# Patient Record
Sex: MALE | Race: WHITE | NOT HISPANIC OR LATINO | Employment: OTHER | URBAN - METROPOLITAN AREA
[De-identification: names, ages, dates, MRNs, and addresses within clinical notes are randomized per-mention and may not be internally consistent; named-entity substitution may affect disease eponyms.]

---

## 2017-01-11 ENCOUNTER — HOSPITAL ENCOUNTER (OUTPATIENT)
Dept: CARDIOLOGY | Facility: CLINIC | Age: 82
Discharge: HOME | End: 2017-01-11
Payer: MEDICARE

## 2017-01-11 ENCOUNTER — OFFICE VISIT (OUTPATIENT)
Dept: ELECTROPHYSIOLOGY | Facility: CLINIC | Age: 82
End: 2017-01-11
Payer: MEDICARE

## 2017-01-11 VITALS
WEIGHT: 157.88 LBS | SYSTOLIC BLOOD PRESSURE: 186 MMHG | BODY MASS INDEX: 23.38 KG/M2 | HEART RATE: 80 BPM | DIASTOLIC BLOOD PRESSURE: 98 MMHG | HEIGHT: 69 IN

## 2017-01-11 DIAGNOSIS — Z95.0 S/P PLACEMENT OF CARDIAC PACEMAKER: Chronic | ICD-10-CM

## 2017-01-11 DIAGNOSIS — I48.19 PERSISTENT ATRIAL FIBRILLATION: Primary | ICD-10-CM

## 2017-01-11 DIAGNOSIS — I48.91 ATRIAL FIBRILLATION, UNSPECIFIED TYPE: ICD-10-CM

## 2017-01-11 DIAGNOSIS — I45.9 HEART BLOCK: ICD-10-CM

## 2017-01-11 DIAGNOSIS — N18.30 CKD (CHRONIC KIDNEY DISEASE) STAGE 3, GFR 30-59 ML/MIN: ICD-10-CM

## 2017-01-11 DIAGNOSIS — Z95.2 S/P AVR (AORTIC VALVE REPLACEMENT): ICD-10-CM

## 2017-01-11 PROCEDURE — 99499 UNLISTED E&M SERVICE: CPT | Mod: S$GLB,,, | Performed by: INTERNAL MEDICINE

## 2017-01-11 PROCEDURE — 99999 PR PBB SHADOW E&M-EST. PATIENT-LVL III: CPT | Mod: PBBFAC,,, | Performed by: INTERNAL MEDICINE

## 2017-01-11 PROCEDURE — 1159F MED LIST DOCD IN RCRD: CPT | Mod: S$GLB,,, | Performed by: INTERNAL MEDICINE

## 2017-01-11 PROCEDURE — 99213 OFFICE O/P EST LOW 20 MIN: CPT | Mod: S$GLB,,, | Performed by: INTERNAL MEDICINE

## 2017-01-11 PROCEDURE — 93000 ELECTROCARDIOGRAM COMPLETE: CPT | Mod: S$GLB,,, | Performed by: INTERNAL MEDICINE

## 2017-01-11 PROCEDURE — 1160F RVW MEDS BY RX/DR IN RCRD: CPT | Mod: S$GLB,,, | Performed by: INTERNAL MEDICINE

## 2017-01-11 PROCEDURE — 1157F ADVNC CARE PLAN IN RCRD: CPT | Mod: S$GLB,,, | Performed by: INTERNAL MEDICINE

## 2017-01-11 RX ORDER — BENZONATATE 100 MG/1
CAPSULE ORAL
Refills: 0 | COMMUNITY
Start: 2016-12-27 | End: 2017-01-11

## 2017-01-11 RX ORDER — TIZANIDINE 4 MG/1
TABLET ORAL
Refills: 0 | COMMUNITY
Start: 2016-12-27 | End: 2017-01-11

## 2017-01-11 RX ORDER — AZITHROMYCIN 250 MG/1
TABLET, FILM COATED ORAL
Refills: 0 | COMMUNITY
Start: 2016-12-27 | End: 2017-01-11

## 2017-01-11 NOTE — PROGRESS NOTES
Subjective:    Patient ID:  Deena Moody is a 86 y.o. male who presents for follow-up of Atrial Fibrillation      HPI  Prior Hx:  I had the pleasure of seeing Mr. Moody today in our electrophysiology clinic in consultation for his new onset atrial fibrillation. 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 On remote monitoring new onset persistent atrial fibrillation was diagnosed. We asked him to come to the clinic for discussion. He has had no perceived change in how he is feeling since being in atrial fibrillation. He has never been diagnosed with atrial fibrillation before. Device interrogation shows 15% AF burden since last device check . He has been persistent since 10/31/2016. He is doing his full activities including mowing his lawn.     Interim Hx:  Mr. Moody was seen 2 months ago with discussion about atrial fibrillation and stroke risk reduction with anticoagulation.  We initiated apixaban.  He returns for follow-up.  He denies any changes in his symptoms or bleeding in his stool or black, tar-like stool.    Device interrogation from 12/1/2016 reported 99% RV pacing with 40% AF burden since implant which was persistent since 10/31/2016.    My interpretation of today's in clinic electrocardiogram is atrial fibrillation with ventricular pacing.    Review of Systems   Constitution: Negative.   HENT: Negative.    Eyes: Negative.    Cardiovascular: Negative.    Respiratory: Negative.    Endocrine: Negative.    Hematologic/Lymphatic: Negative.    Skin: Negative.    Musculoskeletal: Negative.    Gastrointestinal: Negative.    Genitourinary: Negative.    Neurological: Negative.    Psychiatric/Behavioral: Negative.         Objective:    Physical Exam   Constitutional: He is oriented to person, place, and time. He appears  well-developed and well-nourished. No distress.   HENT:   Head: Normocephalic and atraumatic.   Eyes: Conjunctivae are normal. Right eye exhibits no discharge. Left eye exhibits no discharge.   Neck: Neck supple. No JVD present.   Cardiovascular: Normal rate and regular rhythm.  Exam reveals no gallop and no friction rub.    Murmur heard.   Harsh crescendo-decrescendo midsystolic murmur is present with a grade of 3/6  at the upper right sternal border radiating to the neck  Pulmonary/Chest: Effort normal and breath sounds normal. No respiratory distress. He has no wheezes. He has no rales.   Abdominal: Soft. Bowel sounds are normal. He exhibits no distension. There is no tenderness. There is no rebound.   Musculoskeletal: He exhibits no edema or tenderness.   Neurological: He is alert and oriented to person, place, and time.   Skin: Skin is warm and dry. No rash noted. He is not diaphoretic. No erythema.   Psychiatric: He has a normal mood and affect. His behavior is normal. Thought content normal.   Vitals reviewed.        Assessment:       1. Persistent atrial fibrillation    2. Heart block    3. CKD (chronic kidney disease) stage 3, GFR 30-59 ml/min    4. S/P AVR (aortic valve replacement) with possible stenosis    5. S/P placement of cardiac pacemaker         Plan:        In summary, Mr. 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 We again addressed the need for CVA (stroke) prophylaxis with aspirin versus oral anticoagulation (warfarin vs NOACs, discussed bleeding risks, irreversibility of NOACs vs Vitamin K/plasma reversal of warfarin, and need to come to the ER for any head trauma for CT scanning even if asymptomatic). Since  he is asymptomatic and has AV block with 99% RV pacing we are leaving him in atrial fibrillation.  Since his creatinine is borderline 1.5, may at some point need the lower eliquis dosing.  Will check CBC, BMP today.    Will call with results of blood tests.    RTC in 6 months    Thank you for allowing me to participate in the care of this patient. Please do not hesitate to call me with any questions or concerns.    Donn Hastings MD, PhD  Cardiac Electrophysiology

## 2017-01-11 NOTE — MR AVS SNAPSHOT
[facility]  [address]  [facility] [address]  Phone: [phone]  Fax: [phone]                  Deena Moody   2017 9:20 AM   Office Visit    Description:  Male : 1930   Provider:  Donn Hastings MD   Department:  [facility]           Reason for Visit     Atrial Fibrillation           Diagnoses this Visit        Comments    Persistent atrial fibrillation    -  Primary     Heart block         CKD (chronic kidney disease) stage 3, GFR 30-59 ml/min         S/P AVR (aortic valve replacement)         S/P placement of cardiac pacemaker                To Do List           Future Appointments        Provider Department Dept Phone    2017 9:20 AM MD [facility] [phone]    2017 11:00 AM HRA, [facility] [phone]    3/6/2017 8:00 AM HOME MONITOR DEVICE CHECK, [facility] [phone]    2017 7:00 AM LAB, [facility] [phone]    2017 8:20 AM Jam Rowell MD [facility] [phone]      Goals (5 Years of Data)     None      Follow-Up and Disposition     Return in about 6 months (around 2017).    Follow-up and Disposition History      [facility] On Call     [facility] Nurse Care Line - 24/ Assistance  Registered nurses in the [facility] Center provide clinical advisement, health education, appointment booking, and other advisory services.  Call for this free service at [phone].             Medications           Message regarding Medications     Verify the changes and/or additions to your medication regime listed below are the same as discussed with your clinician today.  If any of these changes or additions are incorrect, please notify your healthcare provider.        STOP taking these medications     azithromycin (Z-CHRISTOPH) 250 MG tablet     benzonatate (TESSALON) 100 MG capsule TK 1 C PO BID FOR 10 DAYS     "VIRTUSSIN AC  mg/5 mL syrup TK 5 ML PO Q 6 TO 8 H PRN FOR COUGH           Verify that the below list of medications is an accurate representation of the medications you are currently taking.  If none reported, the list may be blank. If incorrect, please contact your healthcare provider. Carry this list with you in case of emergency.           Current Medications     amlodipine (NORVASC) 5 MG tablet Take 1 tablet (5 mg total) by mouth once daily.    apixaban (ELIQUIS) 5 mg Tab Take 1 tablet (5 mg total) by mouth 2 (two) times daily.    aspirin (ECOTRIN) 81 MG EC tablet Take 1 tablet (81 mg total) by mouth once daily.    atorvastatin (LIPITOR) 80 MG tablet Take 1 tablet (80 mg total) by mouth nightly.    benazepril (LOTENSIN) 5 MG tablet Take 1 tablet (5 mg total) by mouth once daily.    carvedilol (COREG) 12.5 MG tablet Take 1 tablet (12.5 mg total) by mouth 2 (two) times daily.    multivitamin (MULTIVITAMIN) per tablet Take 1 tablet by mouth once daily.      omeprazole (PRILOSEC) 20 MG capsule Take 20 mg by mouth once daily.           Clinical Reference Information           Vital Signs - Last Recorded  Most recent update: 1/11/2017  9:10 AM by Lorrie Hauser MA    BP Pulse Ht Wt BMI    (!) 186/98 80 5' 8.5" (1.74 m) 71.6 kg (157 lb 13.6 oz) 23.65 kg/m2      Blood Pressure          Most Recent Value    BP  (!)  186/98      Allergies as of 1/11/2017     Iodine And Iodide Containing Products      Immunizations Administered on Date of Encounter - 1/11/2017     None      Orders Placed During Today's Visit     Future Labs/Procedures Expected by Expires    Basic metabolic panel  1/11/2017 3/12/2018    CBC auto differential  1/11/2017 3/12/2018      "

## 2017-01-12 ENCOUNTER — TELEPHONE (OUTPATIENT)
Dept: ELECTROPHYSIOLOGY | Facility: CLINIC | Age: 82
End: 2017-01-12

## 2017-01-12 NOTE — TELEPHONE ENCOUNTER
----- Message from Donn Hastings MD sent at 1/11/2017  1:31 PM CST -----  Please notify the patient that his lab results are unchanged and we will keep his medications the same

## 2017-01-12 NOTE — TELEPHONE ENCOUNTER
Pt does not hear well via telephone. He asked that I speak with his wife. I spoke with Mrs. Moody, and informed her that his BMP results are unchanged, and we are going to keep him on his current medication regimen. She verbalized understanding and denied further questions, needs, and concerns.

## 2017-01-18 ENCOUNTER — OFFICE VISIT (OUTPATIENT)
Dept: FAMILY MEDICINE | Facility: CLINIC | Age: 82
End: 2017-01-18
Payer: MEDICARE

## 2017-01-18 VITALS
DIASTOLIC BLOOD PRESSURE: 84 MMHG | HEART RATE: 80 BPM | BODY MASS INDEX: 23.89 KG/M2 | HEIGHT: 68 IN | SYSTOLIC BLOOD PRESSURE: 132 MMHG | WEIGHT: 157.63 LBS

## 2017-01-18 DIAGNOSIS — E78.5 DYSLIPIDEMIA: ICD-10-CM

## 2017-01-18 DIAGNOSIS — Z00.00 ENCOUNTER FOR PREVENTIVE HEALTH EXAMINATION: Primary | ICD-10-CM

## 2017-01-18 DIAGNOSIS — I70.0 ATHEROSCLEROSIS OF AORTA: ICD-10-CM

## 2017-01-18 DIAGNOSIS — I10 ESSENTIAL HYPERTENSION: ICD-10-CM

## 2017-01-18 DIAGNOSIS — I27.89 OTHER CHRONIC PULMONARY HEART DISEASES: ICD-10-CM

## 2017-01-18 DIAGNOSIS — N18.30 CKD (CHRONIC KIDNEY DISEASE) STAGE 3, GFR 30-59 ML/MIN: ICD-10-CM

## 2017-01-18 DIAGNOSIS — I48.19 PERSISTENT ATRIAL FIBRILLATION: ICD-10-CM

## 2017-01-18 PROCEDURE — 99999 PR PBB SHADOW E&M-EST. PATIENT-LVL III: CPT | Mod: PBBFAC,,, | Performed by: NURSE PRACTITIONER

## 2017-01-18 PROCEDURE — G0439 PPPS, SUBSEQ VISIT: HCPCS | Mod: S$GLB,,, | Performed by: NURSE PRACTITIONER

## 2017-01-18 PROCEDURE — 99499 UNLISTED E&M SERVICE: CPT | Mod: S$GLB,,, | Performed by: NURSE PRACTITIONER

## 2017-01-18 RX ORDER — AMLODIPINE BESYLATE 5 MG/1
5 TABLET ORAL DAILY
Qty: 90 TABLET | Refills: 0 | Status: SHIPPED | OUTPATIENT
Start: 2017-01-18 | End: 2017-04-27 | Stop reason: SDUPTHER

## 2017-01-18 RX ORDER — CARVEDILOL 12.5 MG/1
12.5 TABLET ORAL 2 TIMES DAILY
Qty: 90 TABLET | Refills: 0 | Status: SHIPPED | OUTPATIENT
Start: 2017-01-18 | End: 2017-03-28 | Stop reason: SDUPTHER

## 2017-01-18 RX ORDER — OMEPRAZOLE 20 MG/1
20 CAPSULE, DELAYED RELEASE ORAL DAILY
Qty: 90 CAPSULE | Refills: 0 | Status: SHIPPED | OUTPATIENT
Start: 2017-01-18 | End: 2017-04-27 | Stop reason: SDUPTHER

## 2017-01-18 RX ORDER — BENAZEPRIL HYDROCHLORIDE 5 MG/1
5 TABLET ORAL DAILY
Qty: 90 TABLET | Refills: 0 | Status: SHIPPED | OUTPATIENT
Start: 2017-01-18 | End: 2017-04-27 | Stop reason: SDUPTHER

## 2017-01-18 RX ORDER — ATORVASTATIN CALCIUM 80 MG/1
80 TABLET, FILM COATED ORAL NIGHTLY
Qty: 90 TABLET | Refills: 0 | Status: SHIPPED | OUTPATIENT
Start: 2017-01-18 | End: 2017-04-27 | Stop reason: SDUPTHER

## 2017-01-18 NOTE — MR AVS SNAPSHOT
[facility]   [address]  [place] [address]  Phone: [phone]  Fax: [phone]                  Deena Moody   2017 11:00 AM   Office Visit    Description:  Male : 1930   Provider:  Korina Lopez NP   Department:  [facility]           Reason for Visit     Health Risk Assessment           Diagnoses this Visit        Comments    Persistent atrial fibrillation                To Do List           Future Appointments        Provider Department Dept Phone    2017 11:00 AM Korina Lopez NP [facility] [phone]    2/10/2017 2:00 PM Jam Rowell MD [facility] [phone]    3/6/2017 8:00 AM HOME MONITOR DEVICE CHECK, [facility] [phone]    2017 7:00 AM LAB, [facility] [phone]    2017 8:20 AM Jam Rowell MD [facility] [phone]      Goals (5 Years of Data)     None      Follow-Up and Disposition     Return in 3 weeks (on 2/10/2017) for follow-up with PCP, HRA visit in 1 year.       These Medications        Disp Refills Start End    amlodipine (NORVASC) 5 MG tablet 90 tablet 0 2017     Take 1 tablet (5 mg total) by mouth once daily. - Oral    Pharmacy: [facility] - [facility] [address] #: [phone]       apixaban (ELIQUIS) 5 mg Tab 60 tablet 0 2017     Take 1 tablet (5 mg total) by mouth 2 (two) times daily. - Oral    Pharmacy: [facility] - [facility] [address] Ph #: [phone]       atorvastatin (LIPITOR) 80 MG tablet 90 tablet 0 2017     Take 1 tablet (80 mg total) by mouth nightly. - Oral    Pharmacy: [facility] - [facility] [address] Ph #: [phone]       benazepril (LOTENSIN) 5 MG tablet 90 tablet 0 2017    Take 1 tablet (5 mg total) by mouth once daily. - Oral     Pharmacy: [facility] - [place], [place] - [address] #: [phone]       carvedilol (COREG) 12.5 MG tablet 90 tablet 0 1/18/2017     Take 1 tablet (12.5 mg total) by mouth 2 (two) times daily. - Oral    Pharmacy: [facility] - [facility] [address] #: [phone]       Notes to Pharmacy: **Patient requests 90 days supply**    omeprazole (PRILOSEC) 20 MG capsule 90 capsule 0 1/18/2017     Take 1 capsule (20 mg total) by mouth once daily. - Oral    Pharmacy: [facility] - [facility] [address] Ph #: [phone]         [facility]     [facility] Care [facility] - 24/7 Assistance  Registered nurses in the [facility] Center provide clinical advisement, health education, appointment booking, and other advisory services.  Call for this free service at [phone].             Medications           Message regarding Medications     Verify the changes and/or additions to your medication regime listed below are the same as discussed with your clinician today.  If any of these changes or additions are incorrect, please notify your healthcare provider.        CHANGE how you are taking these medications     Start Taking Instead of    omeprazole (PRILOSEC) 20 MG capsule omeprazole (PRILOSEC) 20 MG capsule    Dosage:  Take 1 capsule (20 mg total) by mouth once daily. Dosage:  Take 20 mg by mouth once daily.    Reason for Change:  Reorder            Verify that the below list of medications is an accurate representation of the medications you are currently taking.  If none reported, the list may be blank. If incorrect, please contact your healthcare provider. Carry this list with you in case of emergency.           Current Medications     amlodipine (NORVASC) 5 MG tablet Take 1 tablet (5 mg total) by mouth once daily.    apixaban (ELIQUIS) 5 mg Tab Take 1 tablet (5 mg total) by mouth 2 (two) times daily.    aspirin  "(ECOTRIN) 81 MG EC tablet Take 1 tablet (81 mg total) by mouth once daily.    atorvastatin (LIPITOR) 80 MG tablet Take 1 tablet (80 mg total) by mouth nightly.    benazepril (LOTENSIN) 5 MG tablet Take 1 tablet (5 mg total) by mouth once daily.    carvedilol (COREG) 12.5 MG tablet Take 1 tablet (12.5 mg total) by mouth 2 (two) times daily.    multivitamin (MULTIVITAMIN) per tablet Take 1 tablet by mouth once daily.      omeprazole (PRILOSEC) 20 MG capsule Take 1 capsule (20 mg total) by mouth once daily.           Clinical Reference Information           Vital Signs - Last Recorded  Most recent update: 1/18/2017  9:44 AM by Naomi Estes MA    BP Pulse Ht Wt BMI    132/84 80 5' 8" (1.727 m) 71.5 kg (157 lb 10.1 oz) 23.97 kg/m2      Blood Pressure          Most Recent Value    BP  132/84      Allergies as of 1/18/2017     Iodine And Iodide Containing Products      Immunizations Administered on Date of Encounter - 1/18/2017     None      "

## 2017-01-19 PROBLEM — I27.89 OTHER CHRONIC PULMONARY HEART DISEASES: Status: ACTIVE | Noted: 2017-01-19

## 2017-01-19 NOTE — PROGRESS NOTES
"Deena Moody presented for a  Medicare AWV and comprehensive Health Risk Assessment today. The following components were reviewed and updated:    · Medical history  · Family History  · Social history  · Allergies and Current Medications  · Health Risk Assessment  · Health Maintenance  · Care Team     ** See Completed Assessments for Annual Wellness Visit within the encounter summary.**       The following assessments were completed:  · Living Situation  · CAGE  · Depression Screening  · Timed Get Up and Go  · Whisper Test  · Cognitive Function Screening  · Nutrition Screening  · ADL Screening  · PAQ Screening    Vitals:    01/18/17 0942   BP: 132/84   Pulse: 80   Weight: 71.5 kg (157 lb 10.1 oz)   Height: 5' 8" (1.727 m)     Body mass index is 23.97 kg/(m^2).     Physical Exam   Constitutional: He is oriented to person, place, and time. He appears well-developed and well-nourished. No distress.   Hard of hearing   HENT:   Head: Normocephalic and atraumatic.   Eyes: EOM are normal. Pupils are equal, round, and reactive to light.   Neck: Neck supple. No JVD present. No tracheal deviation present.   Cardiovascular: Normal rate, regular rhythm and intact distal pulses.    Murmur heard.  Pulmonary/Chest: Effort normal and breath sounds normal. No respiratory distress. He has no wheezes. He has no rales.   Abdominal: Soft. Bowel sounds are normal. He exhibits no distension and no mass. There is no tenderness.   Musculoskeletal: Normal range of motion. He exhibits no edema or tenderness.   Neurological: He is alert and oriented to person, place, and time. Gait abnormal. Coordination normal.   Skin: Skin is warm and dry. No erythema. No pallor.   Psychiatric: He has a normal mood and affect. His behavior is normal. Judgment and thought content normal. Cognition and memory are normal. He expresses no homicidal and no suicidal ideation.   Nursing note and vitals reviewed.        Diagnoses and health risks identified today " and associated recommendations/orders:    1. Encounter for preventive health examination    2. Essential hypertension  Chronic; stable on medication.  Followed by PCP.    3. Dyslipidemia  Chronic; stable on medication.  Followed by PCP.    4. Atherosclerosis of aorta  Chronic; stable.  Followed by Cardiology.    5. Persistent atrial fibrillation  Chronic; stable on medication.  Followed by Cardiology.  - apixaban (ELIQUIS) 5 mg Tab; Take 1 tablet (5 mg total) by mouth 2 (two) times daily.  Dispense: 60 tablet; Refill: 0    6. Other chronic pulmonary heart diseases  Chronic; stable.  Followed by Cardiology.    7. CKD (chronic kidney disease) stage 3, GFR 30-59 ml/min  Chronic; stable.  Followed by PCP.    8. Body mass index (BMI) 23.0-23.9, adult      Provided Deena with a 5-10 year written screening schedule and personal prevention plan. Recommendations were developed using the USPSTF age appropriate recommendations. Education, counseling, and referrals were provided as needed. After Visit Summary printed and given to patient which includes a list of additional screenings\tests needed.    Return in 3 weeks (on 2/10/2017) for follow-up with PCP, HRA visit in 1 year.    Korina Lopez NP

## 2017-01-19 NOTE — PATIENT INSTRUCTIONS
Counseling and Referral of Other Preventative  (Italic type indicates deductible and co-insurance are waived)    Patient Name: Deena Moody  Today's Date: 1/19/2017      SERVICE LIMITATIONS RECOMMENDATION    Vaccines    · Pneumococcal (once after 65)    · Influenza (annually)    · Hepatitis B (if medium/high risk)    · Prevnar 13      Hepatitis B medium/high risk factors:       - End-stage renal disease       - Hemophiliacs who received Factor VII or         IX concentrates       - Clients of institutions for the mentally             retarded       - Persons who live in the same house as          a HepB carrier       - Homosexual men       - Illicit injectable drug abusers     Pneumococcal: Done, no repeat necessary     Influenza: Done, repeat in one year     Hepatitis B: N/A Not recommended     Prevnar 13: Done, repeat at next scheduled date    Prostate cancer screening (annually to age 75)     Prostate specific antigen (PSA) Shared decision making with Provider. Sometimes a co-pay may be required if the patient decides to have this test. The USPSTF no longer recommends prostate cancer screening routinely in medicine: not to follow    Colorectal cancer screening (to age 75)    · Fecal occult blood test (annual)  · Flexible sigmoidoscopy (5y)  · Screening colonoscopy (10y)  · Barium enema   N/A Not recommended    Diabetes self-management training (no USPSTF recommendations)  Requires referral by treating physician for patient with diabetes or renal disease. 10 hours of initial DSMT sessions of no less than 30 minutes each in a continuous 12-month period. 2 hours of follow-up DSMT in subsequent years.  N/A Not recommended    Glaucoma screening (no USPSTF recommendation)  Diabetes mellitus, family history   , age 50 or over    American, age 65 or over  Done this year, repeat every year    Medical nutrition therapy for diabetes or renal disease (no recommended schedule)  Requires referral by  treating physician for patient with diabetes or renal disease or kidney transplant within the past 3 years.  Can be provided in same year as diabetes self-management training (DSMT), and CMS recommends medical nutrition therapy take place after DSMT. Up to 3 hours for initial year and 2 hours in subsequent years.  N/A Not recommended    Cardiovascular screening blood tests (every 5 years)  · Fasting lipid panel  Order as a panel if possible  Done this year, repeat every year    Diabetes screening tests (at least every 3 years, Medicare covers annually or at 6-month intervals for prediabetic patients)  · Fasting blood sugar (FBS) or glucose tolerance test (GTT)  Patient must be diagnosed with one of the following:       - Hypertension       - Dyslipidemia       - Obesity (BMI 30kg/m2)       - Previous elevated impaired FBS or GTT       ... or any two of the following:       - Overweight (BMI 25 but <30)       - Family history of diabetes       - Age 65 or older       - History of gestational diabetes or birth of baby weighing more than 9 pounds  Done this year, repeat every year    Abdominal aortic aneurysm screening (once)  · Sonogram   Limited to patients who meet one of the following criteria:       - Men who are 65-75 years old and have smoked more than 100 cigarette in their lifetime       - Anyone with a family history of abdominal aortic aneurysm       - Anyone recommended for screening by the USPSTF  N/A Not recommended    HIV screening (annually for increased risk patients)  · HIV-1 and HIV-2 by EIA, or KEMAL, rapid antibody test or oral mucosa transudate  Patients must be at increased risk for HIV infection per USPSTF guidelines or pregnant. Tests covered annually for patient at increased risk or as requested by the patient. Pregnant patients may receive up to 3 tests during pregnancy.  Risks discussed, screening is not recommended    Smoking cessation counseling (up to 8 sessions per year)  Patients must  be asymptomatic of tobacco-related conditions to receive as a preventative service.  Never Smoker    Subsequent annual wellness visit  At least 12 months since last AWV  Return in one year     The following information is provided to all patients.  This information is to help you find resources for any of the problems found today that may be affecting your health:                Living healthy guide: www.[facility].[place].[facility]      Understanding Diabetes: www.diabetes.org      Eating healthy: www.cdc.gov/healthyweight      CDC home safety checklist: www.cdc.gov/steadi/patient.html      Agency on Aging: www.goea.[place].[facility]      Alcoholics anonymous (AA): www.aa.org      Physical Activity: www.sharri.nih.gov/qq4ruqc      Tobacco use: www.quitwithusla.org

## 2017-02-10 ENCOUNTER — OFFICE VISIT (OUTPATIENT)
Dept: FAMILY MEDICINE | Facility: CLINIC | Age: 82
End: 2017-02-10
Payer: MEDICARE

## 2017-02-10 VITALS
HEIGHT: 68 IN | DIASTOLIC BLOOD PRESSURE: 80 MMHG | SYSTOLIC BLOOD PRESSURE: 124 MMHG | WEIGHT: 158.94 LBS | HEART RATE: 72 BPM | BODY MASS INDEX: 24.09 KG/M2

## 2017-02-10 DIAGNOSIS — I10 ESSENTIAL HYPERTENSION: Primary | ICD-10-CM

## 2017-02-10 DIAGNOSIS — K21.9 GASTROESOPHAGEAL REFLUX DISEASE, ESOPHAGITIS PRESENCE NOT SPECIFIED: ICD-10-CM

## 2017-02-10 DIAGNOSIS — D63.8 CHRONIC DISEASE ANEMIA: ICD-10-CM

## 2017-02-10 DIAGNOSIS — E78.5 DYSLIPIDEMIA: ICD-10-CM

## 2017-02-10 PROCEDURE — 1159F MED LIST DOCD IN RCRD: CPT | Mod: S$GLB,,, | Performed by: FAMILY MEDICINE

## 2017-02-10 PROCEDURE — 1157F ADVNC CARE PLAN IN RCRD: CPT | Mod: S$GLB,,, | Performed by: FAMILY MEDICINE

## 2017-02-10 PROCEDURE — 1160F RVW MEDS BY RX/DR IN RCRD: CPT | Mod: S$GLB,,, | Performed by: FAMILY MEDICINE

## 2017-02-10 PROCEDURE — 1126F AMNT PAIN NOTED NONE PRSNT: CPT | Mod: S$GLB,,, | Performed by: FAMILY MEDICINE

## 2017-02-10 PROCEDURE — 99999 PR PBB SHADOW E&M-EST. PATIENT-LVL III: CPT | Mod: PBBFAC,,, | Performed by: FAMILY MEDICINE

## 2017-02-10 PROCEDURE — 99214 OFFICE O/P EST MOD 30 MIN: CPT | Mod: S$GLB,,, | Performed by: FAMILY MEDICINE

## 2017-02-10 PROCEDURE — 99499 UNLISTED E&M SERVICE: CPT | Mod: S$GLB,,, | Performed by: FAMILY MEDICINE

## 2017-02-10 NOTE — PROGRESS NOTES
Subjective:       Patient ID: Deena Moody is a 86 y.o. male.    Chief Complaint: Follow-up and Hypertension    HPI Comments: 86 years old male who came to the clinic for blood pressure check.  Blood pressure today is stable.  No chest pain palpitations orthopnea or PND.  Patient with chronic anemia but stable in comparison with previous reports.  Patient with good compliance on medical regimen.  No flareups with the esophageal reflux.    Hypertension   Pertinent negatives include no chest pain or palpitations.     Review of Systems   Constitutional: Negative.    HENT: Negative.    Eyes: Negative.    Respiratory: Negative.    Cardiovascular: Negative.  Negative for chest pain, palpitations and leg swelling.   Gastrointestinal: Negative.    Genitourinary: Negative.    Musculoskeletal: Negative.    Skin: Negative.    Neurological: Negative.    Psychiatric/Behavioral: Negative.        Objective:      Physical Exam   Constitutional: He is oriented to person, place, and time. He appears well-developed and well-nourished. No distress.   HENT:   Head: Normocephalic and atraumatic.   Right Ear: External ear normal.   Left Ear: External ear normal.   Nose: Nose normal.   Mouth/Throat: Oropharynx is clear and moist. No oropharyngeal exudate.   Eyes: Conjunctivae and EOM are normal. Pupils are equal, round, and reactive to light. Right eye exhibits no discharge. Left eye exhibits no discharge. No scleral icterus.   Neck: Normal range of motion. Neck supple. No JVD present. No tracheal deviation present. No thyromegaly present.   Cardiovascular: Normal rate, regular rhythm, normal heart sounds and intact distal pulses.  Exam reveals no gallop and no friction rub.    No murmur heard.  Pulmonary/Chest: Effort normal and breath sounds normal. No stridor. No respiratory distress. He has no wheezes. He has no rales. He exhibits no tenderness.   Abdominal: Soft. Bowel sounds are normal. He exhibits no distension and no mass. There  is no tenderness. There is no rebound and no guarding.   Musculoskeletal: Normal range of motion. He exhibits no edema or tenderness.   Lymphadenopathy:     He has no cervical adenopathy.   Neurological: He is alert and oriented to person, place, and time. He has normal reflexes. He displays normal reflexes. No cranial nerve deficit. He exhibits normal muscle tone. Coordination normal.   Skin: Skin is warm and dry. No rash noted. He is not diaphoretic. No erythema. No pallor.   Psychiatric: He has a normal mood and affect. His behavior is normal. Judgment and thought content normal.   Nursing note and vitals reviewed.      Assessment:       1. Essential hypertension    2. Dyslipidemia    3. Gastroesophageal reflux disease, esophagitis presence not specified    4. Chronic disease anemia        Plan:         Deena was seen today for follow-up and hypertension.    Diagnoses and all orders for this visit:    Essential hypertension  -     TSH; Future  -     Comprehensive metabolic panel; Future  -     Lipid panel; Future  -     CBC auto differential; Future    Dyslipidemia  -     TSH; Future  -     Comprehensive metabolic panel; Future  -     Lipid panel; Future    Gastroesophageal reflux disease, esophagitis presence not specified    Chronic disease anemia  -     CBC auto differential; Future

## 2017-02-10 NOTE — MR AVS SNAPSHOT
[facility]   [facility] [place] [address]  Phone: [phone]  Fax: [phone]                  Deena oMody   2/10/2017 2:00 PM   Office Visit    Description:  Male : 1930   Provider:  Jam Rowell MD   Department:  [facility]           Reason for Visit     Follow-up     Hypertension           Diagnoses this Visit        Comments    Essential hypertension    -  Primary     Dyslipidemia         Gastroesophageal reflux disease, esophagitis presence not specified         Chronic disease anemia                To Do List           Future Appointments        Provider Department Dept Phone    3/6/2017 8:00 AM HOME MONITOR DEVICE CHECK, [facility] - Arrhythmia [phone]    2017 7:00 AM LAB, [facility] [phone]    2017 8:20 AM Jam Rowell MD [facility] [phone]      Goals (5 Years of Data)     None      Follow-Up and Disposition     Return in about 6 months (around 8/10/2017), or if symptoms worsen or fail to improve.      [facility]     [facility] Care [facility] -  Assistance  Registered nurses in the [facility] provide clinical advisement, health education, appointment booking, and other advisory services.  Call for this free service at [phone].             Medications           Message regarding Medications     Verify the changes and/or additions to your medication regime listed below are the same as discussed with your clinician today.  If any of these changes or additions are incorrect, please notify your healthcare provider.             Verify that the below list of medications is an accurate representation of the medications you are currently taking.  If none reported, the list may be blank. If incorrect, please contact your healthcare provider. Carry this list with you in case of emergency.           Current Medications     amlodipine  "(NORVASC) 5 MG tablet Take 1 tablet (5 mg total) by mouth once daily.    apixaban (ELIQUIS) 5 mg Tab Take 1 tablet (5 mg total) by mouth 2 (two) times daily.    aspirin (ECOTRIN) 81 MG EC tablet Take 1 tablet (81 mg total) by mouth once daily.    atorvastatin (LIPITOR) 80 MG tablet Take 1 tablet (80 mg total) by mouth nightly.    benazepril (LOTENSIN) 5 MG tablet Take 1 tablet (5 mg total) by mouth once daily.    carvedilol (COREG) 12.5 MG tablet Take 1 tablet (12.5 mg total) by mouth 2 (two) times daily.    multivitamin (MULTIVITAMIN) per tablet Take 1 tablet by mouth once daily.      omeprazole (PRILOSEC) 20 MG capsule Take 1 capsule (20 mg total) by mouth once daily.           Clinical Reference Information           Your Vitals Were     BP Pulse Height Weight BMI    124/80 72 5' 8" (1.727 m) 72.1 kg (158 lb 15.2 oz) 24.17 kg/m2      Blood Pressure          Most Recent Value    BP  124/80      Allergies as of 2/10/2017     Iodine And Iodide Containing Products      Immunizations Administered on Date of Encounter - 2/10/2017     None      Orders Placed During Today's Visit     Future Labs/Procedures Expected by Expires    CBC auto differential  2/10/2017 2/10/2018    Comprehensive metabolic panel  2/10/2017 5/11/2017    Lipid panel  2/10/2017 5/11/2017    TSH  2/10/2017 5/11/2017      [facility] Sign-Up     Activating your [facility] account is as easy as 1-2-3!     1) Visit [email or web address], select Sign Up Now, enter this activation code and your date of birth, then select Next.  Activation code not generated  Current Patient Portal Status: Account disabled      2) Create a username and password to use when you visit [facility] in the future and select a security question in case you lose your password and select Next.    3) Enter your e-mail address and click Sign Up!    Additional Information  If you have questions, please e-mail [email or web address] or call [phone] to talk to our [facility] staff. Remember, " [facility] is NOT to be used for urgent needs. For medical emergencies, dial 911.         Language Assistance Services     ATTENTION: Language assistance services are available, free of charge. Please call [phone].      ATENCIÓN: Si habla franki, tiene a david disposición servicios gratuitos de asistencia lingüística. Llame al [phone].     CHÚ Ý: N?u b?n nói Ti?ng Vi?t, có các d?ch v? h? tr? ngôn ng? mi?n phí dành cho b?n. G?i s? [phone].         [facility] complies with applicable Federal civil rights laws and does not discriminate on the basis of race, color, national origin, age, disability, or sex.

## 2017-03-06 ENCOUNTER — CLINICAL SUPPORT (OUTPATIENT)
Dept: ELECTROPHYSIOLOGY | Facility: CLINIC | Age: 82
End: 2017-03-06
Payer: MEDICARE

## 2017-03-06 DIAGNOSIS — I48.91 ATRIAL FIBRILLATION, UNSPECIFIED TYPE: ICD-10-CM

## 2017-03-06 DIAGNOSIS — Z95.0 CARDIAC PACEMAKER IN SITU: ICD-10-CM

## 2017-03-06 PROCEDURE — 93294 REM INTERROG EVL PM/LDLS PM: CPT | Mod: S$GLB,,, | Performed by: INTERNAL MEDICINE

## 2017-03-06 PROCEDURE — 93296 REM INTERROG EVL PM/IDS: CPT | Mod: S$GLB,,, | Performed by: INTERNAL MEDICINE

## 2017-03-28 RX ORDER — CARVEDILOL 12.5 MG/1
12.5 TABLET ORAL 2 TIMES DAILY
Qty: 90 TABLET | Refills: 3 | Status: SHIPPED | OUTPATIENT
Start: 2017-03-28 | End: 2017-04-27 | Stop reason: SDUPTHER

## 2017-04-27 DIAGNOSIS — I48.19 PERSISTENT ATRIAL FIBRILLATION: ICD-10-CM

## 2017-04-27 RX ORDER — AMLODIPINE BESYLATE 5 MG/1
5 TABLET ORAL DAILY
Qty: 90 TABLET | Refills: 1 | Status: SHIPPED | OUTPATIENT
Start: 2017-04-27 | End: 2017-07-29 | Stop reason: SDUPTHER

## 2017-04-27 RX ORDER — CARVEDILOL 12.5 MG/1
12.5 TABLET ORAL 2 TIMES DAILY
Qty: 90 TABLET | Refills: 1 | Status: SHIPPED | OUTPATIENT
Start: 2017-04-27 | End: 2017-07-29 | Stop reason: SDUPTHER

## 2017-04-27 RX ORDER — BENAZEPRIL HYDROCHLORIDE 5 MG/1
5 TABLET ORAL DAILY
Qty: 90 TABLET | Refills: 1 | Status: SHIPPED | OUTPATIENT
Start: 2017-04-27 | End: 2017-07-29 | Stop reason: SDUPTHER

## 2017-04-27 RX ORDER — ATORVASTATIN CALCIUM 80 MG/1
80 TABLET, FILM COATED ORAL NIGHTLY
Qty: 90 TABLET | Refills: 1 | Status: SHIPPED | OUTPATIENT
Start: 2017-04-27 | End: 2017-07-29 | Stop reason: SDUPTHER

## 2017-04-27 RX ORDER — OMEPRAZOLE 20 MG/1
20 CAPSULE, DELAYED RELEASE ORAL DAILY
Qty: 90 CAPSULE | Refills: 1 | Status: SHIPPED | OUTPATIENT
Start: 2017-04-27 | End: 2017-07-29 | Stop reason: SDUPTHER

## 2017-05-08 ENCOUNTER — LAB VISIT (OUTPATIENT)
Dept: LAB | Facility: HOSPITAL | Age: 82
End: 2017-05-08
Attending: FAMILY MEDICINE
Payer: MEDICARE

## 2017-05-08 DIAGNOSIS — E78.5 DYSLIPIDEMIA: ICD-10-CM

## 2017-05-08 DIAGNOSIS — N18.30 CKD (CHRONIC KIDNEY DISEASE) STAGE 3, GFR 30-59 ML/MIN: ICD-10-CM

## 2017-05-08 DIAGNOSIS — I10 ESSENTIAL HYPERTENSION: ICD-10-CM

## 2017-05-08 DIAGNOSIS — D63.8 CHRONIC DISEASE ANEMIA: ICD-10-CM

## 2017-05-08 LAB
ALBUMIN SERPL BCP-MCNC: 3.5 G/DL
ALP SERPL-CCNC: 132 U/L
ALT SERPL W/O P-5'-P-CCNC: 18 U/L
ANION GAP SERPL CALC-SCNC: 8 MMOL/L
AST SERPL-CCNC: 23 U/L
BASOPHILS # BLD AUTO: 0.06 K/UL
BASOPHILS NFR BLD: 0.7 %
BILIRUB SERPL-MCNC: 1.4 MG/DL
BUN SERPL-MCNC: 17 MG/DL
CALCIUM SERPL-MCNC: 9.1 MG/DL
CHLORIDE SERPL-SCNC: 108 MMOL/L
CHOLEST/HDLC SERPL: 3.4 {RATIO}
CO2 SERPL-SCNC: 27 MMOL/L
CREAT SERPL-MCNC: 1.4 MG/DL
DIFFERENTIAL METHOD: ABNORMAL
EOSINOPHIL # BLD AUTO: 0.7 K/UL
EOSINOPHIL NFR BLD: 7.4 %
ERYTHROCYTE [DISTWIDTH] IN BLOOD BY AUTOMATED COUNT: 15.4 %
EST. GFR  (AFRICAN AMERICAN): 52.2 ML/MIN/1.73 M^2
EST. GFR  (NON AFRICAN AMERICAN): 45.2 ML/MIN/1.73 M^2
GLUCOSE SERPL-MCNC: 92 MG/DL
HCT VFR BLD AUTO: 37.7 %
HDL/CHOLESTEROL RATIO: 29.1 %
HDLC SERPL-MCNC: 117 MG/DL
HDLC SERPL-MCNC: 34 MG/DL
HGB BLD-MCNC: 11.6 G/DL
LDLC SERPL CALC-MCNC: 71.2 MG/DL
LYMPHOCYTES # BLD AUTO: 2.3 K/UL
LYMPHOCYTES NFR BLD: 26.4 %
MCH RBC QN AUTO: 30.3 PG
MCHC RBC AUTO-ENTMCNC: 30.8 %
MCV RBC AUTO: 98 FL
MONOCYTES # BLD AUTO: 0.9 K/UL
MONOCYTES NFR BLD: 9.7 %
NEUTROPHILS # BLD AUTO: 5 K/UL
NEUTROPHILS NFR BLD: 55.8 %
NONHDLC SERPL-MCNC: 83 MG/DL
PLATELET # BLD AUTO: 168 K/UL
PMV BLD AUTO: 9.6 FL
POTASSIUM SERPL-SCNC: 4 MMOL/L
PROT SERPL-MCNC: 7.3 G/DL
RBC # BLD AUTO: 3.83 M/UL
SODIUM SERPL-SCNC: 143 MMOL/L
TRIGL SERPL-MCNC: 59 MG/DL
TSH SERPL DL<=0.005 MIU/L-ACNC: 2.19 UIU/ML
WBC # BLD AUTO: 8.87 K/UL

## 2017-05-08 PROCEDURE — 84443 ASSAY THYROID STIM HORMONE: CPT

## 2017-05-08 PROCEDURE — 36415 COLL VENOUS BLD VENIPUNCTURE: CPT | Mod: PO

## 2017-05-08 PROCEDURE — 85025 COMPLETE CBC W/AUTO DIFF WBC: CPT

## 2017-05-08 PROCEDURE — 80053 COMPREHEN METABOLIC PANEL: CPT

## 2017-05-08 PROCEDURE — 80061 LIPID PANEL: CPT

## 2017-05-11 ENCOUNTER — OFFICE VISIT (OUTPATIENT)
Dept: FAMILY MEDICINE | Facility: CLINIC | Age: 82
End: 2017-05-11
Payer: MEDICARE

## 2017-05-11 VITALS
WEIGHT: 156.5 LBS | HEART RATE: 64 BPM | HEIGHT: 68 IN | BODY MASS INDEX: 23.72 KG/M2 | DIASTOLIC BLOOD PRESSURE: 72 MMHG | SYSTOLIC BLOOD PRESSURE: 140 MMHG

## 2017-05-11 DIAGNOSIS — D63.8 CHRONIC DISEASE ANEMIA: ICD-10-CM

## 2017-05-11 DIAGNOSIS — I48.91 ATRIAL FIBRILLATION, UNSPECIFIED TYPE: ICD-10-CM

## 2017-05-11 DIAGNOSIS — N18.30 CKD (CHRONIC KIDNEY DISEASE) STAGE 3, GFR 30-59 ML/MIN: ICD-10-CM

## 2017-05-11 DIAGNOSIS — I48.19 PERSISTENT ATRIAL FIBRILLATION: ICD-10-CM

## 2017-05-11 DIAGNOSIS — E78.5 DYSLIPIDEMIA: ICD-10-CM

## 2017-05-11 DIAGNOSIS — Z00.00 ANNUAL PHYSICAL EXAM: Primary | ICD-10-CM

## 2017-05-11 DIAGNOSIS — I10 ESSENTIAL HYPERTENSION: ICD-10-CM

## 2017-05-11 PROCEDURE — 99397 PER PM REEVAL EST PAT 65+ YR: CPT | Mod: S$GLB,,, | Performed by: FAMILY MEDICINE

## 2017-05-11 PROCEDURE — 99499 UNLISTED E&M SERVICE: CPT | Mod: S$GLB,,, | Performed by: FAMILY MEDICINE

## 2017-05-11 PROCEDURE — 99999 PR PBB SHADOW E&M-EST. PATIENT-LVL III: CPT | Mod: PBBFAC,,, | Performed by: FAMILY MEDICINE

## 2017-05-11 NOTE — PATIENT INSTRUCTIONS
Anemia  Anemia is a condition that occurs when your body does not have enough healthy red blood cells (RBCs). Your RBCs are the parts of your blood that carry oxygen throughout your body. A protein called hemoglobin allows your RBCs to absorb and release oxygen. Without enough RBCs or hemoglobin, your body doesn't get enough oxygen. Symptoms of anemia may then occur.    Symptoms of anemia  Some people with anemia have no symptoms. But most people have symptoms that range from mild to severe. These can include:  · Tiredness (fatigue)  · Weakness  · Pale skin  · Shortness of breath  · Dizziness or fainting  · Rapid heartbeat  · Trouble doing normal amounts of activity  · Jaundice (yellowing of your eyes, skin, or mouth; dark urine)  Causes of anemia  Anemia can occur when your body:  · Loses too much blood  · Does not make enough RBCs  · Destroys your RBCs at a faster rate than it can replace them  · Does not make a normal amount of hemoglobin in your RBCs  These problems can occur for many reasons, including:  · A condition that you are born with (congenital or inherited). This includes sickle cell disease or thalassemia.  · Heavy bleeding for any reason, including injury, surgery, childbirth, or even heavy menstrual periods.  · Being low in certain nutrients, such as iron, folate, or vitamin B12. This may be due to poor diet. Also, a condition like celiac disease or Crohn's disease can cause poor absorption of these nutrients  · Certain chronic conditions like diabetes, arthritis, or kidney disease.  · Certain chronic infections like tuberculosis or HIV.  · Exposure to certain medications, such as those used for chemotherapy.  There are different types of anemia. Your doctor can tell you more about the type of anemia you have and what may have caused it.  Diagnosing anemia  To diagnose anemia, your doctor gives you blood tests. These can include:  · Complete blood cell count (CBC). This test measures the amounts  of the different types of blood cells.  · Blood smear. This test checks the size and shape of your blood cells. To perform the test, your doctor views a drop of your blood under a microscope. Your doctor uses a stain to make the blood cells easier to see.  · Iron studies. These tests measure the amount of iron in your blood. Your body needs iron to make hemoglobin in your RBCs.  · Vitamin B12 and folate studies. These tests check for some of the components that help give RBCs a normal size and shape.  · Reticulocyte count. This test measures the amount of new RBCs that your bone marrow makes.  · Hemoglobin electrophoresis. This test checks for problems with your hemoglobin in RBCs.  Treating anemia  Treatment for anemia is based on the type of anemia, its cause, and the severity of your symptoms. Treatments may include:  · Diet changes. This involves increasing the amount of certain nutrients in your diet, such as iron, vitamin B12, or folate. Your doctor may also prescribe nutrient supplements.  · Medications. Certain medications treat the cause of your anemia. Others help build new RBCs or relieve symptoms. If a medication is the cause of your anemia, you may need to stop or change it.  · Blood transfusions. Replacing some of your blood can increase the number of healthy RBCs in your body.  · Surgery. In some cases, your doctor can do surgery to treat the underlying cause of anemia. If you need surgery, your doctor will explain the procedure and outline the risks and benefits for you.  Long-term concerns  If you have a certain type of anemia, you can expect a full recovery after treatment. If you have other types of anemia (especially a type you're born with), you will need to manage it for life. Your doctor can tell you more.  Date Last Reviewed: 4/27/2015  © 1520-3457 [employer]. [address] [place] [address], [place] [address]. All rights reserved. This information is not intended as a substitute for  professional medical care. Always follow your healthcare professional's instructions.

## 2017-05-11 NOTE — MR AVS SNAPSHOT
[facility]   [facility] [place] [address]  Phone: [phone]  Fax: [phone]                  Deena Moody   2017 8:20 AM   Office Visit    Description:  Male : 1930   Provider:  Jam Rowell MD   Department:  [facility]           Reason for Visit     Annual Exam           Diagnoses this Visit        Comments    Annual physical exam    -  Primary     Essential hypertension         Persistent atrial fibrillation         Dyslipidemia         CKD (chronic kidney disease) stage 3, GFR 30-59 ml/min         Chronic disease anemia                To Do List           Future Appointments        Provider Department Dept Phone    2017 8:00 AM HOME MONITOR DEVICE CHECK, [facility] - Arrhythmia [phone]    2017 9:00 AM EKG, [facility] - [phone]    2017 9:20 AM [facility] - Arrhythmia [phone]    2017 7:00 AM LAB, [facility] [phone]    11/10/2017 8:20 AM Jam Rowell MD [facility] [phone]      Goals (5 Years of Data)     None      Follow-Up and Disposition     Return in about 6 months (around 2017), or if symptoms worsen or fail to improve.      [facility]     [facility] Care [facility] - 24/ Assistance  Unless otherwise directed by your provider, please contact [facility], our nurse care line that is available for  assistance.     Registered nurses in the [facility] provide: appointment scheduling, clinical advisement, health education, and other advisory services.  Call: [phone] (toll free)               Medications           Message regarding Medications     Verify the changes and/or additions to your medication regime listed below are the same as discussed with your clinician today.  If any of these changes or additions are incorrect, please notify your healthcare provider.            "  Verify that the below list of medications is an accurate representation of the medications you are currently taking.  If none reported, the list may be blank. If incorrect, please contact your healthcare provider. Carry this list with you in case of emergency.           Current Medications     amlodipine (NORVASC) 5 MG tablet Take 1 tablet (5 mg total) by mouth once daily.    apixaban (ELIQUIS) 5 mg Tab Take 1 tablet (5 mg total) by mouth 2 (two) times daily.    aspirin (ECOTRIN) 81 MG EC tablet Take 1 tablet (81 mg total) by mouth once daily.    atorvastatin (LIPITOR) 80 MG tablet Take 1 tablet (80 mg total) by mouth nightly.    benazepril (LOTENSIN) 5 MG tablet Take 1 tablet (5 mg total) by mouth once daily.    carvedilol (COREG) 12.5 MG tablet Take 1 tablet (12.5 mg total) by mouth 2 (two) times daily.    multivitamin (MULTIVITAMIN) per tablet Take 1 tablet by mouth once daily.      omeprazole (PRILOSEC) 20 MG capsule Take 1 capsule (20 mg total) by mouth once daily.           Clinical Reference Information           Your Vitals Were     BP Pulse Height Weight BMI    140/72 (BP Location: Left arm, Patient Position: Sitting, BP Method: Manual) 64 5' 8" (1.727 m) 71 kg (156 lb 8.4 oz) 23.8 kg/m2      Blood Pressure          Most Recent Value    BP  (!)  140/72      Allergies as of 5/11/2017     Iodine And Iodide Containing Products      Immunizations Administered on Date of Encounter - 5/11/2017     None      Orders Placed During Today's Visit     Future Labs/Procedures Expected by Expires    CBC auto differential  5/11/2017 12/27/2018    Comprehensive metabolic panel  5/11/2017 12/27/2018    Lipid panel  5/11/2017 12/27/2018    TSH  5/11/2017 12/27/2018      [facility] Sign-Up     Activating your [facility] account is as easy as 1-2-3!     1) Visit [email or web address], select Sign Up Now, enter this activation code and your date of birth, then select Next.  Activation code not generated  Current Patient Portal Status: " Account disabled      2) Create a username and password to use when you visit [facility] in the future and select a security question in case you lose your password and select Next.    3) Enter your e-mail address and click Sign Up!    Additional Information  If you have questions, please e-mail [email or web address] or call [phone] to talk to our [facility] staff. Remember, [facility] is NOT to be used for urgent needs. For medical emergencies, dial 911.         Instructions      Anemia  Anemia is a condition that occurs when your body does not have enough healthy red blood cells (RBCs). Your RBCs are the parts of your blood that carry oxygen throughout your body. A protein called hemoglobin allows your RBCs to absorb and release oxygen. Without enough RBCs or hemoglobin, your body doesn't get enough oxygen. Symptoms of anemia may then occur.    Symptoms of anemia  Some people with anemia have no symptoms. But most people have symptoms that range from mild to severe. These can include:  · Tiredness (fatigue)  · Weakness  · Pale skin  · Shortness of breath  · Dizziness or fainting  · Rapid heartbeat  · Trouble doing normal amounts of activity  · Jaundice (yellowing of your eyes, skin, or mouth; dark urine)  Causes of anemia  Anemia can occur when your body:  · Loses too much blood  · Does not make enough RBCs  · Destroys your RBCs at a faster rate than it can replace them  · Does not make a normal amount of hemoglobin in your RBCs  These problems can occur for many reasons, including:  · A condition that you are born with (congenital or inherited). This includes sickle cell disease or thalassemia.  · Heavy bleeding for any reason, including injury, surgery, childbirth, or even heavy menstrual periods.  · Being low in certain nutrients, such as iron, folate, or vitamin B12. This may be due to poor diet. Also, a condition like celiac disease or Crohn's disease can cause poor absorption of these nutrients  · Certain  chronic conditions like diabetes, arthritis, or kidney disease.  · Certain chronic infections like tuberculosis or HIV.  · Exposure to certain medications, such as those used for chemotherapy.  There are different types of anemia. Your doctor can tell you more about the type of anemia you have and what may have caused it.  Diagnosing anemia  To diagnose anemia, your doctor gives you blood tests. These can include:  · Complete blood cell count (CBC). This test measures the amounts of the different types of blood cells.  · Blood smear. This test checks the size and shape of your blood cells. To perform the test, your doctor views a drop of your blood under a microscope. Your doctor uses a stain to make the blood cells easier to see.  · Iron studies. These tests measure the amount of iron in your blood. Your body needs iron to make hemoglobin in your RBCs.  · Vitamin B12 and folate studies. These tests check for some of the components that help give RBCs a normal size and shape.  · Reticulocyte count. This test measures the amount of new RBCs that your bone marrow makes.  · Hemoglobin electrophoresis. This test checks for problems with your hemoglobin in RBCs.  Treating anemia  Treatment for anemia is based on the type of anemia, its cause, and the severity of your symptoms. Treatments may include:  · Diet changes. This involves increasing the amount of certain nutrients in your diet, such as iron, vitamin B12, or folate. Your doctor may also prescribe nutrient supplements.  · Medications. Certain medications treat the cause of your anemia. Others help build new RBCs or relieve symptoms. If a medication is the cause of your anemia, you may need to stop or change it.  · Blood transfusions. Replacing some of your blood can increase the number of healthy RBCs in your body.  · Surgery. In some cases, your doctor can do surgery to treat the underlying cause of anemia. If you need surgery, your doctor will explain the  procedure and outline the risks and benefits for you.  Long-term concerns  If you have a certain type of anemia, you can expect a full recovery after treatment. If you have other types of anemia (especially a type you're born with), you will need to manage it for life. Your doctor can tell you more.  Date Last Reviewed: 4/27/2015  © 8885-3698 [employer]. [address] [place] [address], [place] [address]. All rights reserved. This information is not intended as a substitute for professional medical care. Always follow your healthcare professional's instructions.             Language Assistance Services     ATTENTION: Language assistance services are available, free of charge. Please call [phone].      ATENCIÓN: Si aston doan, tiene a david disposición servicios gratuitos de asistencia lingüística. Llame al [phone].     LUCINA Ý: N?u b?n nói Ti?ng Vi?t, có các d?ch v? h? tr? ngôn ng? mi?n phí dành cho b?n. G?i s? [phone].         [facility] complies with applicable Federal civil rights laws and does not discriminate on the basis of race, color, national origin, age, disability, or sex.

## 2017-05-11 NOTE — PROGRESS NOTES
Subjective:       Patient ID: Deena Moody is a 86 y.o. male.    Chief Complaint: Annual Exam    HPI Comments: 86 years old male came to the clinic for his physical examination.  Blood pressure is stable.  No chest pain palpitations orthopnea or PND.  Patient with decreased kidney function but stable in comparison with previous reports.  Atrial fibrillation currently stable.  No symptoms flare ups.  Patient with mild anemia but stable in comparison with previous reports.    Review of Systems   Constitutional: Negative.    HENT: Negative.    Eyes: Negative.    Respiratory: Negative.    Cardiovascular: Negative.  Negative for chest pain, palpitations and leg swelling.   Gastrointestinal: Negative.    Genitourinary: Negative.    Musculoskeletal: Negative.    Skin: Negative.    Neurological: Negative.    Psychiatric/Behavioral: Negative.        Objective:      Physical Exam   Constitutional: He is oriented to person, place, and time. He appears well-developed and well-nourished. No distress.   HENT:   Head: Normocephalic and atraumatic.   Right Ear: External ear normal.   Left Ear: External ear normal.   Nose: Nose normal.   Mouth/Throat: Oropharynx is clear and moist. No oropharyngeal exudate.   Eyes: Conjunctivae and EOM are normal. Pupils are equal, round, and reactive to light. Right eye exhibits no discharge. Left eye exhibits no discharge. No scleral icterus.   Neck: Normal range of motion. Neck supple. No JVD present. No tracheal deviation present. No thyromegaly present.   Cardiovascular: Normal rate, normal heart sounds and intact distal pulses.  An irregularly irregular rhythm present. Exam reveals no gallop and no friction rub.    No murmur heard.  Pulmonary/Chest: Effort normal and breath sounds normal. No stridor. No respiratory distress. He has no wheezes. He has no rales. He exhibits no tenderness.   Abdominal: Soft. Bowel sounds are normal. He exhibits no distension and no mass. There is no  tenderness. There is no rebound and no guarding.   Musculoskeletal: Normal range of motion. He exhibits no edema or tenderness.   Lymphadenopathy:     He has no cervical adenopathy.   Neurological: He is alert and oriented to person, place, and time. He has normal reflexes. He displays normal reflexes. No cranial nerve deficit. He exhibits normal muscle tone. Coordination and gait abnormal.   Skin: Skin is warm and dry. No rash noted. He is not diaphoretic. No erythema. No pallor.   Psychiatric: He has a normal mood and affect. His behavior is normal. Judgment and thought content normal.   Nursing note and vitals reviewed.      Assessment:       1. Annual physical exam    2. Essential hypertension    3. Persistent atrial fibrillation    4. Dyslipidemia    5. CKD (chronic kidney disease) stage 3, GFR 30-59 ml/min    6. Chronic disease anemia        Plan:         Deena was seen today for annual exam.    Diagnoses and all orders for this visit:    Annual physical exam    Essential hypertension  -     Comprehensive metabolic panel; Future  -     Lipid panel; Future  -     TSH; Future  -     CBC auto differential; Future    Persistent atrial fibrillation  -     TSH; Future    Dyslipidemia  -     Comprehensive metabolic panel; Future  -     Lipid panel; Future    CKD (chronic kidney disease) stage 3, GFR 30-59 ml/min  -     Comprehensive metabolic panel; Future  -     TSH; Future    Chronic disease anemia  -     CBC auto differential; Future    Continue monitoring blood pressure at home, low sodium diet.

## 2017-06-06 ENCOUNTER — HOSPITAL ENCOUNTER (EMERGENCY)
Facility: HOSPITAL | Age: 82
Discharge: HOME | End: 2017-06-06
Attending: FAMILY MEDICINE
Payer: MEDICARE

## 2017-06-06 VITALS
TEMPERATURE: 98 F | BODY MASS INDEX: 23.19 KG/M2 | OXYGEN SATURATION: 96 % | WEIGHT: 162 LBS | RESPIRATION RATE: 16 BRPM | SYSTOLIC BLOOD PRESSURE: 167 MMHG | DIASTOLIC BLOOD PRESSURE: 82 MMHG | HEART RATE: 79 BPM | HEIGHT: 70 IN
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
## 2017-06-06 DIAGNOSIS — S09.90XA HEAD TRAUMA, INITIAL ENCOUNTER: Primary | ICD-10-CM
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
## 2017-06-06 PROCEDURE — 99284 EMERGENCY DEPT VISIT MOD MDM: CPT

## 2017-06-06 PROCEDURE — 99284 EMERGENCY DEPT VISIT MOD MDM: CPT | Mod: ,,, | Performed by: PHYSICIAN ASSISTANT

## 2017-06-06 NOTE — ED TRIAGE NOTES
2 weeks ago patient reports that he was outside blowing off the lawn when it began to rain.  He states that he snatched the power cord out of the electrical socket and the plug end hit him in the back of the head.  Has no contusion noted.

## 2017-06-06 NOTE — ED PROVIDER NOTES
"Encounter Date: 6/6/2017       History     Chief Complaint   Patient presents with    Head Injury     hit in back of head with plug on sat, denies loc, on eliquis bid     Review of patient's allergies indicates:   Allergen Reactions    Iodine and iodide containing products Other (See Comments)     Caused changes in skin color     Patient is a 86-year-old male with a past medical history of HTN, HLD, CAD, diastolic heart failure, pacemaker, A. fib who is on Eliquis who presents the ED with head trauma.  Patient states on Saturday a plug hit him in the back of the head.  He states that since, he's been feeling "fine".  Patient denies any headaches, but does state that he feels different.  He is having difficulty describing his symptoms and states that he thinks he could be dizzy.  Wife states that they came to the ED today because she called her PCP and describes his symptoms to her PCP's nurse who instructed them to report to the ED due to his risk factors.  He denies any dizziness, lightheadedness, nausea, vomiting, blurred vision, changes in hearing.          Past Medical History:   Diagnosis Date    Acute on chronic diastolic heart failure 9/1/2016    Coronary artery disease     Hyperlipidemia     Hypertension     Macular degeneration (senile) of retina, unspecified 12/12/2014    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    Persistent atrial fibrillation 11/10/2016    S/P placement of cardiac pacemaker 9/14/2016     Past Surgical History:   Procedure Laterality Date    AORTIC VALVE REPLACEMENT N/A     CARDIAC PACEMAKER PLACEMENT      CATARACT EXTRACTION W/  INTRAOCULAR LENS IMPLANT Right 2/16/2016    Dr. Whitley    CATARACT EXTRACTION W/  INTRAOCULAR LENS IMPLANT Left 3/1/2016    Dr. Whitley    CORONARY ARTERY BYPASS GRAFT      EAR EXAMINATION UNDER ANESTHESIA      EYE SURGERY       Family History   Problem Relation Age of Onset    No Known Problems Mother     No Known Problems Father     No Known " Problems Sister     Stroke Brother     No Known Problems Maternal Aunt     No Known Problems Maternal Uncle     No Known Problems Paternal Aunt     No Known Problems Paternal Uncle     No Known Problems Maternal Grandmother     No Known Problems Maternal Grandfather     No Known Problems Paternal Grandmother     No Known Problems Paternal Grandfather     No Known Problems Daughter     No Known Problems Son     Amblyopia Neg Hx     Blindness Neg Hx     Cancer Neg Hx     Cataracts Neg Hx     Diabetes Neg Hx     Glaucoma Neg Hx     Hypertension Neg Hx     Macular degeneration Neg Hx     Retinal detachment Neg Hx     Strabismus Neg Hx     Thyroid disease Neg Hx      Social History   Substance Use Topics    Smoking status: Never Smoker    Smokeless tobacco: Not on file    Alcohol use No     Review of Systems   Constitutional: Negative for chills and fever.   HENT: Negative for ear pain and sore throat.    Eyes: Negative for visual disturbance.   Respiratory: Negative for cough and shortness of breath.    Cardiovascular: Negative for chest pain.   Gastrointestinal: Negative for abdominal pain and nausea.   Endocrine: Negative for polyuria.   Genitourinary: Negative for dysuria and hematuria.   Musculoskeletal: Negative for back pain.   Skin: Negative for rash.   Neurological: Positive for dizziness and headaches. Negative for seizures, syncope and weakness.   Hematological: Does not bruise/bleed easily.       Physical Exam     Initial Vitals [06/06/17 1414]   BP Pulse Resp Temp SpO2   (!) 173/85 80 18 97.6 °F (36.4 °C) 99 %     Physical Exam    Vitals reviewed.  Constitutional: He appears well-developed and well-nourished. He is not diaphoretic. No distress.   HENT:   Head: Normocephalic and atraumatic. Head is without raccoon's eyes, without Jimenez's sign, without abrasion, without contusion and without laceration.   Nose: Nose normal.   Mouth/Throat: Oropharynx is clear and moist.   Eyes:  Conjunctivae and EOM are normal. Pupils are equal, round, and reactive to light.   Neck: Normal range of motion. Neck supple.   Cardiovascular: Normal rate, regular rhythm and normal heart sounds. Exam reveals no friction rub.    No murmur heard.  Pulmonary/Chest: Breath sounds normal. No respiratory distress. He has no wheezes. He has no rales.   Abdominal: Soft. Bowel sounds are normal. He exhibits no distension. There is no tenderness.   Musculoskeletal: Normal range of motion.   Neurological: He is alert and oriented to person, place, and time. He has normal strength. No cranial nerve deficit or sensory deficit. He displays a negative Romberg sign. Coordination and gait normal.   Normal finger to nose and rapid alternating hand movements.   Skin: Skin is warm and dry. No erythema.   Psychiatric: He has a normal mood and affect. Thought content normal.         ED Course   Procedures  Labs Reviewed - No data to display          Medical Decision Making:   History:   Old Medical Records: I decided to obtain old medical records.  Clinical Tests:   Radiological Study: Ordered and Reviewed    Imaging Results          CT Head Without Contrast (Final result)  Result time 06/06/17 16:58:36    Final result by Clark Crowley MD (06/06/17 16:58:36)                 Impression:         No evidence of acute intracranial abnormality. Particularly, no evidence of acute infarction or hemorrhage.  ______________________________________     Electronically signed by resident: SANIYA SOARES MD  Date:     06/06/17  Time:    16:32            As the supervising and teaching physician, I personally reviewed the images and resident's interpretation and I agree with the findings.          Electronically signed by: CLARK CROWLEY MD  Date:     06/06/17  Time:    16:58              Narrative:    CT head without contrast    06/06/17 16:19:16    Accession# [id number]    CLINICAL INDICATION: 86 year old M with head injury, on eliquis  "    TECHNIQUE: Axial CT images obtained throughout the region of the head without the use of intravenous contrast. Axial, sagittal and coronal reconstructions were performed.    COMPARISON: CT head 5/36/2004.    FINDINGS:  There is generalized cerebral volume loss and compensatory enlargement of the ventricular system and sulci.    No parenchymal mass, hemorrhage, edema or acute major vascular distribution infarct.    No extra-axial blood or fluid collections.The ventricles are normal in size without evidence of hydrocephalus.    The cranium appears intact. Mastoid air cells and paranasal sinuses are essentially clear.                                 APC / Resident Notes:   On exam, NAD and nontoxic appearing.  Normal neurological exam.  He is hard of hearing which is at baseline today.  Ambulates without difficulty or gait. He has difficulty describing his symptoms clearly and does repeated say he feels "fine". They reported to ED to to patient wife's concerns. Will order CT head to rule out intracranial abnormality and reassess.    CT with no intracranial abnormality.    Patient updated with results.  He reports feeling fine and is asymptomatic at this time.  I will have him follow-up with PCP.  Return precaution given.  All questions answered.  Patient is comfortable with plan and stable for discharge.  I reviewed patient's chart and imaging and discussed this case with my supervising M.D.              ED Course     Clinical Impression:   The encounter diagnosis was Head trauma, initial encounter.    Disposition:   Disposition: Discharged  Condition: Stable       Mariely Marr PA-C  06/06/17 2058    "

## 2017-06-06 NOTE — ED NOTES
GENERAL: The patient is a frail elderly male in no apparent distress. He is alert and oriented x3.    HEENT: Head is normocephalic and atraumatic. Extraocular muscles are intact. Pupils are equal, round, and reactive to light and accommodation. Nares appeared normal. Mouth is well hydrated and without lesions. Mucous membranes are moist. Posterior pharynx clear of any exudate or lesions.    NECK: Supple. No carotid bruits. No lymphadenopathy or thyromegaly.    LUNGS: Clear to auscultation.    HEART: Regular rate and rhythm without murmur.     ABDOMEN: Soft, nontender, and nondistended. Positive bowel sounds. No hepatosplenomegaly was noted.     EXTREMITIES: Without any cyanosis, clubbing, rash, lesions or edema.     NEUROLOGIC: Cranial nerves II through XII are grossly intact.     PSYCHIATRIC: Flat affect, but denies suicidal or homicidal ideations.    SKIN: No ulceration or induration present.

## 2017-06-06 NOTE — DISCHARGE INSTRUCTIONS
Continue all home medication as prescribed. Rest. Stay hydrated by drinking plenty of fluids.  Follow up with primary care physician in 1 day.   Return to the emergency department for new or worsening symptoms.    Future Appointments  Date Time Provider Department Center   6/13/2017 8:00 AM HOME MONITOR DEVICE CHECK, [facility]   7/11/2017 9:00 AM EKG, APPT [facility] EKG [facility]   7/11/2017 9:20 AM Donn Hastings MD [facility]   11/7/2017 7:00 AM LAB, [facility]   11/10/2017 8:20 AM Jam Rowell MD [facility]

## 2017-06-13 ENCOUNTER — CLINICAL SUPPORT (OUTPATIENT)
Dept: ELECTROPHYSIOLOGY | Facility: CLINIC | Age: 82
End: 2017-06-13
Payer: MEDICARE

## 2017-06-13 DIAGNOSIS — Z95.0 CARDIAC PACEMAKER IN SITU: ICD-10-CM

## 2017-06-13 DIAGNOSIS — I48.91 ATRIAL FIBRILLATION, UNSPECIFIED TYPE: ICD-10-CM

## 2017-06-13 PROCEDURE — 93296 REM INTERROG EVL PM/IDS: CPT | Mod: S$GLB,,, | Performed by: INTERNAL MEDICINE

## 2017-06-13 PROCEDURE — 93294 REM INTERROG EVL PM/LDLS PM: CPT | Mod: S$GLB,,, | Performed by: INTERNAL MEDICINE

## 2017-07-11 ENCOUNTER — HOSPITAL ENCOUNTER (OUTPATIENT)
Dept: CARDIOLOGY | Facility: CLINIC | Age: 82
Discharge: HOME | End: 2017-07-11
Payer: MEDICARE

## 2017-07-11 ENCOUNTER — CLINICAL SUPPORT (OUTPATIENT)
Dept: ELECTROPHYSIOLOGY | Facility: CLINIC | Age: 82
End: 2017-07-11
Payer: MEDICARE

## 2017-07-11 ENCOUNTER — OFFICE VISIT (OUTPATIENT)
Dept: ELECTROPHYSIOLOGY | Facility: CLINIC | Age: 82
End: 2017-07-11
Payer: MEDICARE

## 2017-07-11 VITALS
WEIGHT: 159.38 LBS | SYSTOLIC BLOOD PRESSURE: 122 MMHG | HEART RATE: 80 BPM | HEIGHT: 69 IN | DIASTOLIC BLOOD PRESSURE: 84 MMHG | BODY MASS INDEX: 23.6 KG/M2

## 2017-07-11 DIAGNOSIS — Z95.0 PACEMAKER: Chronic | ICD-10-CM

## 2017-07-11 DIAGNOSIS — Z95.0 PACEMAKER: ICD-10-CM

## 2017-07-11 DIAGNOSIS — N18.30 CKD (CHRONIC KIDNEY DISEASE) STAGE 3, GFR 30-59 ML/MIN: ICD-10-CM

## 2017-07-11 DIAGNOSIS — I49.9 CARDIAC ARRHYTHMIA, UNSPECIFIED CARDIAC ARRHYTHMIA TYPE: ICD-10-CM

## 2017-07-11 DIAGNOSIS — I10 ESSENTIAL HYPERTENSION: ICD-10-CM

## 2017-07-11 DIAGNOSIS — I45.9 HEART BLOCK: ICD-10-CM

## 2017-07-11 DIAGNOSIS — I48.19 PERSISTENT ATRIAL FIBRILLATION: Primary | ICD-10-CM

## 2017-07-11 DIAGNOSIS — Z79.01 LONG TERM (CURRENT) USE OF ANTICOAGULANTS: ICD-10-CM

## 2017-07-11 PROCEDURE — 93280 PM DEVICE PROGR EVAL DUAL: CPT | Mod: S$GLB,,, | Performed by: INTERNAL MEDICINE

## 2017-07-11 PROCEDURE — 99213 OFFICE O/P EST LOW 20 MIN: CPT | Mod: S$GLB,,, | Performed by: INTERNAL MEDICINE

## 2017-07-11 PROCEDURE — 99999 PR PBB SHADOW E&M-EST. PATIENT-LVL III: CPT | Mod: PBBFAC,,, | Performed by: INTERNAL MEDICINE

## 2017-07-11 PROCEDURE — 1159F MED LIST DOCD IN RCRD: CPT | Mod: S$GLB,,, | Performed by: INTERNAL MEDICINE

## 2017-07-11 PROCEDURE — 1126F AMNT PAIN NOTED NONE PRSNT: CPT | Mod: S$GLB,,, | Performed by: INTERNAL MEDICINE

## 2017-07-11 PROCEDURE — 93000 ELECTROCARDIOGRAM COMPLETE: CPT | Mod: S$GLB,,, | Performed by: INTERNAL MEDICINE

## 2017-07-11 PROCEDURE — 99499 UNLISTED E&M SERVICE: CPT | Mod: S$GLB,,, | Performed by: INTERNAL MEDICINE

## 2017-07-11 NOTE — PROGRESS NOTES
Subjective:    Patient ID:  Deena Moody is a 86 y.o. male who presents for follow-up of Atrial Fibrillation      HPI  Prior Hx:  I had the pleasure of seeing Mr. Moody today in our electrophysiology clinic in consultation for his new onset atrial fibrillation. 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 On remote monitoring new onset persistent atrial fibrillation was diagnosed that began 10/2016.  He was asymptomatic and started on eliquis for stroke prophylaxis.      Interim Hx:  Mr. Moody presents for 6 month follow-up.  He is feeling well and denies any bloody stools, melena, shortness of breath, or functional limitations.  He continues to mow his lawn without difficulties.  On device check he is in complete AV block with persistent AF and V-pacing. Recent H/H and creatinine are stable.     My interpretation of today's in clinic electrocardiogram is atrial fibrillation with ventricular pacing.    Review of Systems   Constitution: Negative. Negative for weakness and malaise/fatigue.   HENT: Negative.  Negative for congestion and headaches.    Eyes: Negative.  Negative for blurred vision and visual disturbance.   Cardiovascular: Negative.  Negative for chest pain, dyspnea on exertion, irregular heartbeat, near-syncope, orthopnea, palpitations, paroxysmal nocturnal dyspnea and syncope.   Respiratory: Negative.  Negative for cough and wheezing.    Endocrine: Negative.    Hematologic/Lymphatic: Negative for bleeding problem. Bruises/bleeds easily.   Skin: Negative.    Musculoskeletal: Negative.    Gastrointestinal: Negative.  Negative for hematochezia and melena.   Genitourinary: Negative.    Neurological: Negative.  Negative for dizziness, focal weakness and light-headedness.   Psychiatric/Behavioral: Negative.          Objective:    Physical Exam   Constitutional: He is oriented to person, place, and time. He appears well-developed and well-nourished. No distress.   HENT:   Head: Normocephalic and atraumatic.   Eyes: Conjunctivae are normal. Right eye exhibits no discharge. Left eye exhibits no discharge. No scleral icterus.   Neck: Neck supple. No JVD present.   Cardiovascular: Normal rate, regular rhythm and normal heart sounds.  Exam reveals no gallop and no friction rub.    No murmur heard.  Pulmonary/Chest: Effort normal and breath sounds normal. No respiratory distress. He has no wheezes. He has no rales.   Pacemaker pocket well healed   Abdominal: Soft. Bowel sounds are normal. He exhibits no distension. There is no tenderness. There is no rebound.   Musculoskeletal: He exhibits no edema or tenderness.   Neurological: He is alert and oriented to person, place, and time.   Skin: Skin is warm and dry. He is not diaphoretic.   Psychiatric: He has a normal mood and affect. His behavior is normal. Judgment and thought content normal.         Assessment:       1. Persistent atrial fibrillation    2. Essential hypertension    3. Heart block    4. CKD (chronic kidney disease) stage 3, GFR 30-59 ml/min    5. Pacemaker         Plan:       In summary, Mr. Moody is 85 year-old man with hypertension, chronic kidney disease stage III, CABG/AVR 2004 with preserved ejection fraction, and recent symptomatic high-grade AV block s/p dual-chamber pacemaker implantation presenting for follow-up for  asymptomatic permanent atrial fibrillation ([id number]) on eliquis. He is tolerating eliquis without difficulty.  He understands to monitor his stools for signs of bleeding/melena and to come to ER if he has a significant head injury even if he feels fine. Auto-capture on his RV lead was turned on and his lower rate in DDIR mode was changed to 70 from 80.  Continue remote checks every 3 months.  RTC with me in 6 months with pre-clinic  BMP/CBC.    Thank you for allowing me to participate in the care of this patient. Please do not hesitate to call me with any questions or concerns.    Donn Hastings MD, PhD  Cardiac Electrophysiology

## 2017-07-29 RX ORDER — BENAZEPRIL HYDROCHLORIDE 5 MG/1
5 TABLET ORAL DAILY
Qty: 90 TABLET | Refills: 1 | Status: SHIPPED | OUTPATIENT
Start: 2017-07-29 | End: 2017-11-15 | Stop reason: SDUPTHER

## 2017-07-29 RX ORDER — OMEPRAZOLE 20 MG/1
20 CAPSULE, DELAYED RELEASE ORAL DAILY
Qty: 90 CAPSULE | Refills: 1 | Status: SHIPPED | OUTPATIENT
Start: 2017-07-29 | End: 2017-11-06 | Stop reason: SDUPTHER

## 2017-07-29 RX ORDER — AMLODIPINE BESYLATE 5 MG/1
5 TABLET ORAL DAILY
Qty: 90 TABLET | Refills: 1 | Status: SHIPPED | OUTPATIENT
Start: 2017-07-29 | End: 2017-11-06 | Stop reason: SDUPTHER

## 2017-07-29 RX ORDER — CARVEDILOL 12.5 MG/1
12.5 TABLET ORAL 2 TIMES DAILY
Qty: 90 TABLET | Refills: 1 | Status: SHIPPED | OUTPATIENT
Start: 2017-07-29 | End: 2017-11-06 | Stop reason: SDUPTHER

## 2017-07-29 RX ORDER — ATORVASTATIN CALCIUM 80 MG/1
80 TABLET, FILM COATED ORAL NIGHTLY
Qty: 90 TABLET | Refills: 1 | Status: SHIPPED | OUTPATIENT
Start: 2017-07-29 | End: 2017-11-06 | Stop reason: SDUPTHER

## 2017-09-11 ENCOUNTER — OFFICE VISIT (OUTPATIENT)
Dept: URGENT CARE | Facility: CLINIC | Age: 82
End: 2017-09-11
Payer: MEDICARE

## 2017-09-11 VITALS
HEIGHT: 68 IN | RESPIRATION RATE: 18 BRPM | WEIGHT: 158 LBS | DIASTOLIC BLOOD PRESSURE: 78 MMHG | HEART RATE: 64 BPM | OXYGEN SATURATION: 95 % | TEMPERATURE: 97 F | SYSTOLIC BLOOD PRESSURE: 142 MMHG | BODY MASS INDEX: 23.95 KG/M2

## 2017-09-11 DIAGNOSIS — J18.9 PNEUMONIA OF RIGHT LOWER LOBE DUE TO INFECTIOUS ORGANISM: Primary | ICD-10-CM

## 2017-09-11 DIAGNOSIS — R05.9 COUGH: ICD-10-CM

## 2017-09-11 PROCEDURE — 1159F MED LIST DOCD IN RCRD: CPT | Mod: S$GLB,,, | Performed by: PHYSICIAN ASSISTANT

## 2017-09-11 PROCEDURE — 99214 OFFICE O/P EST MOD 30 MIN: CPT | Mod: 25,S$GLB,, | Performed by: PHYSICIAN ASSISTANT

## 2017-09-11 PROCEDURE — 3008F BODY MASS INDEX DOCD: CPT | Mod: S$GLB,,, | Performed by: PHYSICIAN ASSISTANT

## 2017-09-11 PROCEDURE — 96372 THER/PROPH/DIAG INJ SC/IM: CPT | Mod: S$GLB,,, | Performed by: PHYSICIAN ASSISTANT

## 2017-09-11 RX ORDER — AZITHROMYCIN 250 MG/1
250 TABLET, FILM COATED ORAL DAILY
Qty: 6 TABLET | Refills: 0 | Status: SHIPPED | OUTPATIENT
Start: 2017-09-11 | End: 2017-09-16

## 2017-09-11 RX ORDER — CEFTRIAXONE 1 G/1
1 INJECTION, POWDER, FOR SOLUTION INTRAMUSCULAR; INTRAVENOUS
Status: COMPLETED | OUTPATIENT
Start: 2017-09-11 | End: 2017-09-11

## 2017-09-11 RX ADMIN — CEFTRIAXONE 1 G: 1 INJECTION, POWDER, FOR SOLUTION INTRAMUSCULAR; INTRAVENOUS at 12:09

## 2017-09-11 NOTE — PROGRESS NOTES
"Subjective:       Patient ID: Deena Moody is a 86 y.o. male.    Vitals:  height is 5' 8" (1.727 m) and weight is 71.7 kg (158 lb). His temperature is 97 °F (36.1 °C). His blood pressure is 142/78 (abnormal) and his pulse is 64. His respiration is 18 and oxygen saturation is 95%.     Chief Complaint: Cough    Cough   This is a new problem. The current episode started 1 to 4 weeks ago. The problem has been unchanged. Associated symptoms include shortness of breath. Pertinent negatives include no chest pain, chills, ear pain, eye redness, fever, headaches, myalgias, sore throat or wheezing. Nothing aggravates the symptoms. He has tried nothing for the symptoms. His past medical history is significant for pneumonia.     Review of Systems   Constitution: Positive for malaise/fatigue. Negative for chills and fever.   HENT: Positive for congestion. Negative for ear pain, hoarse voice and sore throat.    Eyes: Negative for discharge and redness.   Cardiovascular: Negative for chest pain, dyspnea on exertion and leg swelling.   Respiratory: Positive for cough and shortness of breath. Negative for sputum production and wheezing.    Musculoskeletal: Negative for myalgias.   Gastrointestinal: Negative for abdominal pain and nausea.   Neurological: Negative for headaches.       Objective:      Physical Exam   Constitutional: He is oriented to person, place, and time. He appears well-developed and well-nourished. No distress.   HENT:   Head: Normocephalic and atraumatic.   Right Ear: External ear normal.   Left Ear: External ear normal.   Nose: Nose normal.   Mouth/Throat: Oropharynx is clear and moist.   Eyes: Conjunctivae are normal.   Neck: Normal range of motion. Neck supple.   Cardiovascular: Normal rate and regular rhythm.  Exam reveals no gallop and no friction rub.    No murmur heard.  Pulmonary/Chest: Effort normal and breath sounds normal. He has no wheezes. He has no rales. He exhibits no tenderness. "   Musculoskeletal: Normal range of motion.   Neurological: He is alert and oriented to person, place, and time.   Skin: Skin is warm and dry. No rash noted. No erythema.   Psychiatric: He has a normal mood and affect. His behavior is normal. Judgment and thought content normal.   Nursing note and vitals reviewed.      12:39 PM - Right lower lobe infiltrate.    Assessment:       1. Pneumonia of right lower lobe due to infectious organism    2. Cough        Plan:         Pneumonia of right lower lobe due to infectious organism  -     cefTRIAXone injection 1 g; Inject 1 g into the muscle one time.  -     azithromycin (Z-CHRISTOPH) 250 MG tablet; Take 1 tablet (250 mg total) by mouth once daily. Take 2 tablets on day 1.  Dispense: 6 tablet; Refill: 0    Cough  -     X-Ray Chest PA And Lateral; Future; Expected date: 09/11/2017      Deena was seen today for cough.    Diagnoses and all orders for this visit:    Pneumonia of right lower lobe due to infectious organism  -     cefTRIAXone injection 1 g; Inject 1 g into the muscle one time.  -     azithromycin (Z-CHRISTOPH) 250 MG tablet; Take 1 tablet (250 mg total) by mouth once daily. Take 2 tablets on day 1.    Cough  -     X-Ray Chest PA And Lateral; Future      Patient Instructions   - Rest.    - Drink plenty of fluids.    - Tylenol or Ibuprofen as directed as needed for fever/pain.    - Follow up with your PCP or specialty clinic as directed in the next 1-2 weeks if not improved or as needed.  You can call [phone] to schedule an appointment with the appropriate provider.    - Go to the ED if your symptoms worsen.    Pneumonia (Adult)  Pneumonia is an infection deep within the lungs. It is in the small air sacs (alveoli). Pneumonia may be caused by a virus or bacteria. Pneumonia caused by bacteria is usually treated with an antibiotic. Severe cases may need to be treated in the hospital. Milder cases can be treated at home. Symptoms usually start to get better during the  first 2 days of treatment.    Home care  Follow these guidelines when caring for yourself at home:  · Rest at home for the first 2 to 3 days, or until you feel stronger. Dont let yourself get overly tired when you go back to your activities.  · Stay away from cigarette smoke - yours or other peoples.  · You may use acetaminophen or ibuprofen to control fever or pain, unless another medicine was prescribed. If you have chronic liver or kidney disease, talk with your healthcare provider before using these medicines. Also talk with your provider if youve had a stomach ulcer or gastrointestinal bleeding. Dont give aspirin to anyone younger than 18 years of age who is ill with a fever. It may cause severe liver damage.  · Your appetite may be poor, so a light diet is fine.  · Drink 6 to 8 glasses of fluids every day to make sure you are getting enough fluids. Beverages can include water, sport drinks, sodas without caffeine, juices, tea, or soup. Fluids will help loosen secretions in the lung. This will make it easier for you to cough up the phlegm (sputum). If you also have heart or kidney disease, check with your healthcare provider before you drink extra fluids.  · Take antibiotic medicine prescribed until it is all gone, even if you are feeling better after a few days.  Follow-up care  Follow up with your healthcare provider in the next 2 to 3 days, or as advised. This is to be sure the medicine is helping you get better.  If you are 65 or older, you should get a pneumococcal vaccine and a yearly flu (influenza) shot. You should also get these vaccines if you have chronic lung disease like asthma, emphysema, or COPD. Recently, a second type of pneumonia vaccine has become available for everyone over 65 years old. This is in addition to the previous vaccine. Ask your provider about this.  When to seek medical advice  Call your healthcare provider right away if any of these occur:  · You dont get better within the  first 48 hours of treatment  · Shortness of breath gets worse  · Rapid breathing (more than 25 breaths per minute)  · Coughing up blood  · Chest pain gets worse with breathing  · Fever of 100.4°F (38°C) or higher that doesnt get better with fever medicine  · Weakness, dizziness, or fainting that gets worse  · Thirst or dry mouth that gets worse  · Sinus pain, headache, or a stiff neck  · Chest pain not caused by coughing  Date Last Reviewed: 1/1/2017  © 2886-4314 [employer]. [address] [place] [address]. All rights reserved. This information is not intended as a substitute for professional medical care. Always follow your healthcare professional's instructions.

## 2017-09-11 NOTE — PATIENT INSTRUCTIONS
- Rest.    - Drink plenty of fluids.    - Tylenol or Ibuprofen as directed as needed for fever/pain.    - Follow up with your PCP or specialty clinic as directed in the next 1-2 weeks if not improved or as needed.  You can call [phone] to schedule an appointment with the appropriate provider.    - Go to the ED if your symptoms worsen.    Pneumonia (Adult)  Pneumonia is an infection deep within the lungs. It is in the small air sacs (alveoli). Pneumonia may be caused by a virus or bacteria. Pneumonia caused by bacteria is usually treated with an antibiotic. Severe cases may need to be treated in the hospital. Milder cases can be treated at home. Symptoms usually start to get better during the first 2 days of treatment.    Home care  Follow these guidelines when caring for yourself at home:  · Rest at home for the first 2 to 3 days, or until you feel stronger. Dont let yourself get overly tired when you go back to your activities.  · Stay away from cigarette smoke - yours or other peoples.  · You may use acetaminophen or ibuprofen to control fever or pain, unless another medicine was prescribed. If you have chronic liver or kidney disease, talk with your healthcare provider before using these medicines. Also talk with your provider if youve had a stomach ulcer or gastrointestinal bleeding. Dont give aspirin to anyone younger than 18 years of age who is ill with a fever. It may cause severe liver damage.  · Your appetite may be poor, so a light diet is fine.  · Drink 6 to 8 glasses of fluids every day to make sure you are getting enough fluids. Beverages can include water, sport drinks, sodas without caffeine, juices, tea, or soup. Fluids will help loosen secretions in the lung. This will make it easier for you to cough up the phlegm (sputum). If you also have heart or kidney disease, check with your healthcare provider before you drink extra fluids.  · Take antibiotic medicine prescribed until it is all  gone, even if you are feeling better after a few days.  Follow-up care  Follow up with your healthcare provider in the next 2 to 3 days, or as advised. This is to be sure the medicine is helping you get better.  If you are 65 or older, you should get a pneumococcal vaccine and a yearly flu (influenza) shot. You should also get these vaccines if you have chronic lung disease like asthma, emphysema, or COPD. Recently, a second type of pneumonia vaccine has become available for everyone over 65 years old. This is in addition to the previous vaccine. Ask your provider about this.  When to seek medical advice  Call your healthcare provider right away if any of these occur:  · You dont get better within the first 48 hours of treatment  · Shortness of breath gets worse  · Rapid breathing (more than 25 breaths per minute)  · Coughing up blood  · Chest pain gets worse with breathing  · Fever of 100.4°F (38°C) or higher that doesnt get better with fever medicine  · Weakness, dizziness, or fainting that gets worse  · Thirst or dry mouth that gets worse  · Sinus pain, headache, or a stiff neck  · Chest pain not caused by coughing  Date Last Reviewed: 1/1/2017  © 9172-5090 [employer]. [address] [place] [address], [place] [address]. All rights reserved. This information is not intended as a substitute for professional medical care. Always follow your healthcare professional's instructions.

## 2017-09-13 ENCOUNTER — CLINICAL SUPPORT (OUTPATIENT)
Dept: ELECTROPHYSIOLOGY | Facility: CLINIC | Age: 82
End: 2017-09-13
Payer: MEDICARE

## 2017-09-13 DIAGNOSIS — I48.91 ATRIAL FIBRILLATION, UNSPECIFIED TYPE: ICD-10-CM

## 2017-09-13 DIAGNOSIS — Z95.0 CARDIAC PACEMAKER IN SITU: ICD-10-CM

## 2017-09-13 PROCEDURE — 93294 REM INTERROG EVL PM/LDLS PM: CPT | Mod: S$GLB,,, | Performed by: INTERNAL MEDICINE

## 2017-09-13 PROCEDURE — 93296 REM INTERROG EVL PM/IDS: CPT | Mod: S$GLB,,, | Performed by: INTERNAL MEDICINE

## 2017-10-24 ENCOUNTER — TELEPHONE (OUTPATIENT)
Dept: ELECTROPHYSIOLOGY | Facility: CLINIC | Age: 82
End: 2017-10-24

## 2017-10-24 NOTE — TELEPHONE ENCOUNTER
Called patient to follow up with [employer]  update, patient verbalized understanding. Patient is not interested in the device update.   Encouraged to call back with any further questions or concerns.

## 2017-11-06 DIAGNOSIS — I48.19 PERSISTENT ATRIAL FIBRILLATION: ICD-10-CM

## 2017-11-06 RX ORDER — AMLODIPINE BESYLATE 5 MG/1
5 TABLET ORAL DAILY
Qty: 90 TABLET | Refills: 1 | Status: SHIPPED | OUTPATIENT
Start: 2017-11-06 | End: 2017-11-15 | Stop reason: SDUPTHER

## 2017-11-06 RX ORDER — CARVEDILOL 12.5 MG/1
12.5 TABLET ORAL 2 TIMES DAILY
Qty: 90 TABLET | Refills: 1 | Status: SHIPPED | OUTPATIENT
Start: 2017-11-06 | End: 2018-04-17 | Stop reason: SDUPTHER

## 2017-11-06 RX ORDER — ATORVASTATIN CALCIUM 80 MG/1
80 TABLET, FILM COATED ORAL NIGHTLY
Qty: 90 TABLET | Refills: 1 | Status: SHIPPED | OUTPATIENT
Start: 2017-11-06 | End: 2017-11-15 | Stop reason: SDUPTHER

## 2017-11-06 RX ORDER — OMEPRAZOLE 20 MG/1
20 CAPSULE, DELAYED RELEASE ORAL DAILY
Qty: 90 CAPSULE | Refills: 1 | Status: SHIPPED | OUTPATIENT
Start: 2017-11-06 | End: 2018-06-04 | Stop reason: SDUPTHER

## 2017-11-06 NOTE — TELEPHONE ENCOUNTER
----- Message from Conchis Sanchez sent at 11/6/2017  3:15 PM CST -----  Contact: Self [phone]  Patient is calling to talk to nurse concerning refills on all of his medication and would like to get them send to the Mail Order pharmacy. Please advice

## 2017-11-12 DIAGNOSIS — I48.19 PERSISTENT ATRIAL FIBRILLATION: ICD-10-CM

## 2017-11-13 RX ORDER — APIXABAN 5 MG/1
TABLET, FILM COATED ORAL
Qty: 60 TABLET | Refills: 11 | Status: SHIPPED | OUTPATIENT
Start: 2017-11-13 | End: 2017-11-27 | Stop reason: SDUPTHER

## 2017-11-15 DIAGNOSIS — I48.19 PERSISTENT ATRIAL FIBRILLATION: ICD-10-CM

## 2017-11-15 RX ORDER — AMLODIPINE BESYLATE 5 MG/1
5 TABLET ORAL DAILY
Qty: 90 TABLET | Refills: 1 | Status: SHIPPED | OUTPATIENT
Start: 2017-11-15 | End: 2018-05-24 | Stop reason: ALTCHOICE

## 2017-11-15 RX ORDER — ATORVASTATIN CALCIUM 80 MG/1
80 TABLET, FILM COATED ORAL NIGHTLY
Qty: 90 TABLET | Refills: 1 | Status: SHIPPED | OUTPATIENT
Start: 2017-11-15 | End: 2018-06-12 | Stop reason: SDUPTHER

## 2017-11-15 RX ORDER — BENAZEPRIL HYDROCHLORIDE 5 MG/1
5 TABLET ORAL DAILY
Qty: 90 TABLET | Refills: 1 | Status: SHIPPED | OUTPATIENT
Start: 2017-11-15 | End: 2018-04-17 | Stop reason: SDUPTHER

## 2017-11-17 ENCOUNTER — TELEPHONE (OUTPATIENT)
Dept: ELECTROPHYSIOLOGY | Facility: CLINIC | Age: 82
End: 2017-11-17

## 2017-11-17 NOTE — TELEPHONE ENCOUNTER
----- Message from Donn Hastings MD sent at 11/17/2017  8:08 AM CST -----  Regarding: RE: Turn OFF AF and RV % pacing Alerts  That would be fine.  ----- Message -----  From: Tatiana Lama  Sent: 11/17/2017   7:51 AM  To: Eddie Shabazz, Donn Hastings MD  Subject: Turn OFF AF and RV % pacing Alerts               Dr. 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      Okay to turn OFF the AT/AF episode duration > threshold, AT/AF burden > threshold, and RV percent pacing greater than limit alerts?    Thanks!  Tatiana

## 2017-11-27 DIAGNOSIS — I48.19 PERSISTENT ATRIAL FIBRILLATION: ICD-10-CM

## 2017-11-27 NOTE — TELEPHONE ENCOUNTER
----- Message from Roberta Cisneros sent at 11/27/2017  4:31 PM CST -----  Contact: pt  RX request - refill or new RX.  Is this a refill or new RX:  refill  RX name and strength: apixaban (ELIQUIS) 5 mg Tab      Pharmacy name: [facility]  Comments:

## 2017-12-14 ENCOUNTER — CLINICAL SUPPORT (OUTPATIENT)
Dept: ELECTROPHYSIOLOGY | Facility: CLINIC | Age: 82
End: 2017-12-14
Attending: INTERNAL MEDICINE
Payer: MEDICARE

## 2017-12-14 DIAGNOSIS — Z95.0 CARDIAC PACEMAKER IN SITU: ICD-10-CM

## 2017-12-14 DIAGNOSIS — I48.91 ATRIAL FIBRILLATION, UNSPECIFIED TYPE: ICD-10-CM

## 2017-12-14 PROCEDURE — 93294 REM INTERROG EVL PM/LDLS PM: CPT | Mod: S$GLB,,, | Performed by: INTERNAL MEDICINE

## 2017-12-14 PROCEDURE — 93296 REM INTERROG EVL PM/IDS: CPT | Mod: S$GLB,,, | Performed by: INTERNAL MEDICINE

## 2017-12-21 DIAGNOSIS — Z95.0 CARDIAC PACEMAKER IN SITU: Primary | ICD-10-CM

## 2017-12-21 DIAGNOSIS — I48.19 PERSISTENT ATRIAL FIBRILLATION: ICD-10-CM

## 2017-12-30 ENCOUNTER — LAB VISIT (OUTPATIENT)
Dept: LAB | Facility: HOSPITAL | Age: 82
End: 2017-12-30
Attending: FAMILY MEDICINE
Payer: MEDICARE

## 2017-12-30 DIAGNOSIS — Z79.01 LONG TERM CURRENT USE OF ANTICOAGULANT THERAPY: ICD-10-CM

## 2017-12-30 DIAGNOSIS — I48.19 PERSISTENT ATRIAL FIBRILLATION: ICD-10-CM

## 2017-12-30 DIAGNOSIS — I10 ESSENTIAL HYPERTENSION: ICD-10-CM

## 2017-12-30 DIAGNOSIS — D63.8 CHRONIC DISEASE ANEMIA: ICD-10-CM

## 2017-12-30 DIAGNOSIS — E78.5 DYSLIPIDEMIA: ICD-10-CM

## 2017-12-30 DIAGNOSIS — N18.30 CKD (CHRONIC KIDNEY DISEASE) STAGE 3, GFR 30-59 ML/MIN: ICD-10-CM

## 2017-12-30 LAB
ALBUMIN SERPL BCP-MCNC: 3.4 G/DL
ALP SERPL-CCNC: 150 U/L
ALT SERPL W/O P-5'-P-CCNC: 17 U/L
ANION GAP SERPL CALC-SCNC: 8 MMOL/L
ANION GAP SERPL CALC-SCNC: 8 MMOL/L
AST SERPL-CCNC: 22 U/L
BASOPHILS # BLD AUTO: 0.07 K/UL
BASOPHILS # BLD AUTO: 0.07 K/UL
BASOPHILS NFR BLD: 0.8 %
BASOPHILS NFR BLD: 0.8 %
BILIRUB SERPL-MCNC: 1.3 MG/DL
BUN SERPL-MCNC: 19 MG/DL
BUN SERPL-MCNC: 19 MG/DL
CALCIUM SERPL-MCNC: 9 MG/DL
CALCIUM SERPL-MCNC: 9 MG/DL
CHLORIDE SERPL-SCNC: 110 MMOL/L
CHLORIDE SERPL-SCNC: 110 MMOL/L
CHOLEST SERPL-MCNC: 124 MG/DL
CHOLEST/HDLC SERPL: 3.9 {RATIO}
CO2 SERPL-SCNC: 27 MMOL/L
CO2 SERPL-SCNC: 27 MMOL/L
CREAT SERPL-MCNC: 1.3 MG/DL
CREAT SERPL-MCNC: 1.3 MG/DL
DIFFERENTIAL METHOD: ABNORMAL
DIFFERENTIAL METHOD: ABNORMAL
EOSINOPHIL # BLD AUTO: 0.7 K/UL
EOSINOPHIL # BLD AUTO: 0.7 K/UL
EOSINOPHIL NFR BLD: 7.2 %
EOSINOPHIL NFR BLD: 7.2 %
ERYTHROCYTE [DISTWIDTH] IN BLOOD BY AUTOMATED COUNT: 13.8 %
ERYTHROCYTE [DISTWIDTH] IN BLOOD BY AUTOMATED COUNT: 13.8 %
EST. GFR  (AFRICAN AMERICAN): 56.7 ML/MIN/1.73 M^2
EST. GFR  (AFRICAN AMERICAN): 56.7 ML/MIN/1.73 M^2
EST. GFR  (NON AFRICAN AMERICAN): 49.1 ML/MIN/1.73 M^2
EST. GFR  (NON AFRICAN AMERICAN): 49.1 ML/MIN/1.73 M^2
GLUCOSE SERPL-MCNC: 87 MG/DL
GLUCOSE SERPL-MCNC: 87 MG/DL
HCT VFR BLD AUTO: 37.6 %
HCT VFR BLD AUTO: 37.6 %
HDLC SERPL-MCNC: 32 MG/DL
HDLC SERPL: 25.8 %
HGB BLD-MCNC: 12 G/DL
HGB BLD-MCNC: 12 G/DL
IMM GRANULOCYTES # BLD AUTO: 0.01 K/UL
IMM GRANULOCYTES # BLD AUTO: 0.01 K/UL
IMM GRANULOCYTES NFR BLD AUTO: 0.1 %
IMM GRANULOCYTES NFR BLD AUTO: 0.1 %
LDLC SERPL CALC-MCNC: 74.4 MG/DL
LYMPHOCYTES # BLD AUTO: 2.4 K/UL
LYMPHOCYTES # BLD AUTO: 2.4 K/UL
LYMPHOCYTES NFR BLD: 25.9 %
LYMPHOCYTES NFR BLD: 25.9 %
MCH RBC QN AUTO: 31.3 PG
MCH RBC QN AUTO: 31.3 PG
MCHC RBC AUTO-ENTMCNC: 31.9 G/DL
MCHC RBC AUTO-ENTMCNC: 31.9 G/DL
MCV RBC AUTO: 98 FL
MCV RBC AUTO: 98 FL
MONOCYTES # BLD AUTO: 0.9 K/UL
MONOCYTES # BLD AUTO: 0.9 K/UL
MONOCYTES NFR BLD: 9.2 %
MONOCYTES NFR BLD: 9.2 %
NEUTROPHILS # BLD AUTO: 5.2 K/UL
NEUTROPHILS # BLD AUTO: 5.2 K/UL
NEUTROPHILS NFR BLD: 56.8 %
NEUTROPHILS NFR BLD: 56.8 %
NONHDLC SERPL-MCNC: 92 MG/DL
NRBC BLD-RTO: 0 /100 WBC
NRBC BLD-RTO: 0 /100 WBC
PLATELET # BLD AUTO: 173 K/UL
PLATELET # BLD AUTO: 173 K/UL
PMV BLD AUTO: 10 FL
PMV BLD AUTO: 10 FL
POTASSIUM SERPL-SCNC: 4.5 MMOL/L
POTASSIUM SERPL-SCNC: 4.5 MMOL/L
PROT SERPL-MCNC: 7.6 G/DL
RBC # BLD AUTO: 3.84 M/UL
RBC # BLD AUTO: 3.84 M/UL
SODIUM SERPL-SCNC: 145 MMOL/L
SODIUM SERPL-SCNC: 145 MMOL/L
TRIGL SERPL-MCNC: 88 MG/DL
TSH SERPL DL<=0.005 MIU/L-ACNC: 2.71 UIU/ML
WBC # BLD AUTO: 9.2 K/UL
WBC # BLD AUTO: 9.2 K/UL

## 2017-12-30 PROCEDURE — 84443 ASSAY THYROID STIM HORMONE: CPT

## 2017-12-30 PROCEDURE — 85025 COMPLETE CBC W/AUTO DIFF WBC: CPT

## 2017-12-30 PROCEDURE — 80061 LIPID PANEL: CPT

## 2017-12-30 PROCEDURE — 36415 COLL VENOUS BLD VENIPUNCTURE: CPT | Mod: PO

## 2017-12-30 PROCEDURE — 80053 COMPREHEN METABOLIC PANEL: CPT

## 2018-01-02 ENCOUNTER — OFFICE VISIT (OUTPATIENT)
Dept: FAMILY MEDICINE | Facility: CLINIC | Age: 83
End: 2018-01-02
Payer: MEDICARE

## 2018-01-02 VITALS
DIASTOLIC BLOOD PRESSURE: 80 MMHG | HEART RATE: 71 BPM | WEIGHT: 158 LBS | SYSTOLIC BLOOD PRESSURE: 130 MMHG | BODY MASS INDEX: 23.95 KG/M2 | OXYGEN SATURATION: 98 % | HEIGHT: 68 IN

## 2018-01-02 DIAGNOSIS — Z23 NEED FOR INFLUENZA VACCINATION: ICD-10-CM

## 2018-01-02 DIAGNOSIS — E78.5 DYSLIPIDEMIA: ICD-10-CM

## 2018-01-02 DIAGNOSIS — I48.19 PERSISTENT ATRIAL FIBRILLATION: ICD-10-CM

## 2018-01-02 DIAGNOSIS — N18.30 CKD (CHRONIC KIDNEY DISEASE) STAGE 3, GFR 30-59 ML/MIN: ICD-10-CM

## 2018-01-02 DIAGNOSIS — I10 ESSENTIAL HYPERTENSION: Primary | ICD-10-CM

## 2018-01-02 PROCEDURE — 99214 OFFICE O/P EST MOD 30 MIN: CPT | Mod: S$GLB,,, | Performed by: FAMILY MEDICINE

## 2018-01-02 PROCEDURE — 99499 UNLISTED E&M SERVICE: CPT | Mod: S$GLB,,, | Performed by: FAMILY MEDICINE

## 2018-01-02 PROCEDURE — 99999 PR PBB SHADOW E&M-EST. PATIENT-LVL III: CPT | Mod: PBBFAC,,, | Performed by: FAMILY MEDICINE

## 2018-01-02 PROCEDURE — G0008 ADMIN INFLUENZA VIRUS VAC: HCPCS | Mod: S$GLB,,, | Performed by: FAMILY MEDICINE

## 2018-01-02 PROCEDURE — 90662 IIV NO PRSV INCREASED AG IM: CPT | Mod: S$GLB,,, | Performed by: FAMILY MEDICINE

## 2018-01-02 NOTE — PATIENT INSTRUCTIONS
Understanding Atrial Fibrillation    An arrhythmia is any problem with the speed or pattern of the heartbeat. Atrial fibrillation (AFib) is a common type of arrhythmia. It causes fast, chaotic electrical signals in the atria. This leads to poor functioning of the heart. It also affects how much blood your heart can pump out to the body.  Afib may occur once in a while and go away on its own. Or it may continue for longer periods and need treatment.  AFib can lead to serious problems, such as stroke. Your healthcare provider will need to monitor and manage it.  What happens during atrial fibrillation?   The heart has an electrical system that sends signals to control the heartbeat. As signals move through the heart, they tell the hearts upper chambers (atria) and lower chambers (ventricles) when to squeeze (contract) and relax. This lets blood move through the heart and out to the body and lungs.  With AFib, the atria receive abnormal signals. This causes them to contract in a fast and irregular way, and out of sync with the ventricles. When this happens, the atria also have a harder time moving blood into the ventricles. Blood may then pool in the atria, which increases the risk for blood clots and stroke. The ventricles also may contract too quickly and irregularly. As a result, they may not pump blood to the body and lungs as well as they should. This can weaken the heart muscle over time and cause heart failure.  What causes atrial fibrillation?  AFib is more common in older adults. It has many possible causes including:  · Coronary artery disease  · Heart valve disease  · Heart attack  · Heart surgery  · High blood pressure  · Thyroid disease  · Diabetes  · Lung disease  · Sleep apnea  · Heavy alcohol use  In some cases of AFib, doctors do not know the cause.  What are the symptoms of atrial fibrillation?  AFib may or may not cause symptoms. If symptoms do occur, they may include:  · A fast, pounding,  irregular heartbeat  · Shortness of breath  · Tiredness  · Dizziness or fainting  · Chest pain  How is atrial fibrillation treated?  Treatments for AFib can include any of the options below.  · Medicines. You may be prescribed:  ¨ Heart rate medicines to help slow down the heartbeat  ¨ Heart rhythm medicines to help the heart beat more regularly  ¨ Anti-clotting medicines to help reduce the risk for blood clots and stroke  · Electrical cardioversion. Your healthcare provider uses special pads or paddles to send one or more brief electrical shocks to the heart. This can help reset the heartbeat to normal.  · Ablation. Long, thin tubes called catheters are threaded through a blood vessel to the heart. There, the catheters send out hot or cold energy to the areas causing the abnormal signals. This energy destroys the problem tissue or cells. This improves the chances that your heart will stay in normal rhythm without using medicines. If your heart rate and rhythm cant be controlled, you may need ablation and a pacemaker. These will help control the heart rate and regularity of the heartbeat.  · Surgery. During surgery, your healthcare provider may use different methods to create scar tissue in the areas of the heart causing the abnormal signals. The scar tissue disrupts the abnormal signals and may stop AFib from occurring.  What are the complications of atrial fibrillation?  These can include:  · Blood clots  · Stroke  · Heart failure. This problem occurs when the heart muscle weakens so much that it can no longer pump blood well.  When should I call my healthcare provider?  Call your healthcare provider right away if you have any of these:  · Symptoms that dont get better with treatment, or get worse  · New symptoms   Date Last Reviewed: 5/1/2016  © 0784-1260 [employer]. [address] [place] [address], [place] [address]. All rights reserved. This information is not intended as a substitute for professional  medical care. Always follow your healthcare professional's instructions.

## 2018-01-02 NOTE — PROGRESS NOTES
Subjective:       Patient ID: Deena Moody is a 87 y.o. male.    Chief Complaint: No chief complaint on file.    87 years old male came to the clinic for blood pressure check.  Blood pressure today stable.  No chest pain palpitations orthopnea or PND.  Patient with decreased kidney function but stable in comparison with previous reports.  Last lipid profile was normal.  Patient with mild anemia but stable in comparison with previous reports.      Review of Systems   Constitutional: Negative.    HENT: Positive for hearing loss.    Eyes: Negative.    Respiratory: Negative.    Cardiovascular: Negative.  Negative for chest pain, palpitations and leg swelling.   Gastrointestinal: Negative.    Genitourinary: Negative.    Musculoskeletal: Negative.    Skin: Negative.    Neurological: Negative.    Psychiatric/Behavioral: Negative.        Objective:      Physical Exam   Constitutional: He is oriented to person, place, and time. He appears well-developed and well-nourished. No distress.   HENT:   Head: Normocephalic and atraumatic.   Right Ear: External ear normal.   Left Ear: External ear normal.   Nose: Nose normal.   Mouth/Throat: Oropharynx is clear and moist. No oropharyngeal exudate.   Eyes: Conjunctivae and EOM are normal. Pupils are equal, round, and reactive to light. Right eye exhibits no discharge. Left eye exhibits no discharge. No scleral icterus.   Neck: Normal range of motion. Neck supple. No JVD present. No tracheal deviation present. No thyromegaly present.   Cardiovascular: Normal rate, normal heart sounds and intact distal pulses.  An irregularly irregular rhythm present. Exam reveals no gallop and no friction rub.    No murmur heard.  Pulmonary/Chest: Effort normal and breath sounds normal. No stridor. No respiratory distress. He has no wheezes. He has no rales. He exhibits no tenderness.   Abdominal: Soft. Bowel sounds are normal. He exhibits no distension and no mass. There is no tenderness. There is  no rebound and no guarding.   Musculoskeletal: Normal range of motion. He exhibits no edema or tenderness.   Lymphadenopathy:     He has no cervical adenopathy.   Neurological: He is alert and oriented to person, place, and time. He has normal reflexes. He displays normal reflexes. No cranial nerve deficit. He exhibits normal muscle tone. Coordination normal.   Skin: Skin is warm and dry. No rash noted. He is not diaphoretic. No erythema. No pallor.   Psychiatric: He has a normal mood and affect. His behavior is normal. Judgment and thought content normal.   Nursing note and vitals reviewed.      Assessment:       1. Essential hypertension    2. Persistent atrial fibrillation    3. Dyslipidemia    4. CKD (chronic kidney disease) stage 3, GFR 30-59 ml/min    5. Need for influenza vaccination        Plan:         Diagnoses and all orders for this visit:    Essential hypertension    Persistent atrial fibrillation    Dyslipidemia    CKD (chronic kidney disease) stage 3, GFR 30-59 ml/min    Need for influenza vaccination  -     Influenza - High Dose (65+) (PF) (IM)    Continue monitoring blood pressure at home, low sodium diet.

## 2018-01-04 ENCOUNTER — TELEPHONE (OUTPATIENT)
Dept: ELECTROPHYSIOLOGY | Facility: CLINIC | Age: 83
End: 2018-01-04

## 2018-01-04 NOTE — TELEPHONE ENCOUNTER
----- Message from Donn Hastings MD sent at 1/4/2018 11:19 AM CST -----  Please notify the patient that his lab results are stable without any significant abnormalities.

## 2018-01-04 NOTE — TELEPHONE ENCOUNTER
Spoke with patient's wife and advised of lab results, per Dr Hastings' note. She verbalizes understanding. Verified appt with Dr Hastings on 1/30/2018.

## 2018-01-17 ENCOUNTER — PES CALL (OUTPATIENT)
Dept: ADMINISTRATIVE | Facility: CLINIC | Age: 83
End: 2018-01-17

## 2018-01-19 ENCOUNTER — TELEPHONE (OUTPATIENT)
Dept: INTERNAL MEDICINE | Facility: CLINIC | Age: 83
End: 2018-01-19

## 2018-01-26 DIAGNOSIS — I48.19 PERSISTENT ATRIAL FIBRILLATION: Primary | ICD-10-CM

## 2018-01-26 DIAGNOSIS — Z95.0 PACEMAKER: ICD-10-CM

## 2018-01-30 ENCOUNTER — HOSPITAL ENCOUNTER (OUTPATIENT)
Dept: CARDIOLOGY | Facility: CLINIC | Age: 83
Discharge: HOME | End: 2018-01-30
Payer: MEDICARE

## 2018-01-30 ENCOUNTER — OFFICE VISIT (OUTPATIENT)
Dept: ELECTROPHYSIOLOGY | Facility: CLINIC | Age: 83
End: 2018-01-30
Payer: MEDICARE

## 2018-01-30 VITALS
SYSTOLIC BLOOD PRESSURE: 138 MMHG | WEIGHT: 162.5 LBS | BODY MASS INDEX: 24.07 KG/M2 | DIASTOLIC BLOOD PRESSURE: 82 MMHG | HEART RATE: 70 BPM | HEIGHT: 69 IN

## 2018-01-30 DIAGNOSIS — I48.19 PERSISTENT ATRIAL FIBRILLATION: ICD-10-CM

## 2018-01-30 DIAGNOSIS — I48.19 PERSISTENT ATRIAL FIBRILLATION: Primary | ICD-10-CM

## 2018-01-30 DIAGNOSIS — Z79.01 LONG TERM (CURRENT) USE OF ANTICOAGULANTS: ICD-10-CM

## 2018-01-30 DIAGNOSIS — R53.83 FATIGUE, UNSPECIFIED TYPE: ICD-10-CM

## 2018-01-30 DIAGNOSIS — Z95.0 S/P PLACEMENT OF CARDIAC PACEMAKER: Chronic | ICD-10-CM

## 2018-01-30 DIAGNOSIS — I44.2 COMPLETE AV BLOCK: Chronic | ICD-10-CM

## 2018-01-30 DIAGNOSIS — Z95.0 PACEMAKER: ICD-10-CM

## 2018-01-30 PROCEDURE — 99214 OFFICE O/P EST MOD 30 MIN: CPT | Mod: S$GLB,,, | Performed by: INTERNAL MEDICINE

## 2018-01-30 PROCEDURE — 99999 PR PBB SHADOW E&M-EST. PATIENT-LVL II: CPT | Mod: PBBFAC,,, | Performed by: INTERNAL MEDICINE

## 2018-01-30 PROCEDURE — 93000 ELECTROCARDIOGRAM COMPLETE: CPT | Mod: S$GLB,,, | Performed by: INTERNAL MEDICINE

## 2018-01-30 PROCEDURE — 99499 UNLISTED E&M SERVICE: CPT | Mod: S$GLB,,, | Performed by: INTERNAL MEDICINE

## 2018-01-30 NOTE — PROGRESS NOTES
Subjective:    Patient ID:  Deena Moody is a 87 y.o. male who presents for follow-up of Fatigue; Atrial Fibrillation; and Pacemaker Check      HPI  Prior Hx:  I had the pleasure of seeing Mr. Moody today in our electrophysiology clinic in consultation for his new onset atrial fibrillation. 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 On remote monitoring new onset persistent atrial fibrillation was diagnosed that began 10/2016.  He was asymptomatic and started on eliquis for stroke prophylaxis. At his 6 month evaluation after starting eliquis he felt well with stable H/H and creatinine.     Interim Hx:  Mr. Moody presents for 6 month follow-up.  He reports gradual increase in fatigue over the past few months. He denies any bloody stools, melena, or shortness of breath  He continues to do yard work and notices at times he has to stop and rest, which is new for him.  On device check he is in complete AV block with persistent AF and 100% V-pacing with stable lead parameters. Recent H/H and creatinine are stable (12/30/2017).     My interpretation of today's in clinic electrocardiogram is atrial fibrillation with ventricular pacing.    Review of Systems   Constitution: Negative. Negative for fever, weakness and malaise/fatigue.   HENT: Negative.  Negative for congestion and sore throat.    Eyes: Negative.  Negative for blurred vision and visual disturbance.   Cardiovascular: Negative.  Negative for chest pain, dyspnea on exertion, irregular heartbeat, orthopnea, palpitations and paroxysmal nocturnal dyspnea.   Respiratory: Negative.  Negative for cough and shortness of breath.    Hematologic/Lymphatic: Negative for bleeding problem. Does not bruise/bleed easily.   Skin: Negative.  Negative for rash.   Gastrointestinal:  Negative.  Negative for hematochezia and melena.   Neurological: Negative for dizziness and focal weakness.        Objective:    Physical Exam   Constitutional: He is oriented to person, place, and time. He appears well-developed and well-nourished. No distress.   HENT:   Head: Normocephalic and atraumatic.   Eyes: Conjunctivae are normal. Right eye exhibits no discharge. Left eye exhibits no discharge.   Neck: Neck supple. No JVD present.   Cardiovascular: Normal rate, regular rhythm and normal heart sounds.  Exam reveals no gallop and no friction rub.    No murmur heard.  Pulmonary/Chest: Effort normal and breath sounds normal. No respiratory distress. He has no wheezes. He has no rales.   Pacemaker site well healed   Abdominal: Bowel sounds are normal. He exhibits no distension. There is no tenderness.   Musculoskeletal: He exhibits no edema.   Neurological: He is alert and oriented to person, place, and time.   Skin: Skin is warm and dry. He is not diaphoretic.   Psychiatric: He has a normal mood and affect. His behavior is normal. Thought content normal.   Vitals reviewed.        Assessment:       1. Persistent atrial fibrillation    2. Complete AV block    3. S/P placement of cardiac pacemaker    4. Long term (current) use of anticoagulants    5. Fatigue, unspecified type         Plan:       In summary, Mr. 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 He is tolerating eliquis without difficulty.  He understands to monitor his stools for signs of bleeding/melena and to come to ER if he has a significant head injury even if he feels fine.  2d echo to evaluate for new reduced EF in setting of chronic RV pacing. Will call with results and discuss next  steps.    Continue remote checks every 3 months.  RTC with me in 6 months with pre-clinic BMP/CBC if ECHO ok.     Thank you for allowing me to participate in the care of this patient. Please do not hesitate to call me with any questions or concerns.     Donn Hastings MD, PhD  Cardiac Electrophysiology

## 2018-01-31 ENCOUNTER — HOSPITAL ENCOUNTER (OUTPATIENT)
Dept: CARDIOLOGY | Facility: CLINIC | Age: 83
Discharge: HOME | End: 2018-01-31
Attending: INTERNAL MEDICINE
Payer: MEDICARE

## 2018-01-31 DIAGNOSIS — Z95.0 S/P PLACEMENT OF CARDIAC PACEMAKER: Chronic | ICD-10-CM

## 2018-01-31 DIAGNOSIS — I48.19 PERSISTENT ATRIAL FIBRILLATION: ICD-10-CM

## 2018-01-31 DIAGNOSIS — R53.83 FATIGUE, UNSPECIFIED TYPE: ICD-10-CM

## 2018-01-31 DIAGNOSIS — I44.2 COMPLETE AV BLOCK: Chronic | ICD-10-CM

## 2018-01-31 LAB
ESTIMATED PA SYSTOLIC PRESSURE: 69.56
MITRAL VALVE REGURGITATION: ABNORMAL
RETIRED EF AND QEF - SEE NOTES: 65 (ref 55–65)
TRICUSPID VALVE REGURGITATION: ABNORMAL

## 2018-01-31 PROCEDURE — 93306 TTE W/DOPPLER COMPLETE: CPT | Mod: S$GLB,,, | Performed by: INTERNAL MEDICINE

## 2018-02-01 ENCOUNTER — TELEPHONE (OUTPATIENT)
Dept: ELECTROPHYSIOLOGY | Facility: CLINIC | Age: 83
End: 2018-02-01

## 2018-02-01 DIAGNOSIS — I27.20 PULMONARY HYPERTENSION: Chronic | ICD-10-CM

## 2018-02-01 DIAGNOSIS — I50.32 CHRONIC DIASTOLIC HEART FAILURE: Primary | ICD-10-CM

## 2018-02-01 NOTE — TELEPHONE ENCOUNTER
Notified patient of his ECHO results. LVEF is normal however he has diastolic dysfunction with pulmonary hypertension (PA pressure of 70). PA pressure has been 40-50 in the past. Will refer to general cardiology for evaluation and treatment. Will have my office staff help arrange.

## 2018-02-08 ENCOUNTER — OFFICE VISIT (OUTPATIENT)
Dept: CARDIOLOGY | Facility: CLINIC | Age: 83
End: 2018-02-08
Payer: MEDICARE

## 2018-02-08 ENCOUNTER — LAB VISIT (OUTPATIENT)
Dept: LAB | Facility: HOSPITAL | Age: 83
End: 2018-02-08
Attending: INTERNAL MEDICINE
Payer: MEDICARE

## 2018-02-08 VITALS
DIASTOLIC BLOOD PRESSURE: 59 MMHG | BODY MASS INDEX: 23.74 KG/M2 | HEART RATE: 70 BPM | WEIGHT: 160.25 LBS | HEIGHT: 69 IN | OXYGEN SATURATION: 98 % | SYSTOLIC BLOOD PRESSURE: 150 MMHG

## 2018-02-08 DIAGNOSIS — R06.09 EXERTIONAL DYSPNEA: ICD-10-CM

## 2018-02-08 LAB — BNP SERPL-MCNC: 405 PG/ML

## 2018-02-08 PROCEDURE — 99999 PR PBB SHADOW E&M-EST. PATIENT-LVL IV: CPT | Mod: PBBFAC,,, | Performed by: INTERNAL MEDICINE

## 2018-02-08 PROCEDURE — 3008F BODY MASS INDEX DOCD: CPT | Mod: S$GLB,,, | Performed by: INTERNAL MEDICINE

## 2018-02-08 PROCEDURE — 99499 UNLISTED E&M SERVICE: CPT | Mod: S$GLB,,, | Performed by: INTERNAL MEDICINE

## 2018-02-08 PROCEDURE — 36415 COLL VENOUS BLD VENIPUNCTURE: CPT

## 2018-02-08 PROCEDURE — 99213 OFFICE O/P EST LOW 20 MIN: CPT | Mod: S$GLB,,, | Performed by: INTERNAL MEDICINE

## 2018-02-08 PROCEDURE — 99214 OFFICE O/P EST MOD 30 MIN: CPT | Performed by: INTERNAL MEDICINE

## 2018-02-08 PROCEDURE — 1126F AMNT PAIN NOTED NONE PRSNT: CPT | Mod: S$GLB,,, | Performed by: INTERNAL MEDICINE

## 2018-02-08 PROCEDURE — 83880 ASSAY OF NATRIURETIC PEPTIDE: CPT

## 2018-02-08 PROCEDURE — 1159F MED LIST DOCD IN RCRD: CPT | Mod: S$GLB,,, | Performed by: INTERNAL MEDICINE

## 2018-02-08 RX ORDER — TORSEMIDE 10 MG/1
20 TABLET ORAL DAILY
Qty: 60 TABLET | Refills: 11 | Status: SHIPPED | OUTPATIENT
Start: 2018-02-08 | End: 2018-07-26 | Stop reason: DRUGHIGH

## 2018-02-08 NOTE — LETTER
February 8, 2018      Donn Hastings MD  [address]  [facility] [address]           [facility]  [address]  [facility] [address]  Phone: [phone]          Patient: Deena Moody   MR Number: [id number]   YOB: 1930   Date of Visit: 2/8/2018       Dear Dr. Donn Hsatings:    Thank you for referring Deena Moody to me for evaluation. Attached you will find relevant portions of my assessment and plan of care.    If you have questions, please do not hesitate to call me. I look forward to following Deena Moody along with you.    Sincerely,    Ciaran Michaud MD    Enclosure  CC:  No Recipients    If you would like to receive this communication electronically, please contact [email or web address] or [phone] to request more information on [employer] access.    For providers and/or their staff who would like to refer a patient to [facility], please contact us through our one-stop-shop provider referral line, [facility] , at [phone].    If you feel you have received this communication in error or would no longer like to receive these types of communications, please e-mail [email or web address]

## 2018-02-08 NOTE — PROGRESS NOTES
Patient ID:  Deena Moody is a 87 y.o. male who presents to establish care in Cardiology Clinic.      Pt refers that since 2-3 weeks ago he has noted worsening dyspnea on exertion. No PND. Further he occasionally experiences epigastric discomfort that occurs when he walks and resolves if he continues walking. The discomfort is different from what he experienced when he had a myocardial infarction. Asymptomatic at rest.    BP systolic at home averages 140 mmHg. He has not noted an increase in body weight.     PMH includes: s/p pacemaker for complete AV block (followed by Dr Hastings); persistent atrial fibrillation on chronic anticoagulation; hypertension; hyperlipidemia; CAD, Mi, s/p CABG and AVR in 2004; HFpEF.    Social hx: he lives with his wife. Today he is accompanied by his daughter.    Present medicines include: ASA 81 mg qd  Amlodipine 5 mg qd  Benazepril 5 mg qd  Carvedilol 12.5 mg bid  Atorvastatin 80 mg qd  apixaban 5 mg bid  Omeprazole 20 mg qd          Lab Results   Component Value Date     12/30/2017     12/30/2017    K 4.5 12/30/2017    K 4.5 12/30/2017     12/30/2017     12/30/2017    CO2 27 12/30/2017    CO2 27 12/30/2017    BUN 19 12/30/2017    BUN 19 12/30/2017    CREATININE 1.3 12/30/2017    CREATININE 1.3 12/30/2017    GLU 87 12/30/2017    GLU 87 12/30/2017    HGBA1C 5.5 06/15/2010    MG 2.0 09/13/2016    AST 22 12/30/2017    ALT 17 12/30/2017    ALBUMIN 3.4 (L) 12/30/2017    PROT 7.6 12/30/2017    BILITOT 1.3 (H) 12/30/2017    WBC 9.20 12/30/2017    WBC 9.20 12/30/2017    HGB 12.0 (L) 12/30/2017    HGB 12.0 (L) 12/30/2017    HCT 37.6 (L) 12/30/2017    HCT 37.6 (L) 12/30/2017    HCT 39 08/31/2016    MCV 98 12/30/2017    MCV 98 12/30/2017     12/30/2017     12/30/2017    INR 1.1 09/13/2016    PSA 0.89 05/19/2011    TSH 2.709 12/30/2017       Past Medical History:   Diagnosis Date    Acute on chronic diastolic heart failure 9/1/2016    Coronary artery  disease     Hyperlipidemia     Hypertension     Macular degeneration (senile) of retina, unspecified 12/12/2014    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    Persistent atrial fibrillation 11/10/2016    S/P placement of cardiac pacemaker 9/14/2016     Family History   Problem Relation Age of Onset    No Known Problems Mother     No Known Problems Father     No Known Problems Sister     Stroke Brother     Hypertension Brother     No Known Problems Maternal Aunt     No Known Problems Maternal Uncle     No Known Problems Paternal Aunt     No Known Problems Paternal Uncle     No Known Problems Maternal Grandmother     No Known Problems Maternal Grandfather     No Known Problems Paternal Grandmother     No Known Problems Paternal Grandfather     No Known Problems Daughter     No Known Problems Son     Amblyopia Neg Hx     Blindness Neg Hx     Cancer Neg Hx     Cataracts Neg Hx     Diabetes Neg Hx     Glaucoma Neg Hx     Macular degeneration Neg Hx     Retinal detachment Neg Hx     Strabismus Neg Hx     Thyroid disease Neg Hx      Social History     Social History    Marital status:      Spouse name: N/A    Number of children: N/A    Years of education: N/A     Occupational History    Not on file.     Social History Main Topics    Smoking status: Never Smoker    Smokeless tobacco: Never Used    Alcohol use No    Drug use: No    Sexual activity: Yes     Partners: Female     Other Topics Concern    Not on file     Social History Narrative    No narrative on file       Lab Results   Component Value Date    CHOL 124 12/30/2017    HDL 32 (L) 12/30/2017    TRIG 88 12/30/2017       Lab Results   Component Value Date    LDLCALC 74.4 12/30/2017       Past Medical History:   Diagnosis Date    Acute on chronic diastolic heart failure 9/1/2016    Coronary artery disease     Hyperlipidemia     Hypertension     Macular degeneration (senile) of retina, unspecified 12/12/2014    Nuclear  sclerosis 12/12/2014    Persistent atrial fibrillation 11/10/2016    S/P placement of cardiac pacemaker 9/14/2016     Hypertension Medications             amLODIPine (NORVASC) 5 MG tablet Take 1 tablet (5 mg total) by mouth once daily.    benazepril (LOTENSIN) 5 MG tablet Take 1 tablet (5 mg total) by mouth once daily.    carvedilol (COREG) 12.5 MG tablet Take 1 tablet (12.5 mg total) by mouth 2 (two) times daily.            Review of Systems   Constitution: Negative for decreased appetite, diaphoresis, fever, weakness, malaise/fatigue, weight gain and weight loss.   HENT: Negative for congestion, ear discharge, ear pain and nosebleeds.    Eyes: Negative for blurred vision, double vision and visual disturbance.   Cardiovascular: Positive for dyspnea on exertion. Negative for chest pain, claudication, cyanosis, irregular heartbeat, leg swelling, near-syncope, orthopnea, palpitations, paroxysmal nocturnal dyspnea and syncope.   Respiratory: Negative for cough, hemoptysis, shortness of breath, sleep disturbances due to breathing, snoring, sputum production and wheezing.    Endocrine: Negative for polydipsia, polyphagia and polyuria.   Hematologic/Lymphatic: Negative for adenopathy and bleeding problem. Does not bruise/bleed easily.   Skin: Negative for color change, nail changes, poor wound healing and rash.   Musculoskeletal: Negative for muscle cramps and muscle weakness.   Gastrointestinal: Negative for abdominal pain, anorexia, change in bowel habit, hematochezia, nausea and vomiting.   Genitourinary: Negative for dysuria, frequency and hematuria.   Neurological: Negative for brief paralysis, difficulty with concentration, excessive daytime sleepiness, dizziness, focal weakness, headaches, light-headedness, seizures and vertigo.   Psychiatric/Behavioral: Negative for altered mental status and depression.   Allergic/Immunologic: Negative for persistent infections.                Objective:         BP (!) 150/59  "(BP Location: Left arm, Patient Position: Sitting, BP Method: Medium (Automatic))   Pulse 70   Ht 5' 9" (1.753 m)   Wt 72.7 kg (160 lb 4.4 oz)   SpO2 98%   BMI 23.67 kg/m²    Physical Exam   Constitutional: He is oriented to person, place, and time. He appears well-developed and well-nourished.   HENT:   Head: Normocephalic.   Right Ear: External ear normal.   Left Ear: External ear normal.   Nose: Nose normal.   Inspection of lips, teeth and gums normal   Eyes: EOM are normal. Pupils are equal, round, and reactive to light. No scleral icterus.   Neck: Normal range of motion. Neck supple. Hepatojugular reflux and JVD (venous pressure is 10 cm H2O) present. No tracheal deviation present. No thyromegaly present.   Cardiovascular: Normal rate, regular rhythm, S1 normal, S2 normal and intact distal pulses.  Exam reveals no gallop and no friction rub.    Murmur heard.   Harsh midsystolic murmur is present with a grade of 3/6  at the upper right sternal border radiating to the neck  Pulses:       Carotid pulses are 2+ on the right side with bruit, and 2+ on the left side.       Radial pulses are 2+ on the right side, and 2+ on the left side.        Dorsalis pedis pulses are 2+ on the right side, and 2+ on the left side.        Posterior tibial pulses are 2+ on the right side, and 2+ on the left side.   Pulmonary/Chest: Effort normal and breath sounds normal.   Abdominal: Bowel sounds are normal. He exhibits no distension. There is no hepatosplenomegaly. There is no tenderness. There is no guarding.   Musculoskeletal: Normal range of motion. He exhibits no edema or tenderness.   Lymphadenopathy:   Palpation of neck and groin lymph nodes normal   Neurological: He is alert and oriented to person, place, and time. No cranial nerve deficit. He exhibits normal muscle tone. Coordination normal.   Skin: Skin is dry.   He has 1+ pretibial edema bilaterally.   Psychiatric: His behavior is normal. Judgment and thought content " normal.             ECG (30-JAN-2018)  Electronic ventricular pacemaker  The other rhythm shows Atrial fibrillation  Abnormal ECG  When compared with ECG of 11-JUL-2017 08:39,  Vent. rate has decreased BY 10 BPM        Echocardiogram (01/31/2018)    TEST DESCRIPTION     General: A catheter is present in the right-sided cardiac chambers.     Aorta: The aortic root is normal in size. Aortic root measures 3.2 cm at Sinuses of Valsalva.     Left Atrium: The left atrial volume index is severely enlarged, measuring 48.50 cc/m2.     Left Ventricle: The left ventricle is normal in size, with an end-diastolic diameter of 4.4 cm, and an end-systolic diameter of 3.3 cm. Wall thickness is increased, with the septum and the posterior wall each measuring 1.1 cm across. Relative wall   thickness was increased at 0.50, and the LV mass index was 104.1 g/m2 consistent with concentric remodeling. There are no regional wall motion abnormalities. Left ventricular systolic function appears normal. Visually estimated ejection fraction is   60-65%. The LV Doppler derived stroke volume equals 68.0 ccs.     Right Atrium: The right atrium is enlarged, measuring 5.2 cm in length and 4.7 cm in width in the apical view.     Right Ventricle: The right ventricle is normal in size measuring 3.9 cm at the base in the apical right ventricle-focused view. Global right ventricular systolic function appears mildly depressed. Tricuspid annular plane systolic excursion (TAPSE) is 1.8   cm. Tissue Doppler-derived tricuspid annular peak systolic velocity (S prime) is 7.4 cm/s. The estimated PA systolic pressure is greater than 70 mmHg.     Aortic Valve:  There is a surgically replaced bioprosthesis in the aortic position. The peak velocity obtained across the aortic valve is 2.85 m/s, which translates to a peak gradient of 32 mmHg. The mean gradient is 18 mmHg. Using a left ventricular   outflow tract diameter of 2.1 cm, a left ventricular outflow tract  velocity time integral of 20 cm, and a peak instantaneous transvalvular velocity time integral of 61 cm, the effective prosthetic valve area is 1.13 cm2(p AVAi is 0.6 cm2/m2). 21 mm   Post aortic valve with patch augmentation in 2004.    Mitral Valve:  The mitral valve is normal in structure. The mean gradient obtained across the mitral valve is 2 mmHg. There is mild mitral regurgitation. There is marked mitral annular calcification.     Tricuspid Valve:  The tricuspid valve is normal in structure. There is mild tricuspid regurgitation.     Pulmonary Valve:  The pulmonic valve is not well seen.     IVC: The IVC is not visualized.     Intracavitary: There is no evidence of pericardial effusion, intracavity mass, thrombi, or vegetation.     In atrial fibrillation.     CONCLUSIONS     1 - Biatrial enlargement.     2 - Normal left ventricular systolic function (EF 60-65%).     3 - S/P surgical AVR, NAVNEET = 1.13 cm2, AVAi = 0.6 cm2/m2, peak velocity = 2.85 m/s, mean gradient = 18 mmHg.     4 - Mild mitral regurgitation.     5 - Mild tricuspid regurgitation.     6 - Pulmonary hypertension. The estimated PA systolic pressure is greater than 70 mmHg.     7 - In atrial fibrillation.     This document has been electronically    SIGNED BY: Marycarmen Wong MD On: 01/31/2018      I have reviewed the following:     Details / Date    []   Labs     []   Imaging     []   Cardiology Procedures     []   Other      Assessment and Plan:       1. Exertional dyspnea         Exertional dyspnea  He has clinical evidence of fluid retention. He has a hx of CAD and Mi; exertional dyspnea may also represent angina equivalent.    Home body weight and blood pressure monitoring. Keep a log and bring it at the time of next appointment  BNP  Torsemide 20 mg qd  Cardiac PET stress   RTC in 10 days

## 2018-02-08 NOTE — ASSESSMENT & PLAN NOTE
He has clinical evidence of fluid retention. He has a hx of CAD and Mi; exertional dyspnea may also represent angina equivalent.    Home body weight and blood pressure monitoring. Keep a log and bring it at the time of next appointment  BNP  Torsemide 20 mg qd  Cardiac PET stress   RTC in 10 days

## 2018-02-09 NOTE — PROGRESS NOTES
Patient, Deena Moody (MRN #[id number]), presented with a recent Estimated PA Systolic Pressure greater than 40 mmHG consistent with the definition of pulmonary hypertension (ICD10 - I27.0).    Est. PA Systolic Pressure   Date Value Ref Range Status   01/31/2018 69.56 (A)       The patient's pulmonary hypertension was monitored, evaluated, addressed and/or treated. This addendum to the medical record is made on 02/09/2018.

## 2018-02-14 ENCOUNTER — TELEPHONE (OUTPATIENT)
Dept: CARDIOLOGY | Facility: CLINIC | Age: 83
End: 2018-02-14

## 2018-02-15 NOTE — TELEPHONE ENCOUNTER
Message given to patient ( spoke with wife ).    MD Sarah Huynh MA             Please inform pt that BNP is elevated and that I confirm that he is to take torsemide 20 mg qd as planned.     long term

## 2018-02-15 NOTE — TELEPHONE ENCOUNTER
----- Message from Ciaran Michaud MD sent at 2/8/2018  6:43 PM CST -----  Please inform pt that BNP is elevated and that I confirm that he is to take torsemide 20 mg qd as planned.    intermediate

## 2018-02-20 ENCOUNTER — CLINICAL SUPPORT (OUTPATIENT)
Dept: CARDIOLOGY | Facility: CLINIC | Age: 83
End: 2018-02-20
Attending: INTERNAL MEDICINE
Payer: MEDICARE

## 2018-02-20 DIAGNOSIS — R06.09 EXERTIONAL DYSPNEA: ICD-10-CM

## 2018-02-20 PROCEDURE — 93015 CV STRESS TEST SUPVJ I&R: CPT | Mod: S$GLB,,, | Performed by: INTERNAL MEDICINE

## 2018-02-20 PROCEDURE — A9555 RB82 RUBIDIUM: HCPCS | Mod: S$GLB,,, | Performed by: INTERNAL MEDICINE

## 2018-02-20 PROCEDURE — 78492 MYOCRD IMG PET MLT RST&STRS: CPT | Mod: S$GLB,,, | Performed by: INTERNAL MEDICINE

## 2018-02-21 ENCOUNTER — TELEPHONE (OUTPATIENT)
Dept: CARDIOLOGY | Facility: CLINIC | Age: 83
End: 2018-02-21

## 2018-02-21 RX ORDER — NITROGLYCERIN 0.4 MG/1
TABLET SUBLINGUAL
Qty: 25 TABLET | Refills: 4 | Status: SHIPPED | OUTPATIENT
Start: 2018-02-21 | End: 2021-03-18 | Stop reason: SDUPTHER

## 2018-02-21 RX ORDER — NITROGLYCERIN 0.4 MG/1
TABLET SUBLINGUAL
Qty: 30 TABLET | Refills: 6 | Status: CANCELLED | OUTPATIENT
Start: 2018-02-21

## 2018-02-22 NOTE — TELEPHONE ENCOUNTER
Cardiac PET stress positive for myocardial ischemia. Pt and his family have been informed and order for sublingual NTG has been written. I will see pt in 2 days in Clinic.    Ciaran Michaud

## 2018-02-23 ENCOUNTER — OFFICE VISIT (OUTPATIENT)
Dept: CARDIOLOGY | Facility: CLINIC | Age: 83
End: 2018-02-23
Payer: MEDICARE

## 2018-02-23 VITALS
OXYGEN SATURATION: 99 % | HEART RATE: 70 BPM | HEIGHT: 68 IN | BODY MASS INDEX: 22.35 KG/M2 | DIASTOLIC BLOOD PRESSURE: 57 MMHG | WEIGHT: 147.5 LBS | SYSTOLIC BLOOD PRESSURE: 148 MMHG

## 2018-02-23 DIAGNOSIS — I25.10 CORONARY ARTERY DISEASE, ANGINA PRESENCE UNSPECIFIED, UNSPECIFIED VESSEL OR LESION TYPE, UNSPECIFIED WHETHER NATIVE OR TRANSPLANTED HEART: Primary | ICD-10-CM

## 2018-02-23 DIAGNOSIS — I48.19 PERSISTENT ATRIAL FIBRILLATION: ICD-10-CM

## 2018-02-23 DIAGNOSIS — I10 ESSENTIAL HYPERTENSION: ICD-10-CM

## 2018-02-23 DIAGNOSIS — I50.32 CHRONIC DIASTOLIC HEART FAILURE: ICD-10-CM

## 2018-02-23 PROCEDURE — 3008F BODY MASS INDEX DOCD: CPT | Mod: S$GLB,,, | Performed by: INTERNAL MEDICINE

## 2018-02-23 PROCEDURE — 1126F AMNT PAIN NOTED NONE PRSNT: CPT | Mod: S$GLB,,, | Performed by: INTERNAL MEDICINE

## 2018-02-23 PROCEDURE — 99213 OFFICE O/P EST LOW 20 MIN: CPT | Mod: S$GLB,,, | Performed by: INTERNAL MEDICINE

## 2018-02-23 PROCEDURE — 1159F MED LIST DOCD IN RCRD: CPT | Mod: S$GLB,,, | Performed by: INTERNAL MEDICINE

## 2018-02-23 PROCEDURE — 99499 UNLISTED E&M SERVICE: CPT | Mod: S$GLB,,, | Performed by: INTERNAL MEDICINE

## 2018-02-23 PROCEDURE — 99999 PR PBB SHADOW E&M-EST. PATIENT-LVL III: CPT | Mod: PBBFAC,,, | Performed by: INTERNAL MEDICINE

## 2018-02-23 NOTE — ASSESSMENT & PLAN NOTE
Clinically improved in response to diuresis with torsemide and 12 lbs wt loss    BMP prior to next appointment  Continue home Bw, BP and HR monitoring  Low sodium diet (2 gm or less per day)  Limit fluid intake to 1.5 liters per day   Benazepril 5 mg qd  Increase Carvedilol to 25 mg bid  Torsemide 10 mg every other day. Go back to 20 mg per day for 3 days if body weight increases more than 3 lbs in a day or 5 lbs in a week

## 2018-02-23 NOTE — PATIENT INSTRUCTIONS
Low sodium diet (2 gm or less per day)  Limit fluid intake to 1.5 liters per day   ASA 81 mg qd  Amlodipine 5 mg qd  Benazepril 5 mg qd  Carvedilol 25 mg bid  Sublingual nitroglycerin prn  Atorvastatin 80 mg qd  apixaban 5 mg bid  Torsemide 10 mg every other day. Go back to 20 mg per day for 3 days if body weight increases more than 3 lbs in a day or 5 lbs in a week

## 2018-02-23 NOTE — ASSESSMENT & PLAN NOTE
Asymptomatic. Small area of ischemia on Cardiac PET Stress    ASA 81 mg qd  Benazepril 5 mg qd  Increase Carvedilol to 25 mg bid  Sublingual nitroglycerin prn  Atorvastatin 80 mg qd  If BP tolerates the increase in carvedilol at next appointment I will add Imdur

## 2018-02-23 NOTE — PROGRESS NOTES
Cardiology Progress Note:    Patient ID:  Deena Moody is a 87 y.o. male who presents for:     History of Present Illness:  Pt has been in his usual state of health since last visit with me on 2/8/2018. Since then BW has decreased from 156 to 144 lbs; BP has averaged 140/75 mmHg; HR has averaged 70/min    Cardiac PET Stress shows a small area of moderate ischemia (see below)     Past Medical History Includes:  s/p pacemaker for complete AV block (followed by Dr Hastings); persistent atrial fibrillation on chronic anticoagulation; hypertension; hyperlipidemia; CAD, Mi, s/p CABG and AVR in 2004; HFpEF.    Family History Includes:  Non-contributory    Social History Includes:  he lives with his wife. Today he is accompanied by his daughter.    Present Medical Therapy Includes:    ASA 81 mg qd  Amlodipine 5 mg qd  Benazepril 5 mg qd  Carvedilol 12.5 mg bid  Atorvastatin 80 mg qd  apixaban 5 mg bid  Torsemide 20 mg qd  Omeprazole 20 mg qd    Full Medication List Includes:  Outpatient Encounter Prescriptions as of 2/23/2018   Medication Sig Dispense Refill    amLODIPine (NORVASC) 5 MG tablet Take 1 tablet (5 mg total) by mouth once daily. 90 tablet 1    apixaban (ELIQUIS) 5 mg Tab Take 1 tablet (5 mg total) by mouth 2 (two) times daily. 60 tablet 11    aspirin (ECOTRIN) 81 MG EC tablet Take 1 tablet (81 mg total) by mouth once daily. 90 tablet 3    atorvastatin (LIPITOR) 80 MG tablet Take 1 tablet (80 mg total) by mouth nightly. 90 tablet 1    benazepril (LOTENSIN) 5 MG tablet Take 1 tablet (5 mg total) by mouth once daily. 90 tablet 1    carvedilol (COREG) 12.5 MG tablet Take 1 tablet (12.5 mg total) by mouth 2 (two) times daily. 90 tablet 1    multivitamin (MULTIVITAMIN) per tablet Take 1 tablet by mouth once daily.        nitroGLYCERIN (NITROSTAT) 0.4 MG SL tablet Take one tablet every 5 minutes for chest pain. After the third tablet go to the ED. 25 tablet 4    omeprazole (PRILOSEC) 20 MG capsule Take 1  "capsule (20 mg total) by mouth once daily. 90 capsule 1    torsemide (DEMADEX) 10 MG Tab Take 2 tablets (20 mg total) by mouth once daily. 60 tablet 11     No facility-administered encounter medications on file as of 2/23/2018.        Review of Systems:  Review of Systems   Constitution: Negative for decreased appetite, diaphoresis, fever, weakness, malaise/fatigue, weight gain and weight loss.   HENT: Negative for congestion, ear discharge, ear pain and nosebleeds.    Eyes: Negative for blurred vision, double vision and visual disturbance.   Cardiovascular: Negative for chest pain, claudication, cyanosis, dyspnea on exertion, irregular heartbeat, leg swelling, near-syncope, orthopnea, palpitations, paroxysmal nocturnal dyspnea and syncope.   Respiratory: Negative for cough, hemoptysis, shortness of breath, sleep disturbances due to breathing, snoring, sputum production and wheezing.    Endocrine: Negative for polydipsia, polyphagia and polyuria.   Hematologic/Lymphatic: Negative for adenopathy and bleeding problem. Does not bruise/bleed easily.   Skin: Negative for color change, nail changes, poor wound healing and rash.   Musculoskeletal: Negative for muscle cramps and muscle weakness.   Gastrointestinal: Negative for abdominal pain, anorexia, change in bowel habit, hematochezia, nausea and vomiting.   Genitourinary: Negative for dysuria, frequency and hematuria.   Neurological: Negative for brief paralysis, difficulty with concentration, excessive daytime sleepiness, dizziness, focal weakness, headaches, light-headedness, seizures and vertigo.   Psychiatric/Behavioral: Negative for altered mental status and depression.   Allergic/Immunologic: Negative for persistent infections.       Physical Exam:  BP (!) 148/57 (BP Location: Left arm, Patient Position: Sitting, BP Method: Small (Automatic))   Pulse 70   Ht 5' 8" (1.727 m)   Wt 66.9 kg (147 lb 7.8 oz)   SpO2 99%   BMI 22.43 kg/m²   Physical Exam "   Constitutional: He is oriented to person, place, and time. He appears well-developed and well-nourished.   HENT:   Head: Normocephalic.   Right Ear: External ear normal.   Left Ear: External ear normal.   Nose: Nose normal.   Inspection of lips, teeth and gums normal   Eyes: EOM are normal. Pupils are equal, round, and reactive to light. No scleral icterus.   Neck: Normal range of motion. Neck supple. No hepatojugular reflux and no JVD present. No tracheal deviation present. No thyromegaly present.   Cardiovascular: Normal rate, regular rhythm, S1 normal, S2 normal and intact distal pulses.  Exam reveals no gallop and no friction rub.    Murmur heard.   Harsh midsystolic murmur is present with a grade of 3/6  at the upper right sternal border radiating to the neck  Pulses:       Carotid pulses are 2+ on the right side with bruit, and 2+ on the left side.       Radial pulses are 2+ on the right side, and 2+ on the left side.        Dorsalis pedis pulses are 2+ on the right side, and 2+ on the left side.        Posterior tibial pulses are 2+ on the right side, and 2+ on the left side.   Pulmonary/Chest: Effort normal and breath sounds normal.   Abdominal: Bowel sounds are normal. He exhibits no distension. There is no hepatosplenomegaly. There is no tenderness. There is no guarding.   Musculoskeletal: Normal range of motion. He exhibits no edema or tenderness.   Lymphadenopathy:   Palpation of neck and groin lymph nodes normal   Neurological: He is alert and oriented to person, place, and time. No cranial nerve deficit. He exhibits normal muscle tone. Coordination normal.   Skin: Skin is dry.   No ankle nor pretibial edema (he had 1+ pretibial edema bilaterally at the time of last visit).   Psychiatric: His behavior is normal. Judgment and thought content normal.        Blood Tests:  Lab Results   Component Value Date     (H) 02/08/2018     12/30/2017     12/30/2017    K 4.5 12/30/2017    K 4.5  12/30/2017     12/30/2017     12/30/2017    CO2 27 12/30/2017    CO2 27 12/30/2017    BUN 19 12/30/2017    BUN 19 12/30/2017    CREATININE 1.3 12/30/2017    CREATININE 1.3 12/30/2017    GLU 87 12/30/2017    GLU 87 12/30/2017    HGBA1C 5.5 06/15/2010    MG 2.0 09/13/2016    AST 22 12/30/2017    ALT 17 12/30/2017    ALBUMIN 3.4 (L) 12/30/2017    PROT 7.6 12/30/2017    BILITOT 1.3 (H) 12/30/2017    WBC 9.20 12/30/2017    WBC 9.20 12/30/2017    HGB 12.0 (L) 12/30/2017    HGB 12.0 (L) 12/30/2017    HCT 37.6 (L) 12/30/2017    HCT 37.6 (L) 12/30/2017    HCT 39 08/31/2016    MCV 98 12/30/2017    MCV 98 12/30/2017     12/30/2017     12/30/2017    INR 1.1 09/13/2016    PSA 0.89 05/19/2011    TSH 2.709 12/30/2017       Lab Results   Component Value Date    CHOL 124 12/30/2017    HDL 32 (L) 12/30/2017    TRIG 88 12/30/2017       Lab Results   Component Value Date    LDLCALC 74.4 12/30/2017       Urine Tests:  Lab Results   Component Value Date    COLORU Yellow 09/14/2016    APPEARANCEUA Clear 09/14/2016    PHUR 5.0 09/14/2016    SPECGRAV 1.010 09/14/2016    PROTEINUA 1+ (A) 09/14/2016    GLUCUA Negative 09/14/2016    KETONESU Negative 09/14/2016    BILIRUBINUA Negative 09/14/2016    OCCULTUA 1+ (A) 09/14/2016    NITRITE Negative 09/14/2016    UROBILINOGEN Negative 09/14/2016    LEUKOCYTESUR Negative 09/14/2016    CREATRANDUR 95 12/27/2010        ECG (30-JAN-2018)  Electronic ventricular pacemaker  The other rhythm shows Atrial fibrillation  Abnormal ECG  When compared with ECG of 11-JUL-2017 08:39,  Vent. rate has decreased BY 10 BPM           Echocardiogram (01/31/2018)    TEST DESCRIPTION      General: A catheter is present in the right-sided cardiac chambers.     Aorta: The aortic root is normal in size. Aortic root measures 3.2 cm at Sinuses of Valsalva.     Left Atrium: The left atrial volume index is severely enlarged, measuring 48.50 cc/m2.     Left Ventricle: The left ventricle is normal in  size, with an end-diastolic diameter of 4.4 cm, and an end-systolic diameter of 3.3 cm. Wall thickness is increased, with the septum and the posterior wall each measuring 1.1 cm across. Relative wall   thickness was increased at 0.50, and the LV mass index was 104.1 g/m2 consistent with concentric remodeling. There are no regional wall motion abnormalities. Left ventricular systolic function appears normal. Visually estimated ejection fraction is   60-65%. The LV Doppler derived stroke volume equals 68.0 ccs.     Right Atrium: The right atrium is enlarged, measuring 5.2 cm in length and 4.7 cm in width in the apical view.     Right Ventricle: The right ventricle is normal in size measuring 3.9 cm at the base in the apical right ventricle-focused view. Global right ventricular systolic function appears mildly depressed. Tricuspid annular plane systolic excursion (TAPSE) is 1.8   cm. Tissue Doppler-derived tricuspid annular peak systolic velocity (S prime) is 7.4 cm/s. The estimated PA systolic pressure is greater than 70 mmHg.     Aortic Valve:  There is a surgically replaced bioprosthesis in the aortic position. The peak velocity obtained across the aortic valve is 2.85 m/s, which translates to a peak gradient of 32 mmHg. The mean gradient is 18 mmHg. Using a left ventricular   outflow tract diameter of 2.1 cm, a left ventricular outflow tract velocity time integral of 20 cm, and a peak instantaneous transvalvular velocity time integral of 61 cm, the effective prosthetic valve area is 1.13 cm2(p AVAi is 0.6 cm2/m2). 21 mm   Post aortic valve with patch augmentation in 2004.    Mitral Valve:  The mitral valve is normal in structure. The mean gradient obtained across the mitral valve is 2 mmHg. There is mild mitral regurgitation. There is marked mitral annular calcification.     Tricuspid Valve:  The tricuspid valve is normal in structure. There is mild tricuspid regurgitation.     Pulmonary Valve:  The pulmonic  valve is not well seen.     IVC: The IVC is not visualized.     Intracavitary: There is no evidence of pericardial effusion, intracavity mass, thrombi, or vegetation.     In atrial fibrillation.     CONCLUSIONS     1 - Biatrial enlargement.     2 - Normal left ventricular systolic function (EF 60-65%).     3 - S/P surgical AVR, NAVNEET = 1.13 cm2, AVAi = 0.6 cm2/m2, peak velocity = 2.85 m/s, mean gradient = 18 mmHg.     4 - Mild mitral regurgitation.     5 - Mild tricuspid regurgitation.     6 - Pulmonary hypertension. The estimated PA systolic pressure is greater than 70 mmHg.     7 - In atrial fibrillation.     This document has been electronically    SIGNED BY: Marycarmen Wong MD On: 01/31/2018           Cardiac PET Stress Test (02/20/2018)    PRE-TEST DATA   EKG: EKG demonstrates adequate. Resting electrocardiogram reveals normal sinus rhythm at a rate of 70 bpm. Ventricular paced rhythm    Indication for Stress Test:  MCKEON    Clinical Notes:  Hypertension,  Hyperlipidemia,  CABG,  CAD and Atrial Fibrillation    TEST DESCRIPTION   The patient received 40.73 mg of Dipyridamole over 4 minutes. Peak heart rate was 70 bpm, which is 53% of the age predicted maximum heart rate. .     EKG Conclusions:    1. The EKG portion of this study is uninterpretable for ischemia at a peak heart rate of 70 bpm (53% of predicted).   2. Blood pressure remained stable throughout the protocol  (Presenting BP: 161/85 Peak BP: 156/65).   3. No significant arrhythmias were present.   4. There were no symptoms of chest discomfort or significant dyspnea throughout the protocol.     Protocol:  After explaining the risk, benefits, and alternatives of IV Dipyridamole PET perfusion scintigraphy and after obtaining informed consent, the patient was positioned in the Positron Attrius. After aligning the sternal angle of Fred with the laser defining the upper edge of the field of view, positioning was confirmed with a 7 minute attenuation transmission  scan. This was followed by a 50.1 mCi. Rubidium 82 injection at rest.  A 7 minute emission acquisition scan was performed 70 seconds after termination of the infusion.     Subsequently, Dipyridamole pharmacologic stress testing was performed as described above. 3 minutes following the cessation of the Dipyridamole infusion, 50 mCi of Rubidium 82 was infused and a 7 minute emission acquisition scan performed 70 seconds after termination of the infusion. This was followed by a 7 minute attenuation transmission scan. Subsequently, images were processed VLA, HLA and short axis views in addition to polar tomographic displays and pseudo topographic displays. The site of the IV injection was the right hand.     Inspection of the transaxial images demonstrated significant left lateral patient motion in the camera between rest and stress acquisitions. This was compensated for with the computer assisted shift correction algorithm.     COMMENTS  This is a technically excellent study.   Relative Myocardial Perfusion Images: The relative PET images show the following:  On resting images, there is a very small sized moderate intensity defect in the mid inferior wall. On stress images, this defect becomes larger in size extending from the mid inferior and mid to apical inferolateral walls.     Other Findings:  The extracardiac distribution of radioactivity is normal. The left ventricular cavity is normal in size and does not increase with stress. On gated SPECT, left ventricular motion is normal at rest. On gated SPECT, left ventricular motion is normal at stress.     Nuclear Quantitative Functional Analysis:   At rest:  LVEF: >= 70 % (normal is >= 51%)  LVED Volume: 81 ml (normal is <=171)  LVES Volume: 23 ml (normal is <=70)  At stress:  LVEF: 65 % (normal is >= 51%)  LVED Volume: 87 ml (normal is <=171)  LVES Volume: 30 ml (normal is <=70)    CONCLUSIONS: ABNORMAL MYOCARDIAL PERFUSION PET STRESS TEST  1. There is a very small  sized moderate ischemia with a small amount of underlying infarct in the mid inferior and mid to apical inferolateral walls in the usual distribution of the Posterior Lateral Branch. This defect comprises 10 % of the left ventricular myocardium. This is likely consistent with the known occlusion of the Right Coronary Artery.  2. Resting wall motion is physiologic. Stress wall motion is physiologic.   3. LV function is normal at rest and stress.  (normal is >= 51%)  4. The ventricular volumes are normal at rest and stress.   5. The extracardiac distribution of radioactivity is normal.   6. There was no previous study available to compare.    This document has been electronically    SIGNED BY: Paula Quinonez MD       I have reviewed the following:     Details / Date    []   Labs     []   Imaging     []   Cardiology Procedures     []   Other      Assessment and Plan:     1. Coronary artery disease, angina presence unspecified, unspecified vessel or lesion type, unspecified whether native or transplanted heart    2. Chronic diastolic heart failure    3. Essential hypertension    4. Persistent atrial fibrillation         Chronic diastolic heart failure  Clinically improved in response to diuresis with torsemide and 12 lbs wt loss    BMP prior to next appointment  Continue home Bw, BP and HR monitoring  Low sodium diet (2 gm or less per day)  Limit fluid intake to 1.5 liters per day   Benazepril 5 mg qd  Increase Carvedilol to 25 mg bid  Torsemide 10 mg every other day. Go back to 20 mg per day for 3 days if body weight increases more than 3 lbs in a day or 5 lbs in a week      CAD (coronary artery disease)  Asymptomatic. Small area of ischemia on Cardiac PET Stress    ASA 81 mg qd  Benazepril 5 mg qd  Increase Carvedilol to 25 mg bid  Sublingual nitroglycerin prn  Atorvastatin 80 mg qd  If BP tolerates the increase in carvedilol at next appointment I will add Imdur      Hypertension  Still mildly elevated: average  140/75    Low sodium diet (2 gm or less per day)  Amlodipine 5 mg qd  Benazepril 5 mg qd  Increase Carvedilol to 25 mg bid      Persistent atrial fibrillation  Heart rate is regular    Carvedilol 25 mg bid  apixaban 5 mg bid      Follow-up in about 6 weeks (around 4/6/2018).        Ciaran Michaud MD

## 2018-02-23 NOTE — ASSESSMENT & PLAN NOTE
Still mildly elevated: average 140/75    Low sodium diet (2 gm or less per day)  Amlodipine 5 mg qd  Benazepril 5 mg qd  Increase Carvedilol to 25 mg bid

## 2018-03-16 ENCOUNTER — OFFICE VISIT (OUTPATIENT)
Dept: URGENT CARE | Facility: CLINIC | Age: 83
End: 2018-03-16
Payer: MEDICARE

## 2018-03-16 VITALS
SYSTOLIC BLOOD PRESSURE: 125 MMHG | OXYGEN SATURATION: 100 % | DIASTOLIC BLOOD PRESSURE: 76 MMHG | HEART RATE: 69 BPM | WEIGHT: 147 LBS | RESPIRATION RATE: 18 BRPM | BODY MASS INDEX: 22.28 KG/M2 | TEMPERATURE: 97 F | HEIGHT: 68 IN

## 2018-03-16 DIAGNOSIS — S61.411A LACERATION OF RIGHT HAND, FOREIGN BODY PRESENCE UNSPECIFIED, INITIAL ENCOUNTER: Primary | ICD-10-CM

## 2018-03-16 PROCEDURE — 99213 OFFICE O/P EST LOW 20 MIN: CPT | Mod: S$GLB,,, | Performed by: NURSE PRACTITIONER

## 2018-03-16 NOTE — PATIENT INSTRUCTIONS
Old Laceration: Not Sutured  A laceration is a cut through the skin. This will usually require stitches if it is deep. However, if a laceration remains open for too long, the risk of infection increases. In your case, too much time has passed before coming for treatment. The danger of infection from suturing at this time is too high. That is why your wound was not sutured.  If the wound is spread open, it will heal by filling in from the bottom and sides. A wound that is not sutured may take 1 to 4 weeks to heal, depending on the size of the opening. A visible scar will probably occur. You can discuss revision of the scar with your healthcare provider at a later time.  Home care  The following guidelines will help you care for your laceration at home:  · Keep the wound clean and dry. If a bandage was applied and it becomes wet or dirty, replace it. Otherwise, leave it in place for the first 24 hours, then change it once a day or as directed.  · Clean the wound daily:  ¨ After removing any bandage, wash the area with soap and water. Use a wet cotton swab to loosen and remove any blood or crust that forms.  ¨ Talk with your doctor before applying any antibiotic ointment to the wound. Reapply a fresh bandage.  ¨ You may remove the bandage to shower as usual after the first 24 hours, but do not soak the area in water (no tub baths or swimming) for the next five days. Avoid activities that may reinjure your wound.  · The healthcare provider may prescribe an antibiotic cream or ointment to prevent infection. Do not stop using this medication until you have finished the prescribed course or the provider tells you to stop.  · The healthcare provider may also prescribe medications for pain. Follow instructions for taking these medications.  · Check the wound daily for signs of infection listed below.  Follow-up care  Follow up with your healthcare provider as advised.  When to seek medical advice  Call your healthcare  provider right away if any of these occur:  · Wound bleeding not controlled by direct pressure  · Signs of infection, including increasing pain in the wound, increasing wound redness or swelling, or pus or bad odor coming from the wound  · Fever of 100.4°F (38.ºC) or higher or as directed by your healthcare provider  · Wound edges re-open  · Wound changes colors  · Numbness around the wound   · Decreased movement around the injured area  Date Last Reviewed: 6/10/2015  © 5437-9745 [employer]. [address] [place] [id number]. All rights reserved. This information is not intended as a substitute for professional medical care. Always follow your healthcare professional's instructions.      Keep pressure on laceration

## 2018-03-16 NOTE — PROGRESS NOTES
"Subjective:       Patient ID: Deena Moody is a 87 y.o. male.    Vitals:  height is 5' 8" (1.727 m) and weight is 66.7 kg (147 lb). His temperature is 97.1 °F (36.2 °C). His blood pressure is 125/76 and his pulse is 69. His respiration is 18 and oxygen saturation is 100%.     Chief Complaint: Puncture Wound    This is a 87 y.o. male with Past Medical History:  9/1/2016: Acute on chronic diastolic heart failure  No date: Coronary artery disease  No date: Hyperlipidemia  No date: Hypertension  12/12/2014: Macular degeneration (senile) of retina, unspe*  12/12/2014: Nuclear sclerosis  11/10/2016: Persistent atrial fibrillation  9/14/2016: S/P placement of cardiac pacemaker   who presents today with a chief complaint of laceration on rt hand. He cut it while out in yard on Monday on a stick. He has a bandaid on it and states it keeps bleeding. Pt states he is UTD on Tetnus. Pt states the bleeding is a lot better today than it has been any other day. He is concerned that it got stuck in his vein "like an IV they put in".        Hand Injury    His dominant hand is their right hand. The incident occurred 3 to 5 days ago. The incident occurred at home. Injury mechanism: plant  The pain is present in the right hand. The pain does not radiate. The pain is at a severity of 0/10. The patient is experiencing no pain. Pertinent negatives include no chest pain or numbness. Nothing aggravates the symptoms. Treatments tried: neosporin. The treatment provided mild relief.     Review of Systems   Constitution: Negative for weakness and malaise/fatigue.   HENT: Negative for nosebleeds.    Cardiovascular: Negative for chest pain and syncope.   Respiratory: Negative for shortness of breath.    Musculoskeletal: Negative for back pain, joint pain and neck pain.   Gastrointestinal: Negative for abdominal pain.   Genitourinary: Negative for hematuria.   Neurological: Negative for dizziness and numbness.       Objective:      Physical Exam "   Constitutional: He is oriented to person, place, and time. He appears well-developed and well-nourished.   HENT:   Head: Normocephalic and atraumatic. Head is without abrasion, without contusion and without laceration.   Right Ear: External ear normal.   Left Ear: External ear normal.   Nose: Nose normal.   Mouth/Throat: Oropharynx is clear and moist.   Eyes: Conjunctivae, EOM and lids are normal. Pupils are equal, round, and reactive to light.   Neck: Trachea normal, normal range of motion, full passive range of motion without pain and phonation normal. Neck supple.   Cardiovascular: Normal rate, regular rhythm and normal heart sounds.    Pulmonary/Chest: Effort normal and breath sounds normal. No stridor. No respiratory distress.   Musculoskeletal: Normal range of motion.   Neurological: He is alert and oriented to person, place, and time.   Skin: Skin is warm and dry. Capillary refill takes less than 2 seconds. Bruising (3 cm) noted. No abrasion, no burn, no ecchymosis, no lesion and no rash noted. No cyanosis or erythema. Nails show no clubbing.   1mm laceration to hand inbetween thumb and index finger. No infection present. Scan bleeding noted. Pressure dressing applied.   ROM/sensation normal in right hand   Psychiatric: He has a normal mood and affect. His speech is normal and behavior is normal. Judgment and thought content normal. Cognition and memory are normal.   Nursing note and vitals reviewed.      Assessment:       1. Laceration of right hand, foreign body presence unspecified, initial encounter        Plan:       Pt was told he did not need to keep a bandaid on the area but needed to keep it clean and dry to prevent infection. Scant amount of bleeding noticed  Laceration of right hand, foreign body presence unspecified, initial encounter

## 2018-03-22 ENCOUNTER — CLINICAL SUPPORT (OUTPATIENT)
Dept: ELECTROPHYSIOLOGY | Facility: CLINIC | Age: 83
End: 2018-03-22
Payer: MEDICARE

## 2018-03-22 DIAGNOSIS — I48.91 ATRIAL FIBRILLATION, UNSPECIFIED TYPE: ICD-10-CM

## 2018-03-22 DIAGNOSIS — Z95.0 CARDIAC PACEMAKER IN SITU: ICD-10-CM

## 2018-03-22 PROCEDURE — 93294 REM INTERROG EVL PM/LDLS PM: CPT | Mod: S$GLB,,, | Performed by: INTERNAL MEDICINE

## 2018-03-22 PROCEDURE — 93296 REM INTERROG EVL PM/IDS: CPT | Mod: S$GLB,,, | Performed by: INTERNAL MEDICINE

## 2018-03-24 DIAGNOSIS — Z95.0 CARDIAC PACEMAKER IN SITU: Primary | ICD-10-CM

## 2018-03-24 DIAGNOSIS — I44.2 CHB (COMPLETE HEART BLOCK): ICD-10-CM

## 2018-03-29 ENCOUNTER — OFFICE VISIT (OUTPATIENT)
Dept: URGENT CARE | Facility: CLINIC | Age: 83
End: 2018-03-29
Payer: MEDICARE

## 2018-03-29 ENCOUNTER — TELEPHONE (OUTPATIENT)
Dept: FAMILY MEDICINE | Facility: CLINIC | Age: 83
End: 2018-03-29

## 2018-03-29 VITALS
BODY MASS INDEX: 21.77 KG/M2 | RESPIRATION RATE: 18 BRPM | WEIGHT: 147 LBS | DIASTOLIC BLOOD PRESSURE: 83 MMHG | SYSTOLIC BLOOD PRESSURE: 160 MMHG | TEMPERATURE: 98 F | HEART RATE: 70 BPM | HEIGHT: 69 IN | OXYGEN SATURATION: 100 %

## 2018-03-29 DIAGNOSIS — L03.115 CELLULITIS OF RIGHT KNEE: Primary | ICD-10-CM

## 2018-03-29 PROCEDURE — 99214 OFFICE O/P EST MOD 30 MIN: CPT | Mod: S$GLB,,, | Performed by: FAMILY MEDICINE

## 2018-03-29 RX ORDER — AMOXICILLIN AND CLAVULANATE POTASSIUM 875; 125 MG/1; MG/1
1 TABLET, FILM COATED ORAL 2 TIMES DAILY
Qty: 20 TABLET | Refills: 0 | Status: SHIPPED | OUTPATIENT
Start: 2018-03-29 | End: 2018-03-29 | Stop reason: SDUPTHER

## 2018-03-29 RX ORDER — AMOXICILLIN AND CLAVULANATE POTASSIUM 875; 125 MG/1; MG/1
1 TABLET, FILM COATED ORAL 2 TIMES DAILY
Qty: 20 TABLET | Refills: 0 | Status: SHIPPED | OUTPATIENT
Start: 2018-03-29 | End: 2018-03-31

## 2018-03-29 NOTE — PATIENT INSTRUCTIONS
Cellulitis  Cellulitis is an infection of the deep layers of skin. A break in the skin, such as a cut or scratch, can let bacteria under the skin. If the bacteria get to deep layers of the skin, it can be serious. If not treated, cellulitis can get into the bloodstream and lymph nodes. The infection can then spread throughout the body. This causes serious illness.  Cellulitis causes the affected skin to become red, swollen, warm, and sore. The reddened areas have a visible border. An open sore may leak fluid (pus). You may have a fever, chills, and pain.  Cellulitis is treated with antibiotics taken for 7 to 10 days. An open sore may be cleaned and covered with cool wet gauze. Symptoms should get better 1 to 2 days after treatment is started. Make sure to take all the antibiotics for the full number of days until they are gone. Keep taking the medicine even if your symptoms go away.  Home care  Follow these tips:  · Limit the use of the part of your body with cellulitis.   · If the infection is on your leg, keep your leg raised while sitting. This will help to reduce swelling.  · Take all of the antibiotic medicine exactly as directed until it is gone. Do not miss any doses, especially during the first 7 days. Dont stop taking the medicine when your symptoms get better.  · Keep the affected area clean and dry.  · Wash your hands with soap and warm water before and after touching your skin. Anyone else who touches your skin should also wash his or her hands. Don't share towels.  Follow-up care  Follow up with your healthcare provider, or as advised. If your infection does not go away on the first antibiotic, your healthcare provider will prescribe a different one.  When to seek medical advice  Call your healthcare provider right away if any of these occur:  · Red areas that spread  · Swelling or pain that gets worse  · Fluid leaking from the skin (pus)  · Fever higher of 100.4º F (38.0º C) or higher after 2 days  on antibiotics  Date Last Reviewed: 9/1/2016  © 5683-7454 [employer]. [address] [place] [address], [place] [address]. All rights reserved. This information is not intended as a substitute for professional medical care. Always follow your healthcare professional's instructions.

## 2018-03-29 NOTE — PROGRESS NOTES
"Subjective:       Patient ID: Deena Moody is a 87 y.o. male.    Vitals:  height is 5' 8.5" (1.74 m) and weight is 66.7 kg (147 lb). His oral temperature is 97.5 °F (36.4 °C). His blood pressure is 160/83 (abnormal) and his pulse is 70. His respiration is 18 and oxygen saturation is 100%.     Chief Complaint: Joint Swelling    This is a 87 y.o. male with Past Medical History:  9/1/2016: Acute on chronic diastolic heart failure  No date: Coronary artery disease  No date: Hyperlipidemia  No date: Hypertension  12/12/2014: Macular degeneration (senile) of retina, unspe*  12/12/2014: Nuclear sclerosis  11/10/2016: Persistent atrial fibrillation  9/14/2016: S/P placement of cardiac pacemaker   who presents today with a chief complaint of right knee swelling since Tuesday. Applied ice which helped. Symptoms worsened Thursday.Greater pain with change of position to knee. Denies injury.      Knee Pain    The incident occurred 5 to 7 days ago. The incident occurred at home. There was no injury mechanism. The pain is present in the right knee. The pain is at a severity of 5/10. The pain is moderate. The pain has been fluctuating since onset. Associated symptoms include a loss of motion. Pertinent negatives include no inability to bear weight. He reports no foreign bodies present. The symptoms are aggravated by palpation and movement. He has tried ice for the symptoms. The treatment provided mild relief.     Review of Systems   Constitution: Negative for chills and fever.   HENT: Negative for sore throat.    Eyes: Negative for blurred vision.   Cardiovascular: Negative for chest pain.   Respiratory: Negative for shortness of breath.    Skin: Negative for rash.   Musculoskeletal: Positive for joint pain, joint swelling and myalgias. Negative for back pain, falls, muscle weakness and stiffness.   Gastrointestinal: Negative for abdominal pain, diarrhea, nausea and vomiting.   Neurological: Negative for headaches. "   Psychiatric/Behavioral: The patient is not nervous/anxious.        Objective:      Physical Exam   Constitutional: He appears well-developed and well-nourished.   HENT:   Head: Normocephalic and atraumatic.   Cardiovascular: Normal rate, regular rhythm and normal heart sounds.    Pulmonary/Chest: Effort normal and breath sounds normal.   Abdominal: Soft.   Skin: Skin is warm. There is erythema (peripatelllar region right knee, tender, warm).   Nursing note and vitals reviewed.      Assessment:       1. Cellulitis of right knee        Plan:         Cellulitis of right knee  -     X-Ray Knee 3 View Right; Future; Expected date: 03/29/2018  -     Discontinue: amoxicillin-clavulanate 875-125mg (AUGMENTIN) 875-125 mg per tablet; Take 1 tablet by mouth 2 (two) times daily.  Dispense: 20 tablet; Refill: 0  -     Discontinue: amoxicillin-clavulanate 875-125mg (AUGMENTIN) 875-125 mg per tablet; Take 1 tablet by mouth 2 (two) times daily.  Dispense: 20 tablet; Refill: 0  -     amoxicillin-clavulanate 875-125mg (AUGMENTIN) 875-125 mg per tablet; Take 1 tablet by mouth 2 (two) times daily.  Dispense: 20 tablet; Refill: 0      Warm compresses. Followup with PCP in 1-2 days. Discussed case with Dr Vicente

## 2018-03-31 ENCOUNTER — OFFICE VISIT (OUTPATIENT)
Dept: INTERNAL MEDICINE | Facility: CLINIC | Age: 83
End: 2018-03-31
Payer: MEDICARE

## 2018-03-31 ENCOUNTER — LAB VISIT (OUTPATIENT)
Dept: LAB | Facility: HOSPITAL | Age: 83
End: 2018-03-31
Attending: INTERNAL MEDICINE
Payer: MEDICARE

## 2018-03-31 VITALS
HEIGHT: 68 IN | BODY MASS INDEX: 22.45 KG/M2 | HEART RATE: 69 BPM | OXYGEN SATURATION: 99 % | DIASTOLIC BLOOD PRESSURE: 72 MMHG | TEMPERATURE: 97 F | WEIGHT: 148.13 LBS | SYSTOLIC BLOOD PRESSURE: 120 MMHG

## 2018-03-31 DIAGNOSIS — M25.561 ACUTE PAIN OF RIGHT KNEE: ICD-10-CM

## 2018-03-31 DIAGNOSIS — M25.561 ACUTE PAIN OF RIGHT KNEE: Primary | ICD-10-CM

## 2018-03-31 DIAGNOSIS — L60.2 THICKENED NAILS: ICD-10-CM

## 2018-03-31 DIAGNOSIS — Z79.01 CHRONIC ANTICOAGULATION: ICD-10-CM

## 2018-03-31 DIAGNOSIS — I50.32 CHRONIC DIASTOLIC HEART FAILURE: ICD-10-CM

## 2018-03-31 LAB
ALBUMIN SERPL BCP-MCNC: 3.6 G/DL
ALP SERPL-CCNC: 144 U/L
ALT SERPL W/O P-5'-P-CCNC: 21 U/L
ANION GAP SERPL CALC-SCNC: 12 MMOL/L
ANION GAP SERPL CALC-SCNC: 12 MMOL/L
AST SERPL-CCNC: 23 U/L
BASOPHILS # BLD AUTO: 0.06 K/UL
BASOPHILS NFR BLD: 0.6 %
BILIRUB SERPL-MCNC: 1.8 MG/DL
BUN SERPL-MCNC: 29 MG/DL
BUN SERPL-MCNC: 29 MG/DL
CALCIUM SERPL-MCNC: 9.2 MG/DL
CALCIUM SERPL-MCNC: 9.2 MG/DL
CHLORIDE SERPL-SCNC: 104 MMOL/L
CHLORIDE SERPL-SCNC: 104 MMOL/L
CO2 SERPL-SCNC: 27 MMOL/L
CO2 SERPL-SCNC: 27 MMOL/L
CREAT SERPL-MCNC: 1.6 MG/DL
CREAT SERPL-MCNC: 1.6 MG/DL
CRP SERPL-MCNC: 36.9 MG/L
DIFFERENTIAL METHOD: ABNORMAL
EOSINOPHIL # BLD AUTO: 0.5 K/UL
EOSINOPHIL NFR BLD: 4.6 %
ERYTHROCYTE [DISTWIDTH] IN BLOOD BY AUTOMATED COUNT: 15.2 %
ERYTHROCYTE [SEDIMENTATION RATE] IN BLOOD BY WESTERGREN METHOD: 68 MM/HR
EST. GFR  (AFRICAN AMERICAN): 44.1 ML/MIN/1.73 M^2
EST. GFR  (AFRICAN AMERICAN): 44.1 ML/MIN/1.73 M^2
EST. GFR  (NON AFRICAN AMERICAN): 38.2 ML/MIN/1.73 M^2
EST. GFR  (NON AFRICAN AMERICAN): 38.2 ML/MIN/1.73 M^2
GLUCOSE SERPL-MCNC: 96 MG/DL
GLUCOSE SERPL-MCNC: 96 MG/DL
HCT VFR BLD AUTO: 35.6 %
HGB BLD-MCNC: 11.2 G/DL
IMM GRANULOCYTES # BLD AUTO: 0.04 K/UL
IMM GRANULOCYTES NFR BLD AUTO: 0.4 %
LYMPHOCYTES # BLD AUTO: 2 K/UL
LYMPHOCYTES NFR BLD: 19.6 %
MCH RBC QN AUTO: 31 PG
MCHC RBC AUTO-ENTMCNC: 31.5 G/DL
MCV RBC AUTO: 99 FL
MONOCYTES # BLD AUTO: 1.1 K/UL
MONOCYTES NFR BLD: 10.3 %
NEUTROPHILS # BLD AUTO: 6.7 K/UL
NEUTROPHILS NFR BLD: 64.5 %
NRBC BLD-RTO: 0 /100 WBC
PLATELET # BLD AUTO: 183 K/UL
PMV BLD AUTO: 9.6 FL
POTASSIUM SERPL-SCNC: 4.1 MMOL/L
POTASSIUM SERPL-SCNC: 4.1 MMOL/L
PROT SERPL-MCNC: 7.9 G/DL
RBC # BLD AUTO: 3.61 M/UL
SODIUM SERPL-SCNC: 143 MMOL/L
SODIUM SERPL-SCNC: 143 MMOL/L
URATE SERPL-MCNC: 7.8 MG/DL
WBC # BLD AUTO: 10.33 K/UL

## 2018-03-31 PROCEDURE — 99499 UNLISTED E&M SERVICE: CPT | Mod: S$GLB,,, | Performed by: INTERNAL MEDICINE

## 2018-03-31 PROCEDURE — 84550 ASSAY OF BLOOD/URIC ACID: CPT

## 2018-03-31 PROCEDURE — 85730 THROMBOPLASTIN TIME PARTIAL: CPT

## 2018-03-31 PROCEDURE — 36415 COLL VENOUS BLD VENIPUNCTURE: CPT | Mod: PO

## 2018-03-31 PROCEDURE — 85610 PROTHROMBIN TIME: CPT

## 2018-03-31 PROCEDURE — 85025 COMPLETE CBC W/AUTO DIFF WBC: CPT

## 2018-03-31 PROCEDURE — 99999 PR PBB SHADOW E&M-EST. PATIENT-LVL IV: CPT | Mod: PBBFAC,,, | Performed by: INTERNAL MEDICINE

## 2018-03-31 PROCEDURE — 86038 ANTINUCLEAR ANTIBODIES: CPT

## 2018-03-31 PROCEDURE — 85651 RBC SED RATE NONAUTOMATED: CPT

## 2018-03-31 PROCEDURE — 86140 C-REACTIVE PROTEIN: CPT

## 2018-03-31 PROCEDURE — 80053 COMPREHEN METABOLIC PANEL: CPT

## 2018-03-31 PROCEDURE — 99214 OFFICE O/P EST MOD 30 MIN: CPT | Mod: S$GLB,,, | Performed by: INTERNAL MEDICINE

## 2018-03-31 NOTE — PATIENT INSTRUCTIONS
Recommendations for today    Symptoms today are likely due to acute arthritis but this is not likely to be simple arthritis of aging.  This arthritis needs further evaluation and possibly with a procedure to extract some fluid from the knee.  Your primary care doctor will contact you early next week to discuss further diagnostic testing and to see if you qualify for removal of fluid from the knee.    Wrapping the knee can be helpful to reduce pain associated with swelling.  Go to the local pharmacy and get a Ace bandage to wrap up the knee.  Follow other home care measures described below in the meantime.      We recommend stopping antibiotic for now.  However, after stopping antibiotic if fevers develop resume the antibiotic and contact primary care office as soon as possible to describe these new symptoms.              ACE Wrap  Minor muscle or joint injuries are often treated with an elastic bandage. The bandage provides support and compression to the injured area. An elastic bandage is a stretchy, rolled bandage. Elastic bandages range in width from 2 to 6 inches. They can be used for a variety of injuries. The bandages are often called ACE bandages, after the most common brand name.  If used correctly, elastic bandages help control swelling and ease pain. An elastic bandage is also a good reminder not to overuse the injured area. However, elastic bandages do not provide a lot of support and will not prevent reinjury.  Home care    To apply an elastic bandage:  · Check the skin before wrapping the injury. It should be clean, dry, and free of drainage.  · Start wrapping below the injury and work your way toward the body. For an ankle sprain, start wrapping around the foot and work up toward the calf. This will help control swelling.  · Overlap the edges of the bandage so it stays snuggly in place.  · Wrap the bandage firmly, but not too tightly. A tight bandage can increase swelling on either end of the bandage.  Make sure the bandage is wrinkle free.  · Leave fingers and toes exposed.  · Secure ends of the bandage (even self-sticking ones) with clips or tape.  · Check frequently to ensure adequate circulation, especially in the fingers and toes. Loosen the bandage if there is local swelling, numbness, tingling, discomfort, coldness, or discoloration (skin pale or bluish in color).  · Rewrap the bandage as needed during the day. Reroll the bandage as you unwind it.  Continue using the elastic bandage until the pain and swelling are gone or as your healthcare provider advises.  If you have been told to ice the area, the ice can be secured in place with the elastic bandage. Wrap the ice pack with a thin towel to protect the skin. Do not put ice or an ice pack directly on the skin.  Ice the area for no more than 20 minutes at a time.    Follow-up care  Follow up with your healthcare provider, as advised.  When to seek medical advice  Call your healthcare provider for any of the following:  · Pain and swelling that doesn't get better or gets worse  · Trouble moving injured area  · Skin discoloration, numbness, or tingling that doesnt go away after bandage is removed  Date Last Reviewed: 9/13/2015 © 2000-2017 [employer]. [address] [place] [address], [place] [address]. All rights reserved. This information is not intended as a substitute for professional medical care. Always follow your healthcare professional's instructions.        Reducing Knee Pain and Swelling    Many treatments can help reduce pain and swelling in your knee. Your healthcare provider or physical therapist may suggest one or more of the following treatments:  · Icing your knee helps reduce swelling. You may be asked to ice your knee once a day or more. Apply ice for about 15 to 20 minutes at a time, with at least 40 minutes between sessions. Always keep a towel between the ice and your skin.   · Keeping your leg raised above your heart helps excess  fluid flow out of your knee joint. This reduces swelling.  · Compression means wrapping an elastic bandage or neoprene sleeve snugly around your knees. This keeps fluid from collecting in your knee joint.  · Electrical stimulation, done by a physical therapist or , can help reduce excess fluid in your knee joint.  · Anti-inflammatory medicines may be prescribed by your healthcare provider. You may take pills or receive injections in your knee.  · Isometric (vu) exercises strengthen the muscles that support your knee joint. They also help reduce excess fluid in your knee.  · Massage helps fluid drain away from your knee.  Date Last Reviewed: 10/13/2015  © 1958-4443 [employer]. [address] [place] [address], [place] [address]. All rights reserved. This information is not intended as a substitute for professional medical care. Always follow your healthcare professional's instructions.

## 2018-03-31 NOTE — PROGRESS NOTES
Portions of this note are generated with voice recognition software. Typographical errors may exist.     SUBJECTIVE:    This is a/an 87 y.o. male here for primary care visit for  Chief Complaint   Patient presents with    Knee Pain     Patient states that he was in his usual state of health until about March 23.  States that he started to have the gradual development of aching in the right knee.  By Monday after the onset of symptoms there was significant swelling.  No obvious precipitating factor.  Despite anterior knee pain and swelling patient reports no problems with strength when flexing or extending the knee.  No constitutional symptoms.  Patient does not have a past medical history of gout after detailed discussion with the patient about gout symptoms.  Does not have family members with a history of gout.  Does not have a history of injuries to the affected knee.  Does not have a history of rheumatologic conditions but has had persistent thickening of the nails in the right hand that has not been fully evaluated in the past.  Patient is on Eliquis as anticoagulation for atrial fibrillation.  No history of hemarthrosis or other bleeding complications noted.  Kidney function has been stable in this patient on Eliquis.        Medications Reviewed and Updated    Past medical, family, and social histories were reviewed and updated.    Review of Systems negative unless otherwise noted in history of present illness-  ROS    General ROS: negative  Psychological ROS: negative  ENT ROS: negative  Hematologic ROS: Negative  Allergy and Immunology ROS: negative  Gastrointestinal ROS: negative  Musculoskeletal ROS: negative  Dermatological ROS: negative        Allergic:    Review of patient's allergies indicates:   Allergen Reactions    Iodine and iodide containing products Other (See Comments)     Caused changes in skin color       OBJECTIVE:  BP: 120/72 Pulse: 69 Temp: 97.4 °F (36.3 °C)  Wt Readings from Last 3  Encounters:   03/31/18 67.2 kg (148 lb 2.4 oz)   03/29/18 66.7 kg (147 lb)   03/16/18 66.7 kg (147 lb)    Body mass index is 22.53 kg/m².  Previous Blood Pressure Readings :   BP Readings from Last 3 Encounters:   03/31/18 120/72   03/29/18 (!) 160/83   03/16/18 125/76       Physical Exam    GEN: No apparent distress  HEENT: sclera non-icteric, conjunctiva clear  CV: no peripheral edema  PULM: breathing non-labored  ABD: Obese, protuberant abdomen.  PSYCH: appropriate affect  MSK: able to rise from chair without assistance.  Significant point tenderness over the anterior knee.  Warmth and erythema.  SKIN: normal skin turgor    Pertinent Labs Reviewed       ASSESSMENT/PLAN:    Acute pain of right knee.This is a New problem. The etiology is Unknown.  Rheumatologic arthritis versus hemarthrosis.. The problem is not adequately controlled. The risk of medical complications is moderate. Treatment/diagnostic recommendations are to modify the diagnostic/treatment plan as follows. The patient advised if symptoms change or intensify to seek medical care.   -     CBC auto differential; Future; Expected date: 03/31/2018  -     Comprehensive metabolic panel; Future; Expected date: 03/31/2018  -     Sedimentation rate, manual; Future; Expected date: 03/31/2018  -     C-reactive protein; Future; Expected date: 03/31/2018  -     URIC ACID; Future; Expected date: 03/31/2018  -     JOSE; Future; Expected date: 03/31/2018    Chronic anticoagulation.Further evaluation warranted.  Recommendations as below.  -     Protime-INR; Future; Expected date: 03/31/2018  -     APTT; Future; Expected date: 03/31/2018    Thickened nails.Further evaluation warranted.  Recommendations as below.  -     JOSE; Future; Expected date: 03/31/2018          Future Appointments  Date Time Provider Department Center   4/10/2018 4:00 PM Ciaran Michaud MD [facility] CARDIO [facility]   6/21/2018 1:00 PM COORDINATED DEVICE CHECK [facility] ARRHYTH [facility]    7/2/2018 9:00 AM Jam Rowell MD [facility]   10/3/2018 8:00 AM HOME MONITOR DEVICE CHECK, [facility] BRANNON Barry  3/31/2018  10:30 AM

## 2018-04-02 ENCOUNTER — TELEPHONE (OUTPATIENT)
Dept: FAMILY MEDICINE | Facility: CLINIC | Age: 83
End: 2018-04-02

## 2018-04-02 DIAGNOSIS — R94.5 ABNORMAL RESULTS OF LIVER FUNCTION STUDIES: ICD-10-CM

## 2018-04-02 DIAGNOSIS — M25.469 KNEE SWELLING: ICD-10-CM

## 2018-04-02 DIAGNOSIS — M19.90 CHRONIC INFLAMMATORY ARTHRITIS: Primary | ICD-10-CM

## 2018-04-02 DIAGNOSIS — R74.8 ELEVATED ALKALINE PHOSPHATASE LEVEL: ICD-10-CM

## 2018-04-02 LAB
ANA SER QL IF: NORMAL
APTT BLDCRRT: 30 SEC
INR PPP: 1.2
PROTHROMBIN TIME: 12.6 SEC

## 2018-04-02 RX ORDER — PREDNISONE 20 MG/1
20 TABLET ORAL DAILY
Qty: 7 TABLET | Refills: 0 | Status: SHIPPED | OUTPATIENT
Start: 2018-04-02 | End: 2018-04-09

## 2018-04-02 NOTE — TELEPHONE ENCOUNTER
Patient with knee swelling with elevated inflammation markers and uric acid.     Orthopedics referral for possible knee fluid testing as soon as possible.     Rheumatology evaluation for inflammatory arthritis.    Patient with slightly elevated bilirubin and alkaline phosphatase.  Repeat liver function tests and ultrasound were ordered.    I did order steroids only for 1 week and rheumatoid factor testing.    Please notify the patient with appointments.

## 2018-04-02 NOTE — TELEPHONE ENCOUNTER
Spoke with patient's wife Mrs Baum about patient's lab result.  Mrs Baum was transferred to Mrs Kandi Munoz a referral coordinator to set all his appointment with specialist and lab/ ultrasound.  Mrs Baum verbalized understanding instruction given.

## 2018-04-10 ENCOUNTER — HOSPITAL ENCOUNTER (OUTPATIENT)
Dept: RADIOLOGY | Facility: HOSPITAL | Age: 83
Discharge: HOME | End: 2018-04-10
Attending: FAMILY MEDICINE
Payer: MEDICARE

## 2018-04-10 ENCOUNTER — OFFICE VISIT (OUTPATIENT)
Dept: CARDIOLOGY | Facility: CLINIC | Age: 83
End: 2018-04-10
Payer: MEDICARE

## 2018-04-10 VITALS
WEIGHT: 149.94 LBS | HEART RATE: 67 BPM | HEIGHT: 70 IN | DIASTOLIC BLOOD PRESSURE: 72 MMHG | SYSTOLIC BLOOD PRESSURE: 158 MMHG | BODY MASS INDEX: 21.47 KG/M2

## 2018-04-10 DIAGNOSIS — I10 ESSENTIAL HYPERTENSION: ICD-10-CM

## 2018-04-10 DIAGNOSIS — I25.10 CORONARY ARTERY DISEASE, ANGINA PRESENCE UNSPECIFIED, UNSPECIFIED VESSEL OR LESION TYPE, UNSPECIFIED WHETHER NATIVE OR TRANSPLANTED HEART: ICD-10-CM

## 2018-04-10 DIAGNOSIS — R74.8 ELEVATED ALKALINE PHOSPHATASE LEVEL: ICD-10-CM

## 2018-04-10 DIAGNOSIS — I48.19 PERSISTENT ATRIAL FIBRILLATION: ICD-10-CM

## 2018-04-10 DIAGNOSIS — I50.32 CHRONIC DIASTOLIC HEART FAILURE: Primary | ICD-10-CM

## 2018-04-10 PROCEDURE — 99214 OFFICE O/P EST MOD 30 MIN: CPT | Mod: S$GLB,,, | Performed by: INTERNAL MEDICINE

## 2018-04-10 PROCEDURE — 76700 US EXAM ABDOM COMPLETE: CPT | Mod: TC

## 2018-04-10 PROCEDURE — 99499 UNLISTED E&M SERVICE: CPT | Mod: S$PBB,,, | Performed by: INTERNAL MEDICINE

## 2018-04-10 PROCEDURE — 99999 PR PBB SHADOW E&M-EST. PATIENT-LVL IV: CPT | Mod: PBBFAC,,, | Performed by: INTERNAL MEDICINE

## 2018-04-10 PROCEDURE — 76700 US EXAM ABDOM COMPLETE: CPT | Mod: 26,,, | Performed by: RADIOLOGY

## 2018-04-10 NOTE — ASSESSMENT & PLAN NOTE
Clinically stable without evidence of fluid retention. He has taken torsemide alternating 10 mg and 20 mg qd; that is more than instructed. Creatinine was 1.6 ten days ago; up from 1.4 in December 2017.    BMP in 2 weeks  Continue home Bw, BP and HR monitoring  Low sodium diet (2 gm or less per day)  Limit fluid intake to 1.5 liters per day   Benazepril 5 mg qd  Carvedilol 25 mg bid  Torsemide 10 mg every other day. Go back to 20 mg per day for 3 days if body weight increases more than 3 lbs in a day or 5 lbs in a week

## 2018-04-10 NOTE — PROGRESS NOTES
Cardiology Progress Note:    Patient ID:  Deena Moody is a 87 y.o. male who presents for follow up of chronic diastolic heart failure, hypertension, CAD, s/p AV replacement    History of Present Illness:  Pt has been in his usual state of health since last visit with me on 2/23/2018. He has taken torsemide every day alternating 10 and 20 mg per day. I had instructed him to take 10 mg torsemide qod and increase to 20 mg qd for 3 days only with evidence of fluid retention.    He refers that BW has been stable at 144-146 lbs; BP has averaged 140/75 mmHg; HR has averaged 70/min     Cardiac PET Stress shows a small area of moderate ischemia (see below)      Past Medical History Includes:  s/p pacemaker for complete AV block (followed by Dr Hastings); persistent atrial fibrillation on chronic anticoagulation; hypertension; hyperlipidemia; CAD, Mi, s/p CABG and AVR in 2004; HFpEF.     Family History Includes:  Non-contributory     Social History Includes:  he lives with his wife. Today he is accompanied by his daughter.     Present Medical Therapy Includes:    ASA 81 mg qd  Amlodipine 5 mg qd  Benazepril 5 mg qd  Carvedilol 12.5 mg bid (he did not increase it to 25 bid as instructed)  Atorvastatin 80 mg qd  apixaban 5 mg bid  Torsemide 10 mg alternating with 20 mg qd (instructed to take 10 mg qod and increase to 20 mg qd for 3 days in the presence of fluid retention)  Omeprazole 20 mg qd    Full Medication List Includes:  Outpatient Encounter Prescriptions as of 4/10/2018   Medication Sig Dispense Refill    amLODIPine (NORVASC) 5 MG tablet Take 1 tablet (5 mg total) by mouth once daily. 90 tablet 1    apixaban (ELIQUIS) 5 mg Tab Take 1 tablet (5 mg total) by mouth 2 (two) times daily. 60 tablet 11    aspirin (ECOTRIN) 81 MG EC tablet Take 1 tablet (81 mg total) by mouth once daily. 90 tablet 3    atorvastatin (LIPITOR) 80 MG tablet Take 1 tablet (80 mg total) by mouth nightly. 90 tablet 1    benazepril (LOTENSIN) 5  MG tablet Take 1 tablet (5 mg total) by mouth once daily. 90 tablet 1    carvedilol (COREG) 12.5 MG tablet Take 1 tablet (12.5 mg total) by mouth 2 (two) times daily. 90 tablet 1    multivitamin (MULTIVITAMIN) per tablet Take 1 tablet by mouth once daily.        nitroGLYCERIN (NITROSTAT) 0.4 MG SL tablet Take one tablet every 5 minutes for chest pain. After the third tablet go to the ED. 25 tablet 4    omeprazole (PRILOSEC) 20 MG capsule Take 1 capsule (20 mg total) by mouth once daily. 90 capsule 1    torsemide (DEMADEX) 10 MG Tab Take 2 tablets (20 mg total) by mouth once daily. 60 tablet 11    [] predniSONE (DELTASONE) 20 MG tablet Take 1 tablet (20 mg total) by mouth once daily. 7 tablet 0     No facility-administered encounter medications on file as of 4/10/2018.        Review of Systems:  Review of Systems   Constitution: Negative for decreased appetite, diaphoresis, fever, weakness, malaise/fatigue, weight gain and weight loss.   HENT: Negative for congestion, ear discharge, ear pain and nosebleeds.    Eyes: Negative for blurred vision, double vision and visual disturbance.   Cardiovascular: Negative for chest pain, claudication, cyanosis, dyspnea on exertion, irregular heartbeat, leg swelling, near-syncope, orthopnea, palpitations, paroxysmal nocturnal dyspnea and syncope.   Respiratory: Negative for cough, hemoptysis, shortness of breath, sleep disturbances due to breathing, snoring, sputum production and wheezing.    Endocrine: Negative for polydipsia, polyphagia and polyuria.   Hematologic/Lymphatic: Negative for adenopathy and bleeding problem. Does not bruise/bleed easily.   Skin: Negative for color change, nail changes, poor wound healing and rash.   Musculoskeletal: Negative for muscle cramps and muscle weakness.   Gastrointestinal: Negative for abdominal pain, anorexia, change in bowel habit, hematochezia, nausea and vomiting.   Genitourinary: Negative for dysuria, frequency and  "hematuria.   Neurological: Negative for brief paralysis, difficulty with concentration, excessive daytime sleepiness, dizziness, focal weakness, headaches, light-headedness, seizures and vertigo.   Psychiatric/Behavioral: Negative for altered mental status and depression.   Allergic/Immunologic: Negative for persistent infections.       Physical Exam:  BP (!) 158/72 (BP Location: Left arm, Patient Position: Sitting, BP Method: Medium (Automatic))   Pulse 67   Ht 5' 10" (1.778 m)   Wt 68 kg (149 lb 14.6 oz)   BMI 21.51 kg/m²   Physical Exam   Constitutional: He is oriented to person, place, and time. He appears well-developed and well-nourished.   HENT:   Head: Normocephalic.   Right Ear: External ear normal.   Left Ear: External ear normal.   Nose: Nose normal.   Inspection of lips, teeth and gums normal   Eyes: EOM are normal. Pupils are equal, round, and reactive to light. No scleral icterus.   Neck: Normal range of motion. Neck supple. No hepatojugular reflux and no JVD present. No tracheal deviation present. No thyromegaly present.   Cardiovascular: Normal rate, regular rhythm, S1 normal, S2 normal and intact distal pulses.  Exam reveals no gallop and no friction rub.    Murmur heard.   Harsh midsystolic murmur is present with a grade of 3/6  at the upper right sternal border radiating to the neck  Pulses:       Carotid pulses are 2+ on the right side with bruit, and 2+ on the left side.       Radial pulses are 2+ on the right side, and 2+ on the left side.        Dorsalis pedis pulses are 2+ on the right side, and 2+ on the left side.        Posterior tibial pulses are 2+ on the right side, and 2+ on the left side.   Pulmonary/Chest: Effort normal and breath sounds normal.   Abdominal: Bowel sounds are normal. He exhibits no distension. There is no hepatosplenomegaly. There is no tenderness. There is no guarding.   Musculoskeletal: Normal range of motion. He exhibits no edema or tenderness. "   Lymphadenopathy:   Palpation of neck and groin lymph nodes normal   Neurological: He is alert and oriented to person, place, and time. No cranial nerve deficit. He exhibits normal muscle tone. Coordination normal.   Skin: Skin is dry.   No ankle nor pretibial edema (he had 1+ pretibial edema bilaterally at the time of last visit).   Psychiatric: His behavior is normal. Judgment and thought content normal.        Blood Tests:  Lab Results   Component Value Date     (H) 02/08/2018     03/31/2018     03/31/2018    K 4.1 03/31/2018    K 4.1 03/31/2018     03/31/2018     03/31/2018    CO2 27 03/31/2018    CO2 27 03/31/2018    BUN 29 (H) 03/31/2018    BUN 29 (H) 03/31/2018    CREATININE 1.6 (H) 03/31/2018    CREATININE 1.6 (H) 03/31/2018    GLU 96 03/31/2018    GLU 96 03/31/2018    HGBA1C 5.5 06/15/2010    MG 2.0 09/13/2016    AST 32 04/10/2018    ALT 38 04/10/2018    ALBUMIN 3.6 04/10/2018    PROT 7.5 04/10/2018    BILITOT 1.2 (H) 04/10/2018    WBC 10.33 03/31/2018    HGB 11.2 (L) 03/31/2018    HCT 35.6 (L) 03/31/2018    HCT 39 08/31/2016    MCV 99 (H) 03/31/2018     03/31/2018    INR 1.2 03/31/2018    PSA 0.89 05/19/2011    TSH 2.709 12/30/2017       Lab Results   Component Value Date    CHOL 124 12/30/2017    HDL 32 (L) 12/30/2017    TRIG 88 12/30/2017       Lab Results   Component Value Date    LDLCALC 74.4 12/30/2017       Urine Tests:  Lab Results   Component Value Date    COLORU Yellow 09/14/2016    APPEARANCEUA Clear 09/14/2016    PHUR 5.0 09/14/2016    SPECGRAV 1.010 09/14/2016    PROTEINUA 1+ (A) 09/14/2016    GLUCUA Negative 09/14/2016    KETONESU Negative 09/14/2016    BILIRUBINUA Negative 09/14/2016    OCCULTUA 1+ (A) 09/14/2016    NITRITE Negative 09/14/2016    UROBILINOGEN Negative 09/14/2016    LEUKOCYTESUR Negative 09/14/2016    CREATRANDUR 95 12/27/2010             ECG (30-JAN-2018)  Electronic ventricular pacemaker  The other rhythm shows Atrial  fibrillation  Abnormal ECG  When compared with ECG of 11-JUL-2017 08:39,  Vent. rate has decreased BY 10 BPM           Echocardiogram (01/31/2018)    TEST DESCRIPTION      General: A catheter is present in the right-sided cardiac chambers.     Aorta: The aortic root is normal in size. Aortic root measures 3.2 cm at Sinuses of Valsalva.     Left Atrium: The left atrial volume index is severely enlarged, measuring 48.50 cc/m2.     Left Ventricle: The left ventricle is normal in size, with an end-diastolic diameter of 4.4 cm, and an end-systolic diameter of 3.3 cm. Wall thickness is increased, with the septum and the posterior wall each measuring 1.1 cm across. Relative wall   thickness was increased at 0.50, and the LV mass index was 104.1 g/m2 consistent with concentric remodeling. There are no regional wall motion abnormalities. Left ventricular systolic function appears normal. Visually estimated ejection fraction is   60-65%. The LV Doppler derived stroke volume equals 68.0 ccs.     Right Atrium: The right atrium is enlarged, measuring 5.2 cm in length and 4.7 cm in width in the apical view.     Right Ventricle: The right ventricle is normal in size measuring 3.9 cm at the base in the apical right ventricle-focused view. Global right ventricular systolic function appears mildly depressed. Tricuspid annular plane systolic excursion (TAPSE) is 1.8   cm. Tissue Doppler-derived tricuspid annular peak systolic velocity (S prime) is 7.4 cm/s. The estimated PA systolic pressure is greater than 70 mmHg.     Aortic Valve:  There is a surgically replaced bioprosthesis in the aortic position. The peak velocity obtained across the aortic valve is 2.85 m/s, which translates to a peak gradient of 32 mmHg. The mean gradient is 18 mmHg. Using a left ventricular   outflow tract diameter of 2.1 cm, a left ventricular outflow tract velocity time integral of 20 cm, and a peak instantaneous transvalvular velocity time integral of 61  cm, the effective prosthetic valve area is 1.13 cm2(p AVAi is 0.6 cm2/m2). 21 mm   Post aortic valve with patch augmentation in 2004.    Mitral Valve:  The mitral valve is normal in structure. The mean gradient obtained across the mitral valve is 2 mmHg. There is mild mitral regurgitation. There is marked mitral annular calcification.     Tricuspid Valve:  The tricuspid valve is normal in structure. There is mild tricuspid regurgitation.     Pulmonary Valve:  The pulmonic valve is not well seen.     IVC: The IVC is not visualized.     Intracavitary: There is no evidence of pericardial effusion, intracavity mass, thrombi, or vegetation.     In atrial fibrillation.     CONCLUSIONS     1 - Biatrial enlargement.     2 - Normal left ventricular systolic function (EF 60-65%).     3 - S/P surgical AVR, NAVNEET = 1.13 cm2, AVAi = 0.6 cm2/m2, peak velocity = 2.85 m/s, mean gradient = 18 mmHg.     4 - Mild mitral regurgitation.     5 - Mild tricuspid regurgitation.     6 - Pulmonary hypertension. The estimated PA systolic pressure is greater than 70 mmHg.     7 - In atrial fibrillation.     This document has been electronically    SIGNED BY: Marycarmen Wong MD On: 01/31/2018              Cardiac PET Stress Test (02/20/2018)    PRE-TEST DATA   EKG: EKG demonstrates adequate. Resting electrocardiogram reveals normal sinus rhythm at a rate of 70 bpm. Ventricular paced rhythm    Indication for Stress Test:  MCKEON    Clinical Notes:  Hypertension,  Hyperlipidemia,  CABG,  CAD and Atrial Fibrillation    TEST DESCRIPTION   The patient received 40.73 mg of Dipyridamole over 4 minutes. Peak heart rate was 70 bpm, which is 53% of the age predicted maximum heart rate. .     EKG Conclusions:    1. The EKG portion of this study is uninterpretable for ischemia at a peak heart rate of 70 bpm (53% of predicted).   2. Blood pressure remained stable throughout the protocol  (Presenting BP: 161/85 Peak BP: 156/65).   3. No significant arrhythmias  were present.   4. There were no symptoms of chest discomfort or significant dyspnea throughout the protocol.     Protocol:  After explaining the risk, benefits, and alternatives of IV Dipyridamole PET perfusion scintigraphy and after obtaining informed consent, the patient was positioned in the Positron Attrius. After aligning the sternal angle of Fred with the laser defining the upper edge of the field of view, positioning was confirmed with a 7 minute attenuation transmission scan. This was followed by a 50.1 mCi. Rubidium 82 injection at rest.  A 7 minute emission acquisition scan was performed 70 seconds after termination of the infusion.     Subsequently, Dipyridamole pharmacologic stress testing was performed as described above. 3 minutes following the cessation of the Dipyridamole infusion, 50 mCi of Rubidium 82 was infused and a 7 minute emission acquisition scan performed 70 seconds after termination of the infusion. This was followed by a 7 minute attenuation transmission scan. Subsequently, images were processed VLA, HLA and short axis views in addition to polar tomographic displays and pseudo topographic displays. The site of the IV injection was the right hand.     Inspection of the transaxial images demonstrated significant left lateral patient motion in the camera between rest and stress acquisitions. This was compensated for with the computer assisted shift correction algorithm.     COMMENTS  This is a technically excellent study.   Relative Myocardial Perfusion Images: The relative PET images show the following:  On resting images, there is a very small sized moderate intensity defect in the mid inferior wall. On stress images, this defect becomes larger in size extending from the mid inferior and mid to apical inferolateral walls.     Other Findings:  The extracardiac distribution of radioactivity is normal. The left ventricular cavity is normal in size and does not increase with stress. On gated  SPECT, left ventricular motion is normal at rest. On gated SPECT, left ventricular motion is normal at stress.     Nuclear Quantitative Functional Analysis:   At rest:  LVEF: >= 70 % (normal is >= 51%)  LVED Volume: 81 ml (normal is <=171)  LVES Volume: 23 ml (normal is <=70)  At stress:  LVEF: 65 % (normal is >= 51%)  LVED Volume: 87 ml (normal is <=171)  LVES Volume: 30 ml (normal is <=70)    CONCLUSIONS: ABNORMAL MYOCARDIAL PERFUSION PET STRESS TEST  1. There is a very small sized moderate ischemia with a small amount of underlying infarct in the mid inferior and mid to apical inferolateral walls in the usual distribution of the Posterior Lateral Branch. This defect comprises 10 % of the left ventricular myocardium. This is likely consistent with the known occlusion of the Right Coronary Artery.  2. Resting wall motion is physiologic. Stress wall motion is physiologic.   3. LV function is normal at rest and stress.  (normal is >= 51%)  4. The ventricular volumes are normal at rest and stress.   5. The extracardiac distribution of radioactivity is normal.   6. There was no previous study available to compare.     This document has been electronically    SIGNED BY: Paula Quinonez MD         I have reviewed the following:     Details / Date    []   Labs     []   Imaging     []   Cardiology Procedures     []   Other      Assessment and Plan:     1. Chronic diastolic heart failure    2. Coronary artery disease, angina presence unspecified, unspecified vessel or lesion type, unspecified whether native or transplanted heart    3. Essential hypertension    4. Persistent atrial fibrillation         Chronic diastolic heart failure  Clinically stable without evidence of fluid retention. He has taken torsemide alternating 10 mg and 20 mg qd; that is more than instructed. Creatinine was 1.6 ten days ago; up from 1.4 in December 2017.    BMP in 2 weeks  Continue home Bw, BP and HR monitoring  Low sodium diet (2 gm or less per  day)  Limit fluid intake to 1.5 liters per day   Benazepril 5 mg qd  Carvedilol 25 mg bid  Torsemide 10 mg every other day. Go back to 20 mg per day for 3 days if body weight increases more than 3 lbs in a day or 5 lbs in a week         CAD (coronary artery disease)  Asymptomatic. Small area of ischemia on Cardiac PET Stress     ASA 81 mg qd  Benazepril 5 mg qd  Carvedilol 25 mg bid  Sublingual nitroglycerin prn  Atorvastatin 80 mg qd           Hypertension  No home BP record. Today in clinic BP was well controlled at 138/70     Low sodium diet (2 gm or less per day)  Amlodipine 5 mg qd  Benazepril 5 mg qd  Carvedilol to 25 mg bid      Persistent atrial fibrillation  Heart rate is regular     Carvedilol 25 mg bid  apixaban 5 mg bid              Follow-up in about 6 weeks (around 5/22/2018).      Ciaran Michaud MD

## 2018-04-10 NOTE — ASSESSMENT & PLAN NOTE
Asymptomatic. Small area of ischemia on Cardiac PET Stress     ASA 81 mg qd  Benazepril 5 mg qd  Carvedilol 25 mg bid  Sublingual nitroglycerin prn  Atorvastatin 80 mg qd

## 2018-04-10 NOTE — PATIENT INSTRUCTIONS
Continue home Bw, BP and HR monitoring  Low sodium diet (2 gm or less per day)  Limit fluid intake to 1.5 liters per day   Benazepril 5 mg qd  Carvedilol 25 mg bid  Torsemide 10 mg every other day. Go back to 20 mg per day for 3 days if body weight increases more than 3 lbs in a day or 5 lbs in a week     ASA 81 mg qd  Amlodipine 5 mg qd  Apixaban 5 mg bid  Atorvastatin 80 mg qd  Sublingual NTG prn

## 2018-04-10 NOTE — ASSESSMENT & PLAN NOTE
No home BP record. Today in clinic BP was well controlled at 138/70     Low sodium diet (2 gm or less per day)  Amlodipine 5 mg qd  Benazepril 5 mg qd  Carvedilol to 25 mg bid

## 2018-04-17 RX ORDER — BENAZEPRIL HYDROCHLORIDE 5 MG/1
5 TABLET ORAL DAILY
Qty: 90 TABLET | Refills: 1 | Status: SHIPPED | OUTPATIENT
Start: 2018-04-17 | End: 2018-07-01 | Stop reason: SINTOL

## 2018-04-17 RX ORDER — CARVEDILOL 12.5 MG/1
12.5 TABLET ORAL 2 TIMES DAILY
Qty: 90 TABLET | Refills: 1 | Status: SHIPPED | OUTPATIENT
Start: 2018-04-17 | End: 2018-06-26 | Stop reason: SDUPTHER

## 2018-04-24 ENCOUNTER — LAB VISIT (OUTPATIENT)
Dept: LAB | Facility: HOSPITAL | Age: 83
End: 2018-04-24
Attending: INTERNAL MEDICINE
Payer: MEDICARE

## 2018-04-24 DIAGNOSIS — I50.32 CHRONIC DIASTOLIC HEART FAILURE: ICD-10-CM

## 2018-04-24 LAB
ANION GAP SERPL CALC-SCNC: 10 MMOL/L
BUN SERPL-MCNC: 25 MG/DL
CALCIUM SERPL-MCNC: 9.1 MG/DL
CHLORIDE SERPL-SCNC: 108 MMOL/L
CO2 SERPL-SCNC: 29 MMOL/L
CREAT SERPL-MCNC: 1.4 MG/DL
EST. GFR  (AFRICAN AMERICAN): 51.9 ML/MIN/1.73 M^2
EST. GFR  (NON AFRICAN AMERICAN): 44.9 ML/MIN/1.73 M^2
GLUCOSE SERPL-MCNC: 92 MG/DL
POTASSIUM SERPL-SCNC: 4.3 MMOL/L
SODIUM SERPL-SCNC: 147 MMOL/L

## 2018-04-24 PROCEDURE — 80048 BASIC METABOLIC PNL TOTAL CA: CPT

## 2018-04-24 PROCEDURE — 36415 COLL VENOUS BLD VENIPUNCTURE: CPT | Mod: PO

## 2018-05-18 ENCOUNTER — PATIENT MESSAGE (OUTPATIENT)
Dept: CARDIOLOGY | Facility: CLINIC | Age: 83
End: 2018-05-18

## 2018-05-24 ENCOUNTER — OFFICE VISIT (OUTPATIENT)
Dept: CARDIOLOGY | Facility: CLINIC | Age: 83
End: 2018-05-24
Payer: MEDICARE

## 2018-05-24 VITALS
SYSTOLIC BLOOD PRESSURE: 172 MMHG | HEART RATE: 69 BPM | BODY MASS INDEX: 22.45 KG/M2 | DIASTOLIC BLOOD PRESSURE: 79 MMHG | WEIGHT: 148.13 LBS | HEIGHT: 68 IN

## 2018-05-24 DIAGNOSIS — I50.32 CHRONIC DIASTOLIC HEART FAILURE: ICD-10-CM

## 2018-05-24 DIAGNOSIS — I48.19 PERSISTENT ATRIAL FIBRILLATION: ICD-10-CM

## 2018-05-24 DIAGNOSIS — I25.10 CORONARY ARTERY DISEASE WITHOUT ANGINA PECTORIS, UNSPECIFIED VESSEL OR LESION TYPE, UNSPECIFIED WHETHER NATIVE OR TRANSPLANTED HEART: ICD-10-CM

## 2018-05-24 DIAGNOSIS — I10 ESSENTIAL HYPERTENSION: ICD-10-CM

## 2018-05-24 PROBLEM — R06.09 EXERTIONAL DYSPNEA: Status: RESOLVED | Noted: 2018-02-08 | Resolved: 2018-05-24

## 2018-05-24 PROCEDURE — 99214 OFFICE O/P EST MOD 30 MIN: CPT | Mod: S$GLB,,, | Performed by: INTERNAL MEDICINE

## 2018-05-24 PROCEDURE — 99499 UNLISTED E&M SERVICE: CPT | Mod: S$PBB,,, | Performed by: INTERNAL MEDICINE

## 2018-05-24 PROCEDURE — 99999 PR PBB SHADOW E&M-EST. PATIENT-LVL IV: CPT | Mod: PBBFAC,,, | Performed by: INTERNAL MEDICINE

## 2018-05-24 NOTE — PROGRESS NOTES
Cardiology Progress Note:    Patient ID:  Deena Moody is a 87 y.o. male who presents for follow up of chronic diastolic heart failure, hypertension, CAD, s/p AV replacement     History of Present Illness: Pt is accompanied by his daughter.  Pt has felt well since his last visit with me on 4/10/2018. He is active and spends time working in the yard.     He refers that BW has been stable at 145 -146 lbs; BP systolic has averaged   100-110 and, occasionally, has been as low as 95.     Cardiac PET Stress shows a small area of moderate ischemia (see below)      Past Medical History Includes:  s/p pacemaker for complete AV block (followed by Dr Hastings); persistent atrial fibrillation on chronic anticoagulation; hypertension; hyperlipidemia; CAD, Mi, s/p CABG and AVR in 2004; HFpEF.     Family History Includes:  Non-contributory     Social History Includes:  he lives with his wife.    Present Medical Therapy Includes:    ASA 81 mg qd  Amlodipine 5 mg qd  Benazepril 5 mg qd  Carvedilol 12.5 mg bid   Atorvastatin 80 mg qd  apixaban 5 mg bid  Torsemide 10 mg qd   Omeprazole 20 mg qd  Sublingual NTG prn (not used)    Full Medication List Includes:  Outpatient Encounter Prescriptions as of 5/24/2018   Medication Sig Dispense Refill    apixaban (ELIQUIS) 5 mg Tab Take 1 tablet (5 mg total) by mouth 2 (two) times daily. 60 tablet 11    aspirin (ECOTRIN) 81 MG EC tablet Take 1 tablet (81 mg total) by mouth once daily. 90 tablet 3    atorvastatin (LIPITOR) 80 MG tablet Take 1 tablet (80 mg total) by mouth nightly. 90 tablet 1    benazepril (LOTENSIN) 5 MG tablet Take 1 tablet (5 mg total) by mouth once daily. 90 tablet 1    carvedilol (COREG) 12.5 MG tablet Take 1 tablet (12.5 mg total) by mouth 2 (two) times daily. 90 tablet 1    multivitamin (MULTIVITAMIN) per tablet Take 1 tablet by mouth once daily.        nitroGLYCERIN (NITROSTAT) 0.4 MG SL tablet Take one tablet every 5 minutes for chest pain. After the third  tablet go to the ED. 25 tablet 4    omeprazole (PRILOSEC) 20 MG capsule Take 1 capsule (20 mg total) by mouth once daily. 90 capsule 1    torsemide (DEMADEX) 10 MG Tab Take 2 tablets (20 mg total) by mouth once daily. 60 tablet 11    [DISCONTINUED] amLODIPine (NORVASC) 5 MG tablet Take 1 tablet (5 mg total) by mouth once daily. 90 tablet 1     No facility-administered encounter medications on file as of 5/24/2018.        Review of Systems:  Review of Systems   Constitution: Negative for decreased appetite, diaphoresis, fever, weakness, malaise/fatigue, weight gain and weight loss.   HENT: Negative for congestion, ear discharge, ear pain and nosebleeds.    Eyes: Negative for blurred vision, double vision and visual disturbance.   Cardiovascular: Negative for chest pain, claudication, cyanosis, dyspnea on exertion, irregular heartbeat, leg swelling, near-syncope, orthopnea, palpitations, paroxysmal nocturnal dyspnea and syncope.   Respiratory: Negative for cough, hemoptysis, shortness of breath, sleep disturbances due to breathing, snoring, sputum production and wheezing.    Endocrine: Negative for polydipsia, polyphagia and polyuria.   Hematologic/Lymphatic: Negative for adenopathy and bleeding problem. Does not bruise/bleed easily.   Skin: Negative for color change, nail changes, poor wound healing and rash.   Musculoskeletal: Negative for muscle cramps and muscle weakness.   Gastrointestinal: Negative for abdominal pain, anorexia, change in bowel habit, hematochezia, nausea and vomiting.   Genitourinary: Negative for dysuria, frequency and hematuria.   Neurological: Negative for brief paralysis, difficulty with concentration, excessive daytime sleepiness, dizziness, focal weakness, headaches, light-headedness, seizures and vertigo.   Psychiatric/Behavioral: Negative for altered mental status and depression.   Allergic/Immunologic: Negative for persistent infections.       Physical Exam:  BP (!) 172/79 (BP  "Location: Left arm, Patient Position: Sitting, BP Method: Medium (Automatic))   Pulse 69   Ht 5' 8" (1.727 m)   Wt 67.2 kg (148 lb 2.4 oz)   BMI 22.53 kg/m²   Physical Exam   Constitutional: He is oriented to person, place, and time. He appears well-developed and well-nourished.   HENT:   Head: Normocephalic.   Right Ear: External ear normal.   Left Ear: External ear normal.   Nose: Nose normal.   Inspection of lips, teeth and gums normal   Eyes: EOM are normal. Pupils are equal, round, and reactive to light. No scleral icterus.   Neck: Normal range of motion. Neck supple. No hepatojugular reflux and no JVD present. No tracheal deviation present. No thyromegaly present.   Cardiovascular: Normal rate, regular rhythm, S1 normal, S2 normal and intact distal pulses.  Exam reveals no gallop and no friction rub.    Murmur heard.   Harsh midsystolic murmur is present with a grade of 3/6  at the upper right sternal border radiating to the neck  Pulses:       Carotid pulses are 2+ on the right side with bruit, and 2+ on the left side.       Radial pulses are 2+ on the right side, and 2+ on the left side.        Dorsalis pedis pulses are 2+ on the right side, and 2+ on the left side.        Posterior tibial pulses are 2+ on the right side, and 2+ on the left side.   Pulmonary/Chest: Effort normal and breath sounds normal.   Abdominal: Bowel sounds are normal. He exhibits no distension. There is no hepatosplenomegaly. There is no tenderness. There is no guarding.   Musculoskeletal: Normal range of motion. He exhibits no edema or tenderness.   Lymphadenopathy:   Palpation of neck and groin lymph nodes normal   Neurological: He is alert and oriented to person, place, and time. No cranial nerve deficit. He exhibits normal muscle tone. Coordination normal.   Skin: Skin is dry.   No ankle nor pretibial edema    Psychiatric: His behavior is normal. Judgment and thought content normal.        Blood Tests:  Lab Results   Component " Value Date     (H) 02/08/2018     (H) 04/24/2018    K 4.3 04/24/2018     04/24/2018    CO2 29 04/24/2018    BUN 25 (H) 04/24/2018    CREATININE 1.4 04/24/2018    GLU 92 04/24/2018    HGBA1C 5.5 06/15/2010    MG 2.0 09/13/2016    AST 32 04/10/2018    ALT 38 04/10/2018    ALBUMIN 3.6 04/10/2018    PROT 7.5 04/10/2018    BILITOT 1.2 (H) 04/10/2018    WBC 10.33 03/31/2018    HGB 11.2 (L) 03/31/2018    HCT 35.6 (L) 03/31/2018    HCT 39 08/31/2016    MCV 99 (H) 03/31/2018     03/31/2018    INR 1.2 03/31/2018    PSA 0.89 05/19/2011    TSH 2.709 12/30/2017       Lab Results   Component Value Date    CHOL 124 12/30/2017    HDL 32 (L) 12/30/2017    TRIG 88 12/30/2017       Lab Results   Component Value Date    LDLCALC 74.4 12/30/2017       Urine Tests:  Lab Results   Component Value Date    COLORU Yellow 09/14/2016    APPEARANCEUA Clear 09/14/2016    PHUR 5.0 09/14/2016    SPECGRAV 1.010 09/14/2016    PROTEINUA 1+ (A) 09/14/2016    GLUCUA Negative 09/14/2016    KETONESU Negative 09/14/2016    BILIRUBINUA Negative 09/14/2016    OCCULTUA 1+ (A) 09/14/2016    NITRITE Negative 09/14/2016    UROBILINOGEN Negative 09/14/2016    LEUKOCYTESUR Negative 09/14/2016    CREATRANDUR 95 12/27/2010          ECG (30-JAN-2018)  Electronic ventricular pacemaker  The other rhythm shows Atrial fibrillation  Abnormal ECG  When compared with ECG of 11-JUL-2017 08:39,  Vent. rate has decreased BY 10 BPM           Echocardiogram (01/31/2018)    TEST DESCRIPTION      General: A catheter is present in the right-sided cardiac chambers.     Aorta: The aortic root is normal in size. Aortic root measures 3.2 cm at Sinuses of Valsalva.     Left Atrium: The left atrial volume index is severely enlarged, measuring 48.50 cc/m2.     Left Ventricle: The left ventricle is normal in size, with an end-diastolic diameter of 4.4 cm, and an end-systolic diameter of 3.3 cm. Wall thickness is increased, with the septum and the posterior wall  each measuring 1.1 cm across. Relative wall   thickness was increased at 0.50, and the LV mass index was 104.1 g/m2 consistent with concentric remodeling. There are no regional wall motion abnormalities. Left ventricular systolic function appears normal. Visually estimated ejection fraction is   60-65%. The LV Doppler derived stroke volume equals 68.0 ccs.     Right Atrium: The right atrium is enlarged, measuring 5.2 cm in length and 4.7 cm in width in the apical view.     Right Ventricle: The right ventricle is normal in size measuring 3.9 cm at the base in the apical right ventricle-focused view. Global right ventricular systolic function appears mildly depressed. Tricuspid annular plane systolic excursion (TAPSE) is 1.8   cm. Tissue Doppler-derived tricuspid annular peak systolic velocity (S prime) is 7.4 cm/s. The estimated PA systolic pressure is greater than 70 mmHg.     Aortic Valve:  There is a surgically replaced bioprosthesis in the aortic position. The peak velocity obtained across the aortic valve is 2.85 m/s, which translates to a peak gradient of 32 mmHg. The mean gradient is 18 mmHg. Using a left ventricular   outflow tract diameter of 2.1 cm, a left ventricular outflow tract velocity time integral of 20 cm, and a peak instantaneous transvalvular velocity time integral of 61 cm, the effective prosthetic valve area is 1.13 cm2(p AVAi is 0.6 cm2/m2). 21 mm Post aortic valve with patch augmentation in 2004.    Mitral Valve:  The mitral valve is normal in structure. The mean gradient obtained across the mitral valve is 2 mmHg. There is mild mitral regurgitation. There is marked mitral annular calcification.     Tricuspid Valve:  The tricuspid valve is normal in structure. There is mild tricuspid regurgitation.     Pulmonary Valve:  The pulmonic valve is not well seen.     IVC: The IVC is not visualized.     Intracavitary: There is no evidence of pericardial effusion, intracavity mass, thrombi, or  vegetation.     In atrial fibrillation.     CONCLUSIONS     1 - Biatrial enlargement.     2 - Normal left ventricular systolic function (EF 60-65%).     3 - S/P surgical AVR, NAVNEET = 1.13 cm2, AVAi = 0.6 cm2/m2, peak velocity = 2.85 m/s, mean gradient = 18 mmHg.     4 - Mild mitral regurgitation.     5 - Mild tricuspid regurgitation.     6 - Pulmonary hypertension. The estimated PA systolic pressure is greater than 70 mmHg.     7 - In atrial fibrillation.     This document has been electronically    SIGNED BY: Marycarmen Wong MD On: 01/31/2018              Cardiac PET Stress Test (02/20/2018)    PRE-TEST DATA   EKG: EKG demonstrates adequate. Resting electrocardiogram reveals normal sinus rhythm at a rate of 70 bpm. Ventricular paced rhythm    Indication for Stress Test:  MCKEON    Clinical Notes:  Hypertension,  Hyperlipidemia,  CABG,  CAD and Atrial Fibrillation    TEST DESCRIPTION   The patient received 40.73 mg of Dipyridamole over 4 minutes. Peak heart rate was 70 bpm, which is 53% of the age predicted maximum heart rate. .     EKG Conclusions:    1. The EKG portion of this study is uninterpretable for ischemia at a peak heart rate of 70 bpm (53% of predicted).   2. Blood pressure remained stable throughout the protocol  (Presenting BP: 161/85 Peak BP: 156/65).   3. No significant arrhythmias were present.   4. There were no symptoms of chest discomfort or significant dyspnea throughout the protocol.     Protocol:  After explaining the risk, benefits, and alternatives of IV Dipyridamole PET perfusion scintigraphy and after obtaining informed consent, the patient was positioned in the Positron Attrius. After aligning the sternal angle of Fred with the laser defining the upper edge of the field of view, positioning was confirmed with a 7 minute attenuation transmission scan. This was followed by a 50.1 mCi. Rubidium 82 injection at rest.  A 7 minute emission acquisition scan was performed 70 seconds after termination  of the infusion.     Subsequently, Dipyridamole pharmacologic stress testing was performed as described above. 3 minutes following the cessation of the Dipyridamole infusion, 50 mCi of Rubidium 82 was infused and a 7 minute emission acquisition scan performed 70 seconds after termination of the infusion. This was followed by a 7 minute attenuation transmission scan. Subsequently, images were processed VLA, HLA and short axis views in addition to polar tomographic displays and pseudo topographic displays. The site of the IV injection was the right hand.     Inspection of the transaxial images demonstrated significant left lateral patient motion in the camera between rest and stress acquisitions. This was compensated for with the computer assisted shift correction algorithm.     COMMENTS  This is a technically excellent study.   Relative Myocardial Perfusion Images: The relative PET images show the following:  On resting images, there is a very small sized moderate intensity defect in the mid inferior wall. On stress images, this defect becomes larger in size extending from the mid inferior and mid to apical inferolateral walls.     Other Findings:  The extracardiac distribution of radioactivity is normal. The left ventricular cavity is normal in size and does not increase with stress. On gated SPECT, left ventricular motion is normal at rest. On gated SPECT, left ventricular motion is normal at stress.     Nuclear Quantitative Functional Analysis:   At rest:  LVEF: >= 70 % (normal is >= 51%)  LVED Volume: 81 ml (normal is <=171)  LVES Volume: 23 ml (normal is <=70)  At stress:  LVEF: 65 % (normal is >= 51%)  LVED Volume: 87 ml (normal is <=171)  LVES Volume: 30 ml (normal is <=70)    CONCLUSIONS: ABNORMAL MYOCARDIAL PERFUSION PET STRESS TEST  1. There is a very small sized moderate ischemia with a small amount of underlying infarct in the mid inferior and mid to apical inferolateral walls in the usual distribution of  the Posterior Lateral Branch. This defect comprises 10 % of the left ventricular myocardium. This is likely consistent with the known occlusion of the Right Coronary Artery.  2. Resting wall motion is physiologic. Stress wall motion is physiologic.   3. LV function is normal at rest and stress.  (normal is >= 51%)  4. The ventricular volumes are normal at rest and stress.   5. The extracardiac distribution of radioactivity is normal.   6. There was no previous study available to compare.     This document has been electronically    SIGNED BY: Paula Quinonez MD         I have reviewed the following:     Details / Date    []   Labs     []   Imaging     []   Cardiology Procedures     []   Other      Assessment and Plan:     1. Chronic diastolic heart failure    2. Coronary artery disease without angina pectoris, unspecified vessel or lesion type, unspecified whether native or transplanted heart    3. Essential hypertension    4. Persistent atrial fibrillation         Chronic diastolic heart failure  Clinically stable without evidence of fluid retention. BW has been stable at 145-146 lbs     BMP prior to next appointment   Continue home Bw, BP and HR monitoring  Low sodium diet (2 gm or less per day)  Limit fluid intake to 1.5 liters per day. Compensate with 1-2 additional glasses of water if he spends time outside, in the heat  Benazepril 5 mg qd  Carvedilol 12.5 mg bid  Torsemide 10 mg qd     CAD (coronary artery disease)  Asymptomatic. Small area of ischemia on Cardiac PET Stress     ASA 81 mg qd  Benazepril 5 mg qd  Carvedilol 25 mg bid  Sublingual nitroglycerin prn  Atorvastatin 80 mg qd    Hypertension  Home BP record shows that BP systolic usually is 100-110 and, occasionally as low as 95. Today in clinic BP was well controlled at 138/75     Low sodium diet (2 gm or less per day)  Discontinue Amlodipine 5 mg qd  Continue: Benazepril 5 mg qd and Carvedilol 12.5 mg bid    Persistent atrial fibrillation  Heart rate  is regular    Continue present therapy:   Carvedilol 12.5 mg bid  apixaban 5 mg bid      Follow-up in about 2 months (around 7/24/2018).      Ciaran Michaud MD

## 2018-06-04 ENCOUNTER — PES CALL (OUTPATIENT)
Dept: ADMINISTRATIVE | Facility: CLINIC | Age: 83
End: 2018-06-04

## 2018-06-04 RX ORDER — OMEPRAZOLE 20 MG/1
20 CAPSULE, DELAYED RELEASE ORAL DAILY
Qty: 90 CAPSULE | Refills: 1 | Status: SHIPPED | OUTPATIENT
Start: 2018-06-04 | End: 2018-10-10 | Stop reason: SDUPTHER

## 2018-06-12 RX ORDER — ATORVASTATIN CALCIUM 80 MG/1
80 TABLET, FILM COATED ORAL NIGHTLY
Qty: 90 TABLET | Refills: 1 | Status: SHIPPED | OUTPATIENT
Start: 2018-06-12 | End: 2018-10-25

## 2018-06-21 ENCOUNTER — CLINICAL SUPPORT (OUTPATIENT)
Dept: ELECTROPHYSIOLOGY | Facility: CLINIC | Age: 83
End: 2018-06-21
Attending: INTERNAL MEDICINE
Payer: MEDICARE

## 2018-06-21 DIAGNOSIS — Z95.0 CARDIAC PACEMAKER IN SITU: ICD-10-CM

## 2018-06-21 DIAGNOSIS — I48.19 PERSISTENT ATRIAL FIBRILLATION: ICD-10-CM

## 2018-06-21 PROCEDURE — 93280 PM DEVICE PROGR EVAL DUAL: CPT | Mod: S$GLB,,, | Performed by: INTERNAL MEDICINE

## 2018-06-26 RX ORDER — CARVEDILOL 12.5 MG/1
TABLET ORAL
Qty: 180 TABLET | Refills: 1 | Status: SHIPPED | OUTPATIENT
Start: 2018-06-26 | End: 2020-01-08 | Stop reason: SDUPTHER

## 2018-06-28 ENCOUNTER — LAB VISIT (OUTPATIENT)
Dept: LAB | Facility: HOSPITAL | Age: 83
End: 2018-06-28
Attending: INTERNAL MEDICINE
Payer: MEDICARE

## 2018-06-28 DIAGNOSIS — I50.32 CHRONIC DIASTOLIC HEART FAILURE: ICD-10-CM

## 2018-06-28 LAB
ANION GAP SERPL CALC-SCNC: 8 MMOL/L
BUN SERPL-MCNC: 23 MG/DL
CALCIUM SERPL-MCNC: 9.2 MG/DL
CHLORIDE SERPL-SCNC: 105 MMOL/L
CO2 SERPL-SCNC: 31 MMOL/L
CREAT SERPL-MCNC: 1.7 MG/DL
EST. GFR  (AFRICAN AMERICAN): 41 ML/MIN/1.73 M^2
EST. GFR  (NON AFRICAN AMERICAN): 35.5 ML/MIN/1.73 M^2
GLUCOSE SERPL-MCNC: 91 MG/DL
POTASSIUM SERPL-SCNC: 3.9 MMOL/L
SODIUM SERPL-SCNC: 144 MMOL/L

## 2018-06-28 PROCEDURE — 80048 BASIC METABOLIC PNL TOTAL CA: CPT

## 2018-06-28 PROCEDURE — 36415 COLL VENOUS BLD VENIPUNCTURE: CPT | Mod: PO

## 2018-07-01 ENCOUNTER — TELEPHONE (OUTPATIENT)
Dept: CARDIOLOGY | Facility: HOSPITAL | Age: 83
End: 2018-07-01

## 2018-07-01 NOTE — TELEPHONE ENCOUNTER
Kidney function has slightly worsened (Cr 1.7). He is to stop benazepril and if BP increases above 140/90 he is to resume amlodipine 5 mg qd.    Ciaran Michaud

## 2018-07-02 ENCOUNTER — LAB VISIT (OUTPATIENT)
Dept: LAB | Facility: HOSPITAL | Age: 83
End: 2018-07-02
Attending: FAMILY MEDICINE
Payer: MEDICARE

## 2018-07-02 ENCOUNTER — OFFICE VISIT (OUTPATIENT)
Dept: FAMILY MEDICINE | Facility: CLINIC | Age: 83
End: 2018-07-02
Payer: MEDICARE

## 2018-07-02 ENCOUNTER — TELEPHONE (OUTPATIENT)
Dept: CARDIOLOGY | Facility: CLINIC | Age: 83
End: 2018-07-02

## 2018-07-02 VITALS
HEIGHT: 68 IN | SYSTOLIC BLOOD PRESSURE: 110 MMHG | DIASTOLIC BLOOD PRESSURE: 70 MMHG | BODY MASS INDEX: 22.38 KG/M2 | WEIGHT: 147.69 LBS | HEART RATE: 60 BPM

## 2018-07-02 DIAGNOSIS — N18.30 CKD (CHRONIC KIDNEY DISEASE) STAGE 3, GFR 30-59 ML/MIN: Primary | ICD-10-CM

## 2018-07-02 DIAGNOSIS — E78.5 DYSLIPIDEMIA: ICD-10-CM

## 2018-07-02 DIAGNOSIS — D63.8 CHRONIC DISEASE ANEMIA: ICD-10-CM

## 2018-07-02 DIAGNOSIS — N18.30 CKD (CHRONIC KIDNEY DISEASE) STAGE 3, GFR 30-59 ML/MIN: ICD-10-CM

## 2018-07-02 LAB
ALBUMIN SERPL BCP-MCNC: 3.6 G/DL
ALP SERPL-CCNC: 141 U/L
ALT SERPL W/O P-5'-P-CCNC: 29 U/L
AST SERPL-CCNC: 42 U/L
BASOPHILS # BLD AUTO: 0.05 K/UL
BASOPHILS NFR BLD: 0.7 %
BILIRUB DIRECT SERPL-MCNC: 0.7 MG/DL
BILIRUB SERPL-MCNC: 1.5 MG/DL
CHOLEST SERPL-MCNC: 116 MG/DL
CHOLEST/HDLC SERPL: 3.4 {RATIO}
DIFFERENTIAL METHOD: ABNORMAL
EOSINOPHIL # BLD AUTO: 0.4 K/UL
EOSINOPHIL NFR BLD: 5.7 %
ERYTHROCYTE [DISTWIDTH] IN BLOOD BY AUTOMATED COUNT: 13.5 %
HCT VFR BLD AUTO: 36 %
HDLC SERPL-MCNC: 34 MG/DL
HDLC SERPL: 29.3 %
HGB BLD-MCNC: 11.4 G/DL
IMM GRANULOCYTES # BLD AUTO: 0.01 K/UL
IMM GRANULOCYTES NFR BLD AUTO: 0.1 %
LDLC SERPL CALC-MCNC: 68.4 MG/DL
LYMPHOCYTES # BLD AUTO: 1.7 K/UL
LYMPHOCYTES NFR BLD: 22.1 %
MCH RBC QN AUTO: 32.9 PG
MCHC RBC AUTO-ENTMCNC: 31.7 G/DL
MCV RBC AUTO: 104 FL
MONOCYTES # BLD AUTO: 0.6 K/UL
MONOCYTES NFR BLD: 7.9 %
NEUTROPHILS # BLD AUTO: 4.8 K/UL
NEUTROPHILS NFR BLD: 63.5 %
NONHDLC SERPL-MCNC: 82 MG/DL
NRBC BLD-RTO: 0 /100 WBC
PLATELET # BLD AUTO: 154 K/UL
PMV BLD AUTO: 10 FL
PROT SERPL-MCNC: 7.2 G/DL
RBC # BLD AUTO: 3.46 M/UL
TRIGL SERPL-MCNC: 68 MG/DL
WBC # BLD AUTO: 7.5 K/UL

## 2018-07-02 PROCEDURE — 85025 COMPLETE CBC W/AUTO DIFF WBC: CPT

## 2018-07-02 PROCEDURE — 80061 LIPID PANEL: CPT

## 2018-07-02 PROCEDURE — 36415 COLL VENOUS BLD VENIPUNCTURE: CPT | Mod: PO

## 2018-07-02 PROCEDURE — 99214 OFFICE O/P EST MOD 30 MIN: CPT | Mod: S$GLB,,, | Performed by: FAMILY MEDICINE

## 2018-07-02 PROCEDURE — 99999 PR PBB SHADOW E&M-EST. PATIENT-LVL III: CPT | Mod: PBBFAC,,, | Performed by: FAMILY MEDICINE

## 2018-07-02 PROCEDURE — 80076 HEPATIC FUNCTION PANEL: CPT

## 2018-07-02 NOTE — PROGRESS NOTES
Subjective:       Patient ID: Deena Moody is a 87 y.o. male.    Chief Complaint: Follow-up and Hypertension    87 years old male came to the clinic for for chronic kidney disease follow-up.  Patient with decreased kidney function but stable in comparison with previous reports for the last year.  Patient with chronic anemia.  Patient with good compliance with cholesterol medicine.      Hypertension   Pertinent negatives include no chest pain or palpitations.     Review of Systems   Constitutional: Positive for fatigue.   HENT: Positive for hearing loss.    Eyes: Negative.    Respiratory: Negative.    Cardiovascular: Negative.  Negative for chest pain, palpitations and leg swelling.   Gastrointestinal: Negative.    Genitourinary: Negative.    Musculoskeletal: Negative.    Skin: Negative.    Neurological: Negative.    Psychiatric/Behavioral: Negative.        Objective:      Physical Exam   Constitutional: He is oriented to person, place, and time. He appears well-developed and well-nourished. No distress.   HENT:   Head: Normocephalic and atraumatic.   Right Ear: External ear normal.   Left Ear: External ear normal.   Nose: Nose normal.   Mouth/Throat: Oropharynx is clear and moist. No oropharyngeal exudate.   Eyes: Conjunctivae and EOM are normal. Pupils are equal, round, and reactive to light. Right eye exhibits no discharge. Left eye exhibits no discharge. No scleral icterus.   Neck: Normal range of motion. Neck supple. No JVD present. No tracheal deviation present. No thyromegaly present.   Cardiovascular: Normal rate, regular rhythm, normal heart sounds and intact distal pulses.  Exam reveals no gallop and no friction rub.    No murmur heard.  Pulmonary/Chest: Effort normal and breath sounds normal. No stridor. No respiratory distress. He has no wheezes. He has no rales. He exhibits no tenderness.   Abdominal: Soft. Bowel sounds are normal. He exhibits no distension and no mass. There is no tenderness. There  is no rebound and no guarding.   Musculoskeletal: Normal range of motion. He exhibits no edema or tenderness.   Lymphadenopathy:     He has no cervical adenopathy.   Neurological: He is alert and oriented to person, place, and time. He has normal reflexes. He displays normal reflexes. No cranial nerve deficit. He exhibits normal muscle tone. Coordination and gait abnormal.   Skin: Skin is warm and dry. No rash noted. He is not diaphoretic. No erythema. No pallor.   Psychiatric: He has a normal mood and affect. His behavior is normal. Judgment and thought content normal.   Nursing note and vitals reviewed.      Assessment:       1. CKD (chronic kidney disease) stage 3, GFR 30-59 ml/min    2. Chronic disease anemia    3. Dyslipidemia        Plan:         Deena was seen today for follow-up and hypertension.    Diagnoses and all orders for this visit:    CKD (chronic kidney disease) stage 3, GFR 30-59 ml/min  -     CBC auto differential; Future    Chronic disease anemia  -     CBC auto differential; Future    Dyslipidemia  -     CBC auto differential; Future  -     Hepatic function panel; Future  -     Lipid panel; Future

## 2018-07-02 NOTE — PATIENT INSTRUCTIONS

## 2018-07-24 ENCOUNTER — LAB VISIT (OUTPATIENT)
Dept: LAB | Facility: HOSPITAL | Age: 83
End: 2018-07-24
Attending: INTERNAL MEDICINE
Payer: MEDICARE

## 2018-07-24 ENCOUNTER — OFFICE VISIT (OUTPATIENT)
Dept: CARDIOLOGY | Facility: CLINIC | Age: 83
End: 2018-07-24
Payer: MEDICARE

## 2018-07-24 VITALS
WEIGHT: 143.94 LBS | SYSTOLIC BLOOD PRESSURE: 162 MMHG | HEIGHT: 69 IN | HEART RATE: 69 BPM | BODY MASS INDEX: 21.32 KG/M2 | DIASTOLIC BLOOD PRESSURE: 72 MMHG

## 2018-07-24 DIAGNOSIS — I50.32 CHRONIC DIASTOLIC HEART FAILURE: Primary | ICD-10-CM

## 2018-07-24 DIAGNOSIS — I48.19 PERSISTENT ATRIAL FIBRILLATION: ICD-10-CM

## 2018-07-24 DIAGNOSIS — Z95.2 S/P AVR (AORTIC VALVE REPLACEMENT): ICD-10-CM

## 2018-07-24 DIAGNOSIS — I25.10 CORONARY ARTERY DISEASE INVOLVING NATIVE CORONARY ARTERY OF NATIVE HEART WITHOUT ANGINA PECTORIS: ICD-10-CM

## 2018-07-24 DIAGNOSIS — I50.32 CHRONIC DIASTOLIC HEART FAILURE: ICD-10-CM

## 2018-07-24 DIAGNOSIS — I10 ESSENTIAL HYPERTENSION: ICD-10-CM

## 2018-07-24 LAB
ANION GAP SERPL CALC-SCNC: 9 MMOL/L
BUN SERPL-MCNC: 32 MG/DL
CALCIUM SERPL-MCNC: 9.7 MG/DL
CHLORIDE SERPL-SCNC: 105 MMOL/L
CO2 SERPL-SCNC: 30 MMOL/L
CREAT SERPL-MCNC: 1.9 MG/DL
EST. GFR  (AFRICAN AMERICAN): 35.8 ML/MIN/1.73 M^2
EST. GFR  (NON AFRICAN AMERICAN): 31 ML/MIN/1.73 M^2
GLUCOSE SERPL-MCNC: 99 MG/DL
POTASSIUM SERPL-SCNC: 5.2 MMOL/L
SODIUM SERPL-SCNC: 144 MMOL/L

## 2018-07-24 PROCEDURE — 99999 PR PBB SHADOW E&M-EST. PATIENT-LVL IV: CPT | Mod: PBBFAC,,, | Performed by: INTERNAL MEDICINE

## 2018-07-24 PROCEDURE — 99214 OFFICE O/P EST MOD 30 MIN: CPT | Mod: S$GLB,,, | Performed by: INTERNAL MEDICINE

## 2018-07-24 PROCEDURE — 80048 BASIC METABOLIC PNL TOTAL CA: CPT

## 2018-07-24 PROCEDURE — 36415 COLL VENOUS BLD VENIPUNCTURE: CPT

## 2018-07-24 RX ORDER — BENAZEPRIL HYDROCHLORIDE 5 MG/1
5 TABLET ORAL DAILY
COMMUNITY
End: 2018-07-25 | Stop reason: SINTOL

## 2018-07-24 NOTE — PROGRESS NOTES
Cardiology Progress Note:    Patient ID:  Deena Moody is a 87 y.o. male who presents for follow up of chronic diastolic heart failure, hypertension, CAD, s/p AV replacement      History of Present Illness: Pt is accompanied by his daughter. Pt has felt well since I last saw him on 5/24/2018. No symptoms to suggest myocardial ischemia, heart failure and arrhythmia.     He appears uncertain about some of the medicines he is taking.    He refers that BW has been stable at 142 -145 lbs; this AM it was 142 lbs.    BP systolic has averaged 110-120/65-75 and, occasionally, has been as low as 95. No orthostatic symptoms.     Cardiac PET Stress shows a small area of moderate ischemia (see below)      Past Medical History Includes:  s/p pacemaker for complete AV block (followed by Dr Hastings); persistent atrial fibrillation on chronic anticoagulation; hypertension; hyperlipidemia; CAD, Mi, s/p CABG and AVR in 2004; HFpEF.     Family History Includes:  Non-contributory     Social History Includes:  he lives with his wife.      Present Medical Therapy Includes:    ASA 81 mg qd  Benazepril 5 mg qd  Carvedilol 12.5 mg bid   Atorvastatin 80 mg qd  apixaban 5 mg bid  Torsemide 10 mg qod   Omeprazole 20 mg qd  Sublingual NTG prn (not used)    Full Medication List Includes:  Outpatient Encounter Prescriptions as of 7/24/2018   Medication Sig Dispense Refill    apixaban (ELIQUIS) 5 mg Tab Take 1 tablet (5 mg total) by mouth 2 (two) times daily. 60 tablet 11    aspirin (ECOTRIN) 81 MG EC tablet Take 1 tablet (81 mg total) by mouth once daily. 90 tablet 3    atorvastatin (LIPITOR) 80 MG tablet TAKE 1 TABLET (80 MG TOTAL) BY MOUTH NIGHTLY. 90 tablet 1    benazepril (LOTENSIN) 5 MG tablet Take 5 mg by mouth once daily.      carvedilol (COREG) 12.5 MG tablet TAKE 1 TABLET (12.5 MG TOTAL) BY MOUTH TWO TIMES DAILY. 180 tablet 1    multivitamin (MULTIVITAMIN) per tablet Take 1 tablet by mouth once daily.        nitroGLYCERIN  (NITROSTAT) 0.4 MG SL tablet Take one tablet every 5 minutes for chest pain. After the third tablet go to the ED. 25 tablet 4    omeprazole (PRILOSEC) 20 MG capsule Take 1 capsule (20 mg total) by mouth once daily. 90 capsule 1    torsemide (DEMADEX) 10 MG Tab Take 2 tablets (20 mg total) by mouth once daily. 60 tablet 11    [DISCONTINUED] benazepril (LOTENSIN) 5 MG tablet Take 1 tablet (5 mg total) by mouth once daily. 90 tablet 1    [DISCONTINUED] carvedilol (COREG) 12.5 MG tablet Take 1 tablet (12.5 mg total) by mouth 2 (two) times daily. 90 tablet 1     No facility-administered encounter medications on file as of 7/24/2018.        Review of Systems:  Review of Systems   Constitution: Negative for decreased appetite, diaphoresis, fever, weakness, malaise/fatigue, weight gain and weight loss.   HENT: Negative for congestion, ear discharge, ear pain and nosebleeds.    Eyes: Negative for blurred vision, double vision and visual disturbance.   Cardiovascular: Negative for chest pain, claudication, cyanosis, dyspnea on exertion, irregular heartbeat, leg swelling, near-syncope, orthopnea, palpitations, paroxysmal nocturnal dyspnea and syncope.   Respiratory: Negative for cough, hemoptysis, shortness of breath, sleep disturbances due to breathing, snoring, sputum production and wheezing.    Endocrine: Negative for polydipsia, polyphagia and polyuria.   Hematologic/Lymphatic: Negative for adenopathy and bleeding problem. Does not bruise/bleed easily.   Skin: Negative for color change, nail changes, poor wound healing and rash.   Musculoskeletal: Negative for muscle cramps and muscle weakness.   Gastrointestinal: Negative for abdominal pain, anorexia, change in bowel habit, hematochezia, nausea and vomiting.   Genitourinary: Negative for dysuria, frequency and hematuria.   Neurological: Negative for brief paralysis, difficulty with concentration, excessive daytime sleepiness, dizziness, focal weakness, headaches,  "light-headedness, seizures and vertigo.   Psychiatric/Behavioral: Negative for altered mental status and depression.   Allergic/Immunologic: Negative for persistent infections.       Physical Exam:  BP (!) 162/72 (BP Location: Left arm, Patient Position: Sitting, BP Method: Medium (Automatic))   Pulse 69   Ht 5' 8.5" (1.74 m)   Wt 65.3 kg (143 lb 15.4 oz)   BMI 21.57 kg/m²   Physical Exam   Constitutional: He is oriented to person, place, and time. He appears well-developed and well-nourished.   HENT:   Head: Normocephalic.   Right Ear: External ear normal.   Left Ear: External ear normal.   Nose: Nose normal.   Inspection of lips, teeth and gums normal   Eyes: EOM are normal. Pupils are equal, round, and reactive to light. No scleral icterus.   Neck: Normal range of motion. Neck supple. No hepatojugular reflux and no JVD present. No tracheal deviation present. No thyromegaly present.   Cardiovascular: Normal rate, regular rhythm, S1 normal, S2 normal and intact distal pulses.  Exam reveals no gallop and no friction rub.    Murmur heard.   Harsh midsystolic murmur is present with a grade of 3/6  at the upper right sternal border radiating to the neck  Pulses:       Carotid pulses are 2+ on the right side with bruit, and 2+ on the left side.       Radial pulses are 2+ on the right side, and 2+ on the left side.        Dorsalis pedis pulses are 2+ on the right side, and 2+ on the left side.        Posterior tibial pulses are 2+ on the right side, and 2+ on the left side.   Pulmonary/Chest: Effort normal and breath sounds normal.   Abdominal: Bowel sounds are normal. He exhibits no distension. There is no hepatosplenomegaly. There is no tenderness. There is no guarding.   Musculoskeletal: Normal range of motion. He exhibits no edema or tenderness.   Lymphadenopathy:   Palpation of neck and groin lymph nodes normal   Neurological: He is alert and oriented to person, place, and time. No cranial nerve deficit. He " exhibits normal muscle tone. Coordination normal.   Skin: Skin is dry.   No ankle nor pretibial edema    Psychiatric: His behavior is normal. Judgment and thought content normal.        Blood Tests:  Lab Results   Component Value Date     (H) 02/08/2018     06/28/2018    K 3.9 06/28/2018     06/28/2018    CO2 31 (H) 06/28/2018    BUN 23 06/28/2018    CREATININE 1.7 (H) 06/28/2018    GLU 91 06/28/2018    HGBA1C 5.5 06/15/2010    MG 2.0 09/13/2016    AST 42 (H) 07/02/2018    ALT 29 07/02/2018    ALBUMIN 3.6 07/02/2018    PROT 7.2 07/02/2018    BILITOT 1.5 (H) 07/02/2018    WBC 7.50 07/02/2018    HGB 11.4 (L) 07/02/2018    HCT 36.0 (L) 07/02/2018    HCT 39 08/31/2016     (H) 07/02/2018     07/02/2018    INR 1.2 03/31/2018    PSA 0.89 05/19/2011    TSH 2.709 12/30/2017       Lab Results   Component Value Date    CHOL 116 (L) 07/02/2018    HDL 34 (L) 07/02/2018    TRIG 68 07/02/2018       Lab Results   Component Value Date    LDLCALC 68.4 07/02/2018       Urine Tests:  Lab Results   Component Value Date    COLORU Yellow 09/14/2016    APPEARANCEUA Clear 09/14/2016    PHUR 5.0 09/14/2016    SPECGRAV 1.010 09/14/2016    PROTEINUA 1+ (A) 09/14/2016    GLUCUA Negative 09/14/2016    KETONESU Negative 09/14/2016    BILIRUBINUA Negative 09/14/2016    OCCULTUA 1+ (A) 09/14/2016    NITRITE Negative 09/14/2016    UROBILINOGEN Negative 09/14/2016    LEUKOCYTESUR Negative 09/14/2016    CREATRANDUR 95 12/27/2010            ECG (30-JAN-2018)  Electronic ventricular pacemaker  The other rhythm shows Atrial fibrillation  Abnormal ECG  When compared with ECG of 11-JUL-2017 08:39,  Vent. rate has decreased BY 10 BPM           Echocardiogram (01/31/2018)    TEST DESCRIPTION      General: A catheter is present in the right-sided cardiac chambers.     Aorta: The aortic root is normal in size. Aortic root measures 3.2 cm at Sinuses of Valsalva.     Left Atrium: The left atrial volume index is severely enlarged,  measuring 48.50 cc/m2.     Left Ventricle: The left ventricle is normal in size, with an end-diastolic diameter of 4.4 cm, and an end-systolic diameter of 3.3 cm. Wall thickness is increased, with the septum and the posterior wall each measuring 1.1 cm across. Relative wall   thickness was increased at 0.50, and the LV mass index was 104.1 g/m2 consistent with concentric remodeling. There are no regional wall motion abnormalities. Left ventricular systolic function appears normal. Visually estimated ejection fraction is   60-65%. The LV Doppler derived stroke volume equals 68.0 ccs.     Right Atrium: The right atrium is enlarged, measuring 5.2 cm in length and 4.7 cm in width in the apical view.     Right Ventricle: The right ventricle is normal in size measuring 3.9 cm at the base in the apical right ventricle-focused view. Global right ventricular systolic function appears mildly depressed. Tricuspid annular plane systolic excursion (TAPSE) is 1.8   cm. Tissue Doppler-derived tricuspid annular peak systolic velocity (S prime) is 7.4 cm/s. The estimated PA systolic pressure is greater than 70 mmHg.     Aortic Valve:  There is a surgically replaced bioprosthesis in the aortic position. The peak velocity obtained across the aortic valve is 2.85 m/s, which translates to a peak gradient of 32 mmHg. The mean gradient is 18 mmHg. Using a left ventricular   outflow tract diameter of 2.1 cm, a left ventricular outflow tract velocity time integral of 20 cm, and a peak instantaneous transvalvular velocity time integral of 61 cm, the effective prosthetic valve area is 1.13 cm2(p AVAi is 0.6 cm2/m2). 21 mm Post aortic valve with patch augmentation in 2004.    Mitral Valve:  The mitral valve is normal in structure. The mean gradient obtained across the mitral valve is 2 mmHg. There is mild mitral regurgitation. There is marked mitral annular calcification.     Tricuspid Valve:  The tricuspid valve is normal in structure.  There is mild tricuspid regurgitation.     Pulmonary Valve:  The pulmonic valve is not well seen.     IVC: The IVC is not visualized.     Intracavitary: There is no evidence of pericardial effusion, intracavity mass, thrombi, or vegetation.     In atrial fibrillation.     CONCLUSIONS     1 - Biatrial enlargement.     2 - Normal left ventricular systolic function (EF 60-65%).     3 - S/P surgical AVR, NAVNEET = 1.13 cm2, AVAi = 0.6 cm2/m2, peak velocity = 2.85 m/s, mean gradient = 18 mmHg.     4 - Mild mitral regurgitation.     5 - Mild tricuspid regurgitation.     6 - Pulmonary hypertension. The estimated PA systolic pressure is greater than 70 mmHg.     7 - In atrial fibrillation.     This document has been electronically    SIGNED BY: Marycarmen Wong MD On: 01/31/2018              Cardiac PET Stress Test (02/20/2018)    PRE-TEST DATA   EKG: EKG demonstrates adequate. Resting electrocardiogram reveals normal sinus rhythm at a rate of 70 bpm. Ventricular paced rhythm    Indication for Stress Test:  MCKEON    Clinical Notes:  Hypertension,  Hyperlipidemia,  CABG,  CAD and Atrial Fibrillation    TEST DESCRIPTION   The patient received 40.73 mg of Dipyridamole over 4 minutes. Peak heart rate was 70 bpm, which is 53% of the age predicted maximum heart rate. .     EKG Conclusions:    1. The EKG portion of this study is uninterpretable for ischemia at a peak heart rate of 70 bpm (53% of predicted).   2. Blood pressure remained stable throughout the protocol  (Presenting BP: 161/85 Peak BP: 156/65).   3. No significant arrhythmias were present.   4. There were no symptoms of chest discomfort or significant dyspnea throughout the protocol.     Protocol:  After explaining the risk, benefits, and alternatives of IV Dipyridamole PET perfusion scintigraphy and after obtaining informed consent, the patient was positioned in the Positron Attrius. After aligning the sternal angle of Fred with the laser defining the upper edge of the field  of view, positioning was confirmed with a 7 minute attenuation transmission scan. This was followed by a 50.1 mCi. Rubidium 82 injection at rest.  A 7 minute emission acquisition scan was performed 70 seconds after termination of the infusion.     Subsequently, Dipyridamole pharmacologic stress testing was performed as described above. 3 minutes following the cessation of the Dipyridamole infusion, 50 mCi of Rubidium 82 was infused and a 7 minute emission acquisition scan performed 70 seconds after termination of the infusion. This was followed by a 7 minute attenuation transmission scan. Subsequently, images were processed VLA, HLA and short axis views in addition to polar tomographic displays and pseudo topographic displays. The site of the IV injection was the right hand.     Inspection of the transaxial images demonstrated significant left lateral patient motion in the camera between rest and stress acquisitions. This was compensated for with the computer assisted shift correction algorithm.     COMMENTS  This is a technically excellent study.   Relative Myocardial Perfusion Images: The relative PET images show the following:  On resting images, there is a very small sized moderate intensity defect in the mid inferior wall. On stress images, this defect becomes larger in size extending from the mid inferior and mid to apical inferolateral walls.     Other Findings:  The extracardiac distribution of radioactivity is normal. The left ventricular cavity is normal in size and does not increase with stress. On gated SPECT, left ventricular motion is normal at rest. On gated SPECT, left ventricular motion is normal at stress.     Nuclear Quantitative Functional Analysis:   At rest:  LVEF: >= 70 % (normal is >= 51%)  LVED Volume: 81 ml (normal is <=171)  LVES Volume: 23 ml (normal is <=70)  At stress:  LVEF: 65 % (normal is >= 51%)  LVED Volume: 87 ml (normal is <=171)  LVES Volume: 30 ml (normal is  <=70)    CONCLUSIONS: ABNORMAL MYOCARDIAL PERFUSION PET STRESS TEST  1. There is a very small sized moderate ischemia with a small amount of underlying infarct in the mid inferior and mid to apical inferolateral walls in the usual distribution of the Posterior Lateral Branch. This defect comprises 10 % of the left ventricular myocardium. This is likely consistent with the known occlusion of the Right Coronary Artery.  2. Resting wall motion is physiologic. Stress wall motion is physiologic.   3. LV function is normal at rest and stress.  (normal is >= 51%)  4. The ventricular volumes are normal at rest and stress.   5. The extracardiac distribution of radioactivity is normal.   6. There was no previous study available to compare.     This document has been electronically    SIGNED BY: Paula Quinonez MD          I have reviewed the following:     Details / Date    []   Labs     []   Imaging     []   Cardiology Procedures     []   Other      Assessment and Plan:     1. Chronic diastolic heart failure    2. Coronary artery disease involving native coronary artery of native heart without angina pectoris    3. Essential hypertension    4. Persistent atrial fibrillation    5. S/P AVR (aortic valve replacement) with possible stenosis         Chronic diastolic heart failure  Clinically stable without evidence of fluid retention. BW has been stable at 142-145 lbs. This AM it was 142    Medicines were not modified as I recommended on 7/1/2018 and I am not certain of what he really takes at home. I asked Mr Moody's daughter to be our  and to ensure that her father takes the medicines as instructed     BMP today  Continue home Bw, BP and HR monitoring  Low sodium diet (2 gm or less per day)  Limit fluid intake to 1.5 liters per day. Compensate with 1-2 additional glasses of water if he spends time outside, in the heat  Benazepril 5 mg qd  Carvedilol 12.5 mg bid  Torsemide 10 mg qod     CAD (coronary artery  disease)   Asymptomatic. Small area of ischemia on Cardiac PET Stress     ASA 81 mg qd  Benazepril 5 mg qd  Carvedilol 25 mg bid  Sublingual nitroglycerin prn  Atorvastatin 80 mg qd    Hypertension  Home BP record shows that BP systolic usually is 110-120 and, occasionally as low as 95. He has no orthostatic symptoms.     Low sodium diet (2 gm or less per day)  Continue present therapy:   Benazepril 5 mg qd   Carvedilol 12.5 mg bid    Persistent atrial fibrillation  Heart rate is regular     Continue present therapy:   Carvedilol 12.5 mg bid  Apixaban 5 mg bid    S/P AVR (aortic valve replacement) with possible stenosis  Asymptomatic. On 1/31/2018 echocardiogram showed NAVNEET = 1.13 cm2, AVAi = 0.6 cm2/m2, peak velocity = 2.85 m/s, mean gradient = 18 mmHg.     Repeat echo in January 2019      Follow-up in about 3 months (around 10/24/2018) for Dr Tirado.      Ciaran Michaud MD

## 2018-07-24 NOTE — ASSESSMENT & PLAN NOTE
Asymptomatic. On 1/31/2018 echocardiogram showed NAVNEET = 1.13 cm2, AVAi = 0.6 cm2/m2, peak velocity = 2.85 m/s, mean gradient = 18 mmHg.     Repeat echo in January 2019

## 2018-07-24 NOTE — ASSESSMENT & PLAN NOTE
Home BP record shows that BP systolic usually is 110-120 and, occasionally as low as 95. He has no orthostatic symptoms.     Low sodium diet (2 gm or less per day)  Continue present therapy:   Benazepril 5 mg qd   Carvedilol 12.5 mg bid

## 2018-07-24 NOTE — ASSESSMENT & PLAN NOTE
Clinically stable without evidence of fluid retention. BW has been stable at 142-145 lbs. This AM it was 142    Medicines were not modified as I recommended on 7/1/2018 and I am not certain of what he really takes at home. I asked Mr Moody's daughter to be our  and to ensure that her father takes the medicines as instructed     BMP today  Continue home Bw, BP and HR monitoring  Low sodium diet (2 gm or less per day)  Limit fluid intake to 1.5 liters per day. Compensate with 1-2 additional glasses of water if he spends time outside, in the heat  Benazepril 5 mg qd  Carvedilol 12.5 mg bid  Torsemide 10 mg qod

## 2018-07-25 ENCOUNTER — TELEPHONE (OUTPATIENT)
Dept: CARDIOLOGY | Facility: HOSPITAL | Age: 83
End: 2018-07-25

## 2018-07-25 DIAGNOSIS — I50.32 CHRONIC DIASTOLIC HEART FAILURE: Primary | ICD-10-CM

## 2018-07-25 NOTE — TELEPHONE ENCOUNTER
Kidney function has worsened (Cr 1.9 and K 5.2)     I will ask the patient to stop benazepril and make sure that torsemide is taken only every other day.     He should also avoid eating potassium-rich foods.    The BMP will be repeated in 2 weeks.        Ciaran Michaud

## 2018-07-26 ENCOUNTER — PATIENT MESSAGE (OUTPATIENT)
Dept: CARDIOLOGY | Facility: CLINIC | Age: 83
End: 2018-07-26

## 2018-07-26 RX ORDER — TORSEMIDE 10 MG/1
20 TABLET ORAL DAILY
COMMUNITY
End: 2020-01-08 | Stop reason: SDUPTHER

## 2018-08-08 ENCOUNTER — PATIENT MESSAGE (OUTPATIENT)
Dept: CARDIOLOGY | Facility: CLINIC | Age: 83
End: 2018-08-08

## 2018-08-09 ENCOUNTER — LAB VISIT (OUTPATIENT)
Dept: LAB | Facility: HOSPITAL | Age: 83
End: 2018-08-09
Attending: INTERNAL MEDICINE
Payer: MEDICARE

## 2018-08-09 DIAGNOSIS — I50.32 CHRONIC DIASTOLIC HEART FAILURE: ICD-10-CM

## 2018-08-09 LAB
ANION GAP SERPL CALC-SCNC: 12 MMOL/L
BUN SERPL-MCNC: 23 MG/DL
CALCIUM SERPL-MCNC: 9.5 MG/DL
CHLORIDE SERPL-SCNC: 105 MMOL/L
CO2 SERPL-SCNC: 30 MMOL/L
CREAT SERPL-MCNC: 1.8 MG/DL
EST. GFR  (AFRICAN AMERICAN): 38.3 ML/MIN/1.73 M^2
EST. GFR  (NON AFRICAN AMERICAN): 33.1 ML/MIN/1.73 M^2
GLUCOSE SERPL-MCNC: 92 MG/DL
POTASSIUM SERPL-SCNC: 3.9 MMOL/L
SODIUM SERPL-SCNC: 147 MMOL/L

## 2018-08-09 PROCEDURE — 80048 BASIC METABOLIC PNL TOTAL CA: CPT

## 2018-08-09 PROCEDURE — 36415 COLL VENOUS BLD VENIPUNCTURE: CPT | Mod: PO

## 2018-08-10 ENCOUNTER — TELEPHONE (OUTPATIENT)
Dept: CARDIOLOGY | Facility: CLINIC | Age: 83
End: 2018-08-10

## 2018-08-10 NOTE — TELEPHONE ENCOUNTER
Called daughter Willa to schedule follow up w/ Dr. Tirado , as Dr. Michaud recommended, but got no answer. I left a message asking her to call at her convenience to schedule f/u for pt.     MD Sarah Huynh MA             Please make an appointment for Mr Moody to see Dr Tirado in Clinic.     correction

## 2018-08-19 ENCOUNTER — PATIENT MESSAGE (OUTPATIENT)
Dept: ELECTROPHYSIOLOGY | Facility: CLINIC | Age: 83
End: 2018-08-19

## 2018-08-20 ENCOUNTER — TELEPHONE (OUTPATIENT)
Dept: ELECTROPHYSIOLOGY | Facility: CLINIC | Age: 83
End: 2018-08-20

## 2018-08-20 ENCOUNTER — HOSPITAL ENCOUNTER (EMERGENCY)
Facility: HOSPITAL | Age: 83
Discharge: HOME | End: 2018-08-20
Attending: EMERGENCY MEDICINE
Payer: MEDICARE

## 2018-08-20 VITALS
WEIGHT: 138 LBS | RESPIRATION RATE: 16 BRPM | TEMPERATURE: 98 F | OXYGEN SATURATION: 98 % | HEIGHT: 69 IN | SYSTOLIC BLOOD PRESSURE: 185 MMHG | HEART RATE: 79 BPM | BODY MASS INDEX: 20.44 KG/M2 | DIASTOLIC BLOOD PRESSURE: 83 MMHG

## 2018-08-20 DIAGNOSIS — S00.10XA CONTUSION OF PERIOCULAR REGION, UNSPECIFIED LATERALITY, INITIAL ENCOUNTER: Primary | ICD-10-CM

## 2018-08-20 PROCEDURE — 99284 EMERGENCY DEPT VISIT MOD MDM: CPT

## 2018-08-20 PROCEDURE — 99282 EMERGENCY DEPT VISIT SF MDM: CPT | Mod: ,,, | Performed by: EMERGENCY MEDICINE

## 2018-08-20 NOTE — ED PROVIDER NOTES
Encounter Date: 8/20/2018    SCRIBE #1 NOTE: I, Josue oMsqueda, am scribing for, and in the presence of,  Dr. Hauser. I have scribed the entire note.       History     Chief Complaint   Patient presents with    Head Injury     hit head on wed, denies loc, on eliquist, daughter saw pt yest, and his dr told to come told to come to er for CT scan     Time patient was seen by the provider: 5:19 PM    The patient is a 87 y.o. male with co-morbidities including: HTN, HLD, CAD, CHF who presents to the ED with a complaint of minor head injury today. Pt reports that he hit his head on the corner of a tv while attempting to catch it 5 days ago. Pt was ambulatory following the accident and states that he was able to mow the lawn later that day. Pt currently has visible ecchymosis to forehead, R eye and R elbow. He has no further complaints at this time. Pt denies numbness or weakness of the extremities, unsteady gait, N/V or LOC. Of note, pt is currently on Eloquis and took it this morning.           Review of patient's allergies indicates:   Allergen Reactions    Iodine and iodide containing products Other (See Comments)     Caused changes in skin color     Past Medical History:   Diagnosis Date    Acute on chronic diastolic heart failure 9/1/2016    Coronary artery disease     Hyperlipidemia     Hypertension     Macular degeneration (senile) of retina, unspecified 12/12/2014    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    Persistent atrial fibrillation 11/10/2016    S/P placement of cardiac pacemaker 9/14/2016     Past Surgical History:   Procedure Laterality Date    AORTIC VALVE REPLACEMENT N/A     CARDIAC PACEMAKER PLACEMENT      CATARACT EXTRACTION W/  INTRAOCULAR LENS IMPLANT Right 2/16/2016    Dr. Whitley    CATARACT EXTRACTION W/  INTRAOCULAR LENS IMPLANT Left 3/1/2016    Dr. Whitley    CORONARY ARTERY BYPASS GRAFT      EAR EXAMINATION UNDER ANESTHESIA      EYE SURGERY       Family History   Problem Relation  Age of Onset    No Known Problems Mother     No Known Problems Father     No Known Problems Sister     Stroke Brother     Hypertension Brother     No Known Problems Maternal Aunt     No Known Problems Maternal Uncle     No Known Problems Paternal Aunt     No Known Problems Paternal Uncle     No Known Problems Maternal Grandmother     No Known Problems Maternal Grandfather     No Known Problems Paternal Grandmother     No Known Problems Paternal Grandfather     No Known Problems Daughter     No Known Problems Son     Amblyopia Neg Hx     Blindness Neg Hx     Cancer Neg Hx     Cataracts Neg Hx     Diabetes Neg Hx     Glaucoma Neg Hx     Macular degeneration Neg Hx     Retinal detachment Neg Hx     Strabismus Neg Hx     Thyroid disease Neg Hx      Social History     Tobacco Use    Smoking status: Never Smoker    Smokeless tobacco: Never Used   Substance Use Topics    Alcohol use: No    Drug use: No     Review of Systems   Musculoskeletal: Negative for gait problem, neck pain and neck stiffness.   Skin:        Ecchymosis of forehead, R eye, and L elbow   Neurological: Negative for weakness, numbness and headaches.                 Physical Exam     Initial Vitals [08/20/18 1611]   BP Pulse Resp Temp SpO2   (!) 203/91 70 18 97.5 °F (36.4 °C) 99 %      MAP       --         Physical Exam    Nursing note and vitals reviewed.  Constitutional: He appears well-developed and well-nourished. No distress.   HENT:   Right Ear: External ear normal.   Left Ear: External ear normal.   Mouth/Throat: Oropharynx is clear and moist.   There is a small ovoid ecchymosis on his lower forehead between his eyes. There is also periorbital ecchymosis. No facial bone tenderness or step off. No nasal bone tenderness.    Eyes: EOM are normal. Pupils are equal, round, and reactive to light.   Neck: Normal range of motion. Neck supple.   No midline c spine tenderness   Cardiovascular: Normal rate, regular rhythm and  normal heart sounds. Exam reveals no gallop and no friction rub.    No murmur heard.  Pulmonary/Chest: Breath sounds normal. No respiratory distress. He has no wheezes. He has no rhonchi. He has no rales.   Abdominal: Soft. He exhibits no distension. There is no tenderness.   Musculoskeletal: Normal range of motion.   Neurological: He is oriented to person, place, and time. He has normal strength. No cranial nerve deficit or sensory deficit.   Normal gait     Skin: Skin is warm and dry. Ecchymosis (scattered over bilateral upper extremities) noted.         ED Course   Procedures  Labs Reviewed - No data to display       Imaging Results          CT Head Without Contrast (Final result)  Result time 08/20/18 18:14:40    Final result by Aldo Alvarado III, MD (08/20/18 18:14:40)                 Impression:      See above    No acute process seen.      Electronically signed by: Aldo Alvarado MD  Date:    08/20/2018  Time:    18:14             Narrative:    EXAMINATION:  CT HEAD WITHOUT CONTRAST    CLINICAL HISTORY:  Head trauma, minor, GCS>=13, NOC/NEXUS/CCR positive, first study;    FINDINGS:  There is involutional change small vessel ischemic change cataract implants and skullbase vascular calcifications.  There is a prior right craniotomy.  No bleed, mass, mass effect seen.  No significant hydrocephalus seen.  No edema seen.                                 Medical Decision Making:   History:   Old Medical Records: I decided to obtain old medical records.  Initial Assessment:   86 yo male presents with on Eliquis s/p head injury 5 days ago. Pt denies HA, visual changes or neurological deficits. Has a normal neuro exam, however, cardiology wants clearance for continuing Eliquis. Will obtain Head CT to rule out intracranial bleed.   Clinical Tests:   Radiological Study: Ordered and Reviewed            Scribe Attestation:   Scribe #1: I performed the above scribed service and the documentation accurately describes the  services I performed. I attest to the accuracy of the note.    Attending Attestation:           Physician Attestation for Scribe:      Comments: I, Dr. Zoey Jeong, personally performed the services described in this documentation. All medical record entries made by the scribe were at my direction and in my presence.  I have reviewed the chart and agree that the record reflects my personal performance and is accurate and complete. Zoey Jeong MD.  8:43 PM 08/20/2018      Attending ED Notes:   Head CT shows no evidence of intracranial bleed. Pt may continue taking Eliquis and tylenol as needed for HA.                Clinical Impression:   The encounter diagnosis was Contusion of periocular region, unspecified laterality, initial encounter.      Disposition:   Disposition: Discharged  Condition: Stable                        Zoey Jeong MD  08/20/18 2048

## 2018-08-20 NOTE — TELEPHONE ENCOUNTER
Attempting to reach Amelie to discuss pt's fall and to see if he is still taking Eliquis.    No answer, message left for pt to return call to office.

## 2018-08-20 NOTE — TELEPHONE ENCOUNTER
Reviewed with Dr. Hastings.  Order for pt to be evaluated by ED and to hold Eliquis.    Spoke to Amelie, recommendations given.  Reiterated to her to ensure they get clear instructions from ED on when to resume Eliquis.  Asked for her to call office back tomorrow with update.    Amelie verbalizes understanding of recommendations and appreciates call.

## 2018-08-20 NOTE — ED NOTES
Pt presented to the ED via pov. Pt states he was trying to catch his tv from falling on the ground and it hit him in the head. Pt alert. Pt denies loss of consciousness.Pt has bursing noted to the face.

## 2018-08-20 NOTE — DISCHARGE INSTRUCTIONS
Our goal in the emergency department is to always give you outstanding care and exceptional service. You may receive a survey by mail or e-mail in the next week regarding your experience in our ED. We would greatly appreciate your completing and returning the survey. Your feedback provides us with a way to recognize our staff who give very good care and it helps us learn how to improve when your experience was below our aspiration of excellence.     You may continue taking your eliquis.  Tylenol as needed for headache.

## 2018-08-20 NOTE — TELEPHONE ENCOUNTER
----- Message from Court Campbell MA sent at 8/20/2018 11:44 AM CDT -----  Contact: Amelie [phone]      ----- Message -----  From: Ketty Kruger  Sent: 8/20/2018  11:35 AM  To: Venkata Pop A Staff     daughter Amelie missed a call and would the nurse to return their call.     can be reached at [phone] or [phone] work

## 2018-09-11 RX ORDER — BENAZEPRIL HYDROCHLORIDE 5 MG/1
TABLET ORAL
Qty: 90 TABLET | Refills: 1 | Status: SHIPPED | OUTPATIENT
Start: 2018-09-11 | End: 2018-09-19

## 2018-09-19 ENCOUNTER — OFFICE VISIT (OUTPATIENT)
Dept: FAMILY MEDICINE | Facility: CLINIC | Age: 83
End: 2018-09-19
Payer: MEDICARE

## 2018-09-19 ENCOUNTER — LAB VISIT (OUTPATIENT)
Dept: LAB | Facility: HOSPITAL | Age: 83
End: 2018-09-19
Attending: FAMILY MEDICINE
Payer: MEDICARE

## 2018-09-19 VITALS
SYSTOLIC BLOOD PRESSURE: 120 MMHG | HEIGHT: 69 IN | BODY MASS INDEX: 21.22 KG/M2 | DIASTOLIC BLOOD PRESSURE: 64 MMHG | WEIGHT: 143.31 LBS | HEART RATE: 70 BPM

## 2018-09-19 DIAGNOSIS — D63.8 CHRONIC DISEASE ANEMIA: ICD-10-CM

## 2018-09-19 DIAGNOSIS — R94.5 ABNORMAL LIVER FUNCTION: ICD-10-CM

## 2018-09-19 DIAGNOSIS — D63.8 CHRONIC DISEASE ANEMIA: Primary | ICD-10-CM

## 2018-09-19 DIAGNOSIS — I10 ESSENTIAL HYPERTENSION: ICD-10-CM

## 2018-09-19 DIAGNOSIS — N18.30 CKD (CHRONIC KIDNEY DISEASE) STAGE 3, GFR 30-59 ML/MIN: ICD-10-CM

## 2018-09-19 DIAGNOSIS — M79.10 MYALGIA: ICD-10-CM

## 2018-09-19 LAB
ALBUMIN SERPL BCP-MCNC: 3.1 G/DL
ALBUMIN SERPL BCP-MCNC: 3.1 G/DL
ALP SERPL-CCNC: 139 U/L
ALT SERPL W/O P-5'-P-CCNC: 70 U/L
ANION GAP SERPL CALC-SCNC: 13 MMOL/L
AST SERPL-CCNC: 39 U/L
BASOPHILS # BLD AUTO: 0.09 K/UL
BASOPHILS NFR BLD: 1 %
BILIRUB DIRECT SERPL-MCNC: 1.3 MG/DL
BILIRUB SERPL-MCNC: 2.6 MG/DL
BUN SERPL-MCNC: 16 MG/DL
CALCIUM SERPL-MCNC: 9.3 MG/DL
CHLORIDE SERPL-SCNC: 101 MMOL/L
CK SERPL-CCNC: 399 U/L
CO2 SERPL-SCNC: 30 MMOL/L
CREAT SERPL-MCNC: 1.3 MG/DL
DIFFERENTIAL METHOD: ABNORMAL
EOSINOPHIL # BLD AUTO: 0.7 K/UL
EOSINOPHIL NFR BLD: 7 %
ERYTHROCYTE [DISTWIDTH] IN BLOOD BY AUTOMATED COUNT: 14.3 %
EST. GFR  (AFRICAN AMERICAN): 56.7 ML/MIN/1.73 M^2
EST. GFR  (NON AFRICAN AMERICAN): 49.1 ML/MIN/1.73 M^2
GLUCOSE SERPL-MCNC: 93 MG/DL
HCT VFR BLD AUTO: 33.6 %
HGB BLD-MCNC: 10.7 G/DL
IMM GRANULOCYTES # BLD AUTO: 0.03 K/UL
IMM GRANULOCYTES NFR BLD AUTO: 0.3 %
LYMPHOCYTES # BLD AUTO: 1.9 K/UL
LYMPHOCYTES NFR BLD: 19.8 %
MCH RBC QN AUTO: 32.4 PG
MCHC RBC AUTO-ENTMCNC: 31.8 G/DL
MCV RBC AUTO: 102 FL
MONOCYTES # BLD AUTO: 1.2 K/UL
MONOCYTES NFR BLD: 12.7 %
NEUTROPHILS # BLD AUTO: 5.6 K/UL
NEUTROPHILS NFR BLD: 59.2 %
NRBC BLD-RTO: 0 /100 WBC
PHOSPHATE SERPL-MCNC: 3.7 MG/DL
PLATELET # BLD AUTO: 259 K/UL
PMV BLD AUTO: 9.6 FL
POTASSIUM SERPL-SCNC: 3.9 MMOL/L
PROT SERPL-MCNC: 7.3 G/DL
RBC # BLD AUTO: 3.3 M/UL
SODIUM SERPL-SCNC: 144 MMOL/L
WBC # BLD AUTO: 9.45 K/UL

## 2018-09-19 PROCEDURE — 1101F PT FALLS ASSESS-DOCD LE1/YR: CPT | Mod: CPTII,,, | Performed by: FAMILY MEDICINE

## 2018-09-19 PROCEDURE — 99999 PR PBB SHADOW E&M-EST. PATIENT-LVL III: CPT | Mod: PBBFAC,,, | Performed by: FAMILY MEDICINE

## 2018-09-19 PROCEDURE — 99213 OFFICE O/P EST LOW 20 MIN: CPT | Mod: PBBFAC,PO | Performed by: FAMILY MEDICINE

## 2018-09-19 PROCEDURE — 84155 ASSAY OF PROTEIN SERUM: CPT

## 2018-09-19 PROCEDURE — 99214 OFFICE O/P EST MOD 30 MIN: CPT | Mod: S$PBB,,, | Performed by: FAMILY MEDICINE

## 2018-09-19 PROCEDURE — 36415 COLL VENOUS BLD VENIPUNCTURE: CPT | Mod: PO

## 2018-09-19 PROCEDURE — 85025 COMPLETE CBC W/AUTO DIFF WBC: CPT

## 2018-09-19 PROCEDURE — 82550 ASSAY OF CK (CPK): CPT

## 2018-09-19 PROCEDURE — 80069 RENAL FUNCTION PANEL: CPT

## 2018-09-19 RX ORDER — POTASSIUM CHLORIDE 600 MG/1
8 TABLET, FILM COATED, EXTENDED RELEASE ORAL EVERY OTHER DAY
Qty: 45 TABLET | Refills: 3 | Status: SHIPPED | OUTPATIENT
Start: 2018-09-19 | End: 2019-08-12 | Stop reason: SDUPTHER

## 2018-09-19 RX ORDER — FERROUS SULFATE 325(65) MG
325 TABLET ORAL
Qty: 90 TABLET | Refills: 3 | Status: SHIPPED | OUTPATIENT
Start: 2018-09-19 | End: 2019-08-12 | Stop reason: SDUPTHER

## 2018-09-19 NOTE — PROGRESS NOTES
Subjective:       Patient ID: Deena Moody is a 87 y.o. male.    Chief Complaint: Fatigue and Muscle Pain    87 years old male came to the clinic with significant muscle pain. Patient currently taking Lipitor 80 mg .  Patient with chronic kidney disease but stable in comparison with previous reports.  Patient with slightly elevated liver enzymes and potassium before.  Last potassium was normal .  Blood pressure today stable.  No chest pain, palpitation, orthopnea or PND.      Review of Systems   Constitutional: Positive for fatigue.   HENT: Negative.    Eyes: Negative.    Respiratory: Negative.    Cardiovascular: Negative.  Negative for chest pain, palpitations and leg swelling.   Gastrointestinal: Negative.    Genitourinary: Negative.    Musculoskeletal: Positive for myalgias.   Skin: Negative.    Neurological: Negative.    Psychiatric/Behavioral: Negative.        Objective:      Physical Exam   Constitutional: He is oriented to person, place, and time. He appears well-developed and well-nourished. No distress.   HENT:   Head: Normocephalic and atraumatic.   Right Ear: External ear normal.   Left Ear: External ear normal.   Nose: Nose normal.   Mouth/Throat: Oropharynx is clear and moist. No oropharyngeal exudate.   Eyes: Conjunctivae and EOM are normal. Pupils are equal, round, and reactive to light. Right eye exhibits no discharge. Left eye exhibits no discharge. No scleral icterus.   Neck: Normal range of motion. Neck supple. No JVD present. No tracheal deviation present. No thyromegaly present.   Cardiovascular: Normal rate, regular rhythm, normal heart sounds and intact distal pulses. Exam reveals no gallop and no friction rub.   No murmur heard.  Pulmonary/Chest: Effort normal and breath sounds normal. No stridor. No respiratory distress. He has no wheezes. He has no rales. He exhibits no tenderness.   Abdominal: Soft. Bowel sounds are normal. He exhibits no distension and no mass. There is no tenderness.  There is no rebound and no guarding.   Musculoskeletal: Normal range of motion. He exhibits no edema or tenderness.   Lymphadenopathy:     He has no cervical adenopathy.   Neurological: He is alert and oriented to person, place, and time. He has normal reflexes. He displays normal reflexes. No cranial nerve deficit. He exhibits normal muscle tone. Coordination normal.   Skin: Skin is warm and dry. No rash noted. He is not diaphoretic. No erythema. No pallor.   Psychiatric: He has a normal mood and affect. His behavior is normal. Judgment and thought content normal.   Nursing note and vitals reviewed.      Assessment:       1. Chronic disease anemia    2. CKD (chronic kidney disease) stage 3, GFR 30-59 ml/min    3. Essential hypertension    4. Abnormal liver function    5. Myalgia        Plan:         Deena was seen today for fatigue and muscle pain.    Diagnoses and all orders for this visit:    Chronic disease anemia  -     ferrous sulfate (FEOSOL) 325 mg (65 mg iron) Tab tablet; Take 1 tablet (325 mg total) by mouth daily with breakfast.  -     CBC auto differential; Future    CKD (chronic kidney disease) stage 3, GFR 30-59 ml/min  -     potassium chloride (KLOR-CON) 8 MEQ TbSR; Take 1 tablet (8 mEq total) by mouth every other day.  -     Renal function panel; Future    Essential hypertension  -     Renal function panel; Future  -     CBC auto differential; Future  -     Hepatic function panel; Future  -     CK; Future    Abnormal liver function  -     Hepatic function panel; Future  -     CK; Future    Myalgia  -     Hepatic function panel; Future  -     CK; Future    Continue monitoring blood pressure at home, low sodium diet.  Discontinue benazepril.    Possible adjustment of Lipitor.

## 2018-09-19 NOTE — PATIENT INSTRUCTIONS

## 2018-09-20 ENCOUNTER — TELEPHONE (OUTPATIENT)
Dept: FAMILY MEDICINE | Facility: CLINIC | Age: 83
End: 2018-09-20

## 2018-09-20 NOTE — TELEPHONE ENCOUNTER
----- Message from Crow Barry MD sent at 9/20/2018 12:39 PM CDT -----  Covering the box September 20, 2018.  Patient was recently evaluated with complaints of myalgia in the setting of atorvastatin 80 mg and an elevated CPK.  Recommend the patient stop atorvastatin and when Dr. Vicente comes back he can arrange follow-up evaluation to see if symptoms improve after stopping atorvastatin.

## 2018-09-20 NOTE — TELEPHONE ENCOUNTER
Spoke to patients daughter and inform to stop atorvastatin 80 mg until next week and to call back to see how patient felt after stopping medication and to make a follow up visit for further recommendation with Dr. Vicente. Patient's daughter voices understanding.

## 2018-09-25 ENCOUNTER — PATIENT MESSAGE (OUTPATIENT)
Dept: FAMILY MEDICINE | Facility: CLINIC | Age: 83
End: 2018-09-25

## 2018-10-03 ENCOUNTER — CLINICAL SUPPORT (OUTPATIENT)
Dept: ELECTROPHYSIOLOGY | Facility: CLINIC | Age: 83
End: 2018-10-03
Attending: INTERNAL MEDICINE
Payer: MEDICARE

## 2018-10-03 DIAGNOSIS — Z95.0 CARDIAC PACEMAKER IN SITU: Primary | ICD-10-CM

## 2018-10-03 DIAGNOSIS — Z95.0 CARDIAC PACEMAKER IN SITU: ICD-10-CM

## 2018-10-03 DIAGNOSIS — I44.2 CHB (COMPLETE HEART BLOCK): ICD-10-CM

## 2018-10-03 PROCEDURE — 93296 REM INTERROG EVL PM/IDS: CPT | Mod: PBBFAC | Performed by: INTERNAL MEDICINE

## 2018-10-03 PROCEDURE — 93294 REM INTERROG EVL PM/LDLS PM: CPT | Mod: ,,, | Performed by: INTERNAL MEDICINE

## 2018-10-10 RX ORDER — OMEPRAZOLE 20 MG/1
20 CAPSULE, DELAYED RELEASE ORAL DAILY PRN
Qty: 90 CAPSULE | Refills: 0 | Status: SHIPPED | OUTPATIENT
Start: 2018-10-10 | End: 2019-05-16 | Stop reason: SDUPTHER

## 2018-10-12 NOTE — ASSESSMENT & PLAN NOTE
Asymptomatic. Small area of ischemia on Cardiac PET Stress     ASA 81 mg qd  Benazepril 5 mg qd  Carvedilol 25 mg bid  Sublingual nitroglycerin prn  Atorvastatin 80 mg qd  
Clinically stable without evidence of fluid retention. BW has been stable at 145-146 lbs     BMP prior to next appointment   Continue home Bw, BP and HR monitoring  Low sodium diet (2 gm or less per day)  Limit fluid intake to 1.5 liters per day. Compensate with 1-2 additional glasses of water if he spends time outside, in the heat  Benazepril 5 mg qd  Carvedilol 12.5 mg bid  Torsemide 10 mg qd   
Heart rate is regular    Continue present therapy:   Carvedilol 12.5 mg bid  apixaban 5 mg bid  
Home BP record shows that BP systolic usually is 100-110 and, occasionally as low as 95. Today in clinic BP was well controlled at 138/75     Low sodium diet (2 gm or less per day)  Discontinue Amlodipine 5 mg qd  Continue: Benazepril 5 mg qd and Carvedilol 12.5 mg bid  
English

## 2018-10-25 ENCOUNTER — HOSPITAL ENCOUNTER (OUTPATIENT)
Dept: CARDIOLOGY | Facility: CLINIC | Age: 83
Discharge: HOME | End: 2018-10-25
Payer: MEDICARE

## 2018-10-25 ENCOUNTER — OFFICE VISIT (OUTPATIENT)
Dept: ELECTROPHYSIOLOGY | Facility: CLINIC | Age: 83
End: 2018-10-25
Payer: MEDICARE

## 2018-10-25 VITALS
BODY MASS INDEX: 20.35 KG/M2 | WEIGHT: 137.38 LBS | SYSTOLIC BLOOD PRESSURE: 136 MMHG | HEART RATE: 70 BPM | DIASTOLIC BLOOD PRESSURE: 82 MMHG | HEIGHT: 69 IN

## 2018-10-25 DIAGNOSIS — I48.19 PERSISTENT ATRIAL FIBRILLATION: ICD-10-CM

## 2018-10-25 DIAGNOSIS — Z95.0 PACEMAKER: ICD-10-CM

## 2018-10-25 DIAGNOSIS — I44.2 COMPLETE AV BLOCK: Primary | Chronic | ICD-10-CM

## 2018-10-25 DIAGNOSIS — I25.10 CORONARY ARTERY DISEASE INVOLVING NATIVE CORONARY ARTERY OF NATIVE HEART WITHOUT ANGINA PECTORIS: ICD-10-CM

## 2018-10-25 DIAGNOSIS — Z95.2 S/P AVR (AORTIC VALVE REPLACEMENT): ICD-10-CM

## 2018-10-25 DIAGNOSIS — Z95.0 PACEMAKER: Chronic | ICD-10-CM

## 2018-10-25 PROCEDURE — 93005 ELECTROCARDIOGRAM TRACING: CPT | Mod: PBBFAC | Performed by: INTERNAL MEDICINE

## 2018-10-25 PROCEDURE — 1101F PT FALLS ASSESS-DOCD LE1/YR: CPT | Mod: CPTII,,, | Performed by: INTERNAL MEDICINE

## 2018-10-25 PROCEDURE — 99213 OFFICE O/P EST LOW 20 MIN: CPT | Mod: PBBFAC,25 | Performed by: INTERNAL MEDICINE

## 2018-10-25 PROCEDURE — 99999 PR PBB SHADOW E&M-EST. PATIENT-LVL III: CPT | Mod: PBBFAC,,, | Performed by: INTERNAL MEDICINE

## 2018-10-25 PROCEDURE — 99213 OFFICE O/P EST LOW 20 MIN: CPT | Mod: S$PBB,,, | Performed by: INTERNAL MEDICINE

## 2018-10-25 PROCEDURE — 93010 ELECTROCARDIOGRAM REPORT: CPT | Mod: S$PBB,,, | Performed by: INTERNAL MEDICINE

## 2018-10-25 NOTE — PROGRESS NOTES
Subjective:    Patient ID:  Deena Moody is a 87 y.o. male who presents for follow-up of persistent atrial fibrillation      HPI  Prior Hx:  I had the pleasure of seeing Mr. Moody today in our electrophysiology clinic in consultation for his new onset atrial fibrillation. 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 On remote monitoring new onset persistent atrial fibrillation was diagnosed that began 10/2016.  He was asymptomatic and started on eliquis for stroke prophylaxis. At his 6 month evaluation after starting eliquis he felt well with stable H/H and creatinine. ECHO 2/2018 was normal.     Interim Hx:  Mr. Moody presents for 6 month follow-up.  He feels well. He is still active. He has no complaints. Device interrogation notes 98% RV pacing with stable lead parameters. Recent BMP/CBC stable.     My interpretation of today's in clinic electrocardiogram is atrial fibrillation with ventricular pacing.    Review of Systems   Constitution: Negative. Negative for fever, weakness and malaise/fatigue.   HENT: Negative.  Negative for congestion and sore throat.    Eyes: Negative.  Negative for blurred vision and visual disturbance.   Cardiovascular: Negative.  Negative for chest pain, dyspnea on exertion, irregular heartbeat, orthopnea, palpitations and paroxysmal nocturnal dyspnea.   Respiratory: Negative.  Negative for cough and shortness of breath.    Hematologic/Lymphatic: Negative for bleeding problem. Does not bruise/bleed easily.   Skin: Negative.  Negative for rash.   Gastrointestinal: Negative.  Negative for hematochezia and melena.   Neurological: Negative for dizziness and focal weakness.        Objective:    Physical Exam   Constitutional: He is oriented to person, place, and time. He appears well-developed and  well-nourished. No distress.   HENT:   Head: Normocephalic and atraumatic.   Eyes: Conjunctivae are normal. Right eye exhibits no discharge. Left eye exhibits no discharge.   Neck: Neck supple. No JVD present.   Cardiovascular: Normal rate, regular rhythm and normal heart sounds. Exam reveals no gallop and no friction rub.   No murmur heard.  Pulmonary/Chest: Effort normal and breath sounds normal. No respiratory distress. He has no wheezes. He has no rales.   Pacemaker site well healed   Abdominal: Bowel sounds are normal. He exhibits no distension. There is no tenderness.   Musculoskeletal: He exhibits no edema.   Neurological: He is alert and oriented to person, place, and time.   Skin: Skin is warm and dry. He is not diaphoretic.   Psychiatric: He has a normal mood and affect. His behavior is normal. Thought content normal.   Vitals reviewed.        Assessment:       1. Complete AV block    2. Pacemaker    3. Persistent atrial fibrillation    4. S/P AVR (aortic valve replacement) with possible stenosis    5. Coronary artery disease involving native coronary artery of native heart without angina pectoris         Plan:       In summary, Mr. Moody is 87 year-old man with hypertension, chronic kidney disease stage III, CABG/AVR 2004 with preserved ejection fraction, and symptomatic high-grade AV block s/p dual-chamber pacemaker implantation presenting for follow-up for  asymptomatic permanent atrial fibrillation ([id number]) on eliquis. He is tolerating eliquis without difficulty.  He understands to monitor his stools for signs of bleeding/melena and to come to ER if he has a significant head injury even if he feels fine.  Continue eliquis 5mg bid (Cr 1.3, Weight is >60kg).    RTC in 6 months.    Thank you for allowing me to participate in the care of this patient. Please do not hesitate to call me with any questions or concerns.     Donn Hastings MD, PhD  Cardiac Electrophysiology

## 2018-11-08 ENCOUNTER — PATIENT MESSAGE (OUTPATIENT)
Dept: FAMILY MEDICINE | Facility: CLINIC | Age: 83
End: 2018-11-08

## 2018-11-08 ENCOUNTER — OFFICE VISIT (OUTPATIENT)
Dept: FAMILY MEDICINE | Facility: CLINIC | Age: 83
End: 2018-11-08
Payer: MEDICARE

## 2018-11-08 VITALS
OXYGEN SATURATION: 98 % | SYSTOLIC BLOOD PRESSURE: 157 MMHG | HEIGHT: 69 IN | BODY MASS INDEX: 19.62 KG/M2 | HEART RATE: 70 BPM | WEIGHT: 132.5 LBS | DIASTOLIC BLOOD PRESSURE: 74 MMHG

## 2018-11-08 DIAGNOSIS — I10 ESSENTIAL HYPERTENSION: Primary | ICD-10-CM

## 2018-11-08 DIAGNOSIS — N18.30 CKD (CHRONIC KIDNEY DISEASE) STAGE 3, GFR 30-59 ML/MIN: ICD-10-CM

## 2018-11-08 DIAGNOSIS — H61.23 BILATERAL IMPACTED CERUMEN: ICD-10-CM

## 2018-11-08 DIAGNOSIS — Z23 NEED FOR INFLUENZA VACCINATION: ICD-10-CM

## 2018-11-08 PROCEDURE — 99999 PR PBB SHADOW E&M-EST. PATIENT-LVL IV: CPT | Mod: PBBFAC,,, | Performed by: FAMILY MEDICINE

## 2018-11-08 PROCEDURE — 1101F PT FALLS ASSESS-DOCD LE1/YR: CPT | Mod: CPTII,S$GLB,, | Performed by: FAMILY MEDICINE

## 2018-11-08 PROCEDURE — G0008 ADMIN INFLUENZA VIRUS VAC: HCPCS | Mod: S$GLB,,, | Performed by: FAMILY MEDICINE

## 2018-11-08 PROCEDURE — 90662 IIV NO PRSV INCREASED AG IM: CPT | Mod: S$GLB,,, | Performed by: FAMILY MEDICINE

## 2018-11-08 PROCEDURE — 99214 OFFICE O/P EST MOD 30 MIN: CPT | Mod: 25,S$GLB,, | Performed by: FAMILY MEDICINE

## 2018-11-08 NOTE — PROGRESS NOTES
Subjective:       Patient ID: Deena Moody is a 87 y.o. male.    Chief Complaint: Annual Exam    87 years old male came to the clinic for blood pressure check.  Blood pressure today is elevated.  Blood pressure this morning was normal.  No chest pain, palpitation, orthopnea or PND.      Review of Systems   Constitutional: Negative.    HENT: Negative.    Eyes: Negative.    Respiratory: Negative.    Cardiovascular: Negative.    Gastrointestinal: Negative.    Genitourinary: Negative.    Musculoskeletal: Negative.    Skin: Negative.    Neurological: Negative.    Psychiatric/Behavioral: Negative.        Objective:      Physical Exam   Constitutional: He is oriented to person, place, and time. He appears well-developed and well-nourished. No distress.   HENT:   Head: Normocephalic and atraumatic.   Right Ear: External ear normal.   Left Ear: External ear normal.   Ears:    Nose: Nose normal.   Mouth/Throat: Oropharynx is clear and moist. No oropharyngeal exudate.   Eyes: Conjunctivae and EOM are normal. Pupils are equal, round, and reactive to light. Right eye exhibits no discharge. Left eye exhibits no discharge. No scleral icterus.   Neck: Normal range of motion. Neck supple. No JVD present. No tracheal deviation present. No thyromegaly present.   Cardiovascular: Normal rate, regular rhythm, normal heart sounds and intact distal pulses. Exam reveals no gallop and no friction rub.   No murmur heard.  Pulmonary/Chest: Effort normal and breath sounds normal. No stridor. No respiratory distress. He has no wheezes. He has no rales. He exhibits no tenderness.   Abdominal: Soft. Bowel sounds are normal. He exhibits no distension and no mass. There is no tenderness. There is no rebound and no guarding.   Musculoskeletal: Normal range of motion. He exhibits no edema or tenderness.   Lymphadenopathy:     He has no cervical adenopathy.   Neurological: He is alert and oriented to person, place, and time. He has normal reflexes.  He displays normal reflexes. No cranial nerve deficit. He exhibits normal muscle tone. Coordination normal.   Skin: Skin is warm and dry. No rash noted. He is not diaphoretic. No erythema. No pallor.   Psychiatric: He has a normal mood and affect. His behavior is normal. Judgment and thought content normal.   Nursing note and vitals reviewed.      Assessment:       1. Essential hypertension    2. CKD (chronic kidney disease) stage 3, GFR 30-59 ml/min    3. Need for influenza vaccination    4. Bilateral impacted cerumen        Plan:         Deena was seen today for annual exam.    Diagnoses and all orders for this visit:    Essential hypertension  -     TSH; Future  -     Comprehensive metabolic panel; Future  -     Lipid panel; Future  -     CBC auto differential; Future    CKD (chronic kidney disease) stage 3, GFR 30-59 ml/min  -     Comprehensive metabolic panel; Future    Need for influenza vaccination  -     Influenza - High Dose (65+) (PF) (IM)    Bilateral impacted cerumen  -     Ear wax removal    Continue monitoring blood pressure at home, low sodium diet.

## 2018-11-09 ENCOUNTER — TELEPHONE (OUTPATIENT)
Dept: FAMILY MEDICINE | Facility: CLINIC | Age: 83
End: 2018-11-09

## 2018-11-09 DIAGNOSIS — I10 ESSENTIAL HYPERTENSION: Primary | ICD-10-CM

## 2018-11-09 RX ORDER — BENAZEPRIL HYDROCHLORIDE 5 MG/1
5 TABLET ORAL DAILY
Qty: 90 TABLET | Refills: 3 | Status: SHIPPED | OUTPATIENT
Start: 2018-11-09 | End: 2019-01-28

## 2018-12-14 ENCOUNTER — OFFICE VISIT (OUTPATIENT)
Dept: URGENT CARE | Facility: CLINIC | Age: 83
End: 2018-12-14
Payer: MEDICARE

## 2018-12-14 VITALS
RESPIRATION RATE: 20 BRPM | SYSTOLIC BLOOD PRESSURE: 158 MMHG | WEIGHT: 133 LBS | HEART RATE: 70 BPM | HEIGHT: 69 IN | OXYGEN SATURATION: 99 % | TEMPERATURE: 98 F | DIASTOLIC BLOOD PRESSURE: 74 MMHG | BODY MASS INDEX: 19.7 KG/M2

## 2018-12-14 DIAGNOSIS — R05.9 COUGH: Primary | ICD-10-CM

## 2018-12-14 DIAGNOSIS — J01.00 ACUTE MAXILLARY SINUSITIS, RECURRENCE NOT SPECIFIED: ICD-10-CM

## 2018-12-14 PROCEDURE — 99499 UNLISTED E&M SERVICE: CPT | Mod: S$GLB,,, | Performed by: NURSE PRACTITIONER

## 2018-12-14 PROCEDURE — 1101F PT FALLS ASSESS-DOCD LE1/YR: CPT | Mod: CPTII,S$GLB,, | Performed by: NURSE PRACTITIONER

## 2018-12-14 PROCEDURE — 71046 X-RAY EXAM CHEST 2 VIEWS: CPT | Mod: S$GLB,,, | Performed by: RADIOLOGY

## 2018-12-14 PROCEDURE — 99214 OFFICE O/P EST MOD 30 MIN: CPT | Mod: S$GLB,,, | Performed by: NURSE PRACTITIONER

## 2018-12-14 RX ORDER — AMOXICILLIN AND CLAVULANATE POTASSIUM 875; 125 MG/1; MG/1
1 TABLET, FILM COATED ORAL 2 TIMES DAILY
Qty: 20 TABLET | Refills: 0 | Status: SHIPPED | OUTPATIENT
Start: 2018-12-14 | End: 2018-12-24

## 2018-12-14 RX ORDER — BENZONATATE 100 MG/1
100 CAPSULE ORAL EVERY 6 HOURS PRN
Qty: 30 CAPSULE | Refills: 1 | Status: SHIPPED | OUTPATIENT
Start: 2018-12-14 | End: 2019-10-23

## 2018-12-14 NOTE — PROGRESS NOTES
"Subjective:       Patient ID: Deena Moody is a 88 y.o. male.    Vitals:  height is 5' 9" (1.753 m) and weight is 60.3 kg (133 lb). His oral temperature is 97.8 °F (36.6 °C). His blood pressure is 158/74 (abnormal) and his pulse is 70. His respiration is 20 and oxygen saturation is 99%.     Chief Complaint: Cough    This is a 88 y.o. male   with Past Medical History:  9/1/2016: Acute on chronic diastolic heart failure  No date: Coronary artery disease  No date: Hyperlipidemia  No date: Hypertension  12/12/2014: Macular degeneration (senile) of retina, unspecified  12/12/2014: Nuclear sclerosis  11/10/2016: Persistent atrial fibrillation  9/14/2016: S/P placement of cardiac pacemaker   and Past Surgical History:  No date: AORTIC VALVE REPLACEMENT; N/A  No date: CARDIAC PACEMAKER PLACEMENT  2/16/2016: CATARACT EXTRACTION W/  INTRAOCULAR LENS IMPLANT; Right      Comment:  Dr. Whitley  3/1/2016: CATARACT EXTRACTION W/  INTRAOCULAR LENS IMPLANT; Left      Comment:  Dr. Whitley  No date: CORONARY ARTERY BYPASS GRAFT  No date: EAR EXAMINATION UNDER ANESTHESIA  No date: EYE SURGERY  3/1/2016: INSERTION-INTRAOCULAR LENS (IOL); Left      Comment:  Performed by Scar Whitley MD at [facility] OR [address] [place] [address]  2/16/2016: INSERTION-INTRAOCULAR LENS (IOL); Right      Comment:  Performed by Scar Whitley MD at [facility] OR [address] [place] [address]  3/1/2016: PHACOEMULSIFICATION-ASPIRATION-CATARACT; Left      Comment:  Performed by Scar Whitley MD at [facility] OR [address] [place] [address]  2/16/2016: PHACOEMULSIFICATION-ASPIRATION-CATARACT; Right      Comment:  Performed by Scar Whitley MD at [facility] OR [address] [place] [address]  who presents today with a chief complaint of a cough he's had for the past two weeks. He took an otc cough medicine to help with his symptoms. He got better but his symptoms returneed a day ago.       Cough   This is a new problem. The current episode started 1 to 4 weeks ago. " The problem has been waxing and waning. The problem occurs constantly. The cough is productive of sputum. Associated symptoms include rhinorrhea and wheezing. Pertinent negatives include no chills, ear pain, eye redness, fever, hemoptysis, myalgias, rash, sore throat or shortness of breath. Nothing aggravates the symptoms. He has tried OTC cough suppressant for the symptoms. The treatment provided mild relief. His past medical history is significant for pneumonia.       Constitution: Negative for chills, sweating, fatigue and fever.   HENT: Negative for ear pain, congestion, sinus pain, sinus pressure, sore throat and voice change.    Neck: Negative for painful lymph nodes.   Eyes: Negative for eye redness.   Respiratory: Positive for cough, sputum production and wheezing. Negative for chest tightness, bloody sputum, COPD, shortness of breath, stridor and asthma.    Gastrointestinal: Negative for nausea and vomiting.   Musculoskeletal: Negative for muscle ache.   Skin: Negative for rash.   Allergic/Immunologic: Negative for seasonal allergies and asthma.   Hematologic/Lymphatic: Negative for swollen lymph nodes.       Objective:      Physical Exam   Constitutional: He is oriented to person, place, and time. He appears well-developed and well-nourished. He is cooperative.  Non-toxic appearance. He does not appear ill. No distress.   HENT:   Head: Normocephalic and atraumatic.   Right Ear: Hearing, tympanic membrane, external ear and ear canal normal.   Left Ear: Hearing, tympanic membrane, external ear and ear canal normal.   Nose: Mucosal edema present. No rhinorrhea or nasal deformity. No epistaxis. Right sinus exhibits maxillary sinus tenderness. Right sinus exhibits no frontal sinus tenderness. Left sinus exhibits maxillary sinus tenderness. Left sinus exhibits no frontal sinus tenderness.   Mouth/Throat: Uvula is midline, oropharynx is clear and moist and mucous membranes are normal. No trismus in the jaw.  Normal dentition. No uvula swelling. No posterior oropharyngeal erythema.   PND   Eyes: Conjunctivae and lids are normal. No scleral icterus.   Sclera clear bilat   Neck: Trachea normal, full passive range of motion without pain and phonation normal. Neck supple.   Cardiovascular: Normal rate, regular rhythm, normal heart sounds, intact distal pulses and normal pulses.   Pulmonary/Chest: Effort normal and breath sounds normal. No stridor. No respiratory distress. He has no decreased breath sounds. He has no wheezes. He has no rhonchi. He has no rales.   Abdominal: Soft. Normal appearance and bowel sounds are normal. He exhibits no distension. There is no tenderness.   Musculoskeletal: Normal range of motion. He exhibits no edema or deformity.   Neurological: He is alert and oriented to person, place, and time. He exhibits normal muscle tone. Coordination normal.   Skin: Skin is warm, dry and intact. He is not diaphoretic. No pallor.   Psychiatric: He has a normal mood and affect. His speech is normal and behavior is normal. Judgment and thought content normal. Cognition and memory are normal.   Nursing note and vitals reviewed.      Xr Chest Pa And Lateral    Result Date: 12/14/2018  EXAMINATION: XR CHEST PA AND LATERAL CLINICAL HISTORY: Cough TECHNIQUE: PA and lateral views of the chest were performed. COMPARISON: 09/11/2017 FINDINGS: The cardiomediastinal silhouette is not enlarged, noting calcification of the aorta.  Left chest wall pacer noted..  There is slight obscuration of the right costophrenic angle, suggesting effusion..  The trachea is midline.  The lungs are symmetrically expanded bilaterally with coarse interstitial attenuation and bilateral basilar subsegmental atelectasis or scarring..  No large focal consolidation seen.  There is no pneumothorax.  The osseous structures are remarkable for degenerative changes..     1. Small right pleural effusion, noting bilateral basilar subsegmental atelectasis or  scarring.  Coarse interstitial attenuation is suspected to be on the basis of shallow inspiratory effort versus chronic change.  Correlation advised. Electronically signed by: Rico Rico MD Date:    12/14/2018 Time:    12:42  Assessment:       1. Cough    2. Acute maxillary sinusitis, recurrence not specified        Plan:         Cough  -     XR CHEST PA AND LATERAL; Future; Expected date: 12/14/2018    Acute maxillary sinusitis, recurrence not specified    Other orders  -     amoxicillin-clavulanate 875-125mg (AUGMENTIN) 875-125 mg per tablet; Take 1 tablet by mouth 2 (two) times daily. for 10 days  Dispense: 20 tablet; Refill: 0  -     benzonatate (TESSALON PERLES) 100 MG capsule; Take 1 capsule (100 mg total) by mouth every 6 (six) hours as needed for Cough.  Dispense: 30 capsule; Refill: 1      PT must f/u with cardiology and pcp.        Sinusitis (Antibiotic Treatment)    The sinuses are air-filled spaces within the bones of the face. They connect to the inside of the nose. Sinusitis is an inflammation of the tissue lining the sinus cavity. Sinus inflammation can occur during a cold. It can also be due to allergies to pollens and other particles in the air. Sinusitis can cause symptoms of sinus congestion and fullness. A sinus infection causes fever, headache and facial pain. There is often green or yellow drainage from the nose or into the back of the throat (post-nasal drip). You have been given antibiotics to treat this condition.  Home care:  · Take the full course of antibiotics as instructed. Do not stop taking them, even if you feel better.  · Drink plenty of water, hot tea, and other liquids. This may help thin mucus. It also may promote sinus drainage.  · Heat may help soothe painful areas of the face. Use a towel soaked in hot water. Or,  the shower and direct the hot spray onto your face. Using a vaporizer along with a menthol rub at night may also help.   · An expectorant containing  guaifenesin may help thin the mucus and promote drainage from the sinuses.  · Over-the-counter decongestants may be used unless a similar medicine was prescribed. Nasal sprays work the fastest. Use one that contains phenylephrine or oxymetazoline. First blow the nose gently. Then use the spray. Do not use these medicines more often than directed on the label or symptoms may get worse. You may also use tablets containing pseudoephedrine. Avoid products that combine ingredients, because side effects may be increased. Read labels. You can also ask the pharmacist for help. (NOTE: Persons with high blood pressure should not use decongestants. They can raise blood pressure.)  · Over-the-counter antihistamines may help if allergies contributed to your sinusitis.    · Do not use nasal rinses or irrigation during an acute sinus infection, unless told to by your health care provider. Rinsing may spread the infection to other sinuses.  · Use acetaminophen or ibuprofen to control pain, unless another pain medicine was prescribed. (If you have chronic liver or kidney disease or ever had a stomach ulcer, talk with your doctor before using these medicines. Aspirin should never be used in anyone under 18 years of age who is ill with a fever. It may cause severe liver damage.)  · Don't smoke. This can worsen symptoms.  Follow-up care  Follow up with your healthcare provider or our staff if you are not improving within the next week.  When to seek medical advice  Call your healthcare provider if any of these occur:  · Facial pain or headache becoming more severe  · Stiff neck  · Unusual drowsiness or confusion  · Swelling of the forehead or eyelids  · Vision problems, including blurred or double vision  · Fever of 100.4ºF (38ºC) or higher, or as directed by your healthcare provider  · Seizure  · Breathing problems  · Symptoms not resolving within 10 days  Date Last Reviewed: 4/13/2015  © 0709-3737 [employer] [address]  [place] [address]. All rights reserved. This information is not intended as a substitute for professional medical care. Always follow your healthcare professional's instructions.    Please drink plenty of fluids.  Please get plenty of rest.  Please return here or go to the Emergency Department for any concerns or worsening of condition.  If you were given wait & see antibiotics, please wait 3-5 days before taking them, and only take them if your symptoms have worsened or not improved.  If you do begin taking the antibiotics, please take them to completion.  If you were prescribed antibiotics, please take them to completion.  If you were prescribed a narcotic medication, do not drive or operate heavy equipment or machinery while taking these medications.  If you do not have Hypertension or any history of palpitations, it is ok to take over the counter Sudafed or Mucinex D or Allegra-D or Claritin-D or Zyrtec-D.  If you do take one of the above, it is ok to combine that with plain over the counter Mucinex or Allegra or Claritin or Zyrtec.  If for example you are taking Zyrtec -D, you can combine that with Mucinex, but not Mucinex-D.  If you are taking Mucinex-D, you can combine that with plain Allegra or Claritin or Zyrtec.   If you do have Hypertension or palpitations, it is safe to take Coricidin HBP for relief of sinus symptoms.  We recommend you take over the counter Flonase (Fluticasone) or another nasally inhaled steroid unless you are already taking one.  If not allergic, please take over the counter Tylenol (Acetaminophen) and/or Motrin (Ibuprofen) as directed for control of pain and/or fever.  Please follow up with your primary care doctor or specialist as needed.    If you  smoke, please stop smoking.

## 2018-12-14 NOTE — PATIENT INSTRUCTIONS
Sinusitis (Antibiotic Treatment)    The sinuses are air-filled spaces within the bones of the face. They connect to the inside of the nose. Sinusitis is an inflammation of the tissue lining the sinus cavity. Sinus inflammation can occur during a cold. It can also be due to allergies to pollens and other particles in the air. Sinusitis can cause symptoms of sinus congestion and fullness. A sinus infection causes fever, headache and facial pain. There is often green or yellow drainage from the nose or into the back of the throat (post-nasal drip). You have been given antibiotics to treat this condition.  Home care:  · Take the full course of antibiotics as instructed. Do not stop taking them, even if you feel better.  · Drink plenty of water, hot tea, and other liquids. This may help thin mucus. It also may promote sinus drainage.  · Heat may help soothe painful areas of the face. Use a towel soaked in hot water. Or,  the shower and direct the hot spray onto your face. Using a vaporizer along with a menthol rub at night may also help.   · An expectorant containing guaifenesin may help thin the mucus and promote drainage from the sinuses.  · Over-the-counter decongestants may be used unless a similar medicine was prescribed. Nasal sprays work the fastest. Use one that contains phenylephrine or oxymetazoline. First blow the nose gently. Then use the spray. Do not use these medicines more often than directed on the label or symptoms may get worse. You may also use tablets containing pseudoephedrine. Avoid products that combine ingredients, because side effects may be increased. Read labels. You can also ask the pharmacist for help. (NOTE: Persons with high blood pressure should not use decongestants. They can raise blood pressure.)  · Over-the-counter antihistamines may help if allergies contributed to your sinusitis.    · Do not use nasal rinses or irrigation during an acute sinus infection, unless told to by  your health care provider. Rinsing may spread the infection to other sinuses.  · Use acetaminophen or ibuprofen to control pain, unless another pain medicine was prescribed. (If you have chronic liver or kidney disease or ever had a stomach ulcer, talk with your doctor before using these medicines. Aspirin should never be used in anyone under 18 years of age who is ill with a fever. It may cause severe liver damage.)  · Don't smoke. This can worsen symptoms.  Follow-up care  Follow up with your healthcare provider or our staff if you are not improving within the next week.  When to seek medical advice  Call your healthcare provider if any of these occur:  · Facial pain or headache becoming more severe  · Stiff neck  · Unusual drowsiness or confusion  · Swelling of the forehead or eyelids  · Vision problems, including blurred or double vision  · Fever of 100.4ºF (38ºC) or higher, or as directed by your healthcare provider  · Seizure  · Breathing problems  · Symptoms not resolving within 10 days  Date Last Reviewed: 4/13/2015 © 2000-2017 [employer]. [address] [place] [address]. All rights reserved. This information is not intended as a substitute for professional medical care. Always follow your healthcare professional's instructions.    Please drink plenty of fluids.  Please get plenty of rest.  Please return here or go to the Emergency Department for any concerns or worsening of condition.  If you were given wait & see antibiotics, please wait 3-5 days before taking them, and only take them if your symptoms have worsened or not improved.  If you do begin taking the antibiotics, please take them to completion.  If you were prescribed antibiotics, please take them to completion.  If you were prescribed a narcotic medication, do not drive or operate heavy equipment or machinery while taking these medications.  If you do not have Hypertension or any history of palpitations, it is ok to take  over the counter Sudafed or Mucinex D or Allegra-D or Claritin-D or Zyrtec-D.  If you do take one of the above, it is ok to combine that with plain over the counter Mucinex or Allegra or Claritin or Zyrtec.  If for example you are taking Zyrtec -D, you can combine that with Mucinex, but not Mucinex-D.  If you are taking Mucinex-D, you can combine that with plain Allegra or Claritin or Zyrtec.   If you do have Hypertension or palpitations, it is safe to take Coricidin HBP for relief of sinus symptoms.  We recommend you take over the counter Flonase (Fluticasone) or another nasally inhaled steroid unless you are already taking one.  If not allergic, please take over the counter Tylenol (Acetaminophen) and/or Motrin (Ibuprofen) as directed for control of pain and/or fever.  Please follow up with your primary care doctor or specialist as needed.    If you  smoke, please stop smoking.

## 2019-01-16 DIAGNOSIS — I48.19 PERSISTENT ATRIAL FIBRILLATION: ICD-10-CM

## 2019-01-28 DIAGNOSIS — I10 ESSENTIAL HYPERTENSION: ICD-10-CM

## 2019-01-28 RX ORDER — CANDESARTAN 4 MG/1
4 TABLET ORAL DAILY
Qty: 90 TABLET | Refills: 3 | Status: SHIPPED | OUTPATIENT
Start: 2019-01-28 | End: 2019-07-02

## 2019-01-28 RX ORDER — BENAZEPRIL HYDROCHLORIDE 5 MG/1
TABLET ORAL
Qty: 90 TABLET | Refills: 1 | OUTPATIENT
Start: 2019-01-28

## 2019-01-28 NOTE — TELEPHONE ENCOUNTER
Benazepril was discontinued because of lung cancer risk.    Medicine was changed.  Please notify the patient .

## 2019-01-29 ENCOUNTER — CLINICAL SUPPORT (OUTPATIENT)
Dept: CARDIOLOGY | Facility: HOSPITAL | Age: 84
End: 2019-01-29
Attending: INTERNAL MEDICINE
Payer: MEDICARE

## 2019-01-29 DIAGNOSIS — Z95.0 CARDIAC PACEMAKER IN SITU: ICD-10-CM

## 2019-01-29 DIAGNOSIS — I44.2 CHB (COMPLETE HEART BLOCK): ICD-10-CM

## 2019-01-29 PROCEDURE — 93296 REM INTERROG EVL PM/IDS: CPT | Mod: HCNC

## 2019-04-05 ENCOUNTER — OFFICE VISIT (OUTPATIENT)
Dept: URGENT CARE | Facility: CLINIC | Age: 84
End: 2019-04-05
Payer: MEDICARE

## 2019-04-05 VITALS
WEIGHT: 136 LBS | HEIGHT: 69 IN | OXYGEN SATURATION: 100 % | SYSTOLIC BLOOD PRESSURE: 194 MMHG | RESPIRATION RATE: 20 BRPM | DIASTOLIC BLOOD PRESSURE: 88 MMHG | BODY MASS INDEX: 20.14 KG/M2 | TEMPERATURE: 98 F | HEART RATE: 70 BPM

## 2019-04-05 DIAGNOSIS — S80.12XA TRAUMATIC HEMATOMA OF LEFT LOWER LEG, INITIAL ENCOUNTER: Primary | ICD-10-CM

## 2019-04-05 DIAGNOSIS — I80.9 SUPERFICIAL PHLEBITIS: ICD-10-CM

## 2019-04-05 PROCEDURE — 1101F PT FALLS ASSESS-DOCD LE1/YR: CPT | Mod: CPTII,S$GLB,, | Performed by: PHYSICIAN ASSISTANT

## 2019-04-05 PROCEDURE — 99214 OFFICE O/P EST MOD 30 MIN: CPT | Mod: S$GLB,,, | Performed by: PHYSICIAN ASSISTANT

## 2019-04-05 PROCEDURE — 99214 PR OFFICE/OUTPT VISIT, EST, LEVL IV, 30-39 MIN: ICD-10-PCS | Mod: S$GLB,,, | Performed by: PHYSICIAN ASSISTANT

## 2019-04-05 PROCEDURE — 1101F PR PT FALLS ASSESS DOC 0-1 FALLS W/OUT INJ PAST YR: ICD-10-PCS | Mod: CPTII,S$GLB,, | Performed by: PHYSICIAN ASSISTANT

## 2019-04-05 NOTE — PATIENT INSTRUCTIONS
- Rest.    - Drink plenty of fluids.    - Tylenol or Ibuprofen as directed as needed for fever/pain.    - Warm compresses to the affected area for 20 minutes at a time.     - Elevate when possible.    - Follow up with your PCP or specialty clinic as directed in the next 1-2 weeks if not improved or as needed.  You can call [phone] to schedule an appointment with the appropriate provider.    - Go to the ED if your symptoms worsen.  - You must understand that you have received an Urgent Care treatment only and that you may be released before all of your medical problems are known or treated.   - You, the patient, will arrange for follow up care as instructed.   - If your condition worsens or fails to improve we recommend that you receive another evaluation at the ER immediately or contact your PCP to discuss your concerns or return here.       Superficial Thrombophlebitis   The superficial veins are the veins near the surface of the skin. Superficial thrombophlebitis is a problem that occurs when one or more of these veins become inflamed (red, irritated, and swollen). This is most often because of a blood clot.  Causes  The problem may occur after injury to a vein. It may also occur after having an intravenous (IV) line placed. Other factors that can make the problem more likely include:  · Varicose veins  · Venous insufficiency  · Bleeding disorders  · Prolonged periods of rest and not moving around  · IV drug abuse  Symptoms  Symptoms may appear in the affected area. They can include:  · Pain  · Tenderness  · Redness  · Warmth  · Swelling  · Hardening of the vein  In most cases, superficial thrombophlebitis resolves on its own with no problems. Treatment is focused on relieving symptoms.  Sometimes, there is a risk that the deep veins in the body may also be involved. This can lead to more serious problems. In such cases, further testing and treatments may be needed. Your healthcare provider can tell you  more about this.  Home Care  To help relieve pain and swelling, you may be told to:  · Apply heat or cold to the affected area. Do this for up to 10 minutes as often as directed.  ¨ Heat: Use a warm compress, such as a heating pad.  ¨ Cold: Use a cold compress, such as a cold pack or bag of ice wrapped in a thin towel.  · Take nonsteroidal anti-inflammatory drugs (NSAIDS), such as ibuprofen. In some cases, other pain medicines may be prescribed.  · Keep the affected limb (arm or leg) raised above heart level as directed.  · Wear elastic compression stockings or bandages as directed.  · Avoid prolonged sitting or standing. Get up and walk often.  To help treat a blood clot, a blood thinner (anticoagulant) may be prescribed. If this is needed, be sure to take the medicine exactly as directed.  Follow-up care  Follow up with your healthcare provider as advised. If imaging tests are done, they will be reviewed by a doctor. Youll be told the results and any new findings that may affect your treatment.  When to seek medical advice  Call your healthcare provider right away if any of these occur:  · Fever of 100.4°F (38ºC) or higher, or as directed by your provider  · Increasing pain, swelling, or tenderness in the affected area  · Spreading warmth or redness in the affected area  Call 911  Call 911 right away if any of these occur:  · Trouble breathing  · Chest pain or discomfort that worsens with deep breathing or coughing  · Coughing (may cough up blood)  · Fast or irregular heartbeat  · Sweating  · Anxiety  · Lightheadedness, dizziness, or fainting  · Extreme confusion  · Extreme drowsiness or trouble waking up  · New pain in the chest, arm, shoulder, neck, or upper back  Date Last Reviewed: 9/21/2015 © 2000-2017 [employer]. [address] [place] [address], [place] [address]. All rights reserved. This information is not intended as a substitute for professional medical care. Always follow your healthcare  professional's instructions.        Hematoma  A hematoma is a collection of blood trapped outside of a blood vessel. It is what we think of as a bruise or a contusion. It is usually seen under the skin as a black and blue spot on your arm or leg, or a bump on your head after an injury. It can be almost anywhere on or in your body. It can also occur in an internal organ where it can be more serious.  A hematoma is caused by an injury with damage to small blood vessels. This causes blood to leak into the tissues. Blood forms a pocket under the skin that swells and looks like a purplish patch.  Gradually the blood in the hematoma is absorbed back into the body. The swelling and pain of the hematoma will go away. This takes from 1 to 4 weeks, depending on the size of the hematoma. The skin over the hematoma may turn bluish then brown and yellow as the blood is dissolved and absorbed. Usually, this only takes a couple of weeks but can last months.  Home care  · Limit motion of the joints near the hematoma. If the hematoma is large and painful, avoid sports and other vigorous physical activity until the swelling and pain goes away.  · Apply an ice pack (ice cubes in a plastic bag, wrapped in a thin towel or a frozen bag of peas) over the injured area for 20 minutes every 1 to 2 hours the first day. Continue with ice packs 3 to 4 times a day for the next 2 days. Continue the use of ice packs for relief of pain and swelling as needed.  · If you need anything for pain, you can take acetaminophen, unless you were given a different pain medicine to use. Talk with your doctor before using this medicine if you have chronic liver or kidney disease. Also talk with your doctor if you have had a stomach ulcer or digestive tract bleeding, or are taking blood-thinner medicines.  Follow-up care  Follow up with your healthcare provider, or as advised. If X-rays or a CT scan were done, you will be notified if there is a change in the  reading, especially if it affects treatment.  When to seek medical advice  Call your healthcare provider right away f any of the following occur:  · Redness around the hematoma  · Increase in pain or warmth in the hematoma  · Increase in size of the hematoma  · Fever of 100.4ºF (38ºC) or higher, or as directed by your healthcare provider  · If the hematoma is on the arm or leg, watch for:  ¨ Increased swelling or pain in the extremity  ¨ Numbness or tingling or blue color of the hand or foot  Date Last Reviewed: 11/5/2015  © 8102-5794 [employer]. [address] [place] [address]. All rights reserved. This information is not intended as a substitute for professional medical care. Always follow your healthcare professional's instructions.

## 2019-04-05 NOTE — PROGRESS NOTES
"Subjective:       Patient ID: Deena Moody is a 88 y.o. male.    Vitals:  height is 5' 9" (1.753 m) and weight is 61.7 kg (136 lb). His oral temperature is 97.7 °F (36.5 °C). His blood pressure is 194/88 (abnormal) and his pulse is 70. His respiration is 20 and oxygen saturation is 100%.     Chief Complaint: Leg Injury    This is a 88 y.o. male   with Past Medical History:  9/1/2016: Acute on chronic diastolic heart failure  No date: Coronary artery disease  No date: Hyperlipidemia  No date: Hypertension  12/12/2014: Macular degeneration (senile) of retina, unspecified  12/12/2014: Nuclear sclerosis  11/10/2016: Persistent atrial fibrillation  9/14/2016: S/P placement of cardiac pacemaker   and Past Surgical History:  No date: AORTIC VALVE REPLACEMENT; N/A  No date: CARDIAC PACEMAKER PLACEMENT  2/16/2016: CATARACT EXTRACTION W/  INTRAOCULAR LENS IMPLANT; Right      Comment:  Dr. Whitley  3/1/2016: CATARACT EXTRACTION W/  INTRAOCULAR LENS IMPLANT; Left      Comment:  Dr. Whitley  No date: CORONARY ARTERY BYPASS GRAFT  No date: EAR EXAMINATION UNDER ANESTHESIA  No date: EYE SURGERY  3/1/2016: INSERTION-INTRAOCULAR LENS (IOL); Left      Comment:  Performed by Scar Whitley MD at [facility] OR [address] [place] [address]  2/16/2016: INSERTION-INTRAOCULAR LENS (IOL); Right      Comment:  Performed by Scar Whitley MD at [facility] OR [address] [place] [address]  3/1/2016: PHACOEMULSIFICATION-ASPIRATION-CATARACT; Left      Comment:  Performed by Scar Whitley MD at [facility] [address] [place] [address]  2/16/2016: PHACOEMULSIFICATION-ASPIRATION-CATARACT; Right      Comment:  Performed by Scar Whitley MD at [facility] [address] [place] [address]  who presents today with a chief complaint of left leg pain. He says that two weeks ago a car door hit the bottom half of his leg. He had a large hematoma to the area.   He used ice to help relieve his pain.  He says it is only painful when you touch certain areas and when " he goes from a sitting to standing position.  It isn't very painful to walk.  He says there is a little more swelling and redness surronning it now for the past few day.  No fever.  He is currently on blood thinners (eliquis).    Leg Pain    The incident occurred more than 1 week ago. The incident occurred at home. The injury mechanism was a direct blow. The pain is present in the left leg. Quality: pressure. The pain is at a severity of 0/10. The patient is experiencing no pain. The pain has been constant since onset. He reports no foreign bodies present. Nothing aggravates the symptoms. He has tried ice for the symptoms.       Constitution: Negative for fatigue.   HENT: Negative for facial swelling and facial trauma.    Neck: Negative for neck stiffness.   Cardiovascular: Negative for chest trauma.   Eyes: Negative for eye trauma, double vision and blurred vision.   Gastrointestinal: Negative for abdominal trauma, abdominal pain and rectal bleeding.   Genitourinary: Negative for hematuria, genital trauma and pelvic pain.   Musculoskeletal: Positive for pain and trauma. Negative for joint swelling, abnormal ROM of joint and pain with walking.   Skin: Negative for color change, wound, abrasion, laceration and erythema.   Neurological: Negative for dizziness, history of vertigo, light-headedness, coordination disturbances, altered mental status and loss of consciousness.   Hematologic/Lymphatic: Negative for history of bleeding disorder.   Psychiatric/Behavioral: Negative for altered mental status.       Objective:      Physical Exam   Constitutional: He is oriented to person, place, and time. He appears well-developed and well-nourished. No distress.   HENT:   Head: Normocephalic and atraumatic.   Eyes: Conjunctivae are normal.   Neck: Normal range of motion. Neck supple.   Cardiovascular: Normal rate and regular rhythm. Exam reveals no gallop and no friction rub.   No murmur heard.  Pulmonary/Chest: Effort normal  and breath sounds normal. He has no wheezes. He has no rales.   Musculoskeletal: Normal range of motion.        Left lower leg: He exhibits tenderness and swelling.        Legs:  Neurological: He is alert and oriented to person, place, and time.   Skin: Skin is warm and dry. No rash noted. No erythema.   Psychiatric: He has a normal mood and affect. His behavior is normal. Judgment and thought content normal.   Nursing note and vitals reviewed.      Assessment:       1. Traumatic hematoma of left lower leg, initial encounter    2. Superficial phlebitis        Plan:         Traumatic hematoma of left lower leg, initial encounter    Superficial phlebitis        Deena was seen today for leg injury.    Diagnoses and all orders for this visit:    Traumatic hematoma of left lower leg, initial encounter    Superficial phlebitis      Patient Instructions   - Rest.    - Drink plenty of fluids.    - Tylenol or Ibuprofen as directed as needed for fever/pain.    - Warm compresses to the affected area for 20 minutes at a time.     - Elevate when possible.    - Follow up with your PCP or specialty clinic as directed in the next 1-2 weeks if not improved or as needed.  You can call [phone] to schedule an appointment with the appropriate provider.    - Go to the ED if your symptoms worsen.  - You must understand that you have received an Urgent Care treatment only and that you may be released before all of your medical problems are known or treated.   - You, the patient, will arrange for follow up care as instructed.   - If your condition worsens or fails to improve we recommend that you receive another evaluation at the ER immediately or contact your PCP to discuss your concerns or return here.       Superficial Thrombophlebitis   The superficial veins are the veins near the surface of the skin. Superficial thrombophlebitis is a problem that occurs when one or more of these veins become inflamed (red, irritated, and  swollen). This is most often because of a blood clot.  Causes  The problem may occur after injury to a vein. It may also occur after having an intravenous (IV) line placed. Other factors that can make the problem more likely include:  · Varicose veins  · Venous insufficiency  · Bleeding disorders  · Prolonged periods of rest and not moving around  · IV drug abuse  Symptoms  Symptoms may appear in the affected area. They can include:  · Pain  · Tenderness  · Redness  · Warmth  · Swelling  · Hardening of the vein  In most cases, superficial thrombophlebitis resolves on its own with no problems. Treatment is focused on relieving symptoms.  Sometimes, there is a risk that the deep veins in the body may also be involved. This can lead to more serious problems. In such cases, further testing and treatments may be needed. Your healthcare provider can tell you more about this.  Home Care  To help relieve pain and swelling, you may be told to:  · Apply heat or cold to the affected area. Do this for up to 10 minutes as often as directed.  ¨ Heat: Use a warm compress, such as a heating pad.  ¨ Cold: Use a cold compress, such as a cold pack or bag of ice wrapped in a thin towel.  · Take nonsteroidal anti-inflammatory drugs (NSAIDS), such as ibuprofen. In some cases, other pain medicines may be prescribed.  · Keep the affected limb (arm or leg) raised above heart level as directed.  · Wear elastic compression stockings or bandages as directed.  · Avoid prolonged sitting or standing. Get up and walk often.  To help treat a blood clot, a blood thinner (anticoagulant) may be prescribed. If this is needed, be sure to take the medicine exactly as directed.  Follow-up care  Follow up with your healthcare provider as advised. If imaging tests are done, they will be reviewed by a doctor. Youll be told the results and any new findings that may affect your treatment.  When to seek medical advice  Call your healthcare provider right away  if any of these occur:  · Fever of 100.4°F (38ºC) or higher, or as directed by your provider  · Increasing pain, swelling, or tenderness in the affected area  · Spreading warmth or redness in the affected area  Call 911  Call 911 right away if any of these occur:  · Trouble breathing  · Chest pain or discomfort that worsens with deep breathing or coughing  · Coughing (may cough up blood)  · Fast or irregular heartbeat  · Sweating  · Anxiety  · Lightheadedness, dizziness, or fainting  · Extreme confusion  · Extreme drowsiness or trouble waking up  · New pain in the chest, arm, shoulder, neck, or upper back  Date Last Reviewed: 9/21/2015 © 2000-2017 [employer]. [address] [place] [address], [place] [address]. All rights reserved. This information is not intended as a substitute for professional medical care. Always follow your healthcare professional's instructions.        Hematoma  A hematoma is a collection of blood trapped outside of a blood vessel. It is what we think of as a bruise or a contusion. It is usually seen under the skin as a black and blue spot on your arm or leg, or a bump on your head after an injury. It can be almost anywhere on or in your body. It can also occur in an internal organ where it can be more serious.  A hematoma is caused by an injury with damage to small blood vessels. This causes blood to leak into the tissues. Blood forms a pocket under the skin that swells and looks like a purplish patch.  Gradually the blood in the hematoma is absorbed back into the body. The swelling and pain of the hematoma will go away. This takes from 1 to 4 weeks, depending on the size of the hematoma. The skin over the hematoma may turn bluish then brown and yellow as the blood is dissolved and absorbed. Usually, this only takes a couple of weeks but can last months.  Home care  · Limit motion of the joints near the hematoma. If the hematoma is large and painful, avoid sports and other vigorous  physical activity until the swelling and pain goes away.  · Apply an ice pack (ice cubes in a plastic bag, wrapped in a thin towel or a frozen bag of peas) over the injured area for 20 minutes every 1 to 2 hours the first day. Continue with ice packs 3 to 4 times a day for the next 2 days. Continue the use of ice packs for relief of pain and swelling as needed.  · If you need anything for pain, you can take acetaminophen, unless you were given a different pain medicine to use. Talk with your doctor before using this medicine if you have chronic liver or kidney disease. Also talk with your doctor if you have had a stomach ulcer or digestive tract bleeding, or are taking blood-thinner medicines.  Follow-up care  Follow up with your healthcare provider, or as advised. If X-rays or a CT scan were done, you will be notified if there is a change in the reading, especially if it affects treatment.  When to seek medical advice  Call your healthcare provider right away f any of the following occur:  · Redness around the hematoma  · Increase in pain or warmth in the hematoma  · Increase in size of the hematoma  · Fever of 100.4ºF (38ºC) or higher, or as directed by your healthcare provider  · If the hematoma is on the arm or leg, watch for:  ¨ Increased swelling or pain in the extremity  ¨ Numbness or tingling or blue color of the hand or foot  Date Last Reviewed: 11/5/2015  © 8860-5353 [facility]. [address] [place] [address], [place] [address]. All rights reserved. This information is not intended as a substitute for professional medical care. Always follow your healthcare professional's instructions.

## 2019-04-30 ENCOUNTER — CLINICAL SUPPORT (OUTPATIENT)
Dept: CARDIOLOGY | Facility: HOSPITAL | Age: 84
End: 2019-04-30
Attending: INTERNAL MEDICINE
Payer: MEDICARE

## 2019-04-30 DIAGNOSIS — Z95.0 CARDIAC PACEMAKER IN SITU: ICD-10-CM

## 2019-04-30 PROCEDURE — 93296 REM INTERROG EVL PM/IDS: CPT | Mod: HCNC

## 2019-05-03 DIAGNOSIS — Z95.0 CARDIAC PACEMAKER IN SITU: Primary | ICD-10-CM

## 2019-05-07 ENCOUNTER — TELEPHONE (OUTPATIENT)
Dept: INTERNAL MEDICINE | Facility: CLINIC | Age: 84
End: 2019-05-07

## 2019-05-07 ENCOUNTER — HOSPITAL ENCOUNTER (EMERGENCY)
Facility: HOSPITAL | Age: 84
Discharge: HOME | End: 2019-05-07
Attending: EMERGENCY MEDICINE
Payer: MEDICARE

## 2019-05-07 ENCOUNTER — OFFICE VISIT (OUTPATIENT)
Dept: URGENT CARE | Facility: CLINIC | Age: 84
End: 2019-05-07
Payer: MEDICARE

## 2019-05-07 VITALS
WEIGHT: 134 LBS | OXYGEN SATURATION: 100 % | RESPIRATION RATE: 20 BRPM | HEART RATE: 80 BPM | HEIGHT: 69 IN | BODY MASS INDEX: 19.85 KG/M2 | TEMPERATURE: 97 F | DIASTOLIC BLOOD PRESSURE: 69 MMHG | SYSTOLIC BLOOD PRESSURE: 137 MMHG

## 2019-05-07 VITALS
TEMPERATURE: 98 F | BODY MASS INDEX: 20.14 KG/M2 | SYSTOLIC BLOOD PRESSURE: 124 MMHG | OXYGEN SATURATION: 99 % | WEIGHT: 136 LBS | HEIGHT: 69 IN | DIASTOLIC BLOOD PRESSURE: 63 MMHG | RESPIRATION RATE: 20 BRPM | HEART RATE: 69 BPM

## 2019-05-07 DIAGNOSIS — K59.00 CONSTIPATION, UNSPECIFIED CONSTIPATION TYPE: Primary | ICD-10-CM

## 2019-05-07 DIAGNOSIS — K80.20 CALCULUS OF GALLBLADDER WITHOUT CHOLECYSTITIS WITHOUT OBSTRUCTION: ICD-10-CM

## 2019-05-07 DIAGNOSIS — R10.9 ABDOMINAL PAIN: ICD-10-CM

## 2019-05-07 LAB
ALBUMIN SERPL BCP-MCNC: 3.8 G/DL (ref 3.5–5.2)
ALP SERPL-CCNC: 89 U/L (ref 55–135)
ALT SERPL W/O P-5'-P-CCNC: 9 U/L (ref 10–44)
ANION GAP SERPL CALC-SCNC: 13 MMOL/L (ref 8–16)
AST SERPL-CCNC: 27 U/L (ref 10–40)
BASOPHILS # BLD AUTO: 0.04 K/UL (ref 0–0.2)
BASOPHILS NFR BLD: 0.4 % (ref 0–1.9)
BILIRUB SERPL-MCNC: 0.9 MG/DL (ref 0.1–1)
BUN SERPL-MCNC: 34 MG/DL (ref 8–23)
CALCIUM SERPL-MCNC: 9.4 MG/DL (ref 8.7–10.5)
CHLORIDE SERPL-SCNC: 107 MMOL/L (ref 95–110)
CO2 SERPL-SCNC: 22 MMOL/L (ref 23–29)
CREAT SERPL-MCNC: 1.6 MG/DL (ref 0.5–1.4)
DIFFERENTIAL METHOD: ABNORMAL
EOSINOPHIL # BLD AUTO: 0.4 K/UL (ref 0–0.5)
EOSINOPHIL NFR BLD: 4.2 % (ref 0–8)
ERYTHROCYTE [DISTWIDTH] IN BLOOD BY AUTOMATED COUNT: 15.9 % (ref 11.5–14.5)
EST. GFR  (AFRICAN AMERICAN): 44 ML/MIN/1.73 M^2
EST. GFR  (NON AFRICAN AMERICAN): 38 ML/MIN/1.73 M^2
GLUCOSE SERPL-MCNC: 89 MG/DL (ref 70–110)
HCT VFR BLD AUTO: 30.4 % (ref 40–54)
HGB BLD-MCNC: 9.6 G/DL (ref 14–18)
LIPASE SERPL-CCNC: 37 U/L (ref 4–60)
LYMPHOCYTES # BLD AUTO: 2.7 K/UL (ref 1–4.8)
LYMPHOCYTES NFR BLD: 27.8 % (ref 18–48)
MCH RBC QN AUTO: 31.8 PG (ref 27–31)
MCHC RBC AUTO-ENTMCNC: 31.6 G/DL (ref 32–36)
MCV RBC AUTO: 101 FL (ref 82–98)
MONOCYTES # BLD AUTO: 0.8 K/UL (ref 0.3–1)
MONOCYTES NFR BLD: 8.8 % (ref 4–15)
NEUTROPHILS # BLD AUTO: 5.6 K/UL (ref 1.8–7.7)
NEUTROPHILS NFR BLD: 58.7 % (ref 38–73)
PLATELET # BLD AUTO: 241 K/UL (ref 150–350)
PMV BLD AUTO: 9 FL (ref 9.2–12.9)
POTASSIUM SERPL-SCNC: 5.1 MMOL/L (ref 3.5–5.1)
PROT SERPL-MCNC: 7.3 G/DL (ref 6–8.4)
RBC # BLD AUTO: 3.02 M/UL (ref 4.6–6.2)
SODIUM SERPL-SCNC: 142 MMOL/L (ref 136–145)
WBC # BLD AUTO: 9.57 K/UL (ref 3.9–12.7)

## 2019-05-07 PROCEDURE — 83690 ASSAY OF LIPASE: CPT | Mod: HCNC

## 2019-05-07 PROCEDURE — 99284 EMERGENCY DEPT VISIT MOD MDM: CPT | Mod: HCNC

## 2019-05-07 PROCEDURE — 99214 PR OFFICE/OUTPT VISIT, EST, LEVL IV, 30-39 MIN: ICD-10-PCS | Mod: S$GLB,,, | Performed by: PHYSICIAN ASSISTANT

## 2019-05-07 PROCEDURE — 80053 COMPREHEN METABOLIC PANEL: CPT | Mod: HCNC

## 2019-05-07 PROCEDURE — 74019 RADEX ABDOMEN 2 VIEWS: CPT | Mod: S$GLB,,, | Performed by: RADIOLOGY

## 2019-05-07 PROCEDURE — 85025 COMPLETE CBC W/AUTO DIFF WBC: CPT | Mod: HCNC

## 2019-05-07 PROCEDURE — 1101F PT FALLS ASSESS-DOCD LE1/YR: CPT | Mod: CPTII,S$GLB,, | Performed by: PHYSICIAN ASSISTANT

## 2019-05-07 PROCEDURE — 99214 OFFICE O/P EST MOD 30 MIN: CPT | Mod: S$GLB,,, | Performed by: PHYSICIAN ASSISTANT

## 2019-05-07 PROCEDURE — 1101F PR PT FALLS ASSESS DOC 0-1 FALLS W/OUT INJ PAST YR: ICD-10-PCS | Mod: CPTII,S$GLB,, | Performed by: PHYSICIAN ASSISTANT

## 2019-05-07 PROCEDURE — 74019 XR ABDOMEN FLAT AND ERECT: ICD-10-PCS | Mod: S$GLB,,, | Performed by: RADIOLOGY

## 2019-05-07 RX ORDER — PANTOPRAZOLE SODIUM 20 MG/1
20 TABLET, DELAYED RELEASE ORAL DAILY
Qty: 30 TABLET | Refills: 0 | Status: SHIPPED | OUTPATIENT
Start: 2019-05-07 | End: 2019-07-02

## 2019-05-07 RX ORDER — DOCUSATE SODIUM 100 MG/1
100 CAPSULE, LIQUID FILLED ORAL 2 TIMES DAILY
Qty: 30 CAPSULE | Refills: 0 | Status: SHIPPED | OUTPATIENT
Start: 2019-05-07 | End: 2019-07-02

## 2019-05-07 RX ORDER — POLYETHYLENE GLYCOL 3350 17 G/17G
17 POWDER, FOR SOLUTION ORAL DAILY
Qty: 1 BOTTLE | Refills: 0 | Status: SHIPPED | OUTPATIENT
Start: 2019-05-07 | End: 2019-07-02

## 2019-05-07 NOTE — PROGRESS NOTES
"Subjective:       Patient ID: Deena Moody is a 88 y.o. male.    Vitals:  height is 5' 9" (1.753 m) and weight is 61.7 kg (136 lb). His tympanic temperature is 97.8 °F (36.6 °C). His blood pressure is 124/63 and his pulse is 69. His respiration is 20 and oxygen saturation is 99%.     Chief Complaint: Abdominal Pain    This is a 88 y.o. male who presents today with a chief complaint of abdominal pain that began two days that gradually worsened. He says that he feels a burning pain in his stomach. He's also complaining of some dizziness. He's been taking pepto bismol to help relieve his symptoms. His last BM was 2 days ago, which he states is normal for him. He ate red beans and rice last night and had red sauce/spaghetti the night before.    Abdominal Pain   This is a new problem. The current episode started in the past 7 days. The onset quality is sudden. The problem occurs constantly. The problem has been gradually worsening. The pain is located in the generalized abdominal region. The pain is at a severity of 8/10. The pain is severe. The quality of the pain is burning. The abdominal pain does not radiate. Pertinent negatives include no constipation, diarrhea, dysuria, fever, nausea or vomiting. Nothing aggravates the pain. The pain is relieved by belching. Treatments tried: pepto bismol. The treatment provided moderate relief. There is no history of abdominal surgery.       Constitution: Negative for appetite change, chills, sweating and fever.   Cardiovascular: Negative for chest pain.   Respiratory: Negative for shortness of breath.    Gastrointestinal: Positive for abdominal pain. Negative for abdominal trauma, abdominal bloating, history of abdominal surgery, nausea, vomiting, constipation, diarrhea, dark colored stools and heartburn.   Genitourinary: Negative for dysuria and pelvic pain.   Musculoskeletal: Negative for back pain.   Skin: Negative for erythema.       Objective:      Physical Exam "   Constitutional: He is oriented to person, place, and time. Vital signs are normal. He appears well-developed and well-nourished. He does not appear ill. No distress.   HENT:   Head: Normocephalic and atraumatic.   Right Ear: External ear normal.   Left Ear: External ear normal.   Nose: Nose normal.   Eyes: Conjunctivae, EOM and lids are normal. Right eye exhibits no discharge. Left eye exhibits no discharge.   Neck: Normal range of motion. Neck supple.   Cardiovascular: Normal rate, regular rhythm and normal heart sounds. Exam reveals no gallop and no friction rub.   No murmur heard.  Pulmonary/Chest: Effort normal and breath sounds normal. No stridor. No respiratory distress. He has no decreased breath sounds. He has no wheezes. He has no rhonchi. He has no rales.   Abdominal: Normal appearance and bowel sounds are normal. There is tenderness (very mild) in the epigastric area. There is no rigidity, no rebound, no guarding, no tenderness at McBurney's point and negative Rosen's sign.   Musculoskeletal: Normal range of motion.   Neurological: He is alert and oriented to person, place, and time.   Skin: Skin is warm and dry. No rash noted. He is not diaphoretic. No erythema. No pallor.   Psychiatric: He has a normal mood and affect. His behavior is normal.   Nursing note and vitals reviewed.      Assessment:       1. Constipation, unspecified constipation type        Xr Abdomen Flat And Erect    Result Date: 5/7/2019  EXAMINATION: XR ABDOMEN FLAT AND ERECT CLINICAL HISTORY: Epigastric pain TECHNIQUE: Flat and erect AP views of the abdomen were performed. COMPARISON: None FINDINGS: Four images with frontal projection of the abdomen presented with upright positioning.  There appear to be large gallstone right upper quadrant, perhaps better shown on 01/01/2011 CT scan.  There is moderate formed stool throughout the colon without distention.  No pneumatosis or free air.  Heart is moderately enlarged probably unchanged  from 12/14/2018.  There is degenerative changes of the lumbar spine and right hip.  No acute fracture found.  No hernia seen     Moderate formed stool throughout the colon without distention, suggest constipation. Electronically signed by: Sami Schneider Date:    05/07/2019 Time:    19:33    Plan:       Explained options - mag citrate or ED. Patient and his daughter prefer ED. I agree. Questionable impaction.    Constipation, unspecified constipation type  -     (pyxis) gi cocktail (mylanta 30 mL, lidocaine 2 % viscous 10 mL, dicyclomine 10 mL) 50 mL  -     XR ABDOMEN FLAT AND ERECT; Future; Expected date: 05/07/2019

## 2019-05-07 NOTE — TELEPHONE ENCOUNTER
----- Message from Shawanda Lopez sent at 5/7/2019 10:19 AM CDT -----  Contact: wife [phone]  Patient would like to be seen today.

## 2019-05-07 NOTE — TELEPHONE ENCOUNTER
Left detailed message for pt's Daughter Amelie call the office back in regards to pt being seen today.

## 2019-05-08 ENCOUNTER — PES CALL (OUTPATIENT)
Dept: ADMINISTRATIVE | Facility: CLINIC | Age: 84
End: 2019-05-08

## 2019-05-08 NOTE — ED NOTES
Bedside report to yu braun. Pt. Is resting quietly without complaints. No distress. Family at bedside.

## 2019-05-08 NOTE — ED TRIAGE NOTES
Pt sent by urgent care for fecal impaction , pt states he had a bowel movement 2 days ago ,pt states he had pain last night took pepto bismol for relief , no pain noted at this tme

## 2019-05-08 NOTE — ED PROVIDER NOTES
"Encounter Date: 5/7/2019    SCRIBE #1 NOTE: I, Ace Browne, am scribing for, and in the presence of,  Dr. Tripp. I have scribed the entire note.       History     Chief Complaint   Patient presents with    Constipation     Patient presents to the ED with reports of having constipation. States having been seen and treated at urgent care prior to arrival and was instructed to come to the ER for re-evaluation and to see "we can help him have a bowel movement.".      Time seen by provider: 8:56 PM    This is a 88 y.o. male who presents with complaint of abdominal pain and constipation with onset yesterday. The patient reports severe abdominal pain prompting him to go to urgent care. He was given a GI cocktail at urgent care with alleviation of abdominal pain. He also had an X-Ray done showing stool in his bowels and was sent to the ED to assist him with having a bowel movement. Denies taking pain medication or any current abdominal pain.    The history is provided by the patient and a relative.     Review of patient's allergies indicates:   Allergen Reactions    Iodine and iodide containing products Other (See Comments)     Caused changes in skin color     Past Medical History:   Diagnosis Date    Acute on chronic diastolic heart failure 9/1/2016    Coronary artery disease     Hyperlipidemia     Hypertension     Macular degeneration (senile) of retina, unspecified 12/12/2014    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    Persistent atrial fibrillation 11/10/2016    S/P placement of cardiac pacemaker 9/14/2016     Past Surgical History:   Procedure Laterality Date    AORTIC VALVE REPLACEMENT N/A     CARDIAC PACEMAKER PLACEMENT      CATARACT EXTRACTION W/  INTRAOCULAR LENS IMPLANT Right 2/16/2016    Dr. Whitley    CATARACT EXTRACTION W/  INTRAOCULAR LENS IMPLANT Left 3/1/2016    Dr. Whitley    CORONARY ARTERY BYPASS GRAFT      EAR EXAMINATION UNDER ANESTHESIA      EYE SURGERY      INSERTION-INTRAOCULAR LENS " (IOL) Left 3/1/2016    [facility]    INSERTION-INTRAOCULAR LENS (IOL) Right 2/16/2016    [facility]    PHACOEMULSIFICATION-ASPIRATION-CATARACT Left 3/1/2016    [facility]    PHACOEMULSIFICATION-ASPIRATION-CATARACT Right 2/16/2016    [facility]     Family History   Problem Relation Age of Onset    No Known Problems Mother     No Known Problems Father     No Known Problems Sister     Stroke Brother     Hypertension Brother     No Known Problems Maternal Aunt     No Known Problems Maternal Uncle     No Known Problems Paternal Aunt     No Known Problems Paternal Uncle     No Known Problems Maternal Grandmother     No Known Problems Maternal Grandfather     No Known Problems Paternal Grandmother     No Known Problems Paternal Grandfather     No Known Problems Daughter     No Known Problems Son     Amblyopia Neg Hx     Blindness Neg Hx     Cancer Neg Hx     Cataracts Neg Hx     Diabetes Neg Hx     Glaucoma Neg Hx     Macular degeneration Neg Hx     Retinal detachment Neg Hx     Strabismus Neg Hx     Thyroid disease Neg Hx      Social History     Tobacco Use    Smoking status: Never Smoker    Smokeless tobacco: Never Used   Substance Use Topics    Alcohol use: No    Drug use: No     Review of Systems   Constitutional: Negative for fever.   HENT: Negative for facial swelling and sore throat.    Eyes: Negative for pain and redness.   Respiratory: Negative for shortness of breath.    Cardiovascular: Negative for chest pain.   Gastrointestinal: Positive for abdominal pain and constipation. Negative for diarrhea, nausea and vomiting.   Genitourinary: Negative for dysuria and hematuria.   Musculoskeletal: Negative for back pain and neck pain.   Skin: Negative for rash.   Neurological: Negative for seizures and  facial asymmetry.       Physical Exam     Initial Vitals [05/07/19 2049]   BP Pulse Resp Temp SpO2   (!) 195/86 70 18 97.4 °F (36.3 °C) 100 %      MAP       --         Physical Exam    Nursing note and vitals reviewed.  Constitutional: He appears well-developed and well-nourished. He is not diaphoretic. No distress.   HENT:   Head: Normocephalic and atraumatic.   Eyes: Conjunctivae and EOM are normal.   Neck: Normal range of motion. Neck supple.   Cardiovascular: Normal rate, regular rhythm and normal heart sounds.   Pulmonary/Chest: Breath sounds normal. No respiratory distress.   Abdominal: Soft. There is no tenderness.   Musculoskeletal: Normal range of motion. He exhibits no edema or tenderness.   Neurological: He is alert and oriented to person, place, and time. He has normal strength.   Skin: Skin is warm and dry.         ED Course   Procedures  Labs Reviewed   CBC W/ AUTO DIFFERENTIAL - Abnormal; Notable for the following components:       Result Value    RBC 3.02 (*)     Hemoglobin 9.6 (*)     Hematocrit 30.4 (*)     Mean Corpuscular Volume 101 (*)     Mean Corpuscular Hemoglobin 31.8 (*)     Mean Corpuscular Hemoglobin Conc 31.6 (*)     RDW 15.9 (*)     MPV 9.0 (*)     All other components within normal limits   COMPREHENSIVE METABOLIC PANEL - Abnormal; Notable for the following components:    CO2 22 (*)     BUN, Bld 34 (*)     Creatinine 1.6 (*)     ALT 9 (*)     eGFR if  44 (*)     eGFR if non  38 (*)     All other components within normal limits   LIPASE          Imaging Results          US Abdomen Limited (Gallbladder) (Final result)  Result time 05/07/19 22:38:39    Final result by Pierce Gallegos MD (05/07/19 22:38:39)                 Impression:      Cholelithiasis.  No ultrasonographic evidence to suggest acute cholecystitis.      Electronically signed by: Pierce Gallegos MD  Date:    05/07/2019  Time:    22:38             Narrative:    EXAMINATION:  US ABDOMEN  LIMITED    CLINICAL HISTORY:  Unspecified abdominal pain    TECHNIQUE:  Limited ultrasound of the right upper quadrant of the abdomen (including pancreas, liver, gallbladder, common bile duct, and spleen) was performed.    COMPARISON:  April 2018.    FINDINGS:  The liver is normal in size measuring 13.1cm.  Hepatic parenchyma is homogeneous without evidence for masses.  No intra- or extrahepatic biliary ductal dilatation. The common bile duct measures 0.4 cm.  The gallbladder demonstrates a single mobile 2.5 cm shadowing stone.  No evidence of gallbladder wall thickening or pericholecystic fluid.  Sonographic Rosen's sign is negative.  Pancreas is obscured by overlying bowel gas.  Visualized portion of the IVC is unremarkable.  Spleen is normal in size measuring 8.7 cm.  No ascites.                                 Medical Decision Making:   ED Management:  Pt has large gallstone but no e/o cholecystitis. He will be dc'd w colace and miralax for constipation and f/u w gen surg              Attending Attestation:           Physician Attestation for Scribe:  Physician Attestation Statement for Scribe #1: I, dinorah swain, reviewed documentation, as scribed by dinorah galloway in my presence, and it is both accurate and complete.                    Clinical Impression:       ICD-10-CM ICD-9-CM   1. Constipation, unspecified constipation type K59.00 564.00   2. Abdominal pain R10.9 789.00   3. Calculus of gallbladder without cholecystitis without obstruction K80.20 574.20       Disposition:   Disposition: Discharged  Condition: Stable                        Dinorah Swain MD  05/07/19 0946

## 2019-05-16 ENCOUNTER — OFFICE VISIT (OUTPATIENT)
Dept: SURGERY | Facility: CLINIC | Age: 84
End: 2019-05-16
Payer: MEDICARE

## 2019-05-16 VITALS
SYSTOLIC BLOOD PRESSURE: 160 MMHG | WEIGHT: 136.25 LBS | TEMPERATURE: 97 F | HEIGHT: 69 IN | HEART RATE: 70 BPM | OXYGEN SATURATION: 100 % | BODY MASS INDEX: 20.18 KG/M2 | DIASTOLIC BLOOD PRESSURE: 77 MMHG

## 2019-05-16 DIAGNOSIS — K80.20 GALLSTONES: Primary | ICD-10-CM

## 2019-05-16 PROCEDURE — 99999 PR PBB SHADOW E&M-EST. PATIENT-LVL III: CPT | Mod: PBBFAC,HCNC,, | Performed by: SURGERY

## 2019-05-16 PROCEDURE — 99203 PR OFFICE/OUTPT VISIT, NEW, LEVL III, 30-44 MIN: ICD-10-PCS | Mod: HCNC,S$GLB,, | Performed by: SURGERY

## 2019-05-16 PROCEDURE — 1101F PR PT FALLS ASSESS DOC 0-1 FALLS W/OUT INJ PAST YR: ICD-10-PCS | Mod: HCNC,CPTII,S$GLB, | Performed by: SURGERY

## 2019-05-16 PROCEDURE — 99999 PR PBB SHADOW E&M-EST. PATIENT-LVL III: ICD-10-PCS | Mod: PBBFAC,HCNC,, | Performed by: SURGERY

## 2019-05-16 PROCEDURE — 99203 OFFICE O/P NEW LOW 30 MIN: CPT | Mod: HCNC,S$GLB,, | Performed by: SURGERY

## 2019-05-16 PROCEDURE — 1101F PT FALLS ASSESS-DOCD LE1/YR: CPT | Mod: HCNC,CPTII,S$GLB, | Performed by: SURGERY

## 2019-05-16 RX ORDER — OMEPRAZOLE 20 MG/1
20 CAPSULE, DELAYED RELEASE ORAL DAILY PRN
Qty: 90 CAPSULE | Refills: 1 | Status: SHIPPED | OUTPATIENT
Start: 2019-05-16 | End: 2019-05-28 | Stop reason: SDUPTHER

## 2019-05-16 NOTE — H&P
[facility]  OUTPATIENT H&P    REASON FOR VISIT/CC:  Gallstones    HPI: Deena Moody is a 88 y.o. male presents as a referral from the emergency room. Patient presented with acute onset of epigastric pain to an urgent care where he was diagnosed with constipation.  He was sent to emergency room with a suspected possible other etiology.  He was given a GI cocktail with which almost instantly relieved his pain.  He also underwent an ultrasound of the gallbladder which showed no evidence of cholecystitis but he did have gallstones including a large 2.5 cm stone.  He was sent for outpatient follow-up with General surgery. Patient denies previous similar episodes or recurrent episodes since his visit to the emergency room. He has not noticed any fevers, chills, nausea, vomiting, food intolerance, yellowing of the skin or eyes.  He does take a PPI for reflux.  He has a very active 88-year-old man and appears in good health.    I have reviewed the patient's chart including prior progress notes, procedures and testing. The patient has extensive heart history including bypass and valve replacement and ICD pacer placement.  He is on Eliquis currently.    ROS:   Review of Systems   Constitutional: Negative for activity change, chills and fever.   HENT: Negative for congestion, nosebleeds and trouble swallowing.    Eyes: Negative for photophobia, discharge and visual disturbance.   Respiratory: Negative for apnea, chest tightness and shortness of breath.    Cardiovascular: Negative for chest pain, palpitations and leg swelling.   Gastrointestinal: Positive for constipation. Negative for abdominal distention, abdominal pain (No further episodes of abdominal pain since his initial presentation to the emergency room), nausea and vomiting.   Genitourinary: Negative for difficulty urinating, dysuria and hematuria.   Musculoskeletal: Negative for arthralgias, gait problem, neck pain and neck stiffness.   Skin: Negative  for color change, pallor, rash and wound.   Neurological: Negative for seizures, syncope and light-headedness.   Psychiatric/Behavioral: Negative for agitation, behavioral problems and confusion.       PROBLEM LIST:  Patient Active Problem List   Diagnosis    CKD (chronic kidney disease) stage 3, GFR 30-59 ml/min    Dyslipidemia    Hypertension    Nuclear sclerosis    Macular degeneration (senile) of retina    History of heart bypass surgery    History of MI (myocardial infarction)    Renal artery stenosis    Atherosclerosis of aorta    PSC (posterior subcapsular cataract), right    Refractive error    Nonexudative senile macular retinal degeneration    Prominent aorta    Body mass index (BMI) 23.0-23.9, adult    Hearing loss in right ear    Complete AV block    CAD (coronary artery disease)    S/P AVR (aortic valve replacement) with possible stenosis    Heart block    Chronic diastolic heart failure    S/P placement of cardiac pacemaker    Persistent atrial fibrillation    Pacemaker    Other chronic pulmonary heart diseases    Long term (current) use of anticoagulants    Fatigue    Pulmonary hypertension         HISTORY  Past Medical History:   Diagnosis Date    Acute on chronic diastolic heart failure 9/1/2016    Coronary artery disease     Hyperlipidemia     Hypertension     Macular degeneration (senile) of retina, unspecified 12/12/2014    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    Persistent atrial fibrillation 11/10/2016    S/P placement of cardiac pacemaker 9/14/2016       Past Surgical History:   Procedure Laterality Date    AORTIC VALVE REPLACEMENT N/A     CARDIAC PACEMAKER PLACEMENT      CATARACT EXTRACTION W/  INTRAOCULAR LENS IMPLANT Right 2/16/2016    Dr. Whitley    CATARACT EXTRACTION W/  INTRAOCULAR LENS IMPLANT Left 3/1/2016    Dr. Whitley    CORONARY ARTERY BYPASS GRAFT      EAR EXAMINATION UNDER ANESTHESIA      EYE SURGERY      INSERTION-INTRAOCULAR LENS (IOL)  Left 3/1/2016    [facility]    INSERTION-INTRAOCULAR LENS (IOL) Right 2/16/2016    [facility]    PHACOEMULSIFICATION-ASPIRATION-CATARACT Left 3/1/2016    [facility]    PHACOEMULSIFICATION-ASPIRATION-CATARACT Right 2/16/2016    [facility]       Social History     Tobacco Use    Smoking status: Never Smoker    Smokeless tobacco: Never Used   Substance Use Topics    Alcohol use: No    Drug use: No       Family History   Problem Relation Age of Onset    No Known Problems Mother     No Known Problems Father     No Known Problems Sister     Stroke Brother     Hypertension Brother     No Known Problems Maternal Aunt     No Known Problems Maternal Uncle     No Known Problems Paternal Aunt     No Known Problems Paternal Uncle     No Known Problems Maternal Grandmother     No Known Problems Maternal Grandfather     No Known Problems Paternal Grandmother     No Known Problems Paternal Grandfather     No Known Problems Daughter     No Known Problems Son     Amblyopia Neg Hx     Blindness Neg Hx     Cancer Neg Hx     Cataracts Neg Hx     Diabetes Neg Hx     Glaucoma Neg Hx     Macular degeneration Neg Hx     Retinal detachment Neg Hx     Strabismus Neg Hx     Thyroid disease Neg Hx          MEDS:  Current Outpatient Medications on File Prior to Visit   Medication Sig Dispense Refill    apixaban (ELIQUIS) 5 mg Tab Take 1 tablet (5 mg total) by mouth 2 (two) times daily. 60 tablet 11    aspirin (ECOTRIN) 81 MG EC tablet Take 1 tablet (81 mg total) by mouth once daily. 90 tablet 3    carvedilol (COREG) 12.5 MG tablet TAKE 1 TABLET (12.5 MG TOTAL) BY MOUTH TWO TIMES DAILY. (Patient taking differently: TAKE 1 TABLET (12.5 MG TOTAL) BY MOUTH EVERY OTHER DAY) 180 tablet 1    ferrous sulfate (FEOSOL) 325 mg (65 mg  iron) Tab tablet Take 1 tablet (325 mg total) by mouth daily with breakfast. 90 tablet 3    multivitamin (MULTIVITAMIN) per tablet Take 1 tablet by mouth once daily.        nitroGLYCERIN (NITROSTAT) 0.4 MG SL tablet Take one tablet every 5 minutes for chest pain. After the third tablet go to the ED. 25 tablet 4    omeprazole (PRILOSEC) 20 MG capsule Take 1 capsule (20 mg total) by mouth daily as needed (gastritis). 90 capsule 0    potassium chloride (KLOR-CON) 8 MEQ TbSR Take 1 tablet (8 mEq total) by mouth every other day. 45 tablet 3    torsemide (DEMADEX) 10 MG Tab Take 20 mg by mouth once daily. Two 10 mg tablets by mouth every other day.      benzonatate (TESSALON PERLES) 100 MG capsule Take 1 capsule (100 mg total) by mouth every 6 (six) hours as needed for Cough. 30 capsule 1    candesartan (ATACAND) 4 MG tablet Take 1 tablet (4 mg total) by mouth once daily. 90 tablet 3    docusate sodium (COLACE) 100 MG capsule Take 1 capsule (100 mg total) by mouth 2 (two) times daily. 30 capsule 0    pantoprazole (PROTONIX) 20 MG tablet Take 1 tablet (20 mg total) by mouth once daily. 30 tablet 0    polyethylene glycol (GLYCOLAX) 17 gram/dose powder Take 17 g by mouth once daily. 1 Bottle 0     No current facility-administered medications on file prior to visit.        ALLERGIES:  Review of patient's allergies indicates:   Allergen Reactions    Iodine and iodide containing products Other (See Comments)     Caused changes in skin color         VITALS:  Vitals:    05/16/19 1105   BP: (!) 160/77   Pulse: 70   Temp: 96.9 °F (36.1 °C)         PHYSICAL EXAM:  Physical Exam   Constitutional: He is oriented to person, place, and time. He appears well-developed and well-nourished. No distress.   HENT:   Head: Normocephalic and atraumatic.   Nose: Nose normal.   Eyes: Conjunctivae and EOM are normal. No scleral icterus.   Neck: Normal range of motion. Neck supple. No tracheal deviation present.   Cardiovascular: Normal  rate, regular rhythm and intact distal pulses.   Pulmonary/Chest: Effort normal and breath sounds normal. No stridor. No respiratory distress.   Abdominal: Soft. He exhibits no distension and no ascites. There is no tenderness. Hernia confirmed negative in the ventral area, confirmed negative in the right inguinal area and confirmed negative in the left inguinal area.   Genitourinary: Testes normal. Cremasteric reflex is present.   Musculoskeletal: Normal range of motion. He exhibits no edema or deformity.   Lymphadenopathy: No inguinal adenopathy noted on the right or left side.        Right: No inguinal adenopathy present.        Left: No inguinal adenopathy present.   Neurological: He is alert and oriented to person, place, and time. He is not disoriented. He exhibits normal muscle tone.   Skin: Skin is warm and dry. He is not diaphoretic. No erythema.   Psychiatric: He has a normal mood and affect. His behavior is normal. Judgment and thought content normal.   Vitals reviewed.        LABS:  Lab Results   Component Value Date    WBC 9.57 05/07/2019    RBC 3.02 (L) 05/07/2019    HGB 9.6 (L) 05/07/2019    HCT 30.4 (L) 05/07/2019    HCT 39 08/31/2016     05/07/2019     Lab Results   Component Value Date    GLU 89 05/07/2019     05/07/2019    K 5.1 05/07/2019     05/07/2019    CO2 22 (L) 05/07/2019    BUN 34 (H) 05/07/2019    CREATININE 1.6 (H) 05/07/2019    CALCIUM 9.4 05/07/2019     Lab Results   Component Value Date    ALT 9 (L) 05/07/2019    AST 27 05/07/2019    ALKPHOS 89 05/07/2019    BILITOT 0.9 05/07/2019     Lab Results   Component Value Date    MG 2.0 09/13/2016    PHOS 3.7 09/19/2018       STUDIES:  Ultrasound images and reports were personally reviewed.  Patient has no evidence cholecystitis, there is no pericholecystic fluid and gallbladder wall is not thickened.  Common bile duct was not enlarged.  Patient did have multiple stones with the largest measuring 2.5 cm.    FINDINGS:  The  liver is normal in size measuring 13.1cm.  Hepatic parenchyma is homogeneous without evidence for masses.  No intra- or extrahepatic biliary ductal dilatation. The common bile duct measures 0.4 cm.  The gallbladder demonstrates a single mobile 2.5 cm shadowing stone.  No evidence of gallbladder wall thickening or pericholecystic fluid.  Sonographic Rosen's sign is negative.  Pancreas is obscured by overlying bowel gas.  Visualized portion of the IVC is unremarkable.  Spleen is normal in size measuring 8.7 cm.  No ascites.      Impression       Cholelithiasis.  No ultrasonographic evidence to suggest acute cholecystitis.           ASSESSMENT & PLAN:  88 y.o. male with gallstones  - acute episode of pain appears to be more likely related to gastritis or indigestion given his immediate relief with GI cocktail, recommend continuing PPI, if symptoms redevelop would recommend EGD  - at this time, especially given the patient's cardiac history, I would not recommend cholecystectomy  - I did explain that having 2.5 cm gallstones does increase the patient's risk for cholangiocarcinoma however given his age and no other concerning findings on ultrasound he wishes to defer intervention which I agree with.  - return to clinic p.r.n.

## 2019-05-16 NOTE — TELEPHONE ENCOUNTER
----- Message from Elisha Miranda sent at 5/16/2019  4:49 PM CDT -----  Contact: [phone]/self  Refill request for:  omeprazole (PRILOSEC) 20 MG capsule  Be sent to:  [facility] - [facility] [address]

## 2019-05-28 RX ORDER — OMEPRAZOLE 20 MG/1
20 CAPSULE, DELAYED RELEASE ORAL DAILY PRN
Qty: 90 CAPSULE | Refills: 3 | Status: SHIPPED | OUTPATIENT
Start: 2019-05-28 | End: 2020-01-08 | Stop reason: SDUPTHER

## 2019-05-28 NOTE — TELEPHONE ENCOUNTER
----- Message from Conchis Jose sent at 5/28/2019  9:21 AM CDT -----  Contact: Self [phone]  Patient is calling to talk to nurse in regards to patients medication.

## 2019-07-01 NOTE — PROGRESS NOTES
Mr. Moody is a patient of Dr. Hastings and was last seen in clinic 10/25/2018.      Subjective:   Patient ID:  Deena Moody is a 88 y.o. male who presents for follow-up of Atrial Fibrillation and Pacemaker Check  .     HPI:    Mr. Moody is an 88 y.o. male with HTN, CKD III, CAD (CABG/AVR 2004), AVB s/p PPM, AF here for follow up.     Background:    Mr. Moody has a history of hypertension, chronic kidney disease stage III, CABG/AVR 2004 with preserved ejection fraction, and symptomatic high-grade AV block s/p dual-chamber pacemaker implantation 9/2016.   On remote monitoring persistent atrial fibrillation was diagnosed that began 10/2016. He was asymptomatic and started on eliquis for stroke prophylaxis. At his 6 month evaluation after starting eliquis he felt well with stable H/H and creatinine. ECHO 2/2018 was normal.  Was in asymptomatic AF at last clinic appt 10/2018. [id number] 4. Plan was to continue rate control strategy.     Update (07/02/2019):    Today he says he feels well. No cardiac complaints. Mr. Moody denies chest pain with exertion or at rest, palpitations, SOB, MCKEON, dizziness, or syncope.    He is currently taking eliquis 5mg BID for stroke prophylaxis and denies significant bleeding episodes. He is currently being treated with coreg 12.5mg BID for HR control.  Kidney function is decreased, with a creatinine of 1.6 on 5/7/2019.    Device Interrogation (7/2/2019) reveals an intrinsic AF with stable lead and device function. No ventricular arrhythmias or treated episodes were noted.  He paces 0% in the RA and 100% in the RV. Estimated battery longevity 7-11 years.     I have personally reviewed the patient's EKG today, which shows AF with V pacing at 70bpm. QTc is 511.    Recent Cardiac Tests:    2D Echo (1/31/2018):  CONCLUSIONS     1 - Biatrial enlargement.     2 - Normal left ventricular systolic function (EF 60-65%).     3 - S/P surgical AVR, NAVNEET = 1.13 cm2, AVAi = 0.6 cm2/m2, peak  velocity = 2.85 m/s, mean gradient = 18 mmHg.     4 - Mild mitral regurgitation.     5 - Mild tricuspid regurgitation.     6 - Pulmonary hypertension. The estimated PA systolic pressure is greater than 70 mmHg.     7 - In atrial fibrillation.     Current Outpatient Medications   Medication Sig    apixaban (ELIQUIS) 5 mg Tab Take 1 tablet (5 mg total) by mouth 2 (two) times daily.    aspirin (ECOTRIN) 81 MG EC tablet Take 1 tablet (81 mg total) by mouth once daily.    benzonatate (TESSALON PERLES) 100 MG capsule Take 1 capsule (100 mg total) by mouth every 6 (six) hours as needed for Cough.    candesartan (ATACAND) 4 MG tablet Take 1 tablet (4 mg total) by mouth once daily.    carvedilol (COREG) 12.5 MG tablet TAKE 1 TABLET (12.5 MG TOTAL) BY MOUTH TWO TIMES DAILY. (Patient taking differently: TAKE 1 TABLET (12.5 MG TOTAL) BY MOUTH EVERY OTHER DAY)    docusate sodium (COLACE) 100 MG capsule Take 1 capsule (100 mg total) by mouth 2 (two) times daily.    ferrous sulfate (FEOSOL) 325 mg (65 mg iron) Tab tablet Take 1 tablet (325 mg total) by mouth daily with breakfast.    multivitamin (MULTIVITAMIN) per tablet Take 1 tablet by mouth once daily.      nitroGLYCERIN (NITROSTAT) 0.4 MG SL tablet Take one tablet every 5 minutes for chest pain. After the third tablet go to the ED.    omeprazole (PRILOSEC) 20 MG capsule Take 1 capsule (20 mg total) by mouth daily as needed (gastritis).    pantoprazole (PROTONIX) 20 MG tablet Take 1 tablet (20 mg total) by mouth once daily.    polyethylene glycol (GLYCOLAX) 17 gram/dose powder Take 17 g by mouth once daily.    potassium chloride (KLOR-CON) 8 MEQ TbSR Take 1 tablet (8 mEq total) by mouth every other day.    torsemide (DEMADEX) 10 MG Tab Take 20 mg by mouth once daily. Two 10 mg tablets by mouth every other day.     No current facility-administered medications for this visit.        Review of Systems   Constitution: Negative for malaise/fatigue.   Cardiovascular:  "Negative for chest pain, dyspnea on exertion, irregular heartbeat, leg swelling and palpitations.   Respiratory: Negative for shortness of breath.    Hematologic/Lymphatic: Negative for bleeding problem.   Skin: Negative for rash.   Musculoskeletal: Negative for myalgias.   Gastrointestinal: Negative for hematemesis, hematochezia and nausea.   Genitourinary: Negative for hematuria.   Neurological: Negative for light-headedness.   Psychiatric/Behavioral: Negative for altered mental status.   Allergic/Immunologic: Negative for persistent infections.     Objective:          /60   Pulse 70   Ht 5' 8" (1.727 m)   Wt 63.1 kg (139 lb 1.8 oz)   BMI 21.15 kg/m²     Physical Exam   Constitutional: He is oriented to person, place, and time. He appears well-developed and well-nourished.   HENT:   Head: Normocephalic.   Nose: Nose normal.   Eyes: Pupils are equal, round, and reactive to light.   Cardiovascular: Normal rate, regular rhythm, S1 normal and S2 normal.   No murmur heard.  Pulses:       Radial pulses are 2+ on the right side, and 2+ on the left side.   Pulmonary/Chest: Breath sounds normal. No respiratory distress.   Device to LUCW.   Abdominal: Normal appearance.   Musculoskeletal: Normal range of motion. He exhibits no edema.   Neurological: He is alert and oriented to person, place, and time.   Skin: Skin is warm and dry. No erythema.   Psychiatric: He has a normal mood and affect. His speech is normal and behavior is normal.   Nursing note and vitals reviewed.      Lab Results   Component Value Date     05/07/2019    K 5.1 05/07/2019    MG 2.0 09/13/2016    BUN 34 (H) 05/07/2019    CREATININE 1.6 (H) 05/07/2019    ALT 9 (L) 05/07/2019    AST 27 05/07/2019    HGB 9.6 (L) 05/07/2019    HCT 30.4 (L) 05/07/2019    HCT 39 08/31/2016    TSH 2.709 12/30/2017    LDLCALC 68.4 07/02/2018       Recent Labs   Lab 08/31/16  0955 09/01/16  0249 09/13/16  2337 03/31/18  1035   INR 1.1 1.2 1.1 1.2     "       Assessment:     1. Pacemaker    2. Complete AV block    3. Essential hypertension    4. Heart block    5. Persistent atrial fibrillation    6. Long term (current) use of anticoagulants      Plan:     In summary, Mr. Moody is a 88 y.o. male with HTN, CKD III, CAD (CABG/AVR 2004), AVB s/p PPM, AF here for follow up.   Mr. Moody is doing well from a device perspective with stable lead and device function. No ventricular arrhythmia noted. 100% AF burden. Asymptomatic. On eliquis - last creatinine was 1.6. Review of past labs shows new baseline is likely 1.6. Will recheck BMP today. If his baseline is above 1.5, will decreased eliquis to 2.5mg BID dose due to age >80. (ADDENDUM: Creatinine remains 1.6. Will decrease eliquis dose). Last CBC showed anemia - will recheck this as well. 100% RV pacing with no CHF symptoms. He feels well.     BMP and CBC today.   ADDENDUM: Decrease eliquis to 2.5mg BID.  Continue current medication regimen and device settings.   Follow up in device clinic as scheduled.   Follow up in EP clinic in 6 months, sooner as needed.     *A copy of this note has been sent to Dr. Hastings*    Follow up in about 6 months (around 1/2/2020).    ------------------------------------------------------------------    ARCENIO Fagan, NP-C  Cardiac Electrophysiology

## 2019-07-02 ENCOUNTER — OFFICE VISIT (OUTPATIENT)
Dept: ELECTROPHYSIOLOGY | Facility: CLINIC | Age: 84
End: 2019-07-02
Attending: INTERNAL MEDICINE
Payer: MEDICARE

## 2019-07-02 ENCOUNTER — CLINICAL SUPPORT (OUTPATIENT)
Dept: CARDIOLOGY | Facility: HOSPITAL | Age: 84
End: 2019-07-02
Attending: INTERNAL MEDICINE
Payer: MEDICARE

## 2019-07-02 ENCOUNTER — HOSPITAL ENCOUNTER (OUTPATIENT)
Dept: CARDIOLOGY | Facility: CLINIC | Age: 84
Discharge: HOME | End: 2019-07-02
Attending: INTERNAL MEDICINE
Payer: MEDICARE

## 2019-07-02 VITALS
BODY MASS INDEX: 21.09 KG/M2 | SYSTOLIC BLOOD PRESSURE: 110 MMHG | WEIGHT: 139.13 LBS | DIASTOLIC BLOOD PRESSURE: 60 MMHG | HEART RATE: 70 BPM | HEIGHT: 68 IN

## 2019-07-02 DIAGNOSIS — Z95.0 CARDIAC PACEMAKER IN SITU: ICD-10-CM

## 2019-07-02 DIAGNOSIS — I48.19 PERSISTENT ATRIAL FIBRILLATION: ICD-10-CM

## 2019-07-02 DIAGNOSIS — Z79.01 LONG TERM (CURRENT) USE OF ANTICOAGULANTS: ICD-10-CM

## 2019-07-02 DIAGNOSIS — I10 ESSENTIAL HYPERTENSION: ICD-10-CM

## 2019-07-02 DIAGNOSIS — I45.9 HEART BLOCK: ICD-10-CM

## 2019-07-02 DIAGNOSIS — Z95.0 PACEMAKER: Primary | Chronic | ICD-10-CM

## 2019-07-02 DIAGNOSIS — I44.2 COMPLETE AV BLOCK: Chronic | ICD-10-CM

## 2019-07-02 PROCEDURE — 93005 RHYTHM STRIP: ICD-10-PCS | Mod: HCNC,S$GLB,, | Performed by: INTERNAL MEDICINE

## 2019-07-02 PROCEDURE — 93010 ELECTROCARDIOGRAM REPORT: CPT | Mod: HCNC,S$GLB,, | Performed by: INTERNAL MEDICINE

## 2019-07-02 PROCEDURE — 93280 PM DEVICE PROGR EVAL DUAL: CPT | Mod: HCNC

## 2019-07-02 PROCEDURE — 93010 RHYTHM STRIP: ICD-10-PCS | Mod: HCNC,S$GLB,, | Performed by: INTERNAL MEDICINE

## 2019-07-02 PROCEDURE — 1101F PT FALLS ASSESS-DOCD LE1/YR: CPT | Mod: HCNC,CPTII,S$GLB, | Performed by: NURSE PRACTITIONER

## 2019-07-02 PROCEDURE — 99499 UNLISTED E&M SERVICE: CPT | Mod: HCNC,S$GLB,, | Performed by: NURSE PRACTITIONER

## 2019-07-02 PROCEDURE — 93005 ELECTROCARDIOGRAM TRACING: CPT | Mod: HCNC,S$GLB,, | Performed by: INTERNAL MEDICINE

## 2019-07-02 PROCEDURE — 99999 PR PBB SHADOW E&M-EST. PATIENT-LVL III: CPT | Mod: PBBFAC,HCNC,, | Performed by: NURSE PRACTITIONER

## 2019-07-02 PROCEDURE — 1101F PR PT FALLS ASSESS DOC 0-1 FALLS W/OUT INJ PAST YR: ICD-10-PCS | Mod: HCNC,CPTII,S$GLB, | Performed by: NURSE PRACTITIONER

## 2019-07-02 PROCEDURE — 99214 PR OFFICE/OUTPT VISIT, EST, LEVL IV, 30-39 MIN: ICD-10-PCS | Mod: HCNC,S$GLB,, | Performed by: NURSE PRACTITIONER

## 2019-07-02 PROCEDURE — 99214 OFFICE O/P EST MOD 30 MIN: CPT | Mod: HCNC,S$GLB,, | Performed by: NURSE PRACTITIONER

## 2019-07-02 PROCEDURE — 99499 RISK ADDL DX/OHS AUDIT: ICD-10-PCS | Mod: HCNC,S$GLB,, | Performed by: NURSE PRACTITIONER

## 2019-07-02 PROCEDURE — 99999 PR PBB SHADOW E&M-EST. PATIENT-LVL III: ICD-10-PCS | Mod: PBBFAC,HCNC,, | Performed by: NURSE PRACTITIONER

## 2019-07-02 RX ORDER — BENAZEPRIL HYDROCHLORIDE 5 MG/1
5 TABLET ORAL DAILY
COMMUNITY
End: 2020-01-08 | Stop reason: SDUPTHER

## 2019-07-02 NOTE — LETTER
July 2, 2019      Donn Hastings MD  [address]  [facility] [address]           [facility] Arrhythmia  [address]  [facility] [address]  Phone: [phone]  Fax: [phone]          Patient: Deena Moody   MR Number: [id number]   YOB: 1930   Date of Visit: 7/2/2019       Dear Dr. Donn Hastings:    Thank you for referring Deena Moody to me for evaluation. Attached you will find relevant portions of my assessment and plan of care.    If you have questions, please do not hesitate to call me. I look forward to following Deena Moody along with you.    Sincerely,    Ying Castanon NP    Enclosure  CC:  No Recipients    If you would like to receive this communication electronically, please contact [email or web address] or [phone] to request more information on [facility] [employer] access.    For providers and/or their staff who would like to refer a patient to [facility], please contact us through our one-stop-shop provider referral line, [facility], at [phone].    If you feel you have received this communication in error or would no longer like to receive these types of communications, please e-mail [email or web address]

## 2019-07-02 NOTE — PROGRESS NOTES
Patient, Deena Moody (MRN #[id number]), presented with a recent Estimated PA Systolic Pressure greater than 40 mmHG consistent with the definition of pulmonary hypertension (ICD10 - I27.0).    Est. PA Systolic Pressure   Date Value Ref Range Status   01/31/2018 69.56 (A)       The patient's pulmonary hypertension was monitored, evaluated, addressed and/or treated. This addendum to the medical record is made on 07/02/2019.

## 2019-08-12 DIAGNOSIS — N18.30 CKD (CHRONIC KIDNEY DISEASE) STAGE 3, GFR 30-59 ML/MIN: ICD-10-CM

## 2019-08-12 DIAGNOSIS — D63.8 CHRONIC DISEASE ANEMIA: ICD-10-CM

## 2019-08-12 RX ORDER — FERROUS SULFATE 325(65) MG
TABLET ORAL
Qty: 90 TABLET | Refills: 3 | Status: SHIPPED | OUTPATIENT
Start: 2019-08-12 | End: 2021-01-07 | Stop reason: SDUPTHER

## 2019-08-12 RX ORDER — POTASSIUM CHLORIDE 600 MG/1
TABLET, FILM COATED, EXTENDED RELEASE ORAL
Qty: 45 TABLET | Refills: 3 | Status: SHIPPED | OUTPATIENT
Start: 2019-08-12 | End: 2020-01-08 | Stop reason: SDUPTHER

## 2019-10-23 ENCOUNTER — OFFICE VISIT (OUTPATIENT)
Dept: FAMILY MEDICINE | Facility: CLINIC | Age: 84
End: 2019-10-23
Payer: MEDICARE

## 2019-10-23 ENCOUNTER — HOSPITAL ENCOUNTER (OUTPATIENT)
Dept: RADIOLOGY | Facility: HOSPITAL | Age: 84
Discharge: HOME | End: 2019-10-23
Attending: FAMILY MEDICINE
Payer: MEDICARE

## 2019-10-23 VITALS
OXYGEN SATURATION: 99 % | BODY MASS INDEX: 21.02 KG/M2 | HEIGHT: 68 IN | SYSTOLIC BLOOD PRESSURE: 130 MMHG | DIASTOLIC BLOOD PRESSURE: 60 MMHG | WEIGHT: 138.69 LBS | HEART RATE: 71 BPM

## 2019-10-23 DIAGNOSIS — I10 ESSENTIAL HYPERTENSION: Primary | ICD-10-CM

## 2019-10-23 DIAGNOSIS — M70.62 TROCHANTERIC BURSITIS OF LEFT HIP: ICD-10-CM

## 2019-10-23 PROCEDURE — 73502 X-RAY EXAM HIP UNI 2-3 VIEWS: CPT | Mod: TC,HCNC,FY,PO,LT

## 2019-10-23 PROCEDURE — 1101F PT FALLS ASSESS-DOCD LE1/YR: CPT | Mod: HCNC,CPTII,S$GLB, | Performed by: FAMILY MEDICINE

## 2019-10-23 PROCEDURE — 1101F PR PT FALLS ASSESS DOC 0-1 FALLS W/OUT INJ PAST YR: ICD-10-PCS | Mod: HCNC,CPTII,S$GLB, | Performed by: FAMILY MEDICINE

## 2019-10-23 PROCEDURE — 20610 DRAIN/INJ JOINT/BURSA W/O US: CPT | Mod: HCNC,LT,S$GLB, | Performed by: FAMILY MEDICINE

## 2019-10-23 PROCEDURE — 99214 PR OFFICE/OUTPT VISIT, EST, LEVL IV, 30-39 MIN: ICD-10-PCS | Mod: HCNC,25,S$GLB, | Performed by: FAMILY MEDICINE

## 2019-10-23 PROCEDURE — 73502 XR HIP 2 VIEW LEFT: ICD-10-PCS | Mod: 26,HCNC,LT, | Performed by: RADIOLOGY

## 2019-10-23 PROCEDURE — 20610 LARGE JOINT ASPIRATION/INJECTION: ICD-10-PCS | Mod: HCNC,LT,S$GLB, | Performed by: FAMILY MEDICINE

## 2019-10-23 PROCEDURE — 73502 X-RAY EXAM HIP UNI 2-3 VIEWS: CPT | Mod: 26,HCNC,LT, | Performed by: RADIOLOGY

## 2019-10-23 PROCEDURE — 99999 PR PBB SHADOW E&M-EST. PATIENT-LVL III: CPT | Mod: PBBFAC,HCNC,, | Performed by: FAMILY MEDICINE

## 2019-10-23 PROCEDURE — 99999 PR PBB SHADOW E&M-EST. PATIENT-LVL III: ICD-10-PCS | Mod: PBBFAC,HCNC,, | Performed by: FAMILY MEDICINE

## 2019-10-23 PROCEDURE — 99214 OFFICE O/P EST MOD 30 MIN: CPT | Mod: HCNC,25,S$GLB, | Performed by: FAMILY MEDICINE

## 2019-10-23 RX ORDER — PREDNISONE 5 MG/1
5 TABLET ORAL 2 TIMES DAILY
Qty: 10 TABLET | Refills: 0 | Status: SHIPPED | OUTPATIENT
Start: 2019-10-23 | End: 2019-10-28

## 2019-10-23 NOTE — PROCEDURES
Large Joint Aspiration/Injection  Date/Time: 10/23/2019 2:20 PM  Performed by: Jam Rowell MD  Authorized by: Jam Rowell MD     Consent Done?:  Yes (Verbal)  Indications:  Pain  Procedure site marked: Yes    Anesthesia    Anesthetic: lidocaine 2% without epinephrine  Anesthetic total: 9mL  Needle size:  22 G  Approach:  Lateral  Medications:  40 mg triamcinolone hexacetonide 20 mg/mL  Patient tolerance:  Patient tolerated the procedure well with no immediate complications

## 2019-10-23 NOTE — PROGRESS NOTES
Subjective:       Patient ID: Deena Moody is a 88 y.o. male.    Chief Complaint: Fall (x 3 days) and Leg Pain    88 years old male came to the clinic with left hip pain for the last week.  Patient denies a fall.  The pain is 6/10 of intensity on and off aggravated with activity and better with rest.  Pain is located over the lateral side.  Blood pressure today stable.  No chest pain, palpitation, orthopnea or PND.    Review of Systems   Constitutional: Negative.    HENT: Negative.    Eyes: Negative.    Respiratory: Negative.    Cardiovascular: Negative.  Negative for chest pain, palpitations and leg swelling.   Gastrointestinal: Negative.    Genitourinary: Negative.    Musculoskeletal: Positive for arthralgias.   Skin: Negative.    Neurological: Negative.    Psychiatric/Behavioral: Negative.        Objective:      Physical Exam   Constitutional: He is oriented to person, place, and time. He appears well-developed and well-nourished. No distress.   HENT:   Head: Normocephalic and atraumatic.   Right Ear: External ear normal.   Left Ear: External ear normal.   Nose: Nose normal.   Mouth/Throat: Oropharynx is clear and moist. No oropharyngeal exudate.   Eyes: Pupils are equal, round, and reactive to light. Conjunctivae and EOM are normal. Right eye exhibits no discharge. Left eye exhibits no discharge. No scleral icterus.   Neck: Normal range of motion. Neck supple. No JVD present. No tracheal deviation present. No thyromegaly present.   Cardiovascular: Normal rate, regular rhythm, normal heart sounds and intact distal pulses. Exam reveals no gallop and no friction rub.   No murmur heard.  Pulmonary/Chest: Effort normal and breath sounds normal. No stridor. No respiratory distress. He has no wheezes. He has no rales. He exhibits no tenderness.   Abdominal: Soft. Bowel sounds are normal. He exhibits no distension and no mass. There is no tenderness. There is no rebound and no guarding.   Musculoskeletal: Normal  range of motion. He exhibits no edema.        Left hip: He exhibits tenderness.        Legs:  Lymphadenopathy:     He has no cervical adenopathy.   Neurological: He is alert and oriented to person, place, and time. He has normal reflexes. He displays normal reflexes. No cranial nerve deficit. He exhibits normal muscle tone. Coordination and gait abnormal.   Skin: Skin is warm and dry. No rash noted. He is not diaphoretic. No erythema. No pallor.   Psychiatric: He has a normal mood and affect. His behavior is normal. Judgment and thought content normal.   Nursing note and vitals reviewed.      Assessment:       1. Essential hypertension    2. Trochanteric bursitis of left hip        Plan:         Deena was seen today for fall and leg pain.    Diagnoses and all orders for this visit:    Essential hypertension    Trochanteric bursitis of left hip  -     predniSONE (DELTASONE) 5 MG tablet; Take 1 tablet (5 mg total) by mouth 2 (two) times daily. for 5 days  -     X-Ray Hip 2 View Left; Future  -     Large Joint Aspiration/Injection  -     triamcinolone hexacetonide injection 40 mg    Continue monitoring blood pressure at home, low sodium diet.

## 2019-10-27 ENCOUNTER — CLINICAL SUPPORT (OUTPATIENT)
Dept: CARDIOLOGY | Facility: HOSPITAL | Age: 84
End: 2019-10-27
Payer: MEDICARE

## 2019-10-27 DIAGNOSIS — I44.2 ATRIOVENTRICULAR BLOCK, COMPLETE: ICD-10-CM

## 2019-10-27 DIAGNOSIS — Z95.0 PRESENCE OF CARDIAC PACEMAKER: ICD-10-CM

## 2019-10-27 DIAGNOSIS — I48.91 UNSPECIFIED ATRIAL FIBRILLATION: ICD-10-CM

## 2019-10-27 PROCEDURE — 93296 REM INTERROG EVL PM/IDS: CPT | Performed by: INTERNAL MEDICINE

## 2019-10-27 PROCEDURE — 93294 CARDIAC DEVICE CHECK - REMOTE: ICD-10-PCS | Mod: ,,, | Performed by: INTERNAL MEDICINE

## 2019-10-27 PROCEDURE — 93294 REM INTERROG EVL PM/LDLS PM: CPT | Mod: ,,, | Performed by: INTERNAL MEDICINE

## 2020-01-08 DIAGNOSIS — N18.30 CKD (CHRONIC KIDNEY DISEASE) STAGE 3, GFR 30-59 ML/MIN: ICD-10-CM

## 2020-01-09 RX ORDER — OMEPRAZOLE 20 MG/1
20 CAPSULE, DELAYED RELEASE ORAL DAILY PRN
Qty: 90 CAPSULE | Refills: 3 | Status: SHIPPED | OUTPATIENT
Start: 2020-01-09 | End: 2021-03-18 | Stop reason: SDUPTHER

## 2020-01-09 RX ORDER — TORSEMIDE 10 MG/1
10 TABLET ORAL DAILY
Qty: 90 TABLET | Refills: 3 | Status: SHIPPED | OUTPATIENT
Start: 2020-01-09 | End: 2021-03-18 | Stop reason: SDUPTHER

## 2020-01-09 RX ORDER — CARVEDILOL 12.5 MG/1
12.5 TABLET ORAL 2 TIMES DAILY WITH MEALS
Qty: 180 TABLET | Refills: 3 | Status: SHIPPED | OUTPATIENT
Start: 2020-01-09 | End: 2021-03-18 | Stop reason: SDUPTHER

## 2020-01-09 RX ORDER — POTASSIUM CHLORIDE 600 MG/1
8 TABLET, FILM COATED, EXTENDED RELEASE ORAL EVERY OTHER DAY
Qty: 45 TABLET | Refills: 3 | Status: SHIPPED | OUTPATIENT
Start: 2020-01-09 | End: 2021-03-18 | Stop reason: SDUPTHER

## 2020-01-09 RX ORDER — BENAZEPRIL HYDROCHLORIDE 5 MG/1
5 TABLET ORAL DAILY
Qty: 90 TABLET | Refills: 3 | Status: SHIPPED | OUTPATIENT
Start: 2020-01-09 | End: 2021-01-21 | Stop reason: SDUPTHER

## 2020-01-13 ENCOUNTER — PES CALL (OUTPATIENT)
Dept: ADMINISTRATIVE | Facility: CLINIC | Age: 85
End: 2020-01-13

## 2020-01-25 ENCOUNTER — CLINICAL SUPPORT (OUTPATIENT)
Dept: CARDIOLOGY | Facility: HOSPITAL | Age: 85
End: 2020-01-25
Payer: MEDICARE

## 2020-01-25 DIAGNOSIS — Z95.0 PRESENCE OF CARDIAC PACEMAKER: ICD-10-CM

## 2020-01-25 PROCEDURE — 93294 CARDIAC DEVICE CHECK - REMOTE: ICD-10-PCS | Mod: ,,, | Performed by: INTERNAL MEDICINE

## 2020-01-25 PROCEDURE — 93296 REM INTERROG EVL PM/IDS: CPT | Performed by: INTERNAL MEDICINE

## 2020-01-25 PROCEDURE — 93294 REM INTERROG EVL PM/LDLS PM: CPT | Mod: ,,, | Performed by: INTERNAL MEDICINE

## 2020-01-27 DIAGNOSIS — I48.19 PERSISTENT ATRIAL FIBRILLATION: ICD-10-CM

## 2020-02-07 DIAGNOSIS — I48.19 PERSISTENT ATRIAL FIBRILLATION: ICD-10-CM

## 2020-02-07 NOTE — TELEPHONE ENCOUNTER
----- Message from Meg Villarreal sent at 2/7/2020 11:16 AM CST -----  Contact: Pt wife Deena   Pt need a refill on medication apixaban (ELIQUIS) 2.5 mg Tab and send to [id number] [address] AT [facility] [address] [phone] (Phone)[phone] (Fax). Thank you.

## 2020-02-18 ENCOUNTER — PATIENT OUTREACH (OUTPATIENT)
Dept: ADMINISTRATIVE | Facility: OTHER | Age: 85
End: 2020-02-18

## 2020-02-20 ENCOUNTER — HOSPITAL ENCOUNTER (OUTPATIENT)
Dept: CARDIOLOGY | Facility: CLINIC | Age: 85
Discharge: HOME | End: 2020-02-20
Payer: MEDICARE

## 2020-02-20 ENCOUNTER — CLINICAL SUPPORT (OUTPATIENT)
Dept: CARDIOLOGY | Facility: HOSPITAL | Age: 85
End: 2020-02-20
Attending: INTERNAL MEDICINE
Payer: MEDICARE

## 2020-02-20 ENCOUNTER — TELEPHONE (OUTPATIENT)
Dept: ELECTROPHYSIOLOGY | Facility: CLINIC | Age: 85
End: 2020-02-20

## 2020-02-20 ENCOUNTER — OFFICE VISIT (OUTPATIENT)
Dept: ELECTROPHYSIOLOGY | Facility: CLINIC | Age: 85
End: 2020-02-20
Payer: MEDICARE

## 2020-02-20 VITALS
DIASTOLIC BLOOD PRESSURE: 70 MMHG | SYSTOLIC BLOOD PRESSURE: 120 MMHG | WEIGHT: 141.31 LBS | BODY MASS INDEX: 21.42 KG/M2 | HEART RATE: 70 BPM | HEIGHT: 68 IN

## 2020-02-20 DIAGNOSIS — Z95.0 CARDIAC PACEMAKER IN SITU: ICD-10-CM

## 2020-02-20 DIAGNOSIS — Z95.0 PACEMAKER: Chronic | ICD-10-CM

## 2020-02-20 DIAGNOSIS — I48.19 PERSISTENT ATRIAL FIBRILLATION: ICD-10-CM

## 2020-02-20 DIAGNOSIS — I44.2 COMPLETE AV BLOCK: Primary | Chronic | ICD-10-CM

## 2020-02-20 DIAGNOSIS — I25.10 CORONARY ARTERY DISEASE INVOLVING NATIVE CORONARY ARTERY OF NATIVE HEART WITHOUT ANGINA PECTORIS: ICD-10-CM

## 2020-02-20 PROCEDURE — 93010 ELECTROCARDIOGRAM REPORT: CPT | Mod: HCNC,S$GLB,, | Performed by: INTERNAL MEDICINE

## 2020-02-20 PROCEDURE — 99214 PR OFFICE/OUTPT VISIT, EST, LEVL IV, 30-39 MIN: ICD-10-PCS | Mod: HCNC,S$GLB,, | Performed by: INTERNAL MEDICINE

## 2020-02-20 PROCEDURE — 1126F PR PAIN SEVERITY QUANTIFIED, NO PAIN PRESENT: ICD-10-PCS | Mod: HCNC,S$GLB,, | Performed by: INTERNAL MEDICINE

## 2020-02-20 PROCEDURE — 99999 PR PBB SHADOW E&M-EST. PATIENT-LVL III: CPT | Mod: PBBFAC,HCNC,, | Performed by: INTERNAL MEDICINE

## 2020-02-20 PROCEDURE — 93280 PM DEVICE PROGR EVAL DUAL: CPT | Mod: HCNC

## 2020-02-20 PROCEDURE — 93010 RHYTHM STRIP: ICD-10-PCS | Mod: HCNC,S$GLB,, | Performed by: INTERNAL MEDICINE

## 2020-02-20 PROCEDURE — 99999 PR PBB SHADOW E&M-EST. PATIENT-LVL III: ICD-10-PCS | Mod: PBBFAC,HCNC,, | Performed by: INTERNAL MEDICINE

## 2020-02-20 PROCEDURE — 99499 UNLISTED E&M SERVICE: CPT | Mod: HCNC,S$GLB,, | Performed by: INTERNAL MEDICINE

## 2020-02-20 PROCEDURE — 99214 OFFICE O/P EST MOD 30 MIN: CPT | Mod: HCNC,S$GLB,, | Performed by: INTERNAL MEDICINE

## 2020-02-20 PROCEDURE — 1101F PT FALLS ASSESS-DOCD LE1/YR: CPT | Mod: HCNC,CPTII,S$GLB, | Performed by: INTERNAL MEDICINE

## 2020-02-20 PROCEDURE — 99499 RISK ADDL DX/OHS AUDIT: ICD-10-PCS | Mod: HCNC,S$GLB,, | Performed by: INTERNAL MEDICINE

## 2020-02-20 PROCEDURE — 93005 RHYTHM STRIP: ICD-10-PCS | Mod: HCNC,S$GLB,, | Performed by: INTERNAL MEDICINE

## 2020-02-20 PROCEDURE — 93005 ELECTROCARDIOGRAM TRACING: CPT | Mod: HCNC,S$GLB,, | Performed by: INTERNAL MEDICINE

## 2020-02-20 PROCEDURE — 1101F PR PT FALLS ASSESS DOC 0-1 FALLS W/OUT INJ PAST YR: ICD-10-PCS | Mod: HCNC,CPTII,S$GLB, | Performed by: INTERNAL MEDICINE

## 2020-02-20 PROCEDURE — 1159F PR MEDICATION LIST DOCUMENTED IN MEDICAL RECORD: ICD-10-PCS | Mod: HCNC,S$GLB,, | Performed by: INTERNAL MEDICINE

## 2020-02-20 PROCEDURE — 1126F AMNT PAIN NOTED NONE PRSNT: CPT | Mod: HCNC,S$GLB,, | Performed by: INTERNAL MEDICINE

## 2020-02-20 PROCEDURE — 1159F MED LIST DOCD IN RCRD: CPT | Mod: HCNC,S$GLB,, | Performed by: INTERNAL MEDICINE

## 2020-02-20 NOTE — TELEPHONE ENCOUNTER
----- Message from Donn Hastings MD sent at 2/20/2020  4:53 PM CST -----  Please notify the patient that his blood count and kidney function are stable. We will continue the eliquis dosing at 2.5mg twice daily.

## 2020-02-20 NOTE — PROGRESS NOTES
Subjective:    Patient ID:  Deena Moody is a 89 y.o. male who presents for follow-up of Pacemaker Check      HPI    Prior Hx:  I had the pleasure of seeing Mr. Moody today in our electrophysiology clinic in consultation for his new onset atrial fibrillation. 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 On remote monitoring new onset persistent atrial fibrillation was diagnosed that began 10/2016.  He was asymptomatic and started on eliquis for stroke prophylaxis. At his 6 month evaluation after starting eliquis he felt well with stable H/H and creatinine. ECHO 2/2018 was normal.     Interim Hx:  Mr. Moody presents for 6 month follow-up.  He feels well. He is still active (mows lawn, rides stationary bike, just painted his bathroom). He has no complaints. Device interrogation notes 100% RV pacing with stable lead parameters.      My interpretation of today's in clinic electrocardiogram is atrial fibrillation with ventricular pacing.    Review of Systems   Constitution: Negative. Negative for fever and malaise/fatigue.   HENT: Negative.  Negative for congestion and sore throat.    Eyes: Negative.  Negative for blurred vision and visual disturbance.   Cardiovascular: Negative.  Negative for chest pain, dyspnea on exertion, irregular heartbeat, orthopnea, palpitations and paroxysmal nocturnal dyspnea.   Respiratory: Negative.  Negative for cough and shortness of breath.    Hematologic/Lymphatic: Negative for bleeding problem. Does not bruise/bleed easily.   Skin: Negative.  Negative for rash.   Gastrointestinal: Negative.  Negative for hematochezia and melena.   Neurological: Negative for dizziness, focal weakness and weakness.        Objective:    Physical Exam   Constitutional: He is oriented to person, place, and time. He  appears well-developed and well-nourished. No distress.   HENT:   Head: Normocephalic and atraumatic.   Eyes: Conjunctivae are normal. Right eye exhibits no discharge. Left eye exhibits no discharge.   Neck: Neck supple. No JVD present.   Cardiovascular: Normal rate, regular rhythm and normal heart sounds. Exam reveals no gallop and no friction rub.   No murmur heard.  Pulmonary/Chest: Effort normal and breath sounds normal. No respiratory distress. He has no wheezes. He has no rales.   Pacemaker site well healed   Abdominal: Bowel sounds are normal. He exhibits no distension. There is no tenderness.   Musculoskeletal: He exhibits no edema.   Neurological: He is alert and oriented to person, place, and time.   Skin: Skin is warm and dry. He is not diaphoretic.   Psychiatric: He has a normal mood and affect. His behavior is normal. Thought content normal.   Vitals reviewed.        Assessment:       1. Complete AV block    2. Persistent atrial fibrillation    3. Pacemaker    4. Coronary artery disease involving native coronary artery of native heart without angina pectoris         Plan:       In summary, Mr. 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 He is tolerating eliquis without difficulty.  He understands to monitor his stools for signs of bleeding/melena and to come to ER if he has a significant head injury even if he feels fine.  He is on reduced dose eliquis for age and elevated creatinine. Repeat CBC/BMP today. Will update ECHO. Discussed risks of RV pacing induced CMP. Since he has no symptoms and is dependent on his device discussed his EF would have to be severely effected for me to recommend LV lead addition at the moment.    RTC in 6  months.    Thank you for allowing me to participate in the care of this patient. Please do not hesitate to call me with any questions or concerns.     Donn Hastings MD, PhD  Cardiac Electrophysiology

## 2020-02-20 NOTE — PROGRESS NOTES
Patient, Deena Moody (MRN #[id number]), presented with a recent Estimated PA Systolic Pressure greater than 40 mmHG consistent with the definition of pulmonary hypertension (ICD10 - I27.0).    Est. PA Systolic Pressure   Date Value Ref Range Status   01/31/2018 69.56 (A)       The patient's pulmonary hypertension was monitored, evaluated, addressed and/or treated. This addendum to the medical record is made on 02/20/2020.

## 2020-02-28 ENCOUNTER — HOSPITAL ENCOUNTER (OUTPATIENT)
Dept: CARDIOLOGY | Facility: HOSPITAL | Age: 85
Discharge: HOME | End: 2020-02-28
Attending: INTERNAL MEDICINE
Payer: MEDICARE

## 2020-02-28 VITALS — BODY MASS INDEX: 21.37 KG/M2 | WEIGHT: 141 LBS | HEIGHT: 68 IN

## 2020-02-28 DIAGNOSIS — I48.19 PERSISTENT ATRIAL FIBRILLATION: ICD-10-CM

## 2020-02-28 PROCEDURE — 93306 TTE W/DOPPLER COMPLETE: CPT | Mod: 26,HCNC,, | Performed by: INTERNAL MEDICINE

## 2020-02-28 PROCEDURE — 93306 TTE W/DOPPLER COMPLETE: CPT | Mod: HCNC

## 2020-02-28 PROCEDURE — 93306 ECHO (CUPID ONLY): ICD-10-PCS | Mod: 26,HCNC,, | Performed by: INTERNAL MEDICINE

## 2020-03-06 LAB
AORTIC ROOT ANNULUS: 2.93 CM
ASCENDING AORTA: 2.17 CM
AV INDEX (PROSTH): 0.28
AV MEAN GRADIENT: 16 MMHG
AV PEAK GRADIENT: 25 MMHG
AV VALVE AREA: 0.97 CM2
AV VELOCITY RATIO: 0.26
BSA FOR ECHO PROCEDURE: 1.75 M2
CV ECHO LV RWT: 0.7 CM
DOP CALC AO PEAK VEL: 2.5 M/S
DOP CALC AO VTI: 57 CM
DOP CALC LVOT AREA: 3.5 CM2
DOP CALC LVOT DIAMETER: 2.1 CM
DOP CALC LVOT PEAK VEL: 0.65 M/S
DOP CALC LVOT STROKE VOLUME: 55.39 CM3
DOP CALCLVOT PEAK VEL VTI: 16 CM
ECHO LV POSTERIOR WALL: 1.43 CM (ref 0.6–1.1)
FRACTIONAL SHORTENING: 38 % (ref 28–44)
INTERVENTRICULAR SEPTUM: 1.03 CM (ref 0.6–1.1)
IVRT: 0.06 MSEC
LA MAJOR: 5.06 CM
LA MINOR: 5.11 CM
LA WIDTH: 3.38 CM
LEFT ATRIUM SIZE: 2.99 CM
LEFT ATRIUM VOLUME INDEX: 24.8 ML/M2
LEFT ATRIUM VOLUME: 43.68 CM3
LEFT INTERNAL DIMENSION IN SYSTOLE: 2.52 CM (ref 2.1–4)
LEFT VENTRICLE DIASTOLIC VOLUME INDEX: 41.9 ML/M2
LEFT VENTRICLE DIASTOLIC VOLUME: 73.81 ML
LEFT VENTRICLE MASS INDEX: 101 G/M2
LEFT VENTRICLE SYSTOLIC VOLUME INDEX: 13 ML/M2
LEFT VENTRICLE SYSTOLIC VOLUME: 22.88 ML
LEFT VENTRICULAR INTERNAL DIMENSION IN DIASTOLE: 4.09 CM (ref 3.5–6)
LEFT VENTRICULAR MASS: 177.48 G
PISA TR MAX VEL: 3.08 M/S
RA MAJOR: 5.03 CM
RA WIDTH: 3.73 CM
STJ: 2.12 CM
TR MAX PG: 38 MMHG

## 2020-03-09 ENCOUNTER — TELEPHONE (OUTPATIENT)
Dept: ELECTROPHYSIOLOGY | Facility: CLINIC | Age: 85
End: 2020-03-09

## 2020-03-09 NOTE — TELEPHONE ENCOUNTER
----- Message from Donn Hastings MD sent at 3/9/2020 10:24 AM CDT -----  Please notify the patient that his echocardiogram showed his heart function is normal.

## 2020-04-24 ENCOUNTER — CLINICAL SUPPORT (OUTPATIENT)
Dept: CARDIOLOGY | Facility: HOSPITAL | Age: 85
End: 2020-04-24
Payer: MEDICARE

## 2020-04-24 DIAGNOSIS — Z95.0 PRESENCE OF CARDIAC PACEMAKER: ICD-10-CM

## 2020-04-24 PROCEDURE — 93294 REM INTERROG EVL PM/LDLS PM: CPT | Mod: ,,, | Performed by: INTERNAL MEDICINE

## 2020-04-24 PROCEDURE — 93296 REM INTERROG EVL PM/IDS: CPT | Performed by: INTERNAL MEDICINE

## 2020-04-24 PROCEDURE — 93294 CARDIAC DEVICE CHECK - REMOTE: ICD-10-PCS | Mod: ,,, | Performed by: INTERNAL MEDICINE

## 2020-05-10 ENCOUNTER — HOSPITAL ENCOUNTER (INPATIENT)
Facility: HOSPITAL | Age: 85
LOS: 1 days | Discharge: HOME | DRG: 392 | End: 2020-05-11
Attending: EMERGENCY MEDICINE | Admitting: EMERGENCY MEDICINE
Payer: MEDICARE

## 2020-05-10 DIAGNOSIS — R50.9 FEVER, UNSPECIFIED FEVER CAUSE: ICD-10-CM

## 2020-05-10 DIAGNOSIS — K52.9 COLITIS: ICD-10-CM

## 2020-05-10 DIAGNOSIS — R10.9 ABDOMINAL PAIN: ICD-10-CM

## 2020-05-10 DIAGNOSIS — R10.9 ABDOMINAL PAIN, UNSPECIFIED ABDOMINAL LOCATION: Primary | ICD-10-CM

## 2020-05-10 PROCEDURE — 96361 HYDRATE IV INFUSION ADD-ON: CPT | Mod: HCNC

## 2020-05-10 PROCEDURE — 85025 COMPLETE CBC W/AUTO DIFF WBC: CPT | Mod: HCNC

## 2020-05-10 PROCEDURE — U0002 COVID-19 LAB TEST NON-CDC: HCPCS | Mod: HCNC

## 2020-05-10 PROCEDURE — 99285 EMERGENCY DEPT VISIT HI MDM: CPT | Mod: 25,HCNC

## 2020-05-10 PROCEDURE — 99285 PR EMERGENCY DEPT VISIT,LEVEL V: ICD-10-PCS | Mod: HCNC,,, | Performed by: EMERGENCY MEDICINE

## 2020-05-10 PROCEDURE — 83690 ASSAY OF LIPASE: CPT | Mod: HCNC

## 2020-05-10 PROCEDURE — 99285 EMERGENCY DEPT VISIT HI MDM: CPT | Mod: HCNC,,, | Performed by: EMERGENCY MEDICINE

## 2020-05-10 PROCEDURE — 83605 ASSAY OF LACTIC ACID: CPT | Mod: HCNC

## 2020-05-10 PROCEDURE — 93005 ELECTROCARDIOGRAM TRACING: CPT | Mod: HCNC

## 2020-05-10 PROCEDURE — 93010 EKG 12-LEAD: ICD-10-PCS | Mod: HCNC,,, | Performed by: INTERNAL MEDICINE

## 2020-05-10 PROCEDURE — 86140 C-REACTIVE PROTEIN: CPT | Mod: HCNC

## 2020-05-10 PROCEDURE — 84145 PROCALCITONIN (PCT): CPT | Mod: HCNC

## 2020-05-10 PROCEDURE — 93010 ELECTROCARDIOGRAM REPORT: CPT | Mod: HCNC,,, | Performed by: INTERNAL MEDICINE

## 2020-05-10 PROCEDURE — 12000002 HC ACUTE/MED SURGE SEMI-PRIVATE ROOM: Mod: HCNC

## 2020-05-10 PROCEDURE — 80053 COMPREHEN METABOLIC PANEL: CPT | Mod: HCNC

## 2020-05-10 PROCEDURE — 85652 RBC SED RATE AUTOMATED: CPT | Mod: HCNC

## 2020-05-10 PROCEDURE — 96360 HYDRATION IV INFUSION INIT: CPT | Mod: HCNC

## 2020-05-11 VITALS
TEMPERATURE: 98 F | HEIGHT: 69 IN | OXYGEN SATURATION: 94 % | SYSTOLIC BLOOD PRESSURE: 132 MMHG | HEART RATE: 70 BPM | BODY MASS INDEX: 21.22 KG/M2 | RESPIRATION RATE: 18 BRPM | WEIGHT: 143.31 LBS | DIASTOLIC BLOOD PRESSURE: 68 MMHG

## 2020-05-11 PROBLEM — K52.9 NONSPECIFIC COLITIS: Status: ACTIVE | Noted: 2020-05-11

## 2020-05-11 PROBLEM — J98.11 ATELECTASIS: Status: ACTIVE | Noted: 2020-05-11

## 2020-05-11 PROBLEM — K56.41 FECAL IMPACTION: Status: ACTIVE | Noted: 2020-05-11

## 2020-05-11 PROBLEM — K59.00 CONSTIPATION: Status: ACTIVE | Noted: 2020-05-11

## 2020-05-11 PROBLEM — K80.20 CALCULUS OF GALLBLADDER WITHOUT CHOLECYSTITIS WITHOUT OBSTRUCTION: Status: ACTIVE | Noted: 2020-05-11

## 2020-05-11 PROBLEM — R10.9 ABDOMINAL PAIN: Status: ACTIVE | Noted: 2020-05-11

## 2020-05-11 LAB
ALBUMIN SERPL BCP-MCNC: 3.4 G/DL (ref 3.5–5.2)
ALBUMIN SERPL BCP-MCNC: 3.7 G/DL (ref 3.5–5.2)
ALP SERPL-CCNC: 104 U/L (ref 55–135)
ALP SERPL-CCNC: 118 U/L (ref 55–135)
ALT SERPL W/O P-5'-P-CCNC: 8 U/L (ref 10–44)
ALT SERPL W/O P-5'-P-CCNC: 9 U/L (ref 10–44)
ANION GAP SERPL CALC-SCNC: 11 MMOL/L (ref 8–16)
ANION GAP SERPL CALC-SCNC: 9 MMOL/L (ref 8–16)
AST SERPL-CCNC: 17 U/L (ref 10–40)
AST SERPL-CCNC: 17 U/L (ref 10–40)
BASOPHILS # BLD AUTO: 0.03 K/UL (ref 0–0.2)
BASOPHILS # BLD AUTO: 0.03 K/UL (ref 0–0.2)
BASOPHILS NFR BLD: 0.2 % (ref 0–1.9)
BASOPHILS NFR BLD: 0.2 % (ref 0–1.9)
BILIRUB SERPL-MCNC: 1.3 MG/DL (ref 0.1–1)
BILIRUB SERPL-MCNC: 1.4 MG/DL (ref 0.1–1)
BILIRUB UR QL STRIP: NEGATIVE
BUN SERPL-MCNC: 21 MG/DL (ref 8–23)
BUN SERPL-MCNC: 22 MG/DL (ref 8–23)
CALCIUM SERPL-MCNC: 8.6 MG/DL (ref 8.7–10.5)
CALCIUM SERPL-MCNC: 9.1 MG/DL (ref 8.7–10.5)
CHLORIDE SERPL-SCNC: 107 MMOL/L (ref 95–110)
CHLORIDE SERPL-SCNC: 109 MMOL/L (ref 95–110)
CLARITY UR REFRACT.AUTO: CLEAR
CO2 SERPL-SCNC: 24 MMOL/L (ref 23–29)
CO2 SERPL-SCNC: 27 MMOL/L (ref 23–29)
COLOR UR AUTO: YELLOW
CREAT SERPL-MCNC: 1.4 MG/DL (ref 0.5–1.4)
CREAT SERPL-MCNC: 1.5 MG/DL (ref 0.5–1.4)
CRP SERPL-MCNC: 11.5 MG/L (ref 0–8.2)
DIFFERENTIAL METHOD: ABNORMAL
DIFFERENTIAL METHOD: ABNORMAL
EOSINOPHIL # BLD AUTO: 0 K/UL (ref 0–0.5)
EOSINOPHIL # BLD AUTO: 0 K/UL (ref 0–0.5)
EOSINOPHIL NFR BLD: 0 % (ref 0–8)
EOSINOPHIL NFR BLD: 0.3 % (ref 0–8)
ERYTHROCYTE [DISTWIDTH] IN BLOOD BY AUTOMATED COUNT: 12.9 % (ref 11.5–14.5)
ERYTHROCYTE [DISTWIDTH] IN BLOOD BY AUTOMATED COUNT: 13.2 % (ref 11.5–14.5)
ERYTHROCYTE [SEDIMENTATION RATE] IN BLOOD BY WESTERGREN METHOD: 31 MM/HR (ref 0–23)
EST. GFR  (AFRICAN AMERICAN): 47 ML/MIN/1.73 M^2
EST. GFR  (AFRICAN AMERICAN): 51.1 ML/MIN/1.73 M^2
EST. GFR  (NON AFRICAN AMERICAN): 40.7 ML/MIN/1.73 M^2
EST. GFR  (NON AFRICAN AMERICAN): 44.2 ML/MIN/1.73 M^2
GLUCOSE SERPL-MCNC: 111 MG/DL (ref 70–110)
GLUCOSE SERPL-MCNC: 130 MG/DL (ref 70–110)
GLUCOSE UR QL STRIP: NEGATIVE
HCT VFR BLD AUTO: 41.8 % (ref 40–54)
HCT VFR BLD AUTO: 43.7 % (ref 40–54)
HGB BLD-MCNC: 13.1 G/DL (ref 14–18)
HGB BLD-MCNC: 14.3 G/DL (ref 14–18)
HGB UR QL STRIP: NEGATIVE
IMM GRANULOCYTES # BLD AUTO: 0.05 K/UL (ref 0–0.04)
IMM GRANULOCYTES # BLD AUTO: 0.09 K/UL (ref 0–0.04)
IMM GRANULOCYTES NFR BLD AUTO: 0.4 % (ref 0–0.5)
IMM GRANULOCYTES NFR BLD AUTO: 0.7 % (ref 0–0.5)
KETONES UR QL STRIP: NEGATIVE
LACTATE SERPL-SCNC: 1.3 MMOL/L (ref 0.5–2.2)
LACTATE SERPL-SCNC: 2.3 MMOL/L (ref 0.5–2.2)
LEUKOCYTE ESTERASE UR QL STRIP: NEGATIVE
LIPASE SERPL-CCNC: 20 U/L (ref 4–60)
LYMPHOCYTES # BLD AUTO: 0.6 K/UL (ref 1–4.8)
LYMPHOCYTES # BLD AUTO: 0.6 K/UL (ref 1–4.8)
LYMPHOCYTES NFR BLD: 4.4 % (ref 18–48)
LYMPHOCYTES NFR BLD: 4.7 % (ref 18–48)
MAGNESIUM SERPL-MCNC: 2 MG/DL (ref 1.6–2.6)
MCH RBC QN AUTO: 32.6 PG (ref 27–31)
MCH RBC QN AUTO: 32.7 PG (ref 27–31)
MCHC RBC AUTO-ENTMCNC: 31.3 G/DL (ref 32–36)
MCHC RBC AUTO-ENTMCNC: 32.7 G/DL (ref 32–36)
MCV RBC AUTO: 100 FL (ref 82–98)
MCV RBC AUTO: 104 FL (ref 82–98)
MONOCYTES # BLD AUTO: 0.8 K/UL (ref 0.3–1)
MONOCYTES # BLD AUTO: 0.9 K/UL (ref 0.3–1)
MONOCYTES NFR BLD: 6.6 % (ref 4–15)
MONOCYTES NFR BLD: 6.8 % (ref 4–15)
NEUTROPHILS # BLD AUTO: 11 K/UL (ref 1.8–7.7)
NEUTROPHILS # BLD AUTO: 12.1 K/UL (ref 1.8–7.7)
NEUTROPHILS NFR BLD: 87.8 % (ref 38–73)
NEUTROPHILS NFR BLD: 87.9 % (ref 38–73)
NITRITE UR QL STRIP: NEGATIVE
NRBC BLD-RTO: 0 /100 WBC
NRBC BLD-RTO: 0 /100 WBC
PH UR STRIP: 7 [PH] (ref 5–8)
PLATELET # BLD AUTO: 118 K/UL (ref 150–350)
PLATELET # BLD AUTO: 131 K/UL (ref 150–350)
PMV BLD AUTO: 9.1 FL (ref 9.2–12.9)
PMV BLD AUTO: 9.7 FL (ref 9.2–12.9)
POTASSIUM SERPL-SCNC: 3.5 MMOL/L (ref 3.5–5.1)
POTASSIUM SERPL-SCNC: 4.2 MMOL/L (ref 3.5–5.1)
PROCALCITONIN SERPL IA-MCNC: 0.45 NG/ML
PROT SERPL-MCNC: 6.9 G/DL (ref 6–8.4)
PROT SERPL-MCNC: 7.4 G/DL (ref 6–8.4)
PROT UR QL STRIP: NEGATIVE
RBC # BLD AUTO: 4.02 M/UL (ref 4.6–6.2)
RBC # BLD AUTO: 4.37 M/UL (ref 4.6–6.2)
SARS-COV-2 RDRP RESP QL NAA+PROBE: NEGATIVE
SODIUM SERPL-SCNC: 142 MMOL/L (ref 136–145)
SODIUM SERPL-SCNC: 145 MMOL/L (ref 136–145)
SP GR UR STRIP: 1.01 (ref 1–1.03)
URN SPEC COLLECT METH UR: NORMAL
WBC # BLD AUTO: 12.5 K/UL (ref 3.9–12.7)
WBC # BLD AUTO: 13.73 K/UL (ref 3.9–12.7)

## 2020-05-11 PROCEDURE — 81003 URINALYSIS AUTO W/O SCOPE: CPT | Mod: HCNC

## 2020-05-11 PROCEDURE — 83735 ASSAY OF MAGNESIUM: CPT | Mod: HCNC

## 2020-05-11 PROCEDURE — 63600175 PHARM REV CODE 636 W HCPCS: Mod: HCNC | Performed by: STUDENT IN AN ORGANIZED HEALTH CARE EDUCATION/TRAINING PROGRAM

## 2020-05-11 PROCEDURE — 25000003 PHARM REV CODE 250: Mod: HCNC | Performed by: STUDENT IN AN ORGANIZED HEALTH CARE EDUCATION/TRAINING PROGRAM

## 2020-05-11 PROCEDURE — 87040 BLOOD CULTURE FOR BACTERIA: CPT | Mod: 59,HCNC

## 2020-05-11 PROCEDURE — 99223 1ST HOSP IP/OBS HIGH 75: CPT | Mod: HCNC,AI,GC, | Performed by: INTERNAL MEDICINE

## 2020-05-11 PROCEDURE — 20600001 HC STEP DOWN PRIVATE ROOM: Mod: HCNC

## 2020-05-11 PROCEDURE — 99223 PR INITIAL HOSPITAL CARE,LEVL III: ICD-10-PCS | Mod: HCNC,AI,GC, | Performed by: INTERNAL MEDICINE

## 2020-05-11 PROCEDURE — 25000003 PHARM REV CODE 250: Mod: HCNC | Performed by: EMERGENCY MEDICINE

## 2020-05-11 PROCEDURE — 87077 CULTURE AEROBIC IDENTIFY: CPT | Mod: HCNC

## 2020-05-11 PROCEDURE — 87186 SC STD MICRODIL/AGAR DIL: CPT | Mod: HCNC

## 2020-05-11 PROCEDURE — 36415 COLL VENOUS BLD VENIPUNCTURE: CPT | Mod: HCNC

## 2020-05-11 PROCEDURE — 85025 COMPLETE CBC W/AUTO DIFF WBC: CPT | Mod: HCNC

## 2020-05-11 PROCEDURE — 80053 COMPREHEN METABOLIC PANEL: CPT | Mod: HCNC

## 2020-05-11 PROCEDURE — 83605 ASSAY OF LACTIC ACID: CPT | Mod: HCNC

## 2020-05-11 RX ORDER — ACETAMINOPHEN 500 MG
1000 TABLET ORAL
Status: COMPLETED | OUTPATIENT
Start: 2020-05-11 | End: 2020-05-11

## 2020-05-11 RX ORDER — SODIUM CHLORIDE 0.9 % (FLUSH) 0.9 %
10 SYRINGE (ML) INJECTION
Status: DISCONTINUED | OUTPATIENT
Start: 2020-05-11 | End: 2020-05-11 | Stop reason: HOSPADM

## 2020-05-11 RX ORDER — CARVEDILOL 12.5 MG/1
12.5 TABLET ORAL 2 TIMES DAILY
Status: DISCONTINUED | OUTPATIENT
Start: 2020-05-11 | End: 2020-05-11 | Stop reason: HOSPADM

## 2020-05-11 RX ORDER — PANTOPRAZOLE SODIUM 20 MG/1
20 TABLET, DELAYED RELEASE ORAL DAILY
Status: DISCONTINUED | OUTPATIENT
Start: 2020-05-11 | End: 2020-05-11 | Stop reason: HOSPADM

## 2020-05-11 RX ORDER — GLUCAGON 1 MG
1 KIT INJECTION
Status: DISCONTINUED | OUTPATIENT
Start: 2020-05-11 | End: 2020-05-11 | Stop reason: HOSPADM

## 2020-05-11 RX ORDER — ASPIRIN 81 MG/1
81 TABLET ORAL DAILY
Status: DISCONTINUED | OUTPATIENT
Start: 2020-05-11 | End: 2020-05-11 | Stop reason: HOSPADM

## 2020-05-11 RX ORDER — BENAZEPRIL HYDROCHLORIDE 5 MG/1
5 TABLET ORAL DAILY
Status: DISCONTINUED | OUTPATIENT
Start: 2020-05-11 | End: 2020-05-11 | Stop reason: HOSPADM

## 2020-05-11 RX ORDER — DEXTROSE MONOHYDRATE 100 MG/ML
25 INJECTION, SOLUTION INTRAVENOUS
Status: DISCONTINUED | OUTPATIENT
Start: 2020-05-11 | End: 2020-05-11 | Stop reason: HOSPADM

## 2020-05-11 RX ORDER — FERROUS SULFATE 325(65) MG
325 TABLET, DELAYED RELEASE (ENTERIC COATED) ORAL DAILY
Status: DISCONTINUED | OUTPATIENT
Start: 2020-05-11 | End: 2020-05-11 | Stop reason: HOSPADM

## 2020-05-11 RX ORDER — IBUPROFEN 200 MG
16 TABLET ORAL
Status: DISCONTINUED | OUTPATIENT
Start: 2020-05-11 | End: 2020-05-11 | Stop reason: HOSPADM

## 2020-05-11 RX ORDER — BISACODYL 10 MG
10 SUPPOSITORY, RECTAL RECTAL ONCE
Status: DISCONTINUED | OUTPATIENT
Start: 2020-05-11 | End: 2020-05-11

## 2020-05-11 RX ORDER — SENNOSIDES 8.6 MG/1
8.6 TABLET ORAL DAILY
Status: DISCONTINUED | OUTPATIENT
Start: 2020-05-11 | End: 2020-05-11 | Stop reason: HOSPADM

## 2020-05-11 RX ORDER — DEXTROSE MONOHYDRATE 100 MG/ML
12.5 INJECTION, SOLUTION INTRAVENOUS
Status: DISCONTINUED | OUTPATIENT
Start: 2020-05-11 | End: 2020-05-11 | Stop reason: HOSPADM

## 2020-05-11 RX ORDER — PROCHLORPERAZINE MALEATE 5 MG
10 TABLET ORAL EVERY 6 HOURS PRN
Status: DISCONTINUED | OUTPATIENT
Start: 2020-05-11 | End: 2020-05-11 | Stop reason: HOSPADM

## 2020-05-11 RX ORDER — TALC
6 POWDER (GRAM) TOPICAL NIGHTLY PRN
Status: DISCONTINUED | OUTPATIENT
Start: 2020-05-11 | End: 2020-05-11 | Stop reason: HOSPADM

## 2020-05-11 RX ORDER — TORSEMIDE 10 MG/1
10 TABLET ORAL DAILY
Status: DISCONTINUED | OUTPATIENT
Start: 2020-05-11 | End: 2020-05-11 | Stop reason: HOSPADM

## 2020-05-11 RX ORDER — ONDANSETRON 8 MG/1
8 TABLET, ORALLY DISINTEGRATING ORAL EVERY 8 HOURS PRN
Status: DISCONTINUED | OUTPATIENT
Start: 2020-05-11 | End: 2020-05-11 | Stop reason: HOSPADM

## 2020-05-11 RX ORDER — IBUPROFEN 200 MG
24 TABLET ORAL
Status: DISCONTINUED | OUTPATIENT
Start: 2020-05-11 | End: 2020-05-11 | Stop reason: HOSPADM

## 2020-05-11 RX ORDER — POLYETHYLENE GLYCOL 3350 17 G/17G
17 POWDER, FOR SOLUTION ORAL DAILY
Status: DISCONTINUED | OUTPATIENT
Start: 2020-05-11 | End: 2020-05-11 | Stop reason: HOSPADM

## 2020-05-11 RX ORDER — POLYETHYLENE GLYCOL 3350 17 G/17G
17 POWDER, FOR SOLUTION ORAL 3 TIMES DAILY PRN
Qty: 510 G | Refills: 0 | Status: SHIPPED | OUTPATIENT
Start: 2020-05-11 | End: 2021-03-18 | Stop reason: SDUPTHER

## 2020-05-11 RX ADMIN — PIPERACILLIN SODIUM,TAZOBACTAM SODIUM 4.5 G: 4; .5 INJECTION, POWDER, FOR SOLUTION INTRAVENOUS at 02:05

## 2020-05-11 RX ADMIN — SENNOSIDES 8.6 MG: 8.6 TABLET, FILM COATED ORAL at 09:05

## 2020-05-11 RX ADMIN — ACETAMINOPHEN 1000 MG: 500 TABLET ORAL at 12:05

## 2020-05-11 RX ADMIN — SODIUM CHLORIDE 500 ML: 0.9 INJECTION, SOLUTION INTRAVENOUS at 12:05

## 2020-05-11 RX ADMIN — ASPIRIN 81 MG: 81 TABLET, COATED ORAL at 09:05

## 2020-05-11 RX ADMIN — FERROUS SULFATE TAB EC 325 MG (65 MG FE EQUIVALENT) 325 MG: 325 (65 FE) TABLET DELAYED RESPONSE at 09:05

## 2020-05-11 RX ADMIN — BENAZEPRIL HYDROCHLORIDE 5 MG: 5 TABLET ORAL at 09:05

## 2020-05-11 RX ADMIN — APIXABAN 2.5 MG: 2.5 TABLET, FILM COATED ORAL at 09:05

## 2020-05-11 RX ADMIN — AMPICILLIN SODIUM AND SULBACTAM SODIUM 3 G: 2; 1 INJECTION, POWDER, FOR SOLUTION INTRAMUSCULAR; INTRAVENOUS at 05:05

## 2020-05-11 RX ADMIN — CARVEDILOL 12.5 MG: 12.5 TABLET, FILM COATED ORAL at 09:05

## 2020-05-11 RX ADMIN — PANTOPRAZOLE SODIUM 20 MG: 20 TABLET, DELAYED RELEASE ORAL at 09:05

## 2020-05-11 RX ADMIN — TORSEMIDE 10 MG: 10 TABLET ORAL at 09:05

## 2020-05-11 NOTE — H&P
[facility] Hospital Medicine  History & Physical    Patient Name: Deena Moody  MRN: [id number]  Admission Date: 5/10/2020  Attending Physician: Kelsey Hastings MD   Primary Care Provider: Jam Rowell MD    [facility] Medicine Team: [facility] 5 Ying Warren MD     Patient information was obtained from patient, past medical records and ER records.     Subjective:     Principal Problem:Abdominal pain    Chief Complaint:   Chief Complaint   Patient presents with    Abdominal Pain     RUQ pain after eating dinner. rates pain 10/10. Nauseous without vomiting. temp 100.6. hx gallstones        HPI: Mr. 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 Patient reports that he had just come home from his neighbor's house when he developed a 10/10 central abdominal pain with associated chills. Patient was unable to characterize the pain, only reported that it was painful and tender on palpation. He had never experienced a pain like that before. Nothing was taken to try and alleviate the pain and patient did not noticed that any particular position made it worse/better. The pain did not radiate anywhere. He has regular bowel movements, last one was that morning, without any blood. No diarrhea, nausea, or vomiting. Patient normally urinates fairly often at night, but no changes in urinary habits at home, but experienced difficulty urinating upon arrival to ED.     In the ED, patient's pain resolved. CBC was unremarkable, lipase wnl. ESR and CRP mildly elevated. Lactate was 2.3. Procal elevated to 0.45. CT abdo/pelvis with large stool burden, innumerable diverticula. Patient admitted to hospital medicine for further management and evaluation of abdominal pain 2/2 diverticulitis vs colitis.    Past Medical History:   Diagnosis Date    Acute on  chronic diastolic heart failure 9/1/2016    Coronary artery disease     Hyperlipidemia     Hypertension     Macular degeneration (senile) of retina, unspecified 12/12/2014    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    Persistent atrial fibrillation 11/10/2016    S/P placement of cardiac pacemaker 9/14/2016       Past Surgical History:   Procedure Laterality Date    AORTIC VALVE REPLACEMENT N/A     CARDIAC PACEMAKER PLACEMENT      CATARACT EXTRACTION W/  INTRAOCULAR LENS IMPLANT Right 2/16/2016    Dr. Whitley    CATARACT EXTRACTION W/  INTRAOCULAR LENS IMPLANT Left 3/1/2016    Dr. Whitley    CORONARY ARTERY BYPASS GRAFT      EAR EXAMINATION UNDER ANESTHESIA      EYE SURGERY         Review of patient's allergies indicates:   Allergen Reactions    Iodine and iodide containing products Other (See Comments)     Caused changes in skin color       No current facility-administered medications on file prior to encounter.      Current Outpatient Medications on File Prior to Encounter   Medication Sig    apixaban (ELIQUIS) 2.5 mg Tab Take 1 tablet (2.5 mg total) by mouth 2 (two) times daily.    aspirin (ECOTRIN) 81 MG EC tablet Take 1 tablet (81 mg total) by mouth once daily.    benazepril (LOTENSIN) 5 MG tablet Take 1 tablet (5 mg total) by mouth once daily.    carvedilol (COREG) 12.5 MG tablet Take 1 tablet (12.5 mg total) by mouth 2 (two) times daily with meals. (Patient taking differently: Take 12.5 mg by mouth 2 (two) times daily with meals. )    ferrous sulfate (FEOSOL) 325 mg (65 mg iron) Tab tablet TAKE 1 TABLET DAILY WITH BREAKFAST    multivitamin (MULTIVITAMIN) per tablet Take 1 tablet by mouth once daily.      nitroGLYCERIN (NITROSTAT) 0.4 MG SL tablet Take one tablet every 5 minutes for chest pain. After the third tablet go to the ED.    omeprazole (PRILOSEC) 20 MG capsule Take 1 capsule (20 mg total) by mouth daily as needed (gastritis).    potassium chloride (KLOR-CON) 8 MEQ TbSR Take 1 tablet  (8 mEq total) by mouth every other day.    torsemide (DEMADEX) 10 MG Tab Take 1 tablet (10 mg total) by mouth once daily. Two 10 mg tablets by mouth every other day.     Family History     Problem Relation (Age of Onset)    Hypertension Brother    No Known Problems Mother, Father, Sister, Maternal Aunt, Maternal Uncle, Paternal Aunt, Paternal Uncle, Maternal Grandmother, Maternal Grandfather, Paternal Grandmother, Paternal Grandfather, Daughter, Son    Stroke Brother        Tobacco Use    Smoking status: Never Smoker    Smokeless tobacco: Never Used   Substance and Sexual Activity    Alcohol use: No    Drug use: No    Sexual activity: Yes     Partners: Female     Review of Systems   Constitutional: Negative for activity change, chills, fatigue and fever.   HENT: Negative for congestion, sinus pressure and sinus pain.    Eyes: Negative for photophobia and pain.   Respiratory: Negative for chest tightness, shortness of breath and wheezing.    Cardiovascular: Negative for chest pain and palpitations.   Gastrointestinal: Positive for abdominal pain (resolved). Negative for blood in stool, constipation, diarrhea, nausea and vomiting.   Genitourinary: Negative for dysuria, hematuria and urgency.   Musculoskeletal: Negative for arthralgias, back pain and gait problem.   Skin: Negative for color change, pallor and rash.   Neurological: Negative for dizziness, syncope and weakness.   Psychiatric/Behavioral: Negative for agitation, behavioral problems and confusion.     Objective:     Vital Signs (Most Recent):  Temp: 98.7 °F (37.1 °C) (05/11/20 0259)  Pulse: 69 (05/11/20 0259)  Resp: 20 (05/11/20 0259)  BP: (!) 171/72 (05/11/20 0259)  SpO2: 98 % (05/11/20 0259) Vital Signs (24h Range):  Temp:  [98.7 °F (37.1 °C)-100.6 °F (38.1 °C)] 98.7 °F (37.1 °C)  Pulse:  [68-76] 69  Resp:  [12-20] 20  SpO2:  [94 %-100 %] 98 %  BP: (123-171)/(58-90) 171/72     Weight: 65 kg (143 lb 4.8 oz)  Body mass index is 21.16  kg/m².    Physical Exam   Constitutional: He is oriented to person, place, and time. He appears well-developed. No distress.   HENT:   Head: Atraumatic.   Mouth/Throat: No oropharyngeal exudate.   Eyes: Conjunctivae and EOM are normal. No scleral icterus.   Neck: Normal range of motion. Neck supple.   Cardiovascular: Normal rate and regular rhythm.   Murmur heard.  Pulmonary/Chest: Effort normal and breath sounds normal. No respiratory distress. He has no wheezes. He has no rales.   Abdominal: Soft. Bowel sounds are normal. He exhibits no distension. There is no tenderness. There is no guarding.   Musculoskeletal: Normal range of motion. He exhibits no edema or tenderness.   Neurological: He is alert and oriented to person, place, and time.   Skin: He is not diaphoretic.   Nursing note and vitals reviewed.        CRANIAL NERVES     CN III, IV, VI   Extraocular motions are normal.        Significant Labs:   CBC:   Recent Labs   Lab 05/10/20  2357   WBC 12.50   HGB 14.3   HCT 43.7   *     CMP:   Recent Labs   Lab 05/10/20  2357      K 3.5      CO2 24   *   BUN 22   CREATININE 1.4   CALCIUM 9.1   PROT 7.4   ALBUMIN 3.7   BILITOT 1.4*   ALKPHOS 118   AST 17   ALT 8*   ANIONGAP 11   EGFRNONAA 44.2*     Lactic Acid:   Recent Labs   Lab 05/10/20  2357 05/11/20  0057   LACTATE 2.3* 1.3       Significant Imaging: I have reviewed and interpreted all pertinent imaging results/findings within the past 24 hours.     CT Abdomen Pelvis  Without Contrast  Narrative: EXAMINATION:  CT ABDOMEN PELVIS WITHOUT CONTRAST    CLINICAL HISTORY:  LLQ pain, suspect diverticulitis;    TECHNIQUE:  5.0 mm axial CT images of the abdomen and pelvis were obtained via helical, multi detector CT technique.  No intravenous contrast material was administered.    COMPARISON:  January 2, 2011.    FINDINGS:  Heart: No cardiomegaly or pericardial effusion.  Extensive mitral annular atherosclerotic calcification.    Cardiac pacing  wires partially visualized terminating in the left ventricle.    Lung Bases: Small right pleural effusion with associated compressive atelectasis.  Trace left pleural effusion.  No large focal mass or consolidation.  No pneumothorax..    Liver: Small in size with a punctate scattered calcifications consistent with granulomatous disease.  No focal mass lesion.    Gallbladder: Large stone within the gallbladder lumen.  No pericholecystic fluid or wall thickening.    Bile Ducts: No intra or extrahepatic biliary ductal dilation.    Pancreas: Fatty atrophy.  Focal parenchymal mass lesion.  No peripancreatic fat stranding.    Spleen: Unremarkable.    Adrenals: Nonspecific adrenal gland thickening.  No focal lesion.    Genitourinary: Normal in location with cortical thinning.  No focal renal abnormality, nephrolithiasis, or hydroureteronephrosis.  Bladder demonstrates smooth contours without bladder wall thickening.    Reproductive organs: Unremarkable prostate.    GI tract/Mesentery: Stomach is distended with fluid and air.  Small bowel is unremarkable.  There are innumerable diverticula throughout the colon with mild wall thickening within the distal descending and proximal sigmoid colon, which appears relatively similar when compared to CT dated 01/01/2011.  There is no evidence of extraluminal free air to suggest perforation.  No surrounding mesenteric haziness or stress fat stranding.  There is significant stool within the rectum and distal sigmoid colon, which can be seen in the setting of stercoral colitis.    No evidence of obstruction.    Peritoneal Space: No abdominopelvic ascites or intraperitoneal free air.    Retroperitoneum: No significant adenopathy.    Abdominal wall: Normal.    Vasculature: Extensive atherosclerotic calcification of the aorta and branch vessels including the bilateral renal arteries, celiac, and SMA.    Bones: Degenerative changes.  No evidence of acute fracture or bony destructive  process.    Partially visualized sternotomy wires.  No evidence of sternal dehiscence.  Impression: Innumerable diverticula with mild thickening of the wall of the distal descending and proximal sigmoid colon, relatively similar when compared to study dated 01/11/2011.  No surrounding mesenteric stranding.  Possible mild sigmoid diverticulitis.  No evidence of obstruction or perforation.    Large volume stool within the rectum extending retrograde to the sigmoid colon, which may be seen in the setting of fecal impaction with stercoral colitis.    Cholelithiasis without evidence of acute cholecystitis.    Small right and trace left pleural effusions with some associated compressive atelectatic change.    Extensive aortic atherosclerotic calcification.    Electronically signed by resident: Inocencia Valenzuela  Date:    05/11/2020  Time:    01:14    Electronically signed by: Krishna West MD  Date:    05/11/2020  Time:    02:20        Assessment/Plan:     * Abdominal pain  CT abdo/pelvis with evidence of diverticula - possible diverticulitis. Also with large stool burden and possible stercoral colitis. Pain has since resolved. UA unconcerning for UTI. S/p 1x dose of zosyn in ED.    - Switch to unasyn while inpatient to cover for diverticulitis   3g q8h renally dosed   Would discharge on augmentin due to risk of QTc prolongation (492)  - Start bowel regimen to relieve of large stool burden   Had large BM 5/11 am   Continue daily miralax and senna, de-escalate as appropriate      Constipation  Large amount of stool on CT abdo/pelvis  Had large BM on arrival to floor    - Daily miralax and senna  - Add bisacodyl suppository if needed  - Needs good bowel regimen      Long term (current) use of anticoagulants  - Currently on eliquis for AFib      Persistent atrial fibrillation  S/p PPM 2016  CHADSVASC 5  On eliquis 2.5mg bid adjusted for age    - Continue eliquis  - Continue carvedilol  - Monitor for signs of bleeding      CAD  (coronary artery disease)  - Continue home ASA    Hypertension  - Continue home carvedilol and benazepril      CKD (chronic kidney disease) stage 3, GFR 30-59 ml/min  SCr 1.4 is lowest it's been in past year - usually ran 1.6    - Monitor renal function  - Renally dose meds - CrCl 54      VTE Risk Mitigation (From admission, onward)         Ordered     apixaban tablet 2.5 mg  2 times daily      05/11/20 0254     Reason for No Pharmacological VTE Prophylaxis  Once     Question:  Reasons:  Answer:  Already adequately anticoagulated on oral Anticoagulants    05/11/20 0254     IP VTE HIGH RISK PATIENT  Once      05/11/20 0254     Place sequential compression device  Until discontinued      05/11/20 0254                   Ying Warren MD  Department of Hospital Medicine   [facility]

## 2020-05-11 NOTE — ASSESSMENT & PLAN NOTE
S/p PPM 2016  CHADSVASC 5  On eliquis 2.5mg bid adjusted for age    - Continue eliquis  - Continue carvedilol  - Monitor for signs of bleeding

## 2020-05-11 NOTE — PLAN OF CARE
Problem: Fall Injury Risk  Goal: Absence of Fall and Fall-Related Injury  Outcome: Ongoing, Progressing   Pt is compliant with all fall precautions.    Problem: Adult Inpatient Plan of Care  Goal: Plan of Care Review  Outcome: Ongoing, Progressing  Pt denies questions or concerns about his plan of care at this time.     Problem: Adult Inpatient Plan of Care  Goal: Absence of Hospital-Acquired Illness or Injury  Outcome: Ongoing, Progressing  Pt voices understanding of all safety concerns we discussed.

## 2020-05-11 NOTE — HOSPITAL COURSE
Deena Moody is an 89 year old M who presented for abdominal pain, fever, chills admitted for fecal impaction. On admission patient had a fever of 100.6 degrees. UA was unremarkable. CT Abdomen showed findings suggestive of mild sigmoid diverticulitis, without mesenteric fat stranding or abscess. Patient was started on laxatives and subequently had a large bowel movement. Abdominal pain has improved and patient remains afebrile. Patient is medically stable for discharge with a prescription of polyethylene glycol and instructed to take it 3 times daily until he develops well formed stool. Wife was contacted with same information relayed to her.     Review of Systems   Constitutional: Negative for activity change, chills, fatigue and fever.   HENT: Negative for congestion, sinus pressure and sinus pain.    Eyes: Negative for photophobia and pain.   Respiratory: Negative for chest tightness, shortness of breath and wheezing.    Cardiovascular: Negative for chest pain and palpitations.   Gastrointestinal: Negative for blood in stool, abdominal constipation, diarrhea, nausea and vomiting.   Genitourinary: Negative for dysuria, hematuria and urgency.   Musculoskeletal: Negative for arthralgias, back pain and gait problem.   Skin: Negative for color change, pallor and rash.   Neurological: Negative for dizziness, syncope and weakness.   Psychiatric/Behavioral: Negative for agitation, behavioral problems and confusion.     Vitals:    05/11/20 1112   BP: 132/68   Pulse: 70   Resp: 18   Temp: 97.7 °F (36.5 °C)     Physical Exam   Constitutional: He is oriented to person, place, and time. He appears well-developed. No distress.   HENT:   Head: Atraumatic.   Mouth/Throat: No oropharyngeal exudate.   Eyes: Conjunctivae and EOM are normal. No scleral icterus.   Neck: Normal range of motion. Neck supple.   Cardiovascular: Normal rate and regular rhythm. No murmur heard  Pulmonary/Chest: Effort normal and breath sounds normal. No  respiratory distress. He has no wheezes. He has no rales.   Abdominal: Soft. Bowel sounds are normal. He exhibits no distension. There is no tenderness. There is no guarding.   Musculoskeletal: Normal range of motion. He exhibits no edema or tenderness.   Neurological: He is alert and oriented to person, place, and time.   Skin: He is not diaphoretic.   Nursing note and vitals reviewed.

## 2020-05-11 NOTE — ASSESSMENT & PLAN NOTE
Large amount of stool on CT abdo/pelvis  Had large BM on arrival to floor    - Daily miralax and senna  - Add bisacodyl suppository if needed  - Needs good bowel regimen

## 2020-05-11 NOTE — PLAN OF CARE
Pt discharged per MD orders.  Tele discontinued and returned to station.  IV discontinued; catheter tip intact x.  Medication list and prescriptions reviewed; prescriptions sent to pt preferred pharmacy and printed prescriptions provided.  Pt verbalizes understanding of all written and verbal discharge instructions.  Pt awaiting family arrival.  Will continue to monitor.

## 2020-05-11 NOTE — ED PROVIDER NOTES
Encounter Date: 5/10/2020.  COVID Statement  The [place] [facility] and Wander Patrick, Governor of the [facility], have declared a State of Public Health Emergency due to the spread of a novel coronavirus and disease (COVID-19).  There is no currently accepted treatment except conservative measures and respiratory support if appropriate.  This has lead to significant resource capacity and potential delays in care.         History     Chief Complaint   Patient presents with    Abdominal Pain     RUQ pain after eating dinner. rates pain 10/10. Nauseous without vomiting. temp 100.6. hx gallstones     Mr Moody is a 88 yo M with htn, diverticula, and s/p aortic valve repair and pacemaker who presented to [facility] ED on 5/10 c/o umbilical pain which started earlier that day. He notes that it had initialyl been 8/10 sharp pain without radiation that had improved to 4/10. He also endorsed 3 episodes of vomiting (yellow clear). He denies any chills, cough, sob, cp, dysuria, hematuria or constipation. Last BM was yesterday and of normal substance and color. He has not taken anything to improve his pain.    On presentation to ED, patient was febrile to 100.6.        Review of patient's allergies indicates:   Allergen Reactions    Iodine and iodide containing products Other (See Comments)     Caused changes in skin color     Past Medical History:   Diagnosis Date    Acute on chronic diastolic heart failure 9/1/2016    Coronary artery disease     Hyperlipidemia     Hypertension     Macular degeneration (senile) of retina, unspecified 12/12/2014    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    Persistent atrial fibrillation 11/10/2016    S/P placement of cardiac pacemaker 9/14/2016     Past Surgical History:   Procedure Laterality Date    AORTIC VALVE REPLACEMENT N/A     CARDIAC PACEMAKER PLACEMENT      CATARACT EXTRACTION W/  INTRAOCULAR LENS IMPLANT Right 2/16/2016    Dr. Whitley    CATARACT  EXTRACTION W/  INTRAOCULAR LENS IMPLANT Left 3/1/2016    Dr. Whitley    CORONARY ARTERY BYPASS GRAFT      EAR EXAMINATION UNDER ANESTHESIA      EYE SURGERY       Family History   Problem Relation Age of Onset    No Known Problems Mother     No Known Problems Father     No Known Problems Sister     Stroke Brother     Hypertension Brother     No Known Problems Maternal Aunt     No Known Problems Maternal Uncle     No Known Problems Paternal Aunt     No Known Problems Paternal Uncle     No Known Problems Maternal Grandmother     No Known Problems Maternal Grandfather     No Known Problems Paternal Grandmother     No Known Problems Paternal Grandfather     No Known Problems Daughter     No Known Problems Son     Amblyopia Neg Hx     Blindness Neg Hx     Cancer Neg Hx     Cataracts Neg Hx     Diabetes Neg Hx     Glaucoma Neg Hx     Macular degeneration Neg Hx     Retinal detachment Neg Hx     Strabismus Neg Hx     Thyroid disease Neg Hx      Social History     Tobacco Use    Smoking status: Never Smoker    Smokeless tobacco: Never Used   Substance Use Topics    Alcohol use: No    Drug use: No     Review of Systems   Constitutional: Positive for fever. Negative for chills.   HENT: Negative for rhinorrhea and sneezing.    Eyes: Negative for redness and visual disturbance.   Respiratory: Negative for cough, shortness of breath and wheezing.    Cardiovascular: Negative for chest pain and palpitations.   Gastrointestinal: Positive for abdominal pain (umbilical) and vomiting. Negative for abdominal distention, blood in stool, constipation, diarrhea and nausea.   Genitourinary: Negative for dysuria, flank pain and hematuria.   Musculoskeletal: Negative for arthralgias and joint swelling.   Skin: Negative for pallor and rash.   Neurological: Negative for dizziness, weakness, light-headedness and headaches.   Psychiatric/Behavioral: Negative for behavioral problems and decreased concentration.        Physical Exam     Initial Vitals [05/10/20 2335]   BP Pulse Resp Temp SpO2   (!) 156/90 76 18 (!) 100.6 °F (38.1 °C) 100 %      MAP       --         Physical Exam    Constitutional: He appears well-developed and well-nourished.   +febrile   HENT:   Head: Normocephalic and atraumatic.   Eyes: EOM are normal. Pupils are equal, round, and reactive to light. No scleral icterus.   Cardiovascular: Normal rate and intact distal pulses.   No murmur heard.  Pulmonary/Chest: Effort normal. No respiratory distress. He has no wheezes.   Abdominal: Soft. Normal appearance and bowel sounds are normal. He exhibits no distension. There is tenderness (LLQ, umbilical) in the left lower quadrant. There is no rigidity, no rebound and no guarding.   Neurological: He is alert.   Skin: Skin is warm and dry.         ED Course   Procedures  Labs Reviewed   CBC W/ AUTO DIFFERENTIAL - Abnormal; Notable for the following components:       Result Value    RBC 4.37 (*)     Mean Corpuscular Volume 100 (*)     Mean Corpuscular Hemoglobin 32.7 (*)     Platelets 131 (*)     MPV 9.1 (*)     Gran # (ANC) 11.0 (*)     Immature Grans (Abs) 0.05 (*)     Lymph # 0.6 (*)     Gran% 87.8 (*)     Lymph% 4.7 (*)     All other components within normal limits   COMPREHENSIVE METABOLIC PANEL - Abnormal; Notable for the following components:    Glucose 130 (*)     Total Bilirubin 1.4 (*)     ALT 8 (*)     eGFR if  51.1 (*)     eGFR if non  44.2 (*)     All other components within normal limits   LACTIC ACID, PLASMA - Abnormal; Notable for the following components:    Lactate (Lactic Acid) 2.3 (*)     All other components within normal limits   PROCALCITONIN - Abnormal; Notable for the following components:    Procalcitonin 0.45 (*)     All other components within normal limits   SEDIMENTATION RATE - Abnormal; Notable for the following components:    Sed Rate 31 (*)     All other components within normal limits   C-REACTIVE  PROTEIN - Abnormal; Notable for the following components:    CRP 11.5 (*)     All other components within normal limits   SARS-COV-2 RNA AMPLIFICATION, QUAL    Narrative:     What symptom criteria does the patient meet?->Fever   LIPASE   LACTIC ACID, PLASMA     EKG Readings: (Independently Interpreted)   Initial Reading: No STEMI. Previous EKG: Compared with most recent EKG Rhythm: Paced Rhythm. Heart Rate: 70.     ECG Results          EKG 12-lead (In process)  Result time 05/11/20 07:46:44    In process by Interface, Lab In [facility] (05/11/20 07:46:44)                 Narrative:    Test Reason : R10.9,    Vent. Rate : 070 BPM     Atrial Rate : 214 BPM     P-R Int : 000 ms          QRS Dur : 168 ms      QT Int : 456 ms       P-R-T Axes : 000 -80 085 degrees     QTc Int : 492 ms    Wide QRS rhythm  Left axis deviation  Nonspecific intraventricular block  Possible Lateral infarct ,age undetermined  Inferior infarct ,age undetermined  Abnormal ECG  When compared with ECG of 20-FEB-2020 14:17,  Wide QRS rhythm has replaced Atrial fibrillation    Referred By: [email or web address]   SELF           Confirmed By:                             Imaging Results          CT Abdomen Pelvis  Without Contrast (Final result)  Result time 05/11/20 02:20:37   Procedure changed from CT Abdomen Pelvis With Contrast     Final result by Krishna West MD (05/11/20 02:20:37)                 Impression:      Innumerable diverticula with mild thickening of the wall of the distal descending and proximal sigmoid colon, relatively similar when compared to study dated 01/11/2011.  No surrounding mesenteric stranding.  Possible mild sigmoid diverticulitis.  No evidence of obstruction or perforation.    Large volume stool within the rectum extending retrograde to the sigmoid colon, which may be seen in the setting of fecal impaction with stercoral colitis.    Cholelithiasis without evidence of acute cholecystitis.    Small right and trace left pleural  effusions with some associated compressive atelectatic change.    Extensive aortic atherosclerotic calcification.    Electronically signed by resident: Inocencia Valenzuela  Date:    05/11/2020  Time:    01:14    Electronically signed by: Krishna West MD  Date:    05/11/2020  Time:    02:20             Narrative:    EXAMINATION:  CT ABDOMEN PELVIS WITHOUT CONTRAST    CLINICAL HISTORY:  LLQ pain, suspect diverticulitis;    TECHNIQUE:  5.0 mm axial CT images of the abdomen and pelvis were obtained via helical, multi detector CT technique.  No intravenous contrast material was administered.    COMPARISON:  January 2, 2011.    FINDINGS:  Heart: No cardiomegaly or pericardial effusion.  Extensive mitral annular atherosclerotic calcification.    Cardiac pacing wires partially visualized terminating in the left ventricle.    Lung Bases: Small right pleural effusion with associated compressive atelectasis.  Trace left pleural effusion.  No large focal mass or consolidation.  No pneumothorax..    Liver: Small in size with a punctate scattered calcifications consistent with granulomatous disease.  No focal mass lesion.    Gallbladder: Large stone within the gallbladder lumen.  No pericholecystic fluid or wall thickening.    Bile Ducts: No intra or extrahepatic biliary ductal dilation.    Pancreas: Fatty atrophy.  Focal parenchymal mass lesion.  No peripancreatic fat stranding.    Spleen: Unremarkable.    Adrenals: Nonspecific adrenal gland thickening.  No focal lesion.    Genitourinary: Normal in location with cortical thinning.  No focal renal abnormality, nephrolithiasis, or hydroureteronephrosis.  Bladder demonstrates smooth contours without bladder wall thickening.    Reproductive organs: Unremarkable prostate.    GI tract/Mesentery: Stomach is distended with fluid and air.  Small bowel is unremarkable.  There are innumerable diverticula throughout the colon with mild wall thickening within the distal descending and proximal  sigmoid colon, which appears relatively similar when compared to CT dated 01/01/2011.  There is no evidence of extraluminal free air to suggest perforation.  No surrounding mesenteric haziness or stress fat stranding.  There is significant stool within the rectum and distal sigmoid colon, which can be seen in the setting of stercoral colitis.    No evidence of obstruction.    Peritoneal Space: No abdominopelvic ascites or intraperitoneal free air.    Retroperitoneum: No significant adenopathy.    Abdominal wall: Normal.    Vasculature: Extensive atherosclerotic calcification of the aorta and branch vessels including the bilateral renal arteries, celiac, and SMA.    Bones: Degenerative changes.  No evidence of acute fracture or bony destructive process.    Partially visualized sternotomy wires.  No evidence of sternal dehiscence.                                 Medical Decision Making:   History:   Old Medical Records: I decided to obtain old medical records.  Initial Assessment:   Patient was stable, alert and oriented. Umbilical/LLQ pain and tenderness to palpation with associated vomiting and fever.   Differential Diagnosis:   Diverticulitis v constipation v other  Clinical Tests:   Lab Tests: Ordered and Reviewed  Radiological Study: Ordered and Reviewed  Medical Tests: Ordered and Reviewed  ED Management:  Patient with elevated lactate and procal and inflammatory markers. 500 cc IVF given. Repeat lactate wnl. BCx drawn. CT without contrast obtained due to iodine allergy. Admitted              Attending Attestation:   Physician Attestation Statement for Resident:  As the supervising MD   Physician Attestation Statement: I have personally seen and examined this patient.   I agree with the above history. -:   As the supervising MD I agree with the above PE.    As the supervising MD I agree with the above treatment, course, plan, and disposition.            Attending ED Notes:   89y M presenting with fever and  abdominal pain. Known diverticulosis.   Covid screen negative.   Elevated WBC noted. Elevated Procal. Initial lactic high but trended down  with resuscitation.   Has iodine allergy so scanned without contrast. Concern for diverticulitis, distal constipation possible stercoral colitis. Antibiotics started. Stable for admission.                         Clinical Impression:       ICD-10-CM ICD-9-CM   1. Abdominal pain, unspecified abdominal location R10.9 789.00   2. Abdominal pain R10.9 789.00   3. Fever, unspecified fever cause R50.9 780.60   4. Colitis K52.9 558.9         Disposition:   Disposition: Admitted  Condition: Fair     ED Disposition Condition    Admit                           Addi Carwtright MD  Resident  05/11/20 0142       Chandrakant Vital MD  05/11/20 1247

## 2020-05-11 NOTE — ED TRIAGE NOTES
Deena Moody, a 89 y.o. male presents to the ED w/ complaint of abd pain    Triage note: Pt presents with RUQ pain after eating a rich dinner. Pt reports being nauseated and has a history of gallstones   Chief Complaint   Patient presents with    Abdominal Pain     RUQ pain after eating dinner. rates pain 10/10. Nauseous without vomiting. temp 100.6. hx gallstones     Review of patient's allergies indicates:   Allergen Reactions    Iodine and iodide containing products Other (See Comments)     Caused changes in skin color     Past Medical History:   Diagnosis Date    Acute on chronic diastolic heart failure 9/1/2016    Coronary artery disease     Hyperlipidemia     Hypertension     Macular degeneration (senile) of retina, unspecified 12/12/2014    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    Persistent atrial fibrillation 11/10/2016    S/P placement of cardiac pacemaker 9/14/2016   Patient Identifiers for Deena Moody checked and correct  LOC: The patient is awake, alert and aware of environment with an appropriate affect, the patient is oriented x 3 and speaking appropriate.  APPEARANCE: Patient resting comfortably and in no acute distress, patient is clean and well groomed, patient's clothing is properly fastened.  SKIN: The skin is warm and dry, patient has normal skin turgor and moist mucus membranes,no rashes or lesions.Skin Intact , No Breakdown Noted  Musculoskeletal :  Normal range of motion noted. Moves all extremeties well, No swelling or tenderness noted  RESPIRATORY: Airway is open and patent, respirations are spontaneous, patient has a normal effort and rate.  CARDIAC: Patient has a normal rate and rhythm, no periphreal edema noted, capillary refill < 3 seconds. Pt has pacemaker in place  ABDOMEN: RUQ pain 10/10. Pt nauseated  PULSES: 2+  And symmetrical in all extremeties  NEUROLOGIC: PERRL,  facial expression is symmetrical, patient moving all extremities, normal sensation in all extremities when  touched with a finger.The patient is awake, alert and cooperative with a calm affect, patient is aware of environment.    Will continue to monitor

## 2020-05-11 NOTE — ASSESSMENT & PLAN NOTE
SCr 1.4 is lowest it's been in past year - usually ran 1.6    - Monitor renal function  - Renally dose meds - CrCl 54

## 2020-05-11 NOTE — ASSESSMENT & PLAN NOTE
CT abdo/pelvis with evidence of diverticula - initially concerned for possible diverticulitis. Also with large stool burden and possible stercoral colitis. Pain has since resolved. UA unconcerning for UTI. S/p 1x dose of zosyn in ED.     Likely 2/2 to constipation given patient's resolution of abdominal pain s/p spontaneous fecal de-compaction and passage of large bowel movements  -Discontinued antibiotics.   -Patient is afebrile and medically stable for discharged home. Prescribed home polyethylene glycol and encouraged to take three times daily until passage of well formed stool.

## 2020-05-11 NOTE — HPI
Mr. 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 Patient reports that he had just come home from his neighbor's house when he developed a 10/10 central abdominal pain with associated chills. Patient was unable to characterize the pain, only reported that it was painful and tender on palpation. He had never experienced a pain like that before. Nothing was taken to try and alleviate the pain and patient did not noticed that any particular position made it worse/better. The pain did not radiate anywhere. He has regular bowel movements, last one was that morning, without any blood. No diarrhea, nausea, or vomiting. Patient normally urinates fairly often at night, but no changes in urinary habits at home, but experienced difficulty urinating upon arrival to ED.     In the ED, patient's pain resolved. CBC was unremarkable, lipase wnl. ESR and CRP mildly elevated. Lactate was 2.3. Procal elevated to 0.45. CT abdo/pelvis with large stool burden, innumerable diverticula. Patient admitted to hospital medicine for further management and evaluation of abdominal pain 2/2 diverticulitis vs colitis.

## 2020-05-11 NOTE — DISCHARGE INSTRUCTIONS
Take 1 packet of polyethylene glycol which is a laxative three times daily until you have well formed stools.

## 2020-05-11 NOTE — ASSESSMENT & PLAN NOTE
CT abdo/pelvis with evidence of diverticula - possible diverticulitis. Also with large stool burden and possible stercoral colitis. Pain has since resolved. UA unconcerning for UTI. S/p 1x dose of zosyn in ED.    - Switch to unasyn while inpatient to cover for diverticulitis   3g q8h renally dosed   Would discharge on augmentin due to risk of QTc prolongation (492)  - Start bowel regimen to relieve of large stool burden   Had large BM 5/11 am   Continue daily miralax and senna, de-escalate as appropriate

## 2020-05-11 NOTE — DISCHARGE SUMMARY
[facility] Hospital Medicine  Discharge Summary      Patient Name: Deena Moody  MRN: [id number]  Admission Date: 5/10/2020  Hospital Length of Stay: 0 days  Discharge Date and Time:  05/11/2020 1:22 PM  Attending Physician: Kelsey Hastings MD   Discharging Provider: Georgia Oden MD  Primary Care Provider: Jam Rowell MD  [facility] Medicine Team: [facility] Georgia Oden MD    HPI:   Mr. 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 Patient reports that he had just come home from his neighbor's house when he developed a 10/10 central abdominal pain with associated chills. Patient was unable to characterize the pain, only reported that it was painful and tender on palpation. He had never experienced a pain like that before. Nothing was taken to try and alleviate the pain and patient did not noticed that any particular position made it worse/better. The pain did not radiate anywhere. He has regular bowel movements, last one was that morning, without any blood. No diarrhea, nausea, or vomiting. Patient normally urinates fairly often at night, but no changes in urinary habits at home, but experienced difficulty urinating upon arrival to ED.     In the ED, patient's pain resolved. CBC was unremarkable, lipase wnl. ESR and CRP mildly elevated. Lactate was 2.3. Procal elevated to 0.45. CT abdo/pelvis with large stool burden, innumerable diverticula. Patient admitted to hospital medicine for further management and evaluation of abdominal pain 2/2 diverticulitis vs colitis.    * No surgery found *      Hospital Course:   Deena Moody is an 89 year old M who presented for abdominal pain, fever, chills admitted for fecal impaction. On admission patient had a fever of 100.6 degrees. UA was unremarkable. CT Abdomen showed findings suggestive of  mild sigmoid diverticulitis, without mesenteric fat stranding or abscess. Patient was started on laxatives and subequently had a large bowel movement. Abdominal pain has improved and patient remains afebrile. Patient is medically stable for discharge with a prescription of polyethylene glycol and instructed to take it 3 times daily until he develops well formed stool. Wife was contacted with same information relayed to her.     Review of Systems   Constitutional: Negative for activity change, chills, fatigue and fever.   HENT: Negative for congestion, sinus pressure and sinus pain.    Eyes: Negative for photophobia and pain.   Respiratory: Negative for chest tightness, shortness of breath and wheezing.    Cardiovascular: Negative for chest pain and palpitations.   Gastrointestinal: Negative for blood in stool, abdominal constipation, diarrhea, nausea and vomiting.   Genitourinary: Negative for dysuria, hematuria and urgency.   Musculoskeletal: Negative for arthralgias, back pain and gait problem.   Skin: Negative for color change, pallor and rash.   Neurological: Negative for dizziness, syncope and weakness.   Psychiatric/Behavioral: Negative for agitation, behavioral problems and confusion.     Vitals:    05/11/20 1112   BP: 132/68   Pulse: 70   Resp: 18   Temp: 97.7 °F (36.5 °C)     Physical Exam   Constitutional: He is oriented to person, place, and time. He appears well-developed. No distress.   HENT:   Head: Atraumatic.   Mouth/Throat: No oropharyngeal exudate.   Eyes: Conjunctivae and EOM are normal. No scleral icterus.   Neck: Normal range of motion. Neck supple.   Cardiovascular: Normal rate and regular rhythm.  No murmur heard  Pulmonary/Chest: Effort normal and breath sounds normal. No respiratory distress. He has no wheezes. He has no rales.   Abdominal: Soft. Bowel sounds are normal. He exhibits no distension. There is no tenderness. There is no guarding.   Musculoskeletal: Normal range of motion. He  exhibits no edema or tenderness.   Neurological: He is alert and oriented to person, place, and time.   Skin: He is not diaphoretic.   Nursing note and vitals reviewed.      Consults:     Fecal impaction  CT abdo/pelvis with evidence of diverticula - initially concerned for possible diverticulitis. Also with large stool burden and possible stercoral colitis. Pain has since resolved. UA unconcerning for UTI. S/p 1x dose of zosyn in ED.     Likely 2/2 to constipation given patient's resolution of abdominal pain s/p spontaneous fecal de-compaction and passage of large bowel movements  -Discontinued antibiotics.   -Patient is afebrile and medically stable for discharged home. Prescribed home polyethylene glycol and encouraged to take three times daily until passage of well formed stool.     CAD (coronary artery disease)  - Continue home ASA    Hypertension  - Continue home carvedilol and benazepril        Final Active Diagnoses:    Diagnosis Date Noted POA    PRINCIPAL PROBLEM:  Nonspecific colitis [K52.9] 05/11/2020 Yes    Fecal impaction [K56.41] 05/11/2020 Yes    Constipation [K59.00] 05/11/2020 Yes    Atelectasis [J98.11] 05/11/2020 Yes    Calculus of gallbladder without cholecystitis without obstruction [K80.20] 05/11/2020 Yes    Long term (current) use of anticoagulants [Z79.01] 07/11/2017 Not Applicable    Persistent atrial fibrillation [I48.19] 11/10/2016 Yes    CAD (coronary artery disease) [I25.10] 08/31/2016 Yes    Hypertension [I10] 11/18/2013 Yes    CKD (chronic kidney disease) stage 3, GFR 30-59 ml/min [N18.3] 11/18/2013 Yes      Problems Resolved During this Admission:       Discharged Condition: stable    Disposition: Home or Self Care    Follow Up:    Patient Instructions:   No discharge procedures on file.    Significant Diagnostic Studies: Labs:   BMP:   Recent Labs   Lab 05/10/20  2357 05/11/20  0414   * 111*    145   K 3.5 4.2    109   CO2 24 27   BUN 22 21   CREATININE  1.4 1.5*   CALCIUM 9.1 8.6*   MG  --  2.0   , CMP   Recent Labs   Lab 05/10/20  2357 05/11/20  0414    145   K 3.5 4.2    109   CO2 24 27   * 111*   BUN 22 21   CREATININE 1.4 1.5*   CALCIUM 9.1 8.6*   PROT 7.4 6.9   ALBUMIN 3.7 3.4*   BILITOT 1.4* 1.3*   ALKPHOS 118 104   AST 17 17   ALT 8* 9*   ANIONGAP 11 9   ESTGFRAFRICA 51.1* 47.0*   EGFRNONAA 44.2* 40.7*    and CBC   Recent Labs   Lab 05/10/20  2357 05/11/20  0414   WBC 12.50 13.73*   HGB 14.3 13.1*   HCT 43.7 41.8   * 118*     Microbiology:   Blood Culture   Lab Results   Component Value Date    LABBLOO No Growth to date 05/11/2020       Pending Diagnostic Studies:     None         Medications:  Reconciled Home Medications:      Medication List      START taking these medications    polyethylene glycol 17 gram/dose powder  Commonly known as:  GLYCOLAX  Dissolve one capful (17 g) in liquid by mouth 3 (three) times daily as needed. Take 1 three times daily until well formed stool        CONTINUE taking these medications    apixaban 2.5 mg Tab  Commonly known as:  ELIQUIS  Take 1 tablet (2.5 mg total) by mouth 2 (two) times daily.     aspirin 81 MG EC tablet  Commonly known as:  ECOTRIN  Take 1 tablet (81 mg total) by mouth once daily.     benazepriL 5 MG tablet  Commonly known as:  LOTENSIN  Take 1 tablet (5 mg total) by mouth once daily.     carvediloL 12.5 MG tablet  Commonly known as:  COREG  Take 1 tablet (12.5 mg total) by mouth 2 (two) times daily with meals.     ferrous sulfate 325 mg (65 mg iron) Tab tablet  Commonly known as:  FEOSOL  TAKE 1 TABLET DAILY WITH BREAKFAST     nitroGLYCERIN 0.4 MG SL tablet  Commonly known as:  NITROSTAT  Take one tablet every 5 minutes for chest pain. After the third tablet go to the ED.     omeprazole 20 MG capsule  Commonly known as:  PRILOSEC  Take 1 capsule (20 mg total) by mouth daily as needed (gastritis).     ONE DAILY MULTIVITAMIN per tablet  Generic drug:  multivitamin  Take 1 tablet by  mouth once daily.     potassium chloride 8 MEQ Tbsr  Commonly known as:  KLOR-CON  Take 1 tablet (8 mEq total) by mouth every other day.     torsemide 10 MG Tab  Commonly known as:  DEMADEX  Take 1 tablet (10 mg total) by mouth once daily. Two 10 mg tablets by mouth every other day.            Indwelling Lines/Drains at time of discharge:   Lines/Drains/Airways     None                 Time spent on the discharge of patient: 35 minutes  Patient was seen and examined on the date of discharge and determined to be suitable for discharge.         Georgia Oden MD  Department of Hospital Medicine  [facility]

## 2020-05-11 NOTE — NURSING TRANSFER
Nursing Transfer Note      5/11/2020     Arrived on the unit from ED via stretcher with transport personnel. Pt is AAOx4., pt denies pain or discomfort. VSS, see admission assess. Pt has been oriented to the unit, safety socks placed on feet, and call bell in reach.

## 2020-05-11 NOTE — SUBJECTIVE & OBJECTIVE
Past Medical History:   Diagnosis Date    Acute on chronic diastolic heart failure 9/1/2016    Coronary artery disease     Hyperlipidemia     Hypertension     Macular degeneration (senile) of retina, unspecified 12/12/2014    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    Persistent atrial fibrillation 11/10/2016    S/P placement of cardiac pacemaker 9/14/2016       Past Surgical History:   Procedure Laterality Date    AORTIC VALVE REPLACEMENT N/A     CARDIAC PACEMAKER PLACEMENT      CATARACT EXTRACTION W/  INTRAOCULAR LENS IMPLANT Right 2/16/2016    Dr. Whitley    CATARACT EXTRACTION W/  INTRAOCULAR LENS IMPLANT Left 3/1/2016    Dr. Whitley    CORONARY ARTERY BYPASS GRAFT      EAR EXAMINATION UNDER ANESTHESIA      EYE SURGERY         Review of patient's allergies indicates:   Allergen Reactions    Iodine and iodide containing products Other (See Comments)     Caused changes in skin color       No current facility-administered medications on file prior to encounter.      Current Outpatient Medications on File Prior to Encounter   Medication Sig    apixaban (ELIQUIS) 2.5 mg Tab Take 1 tablet (2.5 mg total) by mouth 2 (two) times daily.    aspirin (ECOTRIN) 81 MG EC tablet Take 1 tablet (81 mg total) by mouth once daily.    benazepril (LOTENSIN) 5 MG tablet Take 1 tablet (5 mg total) by mouth once daily.    carvedilol (COREG) 12.5 MG tablet Take 1 tablet (12.5 mg total) by mouth 2 (two) times daily with meals. (Patient taking differently: Take 12.5 mg by mouth 2 (two) times daily with meals. )    ferrous sulfate (FEOSOL) 325 mg (65 mg iron) Tab tablet TAKE 1 TABLET DAILY WITH BREAKFAST    multivitamin (MULTIVITAMIN) per tablet Take 1 tablet by mouth once daily.      nitroGLYCERIN (NITROSTAT) 0.4 MG SL tablet Take one tablet every 5 minutes for chest pain. After the third tablet go to the ED.    omeprazole (PRILOSEC) 20 MG capsule Take 1 capsule (20 mg total) by mouth daily as needed (gastritis).     potassium chloride (KLOR-CON) 8 MEQ TbSR Take 1 tablet (8 mEq total) by mouth every other day.    torsemide (DEMADEX) 10 MG Tab Take 1 tablet (10 mg total) by mouth once daily. Two 10 mg tablets by mouth every other day.     Family History     Problem Relation (Age of Onset)    Hypertension Brother    No Known Problems Mother, Father, Sister, Maternal Aunt, Maternal Uncle, Paternal Aunt, Paternal Uncle, Maternal Grandmother, Maternal Grandfather, Paternal Grandmother, Paternal Grandfather, Daughter, Son    Stroke Brother        Tobacco Use    Smoking status: Never Smoker    Smokeless tobacco: Never Used   Substance and Sexual Activity    Alcohol use: No    Drug use: No    Sexual activity: Yes     Partners: Female     Review of Systems   Constitutional: Negative for activity change, chills, fatigue and fever.   HENT: Negative for congestion, sinus pressure and sinus pain.    Eyes: Negative for photophobia and pain.   Respiratory: Negative for chest tightness, shortness of breath and wheezing.    Cardiovascular: Negative for chest pain and palpitations.   Gastrointestinal: Positive for abdominal pain (resolved). Negative for blood in stool, constipation, diarrhea, nausea and vomiting.   Genitourinary: Negative for dysuria, hematuria and urgency.   Musculoskeletal: Negative for arthralgias, back pain and gait problem.   Skin: Negative for color change, pallor and rash.   Neurological: Negative for dizziness, syncope and weakness.   Psychiatric/Behavioral: Negative for agitation, behavioral problems and confusion.     Objective:     Vital Signs (Most Recent):  Temp: 98.7 °F (37.1 °C) (05/11/20 0259)  Pulse: 69 (05/11/20 0259)  Resp: 20 (05/11/20 0259)  BP: (!) 171/72 (05/11/20 0259)  SpO2: 98 % (05/11/20 0259) Vital Signs (24h Range):  Temp:  [98.7 °F (37.1 °C)-100.6 °F (38.1 °C)] 98.7 °F (37.1 °C)  Pulse:  [68-76] 69  Resp:  [12-20] 20  SpO2:  [94 %-100 %] 98 %  BP: (123-171)/(58-90) 171/72     Weight: 65 kg (143  lb 4.8 oz)  Body mass index is 21.16 kg/m².    Physical Exam   Constitutional: He is oriented to person, place, and time. He appears well-developed. No distress.   HENT:   Head: Atraumatic.   Mouth/Throat: No oropharyngeal exudate.   Eyes: Conjunctivae and EOM are normal. No scleral icterus.   Neck: Normal range of motion. Neck supple.   Cardiovascular: Normal rate and regular rhythm.   Murmur heard.  Pulmonary/Chest: Effort normal and breath sounds normal. No respiratory distress. He has no wheezes. He has no rales.   Abdominal: Soft. Bowel sounds are normal. He exhibits no distension. There is no tenderness. There is no guarding.   Musculoskeletal: Normal range of motion. He exhibits no edema or tenderness.   Neurological: He is alert and oriented to person, place, and time.   Skin: He is not diaphoretic.   Nursing note and vitals reviewed.        CRANIAL NERVES     CN III, IV, VI   Extraocular motions are normal.        Significant Labs:   CBC:   Recent Labs   Lab 05/10/20  2357   WBC 12.50   HGB 14.3   HCT 43.7   *     CMP:   Recent Labs   Lab 05/10/20  2357      K 3.5      CO2 24   *   BUN 22   CREATININE 1.4   CALCIUM 9.1   PROT 7.4   ALBUMIN 3.7   BILITOT 1.4*   ALKPHOS 118   AST 17   ALT 8*   ANIONGAP 11   EGFRNONAA 44.2*     Lactic Acid:   Recent Labs   Lab 05/10/20  2357 05/11/20  0057   LACTATE 2.3* 1.3       Significant Imaging: I have reviewed and interpreted all pertinent imaging results/findings within the past 24 hours.     CT Abdomen Pelvis  Without Contrast  Narrative: EXAMINATION:  CT ABDOMEN PELVIS WITHOUT CONTRAST    CLINICAL HISTORY:  LLQ pain, suspect diverticulitis;    TECHNIQUE:  5.0 mm axial CT images of the abdomen and pelvis were obtained via helical, multi detector CT technique.  No intravenous contrast material was administered.    COMPARISON:  January 2, 2011.    FINDINGS:  Heart: No cardiomegaly or pericardial effusion.  Extensive mitral annular  atherosclerotic calcification.    Cardiac pacing wires partially visualized terminating in the left ventricle.    Lung Bases: Small right pleural effusion with associated compressive atelectasis.  Trace left pleural effusion.  No large focal mass or consolidation.  No pneumothorax..    Liver: Small in size with a punctate scattered calcifications consistent with granulomatous disease.  No focal mass lesion.    Gallbladder: Large stone within the gallbladder lumen.  No pericholecystic fluid or wall thickening.    Bile Ducts: No intra or extrahepatic biliary ductal dilation.    Pancreas: Fatty atrophy.  Focal parenchymal mass lesion.  No peripancreatic fat stranding.    Spleen: Unremarkable.    Adrenals: Nonspecific adrenal gland thickening.  No focal lesion.    Genitourinary: Normal in location with cortical thinning.  No focal renal abnormality, nephrolithiasis, or hydroureteronephrosis.  Bladder demonstrates smooth contours without bladder wall thickening.    Reproductive organs: Unremarkable prostate.    GI tract/Mesentery: Stomach is distended with fluid and air.  Small bowel is unremarkable.  There are innumerable diverticula throughout the colon with mild wall thickening within the distal descending and proximal sigmoid colon, which appears relatively similar when compared to CT dated 01/01/2011.  There is no evidence of extraluminal free air to suggest perforation.  No surrounding mesenteric haziness or stress fat stranding.  There is significant stool within the rectum and distal sigmoid colon, which can be seen in the setting of stercoral colitis.    No evidence of obstruction.    Peritoneal Space: No abdominopelvic ascites or intraperitoneal free air.    Retroperitoneum: No significant adenopathy.    Abdominal wall: Normal.    Vasculature: Extensive atherosclerotic calcification of the aorta and branch vessels including the bilateral renal arteries, celiac, and SMA.    Bones: Degenerative changes.  No  evidence of acute fracture or bony destructive process.    Partially visualized sternotomy wires.  No evidence of sternal dehiscence.  Impression: Innumerable diverticula with mild thickening of the wall of the distal descending and proximal sigmoid colon, relatively similar when compared to study dated 01/11/2011.  No surrounding mesenteric stranding.  Possible mild sigmoid diverticulitis.  No evidence of obstruction or perforation.    Large volume stool within the rectum extending retrograde to the sigmoid colon, which may be seen in the setting of fecal impaction with stercoral colitis.    Cholelithiasis without evidence of acute cholecystitis.    Small right and trace left pleural effusions with some associated compressive atelectatic change.    Extensive aortic atherosclerotic calcification.    Electronically signed by resident: Inocencia Valenzuela  Date:    05/11/2020  Time:    01:14    Electronically signed by: Krishna West MD  Date:    05/11/2020  Time:    02:20

## 2020-05-12 ENCOUNTER — HOSPITAL ENCOUNTER (INPATIENT)
Facility: HOSPITAL | Age: 85
LOS: 5 days | Discharge: HOME HEALTH | DRG: 872 | End: 2020-05-17
Attending: EMERGENCY MEDICINE | Admitting: INTERNAL MEDICINE
Payer: MEDICARE

## 2020-05-12 DIAGNOSIS — B96.5 BACTEREMIA DUE TO PSEUDOMONAS: ICD-10-CM

## 2020-05-12 DIAGNOSIS — R78.81 BACTEREMIA: Primary | ICD-10-CM

## 2020-05-12 DIAGNOSIS — R78.81 BACTEREMIA DUE TO PSEUDOMONAS: ICD-10-CM

## 2020-05-12 DIAGNOSIS — R78.81 GRAM-POSITIVE COCCI BACTEREMIA: ICD-10-CM

## 2020-05-12 LAB
ALBUMIN SERPL BCP-MCNC: 3.3 G/DL (ref 3.5–5.2)
ALP SERPL-CCNC: 99 U/L (ref 55–135)
ALT SERPL W/O P-5'-P-CCNC: 9 U/L (ref 10–44)
ANION GAP SERPL CALC-SCNC: 10 MMOL/L (ref 8–16)
AST SERPL-CCNC: 26 U/L (ref 10–40)
BASOPHILS # BLD AUTO: 0.05 K/UL (ref 0–0.2)
BASOPHILS NFR BLD: 0.5 % (ref 0–1.9)
BILIRUB SERPL-MCNC: 1 MG/DL (ref 0.1–1)
BILIRUB UR QL STRIP: NEGATIVE
BUN SERPL-MCNC: 26 MG/DL (ref 8–23)
CALCIUM SERPL-MCNC: 8.5 MG/DL (ref 8.7–10.5)
CHLORIDE SERPL-SCNC: 107 MMOL/L (ref 95–110)
CLARITY UR REFRACT.AUTO: CLEAR
CO2 SERPL-SCNC: 26 MMOL/L (ref 23–29)
COLOR UR AUTO: YELLOW
CREAT SERPL-MCNC: 1.6 MG/DL (ref 0.5–1.4)
DIFFERENTIAL METHOD: ABNORMAL
EOSINOPHIL # BLD AUTO: 0.2 K/UL (ref 0–0.5)
EOSINOPHIL NFR BLD: 2.5 % (ref 0–8)
ERYTHROCYTE [DISTWIDTH] IN BLOOD BY AUTOMATED COUNT: 13.3 % (ref 11.5–14.5)
EST. GFR  (AFRICAN AMERICAN): 43.5 ML/MIN/1.73 M^2
EST. GFR  (NON AFRICAN AMERICAN): 37.6 ML/MIN/1.73 M^2
GLUCOSE SERPL-MCNC: 89 MG/DL (ref 70–110)
GLUCOSE UR QL STRIP: NEGATIVE
HCT VFR BLD AUTO: 43 % (ref 40–54)
HGB BLD-MCNC: 13.8 G/DL (ref 14–18)
HGB UR QL STRIP: ABNORMAL
IMM GRANULOCYTES # BLD AUTO: 0.03 K/UL (ref 0–0.04)
IMM GRANULOCYTES NFR BLD AUTO: 0.3 % (ref 0–0.5)
KETONES UR QL STRIP: NEGATIVE
LACTATE SERPL-SCNC: 1.6 MMOL/L (ref 0.5–2.2)
LEUKOCYTE ESTERASE UR QL STRIP: NEGATIVE
LYMPHOCYTES # BLD AUTO: 1.3 K/UL (ref 1–4.8)
LYMPHOCYTES NFR BLD: 12.8 % (ref 18–48)
MCH RBC QN AUTO: 32.9 PG (ref 27–31)
MCHC RBC AUTO-ENTMCNC: 32.1 G/DL (ref 32–36)
MCV RBC AUTO: 103 FL (ref 82–98)
MICROSCOPIC COMMENT: ABNORMAL
MONOCYTES # BLD AUTO: 0.8 K/UL (ref 0.3–1)
MONOCYTES NFR BLD: 8.4 % (ref 4–15)
NEUTROPHILS # BLD AUTO: 7.4 K/UL (ref 1.8–7.7)
NEUTROPHILS NFR BLD: 75.5 % (ref 38–73)
NITRITE UR QL STRIP: NEGATIVE
NRBC BLD-RTO: 0 /100 WBC
PH UR STRIP: 5 [PH] (ref 5–8)
PLATELET # BLD AUTO: 163 K/UL (ref 150–350)
PMV BLD AUTO: 10.8 FL (ref 9.2–12.9)
POTASSIUM SERPL-SCNC: 4 MMOL/L (ref 3.5–5.1)
PROT SERPL-MCNC: 7.5 G/DL (ref 6–8.4)
PROT UR QL STRIP: NEGATIVE
RBC # BLD AUTO: 4.19 M/UL (ref 4.6–6.2)
RBC #/AREA URNS AUTO: 14 /HPF (ref 0–4)
SARS-COV-2 RDRP RESP QL NAA+PROBE: NEGATIVE
SODIUM SERPL-SCNC: 143 MMOL/L (ref 136–145)
SP GR UR STRIP: 1.01 (ref 1–1.03)
URN SPEC COLLECT METH UR: ABNORMAL
WBC # BLD AUTO: 9.78 K/UL (ref 3.9–12.7)
WBC #/AREA URNS AUTO: 4 /HPF (ref 0–5)

## 2020-05-12 PROCEDURE — 83605 ASSAY OF LACTIC ACID: CPT | Mod: HCNC

## 2020-05-12 PROCEDURE — 87040 BLOOD CULTURE FOR BACTERIA: CPT | Mod: HCNC

## 2020-05-12 PROCEDURE — 99285 EMERGENCY DEPT VISIT HI MDM: CPT | Mod: ,,, | Performed by: PHYSICIAN ASSISTANT

## 2020-05-12 PROCEDURE — 25000003 PHARM REV CODE 250: Mod: HCNC | Performed by: STUDENT IN AN ORGANIZED HEALTH CARE EDUCATION/TRAINING PROGRAM

## 2020-05-12 PROCEDURE — 63600175 PHARM REV CODE 636 W HCPCS: Mod: HCNC | Performed by: STUDENT IN AN ORGANIZED HEALTH CARE EDUCATION/TRAINING PROGRAM

## 2020-05-12 PROCEDURE — U0002 COVID-19 LAB TEST NON-CDC: HCPCS | Mod: HCNC

## 2020-05-12 PROCEDURE — 99223 1ST HOSP IP/OBS HIGH 75: CPT | Mod: AI,HCNC,GC, | Performed by: INTERNAL MEDICINE

## 2020-05-12 PROCEDURE — 63600175 PHARM REV CODE 636 W HCPCS: Mod: HCNC | Performed by: PHYSICIAN ASSISTANT

## 2020-05-12 PROCEDURE — 11000001 HC ACUTE MED/SURG PRIVATE ROOM: Mod: HCNC

## 2020-05-12 PROCEDURE — 80053 COMPREHEN METABOLIC PANEL: CPT | Mod: HCNC

## 2020-05-12 PROCEDURE — 85025 COMPLETE CBC W/AUTO DIFF WBC: CPT | Mod: HCNC

## 2020-05-12 PROCEDURE — 99285 PR EMERGENCY DEPT VISIT,LEVEL V: ICD-10-PCS | Mod: ,,, | Performed by: PHYSICIAN ASSISTANT

## 2020-05-12 PROCEDURE — 96365 THER/PROPH/DIAG IV INF INIT: CPT | Mod: HCNC

## 2020-05-12 PROCEDURE — 99285 EMERGENCY DEPT VISIT HI MDM: CPT | Mod: 25,HCNC

## 2020-05-12 PROCEDURE — 99223 PR INITIAL HOSPITAL CARE,LEVL III: ICD-10-PCS | Mod: AI,HCNC,GC, | Performed by: INTERNAL MEDICINE

## 2020-05-12 PROCEDURE — 81001 URINALYSIS AUTO W/SCOPE: CPT | Mod: HCNC

## 2020-05-12 PROCEDURE — 25000003 PHARM REV CODE 250: Mod: HCNC | Performed by: PHYSICIAN ASSISTANT

## 2020-05-12 RX ORDER — IBUPROFEN 200 MG
24 TABLET ORAL
Status: DISCONTINUED | OUTPATIENT
Start: 2020-05-12 | End: 2020-05-17 | Stop reason: HOSPADM

## 2020-05-12 RX ORDER — SODIUM CHLORIDE 0.9 % (FLUSH) 0.9 %
10 SYRINGE (ML) INJECTION
Status: DISCONTINUED | OUTPATIENT
Start: 2020-05-12 | End: 2020-05-17 | Stop reason: HOSPADM

## 2020-05-12 RX ORDER — IBUPROFEN 200 MG
16 TABLET ORAL
Status: DISCONTINUED | OUTPATIENT
Start: 2020-05-12 | End: 2020-05-17 | Stop reason: HOSPADM

## 2020-05-12 RX ORDER — ASPIRIN 81 MG/1
81 TABLET ORAL DAILY
Status: DISCONTINUED | OUTPATIENT
Start: 2020-05-12 | End: 2020-05-17 | Stop reason: HOSPADM

## 2020-05-12 RX ORDER — TALC
6 POWDER (GRAM) TOPICAL NIGHTLY PRN
Status: DISCONTINUED | OUTPATIENT
Start: 2020-05-12 | End: 2020-05-17 | Stop reason: HOSPADM

## 2020-05-12 RX ORDER — TORSEMIDE 10 MG/1
10 TABLET ORAL DAILY
Status: DISCONTINUED | OUTPATIENT
Start: 2020-05-12 | End: 2020-05-14

## 2020-05-12 RX ORDER — PROMETHAZINE HYDROCHLORIDE 25 MG/1
25 TABLET ORAL EVERY 6 HOURS PRN
Status: DISCONTINUED | OUTPATIENT
Start: 2020-05-12 | End: 2020-05-17 | Stop reason: HOSPADM

## 2020-05-12 RX ORDER — POLYETHYLENE GLYCOL 3350 17 G/17G
17 POWDER, FOR SOLUTION ORAL 3 TIMES DAILY PRN
Status: DISCONTINUED | OUTPATIENT
Start: 2020-05-12 | End: 2020-05-17 | Stop reason: HOSPADM

## 2020-05-12 RX ORDER — FERROUS SULFATE 325(65) MG
325 TABLET, DELAYED RELEASE (ENTERIC COATED) ORAL DAILY
Status: DISCONTINUED | OUTPATIENT
Start: 2020-05-12 | End: 2020-05-17 | Stop reason: HOSPADM

## 2020-05-12 RX ORDER — BENAZEPRIL HYDROCHLORIDE 5 MG/1
5 TABLET ORAL DAILY
Status: DISCONTINUED | OUTPATIENT
Start: 2020-05-12 | End: 2020-05-15

## 2020-05-12 RX ORDER — PANTOPRAZOLE SODIUM 40 MG/1
40 TABLET, DELAYED RELEASE ORAL DAILY
Status: DISCONTINUED | OUTPATIENT
Start: 2020-05-12 | End: 2020-05-17 | Stop reason: HOSPADM

## 2020-05-12 RX ORDER — ONDANSETRON 8 MG/1
8 TABLET, ORALLY DISINTEGRATING ORAL EVERY 8 HOURS PRN
Status: DISCONTINUED | OUTPATIENT
Start: 2020-05-12 | End: 2020-05-17 | Stop reason: HOSPADM

## 2020-05-12 RX ORDER — CARVEDILOL 12.5 MG/1
12.5 TABLET ORAL
Status: COMPLETED | OUTPATIENT
Start: 2020-05-12 | End: 2020-05-12

## 2020-05-12 RX ORDER — ACETAMINOPHEN 325 MG/1
650 TABLET ORAL EVERY 8 HOURS PRN
Status: DISCONTINUED | OUTPATIENT
Start: 2020-05-12 | End: 2020-05-17 | Stop reason: HOSPADM

## 2020-05-12 RX ORDER — ACETAMINOPHEN 325 MG/1
650 TABLET ORAL EVERY 4 HOURS PRN
Status: DISCONTINUED | OUTPATIENT
Start: 2020-05-12 | End: 2020-05-17 | Stop reason: HOSPADM

## 2020-05-12 RX ORDER — GLUCAGON 1 MG
1 KIT INJECTION
Status: DISCONTINUED | OUTPATIENT
Start: 2020-05-12 | End: 2020-05-17 | Stop reason: HOSPADM

## 2020-05-12 RX ORDER — CARVEDILOL 12.5 MG/1
12.5 TABLET ORAL 2 TIMES DAILY WITH MEALS
Status: DISCONTINUED | OUTPATIENT
Start: 2020-05-12 | End: 2020-05-17 | Stop reason: HOSPADM

## 2020-05-12 RX ADMIN — ASPIRIN 81 MG: 81 TABLET, COATED ORAL at 06:05

## 2020-05-12 RX ADMIN — FERROUS SULFATE TAB EC 325 MG (65 MG FE EQUIVALENT) 325 MG: 325 (65 FE) TABLET DELAYED RESPONSE at 06:05

## 2020-05-12 RX ADMIN — PIPERACILLIN SODIUM,TAZOBACTAM SODIUM 4.5 G: 4; .5 INJECTION, POWDER, FOR SOLUTION INTRAVENOUS at 01:05

## 2020-05-12 RX ADMIN — CARVEDILOL 12.5 MG: 12.5 TABLET, FILM COATED ORAL at 02:05

## 2020-05-12 RX ADMIN — THERA TABS 1 TABLET: TAB at 06:05

## 2020-05-12 RX ADMIN — PIPERACILLIN SODIUM,TAZOBACTAM SODIUM 4.5 G: 4; .5 INJECTION, POWDER, FOR SOLUTION INTRAVENOUS at 09:05

## 2020-05-12 RX ADMIN — CARVEDILOL 12.5 MG: 12.5 TABLET, FILM COATED ORAL at 06:05

## 2020-05-12 RX ADMIN — APIXABAN 2.5 MG: 2.5 TABLET, FILM COATED ORAL at 08:05

## 2020-05-12 RX ADMIN — TORSEMIDE 10 MG: 10 TABLET ORAL at 06:05

## 2020-05-12 RX ADMIN — Medication 6 MG: at 08:05

## 2020-05-12 RX ADMIN — PANTOPRAZOLE SODIUM 40 MG: 40 TABLET, DELAYED RELEASE ORAL at 06:05

## 2020-05-12 NOTE — ED NOTES
Called floor to give report, unit secretary Ana,stated nurse went to cafeteria.  Call backin 10 minutes

## 2020-05-12 NOTE — HPI
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  The patient was discharged yesterday after a 1 day admission for abdominal pain.  Patient initially presented for periumbilical abdominal pain with chills but reports that all these symptoms have completely subsided.  He has no current complaints this time.  He denies chills within last 2 days, fever, persistent abdominal pain, nausea/vomiting/diarrhea, pain or frequency with urination, chest pain, cough or shortness of breath. Lactate was 1.6 in the ED on today's admission.

## 2020-05-12 NOTE — ASSESSMENT & PLAN NOTE
Patient blood cultures from 5/11 positive for pseudomonas bacteremia.    Plan  -zosyn 4.5 mg IV q8

## 2020-05-12 NOTE — ED NOTES
Patient identifiers verified and correct for Mr Bledsoe  C/C: Positive blood cultures SEE NN  APPEARANCE: awake and alert in NAD.Very [place]  SKIN: warm, dry and intact. No breakdown or bruising.  MUSCULOSKELETAL: Patient moving all extremities spontaneously, no obvious swelling or deformities noted. Ambulates independently.  RESPIRATORY: Denies shortness of breath.Respirations unlabored. Denies fevers  CARDIAC: Denies CP, 2+ distal pulses; no peripheral edema  ABDOMEN: Denies abd pain , vnv/d  : voids spontaneously, denies difficulty  Neurologic: AAO x 4; follows commands equal strength in all extremities; denies numbness/tingling. Denies dizziness Denies weakness

## 2020-05-12 NOTE — ED PROVIDER NOTES
Encounter Date: 5/12/2020       History     Chief Complaint   Patient presents with    Abnormal Lab     + blood culture     89-year-old male with multiple comorbidities including CAD, and ICM, hypertension, CKD 3 presents for positive blood cultures.  The patient was discharged yesterday after a 1 day admission for abdominal pain.  Patient initially presented for periumbilical abdominal pain with chills but reports that all these symptoms have completely subsided.  He has no complaints this time.  He denies chills within last 2 days, fever, persistent abdominal pain, nausea/vomiting/diarrhea, urinary symptoms, chest pain, cough or shortness of breath.        Review of patient's allergies indicates:   Allergen Reactions    Iodine and iodide containing products Other (See Comments)     Caused changes in skin color     Past Medical History:   Diagnosis Date    Acute on chronic diastolic heart failure 9/1/2016    Coronary artery disease     Hyperlipidemia     Hypertension     Macular degeneration (senile) of retina, unspecified 12/12/2014    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    Persistent atrial fibrillation 11/10/2016    S/P placement of cardiac pacemaker 9/14/2016     Past Surgical History:   Procedure Laterality Date    AORTIC VALVE REPLACEMENT N/A     CARDIAC PACEMAKER PLACEMENT      CATARACT EXTRACTION W/  INTRAOCULAR LENS IMPLANT Right 2/16/2016    Dr. Whitley    CATARACT EXTRACTION W/  INTRAOCULAR LENS IMPLANT Left 3/1/2016    Dr. Whitley    CORONARY ARTERY BYPASS GRAFT      EAR EXAMINATION UNDER ANESTHESIA      EYE SURGERY       Family History   Problem Relation Age of Onset    No Known Problems Mother     No Known Problems Father     No Known Problems Sister     Stroke Brother     Hypertension Brother     No Known Problems Maternal Aunt     No Known Problems Maternal Uncle     No Known Problems Paternal Aunt     No Known Problems Paternal Uncle     No Known Problems Maternal  Grandmother     No Known Problems Maternal Grandfather     No Known Problems Paternal Grandmother     No Known Problems Paternal Grandfather     No Known Problems Daughter     No Known Problems Son     Amblyopia Neg Hx     Blindness Neg Hx     Cancer Neg Hx     Cataracts Neg Hx     Diabetes Neg Hx     Glaucoma Neg Hx     Macular degeneration Neg Hx     Retinal detachment Neg Hx     Strabismus Neg Hx     Thyroid disease Neg Hx      Social History     Tobacco Use    Smoking status: Never Smoker    Smokeless tobacco: Never Used   Substance Use Topics    Alcohol use: No    Drug use: No     Review of Systems   Constitutional: Positive for chills. Negative for diaphoresis, fatigue and fever.   HENT: Negative for sore throat.    Respiratory: Negative for shortness of breath.    Cardiovascular: Negative for chest pain.   Gastrointestinal: Negative for abdominal pain, diarrhea, nausea and vomiting.   Genitourinary: Negative for dysuria and hematuria.   Musculoskeletal: Negative for back pain.   Skin: Negative for rash.   Allergic/Immunologic: Negative for immunocompromised state.   Neurological: Negative for weakness.   Hematological: Does not bruise/bleed easily.       Physical Exam     Initial Vitals [05/12/20 1245]   BP Pulse Resp Temp SpO2   (!) 196/79 70 18 97.9 °F (36.6 °C) 98 %      MAP       --         Physical Exam    Nursing note and vitals reviewed.  Constitutional: He appears well-developed and well-nourished. He is not diaphoretic. No distress.   HENT:   Head: Normocephalic and atraumatic.   Eyes: EOM are normal. Pupils are equal, round, and reactive to light.   Neck: Normal range of motion. Neck supple.   Cardiovascular: Normal rate, regular rhythm, normal heart sounds and intact distal pulses. Exam reveals no gallop and no friction rub.    No murmur heard.  Pulmonary/Chest: Breath sounds normal. No respiratory distress. He has no wheezes. He has no rhonchi. He has no rales. He exhibits no  tenderness.   Abdominal: Soft. Bowel sounds are normal. He exhibits no distension. There is no tenderness.   Musculoskeletal: Normal range of motion.   Neurological: He is alert and oriented to person, place, and time.   Skin: Skin is warm and dry.   Psychiatric: He has a normal mood and affect.         ED Course   Procedures  Labs Reviewed   CBC W/ AUTO DIFFERENTIAL - Abnormal; Notable for the following components:       Result Value    RBC 4.19 (*)     Hemoglobin 13.8 (*)     Mean Corpuscular Volume 103 (*)     Mean Corpuscular Hemoglobin 32.9 (*)     Gran% 75.5 (*)     Lymph% 12.8 (*)     All other components within normal limits   COMPREHENSIVE METABOLIC PANEL - Abnormal; Notable for the following components:    BUN, Bld 26 (*)     Creatinine 1.6 (*)     Calcium 8.5 (*)     Albumin 3.3 (*)     ALT 9 (*)     eGFR if  43.5 (*)     eGFR if non  37.6 (*)     All other components within normal limits   URINALYSIS, REFLEX TO URINE CULTURE - Abnormal; Notable for the following components:    Occult Blood UA 2+ (*)     All other components within normal limits    Narrative:     Preferred Collection Type->Urine, Clean Catch   URINALYSIS MICROSCOPIC - Abnormal; Notable for the following components:    RBC, UA 14 (*)     All other components within normal limits    Narrative:     Preferred Collection Type->Urine, Clean Catch   CULTURE, BLOOD   CULTURE, BLOOD   SARS-COV-2 RNA AMPLIFICATION, QUAL   LACTIC ACID, PLASMA          Imaging Results          X-Ray Chest AP Portable (Final result)  Result time 05/12/20 14:26:41    Final result by Nancy Anderson MD (05/12/20 14:26:41)                 Impression:      Please see above.      Electronically signed by: Nancy Anderson MD  Date:    05/12/2020  Time:    14:26             Narrative:    EXAMINATION:  XR CHEST AP PORTABLE    CLINICAL HISTORY:  bacteremia;    TECHNIQUE:  Single frontal view of the chest was  performed.    COMPARISON:  12/14/2018.  09/11/2017.    FINDINGS:  Status post remote aortic valve replacement.  Sternal wires are intact and aligned.  Pacer and 2 transvenous leads remain in their usual locations.    Allowing for dextrocurvature of the thoracic spine, mediastinal structures are midline.  Calcific plaque noted at the level of the arch in this 89-year-old man.    There is mild blunting of the right lateral costophrenic angle, new since the most recent chest radiograph dated December 2018.  Differential diagnosis includes a small amount of dependent right pleural fluid, parenchymal opacity of the lung adjacent to the right hemidiaphragm as might occur with atelectasis, aspiration, pneumonia, neoplasm or combination of these factors, or both pulmonary and pleural disease in this patient with a history of bacteremia.    I detect no other significant change in the appearance of the lungs compared to December 2018.                                 Medical Decision Making:   History:   Old Medical Records: I decided to obtain old medical records.  Old Records Summarized: records from previous admission(s).       <> Summary of Records: Patient discharged yesterday after 1 day admission for abdominal pain.  CT abdomen pelvis performed 05/10/2020 showed:    Innumerable diverticula with mild thickening of the wall of the distal descending and proximal sigmoid colon, relatively similar when compared to study dated 01/11/2011.  No surrounding mesenteric stranding.  Possible mild sigmoid diverticulitis.  No evidence of obstruction or perforation.    Microbiology notable for both aerobic cultures from 2 different sites growing gram-negative rods  Initial Assessment:   89-year-old male presenting for positive blood cultures after recent admission for abdominal pain.  He is hypertensive with otherwise normal vitals.  Well-appearing, abdomen is soft and nontender with normoactive bowel sounds.  Per chart review, blood  cultures drawn yesterday growing GNRs in both aerobic bottles from 2 different sites.  Patient was admitted for abdominal pain, CT scan performed at that time showed diverticula with some mild wall thickening but no mesenteric stranding.  Differential Diagnosis:   Suspect transient bacteremia from GI source  UTI  Lower suspicion for pulmonary source  Patient not clinically septic  GNRs inconsistent with contamination from skin    Independently Interpreted Test(s):   I have ordered and independently interpreted X-rays - see summary below.       <> Summary of X-Ray Reading(s): Small right effusion, seen on CT abdomen pelvis performed 05/10/2020  Clinical Tests:   Lab Tests: Ordered and Reviewed  Radiological Study: Reviewed  ED Management:  Will check labs, give empiric piperacillin/ tazobactam and admit to Hospital Medicine.  Will defer fluid resuscitation given that the patient has pulmonary hypertension and CHF and is not clinically septic.    Labs are notable for elevated creatinine close to the patient's baseline.  No leukocytosis, lactic acid normal.  Blood cultures are pending.  Patient will be admitted to Hospital Medicine for further management.  I discussed this plan with the patient's permission with his daughter Amelie Fernandes at [phone].  Please call her with any updates.  I discussed this patient with my supervising physician.    Magalys Woodard PA-C                         ED Course as of May 12 1520   Tue May 12, 2020   1412 COVID-19 Rapid Screening [CC]      ED Course User Index  [CC] Magalys Woodard PA-C                Clinical Impression:       ICD-10-CM ICD-9-CM   1. Bacteremia R78.81 790.7         Disposition:   Disposition: Admitted  Condition: Fair     ED Disposition Condition    Admit                           Magalys Woodard PA-C  05/12/20 1532

## 2020-05-12 NOTE — PROVIDER PROGRESS NOTES - EMERGENCY DEPT.
Emergency Department TeleTRIAGE Encounter Note      CHIEF COMPLAINT    Chief Complaint   Patient presents with    Abnormal Lab     + blood culture       VITAL SIGNS   Initial Vitals [05/12/20 1245]   BP Pulse Resp Temp SpO2   (!) 196/79 70 18 97.9 °F (36.6 °C) 98 %      MAP       --            ALLERGIES    Review of patient's allergies indicates:   Allergen Reactions    Iodine and iodide containing products Other (See Comments)     Caused changes in skin color       PROVIDER TRIAGE NOTE  Patient with past medical history atrial fibrillation, CHF, CAD, hyperlipidemia, hypertension presents to the ED after blood cultures were positive.  Patient was admitted on 05/10/2020 for acute diverticulitis versus colitis.  Was given antibiotics while in the hospital.  Discharged yesterday and called today with Gram-negative rods noted in 1 of his blood cultures.  Patient has no complaints today.      ORDERS  Labs Reviewed - No data to display    ED Orders (720h ago, onward)    Start Ordered     Status Ordering Provider    05/12/20 1255 05/12/20 1255  Blood culture #1  STAT  Collect   Comments:  Blood Culture #1      Ordered COY ONOFRE.    05/12/20 1255 05/12/20 1255  Blood culture #2  STAT  Collect   Comments:  Blood culture #2      Ordered COY ONOFRE G.            Virtual Visit Note: The provider triage portion of this emergency department evaluation and documentation was performed via [employer], a HIPAA-compliant telemedicine application, in concert with a tele-presenter in the room. A face to face patient evaluation with one of my colleagues will occur once the patient is placed in an emergency department room.      DISCLAIMER: This note was prepared with [employer]*Possible Web voice recognition transcription software. Garbled syntax, mangled pronouns, and other bizarre constructions may be attributed to that software system.

## 2020-05-12 NOTE — CARE UPDATE
Called spouse, Sarika (at pt's main home number) to inform her that Mr. Moody's blood cultures were positive for gram negative rods and that Mr. Moody should come back to the hospital ED ASAP.     She acknowledged this and said she would call her daughter to bring him to the ED.    Gil Denis MD   PGY-3 Internal Medicine  [phone]

## 2020-05-12 NOTE — ED NOTES
"Patient states he was discharged from hospital yesterday, told to come to ED for "something in my blood" Denies fevers. Denies abd pain or vomiting.   "

## 2020-05-12 NOTE — PROGRESS NOTES
Pharmacist Renal Dose Adjustment Note    Deena Moody is a 89 y.o. male being treated with piperacillin/tazobactam for bacteremia.     Patient Data:    Vital Signs (Most Recent):  Temp: 98.4 °F (36.9 °C) (05/12/20 1549)  Pulse: 69 (05/12/20 1549)  Resp: 16 (05/12/20 1549)  BP: (!) 155/72 (05/12/20 1549)  SpO2: 99 % (05/12/20 1549)   Vital Signs (72h Range):  Temp:  [97.7 °F (36.5 °C)-100.6 °F (38.1 °C)]   Pulse:  [68-76]   Resp:  [12-20]   BP: (123-208)/(58-91)   SpO2:  [94 %-100 %]      Recent Labs   Lab 05/10/20  2357 05/11/20  0414 05/12/20  1325   CREATININE 1.4 1.5* 1.6*     Serum creatinine:  1.6 mg/dL (H) 05/12/20 1325  Estimated creatinine clearance:  26.9 mL/min (A)    Piperacillin/tazobactam 4.5 grams IVPB every 12 hours will be changed to piperacillin/tazobactam 4.5 grams IVPB every 8 hours extended infusion.     Pharmacist's Name:  Abdirahman Molina  Pharmacist's Extension:  [phone]

## 2020-05-12 NOTE — SUBJECTIVE & OBJECTIVE
Past Medical History:   Diagnosis Date    Acute on chronic diastolic heart failure 9/1/2016    Coronary artery disease     Hyperlipidemia     Hypertension     Macular degeneration (senile) of retina, unspecified 12/12/2014    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    Persistent atrial fibrillation 11/10/2016    S/P placement of cardiac pacemaker 9/14/2016       Past Surgical History:   Procedure Laterality Date    AORTIC VALVE REPLACEMENT N/A     CARDIAC PACEMAKER PLACEMENT      CATARACT EXTRACTION W/  INTRAOCULAR LENS IMPLANT Right 2/16/2016    Dr. Whitley    CATARACT EXTRACTION W/  INTRAOCULAR LENS IMPLANT Left 3/1/2016    Dr. Whitley    CORONARY ARTERY BYPASS GRAFT      EAR EXAMINATION UNDER ANESTHESIA      EYE SURGERY         Review of patient's allergies indicates:   Allergen Reactions    Iodine and iodide containing products Other (See Comments)     Caused changes in skin color       Current Facility-Administered Medications on File Prior to Encounter   Medication    [DISCONTINUED] apixaban tablet 2.5 mg    [DISCONTINUED] aspirin EC tablet 81 mg    [DISCONTINUED] benazepriL tablet 5 mg    [DISCONTINUED] carvediloL tablet 12.5 mg    [DISCONTINUED] dextrose 10 % infusion    [DISCONTINUED] dextrose 10 % infusion    [DISCONTINUED] ferrous sulfate EC tablet 325 mg    [DISCONTINUED] glucagon (human recombinant) injection 1 mg    [DISCONTINUED] glucose chewable tablet 16 g    [DISCONTINUED] glucose chewable tablet 24 g    [DISCONTINUED] melatonin tablet 6 mg    [DISCONTINUED] ondansetron disintegrating tablet 8 mg    [DISCONTINUED] pantoprazole EC tablet 20 mg    [DISCONTINUED] polyethylene glycol packet 17 g    [DISCONTINUED] prochlorperazine tablet 10 mg    [DISCONTINUED] senna tablet 8.6 mg    [DISCONTINUED] sodium chloride 0.9% flush 10 mL    [DISCONTINUED] torsemide tablet 10 mg     Current Outpatient Medications on File Prior to Encounter   Medication Sig    apixaban (ELIQUIS) 2.5  mg Tab Take 1 tablet (2.5 mg total) by mouth 2 (two) times daily.    aspirin (ECOTRIN) 81 MG EC tablet Take 1 tablet (81 mg total) by mouth once daily.    benazepril (LOTENSIN) 5 MG tablet Take 1 tablet (5 mg total) by mouth once daily.    carvedilol (COREG) 12.5 MG tablet Take 1 tablet (12.5 mg total) by mouth 2 (two) times daily with meals. (Patient taking differently: Take 12.5 mg by mouth 2 (two) times daily with meals. )    omeprazole (PRILOSEC) 20 MG capsule Take 1 capsule (20 mg total) by mouth daily as needed (gastritis).    potassium chloride (KLOR-CON) 8 MEQ TbSR Take 1 tablet (8 mEq total) by mouth every other day.    torsemide (DEMADEX) 10 MG Tab Take 1 tablet (10 mg total) by mouth once daily. Two 10 mg tablets by mouth every other day.    ferrous sulfate (FEOSOL) 325 mg (65 mg iron) Tab tablet TAKE 1 TABLET DAILY WITH BREAKFAST    multivitamin (MULTIVITAMIN) per tablet Take 1 tablet by mouth once daily.      nitroGLYCERIN (NITROSTAT) 0.4 MG SL tablet Take one tablet every 5 minutes for chest pain. After the third tablet go to the ED.    polyethylene glycol (GLYCOLAX) 17 gram/dose powder Dissolve one capful (17 g) in liquid by mouth 3 (three) times daily as needed. Take 1 three times daily until well formed stool     Family History     Problem Relation (Age of Onset)    Hypertension Brother    No Known Problems Mother, Father, Sister, Maternal Aunt, Maternal Uncle, Paternal Aunt, Paternal Uncle, Maternal Grandmother, Maternal Grandfather, Paternal Grandmother, Paternal Grandfather, Daughter, Son    Stroke Brother        Tobacco Use    Smoking status: Never Smoker    Smokeless tobacco: Never Used   Substance and Sexual Activity    Alcohol use: No    Drug use: No    Sexual activity: Yes     Partners: Female     Review of Systems   Constitutional: Negative for chills, diaphoresis, fatigue and fever.   HENT: Negative for sore throat and trouble swallowing.    Eyes: Negative for photophobia  and visual disturbance.   Respiratory: Negative for cough, chest tightness and shortness of breath.    Cardiovascular: Negative for chest pain and palpitations.   Gastrointestinal: Negative for abdominal distention, abdominal pain, constipation and nausea.   Genitourinary: Negative for difficulty urinating and dysuria.   Musculoskeletal: Negative for arthralgias and myalgias.   Skin: Negative for pallor and rash.   Neurological: Negative for dizziness, speech difficulty, weakness, light-headedness and headaches.   Psychiatric/Behavioral: Negative for agitation, behavioral problems, confusion and decreased concentration.     Objective:     Vital Signs (Most Recent):  Temp: 98.4 °F (36.9 °C) (05/12/20 1549)  Pulse: 69 (05/12/20 1549)  Resp: 16 (05/12/20 1549)  BP: (!) 155/72 (05/12/20 1549)  SpO2: 99 % (05/12/20 1549) Vital Signs (24h Range):  Temp:  [97.9 °F (36.6 °C)-98.6 °F (37 °C)] 98.4 °F (36.9 °C)  Pulse:  [69-70] 69  Resp:  [16-18] 16  SpO2:  [98 %-99 %] 99 %  BP: (155-208)/(72-91) 155/72     Weight: 60.8 kg (134 lb)  Body mass index is 19.79 kg/m².    Physical Exam   Constitutional: He is oriented to person, place, and time. He appears well-developed and well-nourished. No distress.   HENT:   Head: Normocephalic and atraumatic.   Eyes: Pupils are equal, round, and reactive to light. EOM are normal.   Neck: Normal range of motion. Neck supple.   Cardiovascular: Normal rate and intact distal pulses.   Ventricularly paced rhythm   Pulmonary/Chest: Effort normal and breath sounds normal. No respiratory distress. He exhibits no tenderness.   Abdominal: Soft. Bowel sounds are normal. There is no tenderness.   Musculoskeletal: Normal range of motion. He exhibits no edema or tenderness.   Neurological: He is alert and oriented to person, place, and time.   Skin: Skin is warm and dry. Capillary refill takes less than 2 seconds. No pallor.   Psychiatric: He has a normal mood and affect.         CRANIAL NERVES     CN  III, IV, VI   Pupils are equal, round, and reactive to light.  Extraocular motions are normal.        Significant Labs: All pertinent labs within the past 24 hours have been reviewed.    Significant Imaging: I have reviewed all pertinent imaging results/findings within the past 24 hours.

## 2020-05-13 LAB
ALBUMIN SERPL BCP-MCNC: 3.1 G/DL (ref 3.5–5.2)
ALP SERPL-CCNC: 90 U/L (ref 55–135)
ALT SERPL W/O P-5'-P-CCNC: 8 U/L (ref 10–44)
ANION GAP SERPL CALC-SCNC: 11 MMOL/L (ref 8–16)
AST SERPL-CCNC: 18 U/L (ref 10–40)
BASOPHILS # BLD AUTO: 0.04 K/UL (ref 0–0.2)
BASOPHILS NFR BLD: 0.5 % (ref 0–1.9)
BILIRUB SERPL-MCNC: 1 MG/DL (ref 0.1–1)
BUN SERPL-MCNC: 25 MG/DL (ref 8–23)
CALCIUM SERPL-MCNC: 8.4 MG/DL (ref 8.7–10.5)
CHLORIDE SERPL-SCNC: 107 MMOL/L (ref 95–110)
CO2 SERPL-SCNC: 25 MMOL/L (ref 23–29)
CREAT SERPL-MCNC: 1.5 MG/DL (ref 0.5–1.4)
DIFFERENTIAL METHOD: ABNORMAL
EOSINOPHIL # BLD AUTO: 0.5 K/UL (ref 0–0.5)
EOSINOPHIL NFR BLD: 5.6 % (ref 0–8)
ERYTHROCYTE [DISTWIDTH] IN BLOOD BY AUTOMATED COUNT: 13.1 % (ref 11.5–14.5)
EST. GFR  (AFRICAN AMERICAN): 47 ML/MIN/1.73 M^2
EST. GFR  (NON AFRICAN AMERICAN): 40.7 ML/MIN/1.73 M^2
GLUCOSE SERPL-MCNC: 73 MG/DL (ref 70–110)
HCT VFR BLD AUTO: 38.7 % (ref 40–54)
HGB BLD-MCNC: 12.4 G/DL (ref 14–18)
IMM GRANULOCYTES # BLD AUTO: 0.03 K/UL (ref 0–0.04)
IMM GRANULOCYTES NFR BLD AUTO: 0.3 % (ref 0–0.5)
LYMPHOCYTES # BLD AUTO: 1.3 K/UL (ref 1–4.8)
LYMPHOCYTES NFR BLD: 14.6 % (ref 18–48)
MAGNESIUM SERPL-MCNC: 2 MG/DL (ref 1.6–2.6)
MCH RBC QN AUTO: 32.5 PG (ref 27–31)
MCHC RBC AUTO-ENTMCNC: 32 G/DL (ref 32–36)
MCV RBC AUTO: 101 FL (ref 82–98)
MONOCYTES # BLD AUTO: 0.8 K/UL (ref 0.3–1)
MONOCYTES NFR BLD: 9.1 % (ref 4–15)
NEUTROPHILS # BLD AUTO: 6.1 K/UL (ref 1.8–7.7)
NEUTROPHILS NFR BLD: 69.9 % (ref 38–73)
NRBC BLD-RTO: 0 /100 WBC
PHOSPHATE SERPL-MCNC: 3.6 MG/DL (ref 2.7–4.5)
PLATELET # BLD AUTO: 100 K/UL (ref 150–350)
PMV BLD AUTO: 9.6 FL (ref 9.2–12.9)
POTASSIUM SERPL-SCNC: 3.4 MMOL/L (ref 3.5–5.1)
PROT SERPL-MCNC: 6.5 G/DL (ref 6–8.4)
RBC # BLD AUTO: 3.82 M/UL (ref 4.6–6.2)
SODIUM SERPL-SCNC: 143 MMOL/L (ref 136–145)
WBC # BLD AUTO: 8.7 K/UL (ref 3.9–12.7)

## 2020-05-13 PROCEDURE — 80053 COMPREHEN METABOLIC PANEL: CPT | Mod: HCNC

## 2020-05-13 PROCEDURE — 99223 PR INITIAL HOSPITAL CARE,LEVL III: ICD-10-PCS | Mod: HCNC,,, | Performed by: PHYSICIAN ASSISTANT

## 2020-05-13 PROCEDURE — 85025 COMPLETE CBC W/AUTO DIFF WBC: CPT | Mod: HCNC

## 2020-05-13 PROCEDURE — 63600175 PHARM REV CODE 636 W HCPCS: Mod: HCNC | Performed by: STUDENT IN AN ORGANIZED HEALTH CARE EDUCATION/TRAINING PROGRAM

## 2020-05-13 PROCEDURE — 25000003 PHARM REV CODE 250: Mod: HCNC | Performed by: STUDENT IN AN ORGANIZED HEALTH CARE EDUCATION/TRAINING PROGRAM

## 2020-05-13 PROCEDURE — 94761 N-INVAS EAR/PLS OXIMETRY MLT: CPT | Mod: HCNC

## 2020-05-13 PROCEDURE — 36415 COLL VENOUS BLD VENIPUNCTURE: CPT | Mod: HCNC

## 2020-05-13 PROCEDURE — 11000001 HC ACUTE MED/SURG PRIVATE ROOM: Mod: HCNC

## 2020-05-13 PROCEDURE — 63600175 PHARM REV CODE 636 W HCPCS: Mod: HCNC | Performed by: PHYSICIAN ASSISTANT

## 2020-05-13 PROCEDURE — 84100 ASSAY OF PHOSPHORUS: CPT | Mod: HCNC

## 2020-05-13 PROCEDURE — 83735 ASSAY OF MAGNESIUM: CPT | Mod: HCNC

## 2020-05-13 PROCEDURE — 99232 SBSQ HOSP IP/OBS MODERATE 35: CPT | Mod: HCNC,GC,, | Performed by: INTERNAL MEDICINE

## 2020-05-13 PROCEDURE — 99232 PR SUBSEQUENT HOSPITAL CARE,LEVL II: ICD-10-PCS | Mod: HCNC,GC,, | Performed by: INTERNAL MEDICINE

## 2020-05-13 PROCEDURE — 99223 1ST HOSP IP/OBS HIGH 75: CPT | Mod: HCNC,,, | Performed by: PHYSICIAN ASSISTANT

## 2020-05-13 RX ORDER — POTASSIUM CHLORIDE 20 MEQ/1
40 TABLET, EXTENDED RELEASE ORAL
Status: COMPLETED | OUTPATIENT
Start: 2020-05-13 | End: 2020-05-13

## 2020-05-13 RX ORDER — CEFEPIME HYDROCHLORIDE 2 G/1
2 INJECTION, POWDER, FOR SOLUTION INTRAVENOUS
Status: DISCONTINUED | OUTPATIENT
Start: 2020-05-13 | End: 2020-05-14

## 2020-05-13 RX ADMIN — APIXABAN 2.5 MG: 2.5 TABLET, FILM COATED ORAL at 09:05

## 2020-05-13 RX ADMIN — ASPIRIN 81 MG: 81 TABLET, COATED ORAL at 09:05

## 2020-05-13 RX ADMIN — CARVEDILOL 12.5 MG: 12.5 TABLET, FILM COATED ORAL at 06:05

## 2020-05-13 RX ADMIN — TORSEMIDE 10 MG: 10 TABLET ORAL at 09:05

## 2020-05-13 RX ADMIN — POTASSIUM CHLORIDE 40 MEQ: 1500 TABLET, EXTENDED RELEASE ORAL at 09:05

## 2020-05-13 RX ADMIN — APIXABAN 2.5 MG: 2.5 TABLET, FILM COATED ORAL at 08:05

## 2020-05-13 RX ADMIN — PIPERACILLIN SODIUM,TAZOBACTAM SODIUM 4.5 G: 4; .5 INJECTION, POWDER, FOR SOLUTION INTRAVENOUS at 06:05

## 2020-05-13 RX ADMIN — Medication 6 MG: at 08:05

## 2020-05-13 RX ADMIN — BENAZEPRIL HYDROCHLORIDE 5 MG: 5 TABLET ORAL at 09:05

## 2020-05-13 RX ADMIN — CARVEDILOL 12.5 MG: 12.5 TABLET, FILM COATED ORAL at 09:05

## 2020-05-13 RX ADMIN — PANTOPRAZOLE SODIUM 40 MG: 40 TABLET, DELAYED RELEASE ORAL at 09:05

## 2020-05-13 RX ADMIN — THERA TABS 1 TABLET: TAB at 09:05

## 2020-05-13 RX ADMIN — POTASSIUM CHLORIDE 40 MEQ: 1500 TABLET, EXTENDED RELEASE ORAL at 12:05

## 2020-05-13 RX ADMIN — FERROUS SULFATE TAB EC 325 MG (65 MG FE EQUIVALENT) 325 MG: 325 (65 FE) TABLET DELAYED RESPONSE at 09:05

## 2020-05-13 RX ADMIN — CEFEPIME 2 G: 2 INJECTION, POWDER, FOR SOLUTION INTRAVENOUS at 03:05

## 2020-05-13 NOTE — CONSULTS
[facility]  Infectious Disease  Consult Note    Patient Name: Deena Moody  MRN: [id number]  Admission Date: 5/12/2020  Hospital Length of Stay: 1 days  Attending Physician: Kelsey Hastings MD  Primary Care Provider: Jam Rowell MD     Isolation Status: No active isolations    Patient information was obtained from patient and past medical records.      Inpatient consult to Infectious Diseases  Consult performed by: Thao Anand PA-C  Consult ordered by: Giancarlo Mar MD        Assessment/Plan:     * Bacteremia due to Pseudomonas  88 y/o male with A.fib, HTN, CKD3, CAD s/p CABG/AVR 2004, pacemaker implantation 9/2016 with hospital admission 5/11 for abdominal pain found to have fecal impaction presents with positive blood cultures with pseudomonas. He denies having any fever chills or night sweats. His abdominal pain has resolved. He denies having any dysuria. No cough, SOB, open wounds, prosthetic joints. No recent abd procedure. Pt enjoys outdoor yardwork, denies fishing/hunting or recent travel. Repeat blood cultures 5/12 NGTD.     He was admitted 5/11 for abdominal pain. He had elevated lactic acid level and elevated procal level. He underwent CT and was found to have sigmoid diverticulitis, cholelithiasis without cholecystitis and large stool burdon. His pain resolved after bowel movement.     Recommendations  1. Will discontinue zosyn and start cefepime [id number]. Would avoid flouroquinolones as concerns for QT prolongation.   2. Suspect source from abdomen. Repeat blood cultures 5/12 so far NGTD. Remained afebrile. Stable. Nonseptic appearing  3. Anticipate 14 days of IV abx therapy (end date 5/26/2020)   4. Will need weekly cbc cmp sent to ID clinic at  [facility]  5. Can place midline when repeat blood cultures remain negative        Thank you for your consult. I will sign off. Please contact us if you have any additional questions.    Thao Anand  JAMIE  Infectious Disease  [facility]  [phone]    Subjective:     Principal Problem: Bacteremia due to Pseudomonas    HPI: 89 y/o male with A.fib, HTN, CKD3, CAD s/p CABG/AVR 2004, pacemaker implantation 9/2016 with hospital admission 5/11 for abdominal pain found to have fecal impaction presents with positive blood cultures with pseudomonas. He denies having any fever chills or night sweats. His abdominal pain has resolved. He denies having any dysuria. No cough, SOB, open wounds, prosthetic joints. No recent abd procedure. Pt enjoys outdoor yardwork, denies fishing/hunting or recent travel. Repeat blood cultures 5/12 NGTD.     He was admitted 5/11 for abdominal pain. He had elevated lactic acid level and elevated procal level. He underwent CT and was found to have sigmoid diverticulitis, cholelithiasis without cholecystitis and large stool burdon. His pain resolved after bowel movement.       Past Medical History:   Diagnosis Date    Acute on chronic diastolic heart failure 9/1/2016    Coronary artery disease     Hyperlipidemia     Hypertension     Macular degeneration (senile) of retina, unspecified 12/12/2014    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    Persistent atrial fibrillation 11/10/2016    S/P placement of cardiac pacemaker 9/14/2016       Past Surgical History:   Procedure Laterality Date    AORTIC VALVE REPLACEMENT N/A     CARDIAC PACEMAKER PLACEMENT      CATARACT EXTRACTION W/  INTRAOCULAR LENS IMPLANT Right 2/16/2016    Dr. Whitley    CATARACT EXTRACTION W/  INTRAOCULAR LENS IMPLANT Left 3/1/2016    Dr. Whitley    CORONARY ARTERY BYPASS GRAFT      EAR EXAMINATION UNDER ANESTHESIA      EYE SURGERY         Review of patient's allergies indicates:   Allergen Reactions    Iodine and iodide containing products Other (See Comments)     Caused changes in skin color       Medications:  Medications Prior to Admission   Medication Sig    apixaban (ELIQUIS) 2.5 mg Tab Take 1  tablet (2.5 mg total) by mouth 2 (two) times daily.    aspirin (ECOTRIN) 81 MG EC tablet Take 1 tablet (81 mg total) by mouth once daily.    benazepril (LOTENSIN) 5 MG tablet Take 1 tablet (5 mg total) by mouth once daily.    carvedilol (COREG) 12.5 MG tablet Take 1 tablet (12.5 mg total) by mouth 2 (two) times daily with meals. (Patient taking differently: Take 12.5 mg by mouth 2 (two) times daily with meals. )    omeprazole (PRILOSEC) 20 MG capsule Take 1 capsule (20 mg total) by mouth daily as needed (gastritis).    potassium chloride (KLOR-CON) 8 MEQ TbSR Take 1 tablet (8 mEq total) by mouth every other day.    torsemide (DEMADEX) 10 MG Tab Take 1 tablet (10 mg total) by mouth once daily. Two 10 mg tablets by mouth every other day.    ferrous sulfate (FEOSOL) 325 mg (65 mg iron) Tab tablet TAKE 1 TABLET DAILY WITH BREAKFAST    multivitamin (MULTIVITAMIN) per tablet Take 1 tablet by mouth once daily.      nitroGLYCERIN (NITROSTAT) 0.4 MG SL tablet Take one tablet every 5 minutes for chest pain. After the third tablet go to the ED.    polyethylene glycol (GLYCOLAX) 17 gram/dose powder Dissolve one capful (17 g) in liquid by mouth 3 (three) times daily as needed. Take 1 three times daily until well formed stool     Antibiotics (From admission, onward)    Start     Stop Route Frequency Ordered    05/12/20 2200  piperacillin-tazobactam 4.5 g in sodium chloride 0.9% 100 mL IVPB (ready to mix system)      -- IV Every 8 hours (non-standard times) 05/12/20 1700        Antifungals (From admission, onward)    None        Antivirals (From admission, onward)    None           Immunization History   Administered Date(s) Administered    Influenza 10/17/2007, 10/15/2008, 10/14/2009, 09/16/2010, 10/03/2011    Influenza - High Dose - PF (65 years and older) 11/18/2013, 12/15/2014, 10/26/2015, 11/10/2016, 01/02/2018, 11/08/2018    Pneumococcal Conjugate - 13 Valent 02/01/2016    Pneumococcal Polysaccharide - 23  Harjinder 11/18/2013    Tdap 08/08/2016       Family History     Problem Relation (Age of Onset)    Hypertension Brother    No Known Problems Mother, Father, Sister, Maternal Aunt, Maternal Uncle, Paternal Aunt, Paternal Uncle, Maternal Grandmother, Maternal Grandfather, Paternal Grandmother, Paternal Grandfather, Daughter, Son    Stroke Brother        Social History     Socioeconomic History    Marital status:      Spouse name: Not on file    Number of children: Not on file    Years of education: Not on file    Highest education level: Not on file   Occupational History    Not on file   Social Needs    Financial resource strain: Not on file    Food insecurity:     Worry: Not on file     Inability: Not on file    Transportation needs:     Medical: Not on file     Non-medical: Not on file   Tobacco Use    Smoking status: Never Smoker    Smokeless tobacco: Never Used   Substance and Sexual Activity    Alcohol use: No    Drug use: No    Sexual activity: Yes     Partners: Female   Lifestyle    Physical activity:     Days per week: Not on file     Minutes per session: Not on file    Stress: Not on file   Relationships    Social connections:     Talks on phone: Not on file     Gets together: Not on file     Attends Confucianist service: Not on file     Active member of club or organization: Not on file     Attends meetings of clubs or organizations: Not on file     Relationship status: Not on file   Other Topics Concern    Not on file   Social History Narrative    Not on file     Review of Systems   Constitutional: Negative for activity change, appetite change, chills, diaphoresis, fatigue and fever.   Respiratory: Negative for cough and shortness of breath.    Cardiovascular: Negative for chest pain.   Gastrointestinal: Negative for abdominal pain, constipation, diarrhea, nausea and vomiting.   Genitourinary: Negative for difficulty urinating, dysuria and flank pain.   Musculoskeletal: Negative for  back pain.   Skin: Negative for color change, pallor, rash and wound.   Neurological: Negative for dizziness and headaches.   All other systems reviewed and are negative.    Objective:     Vital Signs (Most Recent):  Temp: 97.4 °F (36.3 °C) (05/13/20 0748)  Pulse: 70 (05/13/20 0748)  Resp: 16 (05/13/20 0748)  BP: (!) 144/63 (05/13/20 0748)  SpO2: 99 % (05/13/20 0748) Vital Signs (24h Range):  Temp:  [97.4 °F (36.3 °C)-98.6 °F (37 °C)] 97.4 °F (36.3 °C)  Pulse:  [69-72] 70  Resp:  [14-18] 16  SpO2:  [93 %-100 %] 99 %  BP: (144-208)/(63-91) 144/63     Weight: 61.5 kg (135 lb 9.3 oz)  Body mass index is 23.27 kg/m².    Estimated Creatinine Clearance: 28 mL/min (A) (based on SCr of 1.5 mg/dL (H)).    Physical Exam   Constitutional: He is oriented to person, place, and time. He appears well-developed and well-nourished. No distress.   HENT:   Head: Normocephalic.   Eyes: Pupils are equal, round, and reactive to light.   Neck: Normal range of motion.   Cardiovascular: Normal rate, regular rhythm and normal heart sounds. Exam reveals no gallop and no friction rub.   No murmur heard.  Pulmonary/Chest: Effort normal. No stridor. No respiratory distress. He has no wheezes. He has no rales.   Abdominal: Soft. He exhibits no distension and no mass. There is no tenderness. There is no rebound and no guarding.   Musculoskeletal: Normal range of motion. He exhibits no edema or deformity.   Neurological: He is alert and oriented to person, place, and time. He displays normal reflexes. No cranial nerve deficit.   Skin: Skin is warm and dry. No rash noted. He is not diaphoretic. No erythema.   Psychiatric: He has a normal mood and affect.   Vitals reviewed.      Significant Labs: All pertinent labs within the past 24 hours have been reviewed.    Significant Imaging: I have reviewed all pertinent imaging results/findings within the past 24 hours.

## 2020-05-13 NOTE — ASSESSMENT & PLAN NOTE
88 y/o male with A.fib, HTN, CKD3, CAD s/p CABG/AVR 2004, pacemaker implantation 9/2016 with hospital admission 5/11 for abdominal pain found to have fecal impaction presents with positive blood cultures with pseudomonas. He denies having any fever chills or night sweats. His abdominal pain has resolved. He denies having any dysuria. No cough, SOB, open wounds, prosthetic joints. No recent abd procedure. Pt enjoys outdoor yardwork, denies fishing/hunting or recent travel. Repeat blood cultures 5/12 NGTD.     He was admitted 5/11 for abdominal pain. He had elevated lactic acid level and elevated procal level. He underwent CT and was found to have sigmoid diverticulitis, cholelithiasis without cholecystitis and large stool burdon. His pain resolved after bowel movement.     Recommendations  1. Will discontinue zosyn and start cefepime 2am10wn. Would avoid flouroquinolones as concerns for QT prolongation.   2. Suspect source from abdomen. Repeat blood cultures 5/12 so far NGTD. Remained afebrile. Stable. Nonseptic appearing  3. Anticipate 14 days of IV abx therapy (end date 5/26/2020)   4. Will need weekly cbc cmp sent to ID clinic at  [facility]  5. Can place midline when repeat blood cultures remain negative

## 2020-05-13 NOTE — SUBJECTIVE & OBJECTIVE
Past Medical History:   Diagnosis Date    Acute on chronic diastolic heart failure 9/1/2016    Coronary artery disease     Hyperlipidemia     Hypertension     Macular degeneration (senile) of retina, unspecified 12/12/2014    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    Persistent atrial fibrillation 11/10/2016    S/P placement of cardiac pacemaker 9/14/2016       Past Surgical History:   Procedure Laterality Date    AORTIC VALVE REPLACEMENT N/A     CARDIAC PACEMAKER PLACEMENT      CATARACT EXTRACTION W/  INTRAOCULAR LENS IMPLANT Right 2/16/2016    Dr. Whitley    CATARACT EXTRACTION W/  INTRAOCULAR LENS IMPLANT Left 3/1/2016    Dr. Whitley    CORONARY ARTERY BYPASS GRAFT      EAR EXAMINATION UNDER ANESTHESIA      EYE SURGERY         Review of patient's allergies indicates:   Allergen Reactions    Iodine and iodide containing products Other (See Comments)     Caused changes in skin color       Medications:  Medications Prior to Admission   Medication Sig    apixaban (ELIQUIS) 2.5 mg Tab Take 1 tablet (2.5 mg total) by mouth 2 (two) times daily.    aspirin (ECOTRIN) 81 MG EC tablet Take 1 tablet (81 mg total) by mouth once daily.    benazepril (LOTENSIN) 5 MG tablet Take 1 tablet (5 mg total) by mouth once daily.    carvedilol (COREG) 12.5 MG tablet Take 1 tablet (12.5 mg total) by mouth 2 (two) times daily with meals. (Patient taking differently: Take 12.5 mg by mouth 2 (two) times daily with meals. )    omeprazole (PRILOSEC) 20 MG capsule Take 1 capsule (20 mg total) by mouth daily as needed (gastritis).    potassium chloride (KLOR-CON) 8 MEQ TbSR Take 1 tablet (8 mEq total) by mouth every other day.    torsemide (DEMADEX) 10 MG Tab Take 1 tablet (10 mg total) by mouth once daily. Two 10 mg tablets by mouth every other day.    ferrous sulfate (FEOSOL) 325 mg (65 mg iron) Tab tablet TAKE 1 TABLET DAILY WITH BREAKFAST    multivitamin (MULTIVITAMIN) per tablet Take 1 tablet by mouth once daily.       nitroGLYCERIN (NITROSTAT) 0.4 MG SL tablet Take one tablet every 5 minutes for chest pain. After the third tablet go to the ED.    polyethylene glycol (GLYCOLAX) 17 gram/dose powder Dissolve one capful (17 g) in liquid by mouth 3 (three) times daily as needed. Take 1 three times daily until well formed stool     Antibiotics (From admission, onward)    Start     Stop Route Frequency Ordered    05/12/20 2200  piperacillin-tazobactam 4.5 g in sodium chloride 0.9% 100 mL IVPB (ready to mix system)      -- IV Every 8 hours (non-standard times) 05/12/20 1700        Antifungals (From admission, onward)    None        Antivirals (From admission, onward)    None           Immunization History   Administered Date(s) Administered    Influenza 10/17/2007, 10/15/2008, 10/14/2009, 09/16/2010, 10/03/2011    Influenza - High Dose - PF (65 years and older) 11/18/2013, 12/15/2014, 10/26/2015, 11/10/2016, 01/02/2018, 11/08/2018    Pneumococcal Conjugate - 13 Valent 02/01/2016    Pneumococcal Polysaccharide - 23 Valent 11/18/2013    Tdap 08/08/2016       Family History     Problem Relation (Age of Onset)    Hypertension Brother    No Known Problems Mother, Father, Sister, Maternal Aunt, Maternal Uncle, Paternal Aunt, Paternal Uncle, Maternal Grandmother, Maternal Grandfather, Paternal Grandmother, Paternal Grandfather, Daughter, Son    Stroke Brother        Social History     Socioeconomic History    Marital status:      Spouse name: Not on file    Number of children: Not on file    Years of education: Not on file    Highest education level: Not on file   Occupational History    Not on file   Social Needs    Financial resource strain: Not on file    Food insecurity:     Worry: Not on file     Inability: Not on file    Transportation needs:     Medical: Not on file     Non-medical: Not on file   Tobacco Use    Smoking status: Never Smoker    Smokeless tobacco: Never Used   Substance and Sexual Activity    Alcohol  use: No    Drug use: No    Sexual activity: Yes     Partners: Female   Lifestyle    Physical activity:     Days per week: Not on file     Minutes per session: Not on file    Stress: Not on file   Relationships    Social connections:     Talks on phone: Not on file     Gets together: Not on file     Attends Spiritism service: Not on file     Active member of club or organization: Not on file     Attends meetings of clubs or organizations: Not on file     Relationship status: Not on file   Other Topics Concern    Not on file   Social History Narrative    Not on file     Review of Systems   Constitutional: Negative for activity change, appetite change, chills, diaphoresis, fatigue and fever.   Respiratory: Negative for cough and shortness of breath.    Cardiovascular: Negative for chest pain.   Gastrointestinal: Negative for abdominal pain, constipation, diarrhea, nausea and vomiting.   Genitourinary: Negative for difficulty urinating, dysuria and flank pain.   Musculoskeletal: Negative for back pain.   Skin: Negative for color change, pallor, rash and wound.   Neurological: Negative for dizziness and headaches.   All other systems reviewed and are negative.    Objective:     Vital Signs (Most Recent):  Temp: 97.4 °F (36.3 °C) (05/13/20 0748)  Pulse: 70 (05/13/20 0748)  Resp: 16 (05/13/20 0748)  BP: (!) 144/63 (05/13/20 0748)  SpO2: 99 % (05/13/20 0748) Vital Signs (24h Range):  Temp:  [97.4 °F (36.3 °C)-98.6 °F (37 °C)] 97.4 °F (36.3 °C)  Pulse:  [69-72] 70  Resp:  [14-18] 16  SpO2:  [93 %-100 %] 99 %  BP: (144-208)/(63-91) 144/63     Weight: 61.5 kg (135 lb 9.3 oz)  Body mass index is 23.27 kg/m².    Estimated Creatinine Clearance: 28 mL/min (A) (based on SCr of 1.5 mg/dL (H)).    Physical Exam   Constitutional: He is oriented to person, place, and time. He appears well-developed and well-nourished. No distress.   HENT:   Head: Normocephalic.   Eyes: Pupils are equal, round, and reactive to light.   Neck:  Normal range of motion.   Cardiovascular: Normal rate, regular rhythm and normal heart sounds. Exam reveals no gallop and no friction rub.   No murmur heard.  Pulmonary/Chest: Effort normal. No stridor. No respiratory distress. He has no wheezes. He has no rales.   Abdominal: Soft. He exhibits no distension and no mass. There is no tenderness. There is no rebound and no guarding.   Musculoskeletal: Normal range of motion. He exhibits no edema or deformity.   Neurological: He is alert and oriented to person, place, and time. He displays normal reflexes. No cranial nerve deficit.   Skin: Skin is warm and dry. No rash noted. He is not diaphoretic. No erythema.   Psychiatric: He has a normal mood and affect.   Vitals reviewed.      Significant Labs: All pertinent labs within the past 24 hours have been reviewed.    Significant Imaging: I have reviewed all pertinent imaging results/findings within the past 24 hours.

## 2020-05-13 NOTE — PLAN OF CARE
CM met with patient in room for [place] Planning Assessment. Per patient,  he lives with his wife in a house with no step(s) to enter. Patient explained he has a ramp instead of steps.   Per patient, he was independent with ADLS and did not use DME for ambulation.  Patient will have assistance from daughter upon discharge.   Discharge Planning Booklet given to patient/family and discussed.  All questions addressed.  CM will follow for needs.     05/13/20 9068   Discharge Assessment   Assessment Type Discharge Planning Assessment   Confirmed/corrected address and phone number on facesheet? Yes   Assessment information obtained from? Patient   Expected Length of Stay (days) 2   Prior to hospitilization cognitive status: Alert/Oriented   Prior to hospitalization functional status: Independent   Current cognitive status: Alert/Oriented   Current Functional Status: Independent   Lives With significant other   Able to Return to Prior Arrangements yes   Is patient able to care for self after discharge? Yes   Patient's perception of discharge disposition home or selfcare   Readmission Within the Last 30 Days current reason for admission unrelated to previous admission   Patient currently being followed by outpatient case management? No   Patient currently receives any other outside agency services? No   Equipment Currently Used at Home none   Do you have any problems affording any of your prescribed medications? No   Is the patient taking medications as prescribed? yes   Does the patient have transportation home? Yes   Transportation Anticipated family or friend will provide   Does the patient receive services at the Coumadin Clinic? No   Discharge Plan A Home   Discharge Plan B Home   DME Needed Upon Discharge  other (see comments)  (TBD)   Patient/Family in Agreement with Plan yes   Readmission Questionnaire   What do you believe caused you to be sick enough to be re-admitted? RUQ pain    Living Arrangements house    Does the patient have family/friends to help with healtcare needs after discharge? yes   Does your caregiver provide all the help you need? Yes           PCP:  Jam Rowell MD        Pharmacy:    [facility] - [place] - [address] [facility]  [address] [facility] [address]  Phone: [phone] Fax: [phone]    [id number] [address]  [address] [place] [address]  Phone: [phone] Fax: [phone]        Emergency Contacts:  Extended Emergency Contact Information  Primary Emergency Contact: Sarika Moody  Address: [address] [place] [address]  Home Phone: [phone]  Relation: Spouse  Secondary Emergency Contact: Amelie Fernandes   [facility]  Home Phone: [phone]  Mobile Phone: [phone]  Relation: Daughter      Insurance:    Payor: [employer] MANAGED MEDICARE / Plan: [employer] MEDICARE HMO / Product Type: Capitation /       05/13/2020  9:38 AM      Vanesa Perdomo RN, CM   Ext: [phone]

## 2020-05-13 NOTE — H&P
[facility] Hospital Medicine  History & Physical    Patient Name: Deena Moody  MRN: [id number]  Admission Date: 5/12/2020  Attending Physician: Kelsey Hastings MD   Primary Care Provider: Jam Rowell MD    [facility] Medicine Team: [facility] 5 Giancarlo Mar MD     Patient information was obtained from patient, past medical records and ER records.     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  The patient was discharged yesterday after a 1 day admission for abdominal pain.  Patient initially presented for periumbilical abdominal pain with chills but reports that all these symptoms have completely subsided.  He has no current complaints this time.  He denies chills within last 2 days, fever, persistent abdominal pain, nausea/vomiting/diarrhea, pain or frequency with urination, chest pain, cough or shortness of breath. Lactate was 1.6 in the ED on today's admission.    Past Medical History:   Diagnosis Date    Acute on chronic diastolic heart failure 9/1/2016    Coronary artery disease     Hyperlipidemia     Hypertension     Macular degeneration (senile) of retina, unspecified 12/12/2014    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    Persistent atrial fibrillation 11/10/2016    S/P placement of cardiac pacemaker 9/14/2016       Past Surgical History:   Procedure Laterality Date    AORTIC VALVE REPLACEMENT N/A     CARDIAC PACEMAKER PLACEMENT      CATARACT EXTRACTION W/  INTRAOCULAR LENS IMPLANT Right 2/16/2016    Dr. Whitley    CATARACT EXTRACTION W/  INTRAOCULAR LENS  IMPLANT Left 3/1/2016    Dr. Whitley    CORONARY ARTERY BYPASS GRAFT      EAR EXAMINATION UNDER ANESTHESIA      EYE SURGERY         Review of patient's allergies indicates:   Allergen Reactions    Iodine and iodide containing products Other (See Comments)     Caused changes in skin color       Current Facility-Administered Medications on File Prior to Encounter   Medication    [DISCONTINUED] apixaban tablet 2.5 mg    [DISCONTINUED] aspirin EC tablet 81 mg    [DISCONTINUED] benazepriL tablet 5 mg    [DISCONTINUED] carvediloL tablet 12.5 mg    [DISCONTINUED] dextrose 10 % infusion    [DISCONTINUED] dextrose 10 % infusion    [DISCONTINUED] ferrous sulfate EC tablet 325 mg    [DISCONTINUED] glucagon (human recombinant) injection 1 mg    [DISCONTINUED] glucose chewable tablet 16 g    [DISCONTINUED] glucose chewable tablet 24 g    [DISCONTINUED] melatonin tablet 6 mg    [DISCONTINUED] ondansetron disintegrating tablet 8 mg    [DISCONTINUED] pantoprazole EC tablet 20 mg    [DISCONTINUED] polyethylene glycol packet 17 g    [DISCONTINUED] prochlorperazine tablet 10 mg    [DISCONTINUED] senna tablet 8.6 mg    [DISCONTINUED] sodium chloride 0.9% flush 10 mL    [DISCONTINUED] torsemide tablet 10 mg     Current Outpatient Medications on File Prior to Encounter   Medication Sig    apixaban (ELIQUIS) 2.5 mg Tab Take 1 tablet (2.5 mg total) by mouth 2 (two) times daily.    aspirin (ECOTRIN) 81 MG EC tablet Take 1 tablet (81 mg total) by mouth once daily.    benazepril (LOTENSIN) 5 MG tablet Take 1 tablet (5 mg total) by mouth once daily.    carvedilol (COREG) 12.5 MG tablet Take 1 tablet (12.5 mg total) by mouth 2 (two) times daily with meals. (Patient taking differently: Take 12.5 mg by mouth 2 (two) times daily with meals. )    omeprazole (PRILOSEC) 20 MG capsule Take 1 capsule (20 mg total) by mouth daily as needed (gastritis).    potassium chloride (KLOR-CON) 8 MEQ TbSR Take 1 tablet (8 mEq total)  by mouth every other day.    torsemide (DEMADEX) 10 MG Tab Take 1 tablet (10 mg total) by mouth once daily. Two 10 mg tablets by mouth every other day.    ferrous sulfate (FEOSOL) 325 mg (65 mg iron) Tab tablet TAKE 1 TABLET DAILY WITH BREAKFAST    multivitamin (MULTIVITAMIN) per tablet Take 1 tablet by mouth once daily.      nitroGLYCERIN (NITROSTAT) 0.4 MG SL tablet Take one tablet every 5 minutes for chest pain. After the third tablet go to the ED.    polyethylene glycol (GLYCOLAX) 17 gram/dose powder Dissolve one capful (17 g) in liquid by mouth 3 (three) times daily as needed. Take 1 three times daily until well formed stool     Family History     Problem Relation (Age of Onset)    Hypertension Brother    No Known Problems Mother, Father, Sister, Maternal Aunt, Maternal Uncle, Paternal Aunt, Paternal Uncle, Maternal Grandmother, Maternal Grandfather, Paternal Grandmother, Paternal Grandfather, Daughter, Son    Stroke Brother        Tobacco Use    Smoking status: Never Smoker    Smokeless tobacco: Never Used   Substance and Sexual Activity    Alcohol use: No    Drug use: No    Sexual activity: Yes     Partners: Female     Review of Systems   Constitutional: Negative for chills, diaphoresis, fatigue and fever.   HENT: Negative for sore throat and trouble swallowing.    Eyes: Negative for photophobia and visual disturbance.   Respiratory: Negative for cough, chest tightness and shortness of breath.    Cardiovascular: Negative for chest pain and palpitations.   Gastrointestinal: Negative for abdominal distention, abdominal pain, constipation and nausea.   Genitourinary: Negative for difficulty urinating and dysuria.   Musculoskeletal: Negative for arthralgias and myalgias.   Skin: Negative for pallor and rash.   Neurological: Negative for dizziness, speech difficulty, weakness, light-headedness and headaches.   Psychiatric/Behavioral: Negative for agitation, behavioral problems, confusion and decreased  concentration.     Objective:     Vital Signs (Most Recent):  Temp: 98.4 °F (36.9 °C) (05/12/20 1549)  Pulse: 69 (05/12/20 1549)  Resp: 16 (05/12/20 1549)  BP: (!) 155/72 (05/12/20 1549)  SpO2: 99 % (05/12/20 1549) Vital Signs (24h Range):  Temp:  [97.9 °F (36.6 °C)-98.6 °F (37 °C)] 98.4 °F (36.9 °C)  Pulse:  [69-70] 69  Resp:  [16-18] 16  SpO2:  [98 %-99 %] 99 %  BP: (155-208)/(72-91) 155/72     Weight: 60.8 kg (134 lb)  Body mass index is 19.79 kg/m².    Physical Exam   Constitutional: He is oriented to person, place, and time. He appears well-developed and well-nourished. No distress.   HENT:   Head: Normocephalic and atraumatic.   Eyes: Pupils are equal, round, and reactive to light. EOM are normal.   Neck: Normal range of motion. Neck supple.   Cardiovascular: Normal rate and intact distal pulses.   Ventricularly paced rhythm   Pulmonary/Chest: Effort normal and breath sounds normal. No respiratory distress. He exhibits no tenderness.   Abdominal: Soft. Bowel sounds are normal. There is no tenderness.   Musculoskeletal: Normal range of motion. He exhibits no edema or tenderness.   Neurological: He is alert and oriented to person, place, and time.   Skin: Skin is warm and dry. Capillary refill takes less than 2 seconds. No pallor.   Psychiatric: He has a normal mood and affect.         CRANIAL NERVES     CN III, IV, VI   Pupils are equal, round, and reactive to light.  Extraocular motions are normal.        Significant Labs: All pertinent labs within the past 24 hours have been reviewed.    Significant Imaging: I have reviewed all pertinent imaging results/findings within the past 24 hours.    Assessment/Plan:     * Bacteremia due to Pseudomonas  Patient blood cultures from 5/11 positive for pseudomonas bacteremia.    Plan  -zosyn 4.5 mg IV q8      Long term (current) use of anticoagulants  -Patient with long-term use of Eliquis      Persistent atrial fibrillation  S/p PPM 2016  CHADSVASC 5  On eliquis 2.5mg bid  adjusted for age     - Continue eliquis  - Continue carvedilol  - Monitor for signs of bleeding      CAD (coronary artery disease)  -Continue home aspirin      Hypertension  -Started home carvedilol and benazepril       CKD (chronic kidney disease) stage 3, GFR 30-59 ml/min  SCr 1.6- this is at his baseline     - Monitor renal function  - Renally dose meds - CrCl 54        VTE Risk Mitigation (From admission, onward)         Ordered     apixaban tablet 2.5 mg  2 times daily      05/12/20 1442     IP VTE HIGH RISK PATIENT  Once      05/12/20 1555     Place sequential compression device  Until discontinued      05/12/20 1555     Place sequential compression device  Until discontinued      05/12/20 1438                   Giancarlo Mar MD  Department of Hospital Medicine   [facility]

## 2020-05-13 NOTE — PROGRESS NOTES
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  The patient was discharged yesterday after a 1 day admission for abdominal pain.  Patient initially presented for periumbilical abdominal pain with chills but reports that all these symptoms have completely subsided.  He has no current complaints this time.  He denies chills within last 2 days, fever, persistent abdominal pain, nausea/vomiting/diarrhea, pain or frequency with urination, chest pain, cough or shortness of breath. Lactate was 1.6 in the ED on today's admission.    Overview/Hospital Course:  Patient blood cultures returned positive for pseudomonas bacteremia. Afebrile with no new signs or symptoms overnight. Initiated on zosyn 4.5 g q8. ID consulted.    Interval History: Patient blood cultures returned positive for pseudomonas bacteremia. Afebrile with no new signs or symptoms overnight. Initiated on zosyn 4.5 g q8. ID consulted.    Review of Systems   Constitutional: Negative for chills, diaphoresis, fatigue and fever.   HENT: Negative for sore throat and trouble swallowing.    Eyes: Negative for photophobia and visual disturbance.   Respiratory: Negative for cough, chest tightness and shortness of breath.    Cardiovascular: Negative for chest pain and palpitations.   Gastrointestinal: Negative for  abdominal distention, abdominal pain, constipation and nausea.   Genitourinary: Negative for difficulty urinating and dysuria.   Musculoskeletal: Negative for arthralgias and myalgias.   Skin: Negative for pallor and rash.   Neurological: Negative for dizziness, speech difficulty, weakness, light-headedness and headaches.   Psychiatric/Behavioral: Negative for agitation, behavioral problems, confusion and decreased concentration.     Objective:     Vital Signs (Most Recent):  Temp: 97.4 °F (36.3 °C) (05/13/20 0748)  Pulse: 70 (05/13/20 0748)  Resp: 16 (05/13/20 0748)  BP: (!) 144/63 (05/13/20 0748)  SpO2: 99 % (05/13/20 0748) Vital Signs (24h Range):  Temp:  [97.4 °F (36.3 °C)-98.6 °F (37 °C)] 97.4 °F (36.3 °C)  Pulse:  [69-72] 70  Resp:  [14-18] 16  SpO2:  [93 %-100 %] 99 %  BP: (144-208)/(63-91) 144/63     Weight: 61.5 kg (135 lb 9.3 oz)  Body mass index is 23.27 kg/m².    Physical Exam   Constitutional: He is oriented to person, place, and time. He appears well-developed and well-nourished. No distress.   HENT:   Head: Normocephalic and atraumatic.   Eyes: Pupils are equal, round, and reactive to light. EOM are normal.   Neck: Normal range of motion. Neck supple.   Cardiovascular: Normal rate and intact distal pulses.   Ventricularly paced rhythm   Pulmonary/Chest: Effort normal and breath sounds normal. No respiratory distress. He exhibits no tenderness.   Abdominal: Soft. Bowel sounds are normal. There is no tenderness.   Musculoskeletal: Normal range of motion. He exhibits no edema or tenderness.   Neurological: He is alert and oriented to person, place, and time.   Skin: Skin is warm and dry. Capillary refill takes less than 2 seconds. No pallor.   Psychiatric: He has a normal mood and affect.         CRANIAL NERVES     CN III, IV, VI   Pupils are equal, round, and reactive to light.  Extraocular motions are normal.        Significant Labs: All pertinent labs within the past 24 hours have been  reviewed.    Significant Imaging: I have reviewed all pertinent imaging results/findings within the past 24 hours.      Assessment/Plan:      * Bacteremia due to Pseudomonas  Patient blood cultures from 5/11 positive for pseudomonas bacteremia.    Plan  -zosyn 4.5 mg IV q8  -ID consulted appreciate recs      Long term (current) use of anticoagulants  -Patient with long-term use of Eliquis      Persistent atrial fibrillation  S/p PPM 2016  CHADSVASC 5  On eliquis 2.5mg bid adjusted for age     - Continue eliquis  - Continue carvedilol  - Monitor for signs of bleeding      CAD (coronary artery disease)  -Continue home aspirin      Hypertension  -Started home carvedilol and benazepril       CKD (chronic kidney disease) stage 3, GFR 30-59 ml/min  SCr 1.6- this is at his baseline     - Monitor renal function  - Renally dose meds - CrCl 54        VTE Risk Mitigation (From admission, onward)         Ordered     apixaban tablet 2.5 mg  2 times daily      05/12/20 1442     IP VTE HIGH RISK PATIENT  Once      05/12/20 1555     Place sequential compression device  Until discontinued      05/12/20 1555     Place sequential compression device  Until discontinued      05/12/20 1438                      Giancarlo Mar MD  Department of Hospital Medicine   [facility]

## 2020-05-13 NOTE — ASSESSMENT & PLAN NOTE
Patient blood cultures from 5/11 positive for pseudomonas bacteremia.    Plan  -zosyn 4.5 mg IV q8  -ID consulted appreciate recs

## 2020-05-13 NOTE — HOSPITAL COURSE
Patient blood cultures returned positive for pseudomonas bacteremia. Afebrile with no new signs or symptoms overnight. Initiated on zosyn 4.5 g q8. ID consulted- switched patient to cefipime. Patient initial cultures from 5/11 now also growing gram positive cocci. Will transition back to zosyn and await speciation. Mid line consult placed to prepare for course of home IV antibiotics.  Awaiting speciation of GPC / strep in anaerobic blood culture. During admission, developed SARAH which responded well to IV fluids. Patient blood cultures grew parvimonas micra. Will discharge on IV zosyn with home health.

## 2020-05-13 NOTE — HPI
89 y/o male with A.fib, HTN, CKD3, CAD s/p CABG/AVR 2004, pacemaker implantation 9/2016 with hospital admission 5/11 for abdominal pain found to have fecal impaction presents with positive blood cultures with pseudomonas. He denies having any fever chills or night sweats. His abdominal pain has resolved. He denies having any dysuria. No cough, SOB, open wounds, prosthetic joints. No recent abd procedure. Pt enjoys outdoor yardwork, denies fishing/hunting or recent travel. Repeat blood cultures 5/12 NGTD.     He was admitted 5/11 for abdominal pain. He had elevated lactic acid level and elevated procal level. He underwent CT and was found to have sigmoid diverticulitis, cholelithiasis without cholecystitis and large stool burdon. His pain resolved after bowel movement.

## 2020-05-13 NOTE — SUBJECTIVE & OBJECTIVE
Interval History: Patient blood cultures returned positive for pseudomonas bacteremia. Afebrile with no new signs or symptoms overnight. Initiated on zosyn 4.5 g q8. ID consulted.    Review of Systems   Constitutional: Negative for chills, diaphoresis, fatigue and fever.   HENT: Negative for sore throat and trouble swallowing.    Eyes: Negative for photophobia and visual disturbance.   Respiratory: Negative for cough, chest tightness and shortness of breath.    Cardiovascular: Negative for chest pain and palpitations.   Gastrointestinal: Negative for abdominal distention, abdominal pain, constipation and nausea.   Genitourinary: Negative for difficulty urinating and dysuria.   Musculoskeletal: Negative for arthralgias and myalgias.   Skin: Negative for pallor and rash.   Neurological: Negative for dizziness, speech difficulty, weakness, light-headedness and headaches.   Psychiatric/Behavioral: Negative for agitation, behavioral problems, confusion and decreased concentration.     Objective:     Vital Signs (Most Recent):  Temp: 97.4 °F (36.3 °C) (05/13/20 0748)  Pulse: 70 (05/13/20 0748)  Resp: 16 (05/13/20 0748)  BP: (!) 144/63 (05/13/20 0748)  SpO2: 99 % (05/13/20 0748) Vital Signs (24h Range):  Temp:  [97.4 °F (36.3 °C)-98.6 °F (37 °C)] 97.4 °F (36.3 °C)  Pulse:  [69-72] 70  Resp:  [14-18] 16  SpO2:  [93 %-100 %] 99 %  BP: (144-208)/(63-91) 144/63     Weight: 61.5 kg (135 lb 9.3 oz)  Body mass index is 23.27 kg/m².    Physical Exam   Constitutional: He is oriented to person, place, and time. He appears well-developed and well-nourished. No distress.   HENT:   Head: Normocephalic and atraumatic.   Eyes: Pupils are equal, round, and reactive to light. EOM are normal.   Neck: Normal range of motion. Neck supple.   Cardiovascular: Normal rate and intact distal pulses.   Ventricularly paced rhythm   Pulmonary/Chest: Effort normal and breath sounds normal. No respiratory distress. He exhibits no tenderness.    Abdominal: Soft. Bowel sounds are normal. There is no tenderness.   Musculoskeletal: Normal range of motion. He exhibits no edema or tenderness.   Neurological: He is alert and oriented to person, place, and time.   Skin: Skin is warm and dry. Capillary refill takes less than 2 seconds. No pallor.   Psychiatric: He has a normal mood and affect.         CRANIAL NERVES     CN III, IV, VI   Pupils are equal, round, and reactive to light.  Extraocular motions are normal.        Significant Labs: All pertinent labs within the past 24 hours have been reviewed.    Significant Imaging: I have reviewed all pertinent imaging results/findings within the past 24 hours.

## 2020-05-14 PROBLEM — R78.81 GRAM-POSITIVE COCCI BACTEREMIA: Status: ACTIVE | Noted: 2020-05-14

## 2020-05-14 LAB
ALBUMIN SERPL BCP-MCNC: 3.1 G/DL (ref 3.5–5.2)
ALP SERPL-CCNC: 125 U/L (ref 55–135)
ALT SERPL W/O P-5'-P-CCNC: 8 U/L (ref 10–44)
ANION GAP SERPL CALC-SCNC: 12 MMOL/L (ref 8–16)
AST SERPL-CCNC: 21 U/L (ref 10–40)
BACTERIA BLD CULT: ABNORMAL
BASOPHILS # BLD AUTO: 0.04 K/UL (ref 0–0.2)
BASOPHILS NFR BLD: 0.5 % (ref 0–1.9)
BILIRUB SERPL-MCNC: 1.2 MG/DL (ref 0.1–1)
BUN SERPL-MCNC: 25 MG/DL (ref 8–23)
CALCIUM SERPL-MCNC: 8.7 MG/DL (ref 8.7–10.5)
CHLORIDE SERPL-SCNC: 105 MMOL/L (ref 95–110)
CO2 SERPL-SCNC: 25 MMOL/L (ref 23–29)
CREAT SERPL-MCNC: 1.6 MG/DL (ref 0.5–1.4)
DIFFERENTIAL METHOD: ABNORMAL
EOSINOPHIL # BLD AUTO: 0.4 K/UL (ref 0–0.5)
EOSINOPHIL NFR BLD: 5.1 % (ref 0–8)
ERYTHROCYTE [DISTWIDTH] IN BLOOD BY AUTOMATED COUNT: 13 % (ref 11.5–14.5)
EST. GFR  (AFRICAN AMERICAN): 43.5 ML/MIN/1.73 M^2
EST. GFR  (NON AFRICAN AMERICAN): 37.6 ML/MIN/1.73 M^2
GLUCOSE SERPL-MCNC: 86 MG/DL (ref 70–110)
HCT VFR BLD AUTO: 39.7 % (ref 40–54)
HGB BLD-MCNC: 12.8 G/DL (ref 14–18)
IMM GRANULOCYTES # BLD AUTO: 0.01 K/UL (ref 0–0.04)
IMM GRANULOCYTES NFR BLD AUTO: 0.1 % (ref 0–0.5)
LYMPHOCYTES # BLD AUTO: 1.3 K/UL (ref 1–4.8)
LYMPHOCYTES NFR BLD: 15.3 % (ref 18–48)
MAGNESIUM SERPL-MCNC: 1.8 MG/DL (ref 1.6–2.6)
MCH RBC QN AUTO: 32.5 PG (ref 27–31)
MCHC RBC AUTO-ENTMCNC: 32.2 G/DL (ref 32–36)
MCV RBC AUTO: 101 FL (ref 82–98)
MONOCYTES # BLD AUTO: 0.9 K/UL (ref 0.3–1)
MONOCYTES NFR BLD: 10.6 % (ref 4–15)
NEUTROPHILS # BLD AUTO: 5.6 K/UL (ref 1.8–7.7)
NEUTROPHILS NFR BLD: 68.4 % (ref 38–73)
NRBC BLD-RTO: 0 /100 WBC
PHOSPHATE SERPL-MCNC: 3.5 MG/DL (ref 2.7–4.5)
PLATELET # BLD AUTO: 125 K/UL (ref 150–350)
PMV BLD AUTO: 9.9 FL (ref 9.2–12.9)
POTASSIUM SERPL-SCNC: 3.7 MMOL/L (ref 3.5–5.1)
PROT SERPL-MCNC: 6.8 G/DL (ref 6–8.4)
RBC # BLD AUTO: 3.94 M/UL (ref 4.6–6.2)
SODIUM SERPL-SCNC: 142 MMOL/L (ref 136–145)
WBC # BLD AUTO: 8.21 K/UL (ref 3.9–12.7)

## 2020-05-14 PROCEDURE — 99233 PR SUBSEQUENT HOSPITAL CARE,LEVL III: ICD-10-PCS | Mod: HCNC,GC,, | Performed by: STUDENT IN AN ORGANIZED HEALTH CARE EDUCATION/TRAINING PROGRAM

## 2020-05-14 PROCEDURE — 63600175 PHARM REV CODE 636 W HCPCS: Mod: HCNC | Performed by: NURSE PRACTITIONER

## 2020-05-14 PROCEDURE — 99233 SBSQ HOSP IP/OBS HIGH 50: CPT | Mod: HCNC,GC,, | Performed by: STUDENT IN AN ORGANIZED HEALTH CARE EDUCATION/TRAINING PROGRAM

## 2020-05-14 PROCEDURE — 99233 SBSQ HOSP IP/OBS HIGH 50: CPT | Mod: HCNC,,, | Performed by: NURSE PRACTITIONER

## 2020-05-14 PROCEDURE — 76937 US GUIDE VASCULAR ACCESS: CPT | Mod: HCNC

## 2020-05-14 PROCEDURE — 63600175 PHARM REV CODE 636 W HCPCS: Mod: HCNC | Performed by: PHYSICIAN ASSISTANT

## 2020-05-14 PROCEDURE — 25000003 PHARM REV CODE 250: Mod: HCNC | Performed by: STUDENT IN AN ORGANIZED HEALTH CARE EDUCATION/TRAINING PROGRAM

## 2020-05-14 PROCEDURE — C1751 CATH, INF, PER/CENT/MIDLINE: HCPCS | Mod: HCNC

## 2020-05-14 PROCEDURE — 36410 VNPNXR 3YR/> PHY/QHP DX/THER: CPT | Mod: HCNC

## 2020-05-14 PROCEDURE — 84100 ASSAY OF PHOSPHORUS: CPT | Mod: HCNC

## 2020-05-14 PROCEDURE — 36415 COLL VENOUS BLD VENIPUNCTURE: CPT | Mod: HCNC

## 2020-05-14 PROCEDURE — 83735 ASSAY OF MAGNESIUM: CPT | Mod: HCNC

## 2020-05-14 PROCEDURE — 99233 PR SUBSEQUENT HOSPITAL CARE,LEVL III: ICD-10-PCS | Mod: HCNC,,, | Performed by: NURSE PRACTITIONER

## 2020-05-14 PROCEDURE — 25000003 PHARM REV CODE 250: Mod: HCNC | Performed by: NURSE PRACTITIONER

## 2020-05-14 PROCEDURE — 85025 COMPLETE CBC W/AUTO DIFF WBC: CPT | Mod: HCNC

## 2020-05-14 PROCEDURE — 11000001 HC ACUTE MED/SURG PRIVATE ROOM: Mod: HCNC

## 2020-05-14 PROCEDURE — 80053 COMPREHEN METABOLIC PANEL: CPT | Mod: HCNC

## 2020-05-14 RX ADMIN — PIPERACILLIN SODIUM,TAZOBACTAM SODIUM 4.5 G: 4; .5 INJECTION, POWDER, FOR SOLUTION INTRAVENOUS at 01:05

## 2020-05-14 RX ADMIN — CARVEDILOL 12.5 MG: 12.5 TABLET, FILM COATED ORAL at 06:05

## 2020-05-14 RX ADMIN — CEFEPIME 2 G: 2 INJECTION, POWDER, FOR SOLUTION INTRAVENOUS at 03:05

## 2020-05-14 RX ADMIN — TORSEMIDE 10 MG: 10 TABLET ORAL at 08:05

## 2020-05-14 RX ADMIN — PANTOPRAZOLE SODIUM 40 MG: 40 TABLET, DELAYED RELEASE ORAL at 08:05

## 2020-05-14 RX ADMIN — CARVEDILOL 12.5 MG: 12.5 TABLET, FILM COATED ORAL at 08:05

## 2020-05-14 RX ADMIN — FERROUS SULFATE TAB EC 325 MG (65 MG FE EQUIVALENT) 325 MG: 325 (65 FE) TABLET DELAYED RESPONSE at 08:05

## 2020-05-14 RX ADMIN — THERA TABS 1 TABLET: TAB at 08:05

## 2020-05-14 RX ADMIN — PIPERACILLIN SODIUM,TAZOBACTAM SODIUM 4.5 G: 4; .5 INJECTION, POWDER, FOR SOLUTION INTRAVENOUS at 08:05

## 2020-05-14 RX ADMIN — APIXABAN 2.5 MG: 2.5 TABLET, FILM COATED ORAL at 08:05

## 2020-05-14 RX ADMIN — BENAZEPRIL HYDROCHLORIDE 5 MG: 5 TABLET ORAL at 08:05

## 2020-05-14 RX ADMIN — ASPIRIN 81 MG: 81 TABLET, COATED ORAL at 08:05

## 2020-05-14 NOTE — SUBJECTIVE & OBJECTIVE
Interval History: ID consulted- switched patient to cefipime. Patient initial cultures from 5/11 now also growing gram positive cocci. Will transition back to zosyn and await speciation. Mid line consult placed to prepare for course of home IV antibiotics.    Review of Systems   Constitutional: Negative for chills, diaphoresis, fatigue and fever.   HENT: Negative for sore throat and trouble swallowing.    Eyes: Negative for photophobia and visual disturbance.   Respiratory: Negative for cough, chest tightness and shortness of breath.    Cardiovascular: Negative for chest pain and palpitations.   Gastrointestinal: Negative for abdominal distention, abdominal pain, constipation and nausea.   Genitourinary: Negative for difficulty urinating and dysuria.   Musculoskeletal: Negative for arthralgias and myalgias.   Skin: Negative for pallor and rash.   Neurological: Negative for dizziness, speech difficulty, weakness, light-headedness and headaches.   Psychiatric/Behavioral: Negative for agitation, behavioral problems, confusion and decreased concentration.     Objective:     Vital Signs (Most Recent):  Temp: 98.1 °F (36.7 °C) (05/14/20 1203)  Pulse: 80 (05/14/20 0735)  Resp: 18 (05/14/20 0735)  BP: (!) 126/59 (05/14/20 0735)  SpO2: 95 % (05/14/20 0735) Vital Signs (24h Range):  Temp:  [97.9 °F (36.6 °C)-98.6 °F (37 °C)] 98.1 °F (36.7 °C)  Pulse:  [69-80] 80  Resp:  [16-18] 18  SpO2:  [91 %-99 %] 95 %  BP: (126-144)/(59-64) 126/59     Weight: 61.5 kg (135 lb 9.3 oz)  Body mass index is 23.27 kg/m².    Physical Exam   Constitutional: He is oriented to person, place, and time. He appears well-developed and well-nourished. No distress.   HENT:   Head: Normocephalic and atraumatic.   Eyes: Pupils are equal, round, and reactive to light. EOM are normal.   Neck: Normal range of motion. Neck supple.   Cardiovascular: Normal rate and intact distal pulses.   Ventricularly paced rhythm   Pulmonary/Chest: Effort normal and breath  sounds normal. No respiratory distress. He exhibits no tenderness.   Abdominal: Soft. Bowel sounds are normal. There is no tenderness.   Musculoskeletal: Normal range of motion. He exhibits no edema or tenderness.   Neurological: He is alert and oriented to person, place, and time.   Skin: Skin is warm and dry. Capillary refill takes less than 2 seconds. No pallor.   Psychiatric: He has a normal mood and affect.         CRANIAL NERVES     CN III, IV, VI   Pupils are equal, round, and reactive to light.  Extraocular motions are normal.        Significant Labs: All pertinent labs within the past 24 hours have been reviewed.    Significant Imaging: I have reviewed all pertinent imaging results/findings within the past 24 hours.

## 2020-05-14 NOTE — ASSESSMENT & PLAN NOTE
Patient blood cultures from 5/11 positive for pseudomonas bacteremia.    Plan  -zosyn 4.5 mg IV q8  -ID consulted - switched patient to cefipime. Patient initial cultures from 5/11 now also growing gram positive cocci. Will transition back to zosyn and await speciation. Mid line consult placed to prepare for course of home IV antibiotics.

## 2020-05-14 NOTE — PROGRESS NOTES
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  The patient was discharged yesterday after a 1 day admission for abdominal pain.  Patient initially presented for periumbilical abdominal pain with chills but reports that all these symptoms have completely subsided.  He has no current complaints this time.  He denies chills within last 2 days, fever, persistent abdominal pain, nausea/vomiting/diarrhea, pain or frequency with urination, chest pain, cough or shortness of breath. Lactate was 1.6 in the ED on today's admission.    Overview/Hospital Course:  Patient blood cultures returned positive for pseudomonas bacteremia. Afebrile with no new signs or symptoms overnight. Initiated on zosyn 4.5 g q8. ID consulted- switched patient to cefipime. Patient initial cultures from 5/11 now also growing gram positive cocci. Will transition back to zosyn and await speciation. Mid line consult placed to prepare for course of home IV antibiotics.    Interval History: ID consulted- switched patient to cefipime. Patient initial cultures from 5/11 now also growing gram positive cocci. Will transition back to zosyn and await speciation. Mid line consult placed to prepare for course of home IV antibiotics.    Review of Systems   Constitutional: Negative for chills, diaphoresis,  fatigue and fever.   HENT: Negative for sore throat and trouble swallowing.    Eyes: Negative for photophobia and visual disturbance.   Respiratory: Negative for cough, chest tightness and shortness of breath.    Cardiovascular: Negative for chest pain and palpitations.   Gastrointestinal: Negative for abdominal distention, abdominal pain, constipation and nausea.   Genitourinary: Negative for difficulty urinating and dysuria.   Musculoskeletal: Negative for arthralgias and myalgias.   Skin: Negative for pallor and rash.   Neurological: Negative for dizziness, speech difficulty, weakness, light-headedness and headaches.   Psychiatric/Behavioral: Negative for agitation, behavioral problems, confusion and decreased concentration.     Objective:     Vital Signs (Most Recent):  Temp: 98.1 °F (36.7 °C) (05/14/20 1203)  Pulse: 80 (05/14/20 0735)  Resp: 18 (05/14/20 0735)  BP: (!) 126/59 (05/14/20 0735)  SpO2: 95 % (05/14/20 0735) Vital Signs (24h Range):  Temp:  [97.9 °F (36.6 °C)-98.6 °F (37 °C)] 98.1 °F (36.7 °C)  Pulse:  [69-80] 80  Resp:  [16-18] 18  SpO2:  [91 %-99 %] 95 %  BP: (126-144)/(59-64) 126/59     Weight: 61.5 kg (135 lb 9.3 oz)  Body mass index is 23.27 kg/m².    Physical Exam   Constitutional: He is oriented to person, place, and time. He appears well-developed and well-nourished. No distress.   HENT:   Head: Normocephalic and atraumatic.   Eyes: Pupils are equal, round, and reactive to light. EOM are normal.   Neck: Normal range of motion. Neck supple.   Cardiovascular: Normal rate and intact distal pulses.   Ventricularly paced rhythm   Pulmonary/Chest: Effort normal and breath sounds normal. No respiratory distress. He exhibits no tenderness.   Abdominal: Soft. Bowel sounds are normal. There is no tenderness.   Musculoskeletal: Normal range of motion. He exhibits no edema or tenderness.   Neurological: He is alert and oriented to person, place, and time.   Skin: Skin is warm and dry. Capillary refill  takes less than 2 seconds. No pallor.   Psychiatric: He has a normal mood and affect.         CRANIAL NERVES     CN III, IV, VI   Pupils are equal, round, and reactive to light.  Extraocular motions are normal.        Significant Labs: All pertinent labs within the past 24 hours have been reviewed.    Significant Imaging: I have reviewed all pertinent imaging results/findings within the past 24 hours.      Assessment/Plan:      * Bacteremia due to Pseudomonas  Patient blood cultures from 5/11 positive for pseudomonas bacteremia.    Plan  -zosyn 4.5 mg IV q8  -ID consulted - switched patient to cefipime. Patient initial cultures from 5/11 now also growing gram positive cocci. Will transition back to zosyn and await speciation. Mid line consult placed to prepare for course of home IV antibiotics.      Gram-positive cocci bacteremia  Blood cultures from 5/11 now growing gram positive cocci in chains.    Plan  ID consulted- Will transition back to zosyn and await speciation. Mid line consult placed in preparation for course of home IV antibiotics.      Long term (current) use of anticoagulants  -Patient with long-term use of Eliquis      Persistent atrial fibrillation  S/p PPM 2016  CHADSVASC 5  On eliquis 2.5mg bid adjusted for age     - Continue eliquis  - Continue carvedilol  - Monitor for signs of bleeding      CAD (coronary artery disease)  -Continue home aspirin      Hypertension  -Started home carvedilol and benazepril       CKD (chronic kidney disease) stage 3, GFR 30-59 ml/min  SCr 1.6- this is at his baseline     - Monitor renal function  - Renally dose meds - CrCl 54        VTE Risk Mitigation (From admission, onward)         Ordered     apixaban tablet 2.5 mg  2 times daily      05/12/20 1442     IP VTE HIGH RISK PATIENT  Once      05/12/20 1555     Place sequential compression device  Until discontinued      05/12/20 1555     Place sequential compression device  Until discontinued      05/12/20 1438                       Giancarlo Mar MD  Department of Hospital Medicine   [facility]

## 2020-05-14 NOTE — ASSESSMENT & PLAN NOTE
"   88 y/o male with A.fib, HTN, CKD3, CAD s/p CABG/AVR 2004, pacemaker implantation 9/2016 with hospital admission 5/11 for abdominal pain found to have fecal impaction presents with positive blood cultures with pseudomonas. He denies having any fever chills or night sweats. His abdominal pain has resolved. He denies having any dysuria. No cough, SOB, open wounds, prosthetic joints. No recent abd procedure. Pt enjoys outdoor yardwork, denies fishing/hunting or recent travel. Repeat blood cultures 5/12 NGTD.     He was admitted 5/11 for abdominal pain. He had elevated lactic acid level and elevated procal level. He underwent CT and was found to have sigmoid diverticulitis, cholelithiasis without cholecystitis and large stool burdon. His pain resolved after bowel movement.        IV antibiotics de-escalated yesterday to cefepime with plan for 14 days.  Blood cultures now showing GPC in chains resembling strep (ID pending), in addition to Pseudomonas.  Afebrile, no leukocytosis.  States he "feels great".  Abd pain resolved.  Ambulating in the halls.       Recommendations  1. Discontinue cefepime and re-start zosyn ( ? Enterococcus given abdominal complaints) 4.5 g IV q 8 hours (cr cl >20).   2. Will follow cultures and adjust abx as needed.   3. Anticipate 14 days of IV abx therapy (end date 5/26/2020)   4. Will need weekly cbc cmp sent to ID clinic at  [facility]  5. Will follow up tomorrow with further recommendations.     Data reviewed and plan discussed with ID staff  Discussed with Primary Team       "

## 2020-05-14 NOTE — CONSULTS
Single lumen 18g x 8  midline placed to rt basilic vein.  Max dwell date 6/13/20, Lot# [id number].  Needle advance into the vessel under real time ultrasound guidance.  Image recorded and saved.

## 2020-05-14 NOTE — PLAN OF CARE
Pt needs IV abx at d/c - awaiting final ID recs and access placement. CM initiated referral w/ [facility] to follow pt for d/c needs and possible d/c tmw.       05/14/20 1442   Discharge Reassessment   Assessment Type Discharge Planning Reassessment   Discharge Plan A Home with family;Home Health   Discharge Plan B Home Health   Anticipated Discharge Disposition Home-Health   Post-Acute Status   Post-Acute Authorization Medications   Medication Status Pending Access Placement   Discharge Delays None known at this time

## 2020-05-14 NOTE — ASSESSMENT & PLAN NOTE
Blood cultures from 5/11 now growing gram positive cocci in chains.    Plan  ID consulted- Will transition back to zosyn and await speciation. Mid line consult placed in preparation for course of home IV antibiotics.

## 2020-05-14 NOTE — PROGRESS NOTES
"[facility]  Infectious Disease  Progress Note    Patient Name: Deena Moody  MRN: [id number]  Admission Date: 5/12/2020  Length of Stay: 2 days  Attending Physician: Tacos Alvarez MD  Primary Care Provider: Jam Rowell MD    Isolation Status: No active isolations  Assessment/Plan:      * Bacteremia due to Pseudomonas     89 y/o male with A.fib, HTN, CKD3, CAD s/p CABG/AVR 2004, pacemaker implantation 9/2016 with hospital admission 5/11 for abdominal pain found to have fecal impaction presents with positive blood cultures with pseudomonas. He denies having any fever chills or night sweats. His abdominal pain has resolved. He denies having any dysuria. No cough, SOB, open wounds, prosthetic joints. No recent abd procedure. Pt enjoys outdoor yardwork, denies fishing/hunting or recent travel. Repeat blood cultures 5/12 NGTD.     He was admitted 5/11 for abdominal pain. He had elevated lactic acid level and elevated procal level. He underwent CT and was found to have sigmoid diverticulitis, cholelithiasis without cholecystitis and large stool burdon. His pain resolved after bowel movement.        IV antibiotics de-escalated yesterday to cefepime with plan for 14 days.  Blood cultures now showing GPC in chains resembling strep (ID pending), in addition to Pseudomonas.  Afebrile, no leukocytosis.  States he "feels great".  Abd pain resolved.  Ambulating in the halls.       Recommendations  1. Discontinue cefepime and re-start zosyn ( ? Enterococcus given abdominal complaints) 4.5 g IV q 8 hours (cr cl >20).   2. Will follow cultures and adjust abx as needed.   3. Anticipate 14 days of IV abx therapy (end date 5/26/2020)   4. Will need weekly cbc cmp sent to ID clinic at  [facility]  5. Will follow up tomorrow with further recommendations.     Data reviewed and plan discussed with ID staff  Discussed with Primary Team         Gram-positive cocci bacteremia  See " "assessment/plan above      Thank you.   Please call for any questions or concerns.  ARCENIO Parks, ANP-C  [phone] pager, [place] [phone]  Subjective:     Principal Problem:Bacteremia due to Pseudomonas    HPI: 89 y/o male with A.fib, HTN, CKD3, CAD s/p CABG/AVR 2004, pacemaker implantation 9/2016 with hospital admission 5/11 for abdominal pain found to have fecal impaction presents with positive blood cultures with pseudomonas. He denies having any fever chills or night sweats. His abdominal pain has resolved. He denies having any dysuria. No cough, SOB, open wounds, prosthetic joints. No recent abd procedure. Pt enjoys outdoor yardwork, denies fishing/hunting or recent travel. Repeat blood cultures 5/12 NGTD.     He was admitted 5/11 for abdominal pain. He had elevated lactic acid level and elevated procal level. He underwent CT and was found to have sigmoid diverticulitis, cholelithiasis without cholecystitis and large stool burdon. His pain resolved after bowel movement.     Interval History:  No acute events overnight.  Now with GPC in chains resembling strep on blood culture of 5/11 in addition to Pseudomonas.   Afebrile, no leukocytosis.  States he "feels great".  Abd pain resolved.  Ambulating in the halls.     Review of Systems   Constitutional: Negative for activity change, appetite change, chills, diaphoresis, fatigue and fever.   Respiratory: Negative for cough and shortness of breath.    Cardiovascular: Negative for chest pain.   Gastrointestinal: Negative for abdominal distention, abdominal pain, constipation, diarrhea, nausea and vomiting.   Genitourinary: Negative for difficulty urinating, dysuria and flank pain.   Musculoskeletal: Negative for back pain.   Skin: Negative for color change, pallor, rash and wound.   Neurological: Negative for dizziness and headaches.   Psychiatric/Behavioral: Negative for agitation and behavioral problems.   All other systems reviewed and are negative.    Objective: "     Vital Signs (Most Recent):  Temp: 98.1 °F (36.7 °C) (05/14/20 0735)  Pulse: 80 (05/14/20 0735)  Resp: 18 (05/14/20 0735)  BP: (!) 126/59 (05/14/20 0735)  SpO2: 95 % (05/14/20 0735) Vital Signs (24h Range):  Temp:  [97.9 °F (36.6 °C)-98.6 °F (37 °C)] 98.1 °F (36.7 °C)  Pulse:  [69-80] 80  Resp:  [16-18] 18  SpO2:  [91 %-99 %] 95 %  BP: (126-144)/(59-64) 126/59     Weight: 61.5 kg (135 lb 9.3 oz)  Body mass index is 23.27 kg/m².    Estimated Creatinine Clearance: 26.2 mL/min (A) (based on SCr of 1.6 mg/dL (H)).    Physical Exam   Constitutional: He is oriented to person, place, and time. He appears well-developed and well-nourished. No distress.   HENT:   Head: Normocephalic and atraumatic.   Eyes: Conjunctivae are normal. No scleral icterus.   Neck: Normal range of motion.   Cardiovascular: Normal rate, regular rhythm and normal heart sounds. Exam reveals no gallop and no friction rub.   No murmur heard.  Pulmonary/Chest: Effort normal. No respiratory distress. He has no wheezes. He has no rales.   Abdominal: Soft. He exhibits no distension. There is no tenderness. There is no guarding.   Musculoskeletal: Normal range of motion. He exhibits no edema or deformity.   Neurological: He is alert and oriented to person, place, and time. No cranial nerve deficit.   Skin: Skin is warm and dry. No rash noted. He is not diaphoretic. No erythema.   Psychiatric: He has a normal mood and affect. His behavior is normal.   Vitals reviewed.      Significant Labs:   Blood Culture:   Recent Labs   Lab 05/11/20  0015 05/11/20  0016 05/12/20  1326 05/12/20  1332   LABBLOO Gram stain aer bottle: Gram negative rods  05/12/2020  10:06   Positive results previously called  Gram stain maribel bottle: Gram positive cocci in chains resembling Strep  Results called to and read back by:Palmira Tinajero RN 05/13/2020  17:02  PSEUDOMONAS AERUGINOSA  For susceptibility see order #[id number]  * Gram stain aer bottle: Gram negative rods   Results  called to and read back by: Oswaldo Jacome RN  05/11/2020    20:21  PSEUDOMONAS AERUGINOSA* No Growth to date  No Growth to date No Growth to date  No Growth to date     CBC:   Recent Labs   Lab 05/12/20  1325 05/13/20  0428 05/14/20  0420   WBC 9.78 8.70 8.21   HGB 13.8* 12.4* 12.8*   HCT 43.0 38.7* 39.7*    100* 125*     CMP:   Recent Labs   Lab 05/12/20  1325 05/13/20  0428 05/14/20  0420    143 142   K 4.0 3.4* 3.7    107 105   CO2 26 25 25   GLU 89 73 86   BUN 26* 25* 25*   CREATININE 1.6* 1.5* 1.6*   CALCIUM 8.5* 8.4* 8.7   PROT 7.5 6.5 6.8   ALBUMIN 3.3* 3.1* 3.1*   BILITOT 1.0 1.0 1.2*   ALKPHOS 99 90 125   AST 26 18 21   ALT 9* 8* 8*   ANIONGAP 10 11 12   EGFRNONAA 37.6* 40.7* 37.6*     Urine Culture: No results for input(s): LABURIN in the last 4320 hours.  Urine Studies:   Recent Labs   Lab 05/12/20  1355   COLORU Yellow   APPEARANCEUA Clear   PHUR 5.0   SPECGRAV 1.015   PROTEINUA Negative   GLUCUA Negative   KETONESU Negative   BILIRUBINUA Negative   OCCULTUA 2+*   NITRITE Negative   LEUKOCYTESUR Negative   RBCUA 14*   WBCUA 4     Wound Culture: No results for input(s): LABAERO in the last 4320 hours.    Significant Imaging: I have reviewed all pertinent imaging results/findings within the past 24 hours.

## 2020-05-15 LAB
ALBUMIN SERPL BCP-MCNC: 2.8 G/DL (ref 3.5–5.2)
ALP SERPL-CCNC: 108 U/L (ref 55–135)
ALT SERPL W/O P-5'-P-CCNC: 9 U/L (ref 10–44)
ANION GAP SERPL CALC-SCNC: 11 MMOL/L (ref 8–16)
AST SERPL-CCNC: 18 U/L (ref 10–40)
BASOPHILS # BLD AUTO: 0.06 K/UL (ref 0–0.2)
BASOPHILS NFR BLD: 0.9 % (ref 0–1.9)
BILIRUB SERPL-MCNC: 1.1 MG/DL (ref 0.1–1)
BUN SERPL-MCNC: 29 MG/DL (ref 8–23)
CALCIUM SERPL-MCNC: 8.1 MG/DL (ref 8.7–10.5)
CHLORIDE SERPL-SCNC: 105 MMOL/L (ref 95–110)
CO2 SERPL-SCNC: 28 MMOL/L (ref 23–29)
CREAT SERPL-MCNC: 1.8 MG/DL (ref 0.5–1.4)
DIFFERENTIAL METHOD: ABNORMAL
EOSINOPHIL # BLD AUTO: 0.5 K/UL (ref 0–0.5)
EOSINOPHIL NFR BLD: 7.2 % (ref 0–8)
ERYTHROCYTE [DISTWIDTH] IN BLOOD BY AUTOMATED COUNT: 12.9 % (ref 11.5–14.5)
EST. GFR  (AFRICAN AMERICAN): 37.7 ML/MIN/1.73 M^2
EST. GFR  (NON AFRICAN AMERICAN): 32.6 ML/MIN/1.73 M^2
GLUCOSE SERPL-MCNC: 95 MG/DL (ref 70–110)
HCT VFR BLD AUTO: 38.2 % (ref 40–54)
HGB BLD-MCNC: 12.2 G/DL (ref 14–18)
IMM GRANULOCYTES # BLD AUTO: 0.03 K/UL (ref 0–0.04)
IMM GRANULOCYTES NFR BLD AUTO: 0.4 % (ref 0–0.5)
LYMPHOCYTES # BLD AUTO: 1.6 K/UL (ref 1–4.8)
LYMPHOCYTES NFR BLD: 23.6 % (ref 18–48)
MAGNESIUM SERPL-MCNC: 1.9 MG/DL (ref 1.6–2.6)
MCH RBC QN AUTO: 32.4 PG (ref 27–31)
MCHC RBC AUTO-ENTMCNC: 31.9 G/DL (ref 32–36)
MCV RBC AUTO: 102 FL (ref 82–98)
MONOCYTES # BLD AUTO: 0.7 K/UL (ref 0.3–1)
MONOCYTES NFR BLD: 10 % (ref 4–15)
NEUTROPHILS # BLD AUTO: 4 K/UL (ref 1.8–7.7)
NEUTROPHILS NFR BLD: 57.9 % (ref 38–73)
NRBC BLD-RTO: 0 /100 WBC
PHOSPHATE SERPL-MCNC: 3.7 MG/DL (ref 2.7–4.5)
PLATELET # BLD AUTO: 125 K/UL (ref 150–350)
PMV BLD AUTO: 9.7 FL (ref 9.2–12.9)
POTASSIUM SERPL-SCNC: 3.4 MMOL/L (ref 3.5–5.1)
PROT SERPL-MCNC: 6.7 G/DL (ref 6–8.4)
RBC # BLD AUTO: 3.76 M/UL (ref 4.6–6.2)
SODIUM SERPL-SCNC: 144 MMOL/L (ref 136–145)
WBC # BLD AUTO: 6.83 K/UL (ref 3.9–12.7)

## 2020-05-15 PROCEDURE — 99233 PR SUBSEQUENT HOSPITAL CARE,LEVL III: ICD-10-PCS | Mod: HCNC,GC,, | Performed by: INTERNAL MEDICINE

## 2020-05-15 PROCEDURE — 99233 PR SUBSEQUENT HOSPITAL CARE,LEVL III: ICD-10-PCS | Mod: HCNC,,, | Performed by: NURSE PRACTITIONER

## 2020-05-15 PROCEDURE — 25000003 PHARM REV CODE 250: Mod: HCNC | Performed by: STUDENT IN AN ORGANIZED HEALTH CARE EDUCATION/TRAINING PROGRAM

## 2020-05-15 PROCEDURE — 36415 COLL VENOUS BLD VENIPUNCTURE: CPT | Mod: HCNC

## 2020-05-15 PROCEDURE — 80053 COMPREHEN METABOLIC PANEL: CPT | Mod: HCNC

## 2020-05-15 PROCEDURE — 99233 SBSQ HOSP IP/OBS HIGH 50: CPT | Mod: HCNC,,, | Performed by: NURSE PRACTITIONER

## 2020-05-15 PROCEDURE — 11000001 HC ACUTE MED/SURG PRIVATE ROOM: Mod: HCNC

## 2020-05-15 PROCEDURE — 63600175 PHARM REV CODE 636 W HCPCS: Mod: HCNC | Performed by: STUDENT IN AN ORGANIZED HEALTH CARE EDUCATION/TRAINING PROGRAM

## 2020-05-15 PROCEDURE — 25000003 PHARM REV CODE 250: Mod: HCNC | Performed by: NURSE PRACTITIONER

## 2020-05-15 PROCEDURE — 63600175 PHARM REV CODE 636 W HCPCS: Mod: HCNC | Performed by: NURSE PRACTITIONER

## 2020-05-15 PROCEDURE — 83735 ASSAY OF MAGNESIUM: CPT | Mod: HCNC

## 2020-05-15 PROCEDURE — 94761 N-INVAS EAR/PLS OXIMETRY MLT: CPT | Mod: HCNC

## 2020-05-15 PROCEDURE — 99233 SBSQ HOSP IP/OBS HIGH 50: CPT | Mod: HCNC,GC,, | Performed by: INTERNAL MEDICINE

## 2020-05-15 PROCEDURE — 85025 COMPLETE CBC W/AUTO DIFF WBC: CPT | Mod: HCNC

## 2020-05-15 PROCEDURE — 84100 ASSAY OF PHOSPHORUS: CPT | Mod: HCNC

## 2020-05-15 PROCEDURE — 27000221 HC OXYGEN, UP TO 24 HOURS: Mod: HCNC

## 2020-05-15 RX ORDER — POTASSIUM CHLORIDE 750 MG/1
30 CAPSULE, EXTENDED RELEASE ORAL ONCE
Status: COMPLETED | OUTPATIENT
Start: 2020-05-15 | End: 2020-05-15

## 2020-05-15 RX ADMIN — CARVEDILOL 12.5 MG: 12.5 TABLET, FILM COATED ORAL at 12:05

## 2020-05-15 RX ADMIN — FERROUS SULFATE TAB EC 325 MG (65 MG FE EQUIVALENT) 325 MG: 325 (65 FE) TABLET DELAYED RESPONSE at 09:05

## 2020-05-15 RX ADMIN — PANTOPRAZOLE SODIUM 40 MG: 40 TABLET, DELAYED RELEASE ORAL at 09:05

## 2020-05-15 RX ADMIN — PIPERACILLIN SODIUM,TAZOBACTAM SODIUM 4.5 G: 4; .5 INJECTION, POWDER, FOR SOLUTION INTRAVENOUS at 10:05

## 2020-05-15 RX ADMIN — PIPERACILLIN SODIUM,TAZOBACTAM SODIUM 4.5 G: 4; .5 INJECTION, POWDER, FOR SOLUTION INTRAVENOUS at 01:05

## 2020-05-15 RX ADMIN — POTASSIUM CHLORIDE 30 MEQ: 750 CAPSULE, EXTENDED RELEASE ORAL at 11:05

## 2020-05-15 RX ADMIN — ASPIRIN 81 MG: 81 TABLET, COATED ORAL at 09:05

## 2020-05-15 RX ADMIN — THERA TABS 1 TABLET: TAB at 09:05

## 2020-05-15 RX ADMIN — SODIUM CHLORIDE, SODIUM LACTATE, POTASSIUM CHLORIDE, AND CALCIUM CHLORIDE 500 ML: .6; .31; .03; .02 INJECTION, SOLUTION INTRAVENOUS at 05:05

## 2020-05-15 RX ADMIN — APIXABAN 2.5 MG: 2.5 TABLET, FILM COATED ORAL at 10:05

## 2020-05-15 RX ADMIN — APIXABAN 2.5 MG: 2.5 TABLET, FILM COATED ORAL at 09:05

## 2020-05-15 RX ADMIN — PIPERACILLIN SODIUM,TAZOBACTAM SODIUM 4.5 G: 4; .5 INJECTION, POWDER, FOR SOLUTION INTRAVENOUS at 05:05

## 2020-05-15 RX ADMIN — CARVEDILOL 12.5 MG: 12.5 TABLET, FILM COATED ORAL at 05:05

## 2020-05-15 RX ADMIN — BENAZEPRIL HYDROCHLORIDE 5 MG: 5 TABLET ORAL at 09:05

## 2020-05-15 NOTE — PLAN OF CARE
Pt remain free from fall and injuries. Ambulated in the room, denies SOB and pain, voiding freely in the urinal. VSS. Safety maintained. Will monitor.

## 2020-05-15 NOTE — PROGRESS NOTES
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  The patient was discharged yesterday after a 1 day admission for abdominal pain.  Patient initially presented for periumbilical abdominal pain with chills but reports that all these symptoms have completely subsided.  He has no current complaints this time.  He denies chills within last 2 days, fever, persistent abdominal pain, nausea/vomiting/diarrhea, pain or frequency with urination, chest pain, cough or shortness of breath. Lactate was 1.6 in the ED on today's admission.    Overview/Hospital Course:  Patient blood cultures returned positive for pseudomonas bacteremia. Afebrile with no new signs or symptoms overnight. Initiated on zosyn 4.5 g q8. ID consulted- switched patient to cefipime. Patient initial cultures from 5/11 now also growing gram positive cocci. Will transition back to zosyn and await speciation. Mid line consult placed to prepare for course of home IV antibiotics. Patient with asymptomatic SARAH today of 1.8. Will hold benazepril. Awaiting speciation of bacteria.    Interval History: ID consulted- switched patient to cefipime. Patient initial cultures from 5/11 now also growing gram positive cocci. Patient with asymptomatic SARAH today of 1.8. Awaiting speciation of bacteria.      Review of Systems    Constitutional: Negative for chills, diaphoresis, fatigue and fever.   HENT: Negative for sore throat and trouble swallowing.    Eyes: Negative for photophobia and visual disturbance.   Respiratory: Negative for cough, chest tightness and shortness of breath.    Cardiovascular: Negative for chest pain and palpitations.   Gastrointestinal: Negative for abdominal distention, abdominal pain, constipation and nausea.   Genitourinary: Negative for difficulty urinating and dysuria.   Musculoskeletal: Negative for arthralgias and myalgias.   Skin: Negative for pallor and rash.   Neurological: Negative for dizziness, speech difficulty, weakness, light-headedness and headaches.   Psychiatric/Behavioral: Negative for agitation, behavioral problems, confusion and decreased concentration.     Objective:     Vital Signs (Most Recent):  Temp: 96.9 °F (36.1 °C) (05/15/20 0815)  Pulse: 70 (05/15/20 1018)  Resp: 16 (05/15/20 1018)  BP: 104/62 (05/15/20 1018)  SpO2: (!) 94 % (05/15/20 1018) Vital Signs (24h Range):  Temp:  [96.9 °F (36.1 °C)-98.3 °F (36.8 °C)] 96.9 °F (36.1 °C)  Pulse:  [70-80] 70  Resp:  [13-18] 16  SpO2:  [92 %-97 %] 94 %  BP: (104-126)/(53-68) 104/62     Weight: 61.5 kg (135 lb 9.3 oz)  Body mass index is 23.27 kg/m².    Physical Exam   Constitutional: He is oriented to person, place, and time. He appears well-developed and well-nourished. No distress.   HENT:   Head: Normocephalic and atraumatic.   Eyes: Pupils are equal, round, and reactive to light. EOM are normal.   Neck: Normal range of motion. Neck supple.   Cardiovascular: Normal rate and intact distal pulses.   Ventricularly paced rhythm   Pulmonary/Chest: Effort normal and breath sounds normal. No respiratory distress. He exhibits no tenderness.   Abdominal: Soft. Bowel sounds are normal. There is no tenderness.   Musculoskeletal: Normal range of motion. He exhibits no edema or tenderness.   Neurological: He is alert and oriented to person, place, and  time.   Skin: Skin is warm and dry. Capillary refill takes less than 2 seconds. No pallor.   Psychiatric: He has a normal mood and affect.         CRANIAL NERVES     CN III, IV, VI   Pupils are equal, round, and reactive to light.  Extraocular motions are normal.        Significant Labs: All pertinent labs within the past 24 hours have been reviewed.    Significant Imaging: I have reviewed all pertinent imaging results/findings within the past 24 hours.      Assessment/Plan:      * Bacteremia due to Pseudomonas  Patient blood cultures from 5/11 positive for pseudomonas bacteremia.    Plan  -zosyn 4.5 mg IV q8  -ID consulted - switched patient to cefipime. Patient initial cultures from 5/11 now also growing gram positive cocci. Will transition back to zosyn and await speciation. Mid line consult placed to prepare for course of home IV antibiotics.      Gram-positive cocci bacteremia  Blood cultures from 5/11 now growing gram positive cocci in chains.    Plan  ID consulted- Will transition back to zosyn and await speciation. Mid line consult placed in preparation for course of home IV antibiotics.      Long term (current) use of anticoagulants  -Patient with long-term use of Eliquis      Persistent atrial fibrillation  S/p PPM 2016  CHADSVASC 5  On eliquis 2.5mg bid adjusted for age     - Continue eliquis  - Continue carvedilol  - Monitor for signs of bleeding      CAD (coronary artery disease)  -Continue home aspirin      Hypertension  -Started home carvedilol and benazepril       CKD (chronic kidney disease) stage 3, GFR 30-59 ml/min  SCr 1.6- this is at his baseline     - Monitor renal function  - Renally dose meds - CrCl 54        VTE Risk Mitigation (From admission, onward)         Ordered     apixaban tablet 2.5 mg  2 times daily      05/12/20 1442     IP VTE HIGH RISK PATIENT  Once      05/12/20 1555     Place sequential compression device  Until discontinued      05/12/20 1555     Place sequential compression  device  Until discontinued      05/12/20 1915                      Giancarlo Mar MD  Department of Hospital Medicine   [facility]

## 2020-05-15 NOTE — SUBJECTIVE & OBJECTIVE
Interval History:  No acute events overnight. Still awaiting ID of GPC  Repeat blood cultures remain NGTD.  Feels good and anxious to go home.     Review of Systems   Constitutional: Negative for activity change, appetite change, chills, diaphoresis, fatigue and fever.   Respiratory: Negative for cough and shortness of breath.    Cardiovascular: Negative for chest pain.   Gastrointestinal: Negative for abdominal distention, abdominal pain, constipation, diarrhea, nausea and vomiting.   Genitourinary: Negative for difficulty urinating, dysuria and flank pain.   Musculoskeletal: Negative for back pain.   Skin: Negative for color change, pallor, rash and wound.   Neurological: Negative for dizziness and headaches.   Psychiatric/Behavioral: Negative for agitation and behavioral problems.   All other systems reviewed and are negative.    Objective:     Vital Signs (Most Recent):  Temp: 96.9 °F (36.1 °C) (05/15/20 0815)  Pulse: 70 (05/15/20 0815)  Resp: 17 (05/15/20 0815)  BP: (!) 110/59 (05/15/20 0815)  SpO2: 97 % (05/15/20 0815) Vital Signs (24h Range):  Temp:  [96.9 °F (36.1 °C)-98.3 °F (36.8 °C)] 96.9 °F (36.1 °C)  Pulse:  [70-80] 70  Resp:  [13-18] 17  SpO2:  [92 %-97 %] 97 %  BP: (104-126)/(53-68) 110/59     Weight: 61.5 kg (135 lb 9.3 oz)  Body mass index is 23.27 kg/m².    Estimated Creatinine Clearance: 23.3 mL/min (A) (based on SCr of 1.8 mg/dL (H)).    Physical Exam   Constitutional: He is oriented to person, place, and time. He appears well-developed and well-nourished. No distress.   HENT:   Head: Normocephalic and atraumatic.   Eyes: Conjunctivae are normal. No scleral icterus.   Neck: Normal range of motion.   Cardiovascular: Normal rate and regular rhythm. Exam reveals no gallop.   Pulmonary/Chest: Effort normal. No respiratory distress.   Abdominal: Soft. He exhibits no distension. There is no tenderness.   Musculoskeletal: Normal range of motion. He exhibits no edema or deformity.   Neurological: He is  alert and oriented to person, place, and time. No cranial nerve deficit.   Skin: Skin is warm and dry. No rash noted. He is not diaphoretic. No erythema.   Psychiatric: He has a normal mood and affect. His behavior is normal.   Vitals reviewed.      Significant Labs:   Blood Culture:   Recent Labs   Lab 05/11/20  0015 05/11/20  0016 05/12/20  1326 05/12/20  1332   LABBLOO Gram stain aer bottle: Gram negative rods  05/12/2020  10:06   Positive results previously called  Gram stain maribel bottle: Gram positive cocci in chains resembling Strep  Results called to and read back by:Palmira Tinajero RN 05/13/2020  17:02  PSEUDOMONAS AERUGINOSA  For susceptibility see order #[id number]  * Gram stain aer bottle: Gram negative rods   Results called to and read back by: Oswaldo Jacome RN  05/11/2020    20:21  PSEUDOMONAS AERUGINOSA* No Growth to date  No Growth to date  No Growth to date No Growth to date  No Growth to date  No Growth to date     CBC:   Recent Labs   Lab 05/14/20  0420 05/15/20  0416   WBC 8.21 6.83   HGB 12.8* 12.2*   HCT 39.7* 38.2*   * 125*     CMP:   Recent Labs   Lab 05/14/20  0420 05/15/20  0416    144   K 3.7 3.4*    105   CO2 25 28   GLU 86 95   BUN 25* 29*   CREATININE 1.6* 1.8*   CALCIUM 8.7 8.1*   PROT 6.8 6.7   ALBUMIN 3.1* 2.8*   BILITOT 1.2* 1.1*   ALKPHOS 125 108   AST 21 18   ALT 8* 9*   ANIONGAP 12 11   EGFRNONAA 37.6* 32.6*     Urine Culture: No results for input(s): LABURIN in the last 4320 hours.  Urine Studies:   Recent Labs   Lab 05/12/20  1355   COLORU Yellow   APPEARANCEUA Clear   PHUR 5.0   SPECGRAV 1.015   PROTEINUA Negative   GLUCUA Negative   KETONESU Negative   BILIRUBINUA Negative   OCCULTUA 2+*   NITRITE Negative   LEUKOCYTESUR Negative   RBCUA 14*   WBCUA 4     Wound Culture: No results for input(s): LABAERO in the last 4320 hours.    Significant Imaging: I have reviewed all pertinent imaging results/findings within the past 24 hours.

## 2020-05-15 NOTE — SUBJECTIVE & OBJECTIVE
Interval History: ID consulted- switched patient to cefipime. Patient initial cultures from 5/11 now also growing gram positive cocci. Patient with asymptomatic SARAH today of 1.8. Awaiting speciation of bacteria.      Review of Systems   Constitutional: Negative for chills, diaphoresis, fatigue and fever.   HENT: Negative for sore throat and trouble swallowing.    Eyes: Negative for photophobia and visual disturbance.   Respiratory: Negative for cough, chest tightness and shortness of breath.    Cardiovascular: Negative for chest pain and palpitations.   Gastrointestinal: Negative for abdominal distention, abdominal pain, constipation and nausea.   Genitourinary: Negative for difficulty urinating and dysuria.   Musculoskeletal: Negative for arthralgias and myalgias.   Skin: Negative for pallor and rash.   Neurological: Negative for dizziness, speech difficulty, weakness, light-headedness and headaches.   Psychiatric/Behavioral: Negative for agitation, behavioral problems, confusion and decreased concentration.     Objective:     Vital Signs (Most Recent):  Temp: 96.9 °F (36.1 °C) (05/15/20 0815)  Pulse: 70 (05/15/20 1018)  Resp: 16 (05/15/20 1018)  BP: 104/62 (05/15/20 1018)  SpO2: (!) 94 % (05/15/20 1018) Vital Signs (24h Range):  Temp:  [96.9 °F (36.1 °C)-98.3 °F (36.8 °C)] 96.9 °F (36.1 °C)  Pulse:  [70-80] 70  Resp:  [13-18] 16  SpO2:  [92 %-97 %] 94 %  BP: (104-126)/(53-68) 104/62     Weight: 61.5 kg (135 lb 9.3 oz)  Body mass index is 23.27 kg/m².    Physical Exam   Constitutional: He is oriented to person, place, and time. He appears well-developed and well-nourished. No distress.   HENT:   Head: Normocephalic and atraumatic.   Eyes: Pupils are equal, round, and reactive to light. EOM are normal.   Neck: Normal range of motion. Neck supple.   Cardiovascular: Normal rate and intact distal pulses.   Ventricularly paced rhythm   Pulmonary/Chest: Effort normal and breath sounds normal. No respiratory distress. He  exhibits no tenderness.   Abdominal: Soft. Bowel sounds are normal. There is no tenderness.   Musculoskeletal: Normal range of motion. He exhibits no edema or tenderness.   Neurological: He is alert and oriented to person, place, and time.   Skin: Skin is warm and dry. Capillary refill takes less than 2 seconds. No pallor.   Psychiatric: He has a normal mood and affect.         CRANIAL NERVES     CN III, IV, VI   Pupils are equal, round, and reactive to light.  Extraocular motions are normal.        Significant Labs: All pertinent labs within the past 24 hours have been reviewed.    Significant Imaging: I have reviewed all pertinent imaging results/findings within the past 24 hours.

## 2020-05-16 LAB
ALBUMIN SERPL BCP-MCNC: 2.8 G/DL (ref 3.5–5.2)
ALP SERPL-CCNC: 105 U/L (ref 55–135)
ALT SERPL W/O P-5'-P-CCNC: 9 U/L (ref 10–44)
ANION GAP SERPL CALC-SCNC: 9 MMOL/L (ref 8–16)
AST SERPL-CCNC: 19 U/L (ref 10–40)
BACTERIA BLD CULT: NORMAL
BACTERIA BLD CULT: NORMAL
BASOPHILS # BLD AUTO: 0.07 K/UL (ref 0–0.2)
BASOPHILS NFR BLD: 1 % (ref 0–1.9)
BILIRUB SERPL-MCNC: 0.8 MG/DL (ref 0.1–1)
BUN SERPL-MCNC: 27 MG/DL (ref 8–23)
CALCIUM SERPL-MCNC: 8.1 MG/DL (ref 8.7–10.5)
CHLORIDE SERPL-SCNC: 108 MMOL/L (ref 95–110)
CO2 SERPL-SCNC: 27 MMOL/L (ref 23–29)
CREAT SERPL-MCNC: 1.6 MG/DL (ref 0.5–1.4)
DIFFERENTIAL METHOD: ABNORMAL
EOSINOPHIL # BLD AUTO: 0.5 K/UL (ref 0–0.5)
EOSINOPHIL NFR BLD: 7.1 % (ref 0–8)
ERYTHROCYTE [DISTWIDTH] IN BLOOD BY AUTOMATED COUNT: 12.9 % (ref 11.5–14.5)
EST. GFR  (AFRICAN AMERICAN): 43.5 ML/MIN/1.73 M^2
EST. GFR  (NON AFRICAN AMERICAN): 37.6 ML/MIN/1.73 M^2
GLUCOSE SERPL-MCNC: 95 MG/DL (ref 70–110)
HCT VFR BLD AUTO: 39.2 % (ref 40–54)
HGB BLD-MCNC: 12.4 G/DL (ref 14–18)
IMM GRANULOCYTES # BLD AUTO: 0.03 K/UL (ref 0–0.04)
IMM GRANULOCYTES NFR BLD AUTO: 0.4 % (ref 0–0.5)
LYMPHOCYTES # BLD AUTO: 1.7 K/UL (ref 1–4.8)
LYMPHOCYTES NFR BLD: 22.9 % (ref 18–48)
MAGNESIUM SERPL-MCNC: 2 MG/DL (ref 1.6–2.6)
MCH RBC QN AUTO: 32.1 PG (ref 27–31)
MCHC RBC AUTO-ENTMCNC: 31.6 G/DL (ref 32–36)
MCV RBC AUTO: 102 FL (ref 82–98)
MONOCYTES # BLD AUTO: 0.7 K/UL (ref 0.3–1)
MONOCYTES NFR BLD: 9.9 % (ref 4–15)
NEUTROPHILS # BLD AUTO: 4.3 K/UL (ref 1.8–7.7)
NEUTROPHILS NFR BLD: 58.7 % (ref 38–73)
NRBC BLD-RTO: 0 /100 WBC
PHOSPHATE SERPL-MCNC: 2.8 MG/DL (ref 2.7–4.5)
PLATELET # BLD AUTO: 140 K/UL (ref 150–350)
PMV BLD AUTO: 9.6 FL (ref 9.2–12.9)
POTASSIUM SERPL-SCNC: 3.6 MMOL/L (ref 3.5–5.1)
PROT SERPL-MCNC: 6.6 G/DL (ref 6–8.4)
RBC # BLD AUTO: 3.86 M/UL (ref 4.6–6.2)
SODIUM SERPL-SCNC: 144 MMOL/L (ref 136–145)
WBC # BLD AUTO: 7.29 K/UL (ref 3.9–12.7)

## 2020-05-16 PROCEDURE — 99232 PR SUBSEQUENT HOSPITAL CARE,LEVL II: ICD-10-PCS | Mod: HCNC,GC,, | Performed by: HOSPITALIST

## 2020-05-16 PROCEDURE — 80053 COMPREHEN METABOLIC PANEL: CPT | Mod: HCNC

## 2020-05-16 PROCEDURE — 94761 N-INVAS EAR/PLS OXIMETRY MLT: CPT | Mod: HCNC

## 2020-05-16 PROCEDURE — 11000001 HC ACUTE MED/SURG PRIVATE ROOM: Mod: HCNC

## 2020-05-16 PROCEDURE — 99232 SBSQ HOSP IP/OBS MODERATE 35: CPT | Mod: HCNC,GC,, | Performed by: HOSPITALIST

## 2020-05-16 PROCEDURE — 83735 ASSAY OF MAGNESIUM: CPT | Mod: HCNC

## 2020-05-16 PROCEDURE — 36415 COLL VENOUS BLD VENIPUNCTURE: CPT | Mod: HCNC

## 2020-05-16 PROCEDURE — 63600175 PHARM REV CODE 636 W HCPCS: Mod: HCNC | Performed by: NURSE PRACTITIONER

## 2020-05-16 PROCEDURE — 63600175 PHARM REV CODE 636 W HCPCS: Mod: HCNC | Performed by: STUDENT IN AN ORGANIZED HEALTH CARE EDUCATION/TRAINING PROGRAM

## 2020-05-16 PROCEDURE — 84100 ASSAY OF PHOSPHORUS: CPT | Mod: HCNC

## 2020-05-16 PROCEDURE — 25000003 PHARM REV CODE 250: Mod: HCNC | Performed by: STUDENT IN AN ORGANIZED HEALTH CARE EDUCATION/TRAINING PROGRAM

## 2020-05-16 PROCEDURE — 85025 COMPLETE CBC W/AUTO DIFF WBC: CPT | Mod: HCNC

## 2020-05-16 PROCEDURE — 25000003 PHARM REV CODE 250: Mod: HCNC | Performed by: NURSE PRACTITIONER

## 2020-05-16 RX ADMIN — SODIUM CHLORIDE, SODIUM LACTATE, POTASSIUM CHLORIDE, AND CALCIUM CHLORIDE 500 ML: .6; .31; .03; .02 INJECTION, SOLUTION INTRAVENOUS at 08:05

## 2020-05-16 RX ADMIN — PIPERACILLIN SODIUM,TAZOBACTAM SODIUM 4.5 G: 4; .5 INJECTION, POWDER, FOR SOLUTION INTRAVENOUS at 12:05

## 2020-05-16 RX ADMIN — APIXABAN 2.5 MG: 2.5 TABLET, FILM COATED ORAL at 08:05

## 2020-05-16 RX ADMIN — CARVEDILOL 12.5 MG: 12.5 TABLET, FILM COATED ORAL at 04:05

## 2020-05-16 RX ADMIN — PIPERACILLIN SODIUM,TAZOBACTAM SODIUM 4.5 G: 4; .5 INJECTION, POWDER, FOR SOLUTION INTRAVENOUS at 05:05

## 2020-05-16 RX ADMIN — ASPIRIN 81 MG: 81 TABLET, COATED ORAL at 08:05

## 2020-05-16 RX ADMIN — CARVEDILOL 12.5 MG: 12.5 TABLET, FILM COATED ORAL at 08:05

## 2020-05-16 RX ADMIN — PIPERACILLIN SODIUM,TAZOBACTAM SODIUM 4.5 G: 4; .5 INJECTION, POWDER, FOR SOLUTION INTRAVENOUS at 08:05

## 2020-05-16 RX ADMIN — PANTOPRAZOLE SODIUM 40 MG: 40 TABLET, DELAYED RELEASE ORAL at 08:05

## 2020-05-16 RX ADMIN — THERA TABS 1 TABLET: TAB at 08:05

## 2020-05-16 RX ADMIN — FERROUS SULFATE TAB EC 325 MG (65 MG FE EQUIVALENT) 325 MG: 325 (65 FE) TABLET DELAYED RESPONSE at 08:05

## 2020-05-16 NOTE — PROGRESS NOTES
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  The patient was discharged yesterday after a 1 day admission for abdominal pain.  Patient initially presented for periumbilical abdominal pain with chills but reports that all these symptoms have completely subsided.  He has no current complaints this time.  He denies chills within last 2 days, fever, persistent abdominal pain, nausea/vomiting/diarrhea, pain or frequency with urination, chest pain, cough or shortness of breath. Lactate was 1.6 in the ED on today's admission.    Overview/Hospital Course:  Patient blood cultures returned positive for pseudomonas bacteremia. Afebrile with no new signs or symptoms overnight. Initiated on zosyn 4.5 g q8. ID consulted- switched patient to cefipime. Patient initial cultures from 5/11 now also growing gram positive cocci. Will transition back to zosyn and await speciation. Mid line consult placed to prepare for course of home IV antibiotics.  Awaiting speciation of GPC / strep in anaerobic blood culture. During admission, developed SARAH which responded well to IV fluids.    Interval History: Feels well today. Understands that he will have to stay hospitalized until we can determine what bacteria are growing in his blood. No complaints.    Review of Systems   Constitutional:  Negative for chills, diaphoresis, fatigue and fever.   HENT: Negative for sore throat and trouble swallowing.    Eyes: Negative for photophobia and visual disturbance.   Respiratory: Negative for cough, chest tightness and shortness of breath.    Cardiovascular: Negative for chest pain and palpitations.   Gastrointestinal: Negative for abdominal distention, abdominal pain, constipation and nausea.   Genitourinary: Negative for difficulty urinating and dysuria.   Musculoskeletal: Negative for arthralgias and myalgias.   Skin: Negative for pallor and rash.   Neurological: Negative for dizziness, speech difficulty, weakness, light-headedness and headaches.   Psychiatric/Behavioral: Negative for agitation, behavioral problems, confusion and decreased concentration.     Objective:     Vital Signs (Most Recent):  Temp: 97.4 °F (36.3 °C) (05/16/20 1200)  Pulse: 70 (05/16/20 1202)  Resp: 17 (05/16/20 1202)  BP: 139/77 (05/16/20 1200)  SpO2: 97 % (05/16/20 1202) Vital Signs (24h Range):  Temp:  [97.4 °F (36.3 °C)-98.6 °F (37 °C)] 97.4 °F (36.3 °C)  Pulse:  [69-70] 70  Resp:  [13-18] 17  SpO2:  [93 %-97 %] 97 %  BP: (106-144)/(59-85) 139/77     Weight: 61.5 kg (135 lb 9.3 oz)  Body mass index is 23.27 kg/m².    Physical Exam   Constitutional: He is oriented to person, place, and time. He appears well-developed and well-nourished. No distress.   HENT:   Head: Normocephalic and atraumatic.   Eyes: Pupils are equal, round, and reactive to light. EOM are normal.   Neck: Normal range of motion. Neck supple.   Cardiovascular: Normal rate and intact distal pulses.   Pulmonary/Chest: Effort normal and breath sounds normal. No respiratory distress. He exhibits no tenderness.   Abdominal: Soft. Bowel sounds are normal. There is no tenderness.   Musculoskeletal: Normal range of motion. He exhibits no edema or tenderness.   Neurological: He is alert and oriented to person, place, and time.   Skin: Skin is warm and dry. Capillary refill  takes less than 2 seconds. No pallor.   Psychiatric: He has a normal mood and affect.         CRANIAL NERVES     CN III, IV, VI   Pupils are equal, round, and reactive to light.  Extraocular motions are normal.        Significant Labs: All pertinent labs within the past 24 hours have been reviewed.    Significant Imaging: I have reviewed all pertinent imaging results/findings within the past 24 hours.      Assessment/Plan:      * Bacteremia due to Pseudomonas  Patient blood cultures from 5/11 positive for pseudomonas bacteremia.    Plan  -zosyn 4.5 mg IV q8  -ID consulted - switched patient to cefipime. Patient initial cultures from 5/11 now also growing gram positive cocci. Will transition back to zosyn and await speciation. Mid line consult placed to prepare for course of home IV antibiotics.      Gram-positive cocci bacteremia  Blood cultures from 5/11 now growing gram positive cocci in chains.    Plan  ID consulted- Will transition back to zosyn and await speciation. Mid line consult placed in preparation for course of home IV antibiotics.      Long term (current) use of anticoagulants  -Patient with long-term use of Eliquis      Persistent atrial fibrillation  S/p PPM 2016  CHADSVASC 5  On eliquis 2.5mg bid adjusted for age     - Continue eliquis  - Continue carvedilol  - Monitor for signs of bleeding      CAD (coronary artery disease)  -Continue home aspirin      Hypertension  -Cont home carvedilol, hold benazepril while SARAH      CKD (chronic kidney disease) stage 3, GFR 30-59 ml/min  Acute Kidney Injury    SCr 1.6- this is at his baseline     Volume-responsive SARAH in the setting of asymptomatic bacteremia, monitor daily, gentle hydration      VTE Risk Mitigation (From admission, onward)         Ordered     apixaban tablet 2.5 mg  2 times daily      05/12/20 1442     IP VTE HIGH RISK PATIENT  Once      05/12/20 1555                      Gil Denis MD  Department of Hospital Medicine   [facility]  [place]

## 2020-05-16 NOTE — PLAN OF CARE
Problem: Fall Injury Risk  Goal: Absence of Fall and Fall-Related Injury  Outcome: Ongoing, Progressing     Problem: Adult Inpatient Plan of Care  Goal: Plan of Care Review  Outcome: Ongoing, Progressing  Goal: Optimal Comfort and Wellbeing  Outcome: Ongoing, Progressing     Problem: Infection  Goal: Infection Symptom Resolution  Outcome: Ongoing, Progressing       Patient aaox4, visi in place with stable vitals. No sob or difficulty breathing on RA. Midline to RUE intact with no difficulty flushing. Dressing CD&I. Urinal emptied of 300cc clear yellow urine. No c/o pain or discomfort. Bed in low position with side rails up x2 and call light within reach. Will continue to monitor.

## 2020-05-16 NOTE — PROGRESS NOTES
[facility]  Infectious Disease  Progress Note    Patient Name: Deena Moody  MRN: [id number]  Admission Date: 5/12/2020  Length of Stay: 3 days  Attending Physician: Kelsey Hastings MD  Primary Care Provider: Jam Rowell MD    Isolation Status: No active isolations  Assessment/Plan:      * Bacteremia due to Pseudomonas     89 y/o male with A.fib, HTN, CKD3, CAD s/p CABG/AVR 2004, pacemaker implantation 9/2016 with hospital admission 5/11 for abdominal pain found to have fecal impaction presents with positive blood cultures with pseudomonas. He denies having any fever chills or night sweats. His abdominal pain has resolved. He denies having any dysuria. No cough, SOB, open wounds, prosthetic joints. No recent abd procedure. Pt enjoys outdoor yardwork, denies fishing/hunting or recent travel. Repeat blood cultures 5/12 NGTD.     He was admitted 5/11 for abdominal pain. He had elevated lactic acid level and elevated procal level. He underwent CT and was found to have sigmoid diverticulitis, cholelithiasis without cholecystitis and large stool burdon. His pain resolved after bowel movement.        IV antibiotics de-escalated 5/13 to cefepime with plan for 14 days, but blood cultures now showing GPC in chains resembling strep (ID remains pending), in addition to Pseudomonas.  I have called the micro lab who confirms GPC resembling strep in anaerobic bottle, but no ID pending as yet.  Need to subculture.       Patient remains afebrile, no leukocytosis, feels good.         Recommendations  1. Continue zosyn ( cover possible Enterococcus given abdominal complaints) 4.5 g IV q 8 hours (cr cl >20).    2. If discharge desired, patient can go home on IV zosyn, pending cultures, and ID can follow up in clinic next week for any antibiotic adjustment needed.  His repeat blood cultures have cleared.   Would need however, to confirm  infusion company will accept patient with extended infusion zosyn  or continuous infusion through midline (some HH and infusion companies require PICC rather than midline for EI or continuous zosyn).  Alternatively, can wait until we have definitive ID of isolate.   3. Anticipate 14 days of IV abx therapy (end date 5/26/2020)   4. Will need weekly cbc cmp sent to ID clinic at  attn Samia Rice NP.   5. Will follow cultures and follow up tomorrow.     For any questions or concerns over the weekend, please [place] BLAYNE Coleman[place] at [phone].     Data reviewed and plan discussed with ID staff  Discussed with Primary Team         Gram-positive cocci bacteremia  See assessment/plan above        Thank you.   Please call for any questions or concerns.  ARCENIO Parks, ANP-C  [phone] pager, [place] [phone]  Subjective:     Principal Problem:Bacteremia due to Pseudomonas    HPI: 88 y/o male with A.fib, HTN, CKD3, CAD s/p CABG/AVR 2004, pacemaker implantation 9/2016 with hospital admission 5/11 for abdominal pain found to have fecal impaction presents with positive blood cultures with pseudomonas. He denies having any fever chills or night sweats. His abdominal pain has resolved. He denies having any dysuria. No cough, SOB, open wounds, prosthetic joints. No recent abd procedure. Pt enjoys outdoor yardwork, denies fishing/hunting or recent travel. Repeat blood cultures 5/12 NGTD.     He was admitted 5/11 for abdominal pain. He had elevated lactic acid level and elevated procal level. He underwent CT and was found to have sigmoid diverticulitis, cholelithiasis without cholecystitis and large stool burdon. His pain resolved after bowel movement.     Interval History:  No acute events overnight. Still awaiting ID of GPC  Repeat blood cultures remain NGTD.  Feels good and anxious to go home.     Review of Systems   Constitutional: Negative for activity change, appetite change, chills, diaphoresis, fatigue and fever.   Respiratory: Negative for cough and shortness of breath.     Cardiovascular: Negative for chest pain.   Gastrointestinal: Negative for abdominal distention, abdominal pain, constipation, diarrhea, nausea and vomiting.   Genitourinary: Negative for difficulty urinating, dysuria and flank pain.   Musculoskeletal: Negative for back pain.   Skin: Negative for color change, pallor, rash and wound.   Neurological: Negative for dizziness and headaches.   Psychiatric/Behavioral: Negative for agitation and behavioral problems.   All other systems reviewed and are negative.    Objective:     Vital Signs (Most Recent):  Temp: 96.9 °F (36.1 °C) (05/15/20 0815)  Pulse: 70 (05/15/20 0815)  Resp: 17 (05/15/20 0815)  BP: (!) 110/59 (05/15/20 0815)  SpO2: 97 % (05/15/20 0815) Vital Signs (24h Range):  Temp:  [96.9 °F (36.1 °C)-98.3 °F (36.8 °C)] 96.9 °F (36.1 °C)  Pulse:  [70-80] 70  Resp:  [13-18] 17  SpO2:  [92 %-97 %] 97 %  BP: (104-126)/(53-68) 110/59     Weight: 61.5 kg (135 lb 9.3 oz)  Body mass index is 23.27 kg/m².    Estimated Creatinine Clearance: 23.3 mL/min (A) (based on SCr of 1.8 mg/dL (H)).    Physical Exam   Constitutional: He is oriented to person, place, and time. He appears well-developed and well-nourished. No distress.   HENT:   Head: Normocephalic and atraumatic.   Eyes: Conjunctivae are normal. No scleral icterus.   Neck: Normal range of motion.   Cardiovascular: Normal rate and regular rhythm. Exam reveals no gallop.   Pulmonary/Chest: Effort normal. No respiratory distress.   Abdominal: Soft. He exhibits no distension. There is no tenderness.   Musculoskeletal: Normal range of motion. He exhibits no edema or deformity.   Neurological: He is alert and oriented to person, place, and time. No cranial nerve deficit.   Skin: Skin is warm and dry. No rash noted. He is not diaphoretic. No erythema.   Psychiatric: He has a normal mood and affect. His behavior is normal.   Vitals reviewed.      Significant Labs:   Blood Culture:   Recent Labs   Lab 05/11/20  0015  05/11/20  0016 05/12/20  1326 05/12/20  1332   LABBLOO Gram stain aer bottle: Gram negative rods  05/12/2020  10:06   Positive results previously called  Gram stain maribel bottle: Gram positive cocci in chains resembling Strep  Results called to and read back by:Palmira Tinajero RN 05/13/2020  17:02  PSEUDOMONAS AERUGINOSA  For susceptibility see order #[id number]  * Gram stain aer bottle: Gram negative rods   Results called to and read back by: Oswaldo Jacome RN  05/11/2020    20:21  PSEUDOMONAS AERUGINOSA* No Growth to date  No Growth to date  No Growth to date No Growth to date  No Growth to date  No Growth to date     CBC:   Recent Labs   Lab 05/14/20  0420 05/15/20  0416   WBC 8.21 6.83   HGB 12.8* 12.2*   HCT 39.7* 38.2*   * 125*     CMP:   Recent Labs   Lab 05/14/20  0420 05/15/20  0416    144   K 3.7 3.4*    105   CO2 25 28   GLU 86 95   BUN 25* 29*   CREATININE 1.6* 1.8*   CALCIUM 8.7 8.1*   PROT 6.8 6.7   ALBUMIN 3.1* 2.8*   BILITOT 1.2* 1.1*   ALKPHOS 125 108   AST 21 18   ALT 8* 9*   ANIONGAP 12 11   EGFRNONAA 37.6* 32.6*     Urine Culture: No results for input(s): LABURIN in the last 4320 hours.  Urine Studies:   Recent Labs   Lab 05/12/20  1355   COLORU Yellow   APPEARANCEUA Clear   PHUR 5.0   SPECGRAV 1.015   PROTEINUA Negative   GLUCUA Negative   KETONESU Negative   BILIRUBINUA Negative   OCCULTUA 2+*   NITRITE Negative   LEUKOCYTESUR Negative   RBCUA 14*   WBCUA 4     Wound Culture: No results for input(s): LABAERO in the last 4320 hours.    Significant Imaging: I have reviewed all pertinent imaging results/findings within the past 24 hours.

## 2020-05-16 NOTE — SUBJECTIVE & OBJECTIVE
Interval History: Feels well today. Understands that he will have to stay hospitalized until we can determine what bacteria are growing in his blood. No complaints.    Review of Systems   Constitutional: Negative for chills, diaphoresis, fatigue and fever.   HENT: Negative for sore throat and trouble swallowing.    Eyes: Negative for photophobia and visual disturbance.   Respiratory: Negative for cough, chest tightness and shortness of breath.    Cardiovascular: Negative for chest pain and palpitations.   Gastrointestinal: Negative for abdominal distention, abdominal pain, constipation and nausea.   Genitourinary: Negative for difficulty urinating and dysuria.   Musculoskeletal: Negative for arthralgias and myalgias.   Skin: Negative for pallor and rash.   Neurological: Negative for dizziness, speech difficulty, weakness, light-headedness and headaches.   Psychiatric/Behavioral: Negative for agitation, behavioral problems, confusion and decreased concentration.     Objective:     Vital Signs (Most Recent):  Temp: 97.4 °F (36.3 °C) (05/16/20 1200)  Pulse: 70 (05/16/20 1202)  Resp: 17 (05/16/20 1202)  BP: 139/77 (05/16/20 1200)  SpO2: 97 % (05/16/20 1202) Vital Signs (24h Range):  Temp:  [97.4 °F (36.3 °C)-98.6 °F (37 °C)] 97.4 °F (36.3 °C)  Pulse:  [69-70] 70  Resp:  [13-18] 17  SpO2:  [93 %-97 %] 97 %  BP: (106-144)/(59-85) 139/77     Weight: 61.5 kg (135 lb 9.3 oz)  Body mass index is 23.27 kg/m².    Physical Exam   Constitutional: He is oriented to person, place, and time. He appears well-developed and well-nourished. No distress.   HENT:   Head: Normocephalic and atraumatic.   Eyes: Pupils are equal, round, and reactive to light. EOM are normal.   Neck: Normal range of motion. Neck supple.   Cardiovascular: Normal rate and intact distal pulses.   Pulmonary/Chest: Effort normal and breath sounds normal. No respiratory distress. He exhibits no tenderness.   Abdominal: Soft. Bowel sounds are normal. There is no  tenderness.   Musculoskeletal: Normal range of motion. He exhibits no edema or tenderness.   Neurological: He is alert and oriented to person, place, and time.   Skin: Skin is warm and dry. Capillary refill takes less than 2 seconds. No pallor.   Psychiatric: He has a normal mood and affect.         CRANIAL NERVES     CN III, IV, VI   Pupils are equal, round, and reactive to light.  Extraocular motions are normal.        Significant Labs: All pertinent labs within the past 24 hours have been reviewed.    Significant Imaging: I have reviewed all pertinent imaging results/findings within the past 24 hours.

## 2020-05-16 NOTE — ASSESSMENT & PLAN NOTE
Acute Kidney Injury    SCr 1.6- this is at his baseline     Volume-responsive SARAH in the setting of asymptomatic bacteremia, monitor daily, gentle hydration

## 2020-05-16 NOTE — PLAN OF CARE
Pt remain free from fall and injuries. Ambulated in the room, denies SOB and pain, voiding freely in the urinal. VSS. Safety maintained. Will continue  monitor.

## 2020-05-17 VITALS
BODY MASS INDEX: 23.14 KG/M2 | WEIGHT: 135.56 LBS | SYSTOLIC BLOOD PRESSURE: 185 MMHG | TEMPERATURE: 97 F | HEART RATE: 70 BPM | OXYGEN SATURATION: 97 % | HEIGHT: 64 IN | RESPIRATION RATE: 18 BRPM | DIASTOLIC BLOOD PRESSURE: 85 MMHG

## 2020-05-17 LAB
ALBUMIN SERPL BCP-MCNC: 2.8 G/DL (ref 3.5–5.2)
ALP SERPL-CCNC: 90 U/L (ref 55–135)
ALT SERPL W/O P-5'-P-CCNC: 10 U/L (ref 10–44)
ANION GAP SERPL CALC-SCNC: 9 MMOL/L (ref 8–16)
AST SERPL-CCNC: 18 U/L (ref 10–40)
BACTERIA BLD CULT: ABNORMAL
BASOPHILS # BLD AUTO: 0.07 K/UL (ref 0–0.2)
BASOPHILS NFR BLD: 0.9 % (ref 0–1.9)
BILIRUB SERPL-MCNC: 0.7 MG/DL (ref 0.1–1)
BUN SERPL-MCNC: 25 MG/DL (ref 8–23)
CALCIUM SERPL-MCNC: 8 MG/DL (ref 8.7–10.5)
CHLORIDE SERPL-SCNC: 110 MMOL/L (ref 95–110)
CO2 SERPL-SCNC: 24 MMOL/L (ref 23–29)
CREAT SERPL-MCNC: 1.4 MG/DL (ref 0.5–1.4)
DIFFERENTIAL METHOD: ABNORMAL
EOSINOPHIL # BLD AUTO: 0.6 K/UL (ref 0–0.5)
EOSINOPHIL NFR BLD: 8.3 % (ref 0–8)
ERYTHROCYTE [DISTWIDTH] IN BLOOD BY AUTOMATED COUNT: 12.8 % (ref 11.5–14.5)
EST. GFR  (AFRICAN AMERICAN): 51.1 ML/MIN/1.73 M^2
EST. GFR  (NON AFRICAN AMERICAN): 44.2 ML/MIN/1.73 M^2
GLUCOSE SERPL-MCNC: 76 MG/DL (ref 70–110)
HCT VFR BLD AUTO: 38.6 % (ref 40–54)
HGB BLD-MCNC: 12.3 G/DL (ref 14–18)
IMM GRANULOCYTES # BLD AUTO: 0.03 K/UL (ref 0–0.04)
IMM GRANULOCYTES NFR BLD AUTO: 0.4 % (ref 0–0.5)
LYMPHOCYTES # BLD AUTO: 1.7 K/UL (ref 1–4.8)
LYMPHOCYTES NFR BLD: 22.2 % (ref 18–48)
MAGNESIUM SERPL-MCNC: 2 MG/DL (ref 1.6–2.6)
MCH RBC QN AUTO: 32.2 PG (ref 27–31)
MCHC RBC AUTO-ENTMCNC: 31.9 G/DL (ref 32–36)
MCV RBC AUTO: 101 FL (ref 82–98)
MONOCYTES # BLD AUTO: 0.6 K/UL (ref 0.3–1)
MONOCYTES NFR BLD: 7.3 % (ref 4–15)
NEUTROPHILS # BLD AUTO: 4.7 K/UL (ref 1.8–7.7)
NEUTROPHILS NFR BLD: 60.9 % (ref 38–73)
NRBC BLD-RTO: 0 /100 WBC
PHOSPHATE SERPL-MCNC: 2.1 MG/DL (ref 2.7–4.5)
PLATELET # BLD AUTO: 132 K/UL (ref 150–350)
PMV BLD AUTO: 9.7 FL (ref 9.2–12.9)
POTASSIUM SERPL-SCNC: 3.6 MMOL/L (ref 3.5–5.1)
PROT SERPL-MCNC: 6.7 G/DL (ref 6–8.4)
RBC # BLD AUTO: 3.82 M/UL (ref 4.6–6.2)
SODIUM SERPL-SCNC: 143 MMOL/L (ref 136–145)
WBC # BLD AUTO: 7.67 K/UL (ref 3.9–12.7)

## 2020-05-17 PROCEDURE — 94761 N-INVAS EAR/PLS OXIMETRY MLT: CPT | Mod: HCNC

## 2020-05-17 PROCEDURE — 99238 HOSP IP/OBS DSCHRG MGMT 30/<: CPT | Mod: HCNC,GC,, | Performed by: HOSPITALIST

## 2020-05-17 PROCEDURE — 99233 SBSQ HOSP IP/OBS HIGH 50: CPT | Mod: HCNC,,, | Performed by: PHYSICIAN ASSISTANT

## 2020-05-17 PROCEDURE — 83735 ASSAY OF MAGNESIUM: CPT | Mod: HCNC

## 2020-05-17 PROCEDURE — 84100 ASSAY OF PHOSPHORUS: CPT | Mod: HCNC

## 2020-05-17 PROCEDURE — 25000003 PHARM REV CODE 250: Mod: HCNC | Performed by: STUDENT IN AN ORGANIZED HEALTH CARE EDUCATION/TRAINING PROGRAM

## 2020-05-17 PROCEDURE — 99233 PR SUBSEQUENT HOSPITAL CARE,LEVL III: ICD-10-PCS | Mod: HCNC,,, | Performed by: PHYSICIAN ASSISTANT

## 2020-05-17 PROCEDURE — 99238 PR HOSPITAL DISCHARGE DAY,<30 MIN: ICD-10-PCS | Mod: HCNC,GC,, | Performed by: HOSPITALIST

## 2020-05-17 PROCEDURE — 36415 COLL VENOUS BLD VENIPUNCTURE: CPT | Mod: HCNC

## 2020-05-17 PROCEDURE — 25000003 PHARM REV CODE 250: Mod: HCNC | Performed by: NURSE PRACTITIONER

## 2020-05-17 PROCEDURE — 85025 COMPLETE CBC W/AUTO DIFF WBC: CPT | Mod: HCNC

## 2020-05-17 PROCEDURE — 63600175 PHARM REV CODE 636 W HCPCS: Mod: HCNC | Performed by: NURSE PRACTITIONER

## 2020-05-17 PROCEDURE — 80053 COMPREHEN METABOLIC PANEL: CPT | Mod: HCNC

## 2020-05-17 RX ORDER — HYDRALAZINE HYDROCHLORIDE 10 MG/1
10 TABLET, FILM COATED ORAL ONCE
Status: COMPLETED | OUTPATIENT
Start: 2020-05-17 | End: 2020-05-17

## 2020-05-17 RX ADMIN — HYDRALAZINE HYDROCHLORIDE 10 MG: 10 TABLET, FILM COATED ORAL at 12:05

## 2020-05-17 RX ADMIN — THERA TABS 1 TABLET: TAB at 08:05

## 2020-05-17 RX ADMIN — APIXABAN 2.5 MG: 2.5 TABLET, FILM COATED ORAL at 08:05

## 2020-05-17 RX ADMIN — CARVEDILOL 12.5 MG: 12.5 TABLET, FILM COATED ORAL at 08:05

## 2020-05-17 RX ADMIN — PIPERACILLIN SODIUM,TAZOBACTAM SODIUM 4.5 G: 4; .5 INJECTION, POWDER, FOR SOLUTION INTRAVENOUS at 01:05

## 2020-05-17 RX ADMIN — PANTOPRAZOLE SODIUM 40 MG: 40 TABLET, DELAYED RELEASE ORAL at 08:05

## 2020-05-17 RX ADMIN — CARVEDILOL 12.5 MG: 12.5 TABLET, FILM COATED ORAL at 04:05

## 2020-05-17 RX ADMIN — ASPIRIN 81 MG: 81 TABLET, COATED ORAL at 08:05

## 2020-05-17 RX ADMIN — PIPERACILLIN SODIUM,TAZOBACTAM SODIUM 4.5 G: 4; .5 INJECTION, POWDER, FOR SOLUTION INTRAVENOUS at 04:05

## 2020-05-17 RX ADMIN — FERROUS SULFATE TAB EC 325 MG (65 MG FE EQUIVALENT) 325 MG: 325 (65 FE) TABLET DELAYED RESPONSE at 08:05

## 2020-05-17 NOTE — NURSING
Patient aaox3, free from falls and injuries during shift. IV abx infusing to midline in rue. Dressing CD&I, no redness, swelling, or drainage at site. Urinal at bedside for voiding. Once IV finishes infusion patient will be discharged to home with daughter. Patient and daughter informed. CM contacted daughter for medication home delivery. No concerns or requests voiced at the moment. Bed in low position with side rails up x2 and call light within reach. Will continue to monitor.

## 2020-05-17 NOTE — PLAN OF CARE
CM updated by MD that ID recs are final and pt can d/c to home w/ HH and IV abx. CM contacted [facility] to f/u on referral - teaching complete and pt's meds will be delivered to his home for 9pm dose. Pt's HH is w/ [facility]. All d/c plans are completed and pt can d/c. CM relayed info to pt's nurse.    Tico MONROY [id number]     05/17/20 1517   Final Note   Assessment Type Final Discharge Note   Anticipated Discharge Disposition Home-Health   Right Care Referral Info   Post Acute Recommendation Home-care   Post-Acute Status   Post-Acute Authorization Home Health;Medications   Home Health Status Set-up Complete   Medication Status Set-up Complete   Discharge Delays None known at this time

## 2020-05-17 NOTE — NURSING
Pt asymptomatic, resting in his bed. Denies any pain and discomfort. Bp 185/80, Md notified and received verbal order to give Hydralazine 10 mg once. Will administer and monitor.

## 2020-05-17 NOTE — PLAN OF CARE
Pt remain free from fall and injuries. Ambulated in the room, denies SOB and pain, voiding freely in the urinal. BP remain elevated. Pt asymptomatic.  Team aware. Frequent BP updated in flow sheet.  Safety maintained. Will continue  monitor.

## 2020-05-17 NOTE — ASSESSMENT & PLAN NOTE
88 y/o male with A.fib, HTN, CKD3, CAD s/p CABG/AVR 2004, pacemaker implantation 9/2016 with hospital admission 5/11 for abdominal pain found to have fecal impaction presents with positive blood cultures with pseudomonas. He denies having any fever chills or night sweats. His abdominal pain has resolved. He denies having any dysuria. No cough, SOB, open wounds, prosthetic joints. No recent abd procedure. Pt enjoys outdoor yardwork, denies fishing/hunting or recent travel. Repeat blood cultures 5/12 NGTD.     He was admitted 5/11 for abdominal pain. He had elevated lactic acid level and elevated procal level. He underwent CT and was found to have sigmoid diverticulitis, cholelithiasis without cholecystitis and large stool burdon. His pain resolved after bowel movement.        IV antibiotics de-escalated 5/13 to cefepime with plan for 14 days, but blood cultures now showing GPC in chains resembling strep (ID remains pending), in addition to Pseudomonas.  Micro lab who confirms Parvimonas micra in anaerobic bottle.  ? Transient bacteremia as fu BCXs are neg.        Patient remains afebrile, no leukocytosis, feels good and ready to go home.        Recommendations  1. Continue zosyn ( cover for pseudomonas and parvimonas on cultures) 4.5 g IV q 8 hours (cr cl >20).    2. If discharge desired, patient can go home on IV zosyn, pending cultures, and ID can follow up in clinic next week for any antibiotic adjustment needed.  His repeat blood cultures have cleared.   Would need however, to confirm  infusion company will accept patient with extended infusion zosyn or continuous infusion through midline (some HH and infusion companies require PICC rather than midline for EI or continuous zosyn).     3. Anticipate 14 days of IV abx therapy (end date 5/27/2020)   4. Will need weekly cbc cmp sent to ID clinic at  attn Samia Rice NP.   5. Consider surveillance BCXs to ensure clearance as source unknown    Data  reviewed and plan discussed with ID staff  Discussed with Primary Team

## 2020-05-17 NOTE — PROGRESS NOTES
[facility]  Infectious Disease  Progress Note    Patient Name: Deena Moody  MRN: [id number]  Admission Date: 5/12/2020  Length of Stay: 5 days  Attending Physician: Lois Jackson MD  Primary Care Provider: Jam Rowell MD    Isolation Status: No active isolations  Assessment/Plan:      * Bacteremia due to Pseudomonas     88 y/o male with A.fib, HTN, CKD3, CAD s/p CABG/AVR 2004, pacemaker implantation 9/2016 with hospital admission 5/11 for abdominal pain found to have fecal impaction presents with positive blood cultures with pseudomonas. He denies having any fever chills or night sweats. His abdominal pain has resolved. He denies having any dysuria. No cough, SOB, open wounds, prosthetic joints. No recent abd procedure. Pt enjoys outdoor yardwork, denies fishing/hunting or recent travel. Repeat blood cultures 5/12 NGTD.     He was admitted 5/11 for abdominal pain. He had elevated lactic acid level and elevated procal level. He underwent CT and was found to have sigmoid diverticulitis, cholelithiasis without cholecystitis and large stool burdon. His pain resolved after bowel movement.        IV antibiotics de-escalated 5/13 to cefepime with plan for 14 days, but blood cultures now showing GPC in chains resembling strep (ID remains pending), in addition to Pseudomonas.  Micro lab who confirms Parvimonas micra in anaerobic bottle.  ? Transient bacteremia as fu BCXs are neg.        Patient remains afebrile, no leukocytosis, feels good and ready to go home.        Recommendations  1. Continue zosyn ( cover for pseudomonas and parvimonas on cultures) 4.5 g IV q 8 hours (cr cl >20).    2. If discharge desired, patient can go home on IV zosyn, pending cultures, and ID can follow up in clinic next week for any antibiotic adjustment needed.  His repeat blood cultures have cleared.   Would need however, to confirm  infusion company will accept patient with extended infusion zosyn or  continuous infusion through midline (some HH and infusion companies require PICC rather than midline for EI or continuous zosyn).  Alternatively, can wait until we have definitive ID of isolate.   3. Anticipate 14 days of IV abx therapy (end date 5/27/2020)   4. Will need weekly cbc cmp sent to ID clinic at  attn Samia Rice NP.   5. Consider surveillance BCXs to ensure clearance as source unknown though likely gut source given GI issues    Data reviewed and plan discussed with ID staff  Discussed with Primary Team         Gram-positive cocci bacteremia  See assessment/plan above        Anticipated Disposition: tbd    Thank you for your consult. I will sign off. Please contact us if you have any additional questions.    BLAYNE Roberson  Infectious Disease  [facility]    Subjective:     Principal Problem:Bacteremia due to Pseudomonas    HPI: 88 y/o male with A.fib, HTN, CKD3, CAD s/p CABG/AVR 2004, pacemaker implantation 9/2016 with hospital admission 5/11 for abdominal pain found to have fecal impaction presents with positive blood cultures with pseudomonas. He denies having any fever chills or night sweats. His abdominal pain has resolved. He denies having any dysuria. No cough, SOB, open wounds, prosthetic joints. No recent abd procedure. Pt enjoys outdoor yardwork, denies fishing/hunting or recent travel. Repeat blood cultures 5/12 NGTD.     He was admitted 5/11 for abdominal pain. He had elevated lactic acid level and elevated procal level. He underwent CT and was found to have sigmoid diverticulitis, cholelithiasis without cholecystitis and large stool burdon. His pain resolved after bowel movement.     Interval History:    No acute events overnight.   GPC on blood cultures show parvimonas micra  Repeat blood cultures remain NGTD.    Feels good and desires to go home.     Review of Systems   Constitutional: Negative for activity change, appetite change, chills,  diaphoresis, fatigue and fever.   Respiratory: Negative for cough and shortness of breath.    Cardiovascular: Negative for chest pain.   Gastrointestinal: Negative for abdominal distention, abdominal pain, constipation, diarrhea, nausea and vomiting.   Genitourinary: Negative for difficulty urinating, dysuria and flank pain.   Musculoskeletal: Negative for back pain.   Skin: Negative for color change, pallor, rash and wound.   Neurological: Negative for dizziness and headaches.   Psychiatric/Behavioral: Negative for agitation and behavioral problems.   All other systems reviewed and are negative.    Objective:     Vital Signs (Most Recent):  Temp: 97.2 °F (36.2 °C) (05/17/20 0800)  Pulse: 70 (05/17/20 0800)  Resp: 18 (05/17/20 0800)  BP: (!) 177/79 (05/17/20 0800)  SpO2: (!) 92 % (05/17/20 0800) Vital Signs (24h Range):  Temp:  [97.2 °F (36.2 °C)-98.4 °F (36.9 °C)] 97.2 °F (36.2 °C)  Pulse:  [70] 70  Resp:  [14-20] 18  SpO2:  [92 %-97 %] 92 %  BP: (132-187)/(73-84) 177/79     Weight: 61.5 kg (135 lb 9.3 oz)  Body mass index is 23.27 kg/m².    Estimated Creatinine Clearance: 30 mL/min (based on SCr of 1.4 mg/dL).    Physical Exam   Constitutional: He is oriented to person, place, and time. He appears well-developed and well-nourished. No distress.   HENT:   Head: Normocephalic and atraumatic.   Eyes: Conjunctivae are normal. No scleral icterus.   Neck: Normal range of motion.   Cardiovascular: Normal rate and regular rhythm. Exam reveals no gallop.   Pulmonary/Chest: Effort normal. No respiratory distress.   Abdominal: Soft. He exhibits no distension. There is no tenderness.   Musculoskeletal: Normal range of motion. He exhibits no edema or deformity.   Neurological: He is alert and oriented to person, place, and time. No cranial nerve deficit.   Skin: Skin is warm and dry. No rash noted. He is not diaphoretic. No erythema.   Psychiatric: He has a normal mood and affect. His behavior is normal.   Vitals  reviewed.      Significant Labs:   Blood Culture:   Recent Labs   Lab 05/11/20  0015 05/11/20  0016 05/12/20  1326 05/12/20  1332   LABBLOO Gram stain aer bottle: Gram negative rods  05/12/2020  10:06   Positive results previously called  Gram stain maribel bottle: Gram positive cocci in chains resembling Strep  Results called to and read back by:Palmira Tinajero RN 05/13/2020  17:02  PSEUDOMONAS AERUGINOSA  For susceptibility see order #[id number]  *  PARVIMONAS MICRA* Gram stain aer bottle: Gram negative rods   Results called to and read back by: Oswaldo Jacome RN  05/11/2020    20:21  PSEUDOMONAS AERUGINOSA* No Growth after 4 days.  No Growth after 4 days.      CBC:   Recent Labs   Lab 05/16/20  0325 05/17/20  0412   WBC 7.29 7.67   HGB 12.4* 12.3*   HCT 39.2* 38.6*   * 132*     CMP:   Recent Labs   Lab 05/16/20 0325 05/17/20  0412    143   K 3.6 3.6    110   CO2 27 24   GLU 95 76   BUN 27* 25*   CREATININE 1.6* 1.4   CALCIUM 8.1* 8.0*   PROT 6.6 6.7   ALBUMIN 2.8* 2.8*   BILITOT 0.8 0.7   ALKPHOS 105 90   AST 19 18   ALT 9* 10   ANIONGAP 9 9   EGFRNONAA 37.6* 44.2*     Urine Culture: No results for input(s): LABURIN in the last 4320 hours.  Urine Studies:   Recent Labs   Lab 05/12/20  1355   COLORU Yellow   APPEARANCEUA Clear   PHUR 5.0   SPECGRAV 1.015   PROTEINUA Negative   GLUCUA Negative   KETONESU Negative   BILIRUBINUA Negative   OCCULTUA 2+*   NITRITE Negative   LEUKOCYTESUR Negative   RBCUA 14*   WBCUA 4     Wound Culture: No results for input(s): LABAERO in the last 4320 hours.    Significant Imaging: I have reviewed all pertinent imaging results/findings within the past 24 hours.   X-Ray Chest AP Portable [[id number]] Resulted: 05/12/20 1426   Order Status: Completed Updated: 05/12/20 1429   Narrative:     EXAMINATION:  XR CHEST AP PORTABLE    CLINICAL HISTORY:  bacteremia;    TECHNIQUE:  Single frontal view of the chest was performed.    COMPARISON:  12/14/2018.   09/11/2017.    FINDINGS:  Status post remote aortic valve replacement.  Sternal wires are intact and aligned.  Pacer and 2 transvenous leads remain in their usual locations.    Allowing for dextrocurvature of the thoracic spine, mediastinal structures are midline.  Calcific plaque noted at the level of the arch in this 89-year-old man.    There is mild blunting of the right lateral costophrenic angle, new since the most recent chest radiograph dated December 2018.  Differential diagnosis includes a small amount of dependent right pleural fluid, parenchymal opacity of the lung adjacent to the right hemidiaphragm as might occur with atelectasis, aspiration, pneumonia, neoplasm or combination of these factors, or both pulmonary and pleural disease in this patient with a history of bacteremia.    I detect no other significant change in the appearance of the lungs compared to December 2018.   Impression:       Please see above.      Electronically signed by: Nanyc Anderson MD  Date: 05/12/2020  Time: 14:26   Imaging History     2020  Date Procedure Name PACS Link Status Accession Number Location   05/12/20 02:10 PM X-Ray Chest AP Portable  Images Final [id number] [facility]   05/11/20 01:10 AM CT Abdomen Pelvis  Without Contrast  Images Final [id number] [facility]   05/10/20 12:00 AM CARDIAC MONITORING STRIPS  Final

## 2020-05-17 NOTE — PLAN OF CARE
[facility]    HOME HEALTH ORDERS  FACE TO FACE ENCOUNTER    Patient Name: Deena Moody  YOB: 1930    PCP: Jam Rowell MD   PCP Address: [address] / [place] [address]  PCP Phone Number: [phone]  PCP Fax: [phone]    Encounter Date: 05/17/2020    Admit to Home Health    Diagnoses:  Active Hospital Problems    Diagnosis  POA    *Bacteremia due to Pseudomonas [R78.81]  Yes    Gram-positive cocci bacteremia [R78.81]  Yes    Long term (current) use of anticoagulants [Z79.01]  Not Applicable    Persistent atrial fibrillation [I48.19]  Yes    CAD (coronary artery disease) [I25.10]  Yes    Hypertension [I10]  Yes    CKD (chronic kidney disease) stage 3, GFR 30-59 ml/min [N18.3]  Yes      Resolved Hospital Problems   No resolved problems to display.       Future Appointments   Date Time Provider Department Center   5/25/2020  8:30 AM Wander Aguilar MD [facility] ID [address]   7/23/2020 10:00 AM HOME MONITOR DEVICE CHECK, [facility] [address]           I have seen and examined this patient face to face today. My clinical findings that support the need for the home health skilled services and home bound status are the following:  Weakness/numbness causing balance and gait disturbance due to IV antibiotics making it taxing to leave home.  Patient with medication mismanagement issues requiring home bound status as evidenced by  IV antibiotics.    Allergies:  Review of patient's allergies indicates:   Allergen Reactions    Iodine and iodide containing products Other (See Comments)     Caused changes in skin color       Diet: renal diet    Activities: activity as tolerated    Nursing:   SN to complete comprehensive assessment including routine vital signs. Instruct on disease process and s/s of complications to report to MD. Review/verify medication list sent home with the patient at time of discharge  and instruct patient/caregiver as needed. Frequency  may be adjusted depending on start of care date.    Notify MD if SBP > 160 or < 90; DBP > 90 or < 50; HR > 120 or < 50; Temp > 101      CONSULTS:    Physical Therapy to evaluate and treat. Evaluate for home safety and equipment needs; Establish/upgrade home exercise program. Perform / instruct on therapeutic exercises, gait training, transfer training, and Range of Motion.  Occupational Therapy to evaluate and treat. Evaluate home environment for safety and equipment needs. Perform/Instruct on transfers, ADL training, ROM, and therapeutic exercises.  Aide to provide assistance with personal care, ADLs, and vital signs.    MISCELLANEOUS CARE:  Home Infusion Therapy:   SN to perform Infusion Therapy/Central Line Care.  Review Central Line Care & Central Line Flush with patient.    Administer (drug and dose): Zosyn IV 4.5g q8  Last dose given: 5/17 @1317                        Home dose due: 5/17/2100    Scrub the Hub: Prior to accessing the line, always perform a 30 second alcohol scrub  Each lumen of the central line is to be flushed at least daily with 10 mL Normal Saline and 3 mL Heparin flush (10 units/mL)  Skilled Nurse (SN) may draw blood from IV access  Blood Draw Procedure:   - Aspirate at least 5 mL of blood   - Discard   - Obtain specimen   - Change injection cap   - Flush with 20 mL Normal Saline followed by a                 3-5 mL Heparin flush (10 units/mL)  Central :   - Sterile dressing changes are done weekly and as needed.   - Use chlor-hexadine scrub to cleanse site, apply Biopatch to insertion site,       apply securement device dressing   - Injection caps are changed weekly and after EVERY lab draw.   - If sterile gauze is under dressing to control oozing,                 dressing change must be performed every 24 hours until gauze is not needed.    WOUND CARE ORDERS  n/a    Medications: Review discharge medications with patient and family and provide education.      Current  Discharge Medication List      START taking these medications    Details   piperacillin sodium/tazobactam (PIPERACILLIN-TAZOBACTAM 4.5G/100ML SODIUM CHLORIDE 0.9%-READY TO MIX) Inject 100 mLs (4.5 g total) into the vein every 8 (eight) hours. for 10 days; end date of 5/27/20  Qty: 3000 mL, Refills: 0         CONTINUE these medications which have NOT CHANGED    Details   apixaban (ELIQUIS) 2.5 mg Tab Take 1 tablet (2.5 mg total) by mouth 2 (two) times daily.  Qty: 180 tablet, Refills: 3    Associated Diagnoses: Persistent atrial fibrillation      aspirin (ECOTRIN) 81 MG EC tablet Take 1 tablet (81 mg total) by mouth once daily.  Qty: 90 tablet, Refills: 3    Associated Diagnoses: Dyslipidemia      benazepril (LOTENSIN) 5 MG tablet Take 1 tablet (5 mg total) by mouth once daily.  Qty: 90 tablet, Refills: 3      carvedilol (COREG) 12.5 MG tablet Take 1 tablet (12.5 mg total) by mouth 2 (two) times daily with meals.  Qty: 180 tablet, Refills: 3      omeprazole (PRILOSEC) 20 MG capsule Take 1 capsule (20 mg total) by mouth daily as needed (gastritis).  Qty: 90 capsule, Refills: 3      potassium chloride (KLOR-CON) 8 MEQ TbSR Take 1 tablet (8 mEq total) by mouth every other day.  Qty: 45 tablet, Refills: 3    Associated Diagnoses: CKD (chronic kidney disease) stage 3, GFR 30-59 ml/min      torsemide (DEMADEX) 10 MG Tab Take 1 tablet (10 mg total) by mouth once daily. Two 10 mg tablets by mouth every other day.  Qty: 90 tablet, Refills: 3      ferrous sulfate (FEOSOL) 325 mg (65 mg iron) Tab tablet TAKE 1 TABLET DAILY WITH BREAKFAST  Qty: 90 tablet, Refills: 3    Associated Diagnoses: Chronic disease anemia      multivitamin (MULTIVITAMIN) per tablet Take 1 tablet by mouth once daily.        nitroGLYCERIN (NITROSTAT) 0.4 MG SL tablet Take one tablet every 5 minutes for chest pain. After the third tablet go to the ED.  Qty: 25 tablet, Refills: 4      polyethylene glycol (GLYCOLAX) 17 gram/dose powder Dissolve one capful (17  g) in liquid by mouth 3 (three) times daily as needed. Take 1 three times daily until well formed stool  Qty: 510 g, Refills: 0             I certify that this patient is confined to his home and needs intermittent skilled nursing care.

## 2020-05-17 NOTE — NURSING
Pt BP manual 174/78, asymptomatic. IM 5 contacted, per team no new order at this time, monitor for now, primary team will look into in  AM.

## 2020-05-17 NOTE — SUBJECTIVE & OBJECTIVE
Interval History:    No acute events overnight.   GPC on blood cultures show parvimonas micra  Repeat blood cultures remain NGTD.    Feels good and desires to go home.     Review of Systems   Constitutional: Negative for activity change, appetite change, chills, diaphoresis, fatigue and fever.   Respiratory: Negative for cough and shortness of breath.    Cardiovascular: Negative for chest pain.   Gastrointestinal: Negative for abdominal distention, abdominal pain, constipation, diarrhea, nausea and vomiting.   Genitourinary: Negative for difficulty urinating, dysuria and flank pain.   Musculoskeletal: Negative for back pain.   Skin: Negative for color change, pallor, rash and wound.   Neurological: Negative for dizziness and headaches.   Psychiatric/Behavioral: Negative for agitation and behavioral problems.   All other systems reviewed and are negative.    Objective:     Vital Signs (Most Recent):  Temp: 97.2 °F (36.2 °C) (05/17/20 0800)  Pulse: 70 (05/17/20 0800)  Resp: 18 (05/17/20 0800)  BP: (!) 177/79 (05/17/20 0800)  SpO2: (!) 92 % (05/17/20 0800) Vital Signs (24h Range):  Temp:  [97.2 °F (36.2 °C)-98.4 °F (36.9 °C)] 97.2 °F (36.2 °C)  Pulse:  [70] 70  Resp:  [14-20] 18  SpO2:  [92 %-97 %] 92 %  BP: (132-187)/(73-84) 177/79     Weight: 61.5 kg (135 lb 9.3 oz)  Body mass index is 23.27 kg/m².    Estimated Creatinine Clearance: 30 mL/min (based on SCr of 1.4 mg/dL).    Physical Exam   Constitutional: He is oriented to person, place, and time. He appears well-developed and well-nourished. No distress.   HENT:   Head: Normocephalic and atraumatic.   Eyes: Conjunctivae are normal. No scleral icterus.   Neck: Normal range of motion.   Cardiovascular: Normal rate and regular rhythm. Exam reveals no gallop.   Pulmonary/Chest: Effort normal. No respiratory distress.   Abdominal: Soft. He exhibits no distension. There is no tenderness.   Musculoskeletal: Normal range of motion. He exhibits no edema or deformity.    Neurological: He is alert and oriented to person, place, and time. No cranial nerve deficit.   Skin: Skin is warm and dry. No rash noted. He is not diaphoretic. No erythema.   Psychiatric: He has a normal mood and affect. His behavior is normal.   Vitals reviewed.      Significant Labs:   Blood Culture:   Recent Labs   Lab 05/11/20  0015 05/11/20  0016 05/12/20  1326 05/12/20  1332   LABBLOO Gram stain aer bottle: Gram negative rods  05/12/2020  10:06   Positive results previously called  Gram stain maribel bottle: Gram positive cocci in chains resembling Strep  Results called to and read back by:Palmira Tinajero RN 05/13/2020  17:02  PSEUDOMONAS AERUGINOSA  For susceptibility see order #[id number]  *  PARVIMONAS MICRA* Gram stain aer bottle: Gram negative rods   Results called to and read back by: Oswaldo Jacome RN  05/11/2020    20:21  PSEUDOMONAS AERUGINOSA* No Growth after 4 days.  No Growth after 4 days.      CBC:   Recent Labs   Lab 05/16/20  0325 05/17/20  0412   WBC 7.29 7.67   HGB 12.4* 12.3*   HCT 39.2* 38.6*   * 132*     CMP:   Recent Labs   Lab 05/16/20  0325 05/17/20  0412    143   K 3.6 3.6    110   CO2 27 24   GLU 95 76   BUN 27* 25*   CREATININE 1.6* 1.4   CALCIUM 8.1* 8.0*   PROT 6.6 6.7   ALBUMIN 2.8* 2.8*   BILITOT 0.8 0.7   ALKPHOS 105 90   AST 19 18   ALT 9* 10   ANIONGAP 9 9   EGFRNONAA 37.6* 44.2*     Urine Culture: No results for input(s): LABURIN in the last 4320 hours.  Urine Studies:   Recent Labs   Lab 05/12/20  1355   COLORU Yellow   APPEARANCEUA Clear   PHUR 5.0   SPECGRAV 1.015   PROTEINUA Negative   GLUCUA Negative   KETONESU Negative   BILIRUBINUA Negative   OCCULTUA 2+*   NITRITE Negative   LEUKOCYTESUR Negative   RBCUA 14*   WBCUA 4     Wound Culture: No results for input(s): LABAERO in the last 4320 hours.    Significant Imaging: I have reviewed all pertinent imaging results/findings within the past 24 hours.   X-Ray Chest AP Portable [[id number]] Resulted:  05/12/20 1426   Order Status: Completed Updated: 05/12/20 1429   Narrative:     EXAMINATION:  XR CHEST AP PORTABLE    CLINICAL HISTORY:  bacteremia;    TECHNIQUE:  Single frontal view of the chest was performed.    COMPARISON:  12/14/2018.  09/11/2017.    FINDINGS:  Status post remote aortic valve replacement.  Sternal wires are intact and aligned.  Pacer and 2 transvenous leads remain in their usual locations.    Allowing for dextrocurvature of the thoracic spine, mediastinal structures are midline.  Calcific plaque noted at the level of the arch in this 89-year-old man.    There is mild blunting of the right lateral costophrenic angle, new since the most recent chest radiograph dated December 2018.  Differential diagnosis includes a small amount of dependent right pleural fluid, parenchymal opacity of the lung adjacent to the right hemidiaphragm as might occur with atelectasis, aspiration, pneumonia, neoplasm or combination of these factors, or both pulmonary and pleural disease in this patient with a history of bacteremia.    I detect no other significant change in the appearance of the lungs compared to December 2018.   Impression:       Please see above.      Electronically signed by: Nancy Anderson MD  Date: 05/12/2020  Time: 14:26   Imaging History     2020  Date Procedure Name PACS Link Status Accession Number Location   05/12/20 02:10 PM X-Ray Chest AP Portable  Images Final [id number] [facility]   05/11/20 01:10 AM CT Abdomen Pelvis  Without Contrast  Images Final [id number] [facility]   05/10/20 12:00 AM CARDIAC MONITORING STRIPS  Final

## 2020-05-18 ENCOUNTER — LAB VISIT (OUTPATIENT)
Dept: LAB | Facility: HOSPITAL | Age: 85
End: 2020-05-18
Attending: PHYSICIAN ASSISTANT
Payer: MEDICARE

## 2020-05-18 DIAGNOSIS — R78.81 BACTEREMIA: Primary | ICD-10-CM

## 2020-05-18 LAB
ERYTHROCYTE [DISTWIDTH] IN BLOOD BY AUTOMATED COUNT: 13.2 % (ref 11.5–14.5)
HCT VFR BLD AUTO: 39.1 % (ref 40–54)
HGB BLD-MCNC: 11.9 G/DL (ref 14–18)
MCH RBC QN AUTO: 31.8 PG (ref 27–31)
MCHC RBC AUTO-ENTMCNC: 30.4 G/DL (ref 32–36)
MCV RBC AUTO: 105 FL (ref 82–98)
PLATELET # BLD AUTO: 172 K/UL (ref 150–350)
PMV BLD AUTO: 10.2 FL (ref 9.2–12.9)
RBC # BLD AUTO: 3.74 M/UL (ref 4.6–6.2)
WBC # BLD AUTO: 8.3 K/UL (ref 3.9–12.7)

## 2020-05-18 PROCEDURE — G0180 MD CERTIFICATION HHA PATIENT: HCPCS | Mod: ,,, | Performed by: INTERNAL MEDICINE

## 2020-05-18 PROCEDURE — G0180 PR HOME HEALTH MD CERTIFICATION: ICD-10-PCS | Mod: ,,, | Performed by: INTERNAL MEDICINE

## 2020-05-18 PROCEDURE — 85027 COMPLETE CBC AUTOMATED: CPT | Mod: HCNC

## 2020-05-18 NOTE — ASSESSMENT & PLAN NOTE
Blood cultures from 5/11 now growing gram positive cocci in chains.    Plan  ID consulted- Grew parvimonas micra- IV zosyn with HH

## 2020-05-18 NOTE — DISCHARGE SUMMARY
[facility] Hospital Medicine  Discharge Summary      Patient Name: Deena Moody  MRN: [id number]  Admission Date: 5/12/2020  Hospital Length of Stay: 5 days  Discharge Date and Time:  05/17/2020 10:08 PM  Attending Physician: No att. 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  The patient was discharged yesterday after a 1 day admission for abdominal pain.  Patient initially presented for periumbilical abdominal pain with chills but reports that all these symptoms have completely subsided.  He has no current complaints this time.  He denies chills within last 2 days, fever, persistent abdominal pain, nausea/vomiting/diarrhea, pain or frequency with urination, chest pain, cough or shortness of breath. Lactate was 1.6 in the ED on today's admission.    * No surgery found *      Hospital Course:   Patient blood cultures returned positive for pseudomonas bacteremia. Afebrile with no new signs or symptoms overnight. Initiated on zosyn 4.5 g q8. ID consulted- switched patient to cefipime. Patient initial cultures from 5/11 now also growing gram positive cocci. Will transition back to zosyn and await speciation. Mid line consult placed to prepare for course of home IV antibiotics.  Awaiting speciation of GPC / strep in anaerobic blood culture. During admission, developed SARAH which responded well to IV fluids. Patient blood cultures grew parvimonas micra. Will discharge on IV zosyn with home health.     Review of Systems   Constitutional: Negative for chills, diaphoresis, fatigue and fever.   HENT:  Negative for sore throat and trouble swallowing.    Eyes: Negative for photophobia and visual disturbance.   Respiratory: Negative for cough, chest tightness and shortness of breath.    Cardiovascular: Negative for chest pain and palpitations.   Gastrointestinal: Negative for abdominal distention, abdominal pain, constipation and nausea.   Genitourinary: Negative for difficulty urinating and dysuria.   Musculoskeletal: Negative for arthralgias and myalgias.   Skin: Negative for pallor and rash.   Neurological: Negative for dizziness, speech difficulty, weakness, light-headedness and headaches.   Psychiatric/Behavioral: Negative for agitation, behavioral problems, confusion and decreased concentration.     Objective:     Vital Signs (Most Recent):  Temp: 97.1 °F (36.2 °C) (05/17/20 1610)  Pulse: 70 (05/17/20 1610)  Resp: 18 (05/17/20 1610)  BP: (!) 185/85 (05/17/20 1610)  SpO2: 97 % (05/17/20 1610) Vital Signs (24h Range):  Temp:  [97.1 °F (36.2 °C)-98.4 °F (36.9 °C)] 97.1 °F (36.2 °C)  Pulse:  [70] 70  Resp:  [14-19] 18  SpO2:  [92 %-97 %] 97 %  BP: (151-187)/(69-85) 185/85     Weight: 61.5 kg (135 lb 9.3 oz)  Body mass index is 23.27 kg/m².    Physical Exam   Constitutional: He is oriented to person, place, and time. He appears well-developed and well-nourished. No distress.   HENT:   Head: Normocephalic and atraumatic.   Eyes: Pupils are equal, round, and reactive to light. EOM are normal.   Neck: Normal range of motion. Neck supple.   Cardiovascular: Normal rate and intact distal pulses.   Pulmonary/Chest: Effort normal and breath sounds normal. No respiratory distress. He exhibits no tenderness.   Abdominal: Soft. Bowel sounds are normal. There is no tenderness.   Musculoskeletal: Normal range of motion. He exhibits no edema or tenderness.   Neurological: He is alert and oriented to person, place, and time.   Skin: Skin is warm and dry. Capillary refill takes less than 2 seconds. No pallor.   Psychiatric: He has  a normal mood and affect.         CRANIAL NERVES     CN III, IV, VI   Pupils are equal, round, and reactive to light.  Extraocular motions are normal.     Consults:   Consults (From admission, onward)        Status Ordering Provider     Inpatient consult to Infectious Diseases  Once     Provider:  (Not yet assigned)    Completed CATHY ESPINAL     Inpatient consult to Midline team  Once     Provider:  (Not yet assigned)    Completed CATHY ESPINAL          Gram-positive cocci bacteremia  Blood cultures from 5/11 now growing gram positive cocci in chains.    Plan  ID consulted- Grew parvimonas micra- IV zosyn with         Final Active Diagnoses:    Diagnosis Date Noted POA    PRINCIPAL PROBLEM:  Bacteremia due to Pseudomonas [R78.81] 05/12/2020 Yes    Gram-positive cocci bacteremia [R78.81] 05/14/2020 Yes    Long term (current) use of anticoagulants [Z79.01] 07/11/2017 Not Applicable    Persistent atrial fibrillation [I48.19] 11/10/2016 Yes    CAD (coronary artery disease) [I25.10] 08/31/2016 Yes    Hypertension [I10] 11/18/2013 Yes    CKD (chronic kidney disease) stage 3, GFR 30-59 ml/min [N18.3] 11/18/2013 Yes      Problems Resolved During this Admission:       Discharged Condition: stable    Disposition: Home-Health Care Svc    Follow Up:  Follow-up Information     [facility].    Specialty:  Infusion Provider  Contact information:  [address] [address]  [phone]             [facility].    Specialty:  Home Health Services  Contact information:  [address]  [address]  [place] [address]  [phone]                 Patient Instructions:      Diet renal     Notify your health care provider if you experience any of the following:  temperature >100.4     Notify your health care provider if you experience any of the following:  persistent nausea and vomiting or diarrhea     Notify your health care provider if you experience any of the following:   severe uncontrolled pain     Notify your health care provider if you experience any of the following:  redness, tenderness, or signs of infection (pain, swelling, redness, odor or green/yellow discharge around incision site)     Notify your health care provider if you experience any of the following:  difficulty breathing or increased cough     Notify your health care provider if you experience any of the following:  severe persistent headache     Notify your health care provider if you experience any of the following:  worsening rash     Notify your health care provider if you experience any of the following:  persistent dizziness, light-headedness, or visual disturbances     Notify your health care provider if you experience any of the following:  increased confusion or weakness     Activity as tolerated       Significant Diagnostic Studies: Labs: All labs within the past 24 hours have been reviewed    Pending Diagnostic Studies:     None         Medications:  Reconciled Home Medications:      Medication List      START taking these medications    PIPERACILLIN-TAZOBACTAM 4.5G/100ML SODIUM CHLORIDE 0.9%-READY TO MIX  Inject 100 mLs (4.5 g total) into the vein every 8 (eight) hours. for 10 days        CONTINUE taking these medications    apixaban 2.5 mg Tab  Commonly known as:  ELIQUIS  Take 1 tablet (2.5 mg total) by mouth 2 (two) times daily.     aspirin 81 MG EC tablet  Commonly known as:  ECOTRIN  Take 1 tablet (81 mg total) by mouth once daily.     benazepriL 5 MG tablet  Commonly known as:  LOTENSIN  Take 1 tablet (5 mg total) by mouth once daily.     carvediloL 12.5 MG tablet  Commonly known as:  COREG  Take 1 tablet (12.5 mg total) by mouth 2 (two) times daily with meals.     ferrous sulfate 325 mg (65 mg iron) Tab tablet  Commonly known as:  FEOSOL  TAKE 1 TABLET DAILY WITH BREAKFAST     nitroGLYCERIN 0.4 MG SL tablet  Commonly known as:  NITROSTAT  Take one tablet every 5 minutes for chest pain. After the  third tablet go to the ED.     omeprazole 20 MG capsule  Commonly known as:  PRILOSEC  Take 1 capsule (20 mg total) by mouth daily as needed (gastritis).     ONE DAILY MULTIVITAMIN per tablet  Generic drug:  multivitamin  Take 1 tablet by mouth once daily.     polyethylene glycol 17 gram/dose powder  Commonly known as:  GLYCOLAX  Dissolve one capful (17 g) in liquid by mouth 3 (three) times daily as needed. Take 1 three times daily until well formed stool     potassium chloride 8 MEQ Tbsr  Commonly known as:  KLOR-CON  Take 1 tablet (8 mEq total) by mouth every other day.     torsemide 10 MG Tab  Commonly known as:  DEMADEX  Take 1 tablet (10 mg total) by mouth once daily. Two 10 mg tablets by mouth every other day.            Indwelling Lines/Drains at time of discharge:   Lines/Drains/Airways     None                 Time spent on the discharge of patient: 35 minutes  Patient was seen and examined on the date of discharge and determined to be suitable for discharge.         Giancarlo Mar MD  Department of Hospital Medicine  [facility]

## 2020-05-20 ENCOUNTER — TELEPHONE (OUTPATIENT)
Dept: INFECTIOUS DISEASES | Facility: CLINIC | Age: 85
End: 2020-05-20

## 2020-05-20 DIAGNOSIS — R78.81 BACTEREMIA: Primary | ICD-10-CM

## 2020-05-20 DIAGNOSIS — Z51.81 THERAPEUTIC DRUG MONITORING: ICD-10-CM

## 2020-05-25 ENCOUNTER — DOCUMENT SCAN (OUTPATIENT)
Dept: HOME HEALTH SERVICES | Facility: HOSPITAL | Age: 85
End: 2020-05-25
Payer: MEDICARE

## 2020-05-25 ENCOUNTER — DOCUMENT SCAN (OUTPATIENT)
Dept: HOME HEALTH SERVICES | Facility: HOSPITAL | Age: 85
End: 2020-05-25

## 2020-05-27 ENCOUNTER — OFFICE VISIT (OUTPATIENT)
Dept: INFECTIOUS DISEASES | Facility: CLINIC | Age: 85
End: 2020-05-27
Payer: MEDICARE

## 2020-05-27 ENCOUNTER — INFUSION (OUTPATIENT)
Dept: INFECTIOUS DISEASES | Facility: HOSPITAL | Age: 85
End: 2020-05-27
Attending: INTERNAL MEDICINE
Payer: MEDICARE

## 2020-05-27 ENCOUNTER — EXTERNAL HOME HEALTH (OUTPATIENT)
Dept: HOME HEALTH SERVICES | Facility: HOSPITAL | Age: 85
End: 2020-05-27
Payer: MEDICARE

## 2020-05-27 VITALS
BODY MASS INDEX: 25.03 KG/M2 | HEIGHT: 64 IN | SYSTOLIC BLOOD PRESSURE: 186 MMHG | DIASTOLIC BLOOD PRESSURE: 85 MMHG | TEMPERATURE: 98 F | HEART RATE: 70 BPM | WEIGHT: 146.63 LBS

## 2020-05-27 DIAGNOSIS — R78.81 BACTEREMIA DUE TO PSEUDOMONAS: Primary | ICD-10-CM

## 2020-05-27 DIAGNOSIS — B96.5 BACTEREMIA DUE TO PSEUDOMONAS: Primary | ICD-10-CM

## 2020-05-27 PROCEDURE — 1159F PR MEDICATION LIST DOCUMENTED IN MEDICAL RECORD: ICD-10-PCS | Mod: HCNC,S$GLB,, | Performed by: INTERNAL MEDICINE

## 2020-05-27 PROCEDURE — 1126F AMNT PAIN NOTED NONE PRSNT: CPT | Mod: HCNC,S$GLB,, | Performed by: INTERNAL MEDICINE

## 2020-05-27 PROCEDURE — 1101F PR PT FALLS ASSESS DOC 0-1 FALLS W/OUT INJ PAST YR: ICD-10-PCS | Mod: HCNC,CPTII,S$GLB, | Performed by: INTERNAL MEDICINE

## 2020-05-27 PROCEDURE — 99214 PR OFFICE/OUTPT VISIT, EST, LEVL IV, 30-39 MIN: ICD-10-PCS | Mod: HCNC,S$GLB,, | Performed by: INTERNAL MEDICINE

## 2020-05-27 PROCEDURE — 1159F MED LIST DOCD IN RCRD: CPT | Mod: HCNC,S$GLB,, | Performed by: INTERNAL MEDICINE

## 2020-05-27 PROCEDURE — 99999 PR PBB SHADOW E&M-EST. PATIENT-LVL III: ICD-10-PCS | Mod: PBBFAC,HCNC,, | Performed by: INTERNAL MEDICINE

## 2020-05-27 PROCEDURE — 1101F PT FALLS ASSESS-DOCD LE1/YR: CPT | Mod: HCNC,CPTII,S$GLB, | Performed by: INTERNAL MEDICINE

## 2020-05-27 PROCEDURE — 99999 PR PBB SHADOW E&M-EST. PATIENT-LVL III: CPT | Mod: PBBFAC,HCNC,, | Performed by: INTERNAL MEDICINE

## 2020-05-27 PROCEDURE — 1126F PR PAIN SEVERITY QUANTIFIED, NO PAIN PRESENT: ICD-10-PCS | Mod: HCNC,S$GLB,, | Performed by: INTERNAL MEDICINE

## 2020-05-27 PROCEDURE — 99214 OFFICE O/P EST MOD 30 MIN: CPT | Mod: HCNC,S$GLB,, | Performed by: INTERNAL MEDICINE

## 2020-05-27 NOTE — PROGRESS NOTES
Pt arrived to infusion suite for Midline cath removal. RUE midline cath removed. Vaseline gauze, 2x2 gauze, and coban applied to site. No obvious bleeding noted to site. Instructed patient and daughter to keep dressing in place for 24 hours. Tolerated procedure well. Left NAD.

## 2020-05-31 NOTE — PROGRESS NOTES
Subjective:      Patient ID: Deena Moody is a 89 y.o. male.    Chief Complaint:Follow-up    History of Present Illness    89 y/o with a history of Atrial fibrillation, HTN, CKD3, CAD s/p CABG and pacemaker implantation.  He as admitted to the hospital with abdominal pain on may 11.  Abdominal imaging showed possible diverticulitis, cholelithiasis.  Blood cultures were positive for pseudomonas.  The source was unclear.  He was placed on IV cefepime and was discharged from the hospital.      Review of Systems   All other systems reviewed and are negative.    Objective:   Physical Exam   Constitutional: He is oriented to person, place, and time. He appears well-developed and well-nourished. No distress.   HENT:   Head: Normocephalic and atraumatic.   Right Ear: External ear normal.   Left Ear: External ear normal.   Nose: Nose normal.   Mouth/Throat: Oropharynx is clear and moist. No oropharyngeal exudate.   Eyes: Pupils are equal, round, and reactive to light. Conjunctivae and EOM are normal. Right eye exhibits no discharge. Left eye exhibits no discharge. No scleral icterus.   Neck: Normal range of motion. Neck supple. No JVD present. No tracheal deviation present. No thyromegaly present.   Cardiovascular: Normal rate, regular rhythm and intact distal pulses. Exam reveals no gallop and no friction rub.   No murmur heard.  Pulmonary/Chest: Effort normal and breath sounds normal. No stridor. No respiratory distress. He has no wheezes. He has no rales. He exhibits no tenderness.   Abdominal: Soft. Bowel sounds are normal. He exhibits no distension and no mass. There is no tenderness. There is no rebound and no guarding.   Musculoskeletal: Normal range of motion. He exhibits no edema or tenderness.   Lymphadenopathy:     He has no cervical adenopathy.   Neurological: He is alert and oriented to person, place, and time. He has normal reflexes. He displays normal reflexes. No cranial nerve deficit. He exhibits normal  muscle tone. Coordination normal.   Skin: Skin is warm. No rash noted. He is not diaphoretic. No erythema. No pallor.   Psychiatric: He has a normal mood and affect. His behavior is normal. Judgment and thought content normal.   Nursing note and vitals reviewed.    Assessment:       1. Bacteremia due to Pseudomonas        88 y/o with a history of Atrial fibrillation, HTN, CKD3, CAD s/p CABG and pacemaker implantation.  He as admitted to the hospital with abdominal pain on may 11.  Abdominal imaging showed possible diverticulitis, cholelithiasis.  Blood cultures were positive for pseudomonas.  The source was unclear.  He was placed on IV cefepime and was discharged from the hospital.    He has now completed 14 days of IV antibiotics.    He is here today for follow up.  His labs were reviewed.  He has a cardiac device so there is a small chance that this was colonized with pseudomonas.  Patient advised to notify us immediately if he develops fevers and/or malaise.  I will remove his picc line today.  Follow up as needed.  Plan:       Bacteremia due to Pseudomonas  -     Nursing communication; Future; Expected date: 05/27/2020

## 2020-06-01 ENCOUNTER — TELEPHONE (OUTPATIENT)
Dept: INFECTIOUS DISEASES | Facility: CLINIC | Age: 85
End: 2020-06-01

## 2020-06-01 NOTE — TELEPHONE ENCOUNTER
Spoke with Shayna, pt home health nurse, and informed her that the provider does not need anymore labs for this pt since he's not on IV abx anymore

## 2020-06-01 NOTE — TELEPHONE ENCOUNTER
"----- Message from Gilda Reeder MA sent at 6/1/2020 12:38 PM CDT -----      ----- Message -----  From: Bobbi Dyson  Sent: 6/1/2020  12:27 PM CDT  To: Oleksandr Thomas Staff    Patient Assist    Name of caller:  Thomas  Provider name: Oleksandr Thomas MD  Contact Preference:  [phone]  Is this regarding current patient or new patient?: current   What is the nature of the call?    - at the pts home and he no longer has a picc line but there's  orders for lab draw. Will need to know what to do or if the pt   will be discharged. Please call and advise.     Additional Notes:   "Thank you for all that you do for our patients'"     "

## 2020-07-23 ENCOUNTER — CLINICAL SUPPORT (OUTPATIENT)
Dept: CARDIOLOGY | Facility: HOSPITAL | Age: 85
End: 2020-07-23
Payer: MEDICARE

## 2020-07-23 DIAGNOSIS — Z95.0 PRESENCE OF CARDIAC PACEMAKER: ICD-10-CM

## 2020-07-23 PROCEDURE — 93296 REM INTERROG EVL PM/IDS: CPT | Mod: HCNC | Performed by: INTERNAL MEDICINE

## 2020-07-23 PROCEDURE — 93294 CARDIAC DEVICE CHECK - REMOTE: ICD-10-PCS | Mod: ,,, | Performed by: INTERNAL MEDICINE

## 2020-07-23 PROCEDURE — 93294 REM INTERROG EVL PM/LDLS PM: CPT | Mod: ,,, | Performed by: INTERNAL MEDICINE

## 2020-09-14 ENCOUNTER — PATIENT OUTREACH (OUTPATIENT)
Dept: ADMINISTRATIVE | Facility: OTHER | Age: 85
End: 2020-09-14

## 2020-09-15 ENCOUNTER — CLINICAL SUPPORT (OUTPATIENT)
Dept: CARDIOLOGY | Facility: HOSPITAL | Age: 85
End: 2020-09-15
Attending: INTERNAL MEDICINE
Payer: MEDICARE

## 2020-09-15 ENCOUNTER — OFFICE VISIT (OUTPATIENT)
Dept: ELECTROPHYSIOLOGY | Facility: CLINIC | Age: 85
End: 2020-09-15
Payer: MEDICARE

## 2020-09-15 ENCOUNTER — HOSPITAL ENCOUNTER (OUTPATIENT)
Dept: CARDIOLOGY | Facility: CLINIC | Age: 85
Discharge: HOME | End: 2020-09-15
Payer: MEDICARE

## 2020-09-15 VITALS
SYSTOLIC BLOOD PRESSURE: 159 MMHG | WEIGHT: 143.31 LBS | DIASTOLIC BLOOD PRESSURE: 73 MMHG | BODY MASS INDEX: 21.22 KG/M2 | HEART RATE: 68 BPM | HEIGHT: 69 IN

## 2020-09-15 DIAGNOSIS — I44.2 COMPLETE AV BLOCK: Primary | Chronic | ICD-10-CM

## 2020-09-15 DIAGNOSIS — N18.30 CKD (CHRONIC KIDNEY DISEASE) STAGE 3, GFR 30-59 ML/MIN: ICD-10-CM

## 2020-09-15 DIAGNOSIS — Z95.0 CARDIAC PACEMAKER IN SITU: ICD-10-CM

## 2020-09-15 DIAGNOSIS — Z95.2 S/P AVR (AORTIC VALVE REPLACEMENT): ICD-10-CM

## 2020-09-15 DIAGNOSIS — Z95.0 S/P PLACEMENT OF CARDIAC PACEMAKER: Chronic | ICD-10-CM

## 2020-09-15 PROCEDURE — 1126F PR PAIN SEVERITY QUANTIFIED, NO PAIN PRESENT: ICD-10-PCS | Mod: HCNC,S$GLB,, | Performed by: INTERNAL MEDICINE

## 2020-09-15 PROCEDURE — 93010 ELECTROCARDIOGRAM REPORT: CPT | Mod: HCNC,S$GLB,, | Performed by: INTERNAL MEDICINE

## 2020-09-15 PROCEDURE — 99214 OFFICE O/P EST MOD 30 MIN: CPT | Mod: HCNC,S$GLB,, | Performed by: INTERNAL MEDICINE

## 2020-09-15 PROCEDURE — 93010 RHYTHM STRIP: ICD-10-PCS | Mod: HCNC,S$GLB,, | Performed by: INTERNAL MEDICINE

## 2020-09-15 PROCEDURE — 99999 PR PBB SHADOW E&M-EST. PATIENT-LVL III: CPT | Mod: PBBFAC,HCNC,, | Performed by: INTERNAL MEDICINE

## 2020-09-15 PROCEDURE — 1101F PT FALLS ASSESS-DOCD LE1/YR: CPT | Mod: HCNC,CPTII,S$GLB, | Performed by: INTERNAL MEDICINE

## 2020-09-15 PROCEDURE — 1159F MED LIST DOCD IN RCRD: CPT | Mod: HCNC,S$GLB,, | Performed by: INTERNAL MEDICINE

## 2020-09-15 PROCEDURE — 1101F PR PT FALLS ASSESS DOC 0-1 FALLS W/OUT INJ PAST YR: ICD-10-PCS | Mod: HCNC,CPTII,S$GLB, | Performed by: INTERNAL MEDICINE

## 2020-09-15 PROCEDURE — 93005 ELECTROCARDIOGRAM TRACING: CPT | Mod: HCNC,S$GLB,, | Performed by: INTERNAL MEDICINE

## 2020-09-15 PROCEDURE — 93005 RHYTHM STRIP: ICD-10-PCS | Mod: HCNC,S$GLB,, | Performed by: INTERNAL MEDICINE

## 2020-09-15 PROCEDURE — 93280 PM DEVICE PROGR EVAL DUAL: CPT | Mod: HCNC

## 2020-09-15 PROCEDURE — 1126F AMNT PAIN NOTED NONE PRSNT: CPT | Mod: HCNC,S$GLB,, | Performed by: INTERNAL MEDICINE

## 2020-09-15 PROCEDURE — 99214 PR OFFICE/OUTPT VISIT, EST, LEVL IV, 30-39 MIN: ICD-10-PCS | Mod: HCNC,S$GLB,, | Performed by: INTERNAL MEDICINE

## 2020-09-15 PROCEDURE — 93280 PM DEVICE PROGR EVAL DUAL: CPT | Mod: 26,HCNC,, | Performed by: INTERNAL MEDICINE

## 2020-09-15 PROCEDURE — 99499 RISK ADDL DX/OHS AUDIT: ICD-10-PCS | Mod: HCNC,S$GLB,, | Performed by: INTERNAL MEDICINE

## 2020-09-15 PROCEDURE — 93280 CARDIAC DEVICE CHECK - IN CLINIC & HOSPITAL: ICD-10-PCS | Mod: 26,HCNC,, | Performed by: INTERNAL MEDICINE

## 2020-09-15 PROCEDURE — 99999 PR PBB SHADOW E&M-EST. PATIENT-LVL III: ICD-10-PCS | Mod: PBBFAC,HCNC,, | Performed by: INTERNAL MEDICINE

## 2020-09-15 PROCEDURE — 1159F PR MEDICATION LIST DOCUMENTED IN MEDICAL RECORD: ICD-10-PCS | Mod: HCNC,S$GLB,, | Performed by: INTERNAL MEDICINE

## 2020-09-15 PROCEDURE — 99499 UNLISTED E&M SERVICE: CPT | Mod: HCNC,S$GLB,, | Performed by: INTERNAL MEDICINE

## 2020-09-15 NOTE — PROGRESS NOTES
Subjective:    Patient ID:  Deena Moody is a 89 y.o. male who presents for follow-up of Pacemaker Check      HPI    Prior Hx:  I had the pleasure of seeing Mr. Moody today in our electrophysiology clinic in consultation for his new onset atrial fibrillation. 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 On remote monitoring new onset persistent atrial fibrillation was diagnosed that began 10/2016.  He was asymptomatic and started on eliquis for stroke prophylaxis. At his 6 month evaluation after starting eliquis he felt well with stable H/H and creatinine. ECHO 2/2020 was normal.     Interim Hx:  Mr. Moody presents for 6 month follow-up.  He feels well. He is still active (mows lawn, rides stationary bike). He has no complaints. He was admitted in May 2020 when he presented with abdominal pain, he was sent home from the ER and was called to come back for admission as his blood cultures grew Pseudomonas. He was treated with IV antibiotics and has been fine since then. Device interrogation notes 100% RV pacing with stable lead parameters. Estimated battery longevity 8-11 yrs.     My interpretation of today's in clinic electrocardiogram is atrial fibrillation with ventricular pacing at 70 bpm with QRS duration of 170msec.    Review of Systems   Constitution: Negative. Negative for fever and malaise/fatigue.   HENT: Negative.  Negative for congestion and sore throat.    Eyes: Negative.  Negative for blurred vision and visual disturbance.   Cardiovascular: Negative.  Negative for chest pain, dyspnea on exertion, irregular heartbeat, orthopnea, palpitations and paroxysmal nocturnal dyspnea.   Respiratory: Negative.  Negative for cough and shortness of breath.    Hematologic/Lymphatic: Negative for bleeding problem. Does not  bruise/bleed easily.   Skin: Negative.  Negative for rash.   Gastrointestinal: Negative.  Negative for hematochezia and melena.   Neurological: Negative for dizziness, focal weakness and weakness.        Objective:    Physical Exam   Constitutional: He is oriented to person, place, and time. He appears well-developed and well-nourished. No distress.   HENT:   Head: Normocephalic and atraumatic.   Eyes: Conjunctivae are normal. Right eye exhibits no discharge. Left eye exhibits no discharge.   Neck: Neck supple. No JVD present.   Cardiovascular: Normal rate, regular rhythm and normal heart sounds. Exam reveals no gallop and no friction rub.   No murmur heard.  Pulmonary/Chest: Effort normal and breath sounds normal. No respiratory distress. He has no wheezes. He has no rales.   Pacemaker site well healed   Abdominal: Bowel sounds are normal. He exhibits no distension. There is no abdominal tenderness.   Musculoskeletal:         General: No edema.   Neurological: He is alert and oriented to person, place, and time.   Skin: Skin is warm and dry. He is not diaphoretic.   Psychiatric: He has a normal mood and affect. His behavior is normal. Thought content normal.   Vitals reviewed.        Assessment:       1. Complete AV block    2. S/P placement of cardiac pacemaker    3. S/P AVR (aortic valve replacement) with possible stenosis    4. CKD (chronic kidney disease) stage 3, GFR 30-59 ml/min         Plan:       In summary, Mr. Moody is 89 year-old man with hypertension, chronic kidney disease stage III, CABG/AVR 2004 with preserved ejection fraction, and symptomatic high-grade AV block s/p dual-chamber pacemaker implantation presenting for follow-up for  asymptomatic permanent atrial fibrillation ([id number]) on eliquis. He is tolerating eliquis without difficulty.  He understands to monitor his stools for signs of bleeding/melena and to come to ER if he has a significant head injury even if he feels fine.  He is on  reduced dose eliquis for age and elevated creatinine.  Discussed risks of RV pacing induced CMP. Since he has no symptoms and is dependent on his device discussed his EF would have to be severely effected for me to recommend LV lead addition. Discussed risks of device seeding from bacteremia, thankfully he has had no recurrence of infectious symptoms since his illness 4 months ago.    RTC in 6 months.    Thank you for allowing me to participate in the care of this patient. Please do not hesitate to call me with any questions or concerns.     Donn Hastings MD, PhD  Cardiac Electrophysiology

## 2020-10-15 ENCOUNTER — CLINICAL SUPPORT (OUTPATIENT)
Dept: URGENT CARE | Facility: CLINIC | Age: 85
End: 2020-10-15
Payer: MEDICARE

## 2020-10-15 DIAGNOSIS — Z23 FLU VACCINE NEED: Primary | ICD-10-CM

## 2020-10-15 PROCEDURE — G0008 FLU VACCINE - QUADRIVALENT - ADJUVANTED: ICD-10-PCS | Mod: S$GLB,,, | Performed by: PHYSICIAN ASSISTANT

## 2020-10-15 PROCEDURE — G0008 ADMIN INFLUENZA VIRUS VAC: HCPCS | Mod: S$GLB,,, | Performed by: PHYSICIAN ASSISTANT

## 2020-10-15 PROCEDURE — 90694 FLU VACCINE - QUADRIVALENT - ADJUVANTED: ICD-10-PCS | Mod: S$GLB,,, | Performed by: PHYSICIAN ASSISTANT

## 2020-10-15 PROCEDURE — 90694 VACC AIIV4 NO PRSRV 0.5ML IM: CPT | Mod: S$GLB,,, | Performed by: PHYSICIAN ASSISTANT

## 2020-10-21 ENCOUNTER — CLINICAL SUPPORT (OUTPATIENT)
Dept: CARDIOLOGY | Facility: HOSPITAL | Age: 85
End: 2020-10-21
Payer: MEDICARE

## 2020-10-21 DIAGNOSIS — Z95.0 PRESENCE OF CARDIAC PACEMAKER: ICD-10-CM

## 2020-10-21 PROCEDURE — 93294 CARDIAC DEVICE CHECK - REMOTE: ICD-10-PCS | Mod: ,,, | Performed by: INTERNAL MEDICINE

## 2020-10-21 PROCEDURE — 93294 REM INTERROG EVL PM/LDLS PM: CPT | Mod: ,,, | Performed by: INTERNAL MEDICINE

## 2020-10-21 PROCEDURE — 93296 REM INTERROG EVL PM/IDS: CPT | Mod: HCNC | Performed by: INTERNAL MEDICINE

## 2020-11-27 ENCOUNTER — PES CALL (OUTPATIENT)
Dept: ADMINISTRATIVE | Facility: CLINIC | Age: 85
End: 2020-11-27

## 2021-01-05 ENCOUNTER — PATIENT MESSAGE (OUTPATIENT)
Dept: ADMINISTRATIVE | Facility: HOSPITAL | Age: 86
End: 2021-01-05

## 2021-01-07 DIAGNOSIS — D63.8 CHRONIC DISEASE ANEMIA: ICD-10-CM

## 2021-01-07 RX ORDER — FERROUS SULFATE 325(65) MG
1 TABLET ORAL
Qty: 90 TABLET | Refills: 3 | Status: SHIPPED | OUTPATIENT
Start: 2021-01-07 | End: 2021-03-18 | Stop reason: SDUPTHER

## 2021-01-19 ENCOUNTER — CLINICAL SUPPORT (OUTPATIENT)
Dept: CARDIOLOGY | Facility: HOSPITAL | Age: 86
End: 2021-01-19
Payer: MEDICARE

## 2021-01-19 DIAGNOSIS — I44.2 ATRIOVENTRICULAR BLOCK, COMPLETE: ICD-10-CM

## 2021-01-19 DIAGNOSIS — I48.91 UNSPECIFIED ATRIAL FIBRILLATION: ICD-10-CM

## 2021-01-19 DIAGNOSIS — Z95.0 PRESENCE OF CARDIAC PACEMAKER: ICD-10-CM

## 2021-01-19 PROCEDURE — 93294 REM INTERROG EVL PM/LDLS PM: CPT | Mod: ,,, | Performed by: INTERNAL MEDICINE

## 2021-01-19 PROCEDURE — 93296 REM INTERROG EVL PM/IDS: CPT | Performed by: INTERNAL MEDICINE

## 2021-01-19 PROCEDURE — 93294 CARDIAC DEVICE CHECK - REMOTE: ICD-10-PCS | Mod: ,,, | Performed by: INTERNAL MEDICINE

## 2021-01-22 ENCOUNTER — TELEPHONE (OUTPATIENT)
Dept: FAMILY MEDICINE | Facility: CLINIC | Age: 86
End: 2021-01-22

## 2021-01-22 RX ORDER — BENAZEPRIL HYDROCHLORIDE 5 MG/1
5 TABLET ORAL DAILY
Qty: 45 TABLET | Refills: 0 | Status: SHIPPED | OUTPATIENT
Start: 2021-01-22 | End: 2021-03-18 | Stop reason: SDUPTHER

## 2021-03-18 DIAGNOSIS — K21.9 GASTROESOPHAGEAL REFLUX DISEASE WITHOUT ESOPHAGITIS: ICD-10-CM

## 2021-03-18 DIAGNOSIS — K59.00 CONSTIPATION, UNSPECIFIED CONSTIPATION TYPE: Primary | ICD-10-CM

## 2021-03-18 DIAGNOSIS — D63.8 CHRONIC DISEASE ANEMIA: ICD-10-CM

## 2021-03-18 DIAGNOSIS — E78.5 DYSLIPIDEMIA: ICD-10-CM

## 2021-03-18 DIAGNOSIS — N18.30 CKD (CHRONIC KIDNEY DISEASE) STAGE 3, GFR 30-59 ML/MIN: ICD-10-CM

## 2021-03-18 DIAGNOSIS — I10 ESSENTIAL HYPERTENSION: ICD-10-CM

## 2021-03-18 DIAGNOSIS — I48.19 PERSISTENT ATRIAL FIBRILLATION: ICD-10-CM

## 2021-03-18 RX ORDER — OMEPRAZOLE 20 MG/1
20 CAPSULE, DELAYED RELEASE ORAL DAILY PRN
Qty: 90 CAPSULE | Refills: 3 | Status: SHIPPED | OUTPATIENT
Start: 2021-03-18 | End: 2022-03-24 | Stop reason: SDUPTHER

## 2021-03-18 RX ORDER — TORSEMIDE 10 MG/1
10 TABLET ORAL DAILY
Qty: 90 TABLET | Refills: 3 | Status: SHIPPED | OUTPATIENT
Start: 2021-03-18 | End: 2021-05-27 | Stop reason: DRUGHIGH

## 2021-03-18 RX ORDER — ASPIRIN 81 MG/1
81 TABLET ORAL DAILY
Qty: 90 TABLET | Refills: 3 | Status: SHIPPED | OUTPATIENT
Start: 2021-03-18

## 2021-03-18 RX ORDER — FERROUS SULFATE 325(65) MG
1 TABLET ORAL
Qty: 90 TABLET | Refills: 3 | Status: SHIPPED | OUTPATIENT
Start: 2021-03-18

## 2021-03-18 RX ORDER — CARVEDILOL 12.5 MG/1
12.5 TABLET ORAL 2 TIMES DAILY WITH MEALS
Qty: 180 TABLET | Refills: 3 | Status: SHIPPED | OUTPATIENT
Start: 2021-03-18 | End: 2022-03-24 | Stop reason: SDUPTHER

## 2021-03-18 RX ORDER — BENAZEPRIL HYDROCHLORIDE 5 MG/1
5 TABLET ORAL DAILY
Qty: 45 TABLET | Refills: 0 | Status: SHIPPED | OUTPATIENT
Start: 2021-03-18 | End: 2021-05-18 | Stop reason: SDUPTHER

## 2021-03-18 RX ORDER — NITROGLYCERIN 0.4 MG/1
TABLET SUBLINGUAL
Qty: 25 TABLET | Refills: 4 | Status: SHIPPED | OUTPATIENT
Start: 2021-03-18

## 2021-03-18 RX ORDER — MULTIVITAMIN
1 TABLET ORAL DAILY
COMMUNITY
Start: 2021-03-18

## 2021-03-18 RX ORDER — POLYETHYLENE GLYCOL 3350 17 G/17G
17 POWDER, FOR SOLUTION ORAL 3 TIMES DAILY PRN
Qty: 510 G | Refills: 0 | Status: SHIPPED | OUTPATIENT
Start: 2021-03-18

## 2021-03-18 RX ORDER — POTASSIUM CHLORIDE 600 MG/1
8 TABLET, FILM COATED, EXTENDED RELEASE ORAL EVERY OTHER DAY
Qty: 45 TABLET | Refills: 3 | Status: SHIPPED | OUTPATIENT
Start: 2021-03-18 | End: 2022-03-24 | Stop reason: SDUPTHER

## 2021-04-05 ENCOUNTER — PATIENT MESSAGE (OUTPATIENT)
Dept: ADMINISTRATIVE | Facility: HOSPITAL | Age: 86
End: 2021-04-05

## 2021-04-19 ENCOUNTER — CLINICAL SUPPORT (OUTPATIENT)
Dept: CARDIOLOGY | Facility: HOSPITAL | Age: 86
End: 2021-04-19
Payer: MEDICARE

## 2021-04-19 DIAGNOSIS — Z95.0 PRESENCE OF CARDIAC PACEMAKER: ICD-10-CM

## 2021-04-19 DIAGNOSIS — I44.2 ATRIOVENTRICULAR BLOCK, COMPLETE: ICD-10-CM

## 2021-04-19 DIAGNOSIS — I48.91 UNSPECIFIED ATRIAL FIBRILLATION: ICD-10-CM

## 2021-04-19 PROCEDURE — 93294 REM INTERROG EVL PM/LDLS PM: CPT | Mod: ,,, | Performed by: INTERNAL MEDICINE

## 2021-04-19 PROCEDURE — 93296 REM INTERROG EVL PM/IDS: CPT | Performed by: INTERNAL MEDICINE

## 2021-04-19 PROCEDURE — 93294 CARDIAC DEVICE CHECK - REMOTE: ICD-10-PCS | Mod: ,,, | Performed by: INTERNAL MEDICINE

## 2021-05-18 DIAGNOSIS — I10 ESSENTIAL HYPERTENSION: ICD-10-CM

## 2021-05-18 DIAGNOSIS — N18.30 CKD (CHRONIC KIDNEY DISEASE) STAGE 3, GFR 30-59 ML/MIN: ICD-10-CM

## 2021-05-18 RX ORDER — BENAZEPRIL HYDROCHLORIDE 5 MG/1
5 TABLET ORAL DAILY
Qty: 45 TABLET | Refills: 0 | Status: SHIPPED | OUTPATIENT
Start: 2021-05-18 | End: 2021-07-08 | Stop reason: SDUPTHER

## 2021-05-27 ENCOUNTER — OFFICE VISIT (OUTPATIENT)
Dept: FAMILY MEDICINE | Facility: CLINIC | Age: 86
End: 2021-05-27
Payer: MEDICARE

## 2021-05-27 ENCOUNTER — HOSPITAL ENCOUNTER (OUTPATIENT)
Facility: HOSPITAL | Age: 86
Discharge: HOME | End: 2021-05-28
Attending: EMERGENCY MEDICINE | Admitting: INTERNAL MEDICINE
Payer: MEDICARE

## 2021-05-27 VITALS
SYSTOLIC BLOOD PRESSURE: 196 MMHG | HEART RATE: 70 BPM | BODY MASS INDEX: 22.21 KG/M2 | HEIGHT: 69 IN | WEIGHT: 149.94 LBS | DIASTOLIC BLOOD PRESSURE: 102 MMHG | OXYGEN SATURATION: 97 %

## 2021-05-27 DIAGNOSIS — R00.0 TACHYCARDIA: ICD-10-CM

## 2021-05-27 DIAGNOSIS — R60.0 BILATERAL LEG EDEMA: Primary | ICD-10-CM

## 2021-05-27 DIAGNOSIS — I50.9 CONGESTIVE HEART FAILURE, UNSPECIFIED HF CHRONICITY, UNSPECIFIED HEART FAILURE TYPE: ICD-10-CM

## 2021-05-27 DIAGNOSIS — N50.89 SCROTUM SWELLING: ICD-10-CM

## 2021-05-27 DIAGNOSIS — I10 HYPERTENSION: ICD-10-CM

## 2021-05-27 DIAGNOSIS — I50.33 ACUTE ON CHRONIC DIASTOLIC CONGESTIVE HEART FAILURE: Primary | ICD-10-CM

## 2021-05-27 DIAGNOSIS — I50.33 ACUTE ON CHRONIC DIASTOLIC (CONGESTIVE) HEART FAILURE: ICD-10-CM

## 2021-05-27 DIAGNOSIS — I10 ESSENTIAL HYPERTENSION: ICD-10-CM

## 2021-05-27 LAB
ALBUMIN SERPL BCP-MCNC: 3.7 G/DL (ref 3.5–5.2)
ALP SERPL-CCNC: 165 U/L (ref 55–135)
ALT SERPL W/O P-5'-P-CCNC: 21 U/L (ref 10–44)
ANION GAP SERPL CALC-SCNC: 14 MMOL/L (ref 8–16)
AST SERPL-CCNC: 37 U/L (ref 10–40)
BASOPHILS # BLD AUTO: 0.07 K/UL (ref 0–0.2)
BASOPHILS NFR BLD: 0.8 % (ref 0–1.9)
BILIRUB SERPL-MCNC: 1.6 MG/DL (ref 0.1–1)
BNP SERPL-MCNC: 799 PG/ML (ref 0–99)
BUN SERPL-MCNC: 16 MG/DL (ref 8–23)
CALCIUM SERPL-MCNC: 9.1 MG/DL (ref 8.7–10.5)
CHLORIDE SERPL-SCNC: 108 MMOL/L (ref 95–110)
CO2 SERPL-SCNC: 20 MMOL/L (ref 23–29)
CREAT SERPL-MCNC: 1.2 MG/DL (ref 0.5–1.4)
CTP QC/QA: YES
DIFFERENTIAL METHOD: ABNORMAL
EOSINOPHIL # BLD AUTO: 0.4 K/UL (ref 0–0.5)
EOSINOPHIL NFR BLD: 4.8 % (ref 0–8)
ERYTHROCYTE [DISTWIDTH] IN BLOOD BY AUTOMATED COUNT: 14.6 % (ref 11.5–14.5)
EST. GFR  (AFRICAN AMERICAN): >60 ML/MIN/1.73 M^2
EST. GFR  (NON AFRICAN AMERICAN): 52.9 ML/MIN/1.73 M^2
GLUCOSE SERPL-MCNC: 96 MG/DL (ref 70–110)
HCT VFR BLD AUTO: 44.8 % (ref 40–54)
HGB BLD-MCNC: 14.6 G/DL (ref 14–18)
IMM GRANULOCYTES # BLD AUTO: 0.02 K/UL (ref 0–0.04)
IMM GRANULOCYTES NFR BLD AUTO: 0.2 % (ref 0–0.5)
LYMPHOCYTES # BLD AUTO: 1.4 K/UL (ref 1–4.8)
LYMPHOCYTES NFR BLD: 16.7 % (ref 18–48)
MCH RBC QN AUTO: 32 PG (ref 27–31)
MCHC RBC AUTO-ENTMCNC: 32.6 G/DL (ref 32–36)
MCV RBC AUTO: 98 FL (ref 82–98)
MONOCYTES # BLD AUTO: 0.7 K/UL (ref 0.3–1)
MONOCYTES NFR BLD: 7.9 % (ref 4–15)
NEUTROPHILS # BLD AUTO: 5.8 K/UL (ref 1.8–7.7)
NEUTROPHILS NFR BLD: 69.6 % (ref 38–73)
NRBC BLD-RTO: 0 /100 WBC
PLATELET # BLD AUTO: 163 K/UL (ref 150–450)
PMV BLD AUTO: 9.3 FL (ref 9.2–12.9)
POTASSIUM SERPL-SCNC: 4.2 MMOL/L (ref 3.5–5.1)
PROT SERPL-MCNC: 7.8 G/DL (ref 6–8.4)
RBC # BLD AUTO: 4.56 M/UL (ref 4.6–6.2)
SARS-COV-2 RDRP RESP QL NAA+PROBE: NEGATIVE
SODIUM SERPL-SCNC: 142 MMOL/L (ref 136–145)
TROPONIN I SERPL DL<=0.01 NG/ML-MCNC: 0.01 NG/ML (ref 0–0.03)
WBC # BLD AUTO: 8.36 K/UL (ref 3.9–12.7)

## 2021-05-27 PROCEDURE — G0378 HOSPITAL OBSERVATION PER HR: HCPCS

## 2021-05-27 PROCEDURE — 96375 TX/PRO/DX INJ NEW DRUG ADDON: CPT

## 2021-05-27 PROCEDURE — 93010 ELECTROCARDIOGRAM REPORT: CPT | Mod: 76,,, | Performed by: INTERNAL MEDICINE

## 2021-05-27 PROCEDURE — 1101F PT FALLS ASSESS-DOCD LE1/YR: CPT | Mod: CPTII,S$GLB,, | Performed by: STUDENT IN AN ORGANIZED HEALTH CARE EDUCATION/TRAINING PROGRAM

## 2021-05-27 PROCEDURE — 1159F PR MEDICATION LIST DOCUMENTED IN MEDICAL RECORD: ICD-10-PCS | Mod: S$GLB,,, | Performed by: STUDENT IN AN ORGANIZED HEALTH CARE EDUCATION/TRAINING PROGRAM

## 2021-05-27 PROCEDURE — 96374 THER/PROPH/DIAG INJ IV PUSH: CPT

## 2021-05-27 PROCEDURE — 99999 PR PBB SHADOW E&M-EST. PATIENT-LVL IV: CPT | Mod: PBBFAC,,, | Performed by: STUDENT IN AN ORGANIZED HEALTH CARE EDUCATION/TRAINING PROGRAM

## 2021-05-27 PROCEDURE — 1159F MED LIST DOCD IN RCRD: CPT | Mod: S$GLB,,, | Performed by: STUDENT IN AN ORGANIZED HEALTH CARE EDUCATION/TRAINING PROGRAM

## 2021-05-27 PROCEDURE — 99284 PR EMERGENCY DEPT VISIT,LEVEL IV: ICD-10-PCS | Mod: CS,,, | Performed by: PHYSICIAN ASSISTANT

## 2021-05-27 PROCEDURE — 3288F FALL RISK ASSESSMENT DOCD: CPT | Mod: CPTII,S$GLB,, | Performed by: STUDENT IN AN ORGANIZED HEALTH CARE EDUCATION/TRAINING PROGRAM

## 2021-05-27 PROCEDURE — 1126F AMNT PAIN NOTED NONE PRSNT: CPT | Mod: S$GLB,,, | Performed by: STUDENT IN AN ORGANIZED HEALTH CARE EDUCATION/TRAINING PROGRAM

## 2021-05-27 PROCEDURE — 99213 OFFICE O/P EST LOW 20 MIN: CPT | Mod: S$GLB,,, | Performed by: STUDENT IN AN ORGANIZED HEALTH CARE EDUCATION/TRAINING PROGRAM

## 2021-05-27 PROCEDURE — 83880 ASSAY OF NATRIURETIC PEPTIDE: CPT | Performed by: PHYSICIAN ASSISTANT

## 2021-05-27 PROCEDURE — U0002 COVID-19 LAB TEST NON-CDC: HCPCS | Performed by: PHYSICIAN ASSISTANT

## 2021-05-27 PROCEDURE — 80053 COMPREHEN METABOLIC PANEL: CPT | Performed by: PHYSICIAN ASSISTANT

## 2021-05-27 PROCEDURE — 3288F PR FALLS RISK ASSESSMENT DOCUMENTED: ICD-10-PCS | Mod: CPTII,S$GLB,, | Performed by: STUDENT IN AN ORGANIZED HEALTH CARE EDUCATION/TRAINING PROGRAM

## 2021-05-27 PROCEDURE — 1101F PR PT FALLS ASSESS DOC 0-1 FALLS W/OUT INJ PAST YR: ICD-10-PCS | Mod: CPTII,S$GLB,, | Performed by: STUDENT IN AN ORGANIZED HEALTH CARE EDUCATION/TRAINING PROGRAM

## 2021-05-27 PROCEDURE — 99220 PR INITIAL OBSERVATION CARE,LEVL III: ICD-10-PCS | Mod: CS,,, | Performed by: PHYSICIAN ASSISTANT

## 2021-05-27 PROCEDURE — 63600175 PHARM REV CODE 636 W HCPCS: Performed by: PHYSICIAN ASSISTANT

## 2021-05-27 PROCEDURE — 99284 EMERGENCY DEPT VISIT MOD MDM: CPT | Mod: CS,,, | Performed by: PHYSICIAN ASSISTANT

## 2021-05-27 PROCEDURE — 85025 COMPLETE CBC W/AUTO DIFF WBC: CPT | Performed by: PHYSICIAN ASSISTANT

## 2021-05-27 PROCEDURE — 99285 EMERGENCY DEPT VISIT HI MDM: CPT | Mod: 25

## 2021-05-27 PROCEDURE — 99499 RISK ADDL DX/OHS AUDIT: ICD-10-PCS | Mod: S$GLB,,, | Performed by: STUDENT IN AN ORGANIZED HEALTH CARE EDUCATION/TRAINING PROGRAM

## 2021-05-27 PROCEDURE — 93005 ELECTROCARDIOGRAM TRACING: CPT

## 2021-05-27 PROCEDURE — 84484 ASSAY OF TROPONIN QUANT: CPT | Performed by: PHYSICIAN ASSISTANT

## 2021-05-27 PROCEDURE — 99999 PR PBB SHADOW E&M-EST. PATIENT-LVL IV: ICD-10-PCS | Mod: PBBFAC,,, | Performed by: STUDENT IN AN ORGANIZED HEALTH CARE EDUCATION/TRAINING PROGRAM

## 2021-05-27 PROCEDURE — 99220 PR INITIAL OBSERVATION CARE,LEVL III: CPT | Mod: CS,,, | Performed by: PHYSICIAN ASSISTANT

## 2021-05-27 PROCEDURE — 93010 EKG 12-LEAD: ICD-10-PCS | Mod: 76,,, | Performed by: INTERNAL MEDICINE

## 2021-05-27 PROCEDURE — 93010 ELECTROCARDIOGRAM REPORT: CPT | Mod: ,,, | Performed by: INTERNAL MEDICINE

## 2021-05-27 PROCEDURE — 99213 PR OFFICE/OUTPT VISIT, EST, LEVL III, 20-29 MIN: ICD-10-PCS | Mod: S$GLB,,, | Performed by: STUDENT IN AN ORGANIZED HEALTH CARE EDUCATION/TRAINING PROGRAM

## 2021-05-27 PROCEDURE — 1126F PR PAIN SEVERITY QUANTIFIED, NO PAIN PRESENT: ICD-10-PCS | Mod: S$GLB,,, | Performed by: STUDENT IN AN ORGANIZED HEALTH CARE EDUCATION/TRAINING PROGRAM

## 2021-05-27 PROCEDURE — 99499 UNLISTED E&M SERVICE: CPT | Mod: S$GLB,,, | Performed by: STUDENT IN AN ORGANIZED HEALTH CARE EDUCATION/TRAINING PROGRAM

## 2021-05-27 RX ORDER — HYDRALAZINE HYDROCHLORIDE 20 MG/ML
10 INJECTION INTRAMUSCULAR; INTRAVENOUS
Status: COMPLETED | OUTPATIENT
Start: 2021-05-27 | End: 2021-05-27

## 2021-05-27 RX ORDER — FUROSEMIDE 10 MG/ML
80 INJECTION INTRAMUSCULAR; INTRAVENOUS
Status: COMPLETED | OUTPATIENT
Start: 2021-05-27 | End: 2021-05-27

## 2021-05-27 RX ORDER — TORSEMIDE 20 MG/1
20 TABLET ORAL DAILY
Qty: 30 TABLET | Refills: 2 | Status: SHIPPED | OUTPATIENT
Start: 2021-05-27 | End: 2022-01-18 | Stop reason: SDUPTHER

## 2021-05-27 RX ADMIN — FUROSEMIDE 80 MG: 10 INJECTION, SOLUTION INTRAMUSCULAR; INTRAVENOUS at 08:05

## 2021-05-27 RX ADMIN — HYDRALAZINE HYDROCHLORIDE 10 MG: 20 INJECTION INTRAMUSCULAR; INTRAVENOUS at 09:05

## 2021-05-28 VITALS
RESPIRATION RATE: 16 BRPM | HEART RATE: 70 BPM | DIASTOLIC BLOOD PRESSURE: 50 MMHG | SYSTOLIC BLOOD PRESSURE: 100 MMHG | WEIGHT: 143 LBS | HEIGHT: 69 IN | BODY MASS INDEX: 21.18 KG/M2 | TEMPERATURE: 98 F | OXYGEN SATURATION: 95 %

## 2021-05-28 PROBLEM — R78.81 BACTEREMIA DUE TO PSEUDOMONAS: Status: RESOLVED | Noted: 2020-05-12 | Resolved: 2021-05-28

## 2021-05-28 PROBLEM — B96.5 BACTEREMIA DUE TO PSEUDOMONAS: Status: RESOLVED | Noted: 2020-05-12 | Resolved: 2021-05-28

## 2021-05-28 PROBLEM — I50.33 ACUTE ON CHRONIC DIASTOLIC CONGESTIVE HEART FAILURE: Status: ACTIVE | Noted: 2021-05-27

## 2021-05-28 PROBLEM — R78.81 GRAM-POSITIVE COCCI BACTEREMIA: Status: RESOLVED | Noted: 2020-05-14 | Resolved: 2021-05-28

## 2021-05-28 LAB
ALBUMIN SERPL BCP-MCNC: 4 G/DL (ref 3.5–5.2)
ALP SERPL-CCNC: 171 U/L (ref 55–135)
ALT SERPL W/O P-5'-P-CCNC: 21 U/L (ref 10–44)
ANION GAP SERPL CALC-SCNC: 16 MMOL/L (ref 8–16)
ASCENDING AORTA: 2.67 CM
AST SERPL-CCNC: 30 U/L (ref 10–40)
AV INDEX (PROSTH): 0.27
AV MEAN GRADIENT: 19 MMHG
AV PEAK GRADIENT: 28 MMHG
AV VALVE AREA: 0.89 CM2
AV VELOCITY RATIO: 0.28
BASOPHILS # BLD AUTO: 0.07 K/UL (ref 0–0.2)
BASOPHILS NFR BLD: 0.7 % (ref 0–1.9)
BILIRUB SERPL-MCNC: 2.3 MG/DL (ref 0.1–1)
BSA FOR ECHO PROCEDURE: 1.78 M2
BUN SERPL-MCNC: 18 MG/DL (ref 8–23)
CALCIUM SERPL-MCNC: 9.8 MG/DL (ref 8.7–10.5)
CHLORIDE SERPL-SCNC: 103 MMOL/L (ref 95–110)
CO2 SERPL-SCNC: 24 MMOL/L (ref 23–29)
CREAT SERPL-MCNC: 1.3 MG/DL (ref 0.5–1.4)
CV ECHO LV RWT: 0.76 CM
DIFFERENTIAL METHOD: ABNORMAL
DOP CALC AO PEAK VEL: 2.64 M/S
DOP CALC AO VTI: 57.69 CM
DOP CALC LVOT AREA: 3.2 CM2
DOP CALC LVOT DIAMETER: 2.03 CM
DOP CALC LVOT PEAK VEL: 0.74 M/S
DOP CALC LVOT STROKE VOLUME: 51.14 CM3
DOP CALCLVOT PEAK VEL VTI: 15.81 CM
E WAVE DECELERATION TIME: 291.26 MSEC
E/A RATIO: 3.29
E/E' RATIO: 40.25 M/S
ECHO LV POSTERIOR WALL: 1.25 CM (ref 0.6–1.1)
EJECTION FRACTION: 58 %
EOSINOPHIL # BLD AUTO: 0.3 K/UL (ref 0–0.5)
EOSINOPHIL NFR BLD: 3.1 % (ref 0–8)
ERYTHROCYTE [DISTWIDTH] IN BLOOD BY AUTOMATED COUNT: 14.7 % (ref 11.5–14.5)
EST. GFR  (AFRICAN AMERICAN): 55.5 ML/MIN/1.73 M^2
EST. GFR  (NON AFRICAN AMERICAN): 48 ML/MIN/1.73 M^2
ESTIMATED AVG GLUCOSE: 103 MG/DL (ref 68–131)
FRACTIONAL SHORTENING: 26 % (ref 28–44)
GLUCOSE SERPL-MCNC: 85 MG/DL (ref 70–110)
HBA1C MFR BLD: 5.2 % (ref 4–5.6)
HCT VFR BLD AUTO: 46.9 % (ref 40–54)
HGB BLD-MCNC: 15.3 G/DL (ref 14–18)
IMM GRANULOCYTES # BLD AUTO: 0.03 K/UL (ref 0–0.04)
IMM GRANULOCYTES NFR BLD AUTO: 0.3 % (ref 0–0.5)
INTERVENTRICULAR SEPTUM: 1.35 CM (ref 0.6–1.1)
LA MAJOR: 5.53 CM
LA MINOR: 5.47 CM
LA WIDTH: 4.14 CM
LEFT ATRIUM SIZE: 3.3 CM
LEFT ATRIUM VOLUME INDEX MOD: 32.7 ML/M2
LEFT ATRIUM VOLUME INDEX: 35.7 ML/M2
LEFT ATRIUM VOLUME MOD: 58.5 CM3
LEFT ATRIUM VOLUME: 63.87 CM3
LEFT INTERNAL DIMENSION IN SYSTOLE: 2.45 CM (ref 2.1–4)
LEFT VENTRICLE DIASTOLIC VOLUME INDEX: 24.84 ML/M2
LEFT VENTRICLE DIASTOLIC VOLUME: 44.47 ML
LEFT VENTRICLE MASS INDEX: 79 G/M2
LEFT VENTRICLE SYSTOLIC VOLUME INDEX: 11.9 ML/M2
LEFT VENTRICLE SYSTOLIC VOLUME: 21.31 ML
LEFT VENTRICULAR INTERNAL DIMENSION IN DIASTOLE: 3.31 CM (ref 3.5–6)
LEFT VENTRICULAR MASS: 142.17 G
LV LATERAL E/E' RATIO: 32.2 M/S
LV SEPTAL E/E' RATIO: 53.67 M/S
LYMPHOCYTES # BLD AUTO: 1.5 K/UL (ref 1–4.8)
LYMPHOCYTES NFR BLD: 14.9 % (ref 18–48)
MAGNESIUM SERPL-MCNC: 2 MG/DL (ref 1.6–2.6)
MCH RBC QN AUTO: 31.9 PG (ref 27–31)
MCHC RBC AUTO-ENTMCNC: 32.6 G/DL (ref 32–36)
MCV RBC AUTO: 98 FL (ref 82–98)
MONOCYTES # BLD AUTO: 0.9 K/UL (ref 0.3–1)
MONOCYTES NFR BLD: 8.4 % (ref 4–15)
MV MEAN GRADIENT: 1 MMHG
MV PEAK A VEL: 0.49 M/S
MV PEAK E VEL: 1.61 M/S
MV PEAK GRADIENT: 11 MMHG
MV STENOSIS PRESSURE HALF TIME: 84.46 MS
MV VALVE AREA P 1/2 METHOD: 2.6 CM2
NEUTROPHILS # BLD AUTO: 7.5 K/UL (ref 1.8–7.7)
NEUTROPHILS NFR BLD: 72.6 % (ref 38–73)
NRBC BLD-RTO: 0 /100 WBC
PHOSPHATE SERPL-MCNC: 3.2 MG/DL (ref 2.7–4.5)
PISA TR MAX VEL: 3.79 M/S
PLATELET # BLD AUTO: 176 K/UL (ref 150–450)
PMV BLD AUTO: 9.6 FL (ref 9.2–12.9)
POTASSIUM SERPL-SCNC: 4 MMOL/L (ref 3.5–5.1)
PROT SERPL-MCNC: 8.4 G/DL (ref 6–8.4)
RA MAJOR: 5.25 CM
RA PRESSURE: 3 MMHG
RA WIDTH: 4.09 CM
RBC # BLD AUTO: 4.79 M/UL (ref 4.6–6.2)
RIGHT VENTRICULAR END-DIASTOLIC DIMENSION: 3.34 CM
SINUS: 2.97 CM
SODIUM SERPL-SCNC: 143 MMOL/L (ref 136–145)
STJ: 2.55 CM
TDI LATERAL: 0.05 M/S
TDI SEPTAL: 0.03 M/S
TDI: 0.04 M/S
TR MAX PG: 57 MMHG
TRICUSPID ANNULAR PLANE SYSTOLIC EXCURSION: 1.43 CM
TV REST PULMONARY ARTERY PRESSURE: 60 MMHG
WBC # BLD AUTO: 10.33 K/UL (ref 3.9–12.7)

## 2021-05-28 PROCEDURE — 25000003 PHARM REV CODE 250: Performed by: PHYSICIAN ASSISTANT

## 2021-05-28 PROCEDURE — 83735 ASSAY OF MAGNESIUM: CPT | Performed by: PHYSICIAN ASSISTANT

## 2021-05-28 PROCEDURE — 83036 HEMOGLOBIN GLYCOSYLATED A1C: CPT | Performed by: PHYSICIAN ASSISTANT

## 2021-05-28 PROCEDURE — 63600175 PHARM REV CODE 636 W HCPCS: Performed by: PHYSICIAN ASSISTANT

## 2021-05-28 PROCEDURE — G0378 HOSPITAL OBSERVATION PER HR: HCPCS

## 2021-05-28 PROCEDURE — 99217 PR OBSERVATION CARE DISCHARGE: ICD-10-PCS | Mod: ,,, | Performed by: PHYSICIAN ASSISTANT

## 2021-05-28 PROCEDURE — 96376 TX/PRO/DX INJ SAME DRUG ADON: CPT | Mod: 59

## 2021-05-28 PROCEDURE — 99217 PR OBSERVATION CARE DISCHARGE: CPT | Mod: ,,, | Performed by: PHYSICIAN ASSISTANT

## 2021-05-28 PROCEDURE — 85025 COMPLETE CBC W/AUTO DIFF WBC: CPT | Performed by: PHYSICIAN ASSISTANT

## 2021-05-28 PROCEDURE — 80053 COMPREHEN METABOLIC PANEL: CPT | Performed by: PHYSICIAN ASSISTANT

## 2021-05-28 PROCEDURE — 84100 ASSAY OF PHOSPHORUS: CPT | Performed by: PHYSICIAN ASSISTANT

## 2021-05-28 RX ORDER — IBUPROFEN 200 MG
16 TABLET ORAL
Status: DISCONTINUED | OUTPATIENT
Start: 2021-05-28 | End: 2021-05-28 | Stop reason: HOSPADM

## 2021-05-28 RX ORDER — POLYETHYLENE GLYCOL 3350 17 G/17G
17 POWDER, FOR SOLUTION ORAL 3 TIMES DAILY PRN
Status: DISCONTINUED | OUTPATIENT
Start: 2021-05-28 | End: 2021-05-28 | Stop reason: HOSPADM

## 2021-05-28 RX ORDER — AMOXICILLIN 250 MG
1 CAPSULE ORAL 2 TIMES DAILY
Status: DISCONTINUED | OUTPATIENT
Start: 2021-05-28 | End: 2021-05-28 | Stop reason: HOSPADM

## 2021-05-28 RX ORDER — SODIUM CHLORIDE 0.9 % (FLUSH) 0.9 %
5 SYRINGE (ML) INJECTION
Status: DISCONTINUED | OUTPATIENT
Start: 2021-05-28 | End: 2021-05-28 | Stop reason: HOSPADM

## 2021-05-28 RX ORDER — PROMETHAZINE HYDROCHLORIDE 25 MG/1
25 TABLET ORAL EVERY 6 HOURS PRN
Status: DISCONTINUED | OUTPATIENT
Start: 2021-05-28 | End: 2021-05-28 | Stop reason: HOSPADM

## 2021-05-28 RX ORDER — PANTOPRAZOLE SODIUM 40 MG/1
40 TABLET, DELAYED RELEASE ORAL DAILY
Refills: 3 | Status: DISCONTINUED | OUTPATIENT
Start: 2021-05-28 | End: 2021-05-28 | Stop reason: HOSPADM

## 2021-05-28 RX ORDER — ASPIRIN 81 MG/1
81 TABLET ORAL DAILY
Status: DISCONTINUED | OUTPATIENT
Start: 2021-05-28 | End: 2021-05-28 | Stop reason: HOSPADM

## 2021-05-28 RX ORDER — GLUCAGON 1 MG
1 KIT INJECTION
Status: DISCONTINUED | OUTPATIENT
Start: 2021-05-28 | End: 2021-05-28 | Stop reason: HOSPADM

## 2021-05-28 RX ORDER — FERROUS SULFATE 325(65) MG
325 TABLET, DELAYED RELEASE (ENTERIC COATED) ORAL DAILY
Status: DISCONTINUED | OUTPATIENT
Start: 2021-05-28 | End: 2021-05-28 | Stop reason: HOSPADM

## 2021-05-28 RX ORDER — LISINOPRIL 5 MG/1
5 TABLET ORAL DAILY
Status: DISCONTINUED | OUTPATIENT
Start: 2021-05-28 | End: 2021-05-28 | Stop reason: HOSPADM

## 2021-05-28 RX ORDER — CARVEDILOL 12.5 MG/1
12.5 TABLET ORAL 2 TIMES DAILY WITH MEALS
Status: DISCONTINUED | OUTPATIENT
Start: 2021-05-28 | End: 2021-05-28 | Stop reason: HOSPADM

## 2021-05-28 RX ORDER — IBUPROFEN 200 MG
24 TABLET ORAL
Status: DISCONTINUED | OUTPATIENT
Start: 2021-05-28 | End: 2021-05-28 | Stop reason: HOSPADM

## 2021-05-28 RX ORDER — TALC
6 POWDER (GRAM) TOPICAL NIGHTLY PRN
Status: DISCONTINUED | OUTPATIENT
Start: 2021-05-28 | End: 2021-05-28 | Stop reason: HOSPADM

## 2021-05-28 RX ORDER — ACETAMINOPHEN 325 MG/1
650 TABLET ORAL EVERY 4 HOURS PRN
Status: DISCONTINUED | OUTPATIENT
Start: 2021-05-28 | End: 2021-05-28 | Stop reason: HOSPADM

## 2021-05-28 RX ORDER — SODIUM CHLORIDE 0.9 % (FLUSH) 0.9 %
10 SYRINGE (ML) INJECTION
Status: DISCONTINUED | OUTPATIENT
Start: 2021-05-28 | End: 2021-05-28 | Stop reason: HOSPADM

## 2021-05-28 RX ORDER — FUROSEMIDE 10 MG/ML
20 INJECTION INTRAMUSCULAR; INTRAVENOUS 2 TIMES DAILY
Status: DISCONTINUED | OUTPATIENT
Start: 2021-05-28 | End: 2021-05-28 | Stop reason: HOSPADM

## 2021-05-28 RX ORDER — IPRATROPIUM BROMIDE AND ALBUTEROL SULFATE 2.5; .5 MG/3ML; MG/3ML
3 SOLUTION RESPIRATORY (INHALATION) EVERY 4 HOURS PRN
Status: DISCONTINUED | OUTPATIENT
Start: 2021-05-28 | End: 2021-05-28 | Stop reason: HOSPADM

## 2021-05-28 RX ORDER — FUROSEMIDE 10 MG/ML
40 INJECTION INTRAMUSCULAR; INTRAVENOUS 3 TIMES DAILY
Status: DISCONTINUED | OUTPATIENT
Start: 2021-05-28 | End: 2021-05-28

## 2021-05-28 RX ORDER — ONDANSETRON 4 MG/1
8 TABLET, ORALLY DISINTEGRATING ORAL EVERY 8 HOURS PRN
Status: DISCONTINUED | OUTPATIENT
Start: 2021-05-28 | End: 2021-05-28 | Stop reason: HOSPADM

## 2021-05-28 RX ORDER — POTASSIUM CHLORIDE 600 MG/1
8 CAPSULE, EXTENDED RELEASE ORAL EVERY OTHER DAY
Status: DISCONTINUED | OUTPATIENT
Start: 2021-05-28 | End: 2021-05-28 | Stop reason: HOSPADM

## 2021-05-28 RX ADMIN — DOCUSATE SODIUM 50MG AND SENNOSIDES 8.6MG 1 TABLET: 8.6; 5 TABLET, FILM COATED ORAL at 10:05

## 2021-05-28 RX ADMIN — ONDANSETRON 8 MG: 8 TABLET, ORALLY DISINTEGRATING ORAL at 01:05

## 2021-05-28 RX ADMIN — APIXABAN 2.5 MG: 2.5 TABLET, FILM COATED ORAL at 10:05

## 2021-05-28 RX ADMIN — CARVEDILOL 12.5 MG: 12.5 TABLET, FILM COATED ORAL at 07:05

## 2021-05-28 RX ADMIN — FUROSEMIDE 20 MG: 10 INJECTION, SOLUTION INTRAMUSCULAR; INTRAVENOUS at 10:05

## 2021-05-28 RX ADMIN — PANTOPRAZOLE SODIUM 40 MG: 40 TABLET, DELAYED RELEASE ORAL at 10:05

## 2021-05-28 RX ADMIN — LISINOPRIL 5 MG: 5 TABLET ORAL at 10:05

## 2021-05-28 RX ADMIN — ASPIRIN 81 MG: 81 TABLET, COATED ORAL at 10:05

## 2021-05-28 RX ADMIN — FERROUS SULFATE TAB EC 325 MG (65 MG FE EQUIVALENT) 325 MG: 325 (65 FE) TABLET DELAYED RESPONSE at 10:05

## 2021-06-02 ENCOUNTER — PATIENT MESSAGE (OUTPATIENT)
Dept: ADMINISTRATIVE | Facility: HOSPITAL | Age: 86
End: 2021-06-02

## 2021-06-03 ENCOUNTER — OFFICE VISIT (OUTPATIENT)
Dept: FAMILY MEDICINE | Facility: CLINIC | Age: 86
End: 2021-06-03
Payer: MEDICARE

## 2021-06-03 VITALS
WEIGHT: 133.81 LBS | DIASTOLIC BLOOD PRESSURE: 74 MMHG | OXYGEN SATURATION: 99 % | HEIGHT: 69 IN | SYSTOLIC BLOOD PRESSURE: 148 MMHG | BODY MASS INDEX: 19.82 KG/M2 | HEART RATE: 70 BPM

## 2021-06-03 DIAGNOSIS — R54 FRAILTY: ICD-10-CM

## 2021-06-03 DIAGNOSIS — I48.21 PERMANENT ATRIAL FIBRILLATION: ICD-10-CM

## 2021-06-03 DIAGNOSIS — I10 ESSENTIAL HYPERTENSION: ICD-10-CM

## 2021-06-03 DIAGNOSIS — I50.32 CHRONIC DIASTOLIC CONGESTIVE HEART FAILURE: Primary | ICD-10-CM

## 2021-06-03 PROCEDURE — 99999 PR PBB SHADOW E&M-EST. PATIENT-LVL III: CPT | Mod: PBBFAC,,, | Performed by: FAMILY MEDICINE

## 2021-06-03 PROCEDURE — 3288F FALL RISK ASSESSMENT DOCD: CPT | Mod: CPTII,S$GLB,, | Performed by: FAMILY MEDICINE

## 2021-06-03 PROCEDURE — 99999 PR PBB SHADOW E&M-EST. PATIENT-LVL III: ICD-10-PCS | Mod: PBBFAC,,, | Performed by: FAMILY MEDICINE

## 2021-06-03 PROCEDURE — 99214 PR OFFICE/OUTPT VISIT, EST, LEVL IV, 30-39 MIN: ICD-10-PCS | Mod: S$GLB,,, | Performed by: FAMILY MEDICINE

## 2021-06-03 PROCEDURE — 1126F PR PAIN SEVERITY QUANTIFIED, NO PAIN PRESENT: ICD-10-PCS | Mod: S$GLB,,, | Performed by: FAMILY MEDICINE

## 2021-06-03 PROCEDURE — 1159F PR MEDICATION LIST DOCUMENTED IN MEDICAL RECORD: ICD-10-PCS | Mod: S$GLB,,, | Performed by: FAMILY MEDICINE

## 2021-06-03 PROCEDURE — 3288F PR FALLS RISK ASSESSMENT DOCUMENTED: ICD-10-PCS | Mod: CPTII,S$GLB,, | Performed by: FAMILY MEDICINE

## 2021-06-03 PROCEDURE — 1101F PR PT FALLS ASSESS DOC 0-1 FALLS W/OUT INJ PAST YR: ICD-10-PCS | Mod: CPTII,S$GLB,, | Performed by: FAMILY MEDICINE

## 2021-06-03 PROCEDURE — 1101F PT FALLS ASSESS-DOCD LE1/YR: CPT | Mod: CPTII,S$GLB,, | Performed by: FAMILY MEDICINE

## 2021-06-03 PROCEDURE — 99214 OFFICE O/P EST MOD 30 MIN: CPT | Mod: S$GLB,,, | Performed by: FAMILY MEDICINE

## 2021-06-03 PROCEDURE — 1159F MED LIST DOCD IN RCRD: CPT | Mod: S$GLB,,, | Performed by: FAMILY MEDICINE

## 2021-06-03 PROCEDURE — 1126F AMNT PAIN NOTED NONE PRSNT: CPT | Mod: S$GLB,,, | Performed by: FAMILY MEDICINE

## 2021-06-11 ENCOUNTER — OFFICE VISIT (OUTPATIENT)
Dept: URGENT CARE | Facility: CLINIC | Age: 86
End: 2021-06-11
Payer: MEDICARE

## 2021-06-11 VITALS
DIASTOLIC BLOOD PRESSURE: 74 MMHG | TEMPERATURE: 98 F | BODY MASS INDEX: 19.7 KG/M2 | RESPIRATION RATE: 20 BRPM | OXYGEN SATURATION: 99 % | WEIGHT: 133 LBS | HEIGHT: 69 IN | HEART RATE: 67 BPM | SYSTOLIC BLOOD PRESSURE: 132 MMHG

## 2021-06-11 DIAGNOSIS — I95.9 HYPOTENSIVE EPISODE: Primary | ICD-10-CM

## 2021-06-11 PROCEDURE — 99214 OFFICE O/P EST MOD 30 MIN: CPT | Mod: S$GLB,,, | Performed by: FAMILY MEDICINE

## 2021-06-11 PROCEDURE — 99214 PR OFFICE/OUTPT VISIT, EST, LEVL IV, 30-39 MIN: ICD-10-PCS | Mod: S$GLB,,, | Performed by: FAMILY MEDICINE

## 2021-07-08 DIAGNOSIS — I10 ESSENTIAL HYPERTENSION: ICD-10-CM

## 2021-07-08 DIAGNOSIS — N18.30 CKD (CHRONIC KIDNEY DISEASE) STAGE 3, GFR 30-59 ML/MIN: ICD-10-CM

## 2021-07-08 RX ORDER — BENAZEPRIL HYDROCHLORIDE 5 MG/1
5 TABLET ORAL DAILY
Qty: 90 TABLET | Refills: 3 | Status: SHIPPED | OUTPATIENT
Start: 2021-07-08 | End: 2022-07-29

## 2021-07-18 ENCOUNTER — CLINICAL SUPPORT (OUTPATIENT)
Dept: CARDIOLOGY | Facility: HOSPITAL | Age: 86
End: 2021-07-18
Payer: MEDICARE

## 2021-07-18 DIAGNOSIS — Z95.0 PRESENCE OF CARDIAC PACEMAKER: ICD-10-CM

## 2021-07-18 DIAGNOSIS — I44.2 ATRIOVENTRICULAR BLOCK, COMPLETE: ICD-10-CM

## 2021-07-18 PROCEDURE — 93294 REM INTERROG EVL PM/LDLS PM: CPT | Mod: ,,, | Performed by: INTERNAL MEDICINE

## 2021-07-18 PROCEDURE — 93296 REM INTERROG EVL PM/IDS: CPT | Performed by: INTERNAL MEDICINE

## 2021-07-18 PROCEDURE — 93294 CARDIAC DEVICE CHECK - REMOTE: ICD-10-PCS | Mod: ,,, | Performed by: INTERNAL MEDICINE

## 2021-08-27 ENCOUNTER — TELEPHONE (OUTPATIENT)
Dept: CARDIOLOGY | Facility: HOSPITAL | Age: 86
End: 2021-08-27

## 2021-08-27 DIAGNOSIS — I49.8 OTHER SPECIFIED CARDIAC ARRHYTHMIAS: Primary | ICD-10-CM

## 2021-09-08 ENCOUNTER — TELEPHONE (OUTPATIENT)
Dept: FAMILY MEDICINE | Facility: CLINIC | Age: 86
End: 2021-09-08

## 2021-10-16 ENCOUNTER — CLINICAL SUPPORT (OUTPATIENT)
Dept: CARDIOLOGY | Facility: HOSPITAL | Age: 86
End: 2021-10-16
Payer: MEDICARE

## 2021-10-16 DIAGNOSIS — I44.2 ATRIOVENTRICULAR BLOCK, COMPLETE: ICD-10-CM

## 2021-10-16 DIAGNOSIS — I48.91 UNSPECIFIED ATRIAL FIBRILLATION: ICD-10-CM

## 2021-10-16 DIAGNOSIS — Z95.0 PRESENCE OF CARDIAC PACEMAKER: ICD-10-CM

## 2021-10-16 PROCEDURE — 93294 CARDIAC DEVICE CHECK - REMOTE: ICD-10-PCS | Mod: HCNC,,, | Performed by: INTERNAL MEDICINE

## 2021-10-16 PROCEDURE — 93296 REM INTERROG EVL PM/IDS: CPT | Mod: HCNC | Performed by: INTERNAL MEDICINE

## 2021-10-16 PROCEDURE — 93294 REM INTERROG EVL PM/LDLS PM: CPT | Mod: HCNC,,, | Performed by: INTERNAL MEDICINE

## 2022-01-07 ENCOUNTER — OFFICE VISIT (OUTPATIENT)
Dept: OPTOMETRY | Facility: CLINIC | Age: 87
End: 2022-01-07
Payer: MEDICARE

## 2022-01-07 DIAGNOSIS — Z13.5 GLAUCOMA SCREENING: ICD-10-CM

## 2022-01-07 DIAGNOSIS — H35.3112 INTERMEDIATE STAGE NONEXUDATIVE AGE-RELATED MACULAR DEGENERATION OF RIGHT EYE: Primary | ICD-10-CM

## 2022-01-07 DIAGNOSIS — H52.4 PRESBYOPIA: ICD-10-CM

## 2022-01-07 PROCEDURE — 3288F FALL RISK ASSESSMENT DOCD: CPT | Mod: HCNC,CPTII,S$GLB, | Performed by: OPTOMETRIST

## 2022-01-07 PROCEDURE — 99999 PR PBB SHADOW E&M-EST. PATIENT-LVL III: ICD-10-PCS | Mod: PBBFAC,HCNC,, | Performed by: OPTOMETRIST

## 2022-01-07 PROCEDURE — 3288F PR FALLS RISK ASSESSMENT DOCUMENTED: ICD-10-PCS | Mod: HCNC,CPTII,S$GLB, | Performed by: OPTOMETRIST

## 2022-01-07 PROCEDURE — 99999 PR PBB SHADOW E&M-EST. PATIENT-LVL III: CPT | Mod: PBBFAC,HCNC,, | Performed by: OPTOMETRIST

## 2022-01-07 PROCEDURE — 92015 PR REFRACTION: ICD-10-PCS | Mod: HCNC,S$GLB,, | Performed by: OPTOMETRIST

## 2022-01-07 PROCEDURE — 1126F PR PAIN SEVERITY QUANTIFIED, NO PAIN PRESENT: ICD-10-PCS | Mod: HCNC,CPTII,S$GLB, | Performed by: OPTOMETRIST

## 2022-01-07 PROCEDURE — 1159F PR MEDICATION LIST DOCUMENTED IN MEDICAL RECORD: ICD-10-PCS | Mod: HCNC,CPTII,S$GLB, | Performed by: OPTOMETRIST

## 2022-01-07 PROCEDURE — 92134 OCT, RETINA - OU - BOTH EYES: ICD-10-PCS | Mod: HCNC,S$GLB,, | Performed by: OPTOMETRIST

## 2022-01-07 PROCEDURE — 2023F PR DILATED RETINAL EXAM W/O EVID OF RETINOPATHY: ICD-10-PCS | Mod: HCNC,CPTII,S$GLB, | Performed by: OPTOMETRIST

## 2022-01-07 PROCEDURE — 1126F AMNT PAIN NOTED NONE PRSNT: CPT | Mod: HCNC,CPTII,S$GLB, | Performed by: OPTOMETRIST

## 2022-01-07 PROCEDURE — 92134 CPTRZ OPH DX IMG PST SGM RTA: CPT | Mod: HCNC,S$GLB,, | Performed by: OPTOMETRIST

## 2022-01-07 PROCEDURE — 1160F PR REVIEW ALL MEDS BY PRESCRIBER/CLIN PHARMACIST DOCUMENTED: ICD-10-PCS | Mod: HCNC,CPTII,S$GLB, | Performed by: OPTOMETRIST

## 2022-01-07 PROCEDURE — 1160F RVW MEDS BY RX/DR IN RCRD: CPT | Mod: HCNC,CPTII,S$GLB, | Performed by: OPTOMETRIST

## 2022-01-07 PROCEDURE — 92004 COMPRE OPH EXAM NEW PT 1/>: CPT | Mod: HCNC,S$GLB,, | Performed by: OPTOMETRIST

## 2022-01-07 PROCEDURE — 1101F PR PT FALLS ASSESS DOC 0-1 FALLS W/OUT INJ PAST YR: ICD-10-PCS | Mod: HCNC,CPTII,S$GLB, | Performed by: OPTOMETRIST

## 2022-01-07 PROCEDURE — 2023F DILAT RTA XM W/O RTNOPTHY: CPT | Mod: HCNC,CPTII,S$GLB, | Performed by: OPTOMETRIST

## 2022-01-07 PROCEDURE — 92015 DETERMINE REFRACTIVE STATE: CPT | Mod: HCNC,S$GLB,, | Performed by: OPTOMETRIST

## 2022-01-07 PROCEDURE — 1159F MED LIST DOCD IN RCRD: CPT | Mod: HCNC,CPTII,S$GLB, | Performed by: OPTOMETRIST

## 2022-01-07 PROCEDURE — 1101F PT FALLS ASSESS-DOCD LE1/YR: CPT | Mod: HCNC,CPTII,S$GLB, | Performed by: OPTOMETRIST

## 2022-01-07 PROCEDURE — 92004 PR EYE EXAM, NEW PATIENT,COMPREHESV: ICD-10-PCS | Mod: HCNC,S$GLB,, | Performed by: OPTOMETRIST

## 2022-01-07 NOTE — PATIENT INSTRUCTIONS
Using the Amsler Grid  If you are at risk for vision loss, you may be told to check your eyesight regularly using the Amsler grid. Below is the grid and instructions for using it.         The Amsler grid helps you track changes in your vision.    How to Use the Amsler Grid  1. Use the grid in a well-lighted area.  2. Wear glasses or contact lenses if you usually wear them.  3. Hold the grid at your normal reading distance (about 16 inches).  4. Cover your left eye.  5. With your right eye, look at the dot in the center of the grid.  6. While looking at the dot, notice if any of the lines look wavy, if any lines disappear, or if the boxes change shape.  7. Write down on a piece of paper any vision changes from the last time you used the grid.  8. Now repeat the exercise, this time covering your right eye.  9. Call your doctor right away if you notice any vision changes.  How Often Should I Check My Vision?  Use the Amsler grid as often as your eye doctor suggests. Keep the grid where youll remember to use it. Call your eye doctor right away if you notice any changes with your eyesight. This includes if your vision improves.  © 2356-4960 [employer], [address] [place] [address], [place] [address]. All rights reserved. This information is not intended as a substitute for professional medical care. Always follow your healthcare professional's instructions.

## 2022-01-07 NOTE — PROGRESS NOTES
HPI     Presents today for on and off cloudy vision OD x 1 month.  Pt denies eye pain.  S/P PCIOL OU    Last edited by Sally Bianchi MA on 1/7/2022  9:00 AM. (History)        ROS     Positive for: Eyes (cat surgery)    Negative for: Constitutional, Gastrointestinal, Neurological, Skin,   Genitourinary, Musculoskeletal, HENT, Endocrine, Cardiovascular,   Respiratory, Psychiatric, Allergic/Imm, Heme/Lymph    Last edited by Maurizio Michele, OD on 1/7/2022  9:10 AM. (History)        Assessment /Plan     For exam results, see Encounter Report.    Intermediate stage nonexudative age-related macular degeneration of right eye  -     OCT, Retina - OU - Both Eyes    Glaucoma screening    Presbyopia      1. Sp pciol OU--otc readers OK  2. Reduced VA OD>OS 2 to ARMD (dry) OU--mac OCT shows no signs CNVM    PLAN:    1. Discussed vitamins/sunglasses (daughter present)  2. Instructed on Amsler Grid use  3. rtc 1 yr, or immediately if Amsler changes

## 2022-01-14 ENCOUNTER — CLINICAL SUPPORT (OUTPATIENT)
Dept: CARDIOLOGY | Facility: HOSPITAL | Age: 87
End: 2022-01-14
Payer: MEDICARE

## 2022-01-14 DIAGNOSIS — Z95.0 PRESENCE OF CARDIAC PACEMAKER: ICD-10-CM

## 2022-01-14 DIAGNOSIS — I44.2 ATRIOVENTRICULAR BLOCK, COMPLETE: ICD-10-CM

## 2022-01-14 DIAGNOSIS — I48.91 UNSPECIFIED ATRIAL FIBRILLATION: ICD-10-CM

## 2022-01-14 PROCEDURE — 93296 REM INTERROG EVL PM/IDS: CPT | Mod: HCNC | Performed by: INTERNAL MEDICINE

## 2022-01-14 PROCEDURE — 93294 REM INTERROG EVL PM/LDLS PM: CPT | Mod: HCNC,,, | Performed by: INTERNAL MEDICINE

## 2022-01-14 PROCEDURE — 93294 CARDIAC DEVICE CHECK - REMOTE: ICD-10-PCS | Mod: HCNC,,, | Performed by: INTERNAL MEDICINE

## 2022-01-18 RX ORDER — TORSEMIDE 20 MG/1
20 TABLET ORAL DAILY
Qty: 90 TABLET | Refills: 3 | Status: SHIPPED | OUTPATIENT
Start: 2022-01-18 | End: 2022-10-26

## 2022-01-18 NOTE — TELEPHONE ENCOUNTER
----- Message from Sarah Garcia sent at 1/18/2022 10:43 AM CST -----  Contact: Daughter Amelie [phone]  Type: Requesting to speak with nurse    Who Called: Pt's daughter   Regarding: discuss pt    Would the patient rather a call back or a response via [email or web address]? Call back  Best Call Back Number: [phone]  Additional Information: n/a

## 2022-01-18 NOTE — TELEPHONE ENCOUNTER
No new care gaps identified.  Powered by Rapid7 by [employer]. Reference number: [id number].   1/18/2022 11:30:43 AM CST

## 2022-03-10 NOTE — PROGRESS NOTES
Mr. Moody is a patient of Dr. Hastings and was last seen in clinic 9/15/2020.      Subjective:   Patient ID:  Deena Moody is a 89 y.o. male who presents for follow-up of No chief complaint on file.  .     HPI:    Mr. Moody is a 89 y.o. male with HTN, CKD III, CAD (CABG/AVR 2004), AVB s/p PPM, AF here for follow up.     Background:    Mr. Moody today has a hx of hypertension, chronic kidney disease stage III, CABG/AVR 2004 with preserved ejection fraction, and recent symptomatic high-grade AV block s/p dual-chamber pacemaker implantation. On remote monitoring new onset persistent atrial fibrillation was diagnosed that began 10/2016.  He was asymptomatic and started on eliquis for stroke prophylaxis. At his 6 month evaluation after starting eliquis he felt well with stable H/H and creatinine. ECHO 2/2020 was normal.     9/15/15167: Mr. Moody presents for 6 month follow-up.  He feels well. He is still active (mows lawn, rides stationary bike). He has no complaints. He was admitted in May 2020 when he presented with abdominal pain, he was sent home from the ER and was called to come back for admission as his blood cultures grew Pseudomonas. He was treated with IV antibiotics and has been fine since then. Device interrogation notes 100% RV pacing with stable lead parameters. Estimated battery longevity 8-11 yrs.  He is tolerating eliquis without difficulty.  He understands to monitor his stools for signs of bleeding/melena and to come to ER if he has a significant head injury even if he feels fine.  He is on reduced dose eliquis for age and elevated creatinine.  Discussed risks of RV pacing induced CMP. Since he has no symptoms and is dependent on his device discussed his EF would have to be severely effected for me to recommend LV lead addition. Discussed risks of device seeding from bacteremia, thankfully he has had no recurrence of infectious symptoms since his illness 4 months ago.    Update  (03/17/2022):    Today he says he has been feeling at baseline. No new MCKEON, CP, palpitations, LH, syncope. Last seen in hospital for CHF 5/2021 but stable since. Echo at that time showed preserved LVEF and mod-severe AS.    He is currently taking eliaui 2.5mg BID for stroke prophylaxis and denies significant bleeding episodes. He is currently being treated with carvedilol 12.5mg BID for HR control.  Kidney function is stable, with a creatinine of 1.3 on 5/28/2021.    Device Interrogation (3/17/2022) reveals an intrinsic AF with CHB with stable lead and device function. 100% AF.  No ventricular arrhythmias. He paces <1% in the RA and 100% in the RV. Estimated battery longevity 6.7-9.7 years.     I have personally reviewed the patient's EKG today, which shows AF with  at 70bpm. QRS is 178. QTc is 509.    Relevant Cardiac Test Results:    2D Echo (5/28/2021):  · The left ventricle is normal in size with concentric remodeling and  · The estimated ejection fraction is 58%.  · There are segmental left ventricular wall motion abnormalities.  · There is abnormal septal wall motion.  · Grade III left ventricular diastolic dysfunction.  · Mild left atrial enlargement.  · Normal right ventricular size with normal right ventricular systolic function.  · Mild right atrial enlargement.  · There is moderate-to-severe aortic valve stenosis.  · Aortic valve area is 0.89 cm2; peak velocity is 2.64 m/s; mean gradient is 19 mmHg.  · Mild-to-moderate mitral regurgitation.  · Normal central venous pressure (3 mmHg).  · The estimated PA systolic pressure is 60 mmHg.  · There is pulmonary hypertension.    Current Outpatient Medications   Medication Sig    apixaban (ELIQUIS) 2.5 mg Tab Take 1 tablet (2.5 mg total) by mouth 2 (two) times daily.    aspirin (ECOTRIN) 81 MG EC tablet Take 1 tablet (81 mg total) by mouth once daily.    benazepriL (LOTENSIN) 5 MG tablet Take 1 tablet (5 mg total) by mouth once daily.    carvediloL (COREG)  12.5 MG tablet Take 1 tablet (12.5 mg total) by mouth 2 (two) times daily with meals.    ferrous sulfate (FEOSOL) 325 mg (65 mg iron) Tab tablet Take 1 tablet (325 mg total) by mouth daily with breakfast.    multivitamin (ONE DAILY MULTIVITAMIN) per tablet Take 1 tablet by mouth once daily.    nitroGLYCERIN (NITROSTAT) 0.4 MG SL tablet Take one tablet every 5 minutes for chest pain. After the third tablet go to the ED.    omeprazole (PRILOSEC) 20 MG capsule Take 1 capsule (20 mg total) by mouth daily as needed (gastritis).    polyethylene glycol (GLYCOLAX) 17 gram/dose powder Dissolve one capful (17 g) in liquid by mouth 3 (three) times daily as needed. Take 1 three times daily until well formed stool    potassium chloride (KLOR-CON) 8 MEQ TbSR Take 1 tablet (8 mEq total) by mouth every other day.    torsemide (DEMADEX) 20 MG Tab Take 1 tablet (20 mg total) by mouth once daily.     No current facility-administered medications for this visit.       Review of Systems   Constitutional: Negative for malaise/fatigue.   Cardiovascular: Negative for chest pain, dyspnea on exertion, irregular heartbeat, leg swelling and palpitations.   Respiratory: Negative for shortness of breath.    Hematologic/Lymphatic: Negative for bleeding problem.   Skin: Negative for rash.   Musculoskeletal: Negative for myalgias.   Gastrointestinal: Negative for hematemesis, hematochezia and nausea.   Genitourinary: Negative for hematuria.   Neurological: Negative for light-headedness.   Psychiatric/Behavioral: Negative for altered mental status.   Allergic/Immunologic: Negative for persistent infections.       Objective:          BP (!) 175/76   Pulse 70   Wt 62.3 kg (137 lb 5.6 oz)   BMI 20.28 kg/m²     Physical Exam  Vitals and nursing note reviewed.   Constitutional:       Appearance: Normal appearance. He is well-developed.   HENT:      Head: Normocephalic.      Nose: Nose normal.   Eyes:      Pupils: Pupils are equal, round, and  reactive to light.   Cardiovascular:      Rate and Rhythm: Normal rate and regular rhythm.   Pulmonary:      Effort: No respiratory distress.      Breath sounds: Normal breath sounds.   Chest:      Comments: Device to LUCW. Incision and pocket in good repair.    Musculoskeletal:         General: Normal range of motion.   Skin:     General: Skin is warm and dry.      Findings: No erythema.   Neurological:      Mental Status: He is alert and oriented to person, place, and time.   Psychiatric:         Speech: Speech normal.         Behavior: Behavior normal.       Lab Results   Component Value Date     05/28/2021    K 4.0 05/28/2021    MG 2.0 05/28/2021    BUN 18 05/28/2021    CREATININE 1.3 05/28/2021    ALT 21 05/28/2021    AST 30 05/28/2021    HGB 15.3 05/28/2021    HCT 46.9 05/28/2021    HCT 39 08/31/2016    TSH 2.709 12/30/2017    LDLCALC 68.4 07/02/2018           Assessment:     1. Complete AV block    2. Essential hypertension    3. Long term (current) use of anticoagulants    4. Pacemaker    5. Permanent atrial fibrillation      Plan:     In summary, Mr. Moody is a 89 y.o. male with HTN, CKD III, CAD (CABG/AVR 2004), AVB s/p PPM, AF here for follow up.   Mr. Moody is doing well from a device perspective with stable lead and device function. 100% AF with CHB on eliquis. Asymptomatic. 100% RV pacing with no recent CHF symptoms. Last seen in hospital 5/2021 for CHF. Echo 5/2021 showed preserved LVEF with mod-severe AS. Overdue for follow up with general cardiology. Referral placed.    Cards referral for AS  Continue current medication regimen and device settings.   Follow up in device clinic as scheduled.   Follow up in EP clinic in 1 year, sooner as needed.     *A copy of this note has been sent to Dr. Hastings*    Follow up in about 1 year (around 3/17/2023).    ------------------------------------------------------------------    ARCENIO Fagan, NP-C  Cardiac Electrophysiology

## 2022-03-17 ENCOUNTER — CLINICAL SUPPORT (OUTPATIENT)
Dept: CARDIOLOGY | Facility: HOSPITAL | Age: 87
End: 2022-03-17
Attending: INTERNAL MEDICINE
Payer: MEDICARE

## 2022-03-17 ENCOUNTER — HOSPITAL ENCOUNTER (OUTPATIENT)
Dept: CARDIOLOGY | Facility: CLINIC | Age: 87
Discharge: HOME | End: 2022-03-17
Payer: MEDICARE

## 2022-03-17 ENCOUNTER — OFFICE VISIT (OUTPATIENT)
Dept: ELECTROPHYSIOLOGY | Facility: CLINIC | Age: 87
End: 2022-03-17
Payer: MEDICARE

## 2022-03-17 VITALS
SYSTOLIC BLOOD PRESSURE: 175 MMHG | HEART RATE: 70 BPM | BODY MASS INDEX: 20.28 KG/M2 | WEIGHT: 137.38 LBS | DIASTOLIC BLOOD PRESSURE: 76 MMHG

## 2022-03-17 DIAGNOSIS — I44.2 COMPLETE AV BLOCK: Primary | Chronic | ICD-10-CM

## 2022-03-17 DIAGNOSIS — I10 ESSENTIAL HYPERTENSION: ICD-10-CM

## 2022-03-17 DIAGNOSIS — I48.21 PERMANENT ATRIAL FIBRILLATION: Chronic | ICD-10-CM

## 2022-03-17 DIAGNOSIS — I35.0 AORTIC VALVE STENOSIS, ETIOLOGY OF CARDIAC VALVE DISEASE UNSPECIFIED: ICD-10-CM

## 2022-03-17 DIAGNOSIS — I49.8 OTHER SPECIFIED CARDIAC ARRHYTHMIAS: ICD-10-CM

## 2022-03-17 DIAGNOSIS — Z79.01 LONG TERM (CURRENT) USE OF ANTICOAGULANTS: ICD-10-CM

## 2022-03-17 DIAGNOSIS — Z95.0 PACEMAKER: Chronic | ICD-10-CM

## 2022-03-17 PROCEDURE — 1159F PR MEDICATION LIST DOCUMENTED IN MEDICAL RECORD: ICD-10-PCS | Mod: CPTII,S$GLB,, | Performed by: NURSE PRACTITIONER

## 2022-03-17 PROCEDURE — 3288F PR FALLS RISK ASSESSMENT DOCUMENTED: ICD-10-PCS | Mod: CPTII,S$GLB,, | Performed by: NURSE PRACTITIONER

## 2022-03-17 PROCEDURE — 93010 ELECTROCARDIOGRAM REPORT: CPT | Mod: S$GLB,,, | Performed by: INTERNAL MEDICINE

## 2022-03-17 PROCEDURE — 1160F PR REVIEW ALL MEDS BY PRESCRIBER/CLIN PHARMACIST DOCUMENTED: ICD-10-PCS | Mod: CPTII,S$GLB,, | Performed by: NURSE PRACTITIONER

## 2022-03-17 PROCEDURE — 93280 PM DEVICE PROGR EVAL DUAL: CPT

## 2022-03-17 PROCEDURE — 93010 RHYTHM STRIP: ICD-10-PCS | Mod: S$GLB,,, | Performed by: INTERNAL MEDICINE

## 2022-03-17 PROCEDURE — 93005 ELECTROCARDIOGRAM TRACING: CPT | Mod: S$GLB,,, | Performed by: INTERNAL MEDICINE

## 2022-03-17 PROCEDURE — 1101F PR PT FALLS ASSESS DOC 0-1 FALLS W/OUT INJ PAST YR: ICD-10-PCS | Mod: CPTII,S$GLB,, | Performed by: NURSE PRACTITIONER

## 2022-03-17 PROCEDURE — 1159F MED LIST DOCD IN RCRD: CPT | Mod: CPTII,S$GLB,, | Performed by: NURSE PRACTITIONER

## 2022-03-17 PROCEDURE — 3288F FALL RISK ASSESSMENT DOCD: CPT | Mod: CPTII,S$GLB,, | Performed by: NURSE PRACTITIONER

## 2022-03-17 PROCEDURE — 93005 RHYTHM STRIP: ICD-10-PCS | Mod: S$GLB,,, | Performed by: INTERNAL MEDICINE

## 2022-03-17 PROCEDURE — 99214 PR OFFICE/OUTPT VISIT, EST, LEVL IV, 30-39 MIN: ICD-10-PCS | Mod: S$GLB,,, | Performed by: NURSE PRACTITIONER

## 2022-03-17 PROCEDURE — 93280 CARDIAC DEVICE CHECK - IN CLINIC & HOSPITAL: ICD-10-PCS | Mod: 26,,, | Performed by: INTERNAL MEDICINE

## 2022-03-17 PROCEDURE — 93280 PM DEVICE PROGR EVAL DUAL: CPT | Mod: 26,,, | Performed by: INTERNAL MEDICINE

## 2022-03-17 PROCEDURE — 1160F RVW MEDS BY RX/DR IN RCRD: CPT | Mod: CPTII,S$GLB,, | Performed by: NURSE PRACTITIONER

## 2022-03-17 PROCEDURE — 99214 OFFICE O/P EST MOD 30 MIN: CPT | Mod: S$GLB,,, | Performed by: NURSE PRACTITIONER

## 2022-03-17 PROCEDURE — 99999 PR PBB SHADOW E&M-EST. PATIENT-LVL IV: ICD-10-PCS | Mod: PBBFAC,,, | Performed by: NURSE PRACTITIONER

## 2022-03-17 PROCEDURE — 1126F AMNT PAIN NOTED NONE PRSNT: CPT | Mod: CPTII,S$GLB,, | Performed by: NURSE PRACTITIONER

## 2022-03-17 PROCEDURE — 1126F PR PAIN SEVERITY QUANTIFIED, NO PAIN PRESENT: ICD-10-PCS | Mod: CPTII,S$GLB,, | Performed by: NURSE PRACTITIONER

## 2022-03-17 PROCEDURE — 99999 PR PBB SHADOW E&M-EST. PATIENT-LVL IV: CPT | Mod: PBBFAC,,, | Performed by: NURSE PRACTITIONER

## 2022-03-17 PROCEDURE — 1101F PT FALLS ASSESS-DOCD LE1/YR: CPT | Mod: CPTII,S$GLB,, | Performed by: NURSE PRACTITIONER

## 2022-03-17 PROCEDURE — 99499 RISK ADDL DX/OHS AUDIT: ICD-10-PCS | Mod: S$GLB,,, | Performed by: NURSE PRACTITIONER

## 2022-03-17 PROCEDURE — 99499 UNLISTED E&M SERVICE: CPT | Mod: S$GLB,,, | Performed by: NURSE PRACTITIONER

## 2022-03-24 ENCOUNTER — TELEPHONE (OUTPATIENT)
Dept: FAMILY MEDICINE | Facility: CLINIC | Age: 87
End: 2022-03-24
Payer: MEDICARE

## 2022-03-24 DIAGNOSIS — K21.9 GASTROESOPHAGEAL REFLUX DISEASE WITHOUT ESOPHAGITIS: ICD-10-CM

## 2022-03-24 DIAGNOSIS — I10 ESSENTIAL HYPERTENSION: ICD-10-CM

## 2022-03-24 DIAGNOSIS — N18.30 CKD (CHRONIC KIDNEY DISEASE) STAGE 3, GFR 30-59 ML/MIN: ICD-10-CM

## 2022-03-24 RX ORDER — OMEPRAZOLE 20 MG/1
20 CAPSULE, DELAYED RELEASE ORAL DAILY PRN
Qty: 90 CAPSULE | Refills: 3 | Status: ON HOLD | OUTPATIENT
Start: 2022-03-24 | End: 2022-12-11 | Stop reason: SDUPTHER

## 2022-03-24 RX ORDER — POTASSIUM CHLORIDE 600 MG/1
8 TABLET, FILM COATED, EXTENDED RELEASE ORAL EVERY OTHER DAY
Qty: 45 TABLET | Refills: 3 | Status: SHIPPED | OUTPATIENT
Start: 2022-03-24 | End: 2023-04-03

## 2022-03-24 RX ORDER — CARVEDILOL 12.5 MG/1
12.5 TABLET ORAL 2 TIMES DAILY WITH MEALS
Qty: 180 TABLET | Refills: 3 | Status: SHIPPED | OUTPATIENT
Start: 2022-03-24 | End: 2023-03-09

## 2022-03-24 NOTE — TELEPHONE ENCOUNTER
Care Due:                  Date            Visit Type   Department     Provider  --------------------------------------------------------------------------------                                EP -                              PRIMARY      [facility]  Last Visit: 06-      CARE ([facility])   MEDICINE       Jam Rowell  Next Visit: None Scheduled  None         None Found                                                            Last  Test          Frequency    Reason                     Performed    Due Date  --------------------------------------------------------------------------------    Office Visit  12 months..  benazepriL, carvediloL,    06- 05-                             nitroGLYCERIN,                             omeprazole, potassium,                             torsemide................    CMP.........  12 months..  benazepriL, potassium,     05- 05-                             torsemide................    Powered by Kaai [employer]. Reference number: [id number].   3/24/2022 1:23:21 PM CDT

## 2022-03-24 NOTE — TELEPHONE ENCOUNTER
----- Message from Ying Sanz sent at 3/24/2022 11:48 AM CDT -----  Needs advice from nurse:      Who Called:daughter-Amelie Vincent  Regarding:needs to discuss patient's meds  Would the patient rather a call back or VIA "[employer]?  Best Call Back number:[phone]  Additional Info:

## 2022-04-14 ENCOUNTER — TELEPHONE (OUTPATIENT)
Dept: ELECTROPHYSIOLOGY | Facility: CLINIC | Age: 87
End: 2022-04-14
Payer: MEDICARE

## 2022-04-14 ENCOUNTER — CLINICAL SUPPORT (OUTPATIENT)
Dept: CARDIOLOGY | Facility: HOSPITAL | Age: 87
End: 2022-04-14
Payer: MEDICARE

## 2022-04-14 DIAGNOSIS — Z95.0 PRESENCE OF CARDIAC PACEMAKER: ICD-10-CM

## 2022-04-14 PROCEDURE — 93296 REM INTERROG EVL PM/IDS: CPT | Performed by: INTERNAL MEDICINE

## 2022-04-14 NOTE — TELEPHONE ENCOUNTER
----- Message from Saira Marti RN sent at 4/14/2022  8:53 AM CDT -----  Regarding: Cardiology appointment  This patient saw Ying in March. She ordered a cardiology consult for aortic stenosis. Can you please get the patient scheduled?  Thanks,  Saira

## 2022-05-18 ENCOUNTER — TELEPHONE (OUTPATIENT)
Dept: ELECTROPHYSIOLOGY | Facility: CLINIC | Age: 87
End: 2022-05-18
Payer: MEDICARE

## 2022-07-06 DIAGNOSIS — I48.19 PERSISTENT ATRIAL FIBRILLATION: ICD-10-CM

## 2022-07-06 NOTE — TELEPHONE ENCOUNTER
----- Message from Zoë Pro sent at 7/6/2022  1:59 PM CDT -----  Regarding: refill issue  Contact: [phone] /yaquelin Crouch  Patient is requesting a call back regarding needing a PA for the   apixaban (ELIQUIS) 2.5 mg Tab. He is out of medication and will need a 30 day supply sent to his local pharmacy.  [id number] [place] [address] [address] AT [facility] [address]    The rest goes to [facility]  Would the patient rather a call back or a response via [facility]?  call  Best Call Back Number:  [phone] /yaquelin Crouch  Additional Information:

## 2022-07-08 DIAGNOSIS — I48.19 PERSISTENT ATRIAL FIBRILLATION: ICD-10-CM

## 2022-07-11 ENCOUNTER — PATIENT MESSAGE (OUTPATIENT)
Dept: FAMILY MEDICINE | Facility: CLINIC | Age: 87
End: 2022-07-11
Payer: MEDICARE

## 2022-07-11 DIAGNOSIS — I48.19 PERSISTENT ATRIAL FIBRILLATION: ICD-10-CM

## 2022-07-13 ENCOUNTER — CLINICAL SUPPORT (OUTPATIENT)
Dept: CARDIOLOGY | Facility: HOSPITAL | Age: 87
End: 2022-07-13
Payer: MEDICARE

## 2022-07-13 DIAGNOSIS — Z95.0 PRESENCE OF CARDIAC PACEMAKER: ICD-10-CM

## 2022-07-13 PROCEDURE — 93294 REM INTERROG EVL PM/LDLS PM: CPT | Mod: ,,, | Performed by: INTERNAL MEDICINE

## 2022-07-13 PROCEDURE — 93294 CARDIAC DEVICE CHECK - REMOTE: ICD-10-PCS | Mod: ,,, | Performed by: INTERNAL MEDICINE

## 2022-07-13 PROCEDURE — 93296 REM INTERROG EVL PM/IDS: CPT | Performed by: INTERNAL MEDICINE

## 2022-10-08 ENCOUNTER — OFFICE VISIT (OUTPATIENT)
Dept: FAMILY MEDICINE | Facility: CLINIC | Age: 87
End: 2022-10-08
Payer: MEDICARE

## 2022-10-08 VITALS
HEART RATE: 70 BPM | TEMPERATURE: 98 F | WEIGHT: 141.56 LBS | OXYGEN SATURATION: 96 % | DIASTOLIC BLOOD PRESSURE: 56 MMHG | HEIGHT: 69 IN | BODY MASS INDEX: 20.97 KG/M2 | SYSTOLIC BLOOD PRESSURE: 118 MMHG

## 2022-10-08 DIAGNOSIS — R05.1 ACUTE COUGH: ICD-10-CM

## 2022-10-08 DIAGNOSIS — U07.1 COVID-19 VIRUS INFECTION: Primary | ICD-10-CM

## 2022-10-08 DIAGNOSIS — I50.32 CHRONIC DIASTOLIC HEART FAILURE: ICD-10-CM

## 2022-10-08 DIAGNOSIS — Z95.0 PACEMAKER: Chronic | ICD-10-CM

## 2022-10-08 DIAGNOSIS — H61.20 WAX IN EAR: ICD-10-CM

## 2022-10-08 DIAGNOSIS — I10 ESSENTIAL HYPERTENSION: ICD-10-CM

## 2022-10-08 LAB
CTP QC/QA: YES
SARS-COV-2 RDRP RESP QL NAA+PROBE: POSITIVE

## 2022-10-08 PROCEDURE — 3288F PR FALLS RISK ASSESSMENT DOCUMENTED: ICD-10-PCS | Mod: CPTII,S$GLB,, | Performed by: INTERNAL MEDICINE

## 2022-10-08 PROCEDURE — 99499 UNLISTED E&M SERVICE: CPT | Mod: S$GLB,,, | Performed by: INTERNAL MEDICINE

## 2022-10-08 PROCEDURE — 1159F PR MEDICATION LIST DOCUMENTED IN MEDICAL RECORD: ICD-10-PCS | Mod: CPTII,S$GLB,, | Performed by: INTERNAL MEDICINE

## 2022-10-08 PROCEDURE — 3288F FALL RISK ASSESSMENT DOCD: CPT | Mod: CPTII,S$GLB,, | Performed by: INTERNAL MEDICINE

## 2022-10-08 PROCEDURE — 99999 PR PBB SHADOW E&M-EST. PATIENT-LVL III: CPT | Mod: PBBFAC,,, | Performed by: INTERNAL MEDICINE

## 2022-10-08 PROCEDURE — 87635: ICD-10-PCS | Mod: QW,S$GLB,, | Performed by: INTERNAL MEDICINE

## 2022-10-08 PROCEDURE — 1126F AMNT PAIN NOTED NONE PRSNT: CPT | Mod: CPTII,S$GLB,, | Performed by: INTERNAL MEDICINE

## 2022-10-08 PROCEDURE — 99214 PR OFFICE/OUTPT VISIT, EST, LEVL IV, 30-39 MIN: ICD-10-PCS | Mod: S$GLB,,, | Performed by: INTERNAL MEDICINE

## 2022-10-08 PROCEDURE — 99999 PR PBB SHADOW E&M-EST. PATIENT-LVL III: ICD-10-PCS | Mod: PBBFAC,,, | Performed by: INTERNAL MEDICINE

## 2022-10-08 PROCEDURE — 1101F PR PT FALLS ASSESS DOC 0-1 FALLS W/OUT INJ PAST YR: ICD-10-PCS | Mod: CPTII,S$GLB,, | Performed by: INTERNAL MEDICINE

## 2022-10-08 PROCEDURE — 1160F RVW MEDS BY RX/DR IN RCRD: CPT | Mod: CPTII,S$GLB,, | Performed by: INTERNAL MEDICINE

## 2022-10-08 PROCEDURE — 1159F MED LIST DOCD IN RCRD: CPT | Mod: CPTII,S$GLB,, | Performed by: INTERNAL MEDICINE

## 2022-10-08 PROCEDURE — 87635 SARS-COV-2 COVID-19 AMP PRB: CPT | Mod: QW,S$GLB,, | Performed by: INTERNAL MEDICINE

## 2022-10-08 PROCEDURE — 1101F PT FALLS ASSESS-DOCD LE1/YR: CPT | Mod: CPTII,S$GLB,, | Performed by: INTERNAL MEDICINE

## 2022-10-08 PROCEDURE — 99499 RISK ADDL DX/OHS AUDIT: ICD-10-PCS | Mod: S$GLB,,, | Performed by: INTERNAL MEDICINE

## 2022-10-08 PROCEDURE — 1126F PR PAIN SEVERITY QUANTIFIED, NO PAIN PRESENT: ICD-10-PCS | Mod: CPTII,S$GLB,, | Performed by: INTERNAL MEDICINE

## 2022-10-08 PROCEDURE — 1160F PR REVIEW ALL MEDS BY PRESCRIBER/CLIN PHARMACIST DOCUMENTED: ICD-10-PCS | Mod: CPTII,S$GLB,, | Performed by: INTERNAL MEDICINE

## 2022-10-08 PROCEDURE — 99214 OFFICE O/P EST MOD 30 MIN: CPT | Mod: S$GLB,,, | Performed by: INTERNAL MEDICINE

## 2022-10-08 RX ORDER — BENZONATATE 200 MG/1
200 CAPSULE ORAL 3 TIMES DAILY PRN
Qty: 30 CAPSULE | Refills: 1 | Status: SHIPPED | OUTPATIENT
Start: 2022-10-08 | End: 2022-10-18

## 2022-10-08 RX ORDER — FLUTICASONE PROPIONATE 50 MCG
2 SPRAY, SUSPENSION (ML) NASAL DAILY
Qty: 30 ML | Refills: 1 | Status: SHIPPED | OUTPATIENT
Start: 2022-10-08

## 2022-10-08 RX ORDER — FLUTICASONE PROPIONATE 50 MCG
2 SPRAY, SUSPENSION (ML) NASAL DAILY
Qty: 30 ML | Refills: 1 | Status: SHIPPED | OUTPATIENT
Start: 2022-10-08 | End: 2022-10-08

## 2022-10-08 RX ORDER — GUAIFENESIN AND DEXTROMETHORPHAN HYDROBROMIDE 1200; 60 MG/1; MG/1
1 TABLET, EXTENDED RELEASE ORAL 2 TIMES DAILY
Qty: 20 TABLET | Refills: 0 | Status: SHIPPED | OUTPATIENT
Start: 2022-10-08 | End: 2022-10-18

## 2022-10-08 NOTE — PROGRESS NOTES
Subjective:       Patient ID: Deena Moody is a 89 y.o. male.    Chief Complaint: No chief complaint on file.      HPI  Deena Moody is a 89 y.o. male with chronic conditions of  HTN, CKD III, CAD (CABG/AVR 2004), AVB s/p PPM, AF, S/P aortic valve replacement, macular degeneration who presents today for ear feeling stopped up.    Daughter reports last Friday started with a scratchy throat and cough.  The cough is dry and without shortness of breath.  Hearing has decreased and many times happens with clogged ears.  Has not had any fever or close contacts with people with similar symptoms.  Has been tired and eating okay but probably not drinking enough fluids.  Has not been sleeping good due to the cough.    Health Maintenance:  Health Maintenance   Topic Date Due    Lipid Panel  07/02/2023    TETANUS VACCINE  08/08/2026       Review of Systems   Constitutional:  Positive for activity change and fatigue. Negative for appetite change, fever and unexpected weight change.   HENT:  Positive for hearing loss, postnasal drip and sore throat (Scratchy throat). Negative for ear pain.    Respiratory:  Positive for cough. Negative for shortness of breath and wheezing.    Cardiovascular: Negative.  Negative for leg swelling.   Gastrointestinal: Negative.    Genitourinary:  Negative for difficulty urinating.   Musculoskeletal: Negative.    Integumentary:  Negative.   Neurological: Negative.    Psychiatric/Behavioral:  Positive for sleep disturbance.     Past Medical History:   Diagnosis Date    Acute on chronic diastolic heart failure 9/1/2016    Coronary artery disease     Hyperlipidemia     Hypertension     Macular degeneration (senile) of retina, unspecified 12/12/2014    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    Persistent atrial fibrillation 11/10/2016    S/P placement of cardiac pacemaker 9/14/2016       Past Surgical History:   Procedure Laterality Date    AORTIC VALVE REPLACEMENT N/A     CARDIAC PACEMAKER PLACEMENT       CATARACT EXTRACTION W/  INTRAOCULAR LENS IMPLANT Right 2/16/2016    Dr. Whitley    CATARACT EXTRACTION W/  INTRAOCULAR LENS IMPLANT Left 3/1/2016    Dr. Whitley    CORONARY ARTERY BYPASS GRAFT      EAR EXAMINATION UNDER ANESTHESIA      EYE SURGERY         Family History   Problem Relation Age of Onset    No Known Problems Mother     No Known Problems Father     No Known Problems Sister     Stroke Brother     Hypertension Brother     No Known Problems Maternal Aunt     No Known Problems Maternal Uncle     No Known Problems Paternal Aunt     No Known Problems Paternal Uncle     No Known Problems Maternal Grandmother     No Known Problems Maternal Grandfather     No Known Problems Paternal Grandmother     No Known Problems Paternal Grandfather     No Known Problems Daughter     No Known Problems Son     Amblyopia Neg Hx     Blindness Neg Hx     Cancer Neg Hx     Cataracts Neg Hx     Diabetes Neg Hx     Glaucoma Neg Hx     Macular degeneration Neg Hx     Retinal detachment Neg Hx     Strabismus Neg Hx     Thyroid disease Neg Hx        Social History     Socioeconomic History    Marital status:    Tobacco Use    Smoking status: Never    Smokeless tobacco: Never   Substance and Sexual Activity    Alcohol use: No    Drug use: No    Sexual activity: Yes     Partners: Female       Current Outpatient Medications   Medication Sig Dispense Refill    apixaban (ELIQUIS) 2.5 mg Tab Take 1 tablet (2.5 mg total) by mouth 2 (two) times daily. 180 tablet 3    aspirin (ECOTRIN) 81 MG EC tablet Take 1 tablet (81 mg total) by mouth once daily. 90 tablet 3    benazepriL (LOTENSIN) 5 MG tablet TAKE 1 TABLET EVERY DAY 90 tablet 0    carvediloL (COREG) 12.5 MG tablet Take 1 tablet (12.5 mg total) by mouth 2 (two) times daily with meals. 180 tablet 3    ferrous sulfate (FEOSOL) 325 mg (65 mg iron) Tab tablet Take 1 tablet (325 mg total) by mouth daily with breakfast. 90 tablet 3    multivitamin (ONE DAILY MULTIVITAMIN) per tablet  Take 1 tablet by mouth once daily.      nitroGLYCERIN (NITROSTAT) 0.4 MG SL tablet Take one tablet every 5 minutes for chest pain. After the third tablet go to the ED. 25 tablet 4    omeprazole (PRILOSEC) 20 MG capsule Take 1 capsule (20 mg total) by mouth daily as needed (gastritis). 90 capsule 3    polyethylene glycol (GLYCOLAX) 17 gram/dose powder Dissolve one capful (17 g) in liquid by mouth 3 (three) times daily as needed. Take 1 three times daily until well formed stool 510 g 0    potassium chloride (KLOR-CON) 8 MEQ TbSR Take 1 tablet (8 mEq total) by mouth every other day. 45 tablet 3    torsemide (DEMADEX) 20 MG Tab Take 1 tablet (20 mg total) by mouth once daily. 90 tablet 3     No current facility-administered medications for this visit.       Review of patient's allergies indicates:   Allergen Reactions    Iodine and iodide containing products Other (See Comments)     Caused changes in skin color         Objective:       Last 3 sets of Vitals    Vitals - 1 value per visit 1/7/2022 3/17/2022 3/17/2022   SYSTOLIC - - 175   DIASTOLIC - - 76   Pulse - - 70   Temp - - -   Resp - - -   SPO2 - - -   Weight (lb) - - 137.35   Weight (kg) - - 62.3   Height - - -   BMI (Calculated) - - -   VISIT REPORT - - -   Pain Score  0 0 -   Some recent data might be hidden   Physical Exam  Constitutional:       General: He is not in acute distress.     Comments: frail   HENT:      Head: Normocephalic.      Right Ear: External ear normal. There is impacted cerumen.      Left Ear: Tympanic membrane, ear canal and external ear normal.      Nose: Nose normal.      Mouth/Throat:      Mouth: Mucous membranes are dry.      Pharynx: No oropharyngeal exudate.      Comments: Has postnasal drip, thick stringy mucus and thick saliva.  Eyes:      General: No scleral icterus.     Extraocular Movements: Extraocular movements intact.      Conjunctiva/sclera: Conjunctivae normal.   Neck:      Vascular: No carotid bruit.      Comments: No  goiter.  Cardiovascular:      Rate and Rhythm: Normal rate and regular rhythm.      Pulses: Normal pulses.      Heart sounds: Normal heart sounds.   Pulmonary:      Effort: Pulmonary effort is normal.      Breath sounds: Normal breath sounds.   Abdominal:      General: Bowel sounds are normal. There is no distension.      Palpations: Abdomen is soft.      Tenderness: There is no abdominal tenderness.   Musculoskeletal:         General: No swelling.   Lymphadenopathy:      Cervical: No cervical adenopathy.   Skin:     General: Skin is warm and dry.   Neurological:      General: No focal deficit present.      Mental Status: He is alert. Mental status is at baseline.      Comments: Hard of hearing   Psychiatric:         Mood and Affect: Mood normal.         Behavior: Behavior normal.         CBC:  Recent Labs   Lab 05/25/20  1333 05/27/21  1856 05/28/21  0315   WBC 8.44 8.36 10.33   RBC 3.64 L 4.56 L 4.79   Hemoglobin 11.9 L 14.6 15.3   Hematocrit 38.5 L 44.8 46.9   Platelets 169 163 176    H 98 98   MCH 32.7 H 32.0 H 31.9 H   MCHC 30.9 L 32.6 32.6     CMP:  Recent Labs   Lab 05/25/20  1333 05/27/21  1856 05/28/21  0315   Glucose 108 96 85   Calcium 8.3 L 9.1 9.8   Albumin 3.1 L 3.7 4.0   Total Protein 7.1 7.8 8.4   Sodium 140 142 143   Potassium 4.0 4.2 4.0   CO2 24 20 L 24   Chloride 105 108 103   BUN 16 16 18   Creatinine 1.6 H 1.2 1.3   Alkaline Phosphatase 90 165 H 171 H   ALT 11 21 21   AST 24 37 30   Total Bilirubin 1.1 H 1.6 H 2.3 H     URINALYSIS:  Recent Labs   Lab 05/12/20  1355   Color, UA Yellow   Specific Gravity, UA 1.015   pH, UA 5.0   Protein, UA Negative   Nitrite, UA Negative   Leukocytes, UA Negative      LIPIDS:      TSH:        A1C:  Recent Labs   Lab 05/28/21  0315   Hemoglobin A1C 5.2       Imaging:  Cardiac device check - Remote  Additional Comments  REMOTE Interrogation Report  DC PPM, DDD  bpm  Presenting egram demonstrates AS- (AF)  Device fxn WNL  Aut[employer] algorithms are  On in the RV, monitoring in RA  RA pacing <1%, RV pacing >99%  Atrial arrhythmias: 100% burden  Anticoagulation status: Eliquis  Ventricular arrhythmias: HVR x 1, 2 secs duration on 5/30/22  Battery Status/Longevity: 2.3-3.5 years  F/U via remote interrogation q 3 mos  Report prepared by SAMANTHA Ramirez      Assessment:       1. COVID-19 virus infection    2. Acute cough    3. Wax in ear    4. Essential hypertension    5. Chronic diastolic heart failure    6. Pacemaker            Plan:       1. COVID-19 virus infection  -     POCT COVID-19 Rapid Screening  -     benzonatate (TESSALON) 200 MG capsule; Take 1 capsule (200 mg total) by mouth 3 (three) times daily as needed for Cough.  Dispense: 30 capsule; Refill: 1  -     fluticasone propionate (FLONASE) 50 mcg/actuation nasal spray; 2 sprays (100 mcg total) by Each Nostril route once daily.  Dispense: 30 mL; Refill: 1  -     dextromethorphan-guaiFENesin (MUCINEX DM) 60-1,200 mg per 12 hr tablet; Take 1 tablet by mouth 2 (two) times a day. for 10 days  Dispense: 20 tablet; Refill: 0  - no fever, shortness of breath, or chest pains.  Pulse oximetry is 96% at room air and lungs are clear.  - symptoms started over 5 days ago and I would not recommend Paxlovid.  Since he still has significant cough would do the isolation for 5 days and 5 more days with mask after isolation is complete.  If condition worsens need to be re-evaluated.  - supportive treatment with Tylenol, fluids, and good nutrition.  Increase activity as tolerated.    2. Acute cough  -     POCT COVID-19 Rapid Screening  -     benzonatate (TESSALON) 200 MG capsule; Take 1 capsule (200 mg total) by mouth 3 (three) times daily as needed for Cough.  Dispense: 30 capsule; Refill: 1  -     fluticasone propionate (FLONASE) 50 mcg/actuation nasal spray; 2 sprays (100 mcg total) by Each Nostril route once daily.  Dispense: 30 mL; Refill: 1  -     dextromethorphan-guaiFENesin (MUCINEX DM) 60-1,200 mg per 12 hr  tablet; Take 1 tablet by mouth 2 (two) times a day. for 10 days  Dispense: 20 tablet; Refill: 0  - likely due to virus.  However, mucus tends to be thick and good hydration recommended with the use of expectorants, in addition that would help weakness.    3. Wax in ear  -     Ear wax removal- unable to remove.  Will try ear drops and if no better can be re-evaluated without softer cerumen.  -     carbamide peroxide (DEBROX) 6.5 % otic solution; Place 5 drops into the right ear 2 (two) times daily. for 7 days  Dispense: 15 mL; Refill: 1    4. Essential hypertension   - controlled    5. Chronic diastolic heart failure  Overview:  Patient being diuresed.  Compensate    6. Pacemaker- noted         Health Maintenance Due   Topic Date Due    COVID-19 Vaccine (1) Never done    Shingles Vaccine (1 of 2) Never done    Influenza Vaccine (1) 09/01/2022            Return to clinic as needed.    Kristine Glass MD  [facility]  Disclaimer:  This note has been generated using voice-recognition software. There may be grammatical or spelling errors that have been missed during proof-reading

## 2022-10-11 ENCOUNTER — CLINICAL SUPPORT (OUTPATIENT)
Dept: CARDIOLOGY | Facility: HOSPITAL | Age: 87
End: 2022-10-11
Payer: MEDICARE

## 2022-10-11 DIAGNOSIS — Z95.0 PRESENCE OF CARDIAC PACEMAKER: ICD-10-CM

## 2022-10-11 DIAGNOSIS — I44.2 ATRIOVENTRICULAR BLOCK, COMPLETE: ICD-10-CM

## 2022-10-11 PROCEDURE — 93296 REM INTERROG EVL PM/IDS: CPT | Performed by: INTERNAL MEDICINE

## 2022-11-04 DIAGNOSIS — N18.30 CKD (CHRONIC KIDNEY DISEASE) STAGE 3, GFR 30-59 ML/MIN: ICD-10-CM

## 2022-11-04 DIAGNOSIS — I10 ESSENTIAL HYPERTENSION: ICD-10-CM

## 2022-11-04 NOTE — TELEPHONE ENCOUNTER
No new care gaps identified.  [facility] Embedded Care Gaps. Reference number: [id number]. 11/04/2022   3:49:10 PM CDT

## 2022-11-06 RX ORDER — BENAZEPRIL HYDROCHLORIDE 5 MG/1
5 TABLET ORAL DAILY
Qty: 90 TABLET | Refills: 3 | Status: SHIPPED | OUTPATIENT
Start: 2022-11-06 | End: 2022-11-08 | Stop reason: SDUPTHER

## 2022-11-29 ENCOUNTER — PATIENT MESSAGE (OUTPATIENT)
Dept: FAMILY MEDICINE | Facility: CLINIC | Age: 87
End: 2022-11-29
Payer: MEDICARE

## 2022-11-29 DIAGNOSIS — I48.19 PERSISTENT ATRIAL FIBRILLATION: ICD-10-CM

## 2022-12-02 ENCOUNTER — OFFICE VISIT (OUTPATIENT)
Dept: FAMILY MEDICINE | Facility: CLINIC | Age: 87
End: 2022-12-02
Payer: MEDICARE

## 2022-12-02 ENCOUNTER — HOSPITAL ENCOUNTER (OUTPATIENT)
Dept: RADIOLOGY | Facility: HOSPITAL | Age: 87
Discharge: HOME | End: 2022-12-02
Payer: MEDICARE

## 2022-12-02 VITALS
BODY MASS INDEX: 22.56 KG/M2 | HEART RATE: 70 BPM | DIASTOLIC BLOOD PRESSURE: 62 MMHG | WEIGHT: 152.31 LBS | HEIGHT: 69 IN | SYSTOLIC BLOOD PRESSURE: 136 MMHG | OXYGEN SATURATION: 95 %

## 2022-12-02 DIAGNOSIS — I48.19 PERSISTENT ATRIAL FIBRILLATION: ICD-10-CM

## 2022-12-02 DIAGNOSIS — J10.1 INFLUENZA A: ICD-10-CM

## 2022-12-02 DIAGNOSIS — R68.89 FLU-LIKE SYMPTOMS: ICD-10-CM

## 2022-12-02 DIAGNOSIS — R05.1 ACUTE COUGH: Primary | ICD-10-CM

## 2022-12-02 DIAGNOSIS — R06.2 WHEEZING: ICD-10-CM

## 2022-12-02 DIAGNOSIS — R05.1 ACUTE COUGH: ICD-10-CM

## 2022-12-02 LAB
CTP QC/QA: YES
CTP QC/QA: YES
FLUAV AG NPH QL: POSITIVE
FLUBV AG NPH QL: NEGATIVE
SARS-COV-2 RDRP RESP QL NAA+PROBE: NEGATIVE

## 2022-12-02 PROCEDURE — 87804 INFLUENZA ASSAY W/OPTIC: CPT | Mod: QW,S$GLB,,

## 2022-12-02 PROCEDURE — 99215 PR OFFICE/OUTPT VISIT, EST, LEVL V, 40-54 MIN: ICD-10-PCS | Mod: S$GLB,,,

## 2022-12-02 PROCEDURE — 1126F PR PAIN SEVERITY QUANTIFIED, NO PAIN PRESENT: ICD-10-PCS | Mod: CPTII,S$GLB,,

## 2022-12-02 PROCEDURE — 1159F MED LIST DOCD IN RCRD: CPT | Mod: CPTII,S$GLB,,

## 2022-12-02 PROCEDURE — 1101F PT FALLS ASSESS-DOCD LE1/YR: CPT | Mod: CPTII,S$GLB,,

## 2022-12-02 PROCEDURE — 3288F FALL RISK ASSESSMENT DOCD: CPT | Mod: CPTII,S$GLB,,

## 2022-12-02 PROCEDURE — 3288F PR FALLS RISK ASSESSMENT DOCUMENTED: ICD-10-PCS | Mod: CPTII,S$GLB,,

## 2022-12-02 PROCEDURE — 87804 POCT INFLUENZA A/B: ICD-10-PCS | Mod: QW,S$GLB,,

## 2022-12-02 PROCEDURE — 1160F PR REVIEW ALL MEDS BY PRESCRIBER/CLIN PHARMACIST DOCUMENTED: ICD-10-PCS | Mod: CPTII,S$GLB,,

## 2022-12-02 PROCEDURE — 99999 PR PBB SHADOW E&M-EST. PATIENT-LVL V: CPT | Mod: PBBFAC,,,

## 2022-12-02 PROCEDURE — 1126F AMNT PAIN NOTED NONE PRSNT: CPT | Mod: CPTII,S$GLB,,

## 2022-12-02 PROCEDURE — 87635 SARS-COV-2 COVID-19 AMP PRB: CPT | Mod: QW,S$GLB,,

## 2022-12-02 PROCEDURE — 99215 OFFICE O/P EST HI 40 MIN: CPT | Mod: S$GLB,,,

## 2022-12-02 PROCEDURE — 87635: ICD-10-PCS | Mod: QW,S$GLB,,

## 2022-12-02 PROCEDURE — 71046 X-RAY EXAM CHEST 2 VIEWS: CPT | Mod: TC,FY

## 2022-12-02 PROCEDURE — 1101F PR PT FALLS ASSESS DOC 0-1 FALLS W/OUT INJ PAST YR: ICD-10-PCS | Mod: CPTII,S$GLB,,

## 2022-12-02 PROCEDURE — 1160F RVW MEDS BY RX/DR IN RCRD: CPT | Mod: CPTII,S$GLB,,

## 2022-12-02 PROCEDURE — 1159F PR MEDICATION LIST DOCUMENTED IN MEDICAL RECORD: ICD-10-PCS | Mod: CPTII,S$GLB,,

## 2022-12-02 PROCEDURE — 71046 XR CHEST PA AND LATERAL: ICD-10-PCS | Mod: 26,,, | Performed by: RADIOLOGY

## 2022-12-02 PROCEDURE — 99999 PR PBB SHADOW E&M-EST. PATIENT-LVL V: ICD-10-PCS | Mod: PBBFAC,,,

## 2022-12-02 PROCEDURE — 71046 X-RAY EXAM CHEST 2 VIEWS: CPT | Mod: 26,,, | Performed by: RADIOLOGY

## 2022-12-02 RX ORDER — BENZONATATE 100 MG/1
100 CAPSULE ORAL 3 TIMES DAILY PRN
Qty: 30 CAPSULE | Refills: 0 | Status: ON HOLD | OUTPATIENT
Start: 2022-12-02 | End: 2022-12-11 | Stop reason: HOSPADM

## 2022-12-02 RX ORDER — OSELTAMIVIR PHOSPHATE 75 MG/1
75 CAPSULE ORAL 2 TIMES DAILY
Qty: 10 CAPSULE | Refills: 0 | Status: SHIPPED | OUTPATIENT
Start: 2022-12-02 | End: 2022-12-02 | Stop reason: SDUPTHER

## 2022-12-02 RX ORDER — OSELTAMIVIR PHOSPHATE 30 MG/1
30 CAPSULE ORAL 2 TIMES DAILY
Qty: 10 CAPSULE | Refills: 0 | Status: ON HOLD | OUTPATIENT
Start: 2022-12-02 | End: 2022-12-11 | Stop reason: HOSPADM

## 2022-12-02 NOTE — PATIENT INSTRUCTIONS
Patient Instructions   PLEASE READ YOUR DISCHARGE INSTRUCTIONS ENTIRELY AS IT CONTAINS IMPORTANT INFORMATION.        Please drink plenty of fluids.     Please get plenty of rest.     Please take an over the counter antihistamine medication (allegra/Claritin/Zyrtec) of your choice as directed.       If you do have Hypertension or palpitations, it is safe to take Coricidin HBP for relief of sinus symptoms.     If not allergic, please take over the counter Tylenol (Acetaminophen) as directed for control of pain and/or fever.     Sore throat recommendations: Warm fluids, warm salt water gargles, throat lozenges, tea, honey, soup, rest, hydration.     Use over the counter flonase: one spray each nostril twice daily OR two sprays each nostril once daily.      If you  smoke, please stop smoking.     Please return here or go to the Emergency Department for any concerns or worsening of condition.

## 2022-12-02 NOTE — PROGRESS NOTES
Subjective:         Chief Complaint: Cough     Deena Moody is a 89 y.o. male, patient of Jam Rowell MD  unknown to me, presents today with complaints of Cough  .  Cough  This is a new problem. The current episode started in the past 7 days. The problem has been gradually worsening. The problem occurs every few minutes. The cough is Non-productive. Associated symptoms include a sore throat and wheezing. Pertinent negatives include no chest pain, chills, ear congestion, ear pain, fever, headaches, nasal congestion, postnasal drip, rhinorrhea, shortness of breath, sweats or weight loss. Nothing aggravates the symptoms.     Patient hard of hearing, daughter providing most of history. Reports family gathering on 11/26/22, two days after patient developed fatigue, sore throat and cough. Denies SOB, CP, palpitations, NVD.       Of side note, patient about to run out of Eliquis, daughter requesting refill to be sent to preferred pharmacy.    Past Medical History:   Diagnosis Date    Acute on chronic diastolic heart failure 9/1/2016    Coronary artery disease     Hyperlipidemia     Hypertension     Macular degeneration (senile) of retina, unspecified 12/12/2014    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    Persistent atrial fibrillation 11/10/2016    S/P placement of cardiac pacemaker 9/14/2016       Past Surgical History:   Procedure Laterality Date    AORTIC VALVE REPLACEMENT N/A     CARDIAC PACEMAKER PLACEMENT      CATARACT EXTRACTION W/  INTRAOCULAR LENS IMPLANT Right 2/16/2016    Dr. Whitley    CATARACT EXTRACTION W/  INTRAOCULAR LENS IMPLANT Left 3/1/2016    Dr. Whitley    CORONARY ARTERY BYPASS GRAFT      EAR EXAMINATION UNDER ANESTHESIA      EYE SURGERY         Family History   Problem Relation Age of Onset    No Known Problems Mother     No Known Problems Father     No Known Problems Sister     Stroke Brother     Hypertension Brother     No Known Problems Maternal Aunt     No Known Problems Maternal Uncle  "    No Known Problems Paternal Aunt     No Known Problems Paternal Uncle     No Known Problems Maternal Grandmother     No Known Problems Maternal Grandfather     No Known Problems Paternal Grandmother     No Known Problems Paternal Grandfather     No Known Problems Daughter     No Known Problems Son     Amblyopia Neg Hx     Blindness Neg Hx     Cancer Neg Hx     Cataracts Neg Hx     Diabetes Neg Hx     Glaucoma Neg Hx     Macular degeneration Neg Hx     Retinal detachment Neg Hx     Strabismus Neg Hx     Thyroid disease Neg Hx        Social History     Socioeconomic History    Marital status:    Tobacco Use    Smoking status: Never    Smokeless tobacco: Never   Substance and Sexual Activity    Alcohol use: No    Drug use: No    Sexual activity: Yes     Partners: Female       Review of Systems   Constitutional:  Negative for chills, fever and weight loss.   HENT:  Positive for sore throat. Negative for ear pain, postnasal drip and rhinorrhea.    Respiratory:  Positive for cough and wheezing. Negative for shortness of breath.    Cardiovascular:  Positive for leg swelling. Negative for chest pain.   Gastrointestinal:  Negative for abdominal pain.   Endocrine: Negative for polydipsia, polyphagia and polyuria.   Neurological:  Negative for headaches.   Psychiatric/Behavioral:  Negative for confusion.        Objective:     Vitals:    12/02/22 1540 12/02/22 1553   BP: (!) 122/56 136/62   BP Location: Left arm Left arm   Patient Position: Sitting Sitting   BP Method: Small (Manual)    Pulse: 70    SpO2: 95%    Weight: 69.1 kg (152 lb 5.4 oz)    Height: 5' 9" (1.753 m)           Physical Exam  Vitals reviewed.   Constitutional:       General: He is not in acute distress.     Appearance: Normal appearance. He is not ill-appearing or toxic-appearing.   HENT:      Head: Normocephalic and atraumatic.      Right Ear: There is impacted cerumen.      Left Ear: There is impacted cerumen.      Nose: Rhinorrhea present. " Rhinorrhea is clear.      Mouth/Throat:      Pharynx: Posterior oropharyngeal erythema present.   Eyes:      Extraocular Movements: Extraocular movements intact.      Pupils: Pupils are equal, round, and reactive to light.   Cardiovascular:      Rate and Rhythm: Normal rate and regular rhythm.      Heart sounds: Normal heart sounds.   Pulmonary:      Effort: Pulmonary effort is normal. No accessory muscle usage or respiratory distress.      Breath sounds: Examination of the right-upper field reveals wheezing. Examination of the left-upper field reveals wheezing. Wheezing present. No decreased breath sounds.   Abdominal:      General: Bowel sounds are normal.      Palpations: Abdomen is soft.   Musculoskeletal:         General: Normal range of motion.      Cervical back: Normal range of motion.      Right lower le+ Edema present.      Left lower le+ Edema present.   Skin:     General: Skin is warm and dry.   Neurological:      General: No focal deficit present.      Mental Status: He is alert and oriented to person, place, and time.   Psychiatric:         Mood and Affect: Mood normal.         Speech: Speech normal.         Assessment:         ICD-10-CM ICD-9-CM   1. Acute cough  R05.1 786.2   2. Flu-like symptoms  R68.89 780.99   3. Persistent atrial fibrillation  I48.19 427.31   4. Wheezing  R06.2 786.07   5. Influenza A  J10.1 487.1       Plan:       Acute cough  -     POCT COVID-19 Rapid Screening  -     POCT Influenza A/B  -     benzonatate (TESSALON) 100 MG capsule; Take 1 capsule (100 mg total) by mouth 3 (three) times daily as needed for Cough.  Dispense: 30 capsule; Refill: 0  -     X-Ray Chest PA And Lateral; Future; Expected date: 2022    Flu-like symptoms  -     POCT Influenza A/B    Persistent atrial fibrillation  -     apixaban (ELIQUIS) 2.5 mg Tab; Take 1 tablet (2.5 mg total) by mouth 2 (two) times daily.  Dispense: 180 tablet; Refill: 3    Wheezing  -     X-Ray Chest PA And Lateral;  Future; Expected date: 12/02/2022    Influenza A  -     X-Ray Chest PA And Lateral; Future; Expected date: 12/02/2022  -     Discontinue: oseltamivir (TAMIFLU) 75 MG capsule; Take 1 capsule (75 mg total) by mouth 2 (two) times daily. for 5 days  Dispense: 10 capsule; Refill: 0  -     oseltamivir (TAMIFLU) 30 MG capsule; Take 1 capsule (30 mg total) by mouth 2 (two) times daily. for 5 days  Dispense: 10 capsule; Refill: 0    -Strict ED precautions given if new symptoms develop or if symptoms worsen.  -Please drink plenty of fluids.  -Please get plenty of rest.  -If you do have Hypertension or palpitations, it is safe to take Coricidin HBP for relief of sinus symptoms.  -If not allergic, please take over the counter Tylenol (Acetaminophen) a as directed for control of pain and/or fever.  -Sore throat recommendations: Warm fluids, warm salt water gargles, throat lozenges, tea, honey, soup, rest, hydration.  -Use over the counter flonase: one spray each nostril twice daily OR two sprays each nostril once daily.      If you  smoke, please stop smoking.       If symptoms worsen, go to ER.  If symptoms do not improve, return to clinic.   Keep appointments with all specialists.   Follow up if symptoms worsen or fail to improve.     Patient's Medications   New Prescriptions    BENZONATATE (TESSALON) 100 MG CAPSULE    Take 1 capsule (100 mg total) by mouth 3 (three) times daily as needed for Cough.    OSELTAMIVIR (TAMIFLU) 30 MG CAPSULE    Take 1 capsule (30 mg total) by mouth 2 (two) times daily. for 5 days   Previous Medications    ASPIRIN (ECOTRIN) 81 MG EC TABLET    Take 1 tablet (81 mg total) by mouth once daily.    BENAZEPRIL (LOTENSIN) 5 MG TABLET    Take 1 tablet (5 mg total) by mouth once daily.    CARVEDILOL (COREG) 12.5 MG TABLET    Take 1 tablet (12.5 mg total) by mouth 2 (two) times daily with meals.    FERROUS SULFATE (FEOSOL) 325 MG (65 MG IRON) TAB TABLET    Take 1 tablet (325 mg total) by mouth daily with  breakfast.    FLUTICASONE PROPIONATE (FLONASE) 50 MCG/ACTUATION NASAL SPRAY    2 sprays (100 mcg total) by Each Nostril route once daily.    MULTIVITAMIN (ONE DAILY MULTIVITAMIN) PER TABLET    Take 1 tablet by mouth once daily.    NITROGLYCERIN (NITROSTAT) 0.4 MG SL TABLET    Take one tablet every 5 minutes for chest pain. After the third tablet go to the ED.    OMEPRAZOLE (PRILOSEC) 20 MG CAPSULE    Take 1 capsule (20 mg total) by mouth daily as needed (gastritis).    POLYETHYLENE GLYCOL (GLYCOLAX) 17 GRAM/DOSE POWDER    Dissolve one capful (17 g) in liquid by mouth 3 (three) times daily as needed. Take 1 three times daily until well formed stool    POTASSIUM CHLORIDE (KLOR-CON) 8 MEQ TBSR    Take 1 tablet (8 mEq total) by mouth every other day.    TORSEMIDE (DEMADEX) 20 MG TAB    TAKE 1 TABLET EVERY DAY   Modified Medications    Modified Medication Previous Medication    APIXABAN (ELIQUIS) 2.5 MG TAB apixaban (ELIQUIS) 2.5 mg Tab       Take 1 tablet (2.5 mg total) by mouth 2 (two) times daily.    Take 1 tablet (2.5 mg total) by mouth 2 (two) times daily.   Discontinued Medications    No medications on file       I spent a total of 50 minutes on the day of the visit.This includes face to face time and non-face to face time preparing to see the patient (eg, review of tests), obtaining and/or reviewing separately obtained history, documenting clinical information in the electronic or other health record, independently interpreting results and communicating results to the patient/family/caregiver, or care coordinator.     Terri Thompson NP

## 2022-12-03 ENCOUNTER — HOSPITAL ENCOUNTER (INPATIENT)
Facility: HOSPITAL | Age: 87
LOS: 7 days | Discharge: HOME HEALTH | DRG: 280 | End: 2022-12-11
Attending: EMERGENCY MEDICINE | Admitting: INTERNAL MEDICINE
Payer: MEDICARE

## 2022-12-03 DIAGNOSIS — I50.33 ACUTE ON CHRONIC DIASTOLIC CONGESTIVE HEART FAILURE: ICD-10-CM

## 2022-12-03 DIAGNOSIS — R79.89 ELEVATED TROPONIN: ICD-10-CM

## 2022-12-03 DIAGNOSIS — R06.02 SOB (SHORTNESS OF BREATH): ICD-10-CM

## 2022-12-03 DIAGNOSIS — I50.9 HEART FAILURE: ICD-10-CM

## 2022-12-03 DIAGNOSIS — R07.9 CHEST PAIN: ICD-10-CM

## 2022-12-03 DIAGNOSIS — I50.43 ACUTE ON CHRONIC COMBINED SYSTOLIC AND DIASTOLIC CONGESTIVE HEART FAILURE: ICD-10-CM

## 2022-12-03 DIAGNOSIS — E87.70 HYPERVOLEMIA, UNSPECIFIED HYPERVOLEMIA TYPE: Primary | ICD-10-CM

## 2022-12-03 DIAGNOSIS — K21.9 GASTROESOPHAGEAL REFLUX DISEASE WITHOUT ESOPHAGITIS: ICD-10-CM

## 2022-12-03 DIAGNOSIS — J10.1 INFLUENZA A: ICD-10-CM

## 2022-12-03 DIAGNOSIS — N17.9 AKI (ACUTE KIDNEY INJURY): ICD-10-CM

## 2022-12-03 DIAGNOSIS — I50.33 ACUTE ON CHRONIC DIASTOLIC (CONGESTIVE) HEART FAILURE: ICD-10-CM

## 2022-12-03 LAB
ALBUMIN SERPL BCP-MCNC: 3.2 G/DL (ref 3.5–5.2)
ALP SERPL-CCNC: 139 U/L (ref 55–135)
ALT SERPL W/O P-5'-P-CCNC: 11 U/L (ref 10–44)
ANION GAP SERPL CALC-SCNC: 11 MMOL/L (ref 8–16)
AST SERPL-CCNC: 20 U/L (ref 10–40)
BASOPHILS # BLD AUTO: 0.03 K/UL (ref 0–0.2)
BASOPHILS NFR BLD: 0.4 % (ref 0–1.9)
BILIRUB SERPL-MCNC: 1.3 MG/DL (ref 0.1–1)
BILIRUB UR QL STRIP: NEGATIVE
BNP SERPL-MCNC: 943 PG/ML (ref 0–99)
BUN SERPL-MCNC: 40 MG/DL (ref 10–30)
BUN SERPL-MCNC: 40 MG/DL (ref 6–30)
CALCIUM SERPL-MCNC: 8.4 MG/DL (ref 8.7–10.5)
CHLORIDE SERPL-SCNC: 102 MMOL/L (ref 95–110)
CHLORIDE SERPL-SCNC: 104 MMOL/L (ref 95–110)
CLARITY UR REFRACT.AUTO: CLEAR
CO2 SERPL-SCNC: 23 MMOL/L (ref 23–29)
COLOR UR AUTO: YELLOW
CREAT SERPL-MCNC: 2 MG/DL (ref 0.5–1.4)
CREAT SERPL-MCNC: 2.1 MG/DL (ref 0.5–1.4)
DIFFERENTIAL METHOD: ABNORMAL
EOSINOPHIL # BLD AUTO: 0.4 K/UL (ref 0–0.5)
EOSINOPHIL NFR BLD: 5.3 % (ref 0–8)
ERYTHROCYTE [DISTWIDTH] IN BLOOD BY AUTOMATED COUNT: 15.9 % (ref 11.5–14.5)
EST. GFR  (NO RACE VARIABLE): 30.7 ML/MIN/1.73 M^2
GLUCOSE SERPL-MCNC: 108 MG/DL (ref 70–110)
GLUCOSE SERPL-MCNC: 109 MG/DL (ref 70–110)
GLUCOSE UR QL STRIP: NEGATIVE
HCT VFR BLD AUTO: 34 % (ref 40–54)
HCT VFR BLD CALC: 33 %PCV (ref 36–54)
HCV AB SERPL QL IA: NORMAL
HGB BLD-MCNC: 10.4 G/DL (ref 14–18)
HGB UR QL STRIP: NEGATIVE
HIV 1+2 AB+HIV1 P24 AG SERPL QL IA: NORMAL
IMM GRANULOCYTES # BLD AUTO: 0.01 K/UL (ref 0–0.04)
IMM GRANULOCYTES NFR BLD AUTO: 0.1 % (ref 0–0.5)
KETONES UR QL STRIP: NEGATIVE
LEUKOCYTE ESTERASE UR QL STRIP: NEGATIVE
LYMPHOCYTES # BLD AUTO: 0.7 K/UL (ref 1–4.8)
LYMPHOCYTES NFR BLD: 11 % (ref 18–48)
MCH RBC QN AUTO: 31.6 PG (ref 27–31)
MCHC RBC AUTO-ENTMCNC: 30.6 G/DL (ref 32–36)
MCV RBC AUTO: 103 FL (ref 82–98)
MONOCYTES # BLD AUTO: 0.7 K/UL (ref 0.3–1)
MONOCYTES NFR BLD: 10.7 % (ref 4–15)
NEUTROPHILS # BLD AUTO: 4.9 K/UL (ref 1.8–7.7)
NEUTROPHILS NFR BLD: 72.5 % (ref 38–73)
NITRITE UR QL STRIP: NEGATIVE
NRBC BLD-RTO: 0 /100 WBC
PH UR STRIP: 5 [PH] (ref 5–8)
PLATELET # BLD AUTO: 150 K/UL (ref 150–450)
PMV BLD AUTO: 9.7 FL (ref 9.2–12.9)
POC IONIZED CALCIUM: 1.05 MMOL/L (ref 1.06–1.42)
POC TCO2 (MEASURED): 24 MMOL/L (ref 23–29)
POTASSIUM BLD-SCNC: 3.9 MMOL/L (ref 3.5–5.1)
POTASSIUM SERPL-SCNC: 3.9 MMOL/L (ref 3.5–5.1)
PROT SERPL-MCNC: 7.7 G/DL (ref 6–8.4)
PROT UR QL STRIP: NEGATIVE
RBC # BLD AUTO: 3.29 M/UL (ref 4.6–6.2)
SAMPLE: ABNORMAL
SODIUM BLD-SCNC: 140 MMOL/L (ref 136–145)
SODIUM SERPL-SCNC: 136 MMOL/L (ref 136–145)
SP GR UR STRIP: 1.01 (ref 1–1.03)
TROPONIN I SERPL DL<=0.01 NG/ML-MCNC: 0.04 NG/ML (ref 0–0.03)
URN SPEC COLLECT METH UR: NORMAL
WBC # BLD AUTO: 6.74 K/UL (ref 3.9–12.7)

## 2022-12-03 PROCEDURE — 80048 BASIC METABOLIC PNL TOTAL CA: CPT | Mod: XB

## 2022-12-03 PROCEDURE — 99220 PR INITIAL OBSERVATION CARE,LEVL III: CPT | Mod: ,,, | Performed by: PHYSICIAN ASSISTANT

## 2022-12-03 PROCEDURE — 96374 THER/PROPH/DIAG INJ IV PUSH: CPT

## 2022-12-03 PROCEDURE — G0378 HOSPITAL OBSERVATION PER HR: HCPCS

## 2022-12-03 PROCEDURE — 83880 ASSAY OF NATRIURETIC PEPTIDE: CPT | Performed by: EMERGENCY MEDICINE

## 2022-12-03 PROCEDURE — 93010 ELECTROCARDIOGRAM REPORT: CPT | Mod: ,,, | Performed by: INTERNAL MEDICINE

## 2022-12-03 PROCEDURE — 25000003 PHARM REV CODE 250: Performed by: EMERGENCY MEDICINE

## 2022-12-03 PROCEDURE — 99285 EMERGENCY DEPT VISIT HI MDM: CPT | Mod: 25

## 2022-12-03 PROCEDURE — 93010 EKG 12-LEAD: ICD-10-PCS | Mod: ,,, | Performed by: INTERNAL MEDICINE

## 2022-12-03 PROCEDURE — 63600175 PHARM REV CODE 636 W HCPCS: Performed by: EMERGENCY MEDICINE

## 2022-12-03 PROCEDURE — 85025 COMPLETE CBC W/AUTO DIFF WBC: CPT | Performed by: EMERGENCY MEDICINE

## 2022-12-03 PROCEDURE — 87389 HIV-1 AG W/HIV-1&-2 AB AG IA: CPT | Performed by: PHYSICIAN ASSISTANT

## 2022-12-03 PROCEDURE — 93005 ELECTROCARDIOGRAM TRACING: CPT

## 2022-12-03 PROCEDURE — 86803 HEPATITIS C AB TEST: CPT | Performed by: PHYSICIAN ASSISTANT

## 2022-12-03 PROCEDURE — 25000003 PHARM REV CODE 250: Performed by: PHYSICIAN ASSISTANT

## 2022-12-03 PROCEDURE — 99220 PR INITIAL OBSERVATION CARE,LEVL III: ICD-10-PCS | Mod: ,,, | Performed by: PHYSICIAN ASSISTANT

## 2022-12-03 PROCEDURE — 80053 COMPREHEN METABOLIC PANEL: CPT | Performed by: EMERGENCY MEDICINE

## 2022-12-03 PROCEDURE — 99285 PR EMERGENCY DEPT VISIT,LEVEL V: ICD-10-PCS | Mod: ,,, | Performed by: EMERGENCY MEDICINE

## 2022-12-03 PROCEDURE — 84484 ASSAY OF TROPONIN QUANT: CPT | Performed by: EMERGENCY MEDICINE

## 2022-12-03 PROCEDURE — 81003 URINALYSIS AUTO W/O SCOPE: CPT | Performed by: PHYSICIAN ASSISTANT

## 2022-12-03 PROCEDURE — 63600175 PHARM REV CODE 636 W HCPCS: Performed by: PHYSICIAN ASSISTANT

## 2022-12-03 PROCEDURE — 99285 EMERGENCY DEPT VISIT HI MDM: CPT | Mod: ,,, | Performed by: EMERGENCY MEDICINE

## 2022-12-03 PROCEDURE — 96376 TX/PRO/DX INJ SAME DRUG ADON: CPT

## 2022-12-03 RX ORDER — ACETAMINOPHEN 325 MG/1
650 TABLET ORAL EVERY 4 HOURS PRN
Status: DISCONTINUED | OUTPATIENT
Start: 2022-12-03 | End: 2022-12-11 | Stop reason: HOSPADM

## 2022-12-03 RX ORDER — FUROSEMIDE 10 MG/ML
40 INJECTION INTRAMUSCULAR; INTRAVENOUS ONCE
Status: COMPLETED | OUTPATIENT
Start: 2022-12-03 | End: 2022-12-03

## 2022-12-03 RX ORDER — SODIUM CHLORIDE 0.9 % (FLUSH) 0.9 %
10 SYRINGE (ML) INJECTION
Status: DISCONTINUED | OUTPATIENT
Start: 2022-12-03 | End: 2022-12-11 | Stop reason: HOSPADM

## 2022-12-03 RX ORDER — CARVEDILOL 12.5 MG/1
12.5 TABLET ORAL 2 TIMES DAILY WITH MEALS
Status: DISCONTINUED | OUTPATIENT
Start: 2022-12-04 | End: 2022-12-11 | Stop reason: HOSPADM

## 2022-12-03 RX ORDER — FUROSEMIDE 10 MG/ML
40 INJECTION INTRAMUSCULAR; INTRAVENOUS
Status: DISCONTINUED | OUTPATIENT
Start: 2022-12-04 | End: 2022-12-03

## 2022-12-03 RX ORDER — PROMETHAZINE HYDROCHLORIDE 25 MG/1
25 TABLET ORAL EVERY 6 HOURS PRN
Status: DISCONTINUED | OUTPATIENT
Start: 2022-12-03 | End: 2022-12-11 | Stop reason: HOSPADM

## 2022-12-03 RX ORDER — GLUCAGON 1 MG
1 KIT INJECTION
Status: DISCONTINUED | OUTPATIENT
Start: 2022-12-03 | End: 2022-12-11 | Stop reason: HOSPADM

## 2022-12-03 RX ORDER — GUAIFENESIN 600 MG/1
600 TABLET, EXTENDED RELEASE ORAL 2 TIMES DAILY
Status: DISCONTINUED | OUTPATIENT
Start: 2022-12-03 | End: 2022-12-07

## 2022-12-03 RX ORDER — LANOLIN ALCOHOL/MO/W.PET/CERES
1 CREAM (GRAM) TOPICAL DAILY
Status: DISCONTINUED | OUTPATIENT
Start: 2022-12-04 | End: 2022-12-11 | Stop reason: HOSPADM

## 2022-12-03 RX ORDER — BENZONATATE 100 MG/1
100 CAPSULE ORAL 3 TIMES DAILY PRN
Status: DISCONTINUED | OUTPATIENT
Start: 2022-12-03 | End: 2022-12-11

## 2022-12-03 RX ORDER — POLYETHYLENE GLYCOL 3350 17 G/17G
17 POWDER, FOR SOLUTION ORAL DAILY PRN
Status: DISCONTINUED | OUTPATIENT
Start: 2022-12-03 | End: 2022-12-11 | Stop reason: HOSPADM

## 2022-12-03 RX ORDER — OSELTAMIVIR PHOSPHATE 30 MG/1
30 CAPSULE ORAL
Status: ACTIVE | OUTPATIENT
Start: 2022-12-03 | End: 2022-12-04

## 2022-12-03 RX ORDER — OSELTAMIVIR PHOSPHATE 30 MG/1
30 CAPSULE ORAL DAILY
Status: DISCONTINUED | OUTPATIENT
Start: 2022-12-04 | End: 2022-12-06

## 2022-12-03 RX ORDER — BISACODYL 10 MG
10 SUPPOSITORY, RECTAL RECTAL DAILY PRN
Status: DISCONTINUED | OUTPATIENT
Start: 2022-12-03 | End: 2022-12-11 | Stop reason: HOSPADM

## 2022-12-03 RX ORDER — FUROSEMIDE 10 MG/ML
40 INJECTION INTRAMUSCULAR; INTRAVENOUS
Status: COMPLETED | OUTPATIENT
Start: 2022-12-03 | End: 2022-12-03

## 2022-12-03 RX ORDER — IPRATROPIUM BROMIDE AND ALBUTEROL SULFATE 2.5; .5 MG/3ML; MG/3ML
3 SOLUTION RESPIRATORY (INHALATION) EVERY 4 HOURS PRN
Status: DISCONTINUED | OUTPATIENT
Start: 2022-12-03 | End: 2022-12-11 | Stop reason: HOSPADM

## 2022-12-03 RX ORDER — ASPIRIN 81 MG/1
81 TABLET ORAL DAILY
Status: DISCONTINUED | OUTPATIENT
Start: 2022-12-04 | End: 2022-12-06

## 2022-12-03 RX ORDER — TALC
6 POWDER (GRAM) TOPICAL NIGHTLY PRN
Status: DISCONTINUED | OUTPATIENT
Start: 2022-12-03 | End: 2022-12-11 | Stop reason: HOSPADM

## 2022-12-03 RX ORDER — IBUPROFEN 200 MG
16 TABLET ORAL
Status: DISCONTINUED | OUTPATIENT
Start: 2022-12-03 | End: 2022-12-11 | Stop reason: HOSPADM

## 2022-12-03 RX ORDER — IBUPROFEN 200 MG
24 TABLET ORAL
Status: DISCONTINUED | OUTPATIENT
Start: 2022-12-03 | End: 2022-12-11 | Stop reason: HOSPADM

## 2022-12-03 RX ORDER — IPRATROPIUM BROMIDE AND ALBUTEROL SULFATE 2.5; .5 MG/3ML; MG/3ML
3 SOLUTION RESPIRATORY (INHALATION)
Status: DISCONTINUED | OUTPATIENT
Start: 2022-12-04 | End: 2022-12-10

## 2022-12-03 RX ORDER — FUROSEMIDE 10 MG/ML
80 INJECTION INTRAMUSCULAR; INTRAVENOUS
Status: DISCONTINUED | OUTPATIENT
Start: 2022-12-04 | End: 2022-12-05

## 2022-12-03 RX ORDER — ONDANSETRON 8 MG/1
8 TABLET, ORALLY DISINTEGRATING ORAL EVERY 8 HOURS PRN
Status: DISCONTINUED | OUTPATIENT
Start: 2022-12-03 | End: 2022-12-11 | Stop reason: HOSPADM

## 2022-12-03 RX ADMIN — FUROSEMIDE 40 MG: 10 INJECTION, SOLUTION INTRAMUSCULAR; INTRAVENOUS at 07:12

## 2022-12-03 RX ADMIN — FUROSEMIDE 40 MG: 10 INJECTION, SOLUTION INTRAMUSCULAR; INTRAVENOUS at 09:12

## 2022-12-03 RX ADMIN — APIXABAN 2.5 MG: 2.5 TABLET, FILM COATED ORAL at 11:12

## 2022-12-03 NOTE — ED PROVIDER NOTES
"Encounter Date: 12/3/2022       History     Chief Complaint   Patient presents with    Shortness of Breath     Hx of CHF. Dx'd w/ influenza yesterday, 12/2/22. Was seen at outside facility w/ chest x-ray obtained that displayed "moderate right pleural effusion with possible basilar atelectasis and/or infiltrate." Pt denies chest pain, N/V/D     HPI  89-year-old male with past medical history as noted below, recently diagnosed with flu at primary care office, coming in with penile and testicular swelling.  He has been short of breath since Wednesday, cough that is dry, some rib pain from coughing.  He is seen his primary care doctor them just a viral swabs and chest x-ray, he had a pleural effusion and was positive for flu.  Today he is coming back because of penile and testicular swelling.  Shortness of breath is at baseline from last few days.  No difficulty peeing, no pain in the testicles.  Family says he is previously had large testicles required IV diuresis to fix.  Patient is not complaining of significant lower extremity swelling.  Some rib pain with coughing but no chest pain.  No fevers.  Patient is not vaccinated for flu.    They are prescribed 30 mg of Tamiflu yesterday but were unable to find a pharmacy so he is not taken it.    Review of patient's allergies indicates:   Allergen Reactions    Iodine and iodide containing products Other (See Comments)     Caused changes in skin color     Past Medical History:   Diagnosis Date    Acute on chronic diastolic heart failure 9/1/2016    Coronary artery disease     Hyperlipidemia     Hypertension     Macular degeneration (senile) of retina, unspecified 12/12/2014    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    Persistent atrial fibrillation 11/10/2016    S/P placement of cardiac pacemaker 9/14/2016     Past Surgical History:   Procedure Laterality Date    AORTIC VALVE REPLACEMENT N/A     CARDIAC PACEMAKER PLACEMENT      CATARACT EXTRACTION W/  INTRAOCULAR LENS IMPLANT Right " 2/16/2016    Dr. Whitley    CATARACT EXTRACTION W/  INTRAOCULAR LENS IMPLANT Left 3/1/2016    Dr. Whitley    CORONARY ARTERY BYPASS GRAFT      EAR EXAMINATION UNDER ANESTHESIA      EYE SURGERY       Family History   Problem Relation Age of Onset    No Known Problems Mother     No Known Problems Father     No Known Problems Sister     Stroke Brother     Hypertension Brother     No Known Problems Maternal Aunt     No Known Problems Maternal Uncle     No Known Problems Paternal Aunt     No Known Problems Paternal Uncle     No Known Problems Maternal Grandmother     No Known Problems Maternal Grandfather     No Known Problems Paternal Grandmother     No Known Problems Paternal Grandfather     No Known Problems Daughter     No Known Problems Son     Amblyopia Neg Hx     Blindness Neg Hx     Cancer Neg Hx     Cataracts Neg Hx     Diabetes Neg Hx     Glaucoma Neg Hx     Macular degeneration Neg Hx     Retinal detachment Neg Hx     Strabismus Neg Hx     Thyroid disease Neg Hx      Social History     Tobacco Use    Smoking status: Never    Smokeless tobacco: Never   Substance Use Topics    Alcohol use: No    Drug use: No     Review of Systems    Constitutional:  No Fever, No Chills,   Eyes: No Vision Changes  ENT/Mouth: No sore throat, No rhinorrhea  Cardiovascular:  No Chest Pain, No Palpitations  Respiratory:  Positive nonproductive Cough, positive SOB  Gastrointestinal:  No Nausea, No Vomiting, No Diarrhea, No abdo pain.  Genitourinary:  No  pain, No dysuria, penile and testicular swelling.  Musculoskeletal:  No Arthralgias, No Back Pain, No Neck Pain, No recent trauma.  Skin:  No skin Lesions  Neuro:  No Weakness, No Numbness, No Paresthesias, No Dizziness, No Headache      Physical Exam     Initial Vitals [12/03/22 1640]   BP Pulse Resp Temp SpO2   (!) 113/55 70 (!) 24 97.7 °F (36.5 °C) 96 %      MAP       --         Physical Exam    Physical Exam:  CONSTITUTIONAL: Well developed, well nourished, in no acute  distress.  HENT: Normocephalic, atraumatic    EYES: Sclerae anicteric   NECK: Supple, no thyroid enlargement  CARDIOVASCULAR: Regular rate and rhythm without any murmurs, gallops, rubs.  Trace lower extremity edema bilaterally.  RESPIRATORY: Speaking in full sentences. Breathing comfortably. Auscultation of the lungs revealed mixed of rhonchi and wheezing with air movement.  ABDOMEN: Soft and nontender, no masses, no rebound or guarding   :  Penis and testicles are moderately swollen, not tight, no redness, no tenderness.  Consistent with edema.  NEUROLOGIC: Alert, interacting normally. No facial droop.   MSK: Moving all four extremities.  Skin: Warm and dry. No visible rash on exposed areas of skin.    Psych: Mood and affect normal.       ED Course   Procedures  Labs Reviewed   B-TYPE NATRIURETIC PEPTIDE - Abnormal; Notable for the following components:       Result Value     (*)     All other components within normal limits   CBC W/ AUTO DIFFERENTIAL - Abnormal; Notable for the following components:    RBC 3.29 (*)     Hemoglobin 10.4 (*)     Hematocrit 34.0 (*)      (*)     MCH 31.6 (*)     MCHC 30.6 (*)     RDW 15.9 (*)     Lymph # 0.7 (*)     Lymph % 11.0 (*)     All other components within normal limits   COMPREHENSIVE METABOLIC PANEL - Abnormal; Notable for the following components:    BUN 40 (*)     Creatinine 2.0 (*)     Calcium 8.4 (*)     Albumin 3.2 (*)     Total Bilirubin 1.3 (*)     Alkaline Phosphatase 139 (*)     eGFR 30.7 (*)     All other components within normal limits   TROPONIN I - Abnormal; Notable for the following components:    Troponin I 0.044 (*)     All other components within normal limits   ISTAT PROCEDURE - Abnormal; Notable for the following components:    POC BUN 40 (*)     POC Creatinine 2.1 (*)     POC Ionized Calcium 1.05 (*)     POC Hematocrit 33 (*)     All other components within normal limits   HIV 1 / 2 ANTIBODY    Narrative:     Release to patient->Immediate    HEPATITIS C ANTIBODY    Narrative:     Release to patient->Immediate   URINALYSIS, REFLEX TO URINE CULTURE    Narrative:     Specimen Source->Urine   URINALYSIS, REFLEX TO URINE CULTURE   ISTAT CHEM8          Imaging Results              X-Ray Chest 1 View (Final result)  Result time 12/03/22 19:30:07      Final result by Carlton Myers MD (12/03/22 19:30:07)                   Impression:      No significant change.  See above comments.  Follow-up recommended.      Electronically signed by: Carlton Myers  Date:    12/03/2022  Time:    19:30               Narrative:    EXAMINATION:  XR CHEST 1 VIEW    CLINICAL HISTORY:  Shortness of breath    TECHNIQUE:  Single frontal view of the chest was performed.    COMPARISON:  12/02/2022    FINDINGS:  Sternotomy wires are present.    Cardiac silhouette is stable, borderline enlarged.  Cardiac valve replacement.    Right basilar effusion.  Possible trace effusion on the left also.    No mass or consolidation.  Probable mild atelectasis on the right.  Minimal left basilar interstitial infiltrate or mild atelectasis.    No evidence of pneumothorax.  No acute osseous abnormality.    No significant change.                                       Medications   oseltamivir capsule 30 mg (30 mg Oral Not Given 12/3/22 2048)   sodium chloride 0.9% flush 10 mL (has no administration in time range)   melatonin tablet 6 mg (has no administration in time range)   sodium chloride 0.9% flush 10 mL (has no administration in time range)   albuterol-ipratropium 2.5 mg-0.5 mg/3 mL nebulizer solution 3 mL (has no administration in time range)   ondansetron disintegrating tablet 8 mg (has no administration in time range)   promethazine tablet 25 mg (has no administration in time range)   polyethylene glycol packet 17 g (has no administration in time range)   bisacodyL suppository 10 mg (has no administration in time range)   acetaminophen tablet 650 mg (has no administration in time range)    glucose chewable tablet 16 g (has no administration in time range)   glucose chewable tablet 24 g (has no administration in time range)   glucagon (human recombinant) injection 1 mg (has no administration in time range)   dextrose 10% bolus 125 mL (has no administration in time range)   dextrose 10% bolus 250 mL (has no administration in time range)   oseltamivir capsule 30 mg (has no administration in time range)   guaiFENesin 12 hr tablet 600 mg (0 mg Oral Hold 12/3/22 2200)   benzonatate capsule 100 mg (100 mg Oral Given 12/4/22 0200)   furosemide injection 80 mg (has no administration in time range)   albuterol-ipratropium 2.5 mg-0.5 mg/3 mL nebulizer solution 3 mL (has no administration in time range)   apixaban tablet 2.5 mg (2.5 mg Oral Given 12/3/22 2323)   aspirin EC tablet 81 mg (has no administration in time range)   carvediloL tablet 12.5 mg (has no administration in time range)   ferrous sulfate tablet 1 each (has no administration in time range)   furosemide injection 40 mg (40 mg Intravenous Given 12/3/22 1939)   furosemide injection 40 mg (40 mg Intravenous Given 12/3/22 2148)     Medical Decision Making:   History:   I obtained history from: someone other than patient.       <> Summary of History: Part of history obtained from daughter.  Old Medical Records: I decided to obtain old medical records.  Old Records Summarized: records from clinic visits.       <> Summary of Records: See HPI  Independently Interpreted Test(s):   I have ordered and independently interpreted EKG Reading(s) - see prior notes  Clinical Tests:   Lab Tests: Reviewed and Ordered  Radiological Study: Ordered and Reviewed  Medical Tests: Reviewed and Ordered  Other:   I have discussed this case with another health care provider.     Risk level:  High, complexity:  High, level 5.     89-year-old male with past medical history as noted coming in with testicular swelling, shortness of breath, cough in the context of recent flu  infection.    On exam no acute distress, some mild wheezing/rhonchi on lung exams, testicles and penis are moderately swollen consistent with edema.  Only trace lower extremity edema bilaterally.    History and exam is consistent with likely fluid overload although unclear if related to recent flu infection.  Will get labs to look for any metabolic or hematologic abnormalities, chest x-ray, BNP to further risk stratify for fluid overload/heart failure.  Troponin to risk stratify for ACS, although patient has no chest pain.    Disposition based on ED workup and patient's symptomatology.    Update:  Found to have SARAH, BNP is up, pleural effusion on chest x-ray.  Consistent with fluid overload.  Troponin is very mildly elevated, likely secondary to demand, the patient has no chest pain.  Not consistent with ACS at this time.  May need to be trended.  Given 40 of Lasix in the emergency department to start diuresis.  Will also give dose of Tamiflu here in the emergency department given his age and risk factors and current flu infection.  Given his age, risk factors, SARAH, fluid overload will admit to Hospital Medicine for IV diuresis.                       Clinical Impression:   Final diagnoses:  [R06.02] SOB (shortness of breath)  [E87.70] Hypervolemia, unspecified hypervolemia type (Primary)  [N17.9] SARAH (acute kidney injury)  [I50.33] Acute on chronic diastolic (congestive) heart failure      ED Disposition Condition    Observation Stable                Constantino Keller MD  12/04/22 0245       Constantino Keller MD  12/04/22 0244

## 2022-12-04 LAB
ALBUMIN SERPL BCP-MCNC: 2.8 G/DL (ref 3.5–5.2)
ALP SERPL-CCNC: 116 U/L (ref 55–135)
ALT SERPL W/O P-5'-P-CCNC: 11 U/L (ref 10–44)
ANION GAP SERPL CALC-SCNC: 10 MMOL/L (ref 8–16)
AST SERPL-CCNC: 17 U/L (ref 10–40)
BASOPHILS # BLD AUTO: 0.03 K/UL (ref 0–0.2)
BASOPHILS NFR BLD: 0.5 % (ref 0–1.9)
BILIRUB SERPL-MCNC: 1.4 MG/DL (ref 0.1–1)
BUN SERPL-MCNC: 43 MG/DL (ref 10–30)
CALCIUM SERPL-MCNC: 8.3 MG/DL (ref 8.7–10.5)
CHLORIDE SERPL-SCNC: 103 MMOL/L (ref 95–110)
CO2 SERPL-SCNC: 23 MMOL/L (ref 23–29)
CREAT SERPL-MCNC: 1.8 MG/DL (ref 0.5–1.4)
DIFFERENTIAL METHOD: ABNORMAL
EOSINOPHIL # BLD AUTO: 0.3 K/UL (ref 0–0.5)
EOSINOPHIL NFR BLD: 5.5 % (ref 0–8)
ERYTHROCYTE [DISTWIDTH] IN BLOOD BY AUTOMATED COUNT: 15.7 % (ref 11.5–14.5)
EST. GFR  (NO RACE VARIABLE): 34.9 ML/MIN/1.73 M^2
ESTIMATED AVG GLUCOSE: 97 MG/DL (ref 68–131)
GLUCOSE SERPL-MCNC: 83 MG/DL (ref 70–110)
HBA1C MFR BLD: 5 % (ref 4–5.6)
HCT VFR BLD AUTO: 28.5 % (ref 40–54)
HGB BLD-MCNC: 8.9 G/DL (ref 14–18)
IMM GRANULOCYTES # BLD AUTO: 0.02 K/UL (ref 0–0.04)
IMM GRANULOCYTES NFR BLD AUTO: 0.3 % (ref 0–0.5)
LYMPHOCYTES # BLD AUTO: 0.8 K/UL (ref 1–4.8)
LYMPHOCYTES NFR BLD: 13.3 % (ref 18–48)
MAGNESIUM SERPL-MCNC: 2 MG/DL (ref 1.6–2.6)
MCH RBC QN AUTO: 31.2 PG (ref 27–31)
MCHC RBC AUTO-ENTMCNC: 31.2 G/DL (ref 32–36)
MCV RBC AUTO: 100 FL (ref 82–98)
MONOCYTES # BLD AUTO: 0.7 K/UL (ref 0.3–1)
MONOCYTES NFR BLD: 11.8 % (ref 4–15)
NEUTROPHILS # BLD AUTO: 4 K/UL (ref 1.8–7.7)
NEUTROPHILS NFR BLD: 68.6 % (ref 38–73)
NRBC BLD-RTO: 0 /100 WBC
PHOSPHATE SERPL-MCNC: 3.3 MG/DL (ref 2.7–4.5)
PLATELET # BLD AUTO: 133 K/UL (ref 150–450)
PMV BLD AUTO: 9.6 FL (ref 9.2–12.9)
POTASSIUM SERPL-SCNC: 3.4 MMOL/L (ref 3.5–5.1)
PROCALCITONIN SERPL IA-MCNC: 0.1 NG/ML
PROT SERPL-MCNC: 6.7 G/DL (ref 6–8.4)
RBC # BLD AUTO: 2.85 M/UL (ref 4.6–6.2)
SODIUM SERPL-SCNC: 136 MMOL/L (ref 136–145)
TROPONIN I SERPL DL<=0.01 NG/ML-MCNC: 0.04 NG/ML (ref 0–0.03)
TROPONIN I SERPL DL<=0.01 NG/ML-MCNC: 0.04 NG/ML (ref 0–0.03)
WBC # BLD AUTO: 5.86 K/UL (ref 3.9–12.7)

## 2022-12-04 PROCEDURE — 84484 ASSAY OF TROPONIN QUANT: CPT | Mod: 91 | Performed by: PHYSICIAN ASSISTANT

## 2022-12-04 PROCEDURE — 25000242 PHARM REV CODE 250 ALT 637 W/ HCPCS: Performed by: PHYSICIAN ASSISTANT

## 2022-12-04 PROCEDURE — 94640 AIRWAY INHALATION TREATMENT: CPT

## 2022-12-04 PROCEDURE — 94761 N-INVAS EAR/PLS OXIMETRY MLT: CPT

## 2022-12-04 PROCEDURE — 27000207 HC ISOLATION

## 2022-12-04 PROCEDURE — 99233 PR SUBSEQUENT HOSPITAL CARE,LEVL III: ICD-10-PCS | Mod: ,,, | Performed by: INTERNAL MEDICINE

## 2022-12-04 PROCEDURE — 25000003 PHARM REV CODE 250: Performed by: INTERNAL MEDICINE

## 2022-12-04 PROCEDURE — 84145 PROCALCITONIN (PCT): CPT | Performed by: PHYSICIAN ASSISTANT

## 2022-12-04 PROCEDURE — 85025 COMPLETE CBC W/AUTO DIFF WBC: CPT | Performed by: PHYSICIAN ASSISTANT

## 2022-12-04 PROCEDURE — 80053 COMPREHEN METABOLIC PANEL: CPT | Performed by: PHYSICIAN ASSISTANT

## 2022-12-04 PROCEDURE — 99233 SBSQ HOSP IP/OBS HIGH 50: CPT | Mod: ,,, | Performed by: INTERNAL MEDICINE

## 2022-12-04 PROCEDURE — 83036 HEMOGLOBIN GLYCOSYLATED A1C: CPT | Performed by: PHYSICIAN ASSISTANT

## 2022-12-04 PROCEDURE — 63600175 PHARM REV CODE 636 W HCPCS: Performed by: PHYSICIAN ASSISTANT

## 2022-12-04 PROCEDURE — 84484 ASSAY OF TROPONIN QUANT: CPT | Performed by: PHYSICIAN ASSISTANT

## 2022-12-04 PROCEDURE — 36415 COLL VENOUS BLD VENIPUNCTURE: CPT | Performed by: PHYSICIAN ASSISTANT

## 2022-12-04 PROCEDURE — 25000003 PHARM REV CODE 250: Performed by: PHYSICIAN ASSISTANT

## 2022-12-04 PROCEDURE — 83735 ASSAY OF MAGNESIUM: CPT | Performed by: PHYSICIAN ASSISTANT

## 2022-12-04 PROCEDURE — 11000001 HC ACUTE MED/SURG PRIVATE ROOM

## 2022-12-04 PROCEDURE — 84100 ASSAY OF PHOSPHORUS: CPT | Performed by: PHYSICIAN ASSISTANT

## 2022-12-04 PROCEDURE — 99900035 HC TECH TIME PER 15 MIN (STAT)

## 2022-12-04 RX ADMIN — POTASSIUM BICARBONATE 50 MEQ: 978 TABLET, EFFERVESCENT ORAL at 09:12

## 2022-12-04 RX ADMIN — FERROUS SULFATE TAB 325 MG (65 MG ELEMENTAL FE) 1 EACH: 325 (65 FE) TAB at 09:12

## 2022-12-04 RX ADMIN — IPRATROPIUM BROMIDE AND ALBUTEROL SULFATE 3 ML: 2.5; .5 SOLUTION RESPIRATORY (INHALATION) at 01:12

## 2022-12-04 RX ADMIN — APIXABAN 2.5 MG: 2.5 TABLET, FILM COATED ORAL at 09:12

## 2022-12-04 RX ADMIN — IPRATROPIUM BROMIDE AND ALBUTEROL SULFATE 3 ML: 2.5; .5 SOLUTION RESPIRATORY (INHALATION) at 07:12

## 2022-12-04 RX ADMIN — OSELTAMIVIR PHOSPHATE 30 MG: 30 CAPSULE ORAL at 09:12

## 2022-12-04 RX ADMIN — GUAIFENESIN 600 MG: 600 TABLET, EXTENDED RELEASE ORAL at 09:12

## 2022-12-04 RX ADMIN — IPRATROPIUM BROMIDE AND ALBUTEROL SULFATE 3 ML: 2.5; .5 SOLUTION RESPIRATORY (INHALATION) at 05:12

## 2022-12-04 RX ADMIN — ASPIRIN 81 MG: 81 TABLET, COATED ORAL at 09:12

## 2022-12-04 RX ADMIN — IPRATROPIUM BROMIDE AND ALBUTEROL SULFATE 3 ML: 2.5; .5 SOLUTION RESPIRATORY (INHALATION) at 09:12

## 2022-12-04 RX ADMIN — FUROSEMIDE 80 MG: 10 INJECTION, SOLUTION INTRAVENOUS at 09:12

## 2022-12-04 RX ADMIN — BENZONATATE 100 MG: 100 CAPSULE ORAL at 02:12

## 2022-12-04 RX ADMIN — CARVEDILOL 12.5 MG: 12.5 TABLET, FILM COATED ORAL at 08:12

## 2022-12-04 RX ADMIN — BENZONATATE 100 MG: 100 CAPSULE ORAL at 01:12

## 2022-12-04 NOTE — HPI
Deena Moody is a 89 y.o. male with a PMHx of HTN, HLD, CHF, Afib, CAD [place] [facility] [place] and penile [place]. Patient reports URI symptoms of productive cough, congestion, and fatigue for the past few days for which he was seen by his PCP yesterday and tested positive for the flu. He was prescribed tamiflu 30mg but was unable to find a pharmacy that carried this dose so he hasn't started it yet. Patient noted to have worsening penile and testicular swelling with associated bilateral LE edema today. No testicular or penile pain. He had similar symptoms in the past which was due to volume overload and required IV lasix. He also notes decreased urine output for the past day, only made a small amount of urine since receiving IV lasix in the ED prior to my exam. Denies fever, chills, N/V, abdominal pain, chest pain, dizziness, HA, or syncope.    ED: AFVSS. No leukocytosis. Cr 2.0 (BL ~1.6), alk phos 139, t.bili 1.3. , trop 0.044. EKG pending. CXR shows right basilar effusion with trace effusion on the left, probable mild atelectasis on the right, minimal left basilar interstitial infiltrate or mild atelectasis. Given 40mg lasix IVP with minimal UOP.

## 2022-12-04 NOTE — ASSESSMENT & PLAN NOTE
- trop 0.044; EKG pending  - no reported CP  - likely type 2 demand ischemia due to volume overload   - trend Tn  - follow up TTE

## 2022-12-04 NOTE — ASSESSMENT & PLAN NOTE
- diagnosed yesterday but unable to fill tamiflu as unable to find pharmacy that carried prescribed dosing  - afebrile without leukocytosis  - CXR with mild infiltrates-- suspect 2/2 atelectasis vs fluid overload  - check procal  - consider adding abx for CAP coverage if no improvement in AM  - start tamiflu 30mg daily (renally dosed)  - sohail IS  - mucinex and tessalon

## 2022-12-04 NOTE — ED NOTES
Telemetry Verification   Patient placed on Telemetry Box  Verified with War Room  Box # [id number]   Monitor Tech War room   Rate 69   Rhythm A-fib

## 2022-12-04 NOTE — ASSESSMENT & PLAN NOTE
CKD (chronic kidney disease) stage 3, GFR 30-59 ml/min  - Cr 2.0, baseline ~1.6  - likely 2/2 cardiorenal syndrome  - UA negative  - IV diuresis as above  - avoid nephrotoxins, renally dose meds  - trend BMP

## 2022-12-04 NOTE — ED NOTES
I-STAT Chem-8+ Results:   Value Reference Range   Sodium 140 136-145 mmol/L   Potassium  3.9 3.5-5.1 mmol/L   Chloride 104  mmol/L   Ionized Calcium 1.05 1.06-1.42 mmol/L   CO2 (measured) 24 23-29 mmol/L   Glucose 109  mg/dL   BUN 40 6-30 mg/dL   Creatinine 2.1 0.5-1.4 mg/dL   Hematocrit 33 36-54%

## 2022-12-04 NOTE — SUBJECTIVE & OBJECTIVE
Past Medical History:   Diagnosis Date    Acute on chronic diastolic heart failure 9/1/2016    Coronary artery disease     Hyperlipidemia     Hypertension     Macular degeneration (senile) of retina, unspecified 12/12/2014    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    Persistent atrial fibrillation 11/10/2016    S/P placement of cardiac pacemaker 9/14/2016       Past Surgical History:   Procedure Laterality Date    AORTIC VALVE REPLACEMENT N/A     CARDIAC PACEMAKER PLACEMENT      CATARACT EXTRACTION W/  INTRAOCULAR LENS IMPLANT Right 2/16/2016    Dr. Whitley    CATARACT EXTRACTION W/  INTRAOCULAR LENS IMPLANT Left 3/1/2016    Dr. Whitley    CORONARY ARTERY BYPASS GRAFT      EAR EXAMINATION UNDER ANESTHESIA      EYE SURGERY         Review of patient's allergies indicates:   Allergen Reactions    Iodine and iodide containing products Other (See Comments)     Caused changes in skin color       No current facility-administered medications on file prior to encounter.     Current Outpatient Medications on File Prior to Encounter   Medication Sig    apixaban (ELIQUIS) 2.5 mg Tab Take 1 tablet (2.5 mg total) by mouth 2 (two) times daily.    aspirin (ECOTRIN) 81 MG EC tablet Take 1 tablet (81 mg total) by mouth once daily.    benazepriL (LOTENSIN) 5 MG tablet Take 1 tablet (5 mg total) by mouth once daily.    benzonatate (TESSALON) 100 MG capsule Take 1 capsule (100 mg total) by mouth 3 (three) times daily as needed for Cough.    carvediloL (COREG) 12.5 MG tablet Take 1 tablet (12.5 mg total) by mouth 2 (two) times daily with meals.    ferrous sulfate (FEOSOL) 325 mg (65 mg iron) Tab tablet Take 1 tablet (325 mg total) by mouth daily with breakfast.    fluticasone propionate (FLONASE) 50 mcg/actuation nasal spray 2 sprays (100 mcg total) by Each Nostril route once daily.    multivitamin (ONE DAILY MULTIVITAMIN) per tablet Take 1 tablet by mouth once daily.    nitroGLYCERIN (NITROSTAT) 0.4 MG SL tablet Take one tablet every 5  minutes for chest pain. After the third tablet go to the ED. (Patient not taking: Reported on 12/2/2022)    omeprazole (PRILOSEC) 20 MG capsule Take 1 capsule (20 mg total) by mouth daily as needed (gastritis). (Patient not taking: Reported on 12/2/2022)    oseltamivir (TAMIFLU) 30 MG capsule Take 1 capsule (30 mg total) by mouth 2 (two) times daily. for 5 days    polyethylene glycol (GLYCOLAX) 17 gram/dose powder Dissolve one capful (17 g) in liquid by mouth 3 (three) times daily as needed. Take 1 three times daily until well formed stool (Patient not taking: Reported on 12/2/2022)    potassium chloride (KLOR-CON) 8 MEQ TbSR Take 1 tablet (8 mEq total) by mouth every other day.    torsemide (DEMADEX) 20 MG Tab TAKE 1 TABLET EVERY DAY     Family History       Problem Relation (Age of Onset)    Hypertension Brother    No Known Problems Mother, Father, Sister, Maternal Aunt, Maternal Uncle, Paternal Aunt, Paternal Uncle, Maternal Grandmother, Maternal Grandfather, Paternal Grandmother, Paternal Grandfather, Daughter, Son    Stroke Brother          Tobacco Use    Smoking status: Never    Smokeless tobacco: Never   Substance and Sexual Activity    Alcohol use: No    Drug use: No    Sexual activity: Yes     Partners: Female     Review of Systems   Constitutional:  Positive for fatigue. Negative for activity change, chills and fever.   HENT:  Negative for trouble swallowing and voice change.    Eyes:  Negative for photophobia and visual disturbance.   Respiratory:  Positive for cough, shortness of breath and wheezing. Negative for chest tightness.    Cardiovascular:  Positive for leg swelling. Negative for chest pain and palpitations.   Gastrointestinal:  Negative for abdominal pain, constipation, diarrhea, nausea and vomiting.   Genitourinary:  Positive for decreased urine volume, penile swelling and scrotal swelling. Negative for dysuria, frequency, hematuria, testicular pain and urgency.   Musculoskeletal:  Negative  for arthralgias, back pain and gait problem.   Skin:  Negative for color change and rash.   Neurological:  Negative for dizziness, syncope, weakness, light-headedness, numbness and headaches.   Psychiatric/Behavioral:  Negative for agitation and confusion. The patient is not nervous/anxious.    Objective:     Vital Signs (Most Recent):  Temp: 97.7 °F (36.5 °C) (12/03/22 1640)  Pulse: 70 (12/03/22 1844)  Resp: (!) 22 (12/03/22 1844)  BP: 139/65 (12/03/22 1844)  SpO2: 95 % (12/03/22 1844)   Vital Signs (24h Range):  Temp:  [97.7 °F (36.5 °C)] 97.7 °F (36.5 °C)  Pulse:  [70] 70  Resp:  [22-24] 22  SpO2:  [95 %-96 %] 95 %  BP: (113-139)/(55-65) 139/65     Weight: 68.9 kg (152 lb)  Body mass index is 22.45 kg/m².    Physical Exam  Vitals and nursing note reviewed.   Constitutional:       General: He is not in acute distress.     Appearance: He is well-developed.   HENT:      Head: Normocephalic and atraumatic.      Mouth/Throat:      Pharynx: No oropharyngeal exudate.   Eyes:      Extraocular Movements: Extraocular movements intact.      Conjunctiva/sclera: Conjunctivae normal.   Cardiovascular:      Rate and Rhythm: Normal rate and regular rhythm.      Heart sounds: Normal heart sounds.   Pulmonary:      Effort: Pulmonary effort is normal. No respiratory distress.      Breath sounds: Wheezing and rales present.      Comments: Crackles to mid-lower lobes  Abdominal:      General: Bowel sounds are normal. There is no distension.      Palpations: Abdomen is soft.      Tenderness: There is no abdominal tenderness.   Musculoskeletal:         General: No tenderness. Normal range of motion.      Cervical back: Normal range of motion and neck supple.      Right lower leg: Edema present.      Left lower leg: Edema present.   Lymphadenopathy:      Cervical: No cervical adenopathy.   Skin:     General: Skin is warm and dry.      Capillary Refill: Capillary refill takes less than 2 seconds.      Findings: No rash.   Neurological:       Mental Status: He is alert and oriented to person, place, and time.      Cranial Nerves: No cranial nerve deficit.      Sensory: No sensory deficit.      Coordination: Coordination normal.   Psychiatric:         Behavior: Behavior normal.         Thought Content: Thought content normal.         Judgment: Judgment normal.           Significant Labs: All pertinent labs within the past 24 hours have been reviewed.  CBC:   Recent Labs   Lab 12/03/22  1825 12/03/22  1830   WBC 6.74  --    HGB 10.4*  --    HCT 34.0* 33*     --      CMP:   Recent Labs   Lab 12/03/22  1825      K 3.9      CO2 23      BUN 40*   CREATININE 2.0*   CALCIUM 8.4*   PROT 7.7   ALBUMIN 3.2*   BILITOT 1.3*   ALKPHOS 139*   AST 20   ALT 11   ANIONGAP 11       Significant Imaging: I have reviewed all pertinent imaging results/findings within the past 24 hours.

## 2022-12-04 NOTE — H&P
"[facility] - Emergency Dept  Hospital Medicine  History & Physical    Patient Name: Deena Moody  MRN: [id number]  Patient Class: OP- Observation  Admission Date: 12/3/2022  Attending Physician: Junior Myers MD   Primary Care Provider: Jam Rowell MD         Patient information was obtained from patient, relative(s), past medical records and ER records.     Subjective:     Principal Problem:Acute on chronic diastolic congestive heart failure    Chief Complaint:   Chief Complaint   Patient presents with    Shortness of Breath     Hx of CHF. Dx'd w/ influenza yesterday, 12/2/22. Was seen at outside facility w/ chest x-ray obtained that displayed "moderate right pleural effusion with possible basilar atelectasis and/or infiltrate." Pt denies chest pain, N/V/D        HPI: Deena Moody is a 89 y.o. male with a PMHx of HTN, HLD, CHF, Afib, CAD who presents to [facility] with testicular and penile swelling. Patient reports URI symptoms of productive cough, congestion, and fatigue for the past few days for which he was seen by his PCP yesterday and tested positive for the flu. He was prescribed tamiflu 30mg but was unable to find a pharmacy that carried this dose so he hasn't started it yet. Patient noted to have worsening penile and testicular swelling with associated bilateral LE edema today. No testicular or penile pain. He had similar symptoms in the past which was due to volume overload and required IV lasix. He also notes decreased urine output for the past day, only made a small amount of urine since receiving IV lasix in the ED prior to my exam. Denies fever, chills, N/V, abdominal pain, chest pain, dizziness, HA, or syncope.    ED: AFVSS. No leukocytosis. Cr 2.0 (BL ~1.6), alk phos 139, t.bili 1.3. , trop 0.044. EKG pending. CXR shows right basilar effusion with trace effusion on the left, probable mild atelectasis on the right, minimal left basilar interstitial infiltrate or mild atelectasis. " Given 40mg lasix IVP with minimal UOP.       Past Medical History:   Diagnosis Date    Acute on chronic diastolic heart failure 9/1/2016    Coronary artery disease     Hyperlipidemia     Hypertension     Macular degeneration (senile) of retina, unspecified 12/12/2014    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    Persistent atrial fibrillation 11/10/2016    S/P placement of cardiac pacemaker 9/14/2016       Past Surgical History:   Procedure Laterality Date    AORTIC VALVE REPLACEMENT N/A     CARDIAC PACEMAKER PLACEMENT      CATARACT EXTRACTION W/  INTRAOCULAR LENS IMPLANT Right 2/16/2016    Dr. Whitley    CATARACT EXTRACTION W/  INTRAOCULAR LENS IMPLANT Left 3/1/2016    Dr. Whitley    CORONARY ARTERY BYPASS GRAFT      EAR EXAMINATION UNDER ANESTHESIA      EYE SURGERY         Review of patient's allergies indicates:   Allergen Reactions    Iodine and iodide containing products Other (See Comments)     Caused changes in skin color       No current facility-administered medications on file prior to encounter.     Current Outpatient Medications on File Prior to Encounter   Medication Sig    apixaban (ELIQUIS) 2.5 mg Tab Take 1 tablet (2.5 mg total) by mouth 2 (two) times daily.    aspirin (ECOTRIN) 81 MG EC tablet Take 1 tablet (81 mg total) by mouth once daily.    benazepriL (LOTENSIN) 5 MG tablet Take 1 tablet (5 mg total) by mouth once daily.    benzonatate (TESSALON) 100 MG capsule Take 1 capsule (100 mg total) by mouth 3 (three) times daily as needed for Cough.    carvediloL (COREG) 12.5 MG tablet Take 1 tablet (12.5 mg total) by mouth 2 (two) times daily with meals.    ferrous sulfate (FEOSOL) 325 mg (65 mg iron) Tab tablet Take 1 tablet (325 mg total) by mouth daily with breakfast.    fluticasone propionate (FLONASE) 50 mcg/actuation nasal spray 2 sprays (100 mcg total) by Each Nostril route once daily.    multivitamin (ONE DAILY MULTIVITAMIN) per tablet Take 1 tablet by mouth once daily.     nitroGLYCERIN (NITROSTAT) 0.4 MG SL tablet Take one tablet every 5 minutes for chest pain. After the third tablet go to the ED. (Patient not taking: Reported on 12/2/2022)    omeprazole (PRILOSEC) 20 MG capsule Take 1 capsule (20 mg total) by mouth daily as needed (gastritis). (Patient not taking: Reported on 12/2/2022)    oseltamivir (TAMIFLU) 30 MG capsule Take 1 capsule (30 mg total) by mouth 2 (two) times daily. for 5 days    polyethylene glycol (GLYCOLAX) 17 gram/dose powder Dissolve one capful (17 g) in liquid by mouth 3 (three) times daily as needed. Take 1 three times daily until well formed stool (Patient not taking: Reported on 12/2/2022)    potassium chloride (KLOR-CON) 8 MEQ TbSR Take 1 tablet (8 mEq total) by mouth every other day.    torsemide (DEMADEX) 20 MG Tab TAKE 1 TABLET EVERY DAY     Family History       Problem Relation (Age of Onset)    Hypertension Brother    No Known Problems Mother, Father, Sister, Maternal Aunt, Maternal Uncle, Paternal Aunt, Paternal Uncle, Maternal Grandmother, Maternal Grandfather, Paternal Grandmother, Paternal Grandfather, Daughter, Son    Stroke Brother          Tobacco Use    Smoking status: Never    Smokeless tobacco: Never   Substance and Sexual Activity    Alcohol use: No    Drug use: No    Sexual activity: Yes     Partners: Female     Review of Systems   Constitutional:  Positive for fatigue. Negative for activity change, chills and fever.   HENT:  Negative for trouble swallowing and voice change.    Eyes:  Negative for photophobia and visual disturbance.   Respiratory:  Positive for cough, shortness of breath and wheezing. Negative for chest tightness.    Cardiovascular:  Positive for leg swelling. Negative for chest pain and palpitations.   Gastrointestinal:  Negative for abdominal pain, constipation, diarrhea, nausea and vomiting.   Genitourinary:  Positive for decreased urine volume, penile swelling and scrotal swelling. Negative for dysuria,  frequency, hematuria, testicular pain and urgency.   Musculoskeletal:  Negative for arthralgias, back pain and gait problem.   Skin:  Negative for color change and rash.   Neurological:  Negative for dizziness, syncope, weakness, light-headedness, numbness and headaches.   Psychiatric/Behavioral:  Negative for agitation and confusion. The patient is not nervous/anxious.    Objective:     Vital Signs (Most Recent):  Temp: 97.7 °F (36.5 °C) (12/03/22 1640)  Pulse: 70 (12/03/22 1844)  Resp: (!) 22 (12/03/22 1844)  BP: 139/65 (12/03/22 1844)  SpO2: 95 % (12/03/22 1844)   Vital Signs (24h Range):  Temp:  [97.7 °F (36.5 °C)] 97.7 °F (36.5 °C)  Pulse:  [70] 70  Resp:  [22-24] 22  SpO2:  [95 %-96 %] 95 %  BP: (113-139)/(55-65) 139/65     Weight: 68.9 kg (152 lb)  Body mass index is 22.45 kg/m².    Physical Exam  Vitals and nursing note reviewed.   Constitutional:       General: He is not in acute distress.     Appearance: He is well-developed.   HENT:      Head: Normocephalic and atraumatic.      Mouth/Throat:      Pharynx: No oropharyngeal exudate.   Eyes:      Extraocular Movements: Extraocular movements intact.      Conjunctiva/sclera: Conjunctivae normal.   Cardiovascular:      Rate and Rhythm: Normal rate and regular rhythm.      Heart sounds: Normal heart sounds.   Pulmonary:      Effort: Pulmonary effort is normal. No respiratory distress.      Breath sounds: Wheezing and rales present.      Comments: Crackles to mid-lower lobes  Abdominal:      General: Bowel sounds are normal. There is no distension.      Palpations: Abdomen is soft.      Tenderness: There is no abdominal tenderness.   Musculoskeletal:         General: No tenderness. Normal range of motion.      Cervical back: Normal range of motion and neck supple.      Right lower leg: Edema present.      Left lower leg: Edema present.   Lymphadenopathy:      Cervical: No cervical adenopathy.   Skin:     General: Skin is warm and dry.      Capillary Refill:  Capillary refill takes less than 2 seconds.      Findings: No rash.   Neurological:      Mental Status: He is alert and oriented to person, place, and time.      Cranial Nerves: No cranial nerve deficit.      Sensory: No sensory deficit.      Coordination: Coordination normal.   Psychiatric:         Behavior: Behavior normal.         Thought Content: Thought content normal.         Judgment: Judgment normal.           Significant Labs: All pertinent labs within the past 24 hours have been reviewed.  CBC:   Recent Labs   Lab 12/03/22  1825 12/03/22  1830   WBC 6.74  --    HGB 10.4*  --    HCT 34.0* 33*     --      CMP:   Recent Labs   Lab 12/03/22  1825      K 3.9      CO2 23      BUN 40*   CREATININE 2.0*   CALCIUM 8.4*   PROT 7.7   ALBUMIN 3.2*   BILITOT 1.3*   ALKPHOS 139*   AST 20   ALT 11   ANIONGAP 11       Significant Imaging: I have reviewed all pertinent imaging results/findings within the past 24 hours.    Assessment/Plan:     * Acute on chronic diastolic congestive heart failure  Acute hypoxemic respiratory failure  - appears volume overload on exam,   - CXR w/ pleural effusions  - minimal UOP s/p 40mg lasix IVP in the ED  - will give an additional 40mg x1 then start IV lasix 80 mg BID  - repeat TTE  - keep K>4 and Mg>2  - telemetry   - strict I/Os, daily weights  - low Na diet w/ fluid restriction    SARAH (acute kidney injury)  CKD (chronic kidney disease) stage 3, GFR 30-59 ml/min  - Cr 2.0, baseline ~1.6  - likely 2/2 cardiorenal syndrome  - UA negative  - IV diuresis as above  - avoid nephrotoxins, renally dose meds  - trend BMP    Influenza A  - diagnosed yesterday but unable to fill tamiflu as unable to find pharmacy that carried prescribed dosing  - afebrile without leukocytosis  - CXR with mild infiltrates-- suspect 2/2 atelectasis vs fluid overload  - check procal  - consider adding abx for CAP coverage if no improvement in AM  - start tamiflu 30mg daily (renally  dosed)  - duonebs, IS  - mucinex and tessalon    Elevated troponin  - trop 0.044; EKG pending  - no reported CP  - likely type 2 demand ischemia due to volume overload   - trend Tn  - follow up TTE    CAD (coronary artery disease)  - no reported chest pain  - elevated trop likely 2/2 volume overload (see above)    Permanent atrial fibrillation  - continue coreg 12.5mg BID and eliquis 2.5mg BID    Essential hypertension  - continue coreg 12.5mg BID  - hold benazepril given SARAH    Dyslipidemia  - continue ASA      VTE Risk Mitigation (From admission, onward)         Ordered     apixaban tablet 2.5 mg  2 times daily         12/03/22 2135     Reason for No Pharmacological VTE Prophylaxis  Once        Question:  Reasons:  Answer:  Already adequately anticoagulated on oral Anticoagulants    12/03/22 2037     IP VTE HIGH RISK PATIENT  Once         12/03/22 2037     Place sequential compression device  Until discontinued         12/03/22 2034     Place sequential compression device  Until discontinued         12/03/22 2018                   Candis Salinas PA-C  Department of Hospital Medicine   [facility] - Emergency Dept

## 2022-12-04 NOTE — ASSESSMENT & PLAN NOTE
Acute hypoxemic respiratory failure  - appears volume overload on exam,   - CXR w/ pleural effusions  - minimal UOP s/p 40mg lasix IVP in the ED  - will give an additional 40mg x1 then start IV lasix 80 mg BID  - repeat TTE  - keep K>4 and Mg>2  - telemetry   - strict I/Os, daily weights  - low Na diet w/ fluid restriction

## 2022-12-05 ENCOUNTER — TELEPHONE (OUTPATIENT)
Dept: CARDIOLOGY | Facility: CLINIC | Age: 87
End: 2022-12-05
Payer: MEDICARE

## 2022-12-05 LAB
ALBUMIN SERPL BCP-MCNC: 2.9 G/DL (ref 3.5–5.2)
ALP SERPL-CCNC: 125 U/L (ref 55–135)
ALT SERPL W/O P-5'-P-CCNC: 11 U/L (ref 10–44)
ANION GAP SERPL CALC-SCNC: 15 MMOL/L (ref 8–16)
ASCENDING AORTA: 2.83 CM
AST SERPL-CCNC: 17 U/L (ref 10–40)
AV INDEX (PROSTH): 0.25
AV MEAN GRADIENT: 21 MMHG
AV PEAK GRADIENT: 34 MMHG
AV REGURGITATION PRESSURE HALF TIME: 350 MS
AV VALVE AREA: 0.72 CM2
AV VELOCITY RATIO: 0.25
BASOPHILS # BLD AUTO: 0.03 K/UL (ref 0–0.2)
BASOPHILS NFR BLD: 0.5 % (ref 0–1.9)
BILIRUB SERPL-MCNC: 1.1 MG/DL (ref 0.1–1)
BSA FOR ECHO PROCEDURE: 1.82 M2
BUN SERPL-MCNC: 51 MG/DL (ref 10–30)
CALCIUM SERPL-MCNC: 8.4 MG/DL (ref 8.7–10.5)
CHLORIDE SERPL-SCNC: 102 MMOL/L (ref 95–110)
CO2 SERPL-SCNC: 25 MMOL/L (ref 23–29)
CREAT SERPL-MCNC: 1.9 MG/DL (ref 0.5–1.4)
CV ECHO LV RWT: 0.55 CM
DIFFERENTIAL METHOD: ABNORMAL
DOP CALC AO PEAK VEL: 2.91 M/S
DOP CALC AO VTI: 63.49 CM
DOP CALC LVOT AREA: 2.8 CM2
DOP CALC LVOT DIAMETER: 1.9 CM
DOP CALC LVOT PEAK VEL: 0.74 M/S
DOP CALC LVOT STROKE VOLUME: 45.57 CM3
DOP CALC MV VTI: 54.29 CM
DOP CALCLVOT PEAK VEL VTI: 16.08 CM
E WAVE DECELERATION TIME: 222 MSEC
E/A RATIO: 3.01
E/E' RATIO: 40.4 M/S
ECHO LV POSTERIOR WALL: 1.1 CM (ref 0.6–1.1)
EJECTION FRACTION: 65 %
EOSINOPHIL # BLD AUTO: 0.4 K/UL (ref 0–0.5)
EOSINOPHIL NFR BLD: 7.1 % (ref 0–8)
ERYTHROCYTE [DISTWIDTH] IN BLOOD BY AUTOMATED COUNT: 15.9 % (ref 11.5–14.5)
EST. GFR  (NO RACE VARIABLE): 32.7 ML/MIN/1.73 M^2
FRACTIONAL SHORTENING: 36 % (ref 28–44)
GLUCOSE SERPL-MCNC: 79 MG/DL (ref 70–110)
HCT VFR BLD AUTO: 29.9 % (ref 40–54)
HGB BLD-MCNC: 9.3 G/DL (ref 14–18)
IMM GRANULOCYTES # BLD AUTO: 0.02 K/UL (ref 0–0.04)
IMM GRANULOCYTES NFR BLD AUTO: 0.3 % (ref 0–0.5)
INTERVENTRICULAR SEPTUM: 1.02 CM (ref 0.6–1.1)
LA MAJOR: 5.26 CM
LA MINOR: 5.34 CM
LA WIDTH: 3.81 CM
LEFT ATRIUM SIZE: 3.95 CM
LEFT ATRIUM VOLUME INDEX MOD: 31.2 ML/M2
LEFT ATRIUM VOLUME INDEX: 37.2 ML/M2
LEFT ATRIUM VOLUME MOD: 56.75 CM3
LEFT ATRIUM VOLUME: 67.79 CM3
LEFT INTERNAL DIMENSION IN SYSTOLE: 2.59 CM (ref 2.1–4)
LEFT VENTRICLE DIASTOLIC VOLUME INDEX: 39.1 ML/M2
LEFT VENTRICLE DIASTOLIC VOLUME: 71.16 ML
LEFT VENTRICLE MASS INDEX: 77 G/M2
LEFT VENTRICLE SYSTOLIC VOLUME INDEX: 13.4 ML/M2
LEFT VENTRICLE SYSTOLIC VOLUME: 24.3 ML
LEFT VENTRICULAR INTERNAL DIMENSION IN DIASTOLE: 4.03 CM (ref 3.5–6)
LEFT VENTRICULAR MASS: 139.68 G
LV LATERAL E/E' RATIO: 33.67 M/S
LV SEPTAL E/E' RATIO: 50.5 M/S
LYMPHOCYTES # BLD AUTO: 1.1 K/UL (ref 1–4.8)
LYMPHOCYTES NFR BLD: 18.3 % (ref 18–48)
MAGNESIUM SERPL-MCNC: 2.1 MG/DL (ref 1.6–2.6)
MCH RBC QN AUTO: 30.9 PG (ref 27–31)
MCHC RBC AUTO-ENTMCNC: 31.1 G/DL (ref 32–36)
MCV RBC AUTO: 99 FL (ref 82–98)
MONOCYTES # BLD AUTO: 0.7 K/UL (ref 0.3–1)
MONOCYTES NFR BLD: 11.6 % (ref 4–15)
MV MEAN GRADIENT: 6 MMHG
MV PEAK A VEL: 0.67 M/S
MV PEAK E VEL: 2.02 M/S
MV PEAK GRADIENT: 21 MMHG
MV STENOSIS PRESSURE HALF TIME: 64.38 MS
MV VALVE AREA BY CONTINUITY EQUATION: 0.84 CM2
MV VALVE AREA P 1/2 METHOD: 3.42 CM2
NEUTROPHILS # BLD AUTO: 3.6 K/UL (ref 1.8–7.7)
NEUTROPHILS NFR BLD: 62.2 % (ref 38–73)
NRBC BLD-RTO: 0 /100 WBC
PHOSPHATE SERPL-MCNC: 3.3 MG/DL (ref 2.7–4.5)
PISA TR MAX VEL: 4.06 M/S
PLATELET # BLD AUTO: 164 K/UL (ref 150–450)
PMV BLD AUTO: 9.4 FL (ref 9.2–12.9)
POCT GLUCOSE: 108 MG/DL (ref 70–110)
POCT GLUCOSE: 90 MG/DL (ref 70–110)
POTASSIUM SERPL-SCNC: 3.9 MMOL/L (ref 3.5–5.1)
PROT SERPL-MCNC: 6.9 G/DL (ref 6–8.4)
RA MAJOR: 5.28 CM
RA WIDTH: 4.23 CM
RBC # BLD AUTO: 3.01 M/UL (ref 4.6–6.2)
RIGHT VENTRICULAR END-DIASTOLIC DIMENSION: 4 CM
RV TISSUE DOPPLER FREE WALL SYSTOLIC VELOCITY 1 (APICAL 4 CHAMBER VIEW): 8.05 CM/S
SINUS: 2.98 CM
SODIUM SERPL-SCNC: 142 MMOL/L (ref 136–145)
STJ: 2.89 CM
TDI LATERAL: 0.06 M/S
TDI SEPTAL: 0.04 M/S
TDI: 0.05 M/S
TR MAX PG: 66 MMHG
TRICUSPID ANNULAR PLANE SYSTOLIC EXCURSION: 1.58 CM
WBC # BLD AUTO: 5.79 K/UL (ref 3.9–12.7)

## 2022-12-05 PROCEDURE — 94761 N-INVAS EAR/PLS OXIMETRY MLT: CPT

## 2022-12-05 PROCEDURE — 94799 UNLISTED PULMONARY SVC/PX: CPT

## 2022-12-05 PROCEDURE — 83735 ASSAY OF MAGNESIUM: CPT | Performed by: PHYSICIAN ASSISTANT

## 2022-12-05 PROCEDURE — 99233 SBSQ HOSP IP/OBS HIGH 50: CPT | Mod: ,,, | Performed by: INTERNAL MEDICINE

## 2022-12-05 PROCEDURE — 11000001 HC ACUTE MED/SURG PRIVATE ROOM

## 2022-12-05 PROCEDURE — 94640 AIRWAY INHALATION TREATMENT: CPT

## 2022-12-05 PROCEDURE — 85025 COMPLETE CBC W/AUTO DIFF WBC: CPT | Performed by: PHYSICIAN ASSISTANT

## 2022-12-05 PROCEDURE — 27000207 HC ISOLATION

## 2022-12-05 PROCEDURE — 84100 ASSAY OF PHOSPHORUS: CPT | Performed by: PHYSICIAN ASSISTANT

## 2022-12-05 PROCEDURE — 36415 COLL VENOUS BLD VENIPUNCTURE: CPT | Performed by: PHYSICIAN ASSISTANT

## 2022-12-05 PROCEDURE — 25000003 PHARM REV CODE 250: Performed by: PHYSICIAN ASSISTANT

## 2022-12-05 PROCEDURE — 63600175 PHARM REV CODE 636 W HCPCS: Performed by: PHYSICIAN ASSISTANT

## 2022-12-05 PROCEDURE — 99900035 HC TECH TIME PER 15 MIN (STAT)

## 2022-12-05 PROCEDURE — 99233 PR SUBSEQUENT HOSPITAL CARE,LEVL III: ICD-10-PCS | Mod: ,,, | Performed by: INTERNAL MEDICINE

## 2022-12-05 PROCEDURE — 63600175 PHARM REV CODE 636 W HCPCS: Performed by: INTERNAL MEDICINE

## 2022-12-05 PROCEDURE — 25000242 PHARM REV CODE 250 ALT 637 W/ HCPCS: Performed by: PHYSICIAN ASSISTANT

## 2022-12-05 PROCEDURE — 80053 COMPREHEN METABOLIC PANEL: CPT | Performed by: PHYSICIAN ASSISTANT

## 2022-12-05 RX ORDER — MUPIROCIN 20 MG/G
OINTMENT TOPICAL 2 TIMES DAILY
Status: CANCELLED | OUTPATIENT
Start: 2022-12-05 | End: 2022-12-10

## 2022-12-05 RX ADMIN — ASPIRIN 81 MG: 81 TABLET, COATED ORAL at 09:12

## 2022-12-05 RX ADMIN — IPRATROPIUM BROMIDE AND ALBUTEROL SULFATE 3 ML: 2.5; .5 SOLUTION RESPIRATORY (INHALATION) at 03:12

## 2022-12-05 RX ADMIN — IPRATROPIUM BROMIDE AND ALBUTEROL SULFATE 3 ML: 2.5; .5 SOLUTION RESPIRATORY (INHALATION) at 11:12

## 2022-12-05 RX ADMIN — FUROSEMIDE 5 MG/HR: 10 INJECTION, SOLUTION INTRAMUSCULAR; INTRAVENOUS at 09:12

## 2022-12-05 RX ADMIN — GUAIFENESIN 600 MG: 600 TABLET, EXTENDED RELEASE ORAL at 09:12

## 2022-12-05 RX ADMIN — IPRATROPIUM BROMIDE AND ALBUTEROL SULFATE 3 ML: 2.5; .5 SOLUTION RESPIRATORY (INHALATION) at 08:12

## 2022-12-05 RX ADMIN — CARVEDILOL 12.5 MG: 12.5 TABLET, FILM COATED ORAL at 09:12

## 2022-12-05 RX ADMIN — OSELTAMIVIR PHOSPHATE 30 MG: 30 CAPSULE ORAL at 09:12

## 2022-12-05 RX ADMIN — IPRATROPIUM BROMIDE AND ALBUTEROL SULFATE 3 ML: 2.5; .5 SOLUTION RESPIRATORY (INHALATION) at 07:12

## 2022-12-05 RX ADMIN — APIXABAN 2.5 MG: 2.5 TABLET, FILM COATED ORAL at 09:12

## 2022-12-05 RX ADMIN — FUROSEMIDE 80 MG: 10 INJECTION, SOLUTION INTRAVENOUS at 09:12

## 2022-12-05 RX ADMIN — FERROUS SULFATE TAB 325 MG (65 MG ELEMENTAL FE) 1 EACH: 325 (65 FE) TAB at 09:12

## 2022-12-05 NOTE — SUBJECTIVE & OBJECTIVE
Interval History: Patient lying in bed, no acute distress. No acute events overnight. Patient reports improvement in dyspnea and scrotal as well as BLE swelling on IV lasix. Patient unsure of his dry weight. Notes that he had increased salt intake during the holidays which contributed to his swelling.        Review of Systems   Constitutional:  Positive for activity change, fatigue and unexpected weight change. Negative for chills and fever.   HENT:  Negative for congestion and trouble swallowing.    Eyes:  Negative for visual disturbance.   Respiratory:  Positive for shortness of breath. Negative for cough.    Cardiovascular:  Positive for leg swelling. Negative for chest pain.   Gastrointestinal:  Negative for abdominal distention, abdominal pain, nausea and vomiting.   Endocrine: Negative for cold intolerance, heat intolerance, polydipsia and polyuria.   Genitourinary:  Positive for scrotal swelling. Negative for difficulty urinating and dysuria.   Musculoskeletal:  Negative for back pain and myalgias.   Skin:  Negative for rash and wound.   Neurological:  Positive for weakness. Negative for dizziness and light-headedness.   Hematological:  Negative for adenopathy. Does not bruise/bleed easily.   Psychiatric/Behavioral:  Negative for confusion and sleep disturbance.    Objective:     Vital Signs (Most Recent):  Temp: 98.2 °F (36.8 °C) (12/04/22 1511)  Pulse: 70 (12/04/22 1723)  Resp: 17 (12/04/22 1723)  BP: 97/62 (12/04/22 1330)  SpO2: 95 % (12/04/22 1723) Vital Signs (24h Range):  Temp:  [97.7 °F (36.5 °C)-99.2 °F (37.3 °C)] 98.2 °F (36.8 °C)  Pulse:  [67-71] 70  Resp:  [17-22] 17  SpO2:  [95 %-100 %] 95 %  BP: ()/(62-82) 97/62     Weight: 69.2 kg (152 lb 8.9 oz)  Body mass index is 23.2 kg/m².    Intake/Output Summary (Last 24 hours) at 12/4/2022 6849  Last data filed at 12/4/2022 1700  Gross per 24 hour   Intake 1060 ml   Output --   Net 1060 ml      Physical Exam  Constitutional:       Appearance:  Normal appearance.   HENT:      Head: Normocephalic.      Mouth/Throat:      Mouth: Mucous membranes are moist.   Eyes:      Extraocular Movements: Extraocular movements intact.      Pupils: Pupils are equal, round, and reactive to light.   Neck:      Comments: JVD absent  Cardiovascular:      Rate and Rhythm: Normal rate and regular rhythm.      Pulses: Normal pulses.      Heart sounds: Normal heart sounds.   Pulmonary:      Effort: Respiratory distress present.      Breath sounds: Normal breath sounds.   Abdominal:      General: Bowel sounds are normal. There is no distension.      Palpations: Abdomen is soft.      Tenderness: There is no abdominal tenderness.   Genitourinary:     Testes:         Right: Swelling present.         Left: Swelling present.   Musculoskeletal:         General: Normal range of motion.      Cervical back: Neck supple.      Right lower leg: Edema present.      Left lower leg: Edema present.   Neurological:      General: No focal deficit present.      Mental Status: He is alert and oriented to person, place, and time.   Psychiatric:         Mood and Affect: Mood normal.       Significant Labs: A1C:   Recent Labs   Lab 12/04/22 0517   HGBA1C 5.0     CBC:   Recent Labs   Lab 12/03/22 1825 12/03/22  1830 12/04/22  0517   WBC 6.74  --  5.86   HGB 10.4*  --  8.9*   HCT 34.0* 33* 28.5*     --  133*     CMP:   Recent Labs   Lab 12/03/22 1825 12/04/22  0517    136   K 3.9 3.4*    103   CO2 23 23    83   BUN 40* 43*   CREATININE 2.0* 1.8*   CALCIUM 8.4* 8.3*   PROT 7.7 6.7   ALBUMIN 3.2* 2.8*   BILITOT 1.3* 1.4*   ALKPHOS 139* 116   AST 20 17   ALT 11 11   ANIONGAP 11 10     Coagulation: No results for input(s): PT, INR, APTT in the last 48 hours.  Magnesium:   Recent Labs   Lab 12/04/22  0517   MG 2.0     Troponin:   Recent Labs   Lab 12/03/22 1825 12/04/22  0057 12/04/22  0517   TROPONINI 0.044* 0.043* 0.044*     TSH: No results for input(s): TSH in the last 4320  hours.  Urine Studies:   Recent Labs   Lab 12/03/22 2119   COLORU Yellow   APPEARANCEUA Clear   PHUR 5.0   SPECGRAV 1.010   PROTEINUA Negative   GLUCUA Negative   KETONESU Negative   BILIRUBINUA Negative   OCCULTUA Negative   NITRITE Negative   LEUKOCYTESUR Negative       Significant Imaging: I have reviewed all pertinent imaging results/findings within the past 24 hours.

## 2022-12-05 NOTE — TELEPHONE ENCOUNTER
[facility]    Attempted to call pt to complete Phone enrollment to program. Unable to reach pt at listed phone numbers.  Was able to leave message on voicemail encouraging pt to return call with [facility] phone number. RN left message for pt'sd daughter's Amelie to contact [facility] for phone enrollment d/t pt dx of FLU.     Will continue to try to reach patient.

## 2022-12-05 NOTE — PLAN OF CARE
Patient awake and alert, no signs of distress noted, denies discomfort at present, breathing even and unlabored, tolertes scheduled meds well by mouth, patient educated on 1500 ml fluid restriction, will continue to monitor.    Problem: Adult Inpatient Plan of Care  Goal: Plan of Care Review  Outcome: Ongoing, Progressing  Flowsheets (Taken 12/4/2022 1806)  Plan of Care Reviewed With: patient  Goal: Optimal Comfort and Wellbeing  Outcome: Ongoing, Progressing  Intervention: Provide Person-Centered Care  Flowsheets (Taken 12/4/2022 1806)  Trust Relationship/Rapport:   care explained   choices provided   questions answered     Problem: Infection  Goal: Absence of Infection Signs and Symptoms  Outcome: Ongoing, Progressing  Intervention: Prevent or Manage Infection  Flowsheets (Taken 12/4/2022 1806)  Isolation Precautions:   precautions initiated   droplet

## 2022-12-05 NOTE — PROGRESS NOTES
[facility] Medicine  Progress Note    Patient Name: Deena Moody  MRN: [id number]  Patient Class: IP- Inpatient   Admission Date: 12/3/2022  Length of Stay: 1 days  Attending Physician: Junior Myers MD  Primary Care Provider: Jam Rowell MD        Subjective:     Principal Problem:Acute on chronic diastolic congestive heart failure        HPI:  Deena Moody is a 89 y.o. male with a PMHx of HTN, HLD, CHF, Afib, CAD who presents to [facility] with testicular and penile swelling. Patient reports URI symptoms of productive cough, congestion, and fatigue for the past few days for which he was seen by his PCP yesterday and tested positive for the flu. He was prescribed tamiflu 30mg but was unable to find a pharmacy that carried this dose so he hasn't started it yet. Patient noted to have worsening penile and testicular swelling with associated bilateral LE edema today. No testicular or penile pain. He had similar symptoms in the past which was due to volume overload and required IV lasix. He also notes decreased urine output for the past day, only made a small amount of urine since receiving IV lasix in the ED prior to my exam. Denies fever, chills, N/V, abdominal pain, chest pain, dizziness, HA, or syncope.    ED: AFVSS. No leukocytosis. Cr 2.0 (BL ~1.6), alk phos 139, t.bili 1.3. , trop 0.044. EKG pending. CXR shows right basilar effusion with trace effusion on the left, probable mild atelectasis on the right, minimal left basilar interstitial infiltrate or mild atelectasis. Given 40mg lasix IVP with minimal UOP.       Overview/Hospital Course:  No notes on file    Interval History: Patient lying in bed, no acute distress. No acute events overnight. Patient reports improvement in dyspnea and scrotal as well as BLE swelling on IV lasix. Patient unsure of his dry weight. Notes that he had increased salt intake during the holidays which contributed to his swelling.        Review of  Systems   Constitutional:  Positive for activity change, fatigue and unexpected weight change. Negative for chills and fever.   HENT:  Negative for congestion and trouble swallowing.    Eyes:  Negative for visual disturbance.   Respiratory:  Positive for shortness of breath. Negative for cough.    Cardiovascular:  Positive for leg swelling. Negative for chest pain.   Gastrointestinal:  Negative for abdominal distention, abdominal pain, nausea and vomiting.   Endocrine: Negative for cold intolerance, heat intolerance, polydipsia and polyuria.   Genitourinary:  Positive for scrotal swelling. Negative for difficulty urinating and dysuria.   Musculoskeletal:  Negative for back pain and myalgias.   Skin:  Negative for rash and wound.   Neurological:  Positive for weakness. Negative for dizziness and light-headedness.   Hematological:  Negative for adenopathy. Does not bruise/bleed easily.   Psychiatric/Behavioral:  Negative for confusion and sleep disturbance.    Objective:     Vital Signs (Most Recent):  Temp: 98.2 °F (36.8 °C) (12/04/22 1511)  Pulse: 70 (12/04/22 1723)  Resp: 17 (12/04/22 1723)  BP: 97/62 (12/04/22 1330)  SpO2: 95 % (12/04/22 1723) Vital Signs (24h Range):  Temp:  [97.7 °F (36.5 °C)-99.2 °F (37.3 °C)] 98.2 °F (36.8 °C)  Pulse:  [67-71] 70  Resp:  [17-22] 17  SpO2:  [95 %-100 %] 95 %  BP: ()/(62-82) 97/62     Weight: 69.2 kg (152 lb 8.9 oz)  Body mass index is 23.2 kg/m².    Intake/Output Summary (Last 24 hours) at 12/4/2022 1839  Last data filed at 12/4/2022 1700  Gross per 24 hour   Intake 1060 ml   Output --   Net 1060 ml      Physical Exam  Constitutional:       Appearance: Normal appearance.   HENT:      Head: Normocephalic.      Mouth/Throat:      Mouth: Mucous membranes are moist.   Eyes:      Extraocular Movements: Extraocular movements intact.      Pupils: Pupils are equal, round, and reactive to light.   Neck:      Comments: JVD absent  Cardiovascular:      Rate and Rhythm: Normal rate  and regular rhythm.      Pulses: Normal pulses.      Heart sounds: Normal heart sounds.   Pulmonary:      Effort: Respiratory distress present.      Breath sounds: Normal breath sounds.   Abdominal:      General: Bowel sounds are normal. There is no distension.      Palpations: Abdomen is soft.      Tenderness: There is no abdominal tenderness.   Genitourinary:     Testes:         Right: Swelling present.         Left: Swelling present.   Musculoskeletal:         General: Normal range of motion.      Cervical back: Neck supple.      Right lower leg: Edema present.      Left lower leg: Edema present.   Neurological:      General: No focal deficit present.      Mental Status: He is alert and oriented to person, place, and time.   Psychiatric:         Mood and Affect: Mood normal.       Significant Labs: A1C:   Recent Labs   Lab 12/04/22 0517   HGBA1C 5.0     CBC:   Recent Labs   Lab 12/03/22 1825 12/03/22  1830 12/04/22 0517   WBC 6.74  --  5.86   HGB 10.4*  --  8.9*   HCT 34.0* 33* 28.5*     --  133*     CMP:   Recent Labs   Lab 12/03/22 1825 12/04/22 0517    136   K 3.9 3.4*    103   CO2 23 23    83   BUN 40* 43*   CREATININE 2.0* 1.8*   CALCIUM 8.4* 8.3*   PROT 7.7 6.7   ALBUMIN 3.2* 2.8*   BILITOT 1.3* 1.4*   ALKPHOS 139* 116   AST 20 17   ALT 11 11   ANIONGAP 11 10     Coagulation: No results for input(s): PT, INR, APTT in the last 48 hours.  Magnesium:   Recent Labs   Lab 12/04/22 0517   MG 2.0     Troponin:   Recent Labs   Lab 12/03/22 1825 12/04/22  0057 12/04/22 0517   TROPONINI 0.044* 0.043* 0.044*     TSH: No results for input(s): TSH in the last 4320 hours.  Urine Studies:   Recent Labs   Lab 12/03/22 2119   COLORU Yellow   APPEARANCEUA Clear   PHUR 5.0   SPECGRAV 1.010   PROTEINUA Negative   GLUCUA Negative   KETONESU Negative   BILIRUBINUA Negative   OCCULTUA Negative   NITRITE Negative   LEUKOCYTESUR Negative       Significant Imaging: I have reviewed all pertinent  imaging results/findings within the past 24 hours.      Assessment/Plan:      * Acute on chronic diastolic congestive heart failure  Acute hypoxemic respiratory failure  - appears volume overload on exam,   - CXR w/ pleural effusions  - minimal UOP s/p 40mg lasix IVP in the ED  - will give an additional 40mg x1 then start IV lasix 80 mg BID  - repeat TTE  - keep K>4 and Mg>2  - telemetry   - strict I/Os, daily weights  - low Na diet w/ fluid restriction    Elevated troponin  - trop 0.044; EKG pending  - no reported CP  - likely type 2 demand ischemia due to volume overload   - trend Tn  - follow up TTE    SARAH (acute kidney injury)  CKD (chronic kidney disease) stage 3, GFR 30-59 ml/min  - Cr 2.0, baseline ~1.6  - likely 2/2 cardiorenal syndrome  - UA negative  - IV diuresis as above  - avoid nephrotoxins, renally dose meds  - trend BMP    Influenza A  - diagnosed yesterday but unable to fill tamiflu as unable to find pharmacy that carried prescribed dosing  - afebrile without leukocytosis  - CXR with mild infiltrates-- suspect 2/2 atelectasis vs fluid overload  - check procal  - consider adding abx for CAP coverage if no improvement in AM  - start tamiflu 30mg daily (renally dosed)  - duonebs, IS  - mucinex and tessalon    Permanent atrial fibrillation  - continue coreg 12.5mg BID and eliquis 2.5mg BID    CAD (coronary artery disease)  - no reported chest pain  - elevated trop likely 2/2 volume overload (see above)    Essential hypertension  - continue coreg 12.5mg BID  - hold benazepril given SARAH    Dyslipidemia  - continue ASA      VTE Risk Mitigation (From admission, onward)         Ordered     apixaban tablet 2.5 mg  2 times daily         12/03/22 2135     Reason for No Pharmacological VTE Prophylaxis  Once        Question:  Reasons:  Answer:  Already adequately anticoagulated on oral Anticoagulants    12/03/22 2037     IP VTE HIGH RISK PATIENT  Once         12/03/22 2037     Place sequential compression  device  Until discontinued         12/03/22 2034     Place sequential compression device  Until discontinued         12/03/22 2018                Discharge Planning   TEOFILO: 12/7/2022     Code Status: Full Code   Is the patient medically ready for discharge?: No    Reason for patient still in hospital (select all that apply): Patient unstable, Patient trending condition, Laboratory test and Treatment             Time spent in care of patient: > 35 minutes           Junior Myers MD  Department of Hospital Medicine   [facility]

## 2022-12-06 ENCOUNTER — TELEPHONE (OUTPATIENT)
Dept: CARDIOLOGY | Facility: CLINIC | Age: 87
End: 2022-12-06
Payer: MEDICARE

## 2022-12-06 LAB
ALBUMIN SERPL BCP-MCNC: 3 G/DL (ref 3.5–5.2)
ALP SERPL-CCNC: 131 U/L (ref 55–135)
ALT SERPL W/O P-5'-P-CCNC: 12 U/L (ref 10–44)
ANION GAP SERPL CALC-SCNC: 12 MMOL/L (ref 8–16)
ANION GAP SERPL CALC-SCNC: 13 MMOL/L (ref 8–16)
AST SERPL-CCNC: 17 U/L (ref 10–40)
BASOPHILS # BLD AUTO: 0.05 K/UL (ref 0–0.2)
BASOPHILS # BLD AUTO: 0.05 K/UL (ref 0–0.2)
BASOPHILS NFR BLD: 0.5 % (ref 0–1.9)
BASOPHILS NFR BLD: 0.6 % (ref 0–1.9)
BILIRUB SERPL-MCNC: 0.9 MG/DL (ref 0.1–1)
BUN SERPL-MCNC: 51 MG/DL (ref 10–30)
BUN SERPL-MCNC: 51 MG/DL (ref 10–30)
CALCIUM SERPL-MCNC: 9 MG/DL (ref 8.7–10.5)
CALCIUM SERPL-MCNC: 9.1 MG/DL (ref 8.7–10.5)
CHLORIDE SERPL-SCNC: 100 MMOL/L (ref 95–110)
CHLORIDE SERPL-SCNC: 99 MMOL/L (ref 95–110)
CO2 SERPL-SCNC: 27 MMOL/L (ref 23–29)
CO2 SERPL-SCNC: 32 MMOL/L (ref 23–29)
CREAT SERPL-MCNC: 1.8 MG/DL (ref 0.5–1.4)
CREAT SERPL-MCNC: 2 MG/DL (ref 0.5–1.4)
DIFFERENTIAL METHOD: ABNORMAL
DIFFERENTIAL METHOD: ABNORMAL
EOSINOPHIL # BLD AUTO: 0.6 K/UL (ref 0–0.5)
EOSINOPHIL # BLD AUTO: 0.6 K/UL (ref 0–0.5)
EOSINOPHIL NFR BLD: 6.4 % (ref 0–8)
EOSINOPHIL NFR BLD: 7.6 % (ref 0–8)
ERYTHROCYTE [DISTWIDTH] IN BLOOD BY AUTOMATED COUNT: 15.7 % (ref 11.5–14.5)
ERYTHROCYTE [DISTWIDTH] IN BLOOD BY AUTOMATED COUNT: 15.9 % (ref 11.5–14.5)
EST. GFR  (NO RACE VARIABLE): 30.7 ML/MIN/1.73 M^2
EST. GFR  (NO RACE VARIABLE): 34.9 ML/MIN/1.73 M^2
GLUCOSE SERPL-MCNC: 118 MG/DL (ref 70–110)
GLUCOSE SERPL-MCNC: 89 MG/DL (ref 70–110)
HCT VFR BLD AUTO: 32 % (ref 40–54)
HCT VFR BLD AUTO: 32.1 % (ref 40–54)
HGB BLD-MCNC: 10 G/DL (ref 14–18)
HGB BLD-MCNC: 9.9 G/DL (ref 14–18)
IMM GRANULOCYTES # BLD AUTO: 0.03 K/UL (ref 0–0.04)
IMM GRANULOCYTES # BLD AUTO: 0.03 K/UL (ref 0–0.04)
IMM GRANULOCYTES NFR BLD AUTO: 0.3 % (ref 0–0.5)
IMM GRANULOCYTES NFR BLD AUTO: 0.4 % (ref 0–0.5)
INR PPP: 1.4 (ref 0.8–1.2)
LYMPHOCYTES # BLD AUTO: 1.5 K/UL (ref 1–4.8)
LYMPHOCYTES # BLD AUTO: 1.5 K/UL (ref 1–4.8)
LYMPHOCYTES NFR BLD: 15.6 % (ref 18–48)
LYMPHOCYTES NFR BLD: 18.4 % (ref 18–48)
MAGNESIUM SERPL-MCNC: 2.3 MG/DL (ref 1.6–2.6)
MCH RBC QN AUTO: 30.7 PG (ref 27–31)
MCH RBC QN AUTO: 31.1 PG (ref 27–31)
MCHC RBC AUTO-ENTMCNC: 30.9 G/DL (ref 32–36)
MCHC RBC AUTO-ENTMCNC: 31.2 G/DL (ref 32–36)
MCV RBC AUTO: 100 FL (ref 82–98)
MCV RBC AUTO: 99 FL (ref 82–98)
MONOCYTES # BLD AUTO: 0.9 K/UL (ref 0.3–1)
MONOCYTES # BLD AUTO: 0.9 K/UL (ref 0.3–1)
MONOCYTES NFR BLD: 10.1 % (ref 4–15)
MONOCYTES NFR BLD: 11.2 % (ref 4–15)
NEUTROPHILS # BLD AUTO: 4.9 K/UL (ref 1.8–7.7)
NEUTROPHILS # BLD AUTO: 6.3 K/UL (ref 1.8–7.7)
NEUTROPHILS NFR BLD: 61.8 % (ref 38–73)
NEUTROPHILS NFR BLD: 67.1 % (ref 38–73)
NRBC BLD-RTO: 0 /100 WBC
NRBC BLD-RTO: 0 /100 WBC
PHOSPHATE SERPL-MCNC: 3.3 MG/DL (ref 2.7–4.5)
PLATELET # BLD AUTO: 183 K/UL (ref 150–450)
PLATELET # BLD AUTO: 219 K/UL (ref 150–450)
PMV BLD AUTO: 9.4 FL (ref 9.2–12.9)
PMV BLD AUTO: 9.6 FL (ref 9.2–12.9)
POCT GLUCOSE: 112 MG/DL (ref 70–110)
POCT GLUCOSE: 124 MG/DL (ref 70–110)
POCT GLUCOSE: 91 MG/DL (ref 70–110)
POTASSIUM SERPL-SCNC: 3.8 MMOL/L (ref 3.5–5.1)
POTASSIUM SERPL-SCNC: 3.8 MMOL/L (ref 3.5–5.1)
PROT SERPL-MCNC: 7.3 G/DL (ref 6–8.4)
PROTHROMBIN TIME: 14 SEC (ref 9–12.5)
RBC # BLD AUTO: 3.22 M/UL (ref 4.6–6.2)
RBC # BLD AUTO: 3.22 M/UL (ref 4.6–6.2)
SODIUM SERPL-SCNC: 140 MMOL/L (ref 136–145)
SODIUM SERPL-SCNC: 143 MMOL/L (ref 136–145)
WBC # BLD AUTO: 7.93 K/UL (ref 3.9–12.7)
WBC # BLD AUTO: 9.33 K/UL (ref 3.9–12.7)

## 2022-12-06 PROCEDURE — 85610 PROTHROMBIN TIME: CPT | Performed by: INTERNAL MEDICINE

## 2022-12-06 PROCEDURE — 99900035 HC TECH TIME PER 15 MIN (STAT)

## 2022-12-06 PROCEDURE — 11000001 HC ACUTE MED/SURG PRIVATE ROOM

## 2022-12-06 PROCEDURE — 27000207 HC ISOLATION

## 2022-12-06 PROCEDURE — 36415 COLL VENOUS BLD VENIPUNCTURE: CPT | Performed by: INTERNAL MEDICINE

## 2022-12-06 PROCEDURE — 84100 ASSAY OF PHOSPHORUS: CPT | Performed by: PHYSICIAN ASSISTANT

## 2022-12-06 PROCEDURE — 63600175 PHARM REV CODE 636 W HCPCS: Performed by: INTERNAL MEDICINE

## 2022-12-06 PROCEDURE — 36415 COLL VENOUS BLD VENIPUNCTURE: CPT | Performed by: PHYSICIAN ASSISTANT

## 2022-12-06 PROCEDURE — 94761 N-INVAS EAR/PLS OXIMETRY MLT: CPT

## 2022-12-06 PROCEDURE — 25000242 PHARM REV CODE 250 ALT 637 W/ HCPCS: Performed by: PHYSICIAN ASSISTANT

## 2022-12-06 PROCEDURE — 94640 AIRWAY INHALATION TREATMENT: CPT

## 2022-12-06 PROCEDURE — 83735 ASSAY OF MAGNESIUM: CPT | Performed by: PHYSICIAN ASSISTANT

## 2022-12-06 PROCEDURE — 80053 COMPREHEN METABOLIC PANEL: CPT | Performed by: PHYSICIAN ASSISTANT

## 2022-12-06 PROCEDURE — 25000003 PHARM REV CODE 250: Performed by: INTERNAL MEDICINE

## 2022-12-06 PROCEDURE — 99233 PR SUBSEQUENT HOSPITAL CARE,LEVL III: ICD-10-PCS | Mod: ,,, | Performed by: INTERNAL MEDICINE

## 2022-12-06 PROCEDURE — 25000003 PHARM REV CODE 250: Performed by: PHYSICIAN ASSISTANT

## 2022-12-06 PROCEDURE — 85025 COMPLETE CBC W/AUTO DIFF WBC: CPT | Mod: 91 | Performed by: INTERNAL MEDICINE

## 2022-12-06 PROCEDURE — 99233 SBSQ HOSP IP/OBS HIGH 50: CPT | Mod: ,,, | Performed by: INTERNAL MEDICINE

## 2022-12-06 PROCEDURE — 80048 BASIC METABOLIC PNL TOTAL CA: CPT | Mod: XB | Performed by: INTERNAL MEDICINE

## 2022-12-06 PROCEDURE — 85025 COMPLETE CBC W/AUTO DIFF WBC: CPT | Performed by: PHYSICIAN ASSISTANT

## 2022-12-06 RX ORDER — NAPROXEN SODIUM 220 MG/1
81 TABLET, FILM COATED ORAL DAILY
Status: DISCONTINUED | OUTPATIENT
Start: 2022-12-07 | End: 2022-12-11 | Stop reason: HOSPADM

## 2022-12-06 RX ORDER — MUPIROCIN 20 MG/G
OINTMENT TOPICAL 2 TIMES DAILY
Status: DISPENSED | OUTPATIENT
Start: 2022-12-06 | End: 2022-12-11

## 2022-12-06 RX ORDER — OSELTAMIVIR PHOSPHATE 30 MG/1
30 CAPSULE ORAL DAILY
Status: DISCONTINUED | OUTPATIENT
Start: 2022-12-06 | End: 2022-12-06

## 2022-12-06 RX ADMIN — IPRATROPIUM BROMIDE AND ALBUTEROL SULFATE 3 ML: 2.5; .5 SOLUTION RESPIRATORY (INHALATION) at 12:12

## 2022-12-06 RX ADMIN — FUROSEMIDE 10 MG/HR: 10 INJECTION, SOLUTION INTRAMUSCULAR; INTRAVENOUS at 10:12

## 2022-12-06 RX ADMIN — IPRATROPIUM BROMIDE AND ALBUTEROL SULFATE 3 ML: 2.5; .5 SOLUTION RESPIRATORY (INHALATION) at 07:12

## 2022-12-06 RX ADMIN — GUAIFENESIN 600 MG: 600 TABLET, EXTENDED RELEASE ORAL at 08:12

## 2022-12-06 RX ADMIN — GUAIFENESIN 600 MG: 600 TABLET, EXTENDED RELEASE ORAL at 11:12

## 2022-12-06 RX ADMIN — CARVEDILOL 12.5 MG: 12.5 TABLET, FILM COATED ORAL at 11:12

## 2022-12-06 RX ADMIN — MUPIROCIN: 20 OINTMENT TOPICAL at 11:12

## 2022-12-06 RX ADMIN — CARVEDILOL 12.5 MG: 12.5 TABLET, FILM COATED ORAL at 08:12

## 2022-12-06 RX ADMIN — IPRATROPIUM BROMIDE AND ALBUTEROL SULFATE 3 ML: 2.5; .5 SOLUTION RESPIRATORY (INHALATION) at 05:12

## 2022-12-06 RX ADMIN — IPRATROPIUM BROMIDE AND ALBUTEROL SULFATE 3 ML: 2.5; .5 SOLUTION RESPIRATORY (INHALATION) at 08:12

## 2022-12-06 RX ADMIN — APIXABAN 2.5 MG: 2.5 TABLET, FILM COATED ORAL at 11:12

## 2022-12-06 RX ADMIN — MUPIROCIN: 20 OINTMENT TOPICAL at 08:12

## 2022-12-06 RX ADMIN — ASPIRIN 81 MG: 81 TABLET, COATED ORAL at 11:12

## 2022-12-06 RX ADMIN — OSELTAMIVIR PHOSPHATE 30 MG: 30 CAPSULE ORAL at 03:12

## 2022-12-06 RX ADMIN — FERROUS SULFATE TAB 325 MG (65 MG ELEMENTAL FE) 1 EACH: 325 (65 FE) TAB at 11:12

## 2022-12-06 NOTE — PLAN OF CARE
Problem: Adult Inpatient Plan of Care  Goal: Plan of Care Review  Outcome: Ongoing, Progressing  Goal: Patient-Specific Goal (Individualized)  Outcome: Ongoing, Progressing  Goal: Absence of Hospital-Acquired Illness or Injury  Outcome: Ongoing, Progressing  Goal: Optimal Comfort and Wellbeing  Outcome: Ongoing, Progressing  Goal: Readiness for Transition of Care  Outcome: Ongoing, Progressing     Problem: Infection  Goal: Absence of Infection Signs and Symptoms  Outcome: Ongoing, Progressing     Problem: Fluid and Electrolyte Imbalance (Acute Kidney Injury/Impairment)  Goal: Fluid and Electrolyte Balance  Outcome: Ongoing, Progressing     Problem: Oral Intake Inadequate (Acute Kidney Injury/Impairment)  Goal: Optimal Nutrition Intake  Outcome: Ongoing, Progressing     Problem: Renal Function Impairment (Acute Kidney Injury/Impairment)  Goal: Effective Renal Function  Outcome: Ongoing, Progressing     Problem: Fall Injury Risk  Goal: Absence of Fall and Fall-Related Injury  Outcome: Ongoing, Progressing       Reviewed plan of care with emphasis on importance of accurate I&O recording and ramirez care.  Patient verbalized understanding and agreement with plan of care.

## 2022-12-06 NOTE — NURSING
Patient urinated multiple times during the day with unmeasured volume.  Muhammad catheter placed in preparation for IV lasix drip.  Existing IV was leaking.  2 attempts to get new IV access, set up provided for oncoming nurse to get IV access and to get lasix drip started.

## 2022-12-06 NOTE — TELEPHONE ENCOUNTER
[facility]    Attempted to call pt to complete Phone enrollment to program. Unable to reach pt at listed phone numbers.  Was able to leave message on voicemail encouraging pt to return call with [facility] phone number..     Will continue to try to reach patient.

## 2022-12-06 NOTE — PLAN OF CARE
[facility]  Initial Discharge Assessment       Primary Care Provider: Jam Rowell MD    Admission Diagnosis: SOB (shortness of breath) [R06.02]  Elevated troponin [R77.8]  Heart failure [I50.9]  SARAH (acute kidney injury) [N17.9]  Chest pain [R07.9]  Hypervolemia, unspecified hypervolemia type [E87.70]  Acute on chronic diastolic (congestive) heart failure [I50.33]    Admission Date: 12/3/2022  Expected Discharge Date: 12/7/2022         Payor: [employer] MEDICARE / Plan: [employer] MEDICARE HMO / Product Type: Capitation /     Extended Emergency Contact Information  Primary Emergency Contact: MattarnoldoSarika  Address: [address] [place] [address] [facility]  Home Phone: [phone]  Relation: Spouse  Secondary Emergency Contact: Amelie Fernandes   [facility]  Home Phone: [phone]  Mobile Phone: [phone]  Relation: Daughter    Discharge Plan A: (P) Home with family  Discharge Plan B: (P) [facility] - [facility] [address] [facility]  [address] [facility] [address]  Phone: [phone] Fax: [phone]    [id number] [address] AT [facility] [address] [facility]  [address] [facility] [address]  Phone: [phone] Fax: [phone]    [facility]  [address] [facility] [address]  [facility] [address]  Phone: [phone] Fax: [phone]      Initial Assessment (most recent)       Adult Discharge Assessment - 12/06/22 1631          Discharge Assessment    Assessment Type Discharge Planning Assessment (P)      Confirmed/corrected address, phone number and insurance Yes (P)      Confirmed Demographics Correct on Facesheet (P)      Source of Information patient (P)      Does patient/caregiver understand observation status Yes (P)      Communicated TEOFILO with patient/caregiver Yes (P)      Reason For Admission shortness of breath (P)       People in Home spouse (P)      Do you expect to return to your current living situation? Yes (P)      Prior to hospitilization cognitive status: Alert/Oriented (P)      Current cognitive status: Alert/Oriented (P)      Equipment Currently Used at Home none (P)      Readmission within 30 days? No (P)      Patient currently being followed by outpatient case management? No (P)      Do you currently have service(s) that help you manage your care at home? No (P)      Do you take prescription medications? Yes (P)      Do you have prescription coverage? Yes (P)      Coverage [employer] Managed Medicare (P)      Do you have any problems affording any of your prescribed medications? No (P)      Who is going to help you get home at discharge? Sarika Moody, spouse, [phone] (P)      How do you get to doctors appointments? car, drives self;family or friend will provide (P)      Are you on dialysis? No (P)      Do you take coumadin? No (P)      Discharge Plan A Home with family (P)      Discharge Plan B Home Health (P)      DME Needed Upon Discharge  none (P)                       Page Hicks LMSW  [facility]   [id number]

## 2022-12-06 NOTE — NURSING
Weight in bed 65.4 kg.   Urine at beginning of shift was blood tinged, is now red.  IV site needed changing also due to bleeding.  Medical team notified.

## 2022-12-07 ENCOUNTER — TELEPHONE (OUTPATIENT)
Dept: CARDIOLOGY | Facility: CLINIC | Age: 87
End: 2022-12-07
Payer: MEDICARE

## 2022-12-07 PROBLEM — R31.9 HEMATURIA: Status: ACTIVE | Noted: 2022-12-07

## 2022-12-07 LAB
ANION GAP SERPL CALC-SCNC: 10 MMOL/L (ref 8–16)
BASOPHILS # BLD AUTO: 0.07 K/UL (ref 0–0.2)
BASOPHILS NFR BLD: 0.7 % (ref 0–1.9)
BNP SERPL-MCNC: 504 PG/ML (ref 0–99)
BUN SERPL-MCNC: 56 MG/DL (ref 10–30)
CALCIUM SERPL-MCNC: 9.1 MG/DL (ref 8.7–10.5)
CHLORIDE SERPL-SCNC: 98 MMOL/L (ref 95–110)
CO2 SERPL-SCNC: 33 MMOL/L (ref 23–29)
CREAT SERPL-MCNC: 2.2 MG/DL (ref 0.5–1.4)
DIFFERENTIAL METHOD: ABNORMAL
EOSINOPHIL # BLD AUTO: 0.8 K/UL (ref 0–0.5)
EOSINOPHIL NFR BLD: 7.7 % (ref 0–8)
ERYTHROCYTE [DISTWIDTH] IN BLOOD BY AUTOMATED COUNT: 15.7 % (ref 11.5–14.5)
EST. GFR  (NO RACE VARIABLE): 27.4 ML/MIN/1.73 M^2
GLUCOSE SERPL-MCNC: 108 MG/DL (ref 70–110)
HCT VFR BLD AUTO: 35.2 % (ref 40–54)
HGB BLD-MCNC: 11 G/DL (ref 14–18)
IMM GRANULOCYTES # BLD AUTO: 0.03 K/UL (ref 0–0.04)
IMM GRANULOCYTES NFR BLD AUTO: 0.3 % (ref 0–0.5)
LYMPHOCYTES # BLD AUTO: 1.7 K/UL (ref 1–4.8)
LYMPHOCYTES NFR BLD: 17.1 % (ref 18–48)
MAGNESIUM SERPL-MCNC: 2.4 MG/DL (ref 1.6–2.6)
MCH RBC QN AUTO: 30.4 PG (ref 27–31)
MCHC RBC AUTO-ENTMCNC: 31.3 G/DL (ref 32–36)
MCV RBC AUTO: 97 FL (ref 82–98)
MONOCYTES # BLD AUTO: 0.9 K/UL (ref 0.3–1)
MONOCYTES NFR BLD: 8.8 % (ref 4–15)
NEUTROPHILS # BLD AUTO: 6.5 K/UL (ref 1.8–7.7)
NEUTROPHILS NFR BLD: 65.4 % (ref 38–73)
NRBC BLD-RTO: 0 /100 WBC
PLATELET # BLD AUTO: 238 K/UL (ref 150–450)
PMV BLD AUTO: 8.9 FL (ref 9.2–12.9)
POCT GLUCOSE: 123 MG/DL (ref 70–110)
POCT GLUCOSE: 161 MG/DL (ref 70–110)
POTASSIUM SERPL-SCNC: 3.7 MMOL/L (ref 3.5–5.1)
RBC # BLD AUTO: 3.62 M/UL (ref 4.6–6.2)
SODIUM SERPL-SCNC: 141 MMOL/L (ref 136–145)
WBC # BLD AUTO: 9.93 K/UL (ref 3.9–12.7)

## 2022-12-07 PROCEDURE — 36415 COLL VENOUS BLD VENIPUNCTURE: CPT | Performed by: PHYSICIAN ASSISTANT

## 2022-12-07 PROCEDURE — 63600175 PHARM REV CODE 636 W HCPCS: Performed by: INTERNAL MEDICINE

## 2022-12-07 PROCEDURE — 25000003 PHARM REV CODE 250: Performed by: INTERNAL MEDICINE

## 2022-12-07 PROCEDURE — 83880 ASSAY OF NATRIURETIC PEPTIDE: CPT | Performed by: INTERNAL MEDICINE

## 2022-12-07 PROCEDURE — 11000001 HC ACUTE MED/SURG PRIVATE ROOM

## 2022-12-07 PROCEDURE — 80048 BASIC METABOLIC PNL TOTAL CA: CPT | Performed by: PHYSICIAN ASSISTANT

## 2022-12-07 PROCEDURE — 25000242 PHARM REV CODE 250 ALT 637 W/ HCPCS: Performed by: PHYSICIAN ASSISTANT

## 2022-12-07 PROCEDURE — 36415 COLL VENOUS BLD VENIPUNCTURE: CPT | Performed by: INTERNAL MEDICINE

## 2022-12-07 PROCEDURE — 85025 COMPLETE CBC W/AUTO DIFF WBC: CPT | Performed by: PHYSICIAN ASSISTANT

## 2022-12-07 PROCEDURE — 99233 SBSQ HOSP IP/OBS HIGH 50: CPT | Mod: ,,, | Performed by: INTERNAL MEDICINE

## 2022-12-07 PROCEDURE — 99233 PR SUBSEQUENT HOSPITAL CARE,LEVL III: ICD-10-PCS | Mod: ,,, | Performed by: INTERNAL MEDICINE

## 2022-12-07 PROCEDURE — 83735 ASSAY OF MAGNESIUM: CPT | Performed by: PHYSICIAN ASSISTANT

## 2022-12-07 PROCEDURE — 94640 AIRWAY INHALATION TREATMENT: CPT

## 2022-12-07 PROCEDURE — 27000207 HC ISOLATION

## 2022-12-07 PROCEDURE — 94761 N-INVAS EAR/PLS OXIMETRY MLT: CPT

## 2022-12-07 PROCEDURE — 25000003 PHARM REV CODE 250: Performed by: PHYSICIAN ASSISTANT

## 2022-12-07 RX ORDER — FUROSEMIDE 10 MG/ML
40 INJECTION INTRAMUSCULAR; INTRAVENOUS 3 TIMES DAILY
Status: DISCONTINUED | OUTPATIENT
Start: 2022-12-07 | End: 2022-12-07

## 2022-12-07 RX ADMIN — MUPIROCIN: 20 OINTMENT TOPICAL at 09:12

## 2022-12-07 RX ADMIN — ASPIRIN 81 MG: 81 TABLET, CHEWABLE ORAL at 09:12

## 2022-12-07 RX ADMIN — FUROSEMIDE 40 MG: 10 INJECTION, SOLUTION INTRAMUSCULAR; INTRAVENOUS at 09:12

## 2022-12-07 RX ADMIN — MUPIROCIN: 20 OINTMENT TOPICAL at 08:12

## 2022-12-07 RX ADMIN — IPRATROPIUM BROMIDE AND ALBUTEROL SULFATE 3 ML: 2.5; .5 SOLUTION RESPIRATORY (INHALATION) at 07:12

## 2022-12-07 RX ADMIN — GUAIFENESIN 600 MG: 600 TABLET, EXTENDED RELEASE ORAL at 09:12

## 2022-12-07 RX ADMIN — IPRATROPIUM BROMIDE AND ALBUTEROL SULFATE 3 ML: 2.5; .5 SOLUTION RESPIRATORY (INHALATION) at 03:12

## 2022-12-07 RX ADMIN — CARVEDILOL 12.5 MG: 12.5 TABLET, FILM COATED ORAL at 06:12

## 2022-12-07 RX ADMIN — IPRATROPIUM BROMIDE AND ALBUTEROL SULFATE 3 ML: 2.5; .5 SOLUTION RESPIRATORY (INHALATION) at 11:12

## 2022-12-07 RX ADMIN — IPRATROPIUM BROMIDE AND ALBUTEROL SULFATE 3 ML: 2.5; .5 SOLUTION RESPIRATORY (INHALATION) at 09:12

## 2022-12-07 RX ADMIN — CARVEDILOL 12.5 MG: 12.5 TABLET, FILM COATED ORAL at 09:12

## 2022-12-07 RX ADMIN — FERROUS SULFATE TAB 325 MG (65 MG ELEMENTAL FE) 1 EACH: 325 (65 FE) TAB at 09:12

## 2022-12-07 NOTE — PROGRESS NOTES
[facility] Medicine  Progress Note    Patient Name: Deena Moody  MRN: [id number]  Patient Class: IP- Inpatient   Admission Date: 12/3/2022  Length of Stay: 3 days  Attending Physician: Junior Myers MD  Primary Care Provider: Jam Rowell MD        Subjective:     Principal Problem:Acute on chronic diastolic congestive heart failure        HPI:  Deena Moody is a 89 y.o. male with a PMHx of HTN, HLD, CHF, Afib, CAD who presents to [facility] with testicular and penile swelling. Patient reports URI symptoms of productive cough, congestion, and fatigue for the past few days for which he was seen by his PCP yesterday and tested positive for the flu. He was prescribed tamiflu 30mg but was unable to find a pharmacy that carried this dose so he hasn't started it yet. Patient noted to have worsening penile and testicular swelling with associated bilateral LE edema today. No testicular or penile pain. He had similar symptoms in the past which was due to volume overload and required IV lasix. He also notes decreased urine output for the past day, only made a small amount of urine since receiving IV lasix in the ED prior to my exam. Denies fever, chills, N/V, abdominal pain, chest pain, dizziness, HA, or syncope.    ED: AFVSS. No leukocytosis. Cr 2.0 (BL ~1.6), alk phos 139, t.bili 1.3. , trop 0.044. EKG pending. CXR shows right basilar effusion with trace effusion on the left, probable mild atelectasis on the right, minimal left basilar interstitial infiltrate or mild atelectasis. Given 40mg lasix IVP with minimal UOP.       Overview/Hospital Course:  No notes on file    Interval History: Patient lying in bed, no acute distress. No acute events overnight. Patient reports improvement in dyspnea and scrotal as well as BLE swelling on IV lasix. Patient's UOP decreased and Scr increased from 1.8 to 1.9 so patient switched to lasix gtt @ 10 cc/hr. Patient family notes that his dry weight is  around 135-140. Will continue to monitor Scr, weight, fluid restriction and cardiac diet.       Review of Systems   Constitutional:  Positive for activity change, fatigue and unexpected weight change. Negative for chills and fever.   HENT:  Negative for congestion and trouble swallowing.    Eyes:  Negative for visual disturbance.   Respiratory:  Positive for shortness of breath. Negative for cough.    Cardiovascular:  Positive for leg swelling. Negative for chest pain.   Gastrointestinal:  Negative for abdominal distention, abdominal pain, nausea and vomiting.   Endocrine: Negative for cold intolerance, heat intolerance, polydipsia and polyuria.   Genitourinary:  Positive for scrotal swelling. Negative for difficulty urinating and dysuria.   Musculoskeletal:  Negative for back pain and myalgias.   Skin:  Negative for rash and wound.   Neurological:  Positive for weakness. Negative for dizziness and light-headedness.   Hematological:  Negative for adenopathy. Does not bruise/bleed easily.   Psychiatric/Behavioral:  Negative for confusion and sleep disturbance.    Objective:     Vital Signs (Most Recent):  Temp: 97.4 °F (36.3 °C) (12/07/22 0829)  Pulse: 70 (12/07/22 0829)  Resp: 16 (12/07/22 0829)  BP: (!) 109/54 (12/07/22 0829)  SpO2: (!) 94 % (12/07/22 0829) Vital Signs (24h Range):  Temp:  [97.3 °F (36.3 °C)-98.5 °F (36.9 °C)] 97.4 °F (36.3 °C)  Pulse:  [69-75] 70  Resp:  [16-20] 16  SpO2:  [90 %-100 %] 94 %  BP: (104-128)/(51-58) 109/54     Weight: 63 kg (138 lb 14.2 oz)  Body mass index is 21.12 kg/m².    Intake/Output Summary (Last 24 hours) at 12/7/2022 0950  Last data filed at 12/7/2022 0931  Gross per 24 hour   Intake 240 ml   Output 4810 ml   Net -4570 ml        Physical Exam  Constitutional:       Appearance: Normal appearance.   HENT:      Head: Normocephalic.      Mouth/Throat:      Mouth: Mucous membranes are moist.   Eyes:      Extraocular Movements: Extraocular movements intact.      Pupils: Pupils are  equal, round, and reactive to light.   Neck:      Comments: JVD absent  Cardiovascular:      Rate and Rhythm: Normal rate and regular rhythm.      Pulses: Normal pulses.      Heart sounds: Normal heart sounds.   Pulmonary:      Effort: Respiratory distress present.      Breath sounds: Normal breath sounds.   Abdominal:      General: Bowel sounds are normal. There is no distension.      Palpations: Abdomen is soft.      Tenderness: There is no abdominal tenderness.   Genitourinary:     Testes:         Right: Swelling present.         Left: Swelling present.   Musculoskeletal:         General: Normal range of motion.      Cervical back: Neck supple.      Right lower leg: Edema present.      Left lower leg: Edema present.   Neurological:      General: No focal deficit present.      Mental Status: He is alert and oriented to person, place, and time.   Psychiatric:         Mood and Affect: Mood normal.       Significant Labs: A1C:   Recent Labs   Lab 12/04/22  0517   HGBA1C 5.0       CBC:   Recent Labs   Lab 12/06/22  0432 12/06/22  1432 12/07/22  0857   WBC 7.93 9.33 9.93   HGB 10.0* 9.9* 11.0*   HCT 32.1* 32.0* 35.2*    219 238       CMP:   Recent Labs   Lab 12/06/22  0432 12/06/22  1432 12/07/22  0857    143 141   K 3.8 3.8 3.7    99 98   CO2 27 32* 33*   GLU 89 118* 108   BUN 51* 51* 56*   CREATININE 1.8* 2.0* 2.2*   CALCIUM 9.0 9.1 9.1   PROT 7.3  --   --    ALBUMIN 3.0*  --   --    BILITOT 0.9  --   --    ALKPHOS 131  --   --    AST 17  --   --    ALT 12  --   --    ANIONGAP 13 12 10       Coagulation:   Recent Labs   Lab 12/06/22  1432   INR 1.4*     Magnesium:   Recent Labs   Lab 12/06/22  0432 12/07/22  0439   MG 2.3 2.4       Troponin:   No results for input(s): TROPONINI, TROPONINIHS in the last 48 hours.    TSH: No results for input(s): TSH in the last 4320 hours.  Urine Studies:   No results for input(s): COLORU, APPEARANCEUA, PHUR, SPECGRAV, PROTEINUA, GLUCUA, KETONESU, BILIRUBINUA,  "OCCULTUA, NITRITE, UROBILINOGEN, LEUKOCYTESUR, RBCUA, WBCUA, BACTERIA, SQUAMEPITHEL, HYALINECASTS in the last 48 hours.    Invalid input(s): LOVE      Significant Imaging: I have reviewed all pertinent imaging results/findings within the past 24 hours.      Assessment/Plan:      * Acute on chronic diastolic congestive heart failure  Acute hypoxemic respiratory failure  - appears volume overload on exam,   - CXR w/ pleural effusions  - minimal UOP s/p 40mg lasix IVP in the ED  - switched IV lasix to lasix gtt on 12/5  - repeat TTE  - keep K>4 and Mg>2  - telemetry   - strict I/Os, daily weights  - low Na diet w/ fluid restriction    Elevated troponin  - trop 0.044; EKG pending  - no reported CP  - likely type 2 demand ischemia due to volume overload   - trend Tn  - follow up TTE    SARAH (acute kidney injury)  CKD (chronic kidney disease) stage 3, GFR 30-59 ml/min  - Cr 2.0, baseline ~1.6. Scr 2.0 --> 1.8 --> 1.9  - likely 2/2 cardiorenal syndrome  - UA negative  - IV diuresis as above  - avoid nephrotoxins, renally dose meds  - trend BMP    Influenza A  - diagnosed yesterday but unable to fill tamiflu as unable to find pharmacy that carried prescribed dosing  - afebrile without leukocytosis  - CXR with mild infiltrates-- suspect 2/2 atelectasis vs fluid overload  - check procal  - consider adding abx for CAP coverage if no improvement in AM  - start tamiflu 30mg daily (renally dosed)  - sohail, IS  - mucinex and tessalon    Permanent atrial fibrillation  - continue coreg 12.5mg BID and eliquis 2.5mg BID    CAD (coronary artery disease)  - no reported chest pain  - elevated trop likely 2/2 volume overload (see above)    Essential hypertension  - continue coreg 12.5mg BID  - hold benazepril given SARAH    Dyslipidemia  - continue ASA    CKD (chronic kidney disease) stage 3, GFR 30-59 ml/min  - see "SARAH"        VTE Risk Mitigation (From admission, onward)         Ordered     Reason for No Pharmacological VTE " Prophylaxis  Once        Question:  Reasons:  Answer:  Already adequately anticoagulated on oral Anticoagulants    12/03/22 2037     IP VTE HIGH RISK PATIENT  Once         12/03/22 2037     Place sequential compression device  Until discontinued         12/03/22 2034     Place sequential compression device  Until discontinued         12/03/22 2018                Discharge Planning   TEOFILO: 12/9/2022     Code Status: Full Code   Is the patient medically ready for discharge?: No    Reason for patient still in hospital (select all that apply): Patient unstable, Patient trending condition, Laboratory test and Treatment  Discharge Plan A: Home with family          Time spent in care of patient: > 35 minutes           Junior Myers MD  Department of Hospital Medicine   [facility]

## 2022-12-07 NOTE — PROGRESS NOTES
[facility] Medicine  Progress Note    Patient Name: Deena Moody  MRN: [id number]  Patient Class: IP- Inpatient   Admission Date: 12/3/2022  Length of Stay: 3 days  Attending Physician: Junior Myers MD  Primary Care Provider: Jam Rowell MD        Subjective:     Principal Problem:Acute on chronic diastolic congestive heart failure        HPI:  Deena Moody is a 89 y.o. male with a PMHx of HTN, HLD, CHF, Afib, CAD who presents to [facility] with testicular and penile swelling. Patient reports URI symptoms of productive cough, congestion, and fatigue for the past few days for which he was seen by his PCP yesterday and tested positive for the flu. He was prescribed tamiflu 30mg but was unable to find a pharmacy that carried this dose so he hasn't started it yet. Patient noted to have worsening penile and testicular swelling with associated bilateral LE edema today. No testicular or penile pain. He had similar symptoms in the past which was due to volume overload and required IV lasix. He also notes decreased urine output for the past day, only made a small amount of urine since receiving IV lasix in the ED prior to my exam. Denies fever, chills, N/V, abdominal pain, chest pain, dizziness, HA, or syncope.    ED: AFVSS. No leukocytosis. Cr 2.0 (BL ~1.6), alk phos 139, t.bili 1.3. , trop 0.044. EKG pending. CXR shows right basilar effusion with trace effusion on the left, probable mild atelectasis on the right, minimal left basilar interstitial infiltrate or mild atelectasis. Given 40mg lasix IVP with minimal UOP.       Overview/Hospital Course:  Patient continuing to feel well and remains on room air. Patient is close to dry weight (138 lbs) and denies SOB or swelling after diuresis. Patient continues to have good UOP through ramirez and  (- 5.75 L since admission) but continues to have hematuria. Urology consulted for evaluation of hematuria. H/H stable and patient hemodynamically  stable. Viral symptoms resolved and patient completed Tamiflu. Scr increased from 1.9 to 2.2 so lasix gtt held. Also bicarb 33 showing contraction alkalosis. Plan to hold diuresis and monitor Scr. Patient will be stable for discharge on home diuresis after Scr close to baseline.       Interval History: Patient lying in bed, no acute distress. Family at bedside. Patient continuing to feel well and remains on room air. Patient is close to dry weight (138 lbs) and denies SOB or swelling after diuresis. Patient continues to have good UOP through ramirez and  (- 5.75 L since admission) but continues to have hematuria. Urology consulted for evaluation of hematuria. H/H stable and patient hemodynamically stable. Viral symptoms resolved and patient completed Tamiflu. Scr increased from 1.9 to 2.2 so lasix gtt held. Also bicarb 33 showing contraction alkalosis. Plan to hold diuresis and monitor Scr. Patient will be stable for discharge on home diuresis after Scr close to baseline.     Review of Systems   Constitutional:  Positive for activity change, fatigue and unexpected weight change. Negative for chills and fever.   HENT:  Negative for congestion and trouble swallowing.    Eyes:  Negative for visual disturbance.   Respiratory:  Positive for shortness of breath. Negative for cough.    Cardiovascular:  Positive for leg swelling. Negative for chest pain.   Gastrointestinal:  Negative for abdominal distention, abdominal pain, nausea and vomiting.   Endocrine: Negative for cold intolerance, heat intolerance, polydipsia and polyuria.   Genitourinary:  Positive for scrotal swelling. Negative for difficulty urinating and dysuria.   Musculoskeletal:  Negative for back pain and myalgias.   Skin:  Negative for rash and wound.   Neurological:  Positive for weakness. Negative for dizziness and light-headedness.   Hematological:  Negative for adenopathy. Does not bruise/bleed easily.   Psychiatric/Behavioral:  Negative for confusion and  sleep disturbance.    Objective:     Vital Signs (Most Recent):  Temp: 97.4 °F (36.3 °C) (12/07/22 0829)  Pulse: 70 (12/07/22 0829)  Resp: 16 (12/07/22 0829)  BP: (!) 109/54 (12/07/22 0829)  SpO2: (!) 94 % (12/07/22 0829) Vital Signs (24h Range):  Temp:  [97.3 °F (36.3 °C)-98.5 °F (36.9 °C)] 97.4 °F (36.3 °C)  Pulse:  [69-75] 70  Resp:  [16-20] 16  SpO2:  [90 %-100 %] 94 %  BP: (104-128)/(51-58) 109/54     Weight: 63 kg (138 lb 14.2 oz)  Body mass index is 21.12 kg/m².    Intake/Output Summary (Last 24 hours) at 12/7/2022 1001  Last data filed at 12/7/2022 0931  Gross per 24 hour   Intake 240 ml   Output 4810 ml   Net -4570 ml        Physical Exam  Constitutional:       Appearance: Normal appearance.   HENT:      Head: Normocephalic.      Mouth/Throat:      Mouth: Mucous membranes are moist.   Eyes:      Extraocular Movements: Extraocular movements intact.      Pupils: Pupils are equal, round, and reactive to light.   Neck:      Comments: JVD absent  Cardiovascular:      Rate and Rhythm: Normal rate and regular rhythm.      Pulses: Normal pulses.      Heart sounds: Normal heart sounds.   Pulmonary:      Effort: Respiratory distress present.      Breath sounds: Normal breath sounds.   Abdominal:      General: Bowel sounds are normal. There is no distension.      Palpations: Abdomen is soft.      Tenderness: There is no abdominal tenderness.   Genitourinary:     Testes:         Right: Swelling present.         Left: Swelling present.   Musculoskeletal:         General: Normal range of motion.      Cervical back: Neck supple.      Right lower leg: Edema present.      Left lower leg: Edema present.   Neurological:      General: No focal deficit present.      Mental Status: He is alert and oriented to person, place, and time.   Psychiatric:         Mood and Affect: Mood normal.       Significant Labs: A1C:   Recent Labs   Lab 12/04/22  0517   HGBA1C 5.0       CBC:   Recent Labs   Lab 12/06/22  0432 12/06/22  1432  12/07/22  0857   WBC 7.93 9.33 9.93   HGB 10.0* 9.9* 11.0*   HCT 32.1* 32.0* 35.2*    219 238       CMP:   Recent Labs   Lab 12/06/22  0432 12/06/22  1432 12/07/22  0857    143 141   K 3.8 3.8 3.7    99 98   CO2 27 32* 33*   GLU 89 118* 108   BUN 51* 51* 56*   CREATININE 1.8* 2.0* 2.2*   CALCIUM 9.0 9.1 9.1   PROT 7.3  --   --    ALBUMIN 3.0*  --   --    BILITOT 0.9  --   --    ALKPHOS 131  --   --    AST 17  --   --    ALT 12  --   --    ANIONGAP 13 12 10       Coagulation:   Recent Labs   Lab 12/06/22  1432   INR 1.4*       Magnesium:   Recent Labs   Lab 12/06/22  0432 12/07/22  0439   MG 2.3 2.4     No results for input(s): COLORU, APPEARANCEUA, PHUR, SPECGRAV, PROTEINUA, GLUCUA, KETONESU, BILIRUBINUA, OCCULTUA, NITRITE, UROBILINOGEN, LEUKOCYTESUR, RBCUA, WBCUA, BACTERIA, SQUAMEPITHEL, HYALINECASTS in the last 48 hours.    Invalid input(s): LOVE      Significant Imaging: I have reviewed all pertinent imaging results/findings within the past 24 hours.      Assessment/Plan:      * Acute on chronic diastolic congestive heart failure  Acute hypoxemic respiratory failure  - appears volume overload on exam,   - CXR w/ pleural effusions  - minimal UOP s/p 40mg lasix IVP in the ED  - switched IV lasix to lasix gtt on 12/5. Discontinued diuresis on 12/7 due to rise in Scr. Will resume home diuresis once SARAH resolves   - patient's weight: 138 lbs (dry weight 140 lbs)  - repeat TTE  - keep K>4 and Mg>2  - telemetry   - strict I/Os, daily weights  - low Na diet w/ fluid restriction    Hematuria  - hematuria via ramirez seen on 12/6  - H/H stable and hemodynamically stable  - holding home Eliquis and ASA   - urology consulted. followup recs      Elevated troponin  - trop 0.044; EKG pending  - no reported CP  - likely type 2 demand ischemia due to volume overload   - trend Tn  - follow up TTE    SARAH (acute kidney injury)  CKD (chronic kidney disease) stage 3, GFR 30-59 ml/min  - Cr 2.0, baseline  "~1.6. Scr 2.0 --> 1.8 --> 2.2  - likely 2/2 cardiorenal syndrome  - renal US negative for hydro  - continue ramirez   - UA negative  - IV diuresis as above  - avoid nephrotoxins, renally dose meds  - trend BMP    Influenza A  - diagnosed yesterday but unable to fill tamiflu as unable to find pharmacy that carried prescribed dosing  - afebrile without leukocytosis  - CXR with mild infiltrates-- suspect 2/2 atelectasis vs fluid overload  - check procal  - consider adding abx for CAP coverage if no improvement in AM  - completed Tamiflu  - duonebs, IS  - mucinex and tessalon    Permanent atrial fibrillation  - continue coreg 12.5mg BID   - holding home eliquis due to hematuria    CAD (coronary artery disease)  - no reported chest pain  - elevated trop likely 2/2 volume overload (see above)    Essential hypertension  - continue coreg 12.5mg BID  - hold benazepril given SARAH    Dyslipidemia  - continue ASA    CKD (chronic kidney disease) stage 3, GFR 30-59 ml/min  - see "SARAH"        VTE Risk Mitigation (From admission, onward)         Ordered     Reason for No Pharmacological VTE Prophylaxis  Once        Question:  Reasons:  Answer:  Already adequately anticoagulated on oral Anticoagulants    12/03/22 2037     IP VTE HIGH RISK PATIENT  Once         12/03/22 2037     Place sequential compression device  Until discontinued         12/03/22 2034     Place sequential compression device  Until discontinued         12/03/22 2018                Discharge Planning   TEOFILO: 12/9/2022     Code Status: Full Code   Is the patient medically ready for discharge?: No    Reason for patient still in hospital (select all that apply): Patient unstable, Patient trending condition, Laboratory test, Treatment and Consult recommendations  Discharge Plan A: Home with family          Time spent in care of patient: > 35 minutes           Junior Myers MD  Department of Hospital Medicine   [facility] - Med Surg    "

## 2022-12-07 NOTE — ASSESSMENT & PLAN NOTE
- diagnosed yesterday but unable to fill tamiflu as unable to find pharmacy that carried prescribed dosing  - afebrile without leukocytosis  - CXR with mild infiltrates-- suspect 2/2 atelectasis vs fluid overload  - check procal  - consider adding abx for CAP coverage if no improvement in AM  - completed Tamiflu  - sohail, IS  - mucinex and tessalon

## 2022-12-07 NOTE — SUBJECTIVE & OBJECTIVE
Interval History: Patient lying in bed, no acute distress. Family at bedside. Patient continuing to feel well and remains on room air. Patient is close to dry weight (138 lbs) and denies SOB or swelling after diuresis. Patient continues to have good UOP through ramirez and  (- 5.75 L since admission) but continues to have hematuria. Urology consulted for evaluation of hematuria. H/H stable and patient hemodynamically stable. Viral symptoms resolved and patient completed Tamiflu. Scr increased from 1.9 to 2.2 so lasix gtt held. Also bicarb 33 showing contraction alkalosis. Plan to hold diuresis and monitor Scr. Patient will be stable for discharge on home diuresis after Scr close to baseline.     Review of Systems   Constitutional:  Positive for activity change, fatigue and unexpected weight change. Negative for chills and fever.   HENT:  Negative for congestion and trouble swallowing.    Eyes:  Negative for visual disturbance.   Respiratory:  Positive for shortness of breath. Negative for cough.    Cardiovascular:  Positive for leg swelling. Negative for chest pain.   Gastrointestinal:  Negative for abdominal distention, abdominal pain, nausea and vomiting.   Endocrine: Negative for cold intolerance, heat intolerance, polydipsia and polyuria.   Genitourinary:  Positive for scrotal swelling. Negative for difficulty urinating and dysuria.   Musculoskeletal:  Negative for back pain and myalgias.   Skin:  Negative for rash and wound.   Neurological:  Positive for weakness. Negative for dizziness and light-headedness.   Hematological:  Negative for adenopathy. Does not bruise/bleed easily.   Psychiatric/Behavioral:  Negative for confusion and sleep disturbance.    Objective:     Vital Signs (Most Recent):  Temp: 97.4 °F (36.3 °C) (12/07/22 0829)  Pulse: 70 (12/07/22 0829)  Resp: 16 (12/07/22 0829)  BP: (!) 109/54 (12/07/22 0829)  SpO2: (!) 94 % (12/07/22 0829) Vital Signs (24h Range):  Temp:  [97.3 °F (36.3 °C)-98.5 °F  (36.9 °C)] 97.4 °F (36.3 °C)  Pulse:  [69-75] 70  Resp:  [16-20] 16  SpO2:  [90 %-100 %] 94 %  BP: (104-128)/(51-58) 109/54     Weight: 63 kg (138 lb 14.2 oz)  Body mass index is 21.12 kg/m².    Intake/Output Summary (Last 24 hours) at 12/7/2022 1001  Last data filed at 12/7/2022 0931  Gross per 24 hour   Intake 240 ml   Output 4810 ml   Net -4570 ml        Physical Exam  Constitutional:       Appearance: Normal appearance.   HENT:      Head: Normocephalic.      Mouth/Throat:      Mouth: Mucous membranes are moist.   Eyes:      Extraocular Movements: Extraocular movements intact.      Pupils: Pupils are equal, round, and reactive to light.   Neck:      Comments: JVD absent  Cardiovascular:      Rate and Rhythm: Normal rate and regular rhythm.      Pulses: Normal pulses.      Heart sounds: Normal heart sounds.   Pulmonary:      Effort: Respiratory distress present.      Breath sounds: Normal breath sounds.   Abdominal:      General: Bowel sounds are normal. There is no distension.      Palpations: Abdomen is soft.      Tenderness: There is no abdominal tenderness.   Genitourinary:     Testes:         Right: Swelling present.         Left: Swelling present.   Musculoskeletal:         General: Normal range of motion.      Cervical back: Neck supple.      Right lower leg: Edema present.      Left lower leg: Edema present.   Neurological:      General: No focal deficit present.      Mental Status: He is alert and oriented to person, place, and time.   Psychiatric:         Mood and Affect: Mood normal.       Significant Labs: A1C:   Recent Labs   Lab 12/04/22  0517   HGBA1C 5.0       CBC:   Recent Labs   Lab 12/06/22  0432 12/06/22  1432 12/07/22  0857   WBC 7.93 9.33 9.93   HGB 10.0* 9.9* 11.0*   HCT 32.1* 32.0* 35.2*    219 238       CMP:   Recent Labs   Lab 12/06/22  0432 12/06/22  1432 12/07/22  0857    143 141   K 3.8 3.8 3.7    99 98   CO2 27 32* 33*   GLU 89 118* 108   BUN 51* 51* 56*    CREATININE 1.8* 2.0* 2.2*   CALCIUM 9.0 9.1 9.1   PROT 7.3  --   --    ALBUMIN 3.0*  --   --    BILITOT 0.9  --   --    ALKPHOS 131  --   --    AST 17  --   --    ALT 12  --   --    ANIONGAP 13 12 10       Coagulation:   Recent Labs   Lab 12/06/22  1432   INR 1.4*       Magnesium:   Recent Labs   Lab 12/06/22  0432 12/07/22  0439   MG 2.3 2.4     No results for input(s): COLORU, APPEARANCEUA, PHUR, SPECGRAV, PROTEINUA, GLUCUA, KETONESU, BILIRUBINUA, OCCULTUA, NITRITE, UROBILINOGEN, LEUKOCYTESUR, RBCUA, WBCUA, BACTERIA, SQUAMEPITHEL, HYALINECASTS in the last 48 hours.    Invalid input(s): LOVE      Significant Imaging: I have reviewed all pertinent imaging results/findings within the past 24 hours.

## 2022-12-07 NOTE — PROGRESS NOTES
[facility] Medicine  Progress Note    Patient Name: Deena Moody  MRN: [id number]  Patient Class: IP- Inpatient   Admission Date: 12/3/2022  Length of Stay: 3 days  Attending Physician: Junior Myers MD  Primary Care Provider: Jam Rowell MD        Subjective:     Principal Problem:Acute on chronic diastolic congestive heart failure        HPI:  Deena Moody is a 89 y.o. male with a PMHx of HTN, HLD, CHF, Afib, CAD who [place] [facility] [place] and penile swelling. Patient reports URI symptoms of productive cough, congestion, and fatigue for the past few days for which he was seen by his PCP yesterday and tested positive for the flu. He was prescribed tamiflu 30mg but was unable to find a pharmacy that carried this dose so he hasn't started it yet. Patient noted to have worsening penile and testicular swelling with associated bilateral LE edema today. No testicular or penile pain. He had similar symptoms in the past which was due to volume overload and required IV lasix. He also notes decreased urine output for the past day, only made a small amount of urine since receiving IV lasix in the ED prior to my exam. Denies fever, chills, N/V, abdominal pain, chest pain, dizziness, HA, or syncope.    ED: AFVSS. No leukocytosis. Cr 2.0 (BL ~1.6), alk phos 139, t.bili 1.3. , trop 0.044. EKG pending. CXR shows right basilar effusion with trace effusion on the left, probable mild atelectasis on the right, minimal left basilar interstitial infiltrate or mild atelectasis. Given 40mg lasix IVP with minimal UOP.       Overview/Hospital Course:  No notes on file    Interval History: Patient lying in bed, no acute distress. Family at bedside. Muhammad placed on 12/6 for strict I/Os while on lasix gtt. Overnight, family reports that patient developed hematuria. H/H stable and patient hemodynamically stable.  Patient reports continued improvement in dyspnea and scrotal as well as BLE  swelling on diuresis. Also viral symptoms have resolved and patient off Tamiflu. Patient's UOP improved with lasix gtt and Scr decreased from 1.9 to 1.8.     Review of Systems   Constitutional:  Positive for activity change, fatigue and unexpected weight change. Negative for chills and fever.   HENT:  Negative for congestion and trouble swallowing.    Eyes:  Negative for visual disturbance.   Respiratory:  Positive for shortness of breath. Negative for cough.    Cardiovascular:  Positive for leg swelling. Negative for chest pain.   Gastrointestinal:  Negative for abdominal distention, abdominal pain, nausea and vomiting.   Endocrine: Negative for cold intolerance, heat intolerance, polydipsia and polyuria.   Genitourinary:  Positive for scrotal swelling. Negative for difficulty urinating and dysuria.   Musculoskeletal:  Negative for back pain and myalgias.   Skin:  Negative for rash and wound.   Neurological:  Positive for weakness. Negative for dizziness and light-headedness.   Hematological:  Negative for adenopathy. Does not bruise/bleed easily.   Psychiatric/Behavioral:  Negative for confusion and sleep disturbance.    Objective:     Vital Signs (Most Recent):  Temp: 97.4 °F (36.3 °C) (12/07/22 0829)  Pulse: 70 (12/07/22 0829)  Resp: 16 (12/07/22 0829)  BP: (!) 109/54 (12/07/22 0829)  SpO2: (!) 94 % (12/07/22 0829) Vital Signs (24h Range):  Temp:  [97.3 °F (36.3 °C)-98.5 °F (36.9 °C)] 97.4 °F (36.3 °C)  Pulse:  [69-75] 70  Resp:  [16-20] 16  SpO2:  [90 %-100 %] 94 %  BP: (104-128)/(51-58) 109/54     Weight: 63 kg (138 lb 14.2 oz)  Body mass index is 21.12 kg/m².    Intake/Output Summary (Last 24 hours) at 12/7/2022 0956  Last data filed at 12/7/2022 0931  Gross per 24 hour   Intake 240 ml   Output 4810 ml   Net -4570 ml        Physical Exam  Constitutional:       Appearance: Normal appearance.   HENT:      Head: Normocephalic.      Mouth/Throat:      Mouth: Mucous membranes are moist.   Eyes:      Extraocular  Movements: Extraocular movements intact.      Pupils: Pupils are equal, round, and reactive to light.   Neck:      Comments: JVD absent  Cardiovascular:      Rate and Rhythm: Normal rate and regular rhythm.      Pulses: Normal pulses.      Heart sounds: Normal heart sounds.   Pulmonary:      Effort: Respiratory distress present.      Breath sounds: Normal breath sounds.   Abdominal:      General: Bowel sounds are normal. There is no distension.      Palpations: Abdomen is soft.      Tenderness: There is no abdominal tenderness.   Genitourinary:     Testes:         Right: Swelling present.         Left: Swelling present.   Musculoskeletal:         General: Normal range of motion.      Cervical back: Neck supple.      Right lower leg: Edema present.      Left lower leg: Edema present.   Neurological:      General: No focal deficit present.      Mental Status: He is alert and oriented to person, place, and time.   Psychiatric:         Mood and Affect: Mood normal.       Significant Labs: A1C:   Recent Labs   Lab 12/04/22  0517   HGBA1C 5.0       CBC:   Recent Labs   Lab 12/06/22  0432 12/06/22  1432 12/07/22  0857   WBC 7.93 9.33 9.93   HGB 10.0* 9.9* 11.0*   HCT 32.1* 32.0* 35.2*    219 238       CMP:   Recent Labs   Lab 12/06/22  0432 12/06/22  1432 12/07/22  0857    143 141   K 3.8 3.8 3.7    99 98   CO2 27 32* 33*   GLU 89 118* 108   BUN 51* 51* 56*   CREATININE 1.8* 2.0* 2.2*   CALCIUM 9.0 9.1 9.1   PROT 7.3  --   --    ALBUMIN 3.0*  --   --    BILITOT 0.9  --   --    ALKPHOS 131  --   --    AST 17  --   --    ALT 12  --   --    ANIONGAP 13 12 10       Coagulation:   Recent Labs   Lab 12/06/22  1432   INR 1.4*       Magnesium:   Recent Labs   Lab 12/06/22  0432 12/07/22  0439   MG 2.3 2.4           Significant Imaging: I have reviewed all pertinent imaging results/findings within the past 24 hours.      Assessment/Plan:      * Acute on chronic diastolic congestive heart failure  Acute hypoxemic  "respiratory failure  - appears volume overload on exam,   - CXR w/ pleural effusions  - minimal UOP s/p 40mg lasix IVP in the ED  - switched IV lasix to lasix gtt on 12/5  - repeat TTE  - keep K>4 and Mg>2  - telemetry   - strict I/Os, daily weights  - low Na diet w/ fluid restriction    Elevated troponin  - trop 0.044; EKG pending  - no reported CP  - likely type 2 demand ischemia due to volume overload   - trend Tn  - follow up TTE    SARAH (acute kidney injury)  CKD (chronic kidney disease) stage 3, GFR 30-59 ml/min  - Cr 2.0, baseline ~1.6. Scr 2.0 --> 1.8 --> 1.9 --> 1.8  - likely 2/2 cardiorenal syndrome  - UA negative  - IV diuresis as above  - avoid nephrotoxins, renally dose meds  - trend BMP    Influenza A  - diagnosed yesterday but unable to fill tamiflu as unable to find pharmacy that carried prescribed dosing  - afebrile without leukocytosis  - CXR with mild infiltrates-- suspect 2/2 atelectasis vs fluid overload  - check procal  - consider adding abx for CAP coverage if no improvement in AM  - start tamiflu 30mg daily (renally dosed)  - duonebs, IS  - mucinex and tessalon    Permanent atrial fibrillation  - continue coreg 12.5mg BID and eliquis 2.5mg BID    CAD (coronary artery disease)  - no reported chest pain  - elevated trop likely 2/2 volume overload (see above)    Essential hypertension  - continue coreg 12.5mg BID  - hold benazepril given SARAH    Dyslipidemia  - continue ASA    CKD (chronic kidney disease) stage 3, GFR 30-59 ml/min  - see "SARAH"        VTE Risk Mitigation (From admission, onward)         Ordered     Reason for No Pharmacological VTE Prophylaxis  Once        Question:  Reasons:  Answer:  Already adequately anticoagulated on oral Anticoagulants    12/03/22 2037     IP VTE HIGH RISK PATIENT  Once         12/03/22 2037     Place sequential compression device  Until discontinued         12/03/22 2034     Place sequential compression device  Until discontinued         12/03/22 2018 "                Discharge Planning   TEOFILO: 12/9/2022     Code Status: Full Code   Is the patient medically ready for discharge?: No    Reason for patient still in hospital (select all that apply): Patient unstable, Patient trending condition, Laboratory test and Treatment  Discharge Plan A: Home with family          Time spent in care of patient: > 35 minutes           Junior Myers MD  Department of Hospital Medicine   [facility] Surg

## 2022-12-07 NOTE — ASSESSMENT & PLAN NOTE
CKD (chronic kidney disease) stage 3, GFR 30-59 ml/min  - Cr 2.0, baseline ~1.6. Scr 2.0 --> 1.8 --> 1.9 --> 1.8  - likely 2/2 cardiorenal syndrome  - UA negative  - IV diuresis as above  - avoid nephrotoxins, renally dose meds  - trend BMP

## 2022-12-07 NOTE — ASSESSMENT & PLAN NOTE
Acute hypoxemic respiratory failure  - appears volume overload on exam,   - CXR w/ pleural effusions  - minimal UOP s/p 40mg lasix IVP in the ED  - switched IV lasix to lasix gtt on 12/5. Discontinued diuresis on 12/7 due to rise in Scr. Will resume home diuresis once SARAH resolves   - patient's weight: 138 lbs (dry weight 140 lbs)  - repeat TTE  - keep K>4 and Mg>2  - telemetry   - strict I/Os, daily weights  - low Na diet w/ fluid restriction

## 2022-12-07 NOTE — HOSPITAL COURSE
Patient continuing to feel well and remains on room air. Patient is close to dry weight (138 lbs) and denies SOB or swelling after diuresis. Patient continues to have good UOP through ramirez and  (- 5.75 L since admission) but continues to have hematuria. Urology consulted for evaluation of hematuria. H/H stable and patient hemodynamically stable. Viral symptoms resolved and patient completed Tamiflu. Scr increased from 1.9 to 2.2 so lasix gtt held. Also bicarb 33 showing contraction alkalosis. Plan to hold diuresis and monitor Scr. Patient will be stable for discharge on home diuresis after Scr close to baseline.  Creatinine improved baseline after holding diuretics for 2-3 days.  Patient discharged with home health and primary care follow-up.

## 2022-12-07 NOTE — ASSESSMENT & PLAN NOTE
Acute hypoxemic respiratory failure  - appears volume overload on exam,   - CXR w/ pleural effusions  - minimal UOP s/p 40mg lasix IVP in the ED  - switched IV lasix to lasix gtt on 12/5  - repeat TTE  - keep K>4 and Mg>2  - telemetry   - strict I/Os, daily weights  - low Na diet w/ fluid restriction

## 2022-12-07 NOTE — ASSESSMENT & PLAN NOTE
CKD (chronic kidney disease) stage 3, GFR 30-59 ml/min  - Cr 2.0, baseline ~1.6. Scr 2.0 --> 1.8 --> 2.2  - likely 2/2 cardiorenal syndrome  - renal US negative for hydro  - continue ramirez   - UA negative  - IV diuresis as above  - avoid nephrotoxins, renally dose meds  - trend BMP

## 2022-12-07 NOTE — ASSESSMENT & PLAN NOTE
CKD (chronic kidney disease) stage 3, GFR 30-59 ml/min  - Cr 2.0, baseline ~1.6. Scr 2.0 --> 1.8 --> 1.9  - likely 2/2 cardiorenal syndrome  - UA negative  - IV diuresis as above  - avoid nephrotoxins, renally dose meds  - trend BMP

## 2022-12-07 NOTE — SUBJECTIVE & OBJECTIVE
Interval History: Patient lying in bed, no acute distress. Family at bedside. Muhammad placed on 12/6 for strict I/Os while on lasix gtt. Overnight, family reports that patient developed hematuria. H/H stable and patient hemodynamically stable.  Patient reports continued improvement in dyspnea and scrotal as well as BLE swelling on diuresis. Also viral symptoms have resolved and patient off Tamiflu. Patient's UOP improved with lasix gtt and Scr decreased from 1.9 to 1.8.     Review of Systems   Constitutional:  Positive for activity change, fatigue and unexpected weight change. Negative for chills and fever.   HENT:  Negative for congestion and trouble swallowing.    Eyes:  Negative for visual disturbance.   Respiratory:  Positive for shortness of breath. Negative for cough.    Cardiovascular:  Positive for leg swelling. Negative for chest pain.   Gastrointestinal:  Negative for abdominal distention, abdominal pain, nausea and vomiting.   Endocrine: Negative for cold intolerance, heat intolerance, polydipsia and polyuria.   Genitourinary:  Positive for scrotal swelling. Negative for difficulty urinating and dysuria.   Musculoskeletal:  Negative for back pain and myalgias.   Skin:  Negative for rash and wound.   Neurological:  Positive for weakness. Negative for dizziness and light-headedness.   Hematological:  Negative for adenopathy. Does not bruise/bleed easily.   Psychiatric/Behavioral:  Negative for confusion and sleep disturbance.    Objective:     Vital Signs (Most Recent):  Temp: 97.4 °F (36.3 °C) (12/07/22 0829)  Pulse: 70 (12/07/22 0829)  Resp: 16 (12/07/22 0829)  BP: (!) 109/54 (12/07/22 0829)  SpO2: (!) 94 % (12/07/22 0829) Vital Signs (24h Range):  Temp:  [97.3 °F (36.3 °C)-98.5 °F (36.9 °C)] 97.4 °F (36.3 °C)  Pulse:  [69-75] 70  Resp:  [16-20] 16  SpO2:  [90 %-100 %] 94 %  BP: (104-128)/(51-58) 109/54     Weight: 63 kg (138 lb 14.2 oz)  Body mass index is 21.12 kg/m².    Intake/Output Summary (Last 24  hours) at 12/7/2022 0956  Last data filed at 12/7/2022 0931  Gross per 24 hour   Intake 240 ml   Output 4810 ml   Net -4570 ml        Physical Exam  Constitutional:       Appearance: Normal appearance.   HENT:      Head: Normocephalic.      Mouth/Throat:      Mouth: Mucous membranes are moist.   Eyes:      Extraocular Movements: Extraocular movements intact.      Pupils: Pupils are equal, round, and reactive to light.   Neck:      Comments: JVD absent  Cardiovascular:      Rate and Rhythm: Normal rate and regular rhythm.      Pulses: Normal pulses.      Heart sounds: Normal heart sounds.   Pulmonary:      Effort: Respiratory distress present.      Breath sounds: Normal breath sounds.   Abdominal:      General: Bowel sounds are normal. There is no distension.      Palpations: Abdomen is soft.      Tenderness: There is no abdominal tenderness.   Genitourinary:     Testes:         Right: Swelling present.         Left: Swelling present.   Musculoskeletal:         General: Normal range of motion.      Cervical back: Neck supple.      Right lower leg: Edema present.      Left lower leg: Edema present.   Neurological:      General: No focal deficit present.      Mental Status: He is alert and oriented to person, place, and time.   Psychiatric:         Mood and Affect: Mood normal.       Significant Labs: A1C:   Recent Labs   Lab 12/04/22  0517   HGBA1C 5.0       CBC:   Recent Labs   Lab 12/06/22  0432 12/06/22  1432 12/07/22  0857   WBC 7.93 9.33 9.93   HGB 10.0* 9.9* 11.0*   HCT 32.1* 32.0* 35.2*    219 238       CMP:   Recent Labs   Lab 12/06/22  0432 12/06/22  1432 12/07/22  0857    143 141   K 3.8 3.8 3.7    99 98   CO2 27 32* 33*   GLU 89 118* 108   BUN 51* 51* 56*   CREATININE 1.8* 2.0* 2.2*   CALCIUM 9.0 9.1 9.1   PROT 7.3  --   --    ALBUMIN 3.0*  --   --    BILITOT 0.9  --   --    ALKPHOS 131  --   --    AST 17  --   --    ALT 12  --   --    ANIONGAP 13 12 10       Coagulation:   Recent Labs    Lab 12/06/22  1432   INR 1.4*       Magnesium:   Recent Labs   Lab 12/06/22  0432 12/07/22  0439   MG 2.3 2.4           Significant Imaging: I have reviewed all pertinent imaging results/findings within the past 24 hours.

## 2022-12-07 NOTE — PROGRESS NOTES
Urology Progress Note    Urology called due to hematuria in patient admitted for CHF exacerbation. Muhammad placed 12/5 for accurate I/O's while on lasix gtt. Patient seen and examined. He denies any urologic history, specifically he denies difficulty voiding, dysuria, suprapubic pain, flank pain, history of urinary retention, history of hematuria prior to Muhammad placement. Muhammad draining light pink urine on assessment. Muhammad irrigated with return of light pink urine, no clots.     - No acute Urologic intervention necessary.  - Muhammad draining well with light pink hematuria with no clots.   - As long as the catheter is draining, there is no Urologic intervention necessary.   - If Muhammad becomes clogged and stops draining, then the Nurse should irrigate the Muhammad with a 50cc catheter tipped syringe.  - Muhammad per primary.    Urology will sign off, please call with further questions or concerns.    Teetee Varela MD  Urology, PGY-3  [facility]

## 2022-12-07 NOTE — SUBJECTIVE & OBJECTIVE
Interval History: Patient lying in bed, no acute distress. No acute events overnight. Patient reports improvement in dyspnea and scrotal as well as BLE swelling on IV lasix. Patient's UOP decreased and Scr increased from 1.8 to 1.9 so patient switched to lasix gtt @ 10 cc/hr. Patient family notes that his dry weight is around 135-140. Will continue to monitor Scr, weight, fluid restriction and cardiac diet.       Review of Systems   Constitutional:  Positive for activity change, fatigue and unexpected weight change. Negative for chills and fever.   HENT:  Negative for congestion and trouble swallowing.    Eyes:  Negative for visual disturbance.   Respiratory:  Positive for shortness of breath. Negative for cough.    Cardiovascular:  Positive for leg swelling. Negative for chest pain.   Gastrointestinal:  Negative for abdominal distention, abdominal pain, nausea and vomiting.   Endocrine: Negative for cold intolerance, heat intolerance, polydipsia and polyuria.   Genitourinary:  Positive for scrotal swelling. Negative for difficulty urinating and dysuria.   Musculoskeletal:  Negative for back pain and myalgias.   Skin:  Negative for rash and wound.   Neurological:  Positive for weakness. Negative for dizziness and light-headedness.   Hematological:  Negative for adenopathy. Does not bruise/bleed easily.   Psychiatric/Behavioral:  Negative for confusion and sleep disturbance.    Objective:     Vital Signs (Most Recent):  Temp: 97.4 °F (36.3 °C) (12/07/22 0829)  Pulse: 70 (12/07/22 0829)  Resp: 16 (12/07/22 0829)  BP: (!) 109/54 (12/07/22 0829)  SpO2: (!) 94 % (12/07/22 0829) Vital Signs (24h Range):  Temp:  [97.3 °F (36.3 °C)-98.5 °F (36.9 °C)] 97.4 °F (36.3 °C)  Pulse:  [69-75] 70  Resp:  [16-20] 16  SpO2:  [90 %-100 %] 94 %  BP: (104-128)/(51-58) 109/54     Weight: 63 kg (138 lb 14.2 oz)  Body mass index is 21.12 kg/m².    Intake/Output Summary (Last 24 hours) at 12/7/2022 0939  Last data filed at 12/7/2022  0931  Gross per 24 hour   Intake 240 ml   Output 4810 ml   Net -4570 ml        Physical Exam  Constitutional:       Appearance: Normal appearance.   HENT:      Head: Normocephalic.      Mouth/Throat:      Mouth: Mucous membranes are moist.   Eyes:      Extraocular Movements: Extraocular movements intact.      Pupils: Pupils are equal, round, and reactive to light.   Neck:      Comments: JVD absent  Cardiovascular:      Rate and Rhythm: Normal rate and regular rhythm.      Pulses: Normal pulses.      Heart sounds: Normal heart sounds.   Pulmonary:      Effort: Respiratory distress present.      Breath sounds: Normal breath sounds.   Abdominal:      General: Bowel sounds are normal. There is no distension.      Palpations: Abdomen is soft.      Tenderness: There is no abdominal tenderness.   Genitourinary:     Testes:         Right: Swelling present.         Left: Swelling present.   Musculoskeletal:         General: Normal range of motion.      Cervical back: Neck supple.      Right lower leg: Edema present.      Left lower leg: Edema present.   Neurological:      General: No focal deficit present.      Mental Status: He is alert and oriented to person, place, and time.   Psychiatric:         Mood and Affect: Mood normal.       Significant Labs: A1C:   Recent Labs   Lab 12/04/22  0517   HGBA1C 5.0       CBC:   Recent Labs   Lab 12/06/22  0432 12/06/22  1432 12/07/22  0857   WBC 7.93 9.33 9.93   HGB 10.0* 9.9* 11.0*   HCT 32.1* 32.0* 35.2*    219 238       CMP:   Recent Labs   Lab 12/06/22  0432 12/06/22  1432 12/07/22  0857    143 141   K 3.8 3.8 3.7    99 98   CO2 27 32* 33*   GLU 89 118* 108   BUN 51* 51* 56*   CREATININE 1.8* 2.0* 2.2*   CALCIUM 9.0 9.1 9.1   PROT 7.3  --   --    ALBUMIN 3.0*  --   --    BILITOT 0.9  --   --    ALKPHOS 131  --   --    AST 17  --   --    ALT 12  --   --    ANIONGAP 13 12 10       Coagulation:   Recent Labs   Lab 12/06/22  1432   INR 1.4*     Magnesium:   Recent  Labs   Lab 12/06/22  0432 12/07/22  0439   MG 2.3 2.4       Troponin:   No results for input(s): TROPONINI, TROPONINIHS in the last 48 hours.    TSH: No results for input(s): TSH in the last 4320 hours.  Urine Studies:   No results for input(s): COLORU, APPEARANCEUA, PHUR, SPECGRAV, PROTEINUA, GLUCUA, KETONESU, BILIRUBINUA, OCCULTUA, NITRITE, UROBILINOGEN, LEUKOCYTESUR, RBCUA, WBCUA, BACTERIA, SQUAMEPITHEL, HYALINECASTS in the last 48 hours.    Invalid input(s): LOVE      Significant Imaging: I have reviewed all pertinent imaging results/findings within the past 24 hours.

## 2022-12-08 ENCOUNTER — TELEPHONE (OUTPATIENT)
Dept: CARDIOLOGY | Facility: CLINIC | Age: 87
End: 2022-12-08
Payer: MEDICARE

## 2022-12-08 LAB
ANION GAP SERPL CALC-SCNC: 12 MMOL/L (ref 8–16)
BASOPHILS # BLD AUTO: 0.05 K/UL (ref 0–0.2)
BASOPHILS NFR BLD: 0.5 % (ref 0–1.9)
BUN SERPL-MCNC: 67 MG/DL (ref 10–30)
CALCIUM SERPL-MCNC: 8.8 MG/DL (ref 8.7–10.5)
CHLORIDE SERPL-SCNC: 98 MMOL/L (ref 95–110)
CO2 SERPL-SCNC: 31 MMOL/L (ref 23–29)
CREAT SERPL-MCNC: 2.4 MG/DL (ref 0.5–1.4)
DIFFERENTIAL METHOD: ABNORMAL
EOSINOPHIL # BLD AUTO: 0.8 K/UL (ref 0–0.5)
EOSINOPHIL NFR BLD: 7.4 % (ref 0–8)
ERYTHROCYTE [DISTWIDTH] IN BLOOD BY AUTOMATED COUNT: 15.7 % (ref 11.5–14.5)
EST. GFR  (NO RACE VARIABLE): 24.7 ML/MIN/1.73 M^2
GLUCOSE SERPL-MCNC: 85 MG/DL (ref 70–110)
HCT VFR BLD AUTO: 33.5 % (ref 40–54)
HGB BLD-MCNC: 10.5 G/DL (ref 14–18)
IMM GRANULOCYTES # BLD AUTO: 0.04 K/UL (ref 0–0.04)
IMM GRANULOCYTES NFR BLD AUTO: 0.4 % (ref 0–0.5)
LYMPHOCYTES # BLD AUTO: 1.8 K/UL (ref 1–4.8)
LYMPHOCYTES NFR BLD: 17.4 % (ref 18–48)
MAGNESIUM SERPL-MCNC: 2.4 MG/DL (ref 1.6–2.6)
MCH RBC QN AUTO: 30.9 PG (ref 27–31)
MCHC RBC AUTO-ENTMCNC: 31.3 G/DL (ref 32–36)
MCV RBC AUTO: 99 FL (ref 82–98)
MONOCYTES # BLD AUTO: 1 K/UL (ref 0.3–1)
MONOCYTES NFR BLD: 9.8 % (ref 4–15)
NEUTROPHILS # BLD AUTO: 6.7 K/UL (ref 1.8–7.7)
NEUTROPHILS NFR BLD: 64.5 % (ref 38–73)
NRBC BLD-RTO: 0 /100 WBC
PLATELET # BLD AUTO: 246 K/UL (ref 150–450)
PMV BLD AUTO: 9.2 FL (ref 9.2–12.9)
POTASSIUM SERPL-SCNC: 3.7 MMOL/L (ref 3.5–5.1)
RBC # BLD AUTO: 3.4 M/UL (ref 4.6–6.2)
SODIUM SERPL-SCNC: 141 MMOL/L (ref 136–145)
WBC # BLD AUTO: 10.44 K/UL (ref 3.9–12.7)

## 2022-12-08 PROCEDURE — 94761 N-INVAS EAR/PLS OXIMETRY MLT: CPT

## 2022-12-08 PROCEDURE — 99232 SBSQ HOSP IP/OBS MODERATE 35: CPT | Mod: ,,, | Performed by: STUDENT IN AN ORGANIZED HEALTH CARE EDUCATION/TRAINING PROGRAM

## 2022-12-08 PROCEDURE — 85025 COMPLETE CBC W/AUTO DIFF WBC: CPT | Performed by: PHYSICIAN ASSISTANT

## 2022-12-08 PROCEDURE — 83735 ASSAY OF MAGNESIUM: CPT | Performed by: PHYSICIAN ASSISTANT

## 2022-12-08 PROCEDURE — 25000003 PHARM REV CODE 250: Performed by: INTERNAL MEDICINE

## 2022-12-08 PROCEDURE — 11000001 HC ACUTE MED/SURG PRIVATE ROOM

## 2022-12-08 PROCEDURE — 36415 COLL VENOUS BLD VENIPUNCTURE: CPT | Performed by: PHYSICIAN ASSISTANT

## 2022-12-08 PROCEDURE — 94640 AIRWAY INHALATION TREATMENT: CPT

## 2022-12-08 PROCEDURE — 25000003 PHARM REV CODE 250: Performed by: PHYSICIAN ASSISTANT

## 2022-12-08 PROCEDURE — 27000207 HC ISOLATION

## 2022-12-08 PROCEDURE — 25000242 PHARM REV CODE 250 ALT 637 W/ HCPCS: Performed by: PHYSICIAN ASSISTANT

## 2022-12-08 PROCEDURE — 80048 BASIC METABOLIC PNL TOTAL CA: CPT | Performed by: PHYSICIAN ASSISTANT

## 2022-12-08 PROCEDURE — 99232 PR SUBSEQUENT HOSPITAL CARE,LEVL II: ICD-10-PCS | Mod: ,,, | Performed by: STUDENT IN AN ORGANIZED HEALTH CARE EDUCATION/TRAINING PROGRAM

## 2022-12-08 PROCEDURE — 99900035 HC TECH TIME PER 15 MIN (STAT)

## 2022-12-08 RX ADMIN — ASPIRIN 81 MG: 81 TABLET, CHEWABLE ORAL at 10:12

## 2022-12-08 RX ADMIN — CARVEDILOL 12.5 MG: 12.5 TABLET, FILM COATED ORAL at 05:12

## 2022-12-08 RX ADMIN — IPRATROPIUM BROMIDE AND ALBUTEROL SULFATE 3 ML: 2.5; .5 SOLUTION RESPIRATORY (INHALATION) at 11:12

## 2022-12-08 RX ADMIN — MUPIROCIN: 20 OINTMENT TOPICAL at 08:12

## 2022-12-08 RX ADMIN — IPRATROPIUM BROMIDE AND ALBUTEROL SULFATE 3 ML: 2.5; .5 SOLUTION RESPIRATORY (INHALATION) at 03:12

## 2022-12-08 RX ADMIN — IPRATROPIUM BROMIDE AND ALBUTEROL SULFATE 3 ML: 2.5; .5 SOLUTION RESPIRATORY (INHALATION) at 07:12

## 2022-12-08 RX ADMIN — FERROUS SULFATE TAB 325 MG (65 MG ELEMENTAL FE) 1 EACH: 325 (65 FE) TAB at 10:12

## 2022-12-08 RX ADMIN — CARVEDILOL 12.5 MG: 12.5 TABLET, FILM COATED ORAL at 10:12

## 2022-12-08 RX ADMIN — MUPIROCIN: 20 OINTMENT TOPICAL at 10:12

## 2022-12-08 NOTE — PLAN OF CARE
Plan of care discussed with patient. Patient is free of fall/trauma/injury. Denies CP, SOB, or pain/discomfort. All questions addressed. Will continue to monitor    Problem: Infection  Goal: Absence of Infection Signs and Symptoms  Intervention: Prevent or Manage Infection  Flowsheets (Taken 12/8/2022 1405)  Infection Management: aseptic technique maintained     Problem: Fall Injury Risk  Goal: Absence of Fall and Fall-Related Injury  Intervention: Identify and Manage Contributors  Flowsheets (Taken 12/8/2022 1405)  Medication Review/Management:   medications reviewed   high-risk medications identified     Problem: Skin Injury Risk Increased  Goal: Skin Health and Integrity  Intervention: Optimize Skin Protection  Flowsheets (Taken 12/8/2022 1405)  Head of Bed (HOB) Positioning: HOB at 30 degrees

## 2022-12-08 NOTE — PLAN OF CARE
Problem: Adult Inpatient Plan of Care  Goal: Plan of Care Review  Outcome: Ongoing, Progressing  Goal: Patient-Specific Goal (Individualized)  Outcome: Ongoing, Progressing  Goal: Absence of Hospital-Acquired Illness or Injury  Outcome: Ongoing, Progressing  Goal: Optimal Comfort and Wellbeing  Outcome: Ongoing, Progressing  Goal: Readiness for Transition of Care  Outcome: Ongoing, Progressing     Problem: Infection  Goal: Absence of Infection Signs and Symptoms  Outcome: Ongoing, Progressing     Problem: Fluid and Electrolyte Imbalance (Acute Kidney Injury/Impairment)  Goal: Fluid and Electrolyte Balance  Outcome: Ongoing, Progressing     Problem: Oral Intake Inadequate (Acute Kidney Injury/Impairment)  Goal: Optimal Nutrition Intake  Outcome: Ongoing, Progressing     Problem: Renal Function Impairment (Acute Kidney Injury/Impairment)  Goal: Effective Renal Function  Outcome: Ongoing, Progressing     Problem: Fall Injury Risk  Goal: Absence of Fall and Fall-Related Injury  Outcome: Ongoing, Progressing

## 2022-12-09 ENCOUNTER — TELEPHONE (OUTPATIENT)
Dept: CARDIOLOGY | Facility: CLINIC | Age: 87
End: 2022-12-09
Payer: MEDICARE

## 2022-12-09 LAB
ANION GAP SERPL CALC-SCNC: 14 MMOL/L (ref 8–16)
BASOPHILS # BLD AUTO: 0.05 K/UL (ref 0–0.2)
BASOPHILS NFR BLD: 0.4 % (ref 0–1.9)
BUN SERPL-MCNC: 74 MG/DL (ref 10–30)
CALCIUM SERPL-MCNC: 8.8 MG/DL (ref 8.7–10.5)
CHLORIDE SERPL-SCNC: 101 MMOL/L (ref 95–110)
CO2 SERPL-SCNC: 26 MMOL/L (ref 23–29)
CREAT SERPL-MCNC: 2 MG/DL (ref 0.5–1.4)
DIFFERENTIAL METHOD: ABNORMAL
EOSINOPHIL # BLD AUTO: 0.6 K/UL (ref 0–0.5)
EOSINOPHIL NFR BLD: 5 % (ref 0–8)
ERYTHROCYTE [DISTWIDTH] IN BLOOD BY AUTOMATED COUNT: 15.5 % (ref 11.5–14.5)
EST. GFR  (NO RACE VARIABLE): 30.7 ML/MIN/1.73 M^2
GLUCOSE SERPL-MCNC: 98 MG/DL (ref 70–110)
HCT VFR BLD AUTO: 33.7 % (ref 40–54)
HGB BLD-MCNC: 10.5 G/DL (ref 14–18)
IMM GRANULOCYTES # BLD AUTO: 0.05 K/UL (ref 0–0.04)
IMM GRANULOCYTES NFR BLD AUTO: 0.4 % (ref 0–0.5)
LYMPHOCYTES # BLD AUTO: 1.4 K/UL (ref 1–4.8)
LYMPHOCYTES NFR BLD: 10.6 % (ref 18–48)
MAGNESIUM SERPL-MCNC: 2.4 MG/DL (ref 1.6–2.6)
MCH RBC QN AUTO: 30.1 PG (ref 27–31)
MCHC RBC AUTO-ENTMCNC: 31.2 G/DL (ref 32–36)
MCV RBC AUTO: 97 FL (ref 82–98)
MONOCYTES # BLD AUTO: 1.2 K/UL (ref 0.3–1)
MONOCYTES NFR BLD: 9.1 % (ref 4–15)
NEUTROPHILS # BLD AUTO: 9.6 K/UL (ref 1.8–7.7)
NEUTROPHILS NFR BLD: 74.5 % (ref 38–73)
NRBC BLD-RTO: 0 /100 WBC
PLATELET # BLD AUTO: 239 K/UL (ref 150–450)
PMV BLD AUTO: 9 FL (ref 9.2–12.9)
POCT GLUCOSE: 104 MG/DL (ref 70–110)
POCT GLUCOSE: 109 MG/DL (ref 70–110)
POCT GLUCOSE: 122 MG/DL (ref 70–110)
POCT GLUCOSE: 141 MG/DL (ref 70–110)
POTASSIUM SERPL-SCNC: 3.8 MMOL/L (ref 3.5–5.1)
RBC # BLD AUTO: 3.49 M/UL (ref 4.6–6.2)
SODIUM SERPL-SCNC: 141 MMOL/L (ref 136–145)
WBC # BLD AUTO: 12.89 K/UL (ref 3.9–12.7)

## 2022-12-09 PROCEDURE — 25000003 PHARM REV CODE 250: Performed by: PHYSICIAN ASSISTANT

## 2022-12-09 PROCEDURE — 94640 AIRWAY INHALATION TREATMENT: CPT

## 2022-12-09 PROCEDURE — 85025 COMPLETE CBC W/AUTO DIFF WBC: CPT | Performed by: PHYSICIAN ASSISTANT

## 2022-12-09 PROCEDURE — 83735 ASSAY OF MAGNESIUM: CPT | Performed by: PHYSICIAN ASSISTANT

## 2022-12-09 PROCEDURE — 99232 PR SUBSEQUENT HOSPITAL CARE,LEVL II: ICD-10-PCS | Mod: ,,, | Performed by: STUDENT IN AN ORGANIZED HEALTH CARE EDUCATION/TRAINING PROGRAM

## 2022-12-09 PROCEDURE — 94761 N-INVAS EAR/PLS OXIMETRY MLT: CPT

## 2022-12-09 PROCEDURE — 25000242 PHARM REV CODE 250 ALT 637 W/ HCPCS: Performed by: PHYSICIAN ASSISTANT

## 2022-12-09 PROCEDURE — 99232 SBSQ HOSP IP/OBS MODERATE 35: CPT | Mod: ,,, | Performed by: STUDENT IN AN ORGANIZED HEALTH CARE EDUCATION/TRAINING PROGRAM

## 2022-12-09 PROCEDURE — 25000003 PHARM REV CODE 250: Performed by: INTERNAL MEDICINE

## 2022-12-09 PROCEDURE — 27000207 HC ISOLATION

## 2022-12-09 PROCEDURE — 11000001 HC ACUTE MED/SURG PRIVATE ROOM

## 2022-12-09 PROCEDURE — 80048 BASIC METABOLIC PNL TOTAL CA: CPT | Performed by: PHYSICIAN ASSISTANT

## 2022-12-09 PROCEDURE — 36415 COLL VENOUS BLD VENIPUNCTURE: CPT | Performed by: PHYSICIAN ASSISTANT

## 2022-12-09 RX ADMIN — FERROUS SULFATE TAB 325 MG (65 MG ELEMENTAL FE) 1 EACH: 325 (65 FE) TAB at 10:12

## 2022-12-09 RX ADMIN — ASPIRIN 81 MG: 81 TABLET, CHEWABLE ORAL at 10:12

## 2022-12-09 RX ADMIN — IPRATROPIUM BROMIDE AND ALBUTEROL SULFATE 3 ML: 2.5; .5 SOLUTION RESPIRATORY (INHALATION) at 11:12

## 2022-12-09 RX ADMIN — BENZONATATE 100 MG: 100 CAPSULE ORAL at 02:12

## 2022-12-09 RX ADMIN — MUPIROCIN: 20 OINTMENT TOPICAL at 10:12

## 2022-12-09 RX ADMIN — IPRATROPIUM BROMIDE AND ALBUTEROL SULFATE 3 ML: 2.5; .5 SOLUTION RESPIRATORY (INHALATION) at 08:12

## 2022-12-09 RX ADMIN — IPRATROPIUM BROMIDE AND ALBUTEROL SULFATE 3 ML: 2.5; .5 SOLUTION RESPIRATORY (INHALATION) at 07:12

## 2022-12-09 RX ADMIN — CARVEDILOL 12.5 MG: 12.5 TABLET, FILM COATED ORAL at 10:12

## 2022-12-09 RX ADMIN — IPRATROPIUM BROMIDE AND ALBUTEROL SULFATE 3 ML: 2.5; .5 SOLUTION RESPIRATORY (INHALATION) at 04:12

## 2022-12-09 RX ADMIN — CARVEDILOL 12.5 MG: 12.5 TABLET, FILM COATED ORAL at 04:12

## 2022-12-09 NOTE — PROGRESS NOTES
[facility] Medicine  Progress Note    Patient Name: Deena Moody  MRN: [id number]  Patient Class: IP- Inpatient   Admission Date: 12/3/2022  Length of Stay: 4 days  Attending Physician: Bertin Dominguez MD  Primary Care Provider: Jam Rowell MD        Subjective:     Principal Problem:Acute on chronic diastolic congestive heart failure        HPI:  Deena Moody is a 89 y.o. male with a PMHx of HTN, HLD, CHF, Afib, CAD who presents to [facility] with testicular and penile swelling. Patient reports URI symptoms of productive cough, congestion, and fatigue for the past few days for which he was seen by his PCP yesterday and tested positive for the flu. He was prescribed tamiflu 30mg but was unable to find a pharmacy that carried this dose so he hasn't started it yet. Patient noted to have worsening penile and testicular swelling with associated bilateral LE edema today. No testicular or penile pain. He had similar symptoms in the past which was due to volume overload and required IV lasix. He also notes decreased urine output for the past day, only made a small amount of urine since receiving IV lasix in the ED prior to my exam. Denies fever, chills, N/V, abdominal pain, chest pain, dizziness, HA, or syncope.    ED: AFVSS. No leukocytosis. Cr 2.0 (BL ~1.6), alk phos 139, t.bili 1.3. , trop 0.044. EKG pending. CXR shows right basilar effusion with trace effusion on the left, probable mild atelectasis on the right, minimal left basilar interstitial infiltrate or mild atelectasis. Given 40mg lasix IVP with minimal UOP.       Overview/Hospital Course:  Patient continuing to feel well and remains on room air. Patient is close to dry weight (138 lbs) and denies SOB or swelling after diuresis. Patient continues to have good UOP through ramirez and  (- 5.75 L since admission) but continues to have hematuria. Urology consulted for evaluation of hematuria. H/H stable and patient hemodynamically  stable. Viral symptoms resolved and patient completed Tamiflu. Scr increased from 1.9 to 2.2 so lasix gtt held. Also bicarb 33 showing contraction alkalosis. Plan to hold diuresis and monitor Scr. Patient will be stable for discharge on home diuresis after Scr close to baseline.       Interval History:   No events overnight.  Patient with no complaints this morning.  Creatinine up trending this morning.  Patient with continued gross hematuria that patient's son states unchanged.  Denies fevers, chills, nausea, abdominal pain, and decreased appetite.      Review of Systems   Constitutional:  Positive for activity change, fatigue and unexpected weight change. Negative for chills and fever.   HENT:  Negative for congestion and trouble swallowing.    Eyes:  Negative for visual disturbance.   Respiratory:  Positive for shortness of breath. Negative for cough.    Cardiovascular:  Positive for leg swelling. Negative for chest pain.   Gastrointestinal:  Negative for abdominal distention, abdominal pain, nausea and vomiting.   Endocrine: Negative for cold intolerance, heat intolerance, polydipsia and polyuria.   Genitourinary:  Negative for difficulty urinating, dysuria and scrotal swelling.   Musculoskeletal:  Negative for back pain and myalgias.   Skin:  Negative for rash and wound.   Neurological:  Positive for weakness. Negative for dizziness and light-headedness.   Hematological:  Negative for adenopathy. Does not bruise/bleed easily.   Psychiatric/Behavioral:  Negative for confusion and sleep disturbance.    Objective:     Vital Signs (Most Recent):  Temp: 98.3 °F (36.8 °C) (12/08/22 1905)  Pulse: 69 (12/08/22 2021)  Resp: 18 (12/08/22 1947)  BP: (!) 114/54 (12/08/22 1905)  SpO2: 98 % (12/08/22 1953)   Vital Signs (24h Range):  Temp:  [96.5 °F (35.8 °C)-98.3 °F (36.8 °C)] 98.3 °F (36.8 °C)  Pulse:  [69-71] 69  Resp:  [16-20] 18  SpO2:  [93 %-98 %] 98 %  BP: ()/(51-66) 114/54     Weight: 63 kg (138 lb 14.2  oz)  Body mass index is 21.12 kg/m².    Intake/Output Summary (Last 24 hours) at 12/8/2022 2103  Last data filed at 12/8/2022 1854  Gross per 24 hour   Intake 540 ml   Output 950 ml   Net -410 ml      Physical Exam  Constitutional:       Appearance: Normal appearance.   HENT:      Head: Normocephalic.      Mouth/Throat:      Mouth: Mucous membranes are moist.   Eyes:      Extraocular Movements: Extraocular movements intact.   Neck:      Comments: JVD absent  Cardiovascular:      Rate and Rhythm: Normal rate and regular rhythm.      Pulses: Normal pulses.      Heart sounds: Normal heart sounds.   Pulmonary:      Effort: No respiratory distress.      Breath sounds: Normal breath sounds.   Abdominal:      General: Bowel sounds are normal. There is no distension.      Palpations: Abdomen is soft.      Tenderness: There is no abdominal tenderness.   Genitourinary:     Testes:         Right: Swelling present.         Left: Swelling present.   Musculoskeletal:         General: Normal range of motion.      Cervical back: Neck supple.      Right lower leg: Edema present.      Left lower leg: Edema present.   Neurological:      General: No focal deficit present.      Mental Status: He is alert and oriented to person, place, and time.   Psychiatric:         Mood and Affect: Mood normal.       Significant Labs: CBC:   Recent Labs   Lab 12/07/22  0857 12/08/22  0544   WBC 9.93 10.44   HGB 11.0* 10.5*   HCT 35.2* 33.5*    246     CMP:   Recent Labs   Lab 12/07/22  0857 12/08/22  0544    141   K 3.7 3.7   CL 98 98   CO2 33* 31*    85   BUN 56* 67*   CREATININE 2.2* 2.4*   CALCIUM 9.1 8.8   ANIONGAP 10 12       Significant Imaging: I have reviewed all pertinent imaging results/findings within the past 24 hours.      Assessment/Plan:      * Acute on chronic diastolic congestive heart failure  Acute hypoxemic respiratory failure  - appears volume overload on exam,   - CXR w/ pleural effusions  - minimal UOP  "s/p 40mg lasix IVP in the ED  - Switched IV lasix to lasix gtt on 12/5. Discontinued diuresis on 12/7 due to rise in Scr. Will resume home diuresis once SARAH resolves   - Dry weight 140 lbs)  - repeat TTE  - keep K>4 and Mg>2  - telemetry   - strict I/Os, daily weights  - low Na diet w/ fluid restriction    SARAH (acute kidney injury)  CKD (chronic kidney disease) stage 3, GFR 30-59 ml/min  - Cr 2.0, baseline ~1.6. Scr 2.0 --> 1.8 --> 2.2  - likely 2/2 cardiorenal syndrome  - renal US negative for hydro  - continue ramirez   - UA negative  - avoid nephrotoxins, renally dose meds  - trend BMP    Hematuria  - hematuria via ramirez seen on 12/6  - H/H stable and hemodynamically stable  - holding home Eliquis and ASA   - Urology consulted   -- No inpatient intervention  -- Continue to maintain Ramirez  -- If clogged or stops draining, can irrigate Ramirez with 50 cc using catheter tip syringe        Elevated troponin  - Trop 0.044; EKG pending  - No reported CP  - Likely type 2 demand ischemia due to volume overload   - Trend Tn  - TTE: EF 65%, no WMA, bioprosthetic AV, PASP 66    Influenza A  - Diagnosed yesterday but unable to fill tamiflu as unable to find pharmacy that carried prescribed dosing  - Afebrile without leukocytosis  - CXR with mild infiltrates-- suspect 2/2 atelectasis vs fluid overload  - Procal 0.10  - Completed Tamiflu  - Duonebs, IS  - Mucinex and tessalon    Permanent atrial fibrillation  - continue coreg 12.5mg BID   - holding home eliquis due to hematuria    CAD (coronary artery disease)  - no reported chest pain  - elevated trop likely 2/2 volume overload (see above)    Essential hypertension  - continue coreg 12.5mg BID  - hold benazepril given SARAH    Dyslipidemia  - continue ASA    CKD (chronic kidney disease) stage 3, GFR 30-59 ml/min  - see "SARAH"        VTE Risk Mitigation (From admission, onward)         Ordered     Reason for No Pharmacological VTE Prophylaxis  Once        Question:  Reasons:  Answer: "  Already adequately anticoagulated on oral Anticoagulants    12/03/22 2037     IP VTE HIGH RISK PATIENT  Once         12/03/22 2037     Place sequential compression device  Until discontinued         12/03/22 2034     Place sequential compression device  Until discontinued         12/03/22 2018                Discharge Planning   TEOFILO: 12/9/2022     Code Status: Full Code   Is the patient medically ready for discharge?: No    Reason for patient still in hospital (select all that apply): Patient trending condition, Laboratory test, Treatment, Consult recommendations and Pending disposition  Discharge Plan A: Home with family                  Bertin Dominguez MD  Department of Hospital Medicine   [facility]

## 2022-12-09 NOTE — ASSESSMENT & PLAN NOTE
- Diagnosed yesterday but unable to fill tamiflu as unable to find pharmacy that carried prescribed dosing  - Afebrile without leukocytosis  - CXR with mild infiltrates-- suspect 2/2 atelectasis vs fluid overload  - Procal 0.10  - Completed Tamiflu  - Marky, IS  - Mucinex and tessalon

## 2022-12-09 NOTE — SUBJECTIVE & OBJECTIVE
"I drink too much." Interval History:   No events overnight.  Patient with no complaints this morning.  Creatinine up trending this morning.  Patient with continued gross hematuria that patient's son states unchanged.  Denies fevers, chills, nausea, abdominal pain, and decreased appetite.      Review of Systems   Constitutional:  Positive for activity change, fatigue and unexpected weight change. Negative for chills and fever.   HENT:  Negative for congestion and trouble swallowing.    Eyes:  Negative for visual disturbance.   Respiratory:  Positive for shortness of breath. Negative for cough.    Cardiovascular:  Positive for leg swelling. Negative for chest pain.   Gastrointestinal:  Negative for abdominal distention, abdominal pain, nausea and vomiting.   Endocrine: Negative for cold intolerance, heat intolerance, polydipsia and polyuria.   Genitourinary:  Negative for difficulty urinating, dysuria and scrotal swelling.   Musculoskeletal:  Negative for back pain and myalgias.   Skin:  Negative for rash and wound.   Neurological:  Positive for weakness. Negative for dizziness and light-headedness.   Hematological:  Negative for adenopathy. Does not bruise/bleed easily.   Psychiatric/Behavioral:  Negative for confusion and sleep disturbance.    Objective:     Vital Signs (Most Recent):  Temp: 98.3 °F (36.8 °C) (12/08/22 1905)  Pulse: 69 (12/08/22 2021)  Resp: 18 (12/08/22 1947)  BP: (!) 114/54 (12/08/22 1905)  SpO2: 98 % (12/08/22 1953)   Vital Signs (24h Range):  Temp:  [96.5 °F (35.8 °C)-98.3 °F (36.8 °C)] 98.3 °F (36.8 °C)  Pulse:  [69-71] 69  Resp:  [16-20] 18  SpO2:  [93 %-98 %] 98 %  BP: ()/(51-66) 114/54     Weight: 63 kg (138 lb 14.2 oz)  Body mass index is 21.12 kg/m².    Intake/Output Summary (Last 24 hours) at 12/8/2022 2103  Last data filed at 12/8/2022 1854  Gross per 24 hour   Intake 540 ml   Output 950 ml   Net -410 ml      Physical Exam  Constitutional:       Appearance: Normal appearance.   HENT:      Head:  Normocephalic.      Mouth/Throat:      Mouth: Mucous membranes are moist.   Eyes:      Extraocular Movements: Extraocular movements intact.   Neck:      Comments: JVD absent  Cardiovascular:      Rate and Rhythm: Normal rate and regular rhythm.      Pulses: Normal pulses.      Heart sounds: Normal heart sounds.   Pulmonary:      Effort: No respiratory distress.      Breath sounds: Normal breath sounds.   Abdominal:      General: Bowel sounds are normal. There is no distension.      Palpations: Abdomen is soft.      Tenderness: There is no abdominal tenderness.   Genitourinary:     Testes:         Right: Swelling present.         Left: Swelling present.   Musculoskeletal:         General: Normal range of motion.      Cervical back: Neck supple.      Right lower leg: Edema present.      Left lower leg: Edema present.   Neurological:      General: No focal deficit present.      Mental Status: He is alert and oriented to person, place, and time.   Psychiatric:         Mood and Affect: Mood normal.       Significant Labs: CBC:   Recent Labs   Lab 12/07/22  0857 12/08/22  0544   WBC 9.93 10.44   HGB 11.0* 10.5*   HCT 35.2* 33.5*    246     CMP:   Recent Labs   Lab 12/07/22  0857 12/08/22  0544    141   K 3.7 3.7   CL 98 98   CO2 33* 31*    85   BUN 56* 67*   CREATININE 2.2* 2.4*   CALCIUM 9.1 8.8   ANIONGAP 10 12       Significant Imaging: I have reviewed all pertinent imaging results/findings within the past 24 hours.

## 2022-12-09 NOTE — ASSESSMENT & PLAN NOTE
Acute hypoxemic respiratory failure  - appears volume overload on exam,   - CXR w/ pleural effusions  - minimal UOP s/p 40mg lasix IVP in the ED  - Switched IV lasix to lasix gtt on 12/5. Discontinued diuresis on 12/7 due to rise in Scr. Will resume home diuresis once SARAH resolves   - Dry weight 140 lbs)  - repeat TTE  - keep K>4 and Mg>2  - telemetry   - strict I/Os, daily weights  - low Na diet w/ fluid restriction

## 2022-12-09 NOTE — ASSESSMENT & PLAN NOTE
- Trop 0.044; EKG pending  - No reported CP  - Likely type 2 demand ischemia due to volume overload   - Trend Tn  - TTE: EF 65%, no WMA, bioprosthetic AV, PASP 66

## 2022-12-09 NOTE — ASSESSMENT & PLAN NOTE
- hematuria via ramirez seen on 12/6  - H/H stable and hemodynamically stable  - holding home Eliquis and ASA   - Urology consulted   -- No inpatient intervention  -- Continue to maintain Ramirez  -- If clogged or stops draining, can irrigate Ramirez with 50 cc using catheter tip syringe

## 2022-12-09 NOTE — ASSESSMENT & PLAN NOTE
CKD (chronic kidney disease) stage 3, GFR 30-59 ml/min  - Cr 2.0, baseline ~1.6. Scr 2.0 --> 1.8 --> 2.2  - likely 2/2 cardiorenal syndrome  - renal US negative for hydro  - continue ramirez   - UA negative  - avoid nephrotoxins, renally dose meds  - trend BMP

## 2022-12-10 LAB
ANION GAP SERPL CALC-SCNC: 12 MMOL/L (ref 8–16)
BASOPHILS # BLD AUTO: 0.06 K/UL (ref 0–0.2)
BASOPHILS NFR BLD: 0.6 % (ref 0–1.9)
BUN SERPL-MCNC: 64 MG/DL (ref 10–30)
CALCIUM SERPL-MCNC: 8.5 MG/DL (ref 8.7–10.5)
CHLORIDE SERPL-SCNC: 102 MMOL/L (ref 95–110)
CO2 SERPL-SCNC: 28 MMOL/L (ref 23–29)
CREAT SERPL-MCNC: 1.7 MG/DL (ref 0.5–1.4)
DIFFERENTIAL METHOD: ABNORMAL
EOSINOPHIL # BLD AUTO: 0.7 K/UL (ref 0–0.5)
EOSINOPHIL NFR BLD: 6.7 % (ref 0–8)
ERYTHROCYTE [DISTWIDTH] IN BLOOD BY AUTOMATED COUNT: 15.4 % (ref 11.5–14.5)
EST. GFR  (NO RACE VARIABLE): 37.4 ML/MIN/1.73 M^2
GLUCOSE SERPL-MCNC: 85 MG/DL (ref 70–110)
HCT VFR BLD AUTO: 34 % (ref 40–54)
HGB BLD-MCNC: 10.3 G/DL (ref 14–18)
IMM GRANULOCYTES # BLD AUTO: 0.04 K/UL (ref 0–0.04)
IMM GRANULOCYTES NFR BLD AUTO: 0.4 % (ref 0–0.5)
LYMPHOCYTES # BLD AUTO: 1.3 K/UL (ref 1–4.8)
LYMPHOCYTES NFR BLD: 13.5 % (ref 18–48)
MAGNESIUM SERPL-MCNC: 2.4 MG/DL (ref 1.6–2.6)
MCH RBC QN AUTO: 30.5 PG (ref 27–31)
MCHC RBC AUTO-ENTMCNC: 30.3 G/DL (ref 32–36)
MCV RBC AUTO: 101 FL (ref 82–98)
MONOCYTES # BLD AUTO: 0.9 K/UL (ref 0.3–1)
MONOCYTES NFR BLD: 8.6 % (ref 4–15)
NEUTROPHILS # BLD AUTO: 7 K/UL (ref 1.8–7.7)
NEUTROPHILS NFR BLD: 70.2 % (ref 38–73)
NRBC BLD-RTO: 0 /100 WBC
PLATELET # BLD AUTO: 246 K/UL (ref 150–450)
PMV BLD AUTO: 9 FL (ref 9.2–12.9)
POTASSIUM SERPL-SCNC: 3.7 MMOL/L (ref 3.5–5.1)
RBC # BLD AUTO: 3.38 M/UL (ref 4.6–6.2)
SODIUM SERPL-SCNC: 142 MMOL/L (ref 136–145)
WBC # BLD AUTO: 9.96 K/UL (ref 3.9–12.7)

## 2022-12-10 PROCEDURE — 25000242 PHARM REV CODE 250 ALT 637 W/ HCPCS: Performed by: PHYSICIAN ASSISTANT

## 2022-12-10 PROCEDURE — 11000001 HC ACUTE MED/SURG PRIVATE ROOM

## 2022-12-10 PROCEDURE — 99232 PR SUBSEQUENT HOSPITAL CARE,LEVL II: ICD-10-PCS | Mod: ,,, | Performed by: STUDENT IN AN ORGANIZED HEALTH CARE EDUCATION/TRAINING PROGRAM

## 2022-12-10 PROCEDURE — 80048 BASIC METABOLIC PNL TOTAL CA: CPT | Performed by: STUDENT IN AN ORGANIZED HEALTH CARE EDUCATION/TRAINING PROGRAM

## 2022-12-10 PROCEDURE — 25000242 PHARM REV CODE 250 ALT 637 W/ HCPCS: Performed by: STUDENT IN AN ORGANIZED HEALTH CARE EDUCATION/TRAINING PROGRAM

## 2022-12-10 PROCEDURE — 36415 COLL VENOUS BLD VENIPUNCTURE: CPT | Performed by: STUDENT IN AN ORGANIZED HEALTH CARE EDUCATION/TRAINING PROGRAM

## 2022-12-10 PROCEDURE — 25000003 PHARM REV CODE 250: Performed by: STUDENT IN AN ORGANIZED HEALTH CARE EDUCATION/TRAINING PROGRAM

## 2022-12-10 PROCEDURE — 36415 COLL VENOUS BLD VENIPUNCTURE: CPT | Performed by: PHYSICIAN ASSISTANT

## 2022-12-10 PROCEDURE — 94799 UNLISTED PULMONARY SVC/PX: CPT

## 2022-12-10 PROCEDURE — 25000003 PHARM REV CODE 250: Performed by: PHYSICIAN ASSISTANT

## 2022-12-10 PROCEDURE — 25000003 PHARM REV CODE 250: Performed by: INTERNAL MEDICINE

## 2022-12-10 PROCEDURE — 83735 ASSAY OF MAGNESIUM: CPT | Performed by: PHYSICIAN ASSISTANT

## 2022-12-10 PROCEDURE — 27000207 HC ISOLATION

## 2022-12-10 PROCEDURE — 94761 N-INVAS EAR/PLS OXIMETRY MLT: CPT

## 2022-12-10 PROCEDURE — 85025 COMPLETE CBC W/AUTO DIFF WBC: CPT | Performed by: STUDENT IN AN ORGANIZED HEALTH CARE EDUCATION/TRAINING PROGRAM

## 2022-12-10 PROCEDURE — 94640 AIRWAY INHALATION TREATMENT: CPT

## 2022-12-10 PROCEDURE — 99232 SBSQ HOSP IP/OBS MODERATE 35: CPT | Mod: ,,, | Performed by: STUDENT IN AN ORGANIZED HEALTH CARE EDUCATION/TRAINING PROGRAM

## 2022-12-10 RX ORDER — GUAIFENESIN 600 MG/1
600 TABLET, EXTENDED RELEASE ORAL 2 TIMES DAILY
Status: DISCONTINUED | OUTPATIENT
Start: 2022-12-10 | End: 2022-12-11 | Stop reason: HOSPADM

## 2022-12-10 RX ORDER — IPRATROPIUM BROMIDE AND ALBUTEROL SULFATE 2.5; .5 MG/3ML; MG/3ML
3 SOLUTION RESPIRATORY (INHALATION) EVERY 12 HOURS
Status: DISCONTINUED | OUTPATIENT
Start: 2022-12-10 | End: 2022-12-11 | Stop reason: HOSPADM

## 2022-12-10 RX ORDER — TORSEMIDE 20 MG/1
20 TABLET ORAL DAILY
Status: DISCONTINUED | OUTPATIENT
Start: 2022-12-11 | End: 2022-12-11 | Stop reason: HOSPADM

## 2022-12-10 RX ADMIN — IPRATROPIUM BROMIDE AND ALBUTEROL SULFATE 3 ML: .5; 3 SOLUTION RESPIRATORY (INHALATION) at 12:12

## 2022-12-10 RX ADMIN — IPRATROPIUM BROMIDE AND ALBUTEROL SULFATE 3 ML: 2.5; .5 SOLUTION RESPIRATORY (INHALATION) at 09:12

## 2022-12-10 RX ADMIN — MUPIROCIN: 20 OINTMENT TOPICAL at 08:12

## 2022-12-10 RX ADMIN — GUAIFENESIN 600 MG: 600 TABLET, EXTENDED RELEASE ORAL at 12:12

## 2022-12-10 RX ADMIN — IPRATROPIUM BROMIDE AND ALBUTEROL SULFATE 3 ML: .5; 3 SOLUTION RESPIRATORY (INHALATION) at 09:12

## 2022-12-10 RX ADMIN — CARVEDILOL 12.5 MG: 12.5 TABLET, FILM COATED ORAL at 08:12

## 2022-12-10 RX ADMIN — FERROUS SULFATE TAB 325 MG (65 MG ELEMENTAL FE) 1 EACH: 325 (65 FE) TAB at 08:12

## 2022-12-10 RX ADMIN — GUAIFENESIN 600 MG: 600 TABLET, EXTENDED RELEASE ORAL at 10:12

## 2022-12-10 RX ADMIN — MUPIROCIN: 20 OINTMENT TOPICAL at 10:12

## 2022-12-10 RX ADMIN — ASPIRIN 81 MG: 81 TABLET, CHEWABLE ORAL at 08:12

## 2022-12-10 NOTE — ASSESSMENT & PLAN NOTE
Acute hypoxemic respiratory failure  - Appears volume overload on exam,   - CXR w/ pleural effusions  - minimal UOP s/p 40mg lasix IVP in the ED  - Switched IV lasix to lasix gtt on 12/5. Discontinued diuresis on 12/7 due to rise in Scr. Will resume home diuresis once SARAH resolves   - Dry weight 140 lbs)  - TTE: EF 65%, AVR, PASP 66,  - K>4 and Mg>2  - Telemetry   - Strict I/Os, daily weights  - Low Na diet w/ fluid restriction

## 2022-12-10 NOTE — SUBJECTIVE & OBJECTIVE
Interval History:   No events overnight. Patient continues to void without difficulty. Endoreses cough. Denied fevers, dyspnea, nausea, and pain. Creatinine improved to baseline 1.7. No other complaints. Holding diuresis one additional day.       Review of Systems   Constitutional:  Positive for activity change, fatigue and unexpected weight change. Negative for chills and fever.   HENT:  Negative for congestion and trouble swallowing.    Eyes:  Negative for visual disturbance.   Respiratory:  Negative for cough and shortness of breath.    Cardiovascular:  Negative for chest pain and leg swelling.   Gastrointestinal:  Negative for abdominal distention, abdominal pain, nausea and vomiting.   Endocrine: Negative for cold intolerance, heat intolerance, polydipsia and polyuria.   Genitourinary:  Negative for difficulty urinating, dysuria and scrotal swelling.   Musculoskeletal:  Negative for arthralgias, back pain and myalgias.   Skin:  Negative for rash and wound.   Neurological:  Positive for weakness. Negative for dizziness and light-headedness.   Hematological:  Negative for adenopathy. Does not bruise/bleed easily.   Psychiatric/Behavioral:  Negative for confusion and sleep disturbance.    Objective:     Vital Signs (Most Recent):  Temp: 97.3 °F (36.3 °C) (12/10/22 1130)  Pulse: 70 (12/10/22 1237)  Resp: 17 (12/10/22 1237)  BP: (!) 118/54 (12/10/22 1130)  SpO2: 96 % (12/10/22 1237)   Vital Signs (24h Range):  Temp:  [96.4 °F (35.8 °C)-97.9 °F (36.6 °C)] 97.3 °F (36.3 °C)  Pulse:  [69-72] 70  Resp:  [16-18] 17  SpO2:  [93 %-98 %] 96 %  BP: ()/(54-73) 118/54     Weight: 63 kg (138 lb 14.2 oz)  Body mass index is 21.12 kg/m².    Intake/Output Summary (Last 24 hours) at 12/10/2022 1310  Last data filed at 12/9/2022 2347  Gross per 24 hour   Intake --   Output 200 ml   Net -200 ml      Physical Exam  Constitutional:       Appearance: Normal appearance.   HENT:      Head: Normocephalic.      Mouth/Throat:       Mouth: Mucous membranes are moist.   Eyes:      Extraocular Movements: Extraocular movements intact.   Neck:      Comments: JVD absent  Cardiovascular:      Rate and Rhythm: Normal rate and regular rhythm.      Pulses: Normal pulses.      Heart sounds: Normal heart sounds.   Pulmonary:      Effort: No respiratory distress.      Breath sounds: Normal breath sounds.   Abdominal:      General: Bowel sounds are normal. There is no distension.      Palpations: Abdomen is soft.      Tenderness: There is no abdominal tenderness.   Genitourinary:     Testes:         Right: Swelling present.         Left: Swelling present.   Musculoskeletal:         General: Normal range of motion.      Cervical back: Neck supple.      Right lower leg: No edema.      Left lower leg: No edema.   Neurological:      General: No focal deficit present.      Mental Status: He is alert and oriented to person, place, and time.   Psychiatric:         Mood and Affect: Mood normal.       Significant Labs: CBC:   Recent Labs   Lab 12/09/22  0417 12/10/22  0933   WBC 12.89* 9.96   HGB 10.5* 10.3*   HCT 33.7* 34.0*    246     CMP:   Recent Labs   Lab 12/09/22  0417 12/10/22  0933    142   K 3.8 3.7    102   CO2 26 28   GLU 98 85   BUN 74* 64*   CREATININE 2.0* 1.7*   CALCIUM 8.8 8.5*   ANIONGAP 14 12       Significant Imaging: I have reviewed all pertinent imaging results/findings within the past 24 hours.

## 2022-12-10 NOTE — ASSESSMENT & PLAN NOTE
- Hematuria via ramirez seen on 12/6  - H/H stable and hemodynamically stable  - Holding home Eliquis and ASA   - Urology consulted   -- No inpatient intervention

## 2022-12-10 NOTE — PROGRESS NOTES
[facility] Medicine  Progress Note    Patient Name: Deena Moody  MRN: [id number]  Patient Class: IP- Inpatient   Admission Date: 12/3/2022  Length of Stay: 6 days  Attending Physician: Bertin Dominguez MD  Primary Care Provider: Jam Rowell MD        Subjective:     Principal Problem:Acute on chronic diastolic congestive heart failure        HPI:  Deena Moody is a 89 y.o. male with a PMHx of HTN, HLD, CHF, Afib, CAD who presents to [facility] with testicular and penile swelling. Patient reports URI symptoms of productive cough, congestion, and fatigue for the past few days for which he was seen by his PCP yesterday and tested positive for the flu. He was prescribed tamiflu 30mg but was unable to find a pharmacy that carried this dose so he hasn't started it yet. Patient noted to have worsening penile and testicular swelling with associated bilateral LE edema today. No testicular or penile pain. He had similar symptoms in the past which was due to volume overload and required IV lasix. He also notes decreased urine output for the past day, only made a small amount of urine since receiving IV lasix in the ED prior to my exam. Denies fever, chills, N/V, abdominal pain, chest pain, dizziness, HA, or syncope.    ED: AFVSS. No leukocytosis. Cr 2.0 (BL ~1.6), alk phos 139, t.bili 1.3. , trop 0.044. EKG pending. CXR shows right basilar effusion with trace effusion on the left, probable mild atelectasis on the right, minimal left basilar interstitial infiltrate or mild atelectasis. Given 40mg lasix IVP with minimal UOP.       Overview/Hospital Course:  Patient continuing to feel well and remains on room air. Patient is close to dry weight (138 lbs) and denies SOB or swelling after diuresis. Patient continues to have good UOP through ramirez and  (- 5.75 L since admission) but continues to have hematuria. Urology consulted for evaluation of hematuria. H/H stable and patient hemodynamically  stable. Viral symptoms resolved and patient completed Tamiflu. Scr increased from 1.9 to 2.2 so lasix gtt held. Also bicarb 33 showing contraction alkalosis. Plan to hold diuresis and monitor Scr. Patient will be stable for discharge on home diuresis after Scr close to baseline.       Interval History:   No events overnight. Patient continues to void without difficulty. Endoreses cough. Denied fevers, dyspnea, nausea, and pain. Creatinine improved to baseline 1.7. No other complaints. Holding diuresis one additional day.       Review of Systems   Constitutional:  Positive for activity change, fatigue and unexpected weight change. Negative for chills and fever.   HENT:  Negative for congestion and trouble swallowing.    Eyes:  Negative for visual disturbance.   Respiratory:  Negative for cough and shortness of breath.    Cardiovascular:  Negative for chest pain and leg swelling.   Gastrointestinal:  Negative for abdominal distention, abdominal pain, nausea and vomiting.   Endocrine: Negative for cold intolerance, heat intolerance, polydipsia and polyuria.   Genitourinary:  Negative for difficulty urinating, dysuria and scrotal swelling.   Musculoskeletal:  Negative for arthralgias, back pain and myalgias.   Skin:  Negative for rash and wound.   Neurological:  Positive for weakness. Negative for dizziness and light-headedness.   Hematological:  Negative for adenopathy. Does not bruise/bleed easily.   Psychiatric/Behavioral:  Negative for confusion and sleep disturbance.    Objective:     Vital Signs (Most Recent):  Temp: 97.3 °F (36.3 °C) (12/10/22 1130)  Pulse: 70 (12/10/22 1237)  Resp: 17 (12/10/22 1237)  BP: (!) 118/54 (12/10/22 1130)  SpO2: 96 % (12/10/22 1237)   Vital Signs (24h Range):  Temp:  [96.4 °F (35.8 °C)-97.9 °F (36.6 °C)] 97.3 °F (36.3 °C)  Pulse:  [69-72] 70  Resp:  [16-18] 17  SpO2:  [93 %-98 %] 96 %  BP: ()/(54-73) 118/54     Weight: 63 kg (138 lb 14.2 oz)  Body mass index is 21.12  kg/m².    Intake/Output Summary (Last 24 hours) at 12/10/2022 1310  Last data filed at 12/9/2022 2347  Gross per 24 hour   Intake --   Output 200 ml   Net -200 ml      Physical Exam  Constitutional:       Appearance: Normal appearance.   HENT:      Head: Normocephalic.      Mouth/Throat:      Mouth: Mucous membranes are moist.   Eyes:      Extraocular Movements: Extraocular movements intact.   Neck:      Comments: JVD absent  Cardiovascular:      Rate and Rhythm: Normal rate and regular rhythm.      Pulses: Normal pulses.      Heart sounds: Normal heart sounds.   Pulmonary:      Effort: No respiratory distress.      Breath sounds: Normal breath sounds.   Abdominal:      General: Bowel sounds are normal. There is no distension.      Palpations: Abdomen is soft.      Tenderness: There is no abdominal tenderness.   Genitourinary:     Testes:         Right: Swelling present.         Left: Swelling present.   Musculoskeletal:         General: Normal range of motion.      Cervical back: Neck supple.      Right lower leg: No edema.      Left lower leg: No edema.   Neurological:      General: No focal deficit present.      Mental Status: He is alert and oriented to person, place, and time.   Psychiatric:         Mood and Affect: Mood normal.       Significant Labs: CBC:   Recent Labs   Lab 12/09/22  0417 12/10/22  0933   WBC 12.89* 9.96   HGB 10.5* 10.3*   HCT 33.7* 34.0*    246     CMP:   Recent Labs   Lab 12/09/22 0417 12/10/22  0933    142   K 3.8 3.7    102   CO2 26 28   GLU 98 85   BUN 74* 64*   CREATININE 2.0* 1.7*   CALCIUM 8.8 8.5*   ANIONGAP 14 12       Significant Imaging: I have reviewed all pertinent imaging results/findings within the past 24 hours.      Assessment/Plan:      * Acute on chronic diastolic congestive heart failure  Acute hypoxemic respiratory failure  - Appears volume overload on exam,   - CXR w/ pleural effusions  - minimal UOP s/p 40mg lasix IVP in the ED  - Switched  "IV lasix to lasix gtt on 12/5. Discontinued diuresis on 12/7 due to rise in Scr. Will resume home diuresis once SARAH resolves   - Dry weight 140 lbs)  - TTE: EF 65%, AVR, PASP 66,  - K>4 and Mg>2  - Telemetry   - Strict I/Os, daily weights  - Low Na diet w/ fluid restriction    SARAH (acute kidney injury)  CKD (chronic kidney disease) stage 3, GFR 30-59 ml/min  - Cr 2.0, baseline ~1.6. Scr 2.0 --> 1.8 --> 2.2  - Likely 2/2 cardiorenal syndrome  - Renal US negative for hydro  - s/p ramirez   - UA negative  - Avoid nephrotoxins, renally dose meds  - Trend BMP  - Improved    Hematuria  - Hematuria via ramirez seen on 12/6  - H/H stable and hemodynamically stable  - Holding home Eliquis and ASA   - Urology consulted   -- No inpatient intervention      Elevated troponin  - Trop 0.044; EKG pending  - No reported CP  - Likely type 2 demand ischemia due to volume overload   - Trend Tn  - TTE: EF 65%, no WMA, bioprosthetic AV, PASP 66    Influenza A  - Diagnosed yesterday but unable to fill tamiflu as unable to find pharmacy that carried prescribed dosing  - Afebrile without leukocytosis  - CXR with mild infiltrates-- suspect 2/2 atelectasis vs fluid overload  - Procal 0.10  - Completed Tamiflu  - Duonebs, IS  - Mucinex and tessalon    Permanent atrial fibrillation  - continue coreg 12.5mg BID   - holding home eliquis due to hematuria    CAD (coronary artery disease)  - no reported chest pain  - elevated trop likely 2/2 volume overload (see above)    Essential hypertension  - continue coreg 12.5mg BID  - hold benazepril given SARAH    Dyslipidemia  - continue ASA    CKD (chronic kidney disease) stage 3, GFR 30-59 ml/min  - see "SARAH"        VTE Risk Mitigation (From admission, onward)         Ordered     Reason for No Pharmacological VTE Prophylaxis  Once        Question:  Reasons:  Answer:  Already adequately anticoagulated on oral Anticoagulants    12/03/22 2037     IP VTE HIGH RISK PATIENT  Once         12/03/22 2037     Place " sequential compression device  Until discontinued         12/03/22 2034     Place sequential compression device  Until discontinued         12/03/22 2018                Discharge Planning   TEOFILO: 12/10/2022     Code Status: Full Code   Is the patient medically ready for discharge?: No    Reason for patient still in hospital (select all that apply): Patient trending condition, Laboratory test, Treatment and Pending disposition  Discharge Plan A: Home with family                  Bertin Dominguez MD  Department of Hospital Medicine   [facility]

## 2022-12-10 NOTE — PROGRESS NOTES
[facility] Medicine  Progress Note    Patient Name: Deena Moody  MRN: [id number]  Patient Class: IP- Inpatient   Admission Date: 12/3/2022  Length of Stay: 5 days  Attending Physician: Bertin Dominguez MD  Primary Care Provider: Jam Rowell MD        Subjective:     Principal Problem:Acute on chronic diastolic congestive heart failure        HPI:  Deena Moody is a 89 y.o. male with a PMHx of HTN, HLD, CHF, Afib, CAD [place] [facility] [place]. Patient reports URI symptoms of productive cough, congestion, and fatigue for the past few days for which he was seen by his PCP yesterday and tested positive for the flu. He was prescribed tamiflu 30mg but was unable to find a pharmacy that carried this dose so he hasn't started it yet. Patient noted to have worsening penile and testicular swelling with associated bilateral LE edema today. No testicular or penile pain. He had similar symptoms in the past which was due to volume overload and required IV lasix. He also notes decreased urine output for the past day, only made a small amount of urine since receiving IV lasix in the ED prior to my exam. Denies fever, chills, N/V, abdominal pain, chest pain, dizziness, HA, or syncope.    ED: AFVSS. No leukocytosis. Cr 2.0 (BL ~1.6), alk phos 139, t.bili 1.3. , trop 0.044. EKG pending. CXR shows right basilar effusion with trace effusion on the left, probable mild atelectasis on the right, minimal left basilar interstitial infiltrate or mild atelectasis. Given 40mg lasix IVP with minimal UOP.       Overview/Hospital Course:  Patient continuing to feel well and remains on room air. Patient is close to dry weight (138 lbs) and denies SOB or swelling after diuresis. Patient continues to have good UOP through ramirez and  (- 5.75 L since admission) but continues to have hematuria. Urology consulted for evaluation of hematuria. H/H stable and patient hemodynamically  stable. Viral symptoms resolved and patient completed Tamiflu. Scr increased from 1.9 to 2.2 so lasix gtt held. Also bicarb 33 showing contraction alkalosis. Plan to hold diuresis and monitor Scr. Patient will be stable for discharge on home diuresis after Scr close to baseline.       Interval History:   No events overnight.  Patient with no complaints this morning.  Urine improving color now mostly light yellow with slight pink tinge.  Patient states that he has not ambulated since having Muhammad.  Renal function improving close to baseline.  Muhammad removed.  Nursing reported patient able to ambulate after Muhammad removal.      Review of Systems   Constitutional:  Positive for activity change, fatigue and unexpected weight change. Negative for chills and fever.   HENT:  Negative for congestion and trouble swallowing.    Eyes:  Negative for visual disturbance.   Respiratory:  Negative for cough and shortness of breath.    Cardiovascular:  Negative for chest pain and leg swelling.   Gastrointestinal:  Negative for abdominal distention, abdominal pain, nausea and vomiting.   Endocrine: Negative for cold intolerance, heat intolerance, polydipsia and polyuria.   Genitourinary:  Negative for difficulty urinating, dysuria and scrotal swelling.   Musculoskeletal:  Negative for arthralgias, back pain and myalgias.   Skin:  Negative for rash and wound.   Neurological:  Positive for weakness. Negative for dizziness and light-headedness.   Hematological:  Negative for adenopathy. Does not bruise/bleed easily.   Psychiatric/Behavioral:  Negative for confusion and sleep disturbance.    Objective:     Vital Signs (Most Recent):  Temp: 96.4 °F (35.8 °C) (12/09/22 1632)  Pulse: 72 (12/09/22 1639)  Resp: 18 (12/09/22 1639)  BP: (!) 122/58 (12/09/22 1654)  SpO2: 98 % (12/09/22 1639)   Vital Signs (24h Range):  Temp:  [96.4 °F (35.8 °C)-98.3 °F (36.8 °C)] 96.4 °F (35.8 °C)  Pulse:  [68-72] 72  Resp:  [16-20] 18  SpO2:  [92 %-98 %] 98  %  BP: (103-122)/(50-60) 122/58     Weight: 63 kg (138 lb 14.2 oz)  Body mass index is 21.12 kg/m².  No intake or output data in the 24 hours ending 12/09/22 1854   Physical Exam  Constitutional:       Appearance: Normal appearance.   HENT:      Head: Normocephalic.      Mouth/Throat:      Mouth: Mucous membranes are moist.   Eyes:      Extraocular Movements: Extraocular movements intact.   Neck:      Comments: JVD absent  Cardiovascular:      Rate and Rhythm: Normal rate and regular rhythm.      Pulses: Normal pulses.      Heart sounds: Normal heart sounds.   Pulmonary:      Effort: No respiratory distress.      Breath sounds: Normal breath sounds.   Abdominal:      General: Bowel sounds are normal. There is no distension.      Palpations: Abdomen is soft.      Tenderness: There is no abdominal tenderness.   Genitourinary:     Testes:         Right: Swelling present.         Left: Swelling present.   Musculoskeletal:         General: Normal range of motion.      Cervical back: Neck supple.      Right lower leg: No edema.      Left lower leg: No edema.   Neurological:      General: No focal deficit present.      Mental Status: He is alert and oriented to person, place, and time.   Psychiatric:         Mood and Affect: Mood normal.       Significant Labs: CBC:   Recent Labs   Lab 12/08/22  0544 12/09/22  0417   WBC 10.44 12.89*   HGB 10.5* 10.5*   HCT 33.5* 33.7*    239     CMP:   Recent Labs   Lab 12/08/22  0544 12/09/22  0417    141   K 3.7 3.8   CL 98 101   CO2 31* 26   GLU 85 98   BUN 67* 74*   CREATININE 2.4* 2.0*   CALCIUM 8.8 8.8   ANIONGAP 12 14       Significant Imaging: I have reviewed all pertinent imaging results/findings within the past 24 hours.      Assessment/Plan:      * Acute on chronic diastolic congestive heart failure  Acute hypoxemic respiratory failure  - appears volume overload on exam,   - CXR w/ pleural effusions  - minimal UOP s/p 40mg lasix IVP in the ED  - Switched IV  "lasix to lasix gtt on 12/5. Discontinued diuresis on 12/7 due to rise in Scr. Will resume home diuresis once SARAH resolves   - Dry weight 140 lbs)  - repeat TTE  - keep K>4 and Mg>2  - telemetry   - strict I/Os, daily weights  - low Na diet w/ fluid restriction    SARAH (acute kidney injury)  CKD (chronic kidney disease) stage 3, GFR 30-59 ml/min  - Cr 2.0, baseline ~1.6. Scr 2.0 --> 1.8 --> 2.2  - likely 2/2 cardiorenal syndrome  - renal US negative for hydro  - continue ramirez   - UA negative  - avoid nephrotoxins, renally dose meds  - trend BMP    Hematuria  - hematuria via ramirez seen on 12/6  - H/H stable and hemodynamically stable  - holding home Eliquis and ASA   - Urology consulted   -- No inpatient intervention      Elevated troponin  - Trop 0.044; EKG pending  - No reported CP  - Likely type 2 demand ischemia due to volume overload   - Trend Tn  - TTE: EF 65%, no WMA, bioprosthetic AV, PASP 66    Influenza A  - Diagnosed yesterday but unable to fill tamiflu as unable to find pharmacy that carried prescribed dosing  - Afebrile without leukocytosis  - CXR with mild infiltrates-- suspect 2/2 atelectasis vs fluid overload  - Procal 0.10  - Completed Tamiflu  - Duonebs, IS  - Mucinex and tessalon    Permanent atrial fibrillation  - continue coreg 12.5mg BID   - holding home eliquis due to hematuria    CAD (coronary artery disease)  - no reported chest pain  - elevated trop likely 2/2 volume overload (see above)    Essential hypertension  - continue coreg 12.5mg BID  - hold benazepril given SARAH    Dyslipidemia  - continue ASA    CKD (chronic kidney disease) stage 3, GFR 30-59 ml/min  - see "SARAH"        VTE Risk Mitigation (From admission, onward)         Ordered     Reason for No Pharmacological VTE Prophylaxis  Once        Question:  Reasons:  Answer:  Already adequately anticoagulated on oral Anticoagulants    12/03/22 2037     IP VTE HIGH RISK PATIENT  Once         12/03/22 2037     Place sequential compression " device  Until discontinued         12/03/22 2034     Place sequential compression device  Until discontinued         12/03/22 2018                Discharge Planning   TEOFILO: 12/10/2022     Code Status: Full Code   Is the patient medically ready for discharge?: No    Reason for patient still in hospital (select all that apply): Patient trending condition, Laboratory test, Treatment and Pending disposition  Discharge Plan A: Home with family                  Bertin Dominguez MD  Department of Hospital Medicine   [facility]

## 2022-12-10 NOTE — SUBJECTIVE & OBJECTIVE
Interval History:   No events overnight.  Patient with no complaints this morning.  Urine improving color now mostly light yellow with slight pink tinge.  Patient states that he has not ambulated since having Muhammad.  Renal function improving close to baseline.  Muhammad removed.  Nursing reported patient able to ambulate after Muhammad removal.      Review of Systems   Constitutional:  Positive for activity change, fatigue and unexpected weight change. Negative for chills and fever.   HENT:  Negative for congestion and trouble swallowing.    Eyes:  Negative for visual disturbance.   Respiratory:  Negative for cough and shortness of breath.    Cardiovascular:  Negative for chest pain and leg swelling.   Gastrointestinal:  Negative for abdominal distention, abdominal pain, nausea and vomiting.   Endocrine: Negative for cold intolerance, heat intolerance, polydipsia and polyuria.   Genitourinary:  Negative for difficulty urinating, dysuria and scrotal swelling.   Musculoskeletal:  Negative for arthralgias, back pain and myalgias.   Skin:  Negative for rash and wound.   Neurological:  Positive for weakness. Negative for dizziness and light-headedness.   Hematological:  Negative for adenopathy. Does not bruise/bleed easily.   Psychiatric/Behavioral:  Negative for confusion and sleep disturbance.    Objective:     Vital Signs (Most Recent):  Temp: 96.4 °F (35.8 °C) (12/09/22 1632)  Pulse: 72 (12/09/22 1639)  Resp: 18 (12/09/22 1639)  BP: (!) 122/58 (12/09/22 1654)  SpO2: 98 % (12/09/22 1639)   Vital Signs (24h Range):  Temp:  [96.4 °F (35.8 °C)-98.3 °F (36.8 °C)] 96.4 °F (35.8 °C)  Pulse:  [68-72] 72  Resp:  [16-20] 18  SpO2:  [92 %-98 %] 98 %  BP: (103-122)/(50-60) 122/58     Weight: 63 kg (138 lb 14.2 oz)  Body mass index is 21.12 kg/m².  No intake or output data in the 24 hours ending 12/09/22 1854   Physical Exam  Constitutional:       Appearance: Normal appearance.   HENT:      Head: Normocephalic.      Mouth/Throat:       Mouth: Mucous membranes are moist.   Eyes:      Extraocular Movements: Extraocular movements intact.   Neck:      Comments: JVD absent  Cardiovascular:      Rate and Rhythm: Normal rate and regular rhythm.      Pulses: Normal pulses.      Heart sounds: Normal heart sounds.   Pulmonary:      Effort: No respiratory distress.      Breath sounds: Normal breath sounds.   Abdominal:      General: Bowel sounds are normal. There is no distension.      Palpations: Abdomen is soft.      Tenderness: There is no abdominal tenderness.   Genitourinary:     Testes:         Right: Swelling present.         Left: Swelling present.   Musculoskeletal:         General: Normal range of motion.      Cervical back: Neck supple.      Right lower leg: No edema.      Left lower leg: No edema.   Neurological:      General: No focal deficit present.      Mental Status: He is alert and oriented to person, place, and time.   Psychiatric:         Mood and Affect: Mood normal.       Significant Labs: CBC:   Recent Labs   Lab 12/08/22  0544 12/09/22  0417   WBC 10.44 12.89*   HGB 10.5* 10.5*   HCT 33.5* 33.7*    239     CMP:   Recent Labs   Lab 12/08/22  0544 12/09/22  0417    141   K 3.7 3.8   CL 98 101   CO2 31* 26   GLU 85 98   BUN 67* 74*   CREATININE 2.4* 2.0*   CALCIUM 8.8 8.8   ANIONGAP 12 14       Significant Imaging: I have reviewed all pertinent imaging results/findings within the past 24 hours.

## 2022-12-10 NOTE — ASSESSMENT & PLAN NOTE
CKD (chronic kidney disease) stage 3, GFR 30-59 ml/min  - Cr 2.0, baseline ~1.6. Scr 2.0 --> 1.8 --> 2.2  - Likely 2/2 cardiorenal syndrome  - Renal US negative for hydro  - s/p ramirez   - UA negative  - Avoid nephrotoxins, renally dose meds  - Trend BMP  - Improved

## 2022-12-11 VITALS
DIASTOLIC BLOOD PRESSURE: 53 MMHG | OXYGEN SATURATION: 97 % | BODY MASS INDEX: 21.05 KG/M2 | RESPIRATION RATE: 18 BRPM | SYSTOLIC BLOOD PRESSURE: 114 MMHG | HEIGHT: 68 IN | HEART RATE: 70 BPM | TEMPERATURE: 98 F | WEIGHT: 138.88 LBS

## 2022-12-11 LAB
ANION GAP SERPL CALC-SCNC: 11 MMOL/L (ref 8–16)
BASOPHILS # BLD AUTO: 0.05 K/UL (ref 0–0.2)
BASOPHILS NFR BLD: 0.5 % (ref 0–1.9)
BUN SERPL-MCNC: 63 MG/DL (ref 10–30)
CALCIUM SERPL-MCNC: 8.6 MG/DL (ref 8.7–10.5)
CHLORIDE SERPL-SCNC: 103 MMOL/L (ref 95–110)
CO2 SERPL-SCNC: 27 MMOL/L (ref 23–29)
CREAT SERPL-MCNC: 1.8 MG/DL (ref 0.5–1.4)
DIFFERENTIAL METHOD: ABNORMAL
EOSINOPHIL # BLD AUTO: 0.5 K/UL (ref 0–0.5)
EOSINOPHIL NFR BLD: 5.2 % (ref 0–8)
ERYTHROCYTE [DISTWIDTH] IN BLOOD BY AUTOMATED COUNT: 15.4 % (ref 11.5–14.5)
EST. GFR  (NO RACE VARIABLE): 34.9 ML/MIN/1.73 M^2
GLUCOSE SERPL-MCNC: 89 MG/DL (ref 70–110)
HCT VFR BLD AUTO: 30.5 % (ref 40–54)
HGB BLD-MCNC: 9.7 G/DL (ref 14–18)
IMM GRANULOCYTES # BLD AUTO: 0.04 K/UL (ref 0–0.04)
IMM GRANULOCYTES NFR BLD AUTO: 0.4 % (ref 0–0.5)
LYMPHOCYTES # BLD AUTO: 1.6 K/UL (ref 1–4.8)
LYMPHOCYTES NFR BLD: 14.9 % (ref 18–48)
MAGNESIUM SERPL-MCNC: 2.5 MG/DL (ref 1.6–2.6)
MCH RBC QN AUTO: 31.4 PG (ref 27–31)
MCHC RBC AUTO-ENTMCNC: 31.8 G/DL (ref 32–36)
MCV RBC AUTO: 99 FL (ref 82–98)
MONOCYTES # BLD AUTO: 1 K/UL (ref 0.3–1)
MONOCYTES NFR BLD: 9.4 % (ref 4–15)
NEUTROPHILS # BLD AUTO: 7.3 K/UL (ref 1.8–7.7)
NEUTROPHILS NFR BLD: 69.6 % (ref 38–73)
NRBC BLD-RTO: 0 /100 WBC
PLATELET # BLD AUTO: 212 K/UL (ref 150–450)
PMV BLD AUTO: 9 FL (ref 9.2–12.9)
POTASSIUM SERPL-SCNC: 4 MMOL/L (ref 3.5–5.1)
RBC # BLD AUTO: 3.09 M/UL (ref 4.6–6.2)
SODIUM SERPL-SCNC: 141 MMOL/L (ref 136–145)
WBC # BLD AUTO: 10.45 K/UL (ref 3.9–12.7)

## 2022-12-11 PROCEDURE — 1111F PR DISCHARGE MEDS RECONCILED W/ CURRENT OUTPATIENT MED LIST: ICD-10-PCS | Mod: CPTII,,, | Performed by: STUDENT IN AN ORGANIZED HEALTH CARE EDUCATION/TRAINING PROGRAM

## 2022-12-11 PROCEDURE — 80048 BASIC METABOLIC PNL TOTAL CA: CPT | Performed by: STUDENT IN AN ORGANIZED HEALTH CARE EDUCATION/TRAINING PROGRAM

## 2022-12-11 PROCEDURE — 99239 HOSP IP/OBS DSCHRG MGMT >30: CPT | Mod: ,,, | Performed by: STUDENT IN AN ORGANIZED HEALTH CARE EDUCATION/TRAINING PROGRAM

## 2022-12-11 PROCEDURE — 99239 PR HOSPITAL DISCHARGE DAY,>30 MIN: ICD-10-PCS | Mod: ,,, | Performed by: STUDENT IN AN ORGANIZED HEALTH CARE EDUCATION/TRAINING PROGRAM

## 2022-12-11 PROCEDURE — 36415 COLL VENOUS BLD VENIPUNCTURE: CPT | Performed by: PHYSICIAN ASSISTANT

## 2022-12-11 PROCEDURE — 25000242 PHARM REV CODE 250 ALT 637 W/ HCPCS: Performed by: STUDENT IN AN ORGANIZED HEALTH CARE EDUCATION/TRAINING PROGRAM

## 2022-12-11 PROCEDURE — 25000003 PHARM REV CODE 250: Performed by: PHYSICIAN ASSISTANT

## 2022-12-11 PROCEDURE — 85025 COMPLETE CBC W/AUTO DIFF WBC: CPT | Performed by: STUDENT IN AN ORGANIZED HEALTH CARE EDUCATION/TRAINING PROGRAM

## 2022-12-11 PROCEDURE — 25000003 PHARM REV CODE 250: Performed by: INTERNAL MEDICINE

## 2022-12-11 PROCEDURE — 94799 UNLISTED PULMONARY SVC/PX: CPT

## 2022-12-11 PROCEDURE — 83735 ASSAY OF MAGNESIUM: CPT | Performed by: PHYSICIAN ASSISTANT

## 2022-12-11 PROCEDURE — 1111F DSCHRG MED/CURRENT MED MERGE: CPT | Mod: CPTII,,, | Performed by: STUDENT IN AN ORGANIZED HEALTH CARE EDUCATION/TRAINING PROGRAM

## 2022-12-11 PROCEDURE — 25000003 PHARM REV CODE 250: Performed by: STUDENT IN AN ORGANIZED HEALTH CARE EDUCATION/TRAINING PROGRAM

## 2022-12-11 PROCEDURE — 94640 AIRWAY INHALATION TREATMENT: CPT

## 2022-12-11 PROCEDURE — 94761 N-INVAS EAR/PLS OXIMETRY MLT: CPT

## 2022-12-11 RX ORDER — BENZONATATE 200 MG/1
200 CAPSULE ORAL 3 TIMES DAILY PRN
Qty: 20 CAPSULE | Refills: 0 | Status: SHIPPED | OUTPATIENT
Start: 2022-12-11 | End: 2022-12-21

## 2022-12-11 RX ORDER — ALBUTEROL SULFATE 90 UG/1
2 AEROSOL, METERED RESPIRATORY (INHALATION) EVERY 6 HOURS PRN
Qty: 18 G | Refills: 1 | Status: SHIPPED | OUTPATIENT
Start: 2022-12-11

## 2022-12-11 RX ORDER — OMEPRAZOLE 20 MG/1
20 CAPSULE, DELAYED RELEASE ORAL DAILY
Qty: 90 CAPSULE | Refills: 3 | Status: ON HOLD | OUTPATIENT
Start: 2022-12-11 | End: 2023-06-16 | Stop reason: SDUPTHER

## 2022-12-11 RX ORDER — GUAIFENESIN 600 MG/1
600 TABLET, EXTENDED RELEASE ORAL 2 TIMES DAILY
Qty: 10 TABLET | Refills: 0 | Status: SHIPPED | OUTPATIENT
Start: 2022-12-11 | End: 2023-07-25

## 2022-12-11 RX ORDER — BENZONATATE 100 MG/1
200 CAPSULE ORAL 3 TIMES DAILY PRN
Status: DISCONTINUED | OUTPATIENT
Start: 2022-12-11 | End: 2022-12-11 | Stop reason: HOSPADM

## 2022-12-11 RX ADMIN — GUAIFENESIN 600 MG: 600 TABLET, EXTENDED RELEASE ORAL at 09:12

## 2022-12-11 RX ADMIN — ASPIRIN 81 MG: 81 TABLET, CHEWABLE ORAL at 09:12

## 2022-12-11 RX ADMIN — CARVEDILOL 12.5 MG: 12.5 TABLET, FILM COATED ORAL at 09:12

## 2022-12-11 RX ADMIN — FERROUS SULFATE TAB 325 MG (65 MG ELEMENTAL FE) 1 EACH: 325 (65 FE) TAB at 09:12

## 2022-12-11 RX ADMIN — IPRATROPIUM BROMIDE AND ALBUTEROL SULFATE 3 ML: .5; 3 SOLUTION RESPIRATORY (INHALATION) at 08:12

## 2022-12-11 RX ADMIN — TORSEMIDE 20 MG: 20 TABLET ORAL at 09:12

## 2022-12-11 NOTE — DISCHARGE INSTRUCTIONS
You were admitted for heart failure and the flu. Your symptoms improved with treatment, but you continued to have a cough.    You will be sent home with cough medicine (benzonatate and guaifenesin) to help with symptoms. Your are also given albuterol inhaler to help with breathing.

## 2022-12-11 NOTE — PLAN OF CARE
Problem: Adult Inpatient Plan of Care  Goal: Plan of Care Review  Outcome: Ongoing, Progressing  Goal: Patient-Specific Goal (Individualized)  Outcome: Ongoing, Progressing  Goal: Absence of Hospital-Acquired Illness or Injury  Outcome: Ongoing, Progressing  Goal: Optimal Comfort and Wellbeing  Outcome: Ongoing, Progressing  Goal: Readiness for Transition of Care  Outcome: Ongoing, Progressing     Problem: Infection  Goal: Absence of Infection Signs and Symptoms  Outcome: Ongoing, Progressing     Problem: Renal Function Impairment (Acute Kidney Injury/Impairment)  Goal: Effective Renal Function  Outcome: Ongoing, Progressing     Problem: Oral Intake Inadequate (Acute Kidney Injury/Impairment)  Goal: Optimal Nutrition Intake  Outcome: Ongoing, Progressing     Problem: Fall Injury Risk  Goal: Absence of Fall and Fall-Related Injury  Outcome: Ongoing, Progressing    Pt cleared for discharge. PIV removed. Discharge instructions provided to pt and daughter (Jemma). Daughter has follow up appointment scheduled. Will await transport to assist pt downstairs.

## 2022-12-11 NOTE — PLAN OF CARE
[facility]      HOME HEALTH ORDERS  FACE TO FACE ENCOUNTER    Patient Name: Deena Moody  YOB: 1930    PCP: Jam Rowell MD   PCP Address: [address] [place] [address] / [place] [id number]  PCP Phone Number: [phone]  PCP Fax: [phone]    Encounter Date: 12/3/22    Admit to Home Health    Diagnoses:  Active Hospital Problems    Diagnosis  POA    *Acute on chronic diastolic congestive heart failure [I50.33]  Yes     Priority: 1 - High    SARAH (acute kidney injury) [N17.9]  Yes     Priority: 2     Hematuria [R31.9]  Yes     Priority: 3     Elevated troponin [R77.8]  Yes     Priority: 4     Influenza A [J10.1]  Yes     Priority: 5     Permanent atrial fibrillation [I48.21]  Yes     Priority: 6      Chronic    CAD (coronary artery disease) [I25.10]  Yes     Chronic    CKD (chronic kidney disease) stage 3, GFR 30-59 ml/min [N18.30]  Yes     Chronic    Dyslipidemia [E78.5]  Yes     Chronic    Essential hypertension [I10]  Yes      Resolved Hospital Problems   No resolved problems to display.       Follow Up Appointments:  Future Appointments   Date Time Provider Department Center   1/9/2023 10:00 AM [facility]       Allergies:  Review of patient's allergies indicates:   Allergen Reactions    Iodine and iodide containing products Other (See Comments)     Caused changes in skin color       Medications: Review discharge medications with patient and family and provide education.    Current Facility-Administered Medications   Medication Dose Route Frequency Provider Last Rate Last Admin    acetaminophen tablet 650 mg  650 mg Oral Q4H PRN Candis Salinas PA-C        albuterol-ipratropium 2.5 mg-0.5 mg/3 mL nebulizer solution 3 mL  3 mL Nebulization Q4H PRN Candis Salinas PA-C   3 mL at 12/04/22 1723    albuterol-ipratropium 2.5 mg-0.5 mg/3 mL nebulizer solution 3 mL  3 mL Nebulization Q12H Bertin Dominguez MD   3 mL at 12/11/22 0814     aspirin chewable tablet 81 mg  81 mg Oral Daily Junior Myers MD   81 mg at 12/11/22 0918    benzonatate capsule 200 mg  200 mg Oral TID PRN Bertin Dominguez MD        bisacodyL suppository 10 mg  10 mg Rectal Daily PRN Candis Salinas PA-C        carvediloL tablet 12.5 mg  12.5 mg Oral BID WM Candis Salinas PA-C   12.5 mg at 12/11/22 0918    dextrose 10% bolus 125 mL  12.5 g Intravenous PRN Candis Salinas PA-C        dextrose 10% bolus 250 mL  25 g Intravenous PRN Candis Salinas PA-C        ferrous sulfate tablet 1 each  1 tablet Oral Daily Candis Salinas PA-C   1 each at 12/11/22 0919    glucagon (human recombinant) injection 1 mg  1 mg Intramuscular PRN Candis Salinas PA-C        glucose chewable tablet 16 g  16 g Oral PRN Candis Salinas PA-C        glucose chewable tablet 24 g  24 g Oral PRN Candis Salinas PA-C        guaiFENesin 12 hr tablet 600 mg  600 mg Oral BID Bertin Dominguez MD   600 mg at 12/11/22 0918    melatonin tablet 6 mg  6 mg Oral Nightly PRN Constantino Keller MD        ondansetron disintegrating tablet 8 mg  8 mg Oral Q8H PRN Candis Salinas PA-C        polyethylene glycol packet 17 g  17 g Oral Daily PRN Candis Salinas PA-C        promethazine tablet 25 mg  25 mg Oral Q6H PRN Candis Salinas PA-C        sodium chloride 0.9% flush 10 mL  10 mL Intravenous PRN Constantino Keller MD        sodium chloride 0.9% flush 10 mL  10 mL Intravenous PRN Candis Salinas PA-C        torsemide tablet 20 mg  20 mg Oral Daily Bertin Dominguez MD   20 mg at 12/11/22 0918     Current Discharge Medication List        START taking these medications    Details   albuterol (PROVENTIL HFA) 90 mcg/actuation inhaler Inhale 2 puffs into the lungs every 6 (six) hours as needed for Wheezing. Rescue  Qty: 18 g, Refills: 1      guaiFENesin (MUCINEX) 600 mg 12 hr tablet Take 1 tablet (600 mg total) by mouth 2 (two) times daily.  Qty: 10 tablet,  Refills: 0           CONTINUE these medications which have CHANGED    Details   benzonatate (TESSALON) 200 MG capsule Take 1 capsule (200 mg total) by mouth 3 (three) times daily as needed for Cough.  Qty: 20 capsule, Refills: 0      omeprazole (PRILOSEC) 20 MG capsule Take 1 capsule (20 mg total) by mouth once daily.  Qty: 90 capsule, Refills: 3    Associated Diagnoses: Gastroesophageal reflux disease without esophagitis           CONTINUE these medications which have NOT CHANGED    Details   apixaban (ELIQUIS) 2.5 mg Tab Take 1 tablet (2.5 mg total) by mouth 2 (two) times daily.  Qty: 180 tablet, Refills: 3    Associated Diagnoses: Persistent atrial fibrillation      aspirin (ECOTRIN) 81 MG EC tablet Take 1 tablet (81 mg total) by mouth once daily.  Qty: 90 tablet, Refills: 3    Associated Diagnoses: Dyslipidemia      benazepriL (LOTENSIN) 5 MG tablet Take 1 tablet (5 mg total) by mouth once daily.  Qty: 90 tablet, Refills: 0    Associated Diagnoses: CKD (chronic kidney disease) stage 3, GFR 30-59 ml/min; Essential hypertension      carvediloL (COREG) 12.5 MG tablet Take 1 tablet (12.5 mg total) by mouth 2 (two) times daily with meals.  Qty: 180 tablet, Refills: 3    Comments: .  Associated Diagnoses: Essential hypertension      ferrous sulfate (FEOSOL) 325 mg (65 mg iron) Tab tablet Take 1 tablet (325 mg total) by mouth daily with breakfast.  Qty: 90 tablet, Refills: 3    Associated Diagnoses: Chronic disease anemia      fluticasone propionate (FLONASE) 50 mcg/actuation nasal spray 2 sprays (100 mcg total) by Each Nostril route once daily.  Qty: 30 mL, Refills: 1    Associated Diagnoses: COVID-19 virus infection; Acute cough      multivitamin (ONE DAILY MULTIVITAMIN) per tablet Take 1 tablet by mouth once daily.    Associated Diagnoses: Chronic disease anemia      nitroGLYCERIN (NITROSTAT) 0.4 MG SL tablet Take one tablet every 5 minutes for chest pain. After the third tablet go to the ED.  Qty: 25 tablet,  Refills: 4    Associated Diagnoses: Persistent atrial fibrillation      polyethylene glycol (GLYCOLAX) 17 gram/dose powder Dissolve one capful (17 g) in liquid by mouth 3 (three) times daily as needed. Take 1 three times daily until well formed stool  Qty: 510 g, Refills: 0    Associated Diagnoses: Constipation, unspecified constipation type      potassium chloride (KLOR-CON) 8 MEQ TbSR Take 1 tablet (8 mEq total) by mouth every other day.  Qty: 45 tablet, Refills: 3    Associated Diagnoses: CKD (chronic kidney disease) stage 3, GFR 30-59 ml/min      torsemide (DEMADEX) 20 MG Tab TAKE 1 TABLET EVERY DAY  Qty: 90 tablet, Refills: 3           STOP taking these medications       oseltamivir (TAMIFLU) 30 MG capsule Comments:   Reason for Stopping:                 I have seen and examined this patient within the last 30 days. My clinical findings that support the need for the home health skilled services and home bound status are the following:no   Weakness/numbness causing balance and gait disturbance due to Heart Failure, Infection, and Weakness/Debility making it taxing to leave home.     Diet:   2 gram sodium diet    Labs:  SN to perform labs:  BMP: Weekly; 2 week(s) and Report Lab results to PCP.    Referrals/ Consults  Physical Therapy to evaluate and treat. Evaluate for home safety and equipment needs; Establish/upgrade home exercise program. Perform / instruct on therapeutic exercises, gait training, transfer training, and Range of Motion.  Occupational Therapy to evaluate and treat. Evaluate home environment for safety and equipment needs. Perform/Instruct on transfers, ADL training, ROM, and therapeutic exercises.    Activities:   activity as tolerated    Nursing:   Agency to admit patient within 24 hours of hospital discharge unless specified on physician order or at patient request    SN to complete comprehensive assessment including routine vital signs. Instruct on disease process and s/s of complications to  report to MD. Review/verify medication list sent home with the patient at time of discharge  and instruct patient/caregiver as needed. Frequency may be adjusted depending on start of care date.     Skilled nurse to perform up to 3 visits PRN for symptoms related to diagnosis    Notify MD if SBP > 160 or < 90; DBP > 90 or < 50; HR > 120 or < 50; Temp > 101; O2 < 88%; Other:   n/a    Ok to schedule additional visits based on staff availability and patient request on consecutive days within the home health episode.    When multiple disciplines ordered:    Start of Care occurs on Sunday - Wednesday schedule remaining discipline evaluations as ordered on separate consecutive days following the start of care.    Thursday SOC -schedule subsequent evaluations Friday and Monday the following week.     Friday - Saturday SOC - schedule subsequent discipline evaluations on consecutive days starting Monday of the following week.    For all post-discharge communication and subsequent orders please contact patient's primary care physician. If unable to reach primary care physician or do not receive response within 30 minutes, please contact [facility] for clinical staff order clarification    Miscellaneous   Heart Failure:      SN to instruct on the following:    Instruct on the definition of CHF.   Instruct on the signs/sympoms of CHF to be reported.   Instruct on and monitor daily weights.   Instruct on factors that cause exacerbation.   Instruct on action, dose, schedule, and side effects of medications.   Instruct on diet as prescribed.   Instruct on activity allowed.   Instruct on life-style modifications for life long management of CHF   SN to assess compliance with daily weights, diet, medications, fluid retention,    safety precautions, activities permitted and life-style modifications.   Additional 1-2 SN visits per week as needed for signs and symptoms     of CHF exacerbation.      For Weight Gain > 2-3 lbs in 1 day or 4-6 lbs  over 1 week notify PCP:  CHF DIURETIC SLIDING SCALE     Skilled nurse visits daily to instruct and monitor medication adherence until target weight. Then resume prior order frequency.     If weight gain exceeds 5 lbs over target weight, call MD  If weight gain of 3-4 lbs over target weight, then:     Increase Torsemide - Current dose (mg/day) to 40 double dose  and frequency of daily until target weight achieved or maximum 3 days.     If already on max oral daily dose, see IV diuretic instructions.    After 3 days of increased oral diuretic dose, get BMP. If patient is on increased oral diuretic dose greater than 5 days, repeat BMP at day 7 of increased dose.     Potassium supplementation   Scr > 1.5 mg/dl  Scr  1.5 mg/dl    K < 3.0 - NOTIFY MD and 40 mEq bid  40 mEq tid   K- 3.1-3.3  20 mEq bid  20 mEq tid    K 3.4-3.7  10 mEq bid  10 mEq tid      If target weight not reached after 5 days of increased oral diuretic, proceed to IV diuretic with daily patient contact to include face-to-face visit and telephone encounters.       Home Health Aide:  Nursing Twice weekly, Physical Therapy Three times weekly, and Occupational Therapy Three times weekly    Wound Care Orders  no    I certify that this patient is confined to his home and needs intermittent skilled nursing care, physical therapy, and occupational therapy.

## 2022-12-11 NOTE — PLAN OF CARE
Patient rested comfortably with no significant changes during the shift.  Patient's daughter called Amelie Vincent and would like the doctor to call her at [phone] regarding the patient.

## 2022-12-11 NOTE — PLAN OF CARE
Problem: Adult Inpatient Plan of Care  Goal: Plan of Care Review  Outcome: Ongoing, Progressing  Goal: Patient-Specific Goal (Individualized)  Outcome: Ongoing, Progressing     Problem: Infection  Goal: Absence of Infection Signs and Symptoms  Outcome: Ongoing, Progressing     Problem: Fluid and Electrolyte Imbalance (Acute Kidney Injury/Impairment)  Goal: Fluid and Electrolyte Balance  Outcome: Ongoing, Progressing

## 2022-12-12 NOTE — PLAN OF CARE
[facility] - [facility]  Discharge Final Note    Primary Care Provider: Jam Rowell MD    Expected Discharge Date: 12/11/2022    Final Discharge Note (most recent)       Final Note - 12/12/22 0826          Final Note    Assessment Type Final Discharge Note     Anticipated Discharge Disposition Home-Health Care [facility]     What phone number can be called within the next 1-3 days to see how you are doing after discharge? [phone]     Hospital Resources/Appts/Education Provided Appointments scheduled and added to AVS;Post-Acute resouces added to AVS        Post-Acute Status    Post-Acute Authorization Home Health     Home Health Status Referrals Sent     Discharge Delays None known at this time                     Important Message from Medicare  Important Message from Medicare regarding Discharge Appeal Rights: Given to patient/caregiver, Explained to patient/caregiver, Signed/date by patient/caregiver     Date IMM was signed: 12/08/22  Time IMM was signed: 1325    Contact Info       Jam Rowell MD   Specialty: Family Medicine   Relationship: PCP - General    [address] [place] [id number]   Phone: [phone]       Next Steps: Follow up

## 2022-12-13 ENCOUNTER — PES CALL (OUTPATIENT)
Dept: ADMINISTRATIVE | Facility: CLINIC | Age: 87
End: 2022-12-13
Payer: MEDICARE

## 2022-12-13 ENCOUNTER — PATIENT OUTREACH (OUTPATIENT)
Dept: ADMINISTRATIVE | Facility: CLINIC | Age: 87
End: 2022-12-13
Payer: MEDICARE

## 2022-12-13 DIAGNOSIS — I50.9 CONGESTIVE HEART FAILURE, UNSPECIFIED HF CHRONICITY, UNSPECIFIED HEART FAILURE TYPE: Primary | ICD-10-CM

## 2022-12-13 NOTE — PROGRESS NOTES
C3 nurse spoke with Deena Londono daughter for a TCC post hospital discharge follow up call. The patient has a scheduled HOSFU appointment with Janette Douglas NP home referral placed.

## 2022-12-13 NOTE — PROGRESS NOTES
The patient does not have a scheduled HOSFU appointment within 5-7 days post hospital discharge date 12/11/22. Message sent to Physician staff to assist with HOSFU appointment scheduling.

## 2022-12-13 NOTE — PLAN OF CARE
Dyan was contacted by Radha Pereira with post acute care to see what home health agency was setup for Pt and when would they contact the Pt. Dyan does not see any HH referral sent for Pt, Dyan sent to [facility], left  for [facility] to see if they could expedite the HH visit and updated CM leadership on the missed on call hh setup. Will follow.    Patient is notified we can change to virtual visit.  Patient states she does not have a smart phone or a computer so it would have to be a telephone visit.  She is notified we will change but she should call her insurance to make sure they still cover telephone visit.  She states she can not get a hold of anyone there she will take her chances and fight the charges later.

## 2022-12-13 NOTE — PLAN OF CARE
Dyan did receive confirmation that [facility] will contact family and see Pt tomorrow. Dyan did note of follow up and did update  leadership with apologies on HH not be addressed at dc.

## 2022-12-14 ENCOUNTER — TELEPHONE (OUTPATIENT)
Dept: FAMILY MEDICINE | Facility: CLINIC | Age: 87
End: 2022-12-14
Payer: MEDICARE

## 2022-12-14 ENCOUNTER — TELEPHONE (OUTPATIENT)
Dept: FAMILY MEDICINE | Facility: CLINIC | Age: 87
End: 2022-12-14

## 2022-12-14 DIAGNOSIS — J18.0 BRONCHOPNEUMONIA: Primary | ICD-10-CM

## 2022-12-14 RX ORDER — DOXYCYCLINE 100 MG/1
100 CAPSULE ORAL 2 TIMES DAILY
Qty: 14 CAPSULE | Refills: 0 | Status: SHIPPED | OUTPATIENT
Start: 2022-12-14 | End: 2022-12-21

## 2022-12-14 RX ORDER — PROMETHAZINE HYDROCHLORIDE AND DEXTROMETHORPHAN HYDROBROMIDE 6.25; 15 MG/5ML; MG/5ML
5 SYRUP ORAL EVERY 6 HOURS PRN
Qty: 240 ML | Refills: 0 | Status: SHIPPED | OUTPATIENT
Start: 2022-12-14 | End: 2022-12-24

## 2022-12-14 NOTE — TELEPHONE ENCOUNTER
Spoke to Anthony[facility] nurse, pt was discharged with chf and the flu, he was given tessalon for the cough but they are not helping, he was up all night coughing, he needs something for the cough please advise

## 2022-12-14 NOTE — TELEPHONE ENCOUNTER
----- Message from Aidee Kan sent at 12/14/2022 10:35 AM CST -----  .Type:  Home Health    Who Called: [facility] [place] [facility]  Would the patient rather a call back or a response via [facility]? call  Best Call Back Number: [phone]   Additional Information:

## 2022-12-15 NOTE — PHYSICIAN QUERY
PT Name: Deena Moody  MR #: [id number]     DOCUMENTATION CLARIFICATION      CDS/: Jyoti Dominguez RN              Contact information:[email or web address]  This form is a permanent document in the medical record.    Query Date: December 15, 2022    By submitting this query, we are merely seeking further clarification of documentation to reflect the severity of illness of your patient. Please utilize your independent clinical judgment when addressing the question(s) below.     The Medical Record contains the following:   Indicators   Supporting Clinical Findings Location in Medical Record    Chest Pain, Angina     x Coronary Artery Disease CAD H&P        EKG     x Troponin Elevated troponin  - trop 0.044-0.043-0.044 Lab 12-3 to 12-4    Echo Results      Angiography     x Documentation of acute cardiac condition - no reported CP  - likely type 2 demand ischemia due to volume overload  H&P    Medication/Treatment     x Other Acute on chronic diastolic congestive heart failure H&P      Provider, please clarify the cardiac diagnosis related to the above documentation:  [  x ] NSTEMI/Myocardial Infarction Type 2 due to (please specify): CHF   [   ] NSTEMI/Myocardial Infarction Type 2 due to (please specify):___________   [   ] Demand Ischemia   [   ] Other Cardiac Diagnosis (please specify): _______________   [   ] Clinically Undetermined     Please document in your progress notes daily for the duration of treatment until resolved, and include in your discharge summary.  Form No. [id number]

## 2022-12-15 NOTE — DISCHARGE SUMMARY
[facility] Medicine  Discharge Summary      Patient Name: Deena Moody  MRN: [id number]  HOMER: [id number]  Patient Class: IP- Inpatient  Admission Date: 12/3/2022  Hospital Length of Stay: 7 days  Discharge Date and Time: 12/11/2022  2:22 PM  Attending Physician: No att. providers found   Discharging Provider: Bertin Dominguez MD  Primary Care Provider: Jam Rowell MD  Hospital Medicine Team: [facility] HOSP MED R Bertin Dominguez MD  Primary Care Team: [facility] HOSP MED R    HPI:   Deena Moody is a 89 y.o. male with a PMHx of HTN, HLD, CHF, Afib, CAD who presents to [facility] with testicular and penile swelling. Patient reports URI symptoms of productive cough, congestion, and fatigue for the past few days for which he was seen by his PCP yesterday and tested positive for the flu. He was prescribed tamiflu 30mg but was unable to find a pharmacy that carried this dose so he hasn't started it yet. Patient noted to have worsening penile and testicular swelling with associated bilateral LE edema today. No testicular or penile pain. He had similar symptoms in the past which was due to volume overload and required IV lasix. He also notes decreased urine output for the past day, only made a small amount of urine since receiving IV lasix in the ED prior to my exam. Denies fever, chills, N/V, abdominal pain, chest pain, dizziness, HA, or syncope.    ED: AFVSS. No leukocytosis. Cr 2.0 (BL ~1.6), alk phos 139, t.bili 1.3. , trop 0.044. EKG pending. CXR shows right basilar effusion with trace effusion on the left, probable mild atelectasis on the right, minimal left basilar interstitial infiltrate or mild atelectasis. Given 40mg lasix IVP with minimal UOP.       * No surgery found *      Hospital Course:   Patient continuing to feel well and remains on room air. Patient is close to dry weight (138 lbs) and denies SOB or swelling after diuresis. Patient continues to have good UOP through ramirez and  (-  5.75 L since admission) but continues to have hematuria. Urology consulted for evaluation of hematuria. H/H stable and patient hemodynamically stable. Viral symptoms resolved and patient completed Tamiflu. Scr increased from 1.9 to 2.2 so lasix gtt held. Also bicarb 33 showing contraction alkalosis. Plan to hold diuresis and monitor Scr. Patient will be stable for discharge on home diuresis after Scr close to baseline.  Creatinine improved baseline after holding diuretics for 2-3 days.  Patient discharged with home health and primary care follow-up.       Goals of Care Treatment Preferences:  Code Status: Full Code      Consults:     No new Assessment & Plan notes have been filed under this hospital service since the last note was generated.  Service: Hospital Medicine    Final Active Diagnoses:    Diagnosis Date Noted POA    PRINCIPAL PROBLEM:  Acute on chronic diastolic congestive heart failure [I50.33] 05/27/2021 Yes    SARAH (acute kidney injury) [N17.9] 12/03/2022 Yes    Hematuria [R31.9] 12/07/2022 Yes    Elevated troponin [R77.8] 12/03/2022 Yes    Influenza A [J10.1] 12/03/2022 Yes    Permanent atrial fibrillation [I48.21] 11/10/2016 Yes     Chronic    CAD (coronary artery disease) [I25.10] 08/31/2016 Yes     Chronic    CKD (chronic kidney disease) stage 3, GFR 30-59 ml/min [N18.30] 11/18/2013 Yes     Chronic    Dyslipidemia [E78.5] 11/18/2013 Yes     Chronic    Essential hypertension [I10] 11/18/2013 Yes      Problems Resolved During this Admission:       Discharged Condition: good    Disposition: Home-Health Care [facility]    Follow Up:   Follow-up Information     Jam Rowell MD Follow up.    Specialty: Family Medicine  Contact information:  [address]  [place] [address] [phone]                       Patient Instructions:      Ambulatory referral/consult to Internal Medicine   Standing Status: Future   Referral Priority: Routine Referral Type: Consultation   Referral Reason:  Specialty Services Required   Requested Specialty: Internal Medicine   Number of Visits Requested: 1     Diet Cardiac     Notify your health care provider if you experience any of the following:  increased confusion or weakness     Notify your health care provider if you experience any of the following:  persistent dizziness, light-headedness, or visual disturbances     Notify your health care provider if you experience any of the following:  worsening rash     Notify your health care provider if you experience any of the following:  severe persistent headache     Notify your health care provider if you experience any of the following:  difficulty breathing or increased cough     Notify your health care provider if you experience any of the following:  redness, tenderness, or signs of infection (pain, swelling, redness, odor or green/yellow discharge around incision site)     Notify your health care provider if you experience any of the following:  severe uncontrolled pain     Notify your health care provider if you experience any of the following:  persistent nausea and vomiting or diarrhea     Notify your health care provider if you experience any of the following:  temperature >100.4     Activity as tolerated       Significant Diagnostic Studies: Labs: All labs within the past 24 hours have been reviewed    Pending Diagnostic Studies:     None         Medications:  Reconciled Home Medications:      Medication List      START taking these medications    albuterol 90 mcg/actuation inhaler  Commonly known as: PROVENTIL HFA  Inhale 2 puffs into the lungs every 6 (six) hours as needed for Wheezing. Rescue     MUCUS RELIEF  mg 12 hr tablet  Generic drug: guaiFENesin  Take 1 tablet (600 mg total) by mouth 2 (two) times daily.     nitroGLYCERIN 0.4 MG SL tablet  Commonly known as: NITROSTAT  Take one tablet every 5 minutes for chest pain. After the third tablet go to the ED.     polyethylene glycol 17 gram/dose  powder  Commonly known as: GLYCOLAX  Dissolve one capful (17 g) in liquid by mouth 3 (three) times daily as needed. Take 1 three times daily until well formed stool        CHANGE how you take these medications    benzonatate 200 MG capsule  Commonly known as: TESSALON  Take 1 capsule (200 mg total) by mouth 3 (three) times daily as needed for Cough.  What changed:   · medication strength  · how much to take     omeprazole 20 MG capsule  Commonly known as: PRILOSEC  Take 1 capsule (20 mg total) by mouth once daily.  What changed:   · when to take this  · reasons to take this        CONTINUE taking these medications    apixaban 2.5 mg Tab  Commonly known as: ELIQUIS  Take 1 tablet (2.5 mg total) by mouth 2 (two) times daily.     aspirin 81 MG EC tablet  Commonly known as: ECOTRIN  Take 1 tablet (81 mg total) by mouth once daily.     benazepriL 5 MG tablet  Commonly known as: LOTENSIN  Take 1 tablet (5 mg total) by mouth once daily.     carvediloL 12.5 MG tablet  Commonly known as: COREG  Take 1 tablet (12.5 mg total) by mouth 2 (two) times daily with meals.     ferrous sulfate 325 mg (65 mg iron) Tab tablet  Commonly known as: FEOSOL  Take 1 tablet (325 mg total) by mouth daily with breakfast.     fluticasone propionate 50 mcg/actuation nasal spray  Commonly known as: FLONASE  2 sprays (100 mcg total) by Each Nostril route once daily.     multivitamin per tablet  Commonly known as: ONE DAILY MULTIVITAMIN  Take 1 tablet by mouth once daily.     torsemide 20 MG Tab  Commonly known as: DEMADEX  TAKE 1 TABLET EVERY DAY        STOP taking these medications    oseltamivir 30 MG capsule  Commonly known as: TAMIFLU        ASK your doctor about these medications    potassium chloride 8 MEQ Tbsr  Commonly known as: KLOR-CON  Take 1 tablet (8 mEq total) by mouth every other day.            Indwelling Lines/Drains at time of discharge:   Lines/Drains/Airways     None                 Time spent on the discharge of patient: 35  minutes         Bertin Dominguez MD  Department of Hospital Medicine  [facility]

## 2022-12-15 NOTE — PHYSICIAN QUERY
PT Name: Deena Moody  MR #: [id number]     DOCUMENTATION CLARIFICATION     CDS/: Jyoti Dominguez RN              Contact information:[email or web address]  This form is a permanent document in the medical record.     Query Date: December 15, 2022    By submitting this query, we are merely seeking further clarification of documentation.  Please utilize your independent clinical judgment when addressing the question(s) below.  The Medical Record contains the following   Indicators   Supporting Clinical Findings Location in Medical Record   x Documentation of Respiratory Failure, ARDS Acute hypoxemic respiratory failure HM note 12-10   x SOB, MCKEON, Wheezing, Productive Cough, Use of Accessory Muscles, etc. SOB H&P   x RR     ABGs     O2 sat RR=18 to 26  O2 sat=90 to 98 Vs record 12-3 to 12-11    Hypoxia/Hypercapnia      BiPAP/Intubation/Mechanical Ventilation      Supplemental O2      Home O2, Oxygen Dependence     x Respiratory distress  Respiratory distress HM note 12-7    Radiology findings     x Acute/Chronic Illness Influenza A  Acute on chronic diastolic congestive heart failure HM note 12-10    Treatment     x Other Room air Vs record 12-3 to 12-11       The noted clinical guidelines following a diagnosis are only system guidelines and do not replace the providers clinical judgment.    Due to the conflicting clinical picture, please clinically validate the diagnosis of Acute hypoxemic respiratory failure.    If validated, please provide additional clinical support for the diagnosis.     [  x  ] Above stated diagnosis is not confirmed and/or it has been ruled out   [    ] Above stated diagnosis is not confirmed and/or it has been ruled out, other diagnosis ruled in (please specify):_______________   [    ] Acute Respiratory Failure diagnosis is confirmed and additional clinical support/decision-making indicators for the diagnosis include (please specify):  _______________________________________________   [    ] Other clarification (please specify): ___________________       Please document in your progress notes daily for the duration of treatment until resolved and include in your discharge summary.     Reference:    SHANNEN Rivers MD. (2020, March 13). Acute respiratory distress syndrome: Clinical features, diagnosis, and complications in adults ([id number] RICARDO Manley MD & [id number] JAELYN Lowery MD, Eds.). Retrieved November 13, 2020, from https://[email or web address].TuckerNuck/contents/acute-respiratory-distress-syndrome-clinical-features-diagnosis-and-complications-in-adults?search=ards&source=search_result&selectedTitle=1~150&usage_type=default&display_rank=1  Form No. 80088   move.  · Hold the squeeze for 3 seconds, and then relax for 3 seconds. · Start with 3 seconds. Then add 1 second each week until you are able to squeeze for 10 seconds. · Repeat the exercise 10 to 15 times a session. Do three or more sessions a day. When should you call for help? Watch closely for changes in your health, and be sure to contact your doctor if:    · Your incontinence is getting worse.     · You do not get better as expected. Where can you learn more? Go to https://[employer] Production[employer]Stylecrookeb.[employer]. org and sign in to your Order[employer] account. Enter Z509 in the [employer] box to learn more about \"Bladder Training: Care Instructions. \"     If you do not have an account, please click on the \"Sign Up Now\" link. Current as of: December 19, 2018  Content Version: 12.1  © 3348-0484 Healthwise, Incorporated. Care instructions adapted under license by [facility] ([facility]). If you have questions about a medical condition or this instruction, always ask your healthcare professional. [address] any warranty or liability for your use of this information.

## 2022-12-22 ENCOUNTER — LAB VISIT (OUTPATIENT)
Dept: LAB | Facility: HOSPITAL | Age: 87
End: 2022-12-22
Attending: STUDENT IN AN ORGANIZED HEALTH CARE EDUCATION/TRAINING PROGRAM
Payer: MEDICARE

## 2022-12-22 DIAGNOSIS — I50.33 ACUTE ON CHRONIC DIASTOLIC HEART FAILURE: Primary | ICD-10-CM

## 2022-12-22 PROCEDURE — 80048 BASIC METABOLIC PNL TOTAL CA: CPT | Performed by: STUDENT IN AN ORGANIZED HEALTH CARE EDUCATION/TRAINING PROGRAM

## 2022-12-23 LAB
ANION GAP SERPL CALC-SCNC: 12 MMOL/L (ref 8–16)
BUN SERPL-MCNC: 41 MG/DL (ref 10–30)
CALCIUM SERPL-MCNC: 8.8 MG/DL (ref 8.7–10.5)
CHLORIDE SERPL-SCNC: 103 MMOL/L (ref 95–110)
CO2 SERPL-SCNC: 25 MMOL/L (ref 23–29)
CREAT SERPL-MCNC: 2.1 MG/DL (ref 0.5–1.4)
EST. GFR  (NO RACE VARIABLE): 29 ML/MIN/1.73 M^2
GLUCOSE SERPL-MCNC: 88 MG/DL (ref 70–110)
POTASSIUM SERPL-SCNC: 4.1 MMOL/L (ref 3.5–5.1)
SODIUM SERPL-SCNC: 140 MMOL/L (ref 136–145)

## 2022-12-29 ENCOUNTER — LAB VISIT (OUTPATIENT)
Dept: LAB | Facility: HOSPITAL | Age: 87
End: 2022-12-29
Attending: STUDENT IN AN ORGANIZED HEALTH CARE EDUCATION/TRAINING PROGRAM
Payer: MEDICARE

## 2022-12-29 DIAGNOSIS — I50.33 ACUTE ON CHRONIC DIASTOLIC HEART FAILURE: Primary | ICD-10-CM

## 2022-12-29 LAB
ANION GAP SERPL CALC-SCNC: 9 MMOL/L (ref 8–16)
BUN SERPL-MCNC: 31 MG/DL (ref 10–30)
CALCIUM SERPL-MCNC: 8.7 MG/DL (ref 8.7–10.5)
CHLORIDE SERPL-SCNC: 103 MMOL/L (ref 95–110)
CO2 SERPL-SCNC: 27 MMOL/L (ref 23–29)
CREAT SERPL-MCNC: 1.6 MG/DL (ref 0.5–1.4)
EST. GFR  (NO RACE VARIABLE): 40.2 ML/MIN/1.73 M^2
GLUCOSE SERPL-MCNC: 87 MG/DL (ref 70–110)
POTASSIUM SERPL-SCNC: 4 MMOL/L (ref 3.5–5.1)
SODIUM SERPL-SCNC: 139 MMOL/L (ref 136–145)

## 2022-12-29 PROCEDURE — 80048 BASIC METABOLIC PNL TOTAL CA: CPT | Mod: HCNC | Performed by: STUDENT IN AN ORGANIZED HEALTH CARE EDUCATION/TRAINING PROGRAM

## 2023-01-09 ENCOUNTER — CLINICAL SUPPORT (OUTPATIENT)
Dept: CARDIOLOGY | Facility: HOSPITAL | Age: 88
End: 2023-01-09
Payer: MEDICARE

## 2023-01-09 DIAGNOSIS — I44.2 ATRIOVENTRICULAR BLOCK, COMPLETE: ICD-10-CM

## 2023-01-09 DIAGNOSIS — I48.91 UNSPECIFIED ATRIAL FIBRILLATION: ICD-10-CM

## 2023-01-09 DIAGNOSIS — Z95.0 PRESENCE OF CARDIAC PACEMAKER: ICD-10-CM

## 2023-01-09 PROCEDURE — 93296 REM INTERROG EVL PM/IDS: CPT | Mod: HCNC | Performed by: INTERNAL MEDICINE

## 2023-01-09 PROCEDURE — 93294 REM INTERROG EVL PM/LDLS PM: CPT | Mod: HCNC,,, | Performed by: INTERNAL MEDICINE

## 2023-01-09 PROCEDURE — 93294 CARDIAC DEVICE CHECK - REMOTE: ICD-10-PCS | Mod: HCNC,,, | Performed by: INTERNAL MEDICINE

## 2023-01-12 ENCOUNTER — EXTERNAL HOME HEALTH (OUTPATIENT)
Dept: HOME HEALTH SERVICES | Facility: HOSPITAL | Age: 88
End: 2023-01-12
Payer: MEDICARE

## 2023-02-24 ENCOUNTER — OFFICE VISIT (OUTPATIENT)
Dept: URGENT CARE | Facility: CLINIC | Age: 88
End: 2023-02-24
Payer: MEDICARE

## 2023-02-24 ENCOUNTER — TELEPHONE (OUTPATIENT)
Dept: ELECTROPHYSIOLOGY | Facility: CLINIC | Age: 88
End: 2023-02-24
Payer: MEDICARE

## 2023-02-24 VITALS
DIASTOLIC BLOOD PRESSURE: 66 MMHG | TEMPERATURE: 98 F | WEIGHT: 140 LBS | OXYGEN SATURATION: 97 % | SYSTOLIC BLOOD PRESSURE: 121 MMHG | HEART RATE: 66 BPM | BODY MASS INDEX: 21.22 KG/M2 | HEIGHT: 68 IN | RESPIRATION RATE: 20 BRPM

## 2023-02-24 DIAGNOSIS — S99.922A FOOT INJURY, LEFT, INITIAL ENCOUNTER: Primary | ICD-10-CM

## 2023-02-24 PROCEDURE — 1160F PR REVIEW ALL MEDS BY PRESCRIBER/CLIN PHARMACIST DOCUMENTED: ICD-10-PCS | Mod: CPTII,S$GLB,,

## 2023-02-24 PROCEDURE — 1125F PR PAIN SEVERITY QUANTIFIED, PAIN PRESENT: ICD-10-PCS | Mod: CPTII,S$GLB,,

## 2023-02-24 PROCEDURE — 1160F RVW MEDS BY RX/DR IN RCRD: CPT | Mod: CPTII,S$GLB,,

## 2023-02-24 PROCEDURE — 99212 OFFICE O/P EST SF 10 MIN: CPT | Mod: S$GLB,,,

## 2023-02-24 PROCEDURE — 73630 X-RAY EXAM OF FOOT: CPT | Mod: FY,LT,S$GLB, | Performed by: RADIOLOGY

## 2023-02-24 PROCEDURE — 1159F MED LIST DOCD IN RCRD: CPT | Mod: CPTII,S$GLB,,

## 2023-02-24 PROCEDURE — 1125F AMNT PAIN NOTED PAIN PRSNT: CPT | Mod: CPTII,S$GLB,,

## 2023-02-24 PROCEDURE — 1159F PR MEDICATION LIST DOCUMENTED IN MEDICAL RECORD: ICD-10-PCS | Mod: CPTII,S$GLB,,

## 2023-02-24 PROCEDURE — 73630 XR FOOT COMPLETE 3 VIEW LEFT: ICD-10-PCS | Mod: FY,LT,S$GLB, | Performed by: RADIOLOGY

## 2023-02-24 PROCEDURE — 99212 PR OFFICE/OUTPT VISIT, EST, LEVL II, 10-19 MIN: ICD-10-PCS | Mod: S$GLB,,,

## 2023-02-24 NOTE — PATIENT INSTRUCTIONS
Orthopedic Follow up Discharge Instructions    If your condition worsens or fails to improve we recommend that you receive another evaluation at the ER immediately or contact your PCP to discuss your concerns or return here. You must understand that you've received an urgent care treatment only and that you may be released before all your medical problems are known or treated. You the patient will arrange for follouwp care as instructed.   If you were prescribed a narcotic or muscle relaxant do not drive or operate heavy machinery while taking these medications   Tylenol can also be used as directed for pain unless you have an allergy to them or medical condition such as stomach ulcers, kidney or liver disease or blood thinners etc for which you should not be taking these type of medications.   RICE which means rest, ice compression and elevation are helpful.   If you have severe pain or any worsening of symptoms go to the ER immediately.   Do not use Biofreeze to area.

## 2023-02-24 NOTE — PROGRESS NOTES
"Subjective:       Patient ID: Deena Moody is a 89 y.o. male.    Vitals:  height is 5' 8" (1.727 m) and weight is 63.5 kg (140 lb). His tympanic temperature is 97.8 °F (36.6 °C). His blood pressure is 121/66 and his pulse is 66. His respiration is 20 and oxygen saturation is 97%.     Chief Complaint: Foot Injury (Left foot injury and painful)    This is a 89 y.o. male who presents today with a chief complaint of left foot injury that's happen on Sunday.      Foot Injury   The incident occurred 5 to 7 days ago. The incident occurred at home. The injury mechanism was a direct blow. The pain is present in the left foot. The quality of the pain is described as aching and shooting. The pain is at a severity of 5/10. The pain is moderate. The pain has been Constant since onset. Associated symptoms include an inability to bear weight. It is unknown if a foreign body is present. The symptoms are aggravated by weight bearing and movement. He has tried non-weight bearing, rest and ice for the symptoms. The treatment provided no relief.     Skin:  Negative for erythema.     Objective:      Physical Exam   Constitutional: He is oriented to person, place, and time. He appears well-developed.   HENT:   Head: Normocephalic and atraumatic. Head is without abrasion, without contusion and without laceration.   Ears:   Right Ear: External ear normal.   Left Ear: External ear normal.   Nose: Nose normal.   Mouth/Throat: Oropharynx is clear and moist and mucous membranes are normal.   Eyes: Conjunctivae, EOM and lids are normal. Pupils are equal, round, and reactive to light.   Neck: Trachea normal and phonation normal. Neck supple.   Cardiovascular: Normal rate, regular rhythm and normal heart sounds.   Pulmonary/Chest: Effort normal and breath sounds normal. No stridor. No respiratory distress.   Musculoskeletal: Normal range of motion.         General: Normal range of motion.      Right ankle: Normal. He exhibits normal range of " motion, no swelling, no ecchymosis, no deformity and no laceration. No tenderness. Achilles tendon normal. Achilles tendon exhibits normal Quigley's test results.      Left ankle: Normal. He exhibits normal range of motion, no swelling, no ecchymosis, no deformity and no laceration. No tenderness. Achilles tendon normal. Achilles tendon exhibits normal Quigley's test results.      Left foot: Tenderness and swelling present.      Comments: Redness noted to left foot see image for details.  Patient is tender to 1st metatarsal of left foot.  Pedal pulses noted to left foot is weak due to swelling of left foot.  Patient full range of motion of left ankle, patient can wiggle all 5 toes with ease.     Neurological: He is alert and oriented to person, place, and time.   Skin: Skin is warm, dry, intact and no rash. Capillary refill takes less than 2 seconds. not left ankle and not right ankleNo abrasion, No burn, No bruising, No erythema and No ecchymosis   Psychiatric: His speech is normal and behavior is normal. Judgment and thought content normal.   Nursing note and vitals reviewed.        X-Ray Foot Complete 3 view Left    Result Date: 2/24/2023  EXAMINATION: XR FOOT COMPLETE 3 VIEW LEFT CLINICAL HISTORY: Unspecified injury of left foot, initial encounter TECHNIQUE: AP, lateral and oblique views of the left foot were performed. COMPARISON: No comparison is available. FINDINGS: There is diffuse demineralization of the osseous structures.  There is no cortical step-off.  There is no evidence of periostitis. There is joint space narrowing.  The Lisfranc articulation is intact.  There are extensive vascular calcifications.  No radiopaque foreign body is identified.  The soft tissue swelling of the left lower extremity. There is no evidence of a fracture or dislocation of the left foot.     No evidence of a fracture or dislocation of the left foot. Diffuse soft tissue swelling of the left foot. Extensive vascular  calcifications. Electronically signed by: Leon Ortega MD Date:    02/24/2023 Time:    15:23     Assessment:       1. Foot injury, left, initial encounter            Plan:         Foot injury, left, initial encounter  -     X-Ray Foot Complete 3 view Left; Future; Expected date: 02/24/2023           Medical Decision Making:   Urgent Care Management:  Discussed with patient, patient's daughter x-ray results, also discussed with patient to go to [facility] emergency room if there is any worsening of symptoms such as swelling, increased redness, or increased pain.  Discussed with patient and patient's daughter to please elevate foot, rest foot and apply ice to foot 3 to 4 times a day.  Discussed with patient and patient's daughter if this does not improve in 48 hours to please follow-up primary care provider.  Discussed with patient and patient's daughter do not use Biofreeze to foot anymore as patient has been using Biofreeze to foot.  Patient patient's daughter agree with treatment plan and verbalized understanding patient is ambulatory from clinic with walker in no acute distress

## 2023-03-08 DIAGNOSIS — I10 ESSENTIAL HYPERTENSION: ICD-10-CM

## 2023-03-08 NOTE — TELEPHONE ENCOUNTER
No new care gaps identified.  [facility] Embedded Care Gaps. Reference number: [id number]. 3/08/2023   9:25:38 AM CST

## 2023-03-09 RX ORDER — CARVEDILOL 12.5 MG/1
TABLET ORAL
Qty: 180 TABLET | Refills: 3 | Status: SHIPPED | OUTPATIENT
Start: 2023-03-09

## 2023-03-13 DIAGNOSIS — I48.19 PERSISTENT ATRIAL FIBRILLATION: ICD-10-CM

## 2023-03-13 PROBLEM — N17.9 AKI (ACUTE KIDNEY INJURY): Status: RESOLVED | Noted: 2022-12-03 | Resolved: 2023-03-13

## 2023-03-13 NOTE — TELEPHONE ENCOUNTER
Refill Routing Note   Medication(s) are not appropriate for processing by [facility] for the following reason(s):       Medication outside of protocol    [facility] action(s):  Route         Appointments  past 12m or future 3m with PCP    Date Provider   Last Visit   6/3/2021 Jam Rowell MD   Next Visit   Visit date not found Jam Rowell MD   ED visits in past 90 days: 0        Note composed:1:41 PM 03/13/2023

## 2023-04-09 ENCOUNTER — CLINICAL SUPPORT (OUTPATIENT)
Dept: CARDIOLOGY | Facility: HOSPITAL | Age: 88
End: 2023-04-09
Payer: MEDICARE

## 2023-04-09 DIAGNOSIS — I44.2 ATRIOVENTRICULAR BLOCK, COMPLETE: ICD-10-CM

## 2023-04-09 DIAGNOSIS — I48.91 UNSPECIFIED ATRIAL FIBRILLATION: ICD-10-CM

## 2023-04-09 DIAGNOSIS — Z95.0 PRESENCE OF CARDIAC PACEMAKER: ICD-10-CM

## 2023-04-09 PROCEDURE — 93296 REM INTERROG EVL PM/IDS: CPT | Mod: HCNC | Performed by: INTERNAL MEDICINE

## 2023-06-06 ENCOUNTER — TELEPHONE (OUTPATIENT)
Dept: ELECTROPHYSIOLOGY | Facility: CLINIC | Age: 88
End: 2023-06-06
Payer: MEDICARE

## 2023-06-06 DIAGNOSIS — I44.2 COMPLETE AV BLOCK: Primary | ICD-10-CM

## 2023-06-14 ENCOUNTER — HOSPITAL ENCOUNTER (OUTPATIENT)
Facility: HOSPITAL | Age: 88
Discharge: HOME HEALTH | End: 2023-06-16
Attending: EMERGENCY MEDICINE | Admitting: STUDENT IN AN ORGANIZED HEALTH CARE EDUCATION/TRAINING PROGRAM
Payer: MEDICARE

## 2023-06-14 DIAGNOSIS — N18.30 CKD (CHRONIC KIDNEY DISEASE) STAGE 3, GFR 30-59 ML/MIN: ICD-10-CM

## 2023-06-14 DIAGNOSIS — I10 ESSENTIAL HYPERTENSION: ICD-10-CM

## 2023-06-14 DIAGNOSIS — K21.9 GASTROESOPHAGEAL REFLUX DISEASE WITHOUT ESOPHAGITIS: ICD-10-CM

## 2023-06-14 DIAGNOSIS — I50.9 CHF EXACERBATION: ICD-10-CM

## 2023-06-14 DIAGNOSIS — I50.33 ACUTE ON CHRONIC DIASTOLIC CONGESTIVE HEART FAILURE: Primary | ICD-10-CM

## 2023-06-14 DIAGNOSIS — R07.9 CHEST PAIN: ICD-10-CM

## 2023-06-14 DIAGNOSIS — R06.02 SHORTNESS OF BREATH: ICD-10-CM

## 2023-06-14 PROCEDURE — 99291 PR CRITICAL CARE, E/M 30-74 MINUTES: ICD-10-PCS | Mod: GC,,, | Performed by: EMERGENCY MEDICINE

## 2023-06-14 PROCEDURE — 99291 CRITICAL CARE FIRST HOUR: CPT | Mod: GC,,, | Performed by: EMERGENCY MEDICINE

## 2023-06-14 PROCEDURE — 99285 EMERGENCY DEPT VISIT HI MDM: CPT | Mod: 25

## 2023-06-14 PROCEDURE — 96374 THER/PROPH/DIAG INJ IV PUSH: CPT

## 2023-06-14 NOTE — Clinical Note
Diagnosis: Shortness of breath [786.05.ICD-9-CM]   Future Attending Provider: EDWARDO CORONA [[id number]]   Admitting Provider:: EDWARDO CORONA [[id number]]

## 2023-06-15 PROBLEM — R06.02 SHORTNESS OF BREATH: Status: ACTIVE | Noted: 2023-06-15

## 2023-06-15 PROBLEM — I35.0 AORTIC VALVE STENOSIS: Status: ACTIVE | Noted: 2023-06-15

## 2023-06-15 PROBLEM — N18.30 ACUTE RENAL FAILURE SUPERIMPOSED ON STAGE 3 CHRONIC KIDNEY DISEASE: Status: ACTIVE | Noted: 2022-12-03

## 2023-06-15 LAB
ALBUMIN SERPL BCP-MCNC: 3.5 G/DL (ref 3.5–5.2)
ALP SERPL-CCNC: 141 U/L (ref 55–135)
ALT SERPL W/O P-5'-P-CCNC: 7 U/L (ref 10–44)
ANION GAP SERPL CALC-SCNC: 14 MMOL/L (ref 8–16)
ASCENDING AORTA: 3.24 CM
AST SERPL-CCNC: 26 U/L (ref 10–40)
AV INDEX (PROSTH): 0.39
AV MEAN GRADIENT: 16 MMHG
AV PEAK GRADIENT: 27 MMHG
AV VALVE AREA: 1.23 CM2
AV VELOCITY RATIO: 0.42
BACTERIA #/AREA URNS AUTO: ABNORMAL /HPF
BASOPHILS # BLD AUTO: 0.06 K/UL (ref 0–0.2)
BASOPHILS NFR BLD: 0.7 % (ref 0–1.9)
BILIRUB SERPL-MCNC: 1 MG/DL (ref 0.1–1)
BILIRUB UR QL STRIP: NEGATIVE
BNP SERPL-MCNC: 1039 PG/ML (ref 0–99)
BSA FOR ECHO PROCEDURE: 1.77 M2
BUN SERPL-MCNC: 35 MG/DL (ref 10–30)
CALCIUM SERPL-MCNC: 9.3 MG/DL (ref 8.7–10.5)
CHLORIDE SERPL-SCNC: 99 MMOL/L (ref 95–110)
CLARITY UR REFRACT.AUTO: CLEAR
CO2 SERPL-SCNC: 29 MMOL/L (ref 23–29)
COLOR UR AUTO: YELLOW
CREAT SERPL-MCNC: 2.1 MG/DL (ref 0.5–1.4)
CV ECHO LV RWT: 0.49 CM
DIFFERENTIAL METHOD: ABNORMAL
DOP CALC AO PEAK VEL: 2.6 M/S
DOP CALC AO VTI: 54.5 CM
DOP CALC LVOT AREA: 3.2 CM2
DOP CALC LVOT DIAMETER: 2.02 CM
DOP CALC LVOT PEAK VEL: 1.1 M/S
DOP CALC LVOT STROKE VOLUME: 67.27 CM3
DOP CALC MV VTI: 43.37 CM
DOP CALCLVOT PEAK VEL VTI: 21 CM
E WAVE DECELERATION TIME: 180.85 MSEC
E/A RATIO: 4.29
E/E' RATIO: 35.2 M/S
ECHO LV POSTERIOR WALL: 1 CM (ref 0.6–1.1)
EJECTION FRACTION: 65 %
EOSINOPHIL # BLD AUTO: 0.5 K/UL (ref 0–0.5)
EOSINOPHIL NFR BLD: 5.2 % (ref 0–8)
ERYTHROCYTE [DISTWIDTH] IN BLOOD BY AUTOMATED COUNT: 15.5 % (ref 11.5–14.5)
EST. GFR  (NO RACE VARIABLE): 29 ML/MIN/1.73 M^2
ESTIMATED AVG GLUCOSE: 100 MG/DL (ref 68–131)
FRACTIONAL SHORTENING: 31 % (ref 28–44)
GLUCOSE SERPL-MCNC: 113 MG/DL (ref 70–110)
GLUCOSE UR QL STRIP: NEGATIVE
HBA1C MFR BLD: 5.1 % (ref 4–5.6)
HCT VFR BLD AUTO: 35.2 % (ref 40–54)
HCV AB SERPL QL IA: NORMAL
HGB BLD-MCNC: 11 G/DL (ref 14–18)
HGB UR QL STRIP: ABNORMAL
HIV 1+2 AB+HIV1 P24 AG SERPL QL IA: NORMAL
HR MV ECHO: 69 BPM
HYALINE CASTS UR QL AUTO: 1 /LPF
IMM GRANULOCYTES # BLD AUTO: 0.02 K/UL (ref 0–0.04)
IMM GRANULOCYTES NFR BLD AUTO: 0.2 % (ref 0–0.5)
INTERVENTRICULAR SEPTUM: 1.1 CM (ref 0.6–1.1)
KETONES UR QL STRIP: NEGATIVE
LA MAJOR: 5.96 CM
LA WIDTH: 4.72 CM
LEFT ATRIUM SIZE: 4.73 CM
LEFT ATRIUM VOLUME INDEX MOD: 44.1 ML/M2
LEFT ATRIUM VOLUME MOD: 78.92 CM3
LEFT INTERNAL DIMENSION IN SYSTOLE: 2.83 CM (ref 2.1–4)
LEFT VENTRICLE DIASTOLIC VOLUME INDEX: 47.89 ML/M2
LEFT VENTRICLE DIASTOLIC VOLUME: 85.73 ML
LEFT VENTRICLE MASS INDEX: 79 G/M2
LEFT VENTRICLE SYSTOLIC VOLUME INDEX: 17 ML/M2
LEFT VENTRICLE SYSTOLIC VOLUME: 30.41 ML
LEFT VENTRICULAR INTERNAL DIMENSION IN DIASTOLE: 4.1 CM (ref 3.5–6)
LEFT VENTRICULAR MASS: 141.55 G
LEUKOCYTE ESTERASE UR QL STRIP: ABNORMAL
LIPASE SERPL-CCNC: 21 U/L (ref 4–60)
LV LATERAL E/E' RATIO: 29.33 M/S
LV SEPTAL E/E' RATIO: 44 M/S
LYMPHOCYTES # BLD AUTO: 1.4 K/UL (ref 1–4.8)
LYMPHOCYTES NFR BLD: 15.4 % (ref 18–48)
MAGNESIUM SERPL-MCNC: 2.5 MG/DL (ref 1.6–2.6)
MCH RBC QN AUTO: 31.4 PG (ref 27–31)
MCHC RBC AUTO-ENTMCNC: 31.3 G/DL (ref 32–36)
MCV RBC AUTO: 101 FL (ref 82–98)
MICROSCOPIC COMMENT: ABNORMAL
MONOCYTES # BLD AUTO: 0.7 K/UL (ref 0.3–1)
MONOCYTES NFR BLD: 7.6 % (ref 4–15)
MV MEAN GRADIENT: 4 MMHG
MV PEAK A VEL: 0.41 M/S
MV PEAK E VEL: 1.76 M/S
MV PEAK GRADIENT: 12 MMHG
MV STENOSIS PRESSURE HALF TIME: 52.45 MS
MV VALVE AREA BY CONTINUITY EQUATION: 1.55 CM2
MV VALVE AREA P 1/2 METHOD: 4.19 CM2
NEUTROPHILS # BLD AUTO: 6.4 K/UL (ref 1.8–7.7)
NEUTROPHILS NFR BLD: 70.9 % (ref 38–73)
NITRITE UR QL STRIP: NEGATIVE
NRBC BLD-RTO: 0 /100 WBC
PH UR STRIP: 8 [PH] (ref 5–8)
PISA MRMAX VEL: 0.05 M/S
PISA TR MAX VEL: 3.95 M/S
PLATELET # BLD AUTO: 179 K/UL (ref 150–450)
PMV BLD AUTO: 9 FL (ref 9.2–12.9)
POTASSIUM SERPL-SCNC: 3.9 MMOL/L (ref 3.5–5.1)
PROT SERPL-MCNC: 7.7 G/DL (ref 6–8.4)
PROT UR QL STRIP: NEGATIVE
RA MAJOR: 5.4 CM
RA WIDTH: 4.19 CM
RBC # BLD AUTO: 3.5 M/UL (ref 4.6–6.2)
RBC #/AREA URNS AUTO: 2 /HPF (ref 0–4)
RIGHT VENTRICULAR END-DIASTOLIC DIMENSION: 4.4 CM
SINUS: 3.4 CM
SODIUM SERPL-SCNC: 142 MMOL/L (ref 136–145)
SP GR UR STRIP: 1.01 (ref 1–1.03)
STJ: 2.41 CM
TDI LATERAL: 0.06 M/S
TDI SEPTAL: 0.04 M/S
TDI: 0.05 M/S
TR MAX PG: 62 MMHG
TRICUSPID ANNULAR PLANE SYSTOLIC EXCURSION: 1.31 CM
TROPONIN I SERPL DL<=0.01 NG/ML-MCNC: 0.03 NG/ML (ref 0–0.03)
TROPONIN I SERPL DL<=0.01 NG/ML-MCNC: 0.04 NG/ML (ref 0–0.03)
URN SPEC COLLECT METH UR: ABNORMAL
WBC # BLD AUTO: 9.07 K/UL (ref 3.9–12.7)
WBC #/AREA URNS AUTO: 13 /HPF (ref 0–5)

## 2023-06-15 PROCEDURE — 96374 THER/PROPH/DIAG INJ IV PUSH: CPT

## 2023-06-15 PROCEDURE — 36415 COLL VENOUS BLD VENIPUNCTURE: CPT

## 2023-06-15 PROCEDURE — 83690 ASSAY OF LIPASE: CPT | Performed by: STUDENT IN AN ORGANIZED HEALTH CARE EDUCATION/TRAINING PROGRAM

## 2023-06-15 PROCEDURE — 93005 ELECTROCARDIOGRAM TRACING: CPT

## 2023-06-15 PROCEDURE — 84484 ASSAY OF TROPONIN QUANT: CPT | Performed by: STUDENT IN AN ORGANIZED HEALTH CARE EDUCATION/TRAINING PROGRAM

## 2023-06-15 PROCEDURE — 87389 HIV-1 AG W/HIV-1&-2 AB AG IA: CPT | Performed by: PHYSICIAN ASSISTANT

## 2023-06-15 PROCEDURE — 25000003 PHARM REV CODE 250: Performed by: STUDENT IN AN ORGANIZED HEALTH CARE EDUCATION/TRAINING PROGRAM

## 2023-06-15 PROCEDURE — 87088 URINE BACTERIA CULTURE: CPT

## 2023-06-15 PROCEDURE — 80053 COMPREHEN METABOLIC PANEL: CPT | Performed by: STUDENT IN AN ORGANIZED HEALTH CARE EDUCATION/TRAINING PROGRAM

## 2023-06-15 PROCEDURE — 83036 HEMOGLOBIN GLYCOSYLATED A1C: CPT

## 2023-06-15 PROCEDURE — 63600175 PHARM REV CODE 636 W HCPCS: Performed by: STUDENT IN AN ORGANIZED HEALTH CARE EDUCATION/TRAINING PROGRAM

## 2023-06-15 PROCEDURE — 93010 EKG 12-LEAD: ICD-10-PCS | Mod: ,,, | Performed by: INTERNAL MEDICINE

## 2023-06-15 PROCEDURE — 86803 HEPATITIS C AB TEST: CPT | Performed by: PHYSICIAN ASSISTANT

## 2023-06-15 PROCEDURE — 85025 COMPLETE CBC W/AUTO DIFF WBC: CPT | Performed by: STUDENT IN AN ORGANIZED HEALTH CARE EDUCATION/TRAINING PROGRAM

## 2023-06-15 PROCEDURE — G0378 HOSPITAL OBSERVATION PER HR: HCPCS

## 2023-06-15 PROCEDURE — 87086 URINE CULTURE/COLONY COUNT: CPT

## 2023-06-15 PROCEDURE — 63600175 PHARM REV CODE 636 W HCPCS

## 2023-06-15 PROCEDURE — 99223 PR INITIAL HOSPITAL CARE,LEVL III: ICD-10-PCS | Mod: ,,,

## 2023-06-15 PROCEDURE — 83880 ASSAY OF NATRIURETIC PEPTIDE: CPT | Performed by: STUDENT IN AN ORGANIZED HEALTH CARE EDUCATION/TRAINING PROGRAM

## 2023-06-15 PROCEDURE — 93010 ELECTROCARDIOGRAM REPORT: CPT | Mod: ,,, | Performed by: INTERNAL MEDICINE

## 2023-06-15 PROCEDURE — 25000003 PHARM REV CODE 250

## 2023-06-15 PROCEDURE — 81001 URINALYSIS AUTO W/SCOPE: CPT

## 2023-06-15 PROCEDURE — 99223 1ST HOSP IP/OBS HIGH 75: CPT | Mod: ,,,

## 2023-06-15 PROCEDURE — 96376 TX/PRO/DX INJ SAME DRUG ADON: CPT

## 2023-06-15 PROCEDURE — 83735 ASSAY OF MAGNESIUM: CPT

## 2023-06-15 PROCEDURE — 36415 COLL VENOUS BLD VENIPUNCTURE: CPT | Performed by: STUDENT IN AN ORGANIZED HEALTH CARE EDUCATION/TRAINING PROGRAM

## 2023-06-15 RX ORDER — LISINOPRIL 2.5 MG/1
5 TABLET ORAL DAILY
Status: DISCONTINUED | OUTPATIENT
Start: 2023-06-15 | End: 2023-06-15

## 2023-06-15 RX ORDER — AMOXICILLIN AND CLAVULANATE POTASSIUM 875; 125 MG/1; MG/1
1 TABLET, FILM COATED ORAL
Status: DISCONTINUED | OUTPATIENT
Start: 2023-06-15 | End: 2023-06-15

## 2023-06-15 RX ORDER — SODIUM CHLORIDE 0.9 % (FLUSH) 0.9 %
10 SYRINGE (ML) INJECTION
Status: DISCONTINUED | OUTPATIENT
Start: 2023-06-15 | End: 2023-06-16 | Stop reason: HOSPADM

## 2023-06-15 RX ORDER — DEXTROSE 40 %
30 GEL (GRAM) ORAL
Status: DISCONTINUED | OUTPATIENT
Start: 2023-06-15 | End: 2023-06-16 | Stop reason: HOSPADM

## 2023-06-15 RX ORDER — MAG HYDROX/ALUMINUM HYD/SIMETH 200-200-20
30 SUSPENSION, ORAL (FINAL DOSE FORM) ORAL 4 TIMES DAILY PRN
Status: DISCONTINUED | OUTPATIENT
Start: 2023-06-15 | End: 2023-06-16 | Stop reason: HOSPADM

## 2023-06-15 RX ORDER — ONDANSETRON 8 MG/1
8 TABLET, ORALLY DISINTEGRATING ORAL EVERY 8 HOURS PRN
Status: DISCONTINUED | OUTPATIENT
Start: 2023-06-15 | End: 2023-06-16 | Stop reason: HOSPADM

## 2023-06-15 RX ORDER — LANOLIN ALCOHOL/MO/W.PET/CERES
1 CREAM (GRAM) TOPICAL DAILY
Status: DISCONTINUED | OUTPATIENT
Start: 2023-06-15 | End: 2023-06-16 | Stop reason: HOSPADM

## 2023-06-15 RX ORDER — NALOXONE HCL 0.4 MG/ML
0.02 VIAL (ML) INJECTION
Status: DISCONTINUED | OUTPATIENT
Start: 2023-06-15 | End: 2023-06-16 | Stop reason: HOSPADM

## 2023-06-15 RX ORDER — POTASSIUM CHLORIDE 600 MG/1
8 TABLET, FILM COATED, EXTENDED RELEASE ORAL EVERY OTHER DAY
Status: DISCONTINUED | OUTPATIENT
Start: 2023-06-16 | End: 2023-06-16

## 2023-06-15 RX ORDER — ALBUTEROL SULFATE 90 UG/1
2 AEROSOL, METERED RESPIRATORY (INHALATION) EVERY 6 HOURS PRN
Status: DISCONTINUED | OUTPATIENT
Start: 2023-06-15 | End: 2023-06-16 | Stop reason: HOSPADM

## 2023-06-15 RX ORDER — FUROSEMIDE 10 MG/ML
80 INJECTION INTRAMUSCULAR; INTRAVENOUS
Status: COMPLETED | OUTPATIENT
Start: 2023-06-15 | End: 2023-06-15

## 2023-06-15 RX ORDER — ASPIRIN 81 MG/1
81 TABLET ORAL DAILY
Status: DISCONTINUED | OUTPATIENT
Start: 2023-06-15 | End: 2023-06-16 | Stop reason: HOSPADM

## 2023-06-15 RX ORDER — ONDANSETRON 2 MG/ML
4 INJECTION INTRAMUSCULAR; INTRAVENOUS EVERY 8 HOURS PRN
Status: DISCONTINUED | OUTPATIENT
Start: 2023-06-15 | End: 2023-06-16 | Stop reason: HOSPADM

## 2023-06-15 RX ORDER — FUROSEMIDE 10 MG/ML
40 INJECTION INTRAMUSCULAR; INTRAVENOUS 2 TIMES DAILY
Status: DISCONTINUED | OUTPATIENT
Start: 2023-06-15 | End: 2023-06-16

## 2023-06-15 RX ORDER — PANTOPRAZOLE SODIUM 20 MG/1
20 TABLET, DELAYED RELEASE ORAL DAILY
Status: DISCONTINUED | OUTPATIENT
Start: 2023-06-15 | End: 2023-06-16 | Stop reason: HOSPADM

## 2023-06-15 RX ORDER — DEXTROSE 40 %
15 GEL (GRAM) ORAL
Status: DISCONTINUED | OUTPATIENT
Start: 2023-06-15 | End: 2023-06-16 | Stop reason: HOSPADM

## 2023-06-15 RX ORDER — TALC
6 POWDER (GRAM) TOPICAL NIGHTLY PRN
Status: DISCONTINUED | OUTPATIENT
Start: 2023-06-15 | End: 2023-06-16 | Stop reason: HOSPADM

## 2023-06-15 RX ORDER — GLUCAGON 1 MG
1 KIT INJECTION
Status: DISCONTINUED | OUTPATIENT
Start: 2023-06-15 | End: 2023-06-16 | Stop reason: HOSPADM

## 2023-06-15 RX ORDER — CARVEDILOL 12.5 MG/1
12.5 TABLET ORAL 2 TIMES DAILY WITH MEALS
Status: DISCONTINUED | OUTPATIENT
Start: 2023-06-15 | End: 2023-06-16 | Stop reason: HOSPADM

## 2023-06-15 RX ORDER — POLYETHYLENE GLYCOL 3350 17 G/17G
17 POWDER, FOR SOLUTION ORAL 2 TIMES DAILY
Status: DISCONTINUED | OUTPATIENT
Start: 2023-06-15 | End: 2023-06-16 | Stop reason: HOSPADM

## 2023-06-15 RX ORDER — ACETAMINOPHEN 325 MG/1
650 TABLET ORAL EVERY 4 HOURS PRN
Status: DISCONTINUED | OUTPATIENT
Start: 2023-06-15 | End: 2023-06-16 | Stop reason: HOSPADM

## 2023-06-15 RX ORDER — ACETAMINOPHEN 325 MG/1
650 TABLET ORAL EVERY 8 HOURS PRN
Status: DISCONTINUED | OUTPATIENT
Start: 2023-06-15 | End: 2023-06-16 | Stop reason: HOSPADM

## 2023-06-15 RX ADMIN — CARVEDILOL 12.5 MG: 12.5 TABLET, FILM COATED ORAL at 09:06

## 2023-06-15 RX ADMIN — APIXABAN 2.5 MG: 2.5 TABLET, FILM COATED ORAL at 09:06

## 2023-06-15 RX ADMIN — POLYETHYLENE GLYCOL 3350 17 G: 17 POWDER, FOR SOLUTION ORAL at 11:06

## 2023-06-15 RX ADMIN — AMOXICILLIN AND CLAVULANATE POTASSIUM 1 TABLET: 875; 125 TABLET, FILM COATED ORAL at 03:06

## 2023-06-15 RX ADMIN — FERROUS SULFATE TAB 325 MG (65 MG ELEMENTAL FE) 1 EACH: 325 (65 FE) TAB at 09:06

## 2023-06-15 RX ADMIN — FUROSEMIDE 80 MG: 10 INJECTION, SOLUTION INTRAMUSCULAR; INTRAVENOUS at 01:06

## 2023-06-15 RX ADMIN — LISINOPRIL 5 MG: 2.5 TABLET ORAL at 09:06

## 2023-06-15 RX ADMIN — FUROSEMIDE 40 MG: 10 INJECTION, SOLUTION INTRAMUSCULAR; INTRAVENOUS at 05:06

## 2023-06-15 RX ADMIN — FUROSEMIDE 40 MG: 10 INJECTION, SOLUTION INTRAMUSCULAR; INTRAVENOUS at 09:06

## 2023-06-15 RX ADMIN — PANTOPRAZOLE SODIUM 20 MG: 20 TABLET, DELAYED RELEASE ORAL at 09:06

## 2023-06-15 RX ADMIN — POLYETHYLENE GLYCOL 3350 17 G: 17 POWDER, FOR SOLUTION ORAL at 09:06

## 2023-06-15 RX ADMIN — ASPIRIN 81 MG: 81 TABLET, COATED ORAL at 09:06

## 2023-06-15 RX ADMIN — THERA TABS 1 TABLET: TAB at 09:06

## 2023-06-15 NOTE — CONSULTS
"Food & Nutrition  Education     Diet Education: Low sodium with fluid restriction  Time Spent: -  Learners: Pt     Nutrition Education provided with handouts: "Heart Failure Nutrition Therapy"     Comments: RD consulted for education on salt and fluid restriction. Unable to speak with pt 2/2 soundly sleeping during time of RD visit. Unsure intake PTA. Unsure if pt has yet to receive meal since diet was advanced this AM. No issues with n/v/d/c/chewing/swallowing. Wt has been stable at 140# since 2/24/23. Pt appears thin but no s/s of malnutrition at this time. Educational handout provided and left on bedside table for pt to review. LBM 6/14.     All questions and concerns answered. Dietitian's contact information provided.         Follow-Up: Yes     Please Re-consult as needed           Thanks!   Denise Beyer RDN,LDN    "

## 2023-06-15 NOTE — ED PROVIDER NOTES
Encounter Date: 6/14/2023       History     Chief Complaint   Patient presents with    Leg Swelling     Bilateral leg and groin swelling x 1 day. Hx of CHF, reports 5lb weight gain in 24 hours.      89-year-old male with past medical history of HTN, HLD, CHF, Afib, CAD presenting to ED with chief complaint of penile and scrotal swelling.  Patient states he noticed his swelling earlier this evening and he notes a 5 lb weight gain over the past day.  He has a history of similar presentation in the past for CHF exacerbation.  He reports compliance with his medications including torsemide.  Patient lives alone.  His daughter lives nearby and checks on him.  She says that he has refused to stay with her when he is feeling unwell. Patient denies fevers, chills, dysuria, diarrhea, abdominal pain, scrotal tenderness, chest pain or SOB. Patient also reports left thumb injury occurring two weeks ago associated with swelling and redness. He had an appt to see his PCP but the appt was cancelled by the clinic.    Review of patient's allergies indicates:   Allergen Reactions    Iodine and iodide containing products Other (See Comments)     Caused changes in skin color     Past Medical History:   Diagnosis Date    Acute on chronic diastolic heart failure 9/1/2016    Coronary artery disease     Hyperlipidemia     Hypertension     Macular degeneration (senile) of retina, unspecified 12/12/2014    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    Persistent atrial fibrillation 11/10/2016    S/P placement of cardiac pacemaker 9/14/2016     Past Surgical History:   Procedure Laterality Date    AORTIC VALVE REPLACEMENT N/A     CARDIAC PACEMAKER PLACEMENT      CATARACT EXTRACTION W/  INTRAOCULAR LENS IMPLANT Right 2/16/2016    Dr. Whitley    CATARACT EXTRACTION W/  INTRAOCULAR LENS IMPLANT Left 3/1/2016    Dr. Whitley    CORONARY ARTERY BYPASS GRAFT      EAR EXAMINATION UNDER ANESTHESIA      EYE SURGERY       Family History   Problem Relation Age of  Onset    No Known Problems Mother     No Known Problems Father     No Known Problems Sister     Stroke Brother     Hypertension Brother     No Known Problems Maternal Aunt     No Known Problems Maternal Uncle     No Known Problems Paternal Aunt     No Known Problems Paternal Uncle     No Known Problems Maternal Grandmother     No Known Problems Maternal Grandfather     No Known Problems Paternal Grandmother     No Known Problems Paternal Grandfather     No Known Problems Daughter     No Known Problems Son     Amblyopia Neg Hx     Blindness Neg Hx     Cancer Neg Hx     Cataracts Neg Hx     Diabetes Neg Hx     Glaucoma Neg Hx     Macular degeneration Neg Hx     Retinal detachment Neg Hx     Strabismus Neg Hx     Thyroid disease Neg Hx      Social History     Tobacco Use    Smoking status: Never     Passive exposure: Never    Smokeless tobacco: Never   Substance Use Topics    Alcohol use: No    Drug use: No     Review of Systems   Constitutional:  Negative for chills and fever.   HENT:  Negative for congestion and sore throat.    Eyes:  Negative for pain and discharge.   Respiratory:  Negative for shortness of breath and wheezing.    Cardiovascular:  Negative for chest pain and palpitations.   Gastrointestinal:  Negative for abdominal pain, nausea and vomiting.   Genitourinary:  Positive for penile swelling and scrotal swelling. Negative for difficulty urinating and dysuria.   Musculoskeletal:  Negative for back pain and joint swelling.   Skin:  Negative for color change and rash.   Neurological:  Negative for weakness and numbness.   Hematological:  Does not bruise/bleed easily.     Physical Exam     Initial Vitals [06/14/23 2317]   BP Pulse Resp Temp SpO2   (!) 172/86 86 16 98 °F (36.7 °C) 98 %      MAP       --         Physical Exam    Nursing note and vitals reviewed.  Constitutional: He is not diaphoretic. No distress.   HENT:   Head: Normocephalic and atraumatic.   Mouth/Throat: Oropharynx is clear and moist.    Eyes: Conjunctivae and EOM are normal.   Neck: Neck supple.   Normal range of motion.  Cardiovascular:  Normal rate, regular rhythm, normal heart sounds and intact distal pulses.           Pulmonary/Chest: Breath sounds normal. He has no wheezes. He has no rhonchi. He has no rales.   Abdominal: Abdomen is soft. He exhibits no distension. There is no abdominal tenderness.   Genitourinary:    Genitourinary Comments: Penile and scrotal edema without tenderness     Musculoskeletal:         General: Edema (2+ BLE edema) present. No tenderness. Normal range of motion.      Cervical back: Normal range of motion and neck supple.      Comments: Left thumb swelling with erythema. No visible wounds. Motor and sensation of thumb intact with mildly limited flexion 2/2 swelling.     Neurological: He is alert and oriented to person, place, and time. He has normal strength. No cranial nerve deficit or sensory deficit.   Skin: Skin is warm and dry. Capillary refill takes less than 2 seconds.       ED Course   Procedures  Labs Reviewed   CBC W/ AUTO DIFFERENTIAL - Abnormal; Notable for the following components:       Result Value    RBC 3.50 (*)     Hemoglobin 11.0 (*)     Hematocrit 35.2 (*)      (*)     MCH 31.4 (*)     MCHC 31.3 (*)     RDW 15.5 (*)     MPV 9.0 (*)     Lymph % 15.4 (*)     All other components within normal limits   COMPREHENSIVE METABOLIC PANEL - Abnormal; Notable for the following components:    Glucose 113 (*)     BUN 35 (*)     Creatinine 2.1 (*)     Alkaline Phosphatase 141 (*)     ALT 7 (*)     eGFR 29.0 (*)     All other components within normal limits   TROPONIN I - Abnormal; Notable for the following components:    Troponin I 0.033 (*)     All other components within normal limits   B-TYPE NATRIURETIC PEPTIDE - Abnormal; Notable for the following components:    BNP 1,039 (*)     All other components within normal limits   HIV 1 / 2 ANTIBODY    Narrative:     Release to patient->Immediate    HEPATITIS C ANTIBODY    Narrative:     Release to patient->Immediate   LIPASE        ECG Results              EKG 12-lead (Final result)  Result time 06/15/23 11:02:07      Final result by [facility] (06/15/23 11:02:07)                   Narrative:    Test Reason : R06.02,    Vent. Rate : 070 BPM     Atrial Rate : 101 BPM     P-R Int : 000 ms          QRS Dur : 178 ms      QT Int : 492 ms       P-R-T Axes : 000 -80 088 degrees     QTc Int : 531 ms    Ventricular paced rhythm  Abnormal ECG  When compared with ECG of 03-DEC-2022 23:05,  No significant change was found  Confirmed by Devi PARRA, Paula ([id number]) on 6/15/2023 11:01:54 AM    Referred By: [email or web address]   SELF           Confirmed By:Paula Quinonez MD                                  Imaging Results              X-Ray Hand 3 view Left (Final result)  Result time 06/15/23 02:09:43      Final result by Krishna West MD (06/15/23 02:09:43)                   Impression:      No acute fracture.    Degenerative changes at the greater multangular joint with lateral subluxation of the 1st metacarpal relative to the trapezium.    Joint space narrowing at the IP joint of the thumb with periarticular erosive changes. Soft tissue swelling throughout the thumb.  Correlate clinically for erosive arthritis such as gout or septic arthritis.    Additional findings as above.      Electronically signed by: Krishna West MD  Date:    06/15/2023  Time:    02:09               Narrative:    EXAMINATION:  XR HAND COMPLETE 3 VIEW LEFT    CLINICAL HISTORY:  swollen thumb;    TECHNIQUE:  PA, lateral, and oblique views of the left hand were performed.    COMPARISON:  None    FINDINGS:  No acute displaced fracture or dislocation.  Degenerative changes at the greater multangular joint with lateral subluxation of the 1st metacarpal relative to the trapezium.  Joint space narrowing at the IP joint of the thumb with periarticular erosive changes.  Soft tissue swelling  throughout the thumb.  Moderate degenerative changes present at the IP joint of the remaining digits.      Vascular calcifications present.                                       X-Ray Chest AP Portable (Final result)  Result time 06/15/23 01:38:32      Final result by Krishna West MD (06/15/23 01:38:32)                   Impression:      Cardiomegaly, sternotomy wires, pacer leads, perihilar edema and right effusion, similar to prior.      Electronically signed by: Krishna West MD  Date:    06/15/2023  Time:    01:38               Narrative:    EXAMINATION:  XR CHEST AP PORTABLE    CLINICAL HISTORY:  CHF;    TECHNIQUE:  Single frontal view of the chest was performed.    COMPARISON:  12/03/2022.    FINDINGS:  Cardiomegaly, sternotomy wires, pacer leads, perihilar edema and right effusion, similar to prior.    Heart and lungs  appear unchanged when allowing for differences in technique and positioning.                                       Medications   sodium chloride 0.9% flush 10 mL (has no administration in time range)   furosemide injection 40 mg (40 mg Intravenous Given 6/15/23 1722)   albuterol inhaler 2 puff (has no administration in time range)   apixaban tablet 2.5 mg (2.5 mg Oral Given 6/15/23 0935)   aspirin EC tablet 81 mg (81 mg Oral Given 6/15/23 0935)   carvediloL tablet 12.5 mg (12.5 mg Oral Not Given 6/15/23 1725)   ferrous sulfate tablet 1 each (1 each Oral Given 6/15/23 0935)   multivitamin tablet (1 tablet Oral Given 6/15/23 0936)   pantoprazole EC tablet 20 mg (20 mg Oral Given 6/15/23 0935)   potassium chloride CR tablet 8 mEq (has no administration in time range)   melatonin tablet 6 mg (has no administration in time range)   polyethylene glycol packet 17 g (17 g Oral Given 6/15/23 1155)   acetaminophen tablet 650 mg (has no administration in time range)   aluminum-magnesium hydroxide-simethicone 200-200-20 mg/5 mL suspension 30 mL (has no administration in time range)   acetaminophen  tablet 650 mg (has no administration in time range)   naloxone 0.4 mg/mL injection 0.02 mg (has no administration in time range)   glucagon (human recombinant) injection 1 mg (has no administration in time range)   dextrose 10% bolus 125 mL 125 mL (has no administration in time range)   dextrose 10% bolus 250 mL 250 mL (has no administration in time range)   dextrose 40 % gel 15,000 mg (has no administration in time range)   dextrose 40 % gel 30,000 mg (has no administration in time range)   ondansetron disintegrating tablet 8 mg (has no administration in time range)   ondansetron injection 4 mg (has no administration in time range)   furosemide injection 80 mg (80 mg Intravenous Given 6/15/23 0105)     Medical Decision Making:   History:   Old Medical Records: I decided to obtain old medical records.  Differential Diagnosis:   CHF exacerbation  Pulmonary edema  Cellulitis  Independently Interpreted Test(s):   I have ordered and independently interpreted EKG Reading(s) - see summary below       <> Summary of EKG Reading(s): Ventricular paced rhythm, similar to prior. No STEMI.  Clinical Tests:   Lab Tests: Ordered and Reviewed  Radiological Study: Ordered and Reviewed  Medical Tests: Reviewed and Ordered  ED Management:  Patient is hypertensive but otherwise hemodynamically stable. His lungs are CTAB. Labs notable for Hgb 11, creatinine 2.1 (baseline ~1.7), BNP >1000, trop 0.035 but lower than baseline. CXR shows cardiomegaly, sternotomy wires, pacer leads, perihilar edema and right effusion. EKG with ventricular paced rhythm.  80 mg IV lasix administered in the ED. Patient admitted to Hospital medicine for further management of CHF exacerbation.                        Clinical Impression:   Final diagnoses:  [R06.02] Shortness of breath        ED Disposition Condition    Observation Stable                Lanny Pena MD  Resident  06/15/23 [id number]

## 2023-06-15 NOTE — H&P
[facility] [address] [place] [address] Medicine  History & Physical    Patient Name: Deena Moody  MRN: [id number]  Patient Class: OP- Observation  Admission Date: 6/14/2023  Attending Physician: Victorino Zuñiga MD   Primary Care Provider: Jam Rowell MD         Patient information was obtained from patient, past medical records and ER records.     Subjective:     Principal Problem:Acute on chronic diastolic congestive heart failure    Chief Complaint:   Chief Complaint   Patient presents with    Leg Swelling     Bilateral leg and groin swelling x 1 day. Hx of CHF, reports 5lb weight gain in 24 hours.         HPI: Deena Moody is a 89 y.o. male with CAD with hx MI s/p CABG, HFpEF, complete AV block s/p PPM, permanent atrial fibrillation on OAC, aortic valve stenosis s/p AVR, hyperlipidemia, hypertension and CKD being admitted to hospital medicine for management of acute on chronic CHF. Patient reports noticing increased scrotal and bilateral lower extremity edema over the past few days. Endorses associated fatigue with exertion. States he takes his diuretic medications as prescribed as denies missing any doses. States these symptoms are consistent with prior CHF exacerbations. Denies shortness of breath, chest pain, abdominal pain, nausea, vomiting, diarrhea, fever or chills. No history of tobacco, alcohol or drug use.      In ED: hypertensive to SBP 170s, vitals otherwise stable. CBC without leukocytosis, hgb at baseline. CMP with elevated creatinine of 2.1 (baseline ~1.7), suspect cardiorenal in nature. BNP >1000, trop 0.035 but lower than baseline. CXR showing cardiomegaly, sternotomy wires, pacer leads, perihilar edema and right effusion. EKG with ventricular paced rhythm. Given 80 mg IV lasix in the ED.      Past Medical History:   Diagnosis Date    Acute on chronic diastolic heart failure 9/1/2016    Coronary artery disease     Hyperlipidemia     Hypertension     Macular degeneration (senile) of  retina, unspecified 12/12/2014    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    Persistent atrial fibrillation 11/10/2016    S/P placement of cardiac pacemaker 9/14/2016       Past Surgical History:   Procedure Laterality Date    AORTIC VALVE REPLACEMENT N/A     CARDIAC PACEMAKER PLACEMENT      CATARACT EXTRACTION W/  INTRAOCULAR LENS IMPLANT Right 2/16/2016    Dr. Whitley    CATARACT EXTRACTION W/  INTRAOCULAR LENS IMPLANT Left 3/1/2016    Dr. Whiltey    CORONARY ARTERY BYPASS GRAFT      EAR EXAMINATION UNDER ANESTHESIA      EYE SURGERY         Review of patient's allergies indicates:   Allergen Reactions    Iodine and iodide containing products Other (See Comments)     Caused changes in skin color       No current facility-administered medications on file prior to encounter.     Current Outpatient Medications on File Prior to Encounter   Medication Sig    albuterol (PROVENTIL HFA) 90 mcg/actuation inhaler Inhale 2 puffs into the lungs every 6 (six) hours as needed for Wheezing. Rescue    apixaban (ELIQUIS) 2.5 mg Tab Take 1 tablet (2.5 mg total) by mouth 2 (two) times daily.    aspirin (ECOTRIN) 81 MG EC tablet Take 1 tablet (81 mg total) by mouth once daily.    benazepriL (LOTENSIN) 5 MG tablet Take 1 tablet (5 mg total) by mouth once daily.    carvediloL (COREG) 12.5 MG tablet TAKE 1 TABLET TWICE DAILY WITH MEALS    ferrous sulfate (FEOSOL) 325 mg (65 mg iron) Tab tablet Take 1 tablet (325 mg total) by mouth daily with breakfast.    fluticasone propionate (FLONASE) 50 mcg/actuation nasal spray 2 sprays (100 mcg total) by Each Nostril route once daily.    guaiFENesin (MUCINEX) 600 mg 12 hr tablet Take 1 tablet (600 mg total) by mouth 2 (two) times daily.    multivitamin (ONE DAILY MULTIVITAMIN) per tablet Take 1 tablet by mouth once daily.    nitroGLYCERIN (NITROSTAT) 0.4 MG SL tablet Take one tablet every 5 minutes for chest pain. After the third tablet go to the ED.    omeprazole (PRILOSEC) 20 MG capsule Take 1 capsule  (20 mg total) by mouth once daily.    polyethylene glycol (GLYCOLAX) 17 gram/dose powder Dissolve one capful (17 g) in liquid by mouth 3 (three) times daily as needed. Take 1 three times daily until well formed stool    potassium chloride (KLOR-CON) 8 MEQ TbSR TAKE 1 TABLET (8 MEQ TOTAL) BY MOUTH EVERY OTHER DAY.    torsemide (DEMADEX) 20 MG Tab TAKE 1 TABLET EVERY DAY     Family History       Problem Relation (Age of Onset)    Hypertension Brother    No Known Problems Mother, Father, Sister, Maternal Aunt, Maternal Uncle, Paternal Aunt, Paternal Uncle, Maternal Grandmother, Maternal Grandfather, Paternal Grandmother, Paternal Grandfather, Daughter, Son    Stroke Brother          Tobacco Use    Smoking status: Never     Passive exposure: Never    Smokeless tobacco: Never   Substance and Sexual Activity    Alcohol use: No    Drug use: No    Sexual activity: Yes     Partners: Female     Review of Systems   Constitutional:  Positive for fatigue. Negative for activity change, chills and fever.   HENT:  Negative for trouble swallowing.    Eyes:  Negative for photophobia and visual disturbance.   Respiratory:  Negative for cough, chest tightness, shortness of breath and wheezing.    Cardiovascular:  Positive for leg swelling. Negative for chest pain and palpitations.   Gastrointestinal:  Negative for abdominal pain, constipation, diarrhea, nausea and vomiting.   Genitourinary:  Positive for scrotal swelling. Negative for dysuria, frequency, hematuria and urgency.   Musculoskeletal:  Negative for arthralgias, back pain and gait problem.   Skin:  Negative for color change and rash.   Neurological:  Negative for dizziness, syncope, weakness, light-headedness, numbness and headaches.   Psychiatric/Behavioral:  Negative for agitation and confusion. The patient is not nervous/anxious.    Objective:     Vital Signs (Most Recent):  Temp: 97.7 °F (36.5 °C) (06/15/23 1110)  Pulse: 68 (06/15/23 1110)  Resp: 20 (06/15/23 1110)  BP:  (!) 129/58 (06/15/23 1110)  SpO2: (!) 91 % (06/15/23 1110) Vital Signs (24h Range):  Temp:  [97.2 °F (36.2 °C)-98.7 °F (37.1 °C)] 97.7 °F (36.5 °C)  Pulse:  [68-86] 68  Resp:  [16-20] 20  SpO2:  [91 %-98 %] 91 %  BP: (129-175)/(58-86) 129/58     Weight: 63.7 kg (140 lb 6.9 oz)  Body mass index is 20.15 kg/m².     Physical Exam  Vitals and nursing note reviewed.   Constitutional:       General: He is not in acute distress.     Appearance: He is well-developed. He is not ill-appearing.   HENT:      Head: Normocephalic and atraumatic.   Eyes:      Conjunctiva/sclera: Conjunctivae normal.   Neck:      Vascular: JVD present.   Cardiovascular:      Rate and Rhythm: Normal rate and regular rhythm.      Heart sounds: Normal heart sounds.   Pulmonary:      Effort: Pulmonary effort is normal. No respiratory distress.      Breath sounds: Rales (bibasilar, L>R) present. No wheezing.   Abdominal:      General: Bowel sounds are normal. There is no distension.      Palpations: Abdomen is soft.      Tenderness: There is no abdominal tenderness.   Musculoskeletal:         General: No tenderness. Normal range of motion.      Right lower leg: 3+ Pitting Edema present.      Left lower leg: 3+ Pitting Edema present.   Skin:     General: Skin is warm and dry.      Findings: No rash.   Neurological:      Mental Status: He is alert and oriented to person, place, and time.   Psychiatric:         Behavior: Behavior normal.         Thought Content: Thought content normal.         Judgment: Judgment normal.       Significant Labs: All pertinent labs within the past 24 hours have been reviewed.  CBC:   Recent Labs   Lab 06/15/23  0102   WBC 9.07   HGB 11.0*   HCT 35.2*        CMP:   Recent Labs   Lab 06/15/23  0102      K 3.9   CL 99   CO2 29   *   BUN 35*   CREATININE 2.1*   CALCIUM 9.3   PROT 7.7   ALBUMIN 3.5   BILITOT 1.0   ALKPHOS 141*   AST 26   ALT 7*   ANIONGAP 14     Cardiac Markers:   Recent Labs   Lab  06/15/23  0102   BNP 1,039*     Troponin:   Recent Labs   Lab 06/15/23  0102 06/15/23  0625   TROPONINI 0.033* 0.035*       Significant Imaging: I have reviewed all pertinent imaging results/findings within the past 24 hours.  Imaging Results              X-Ray Hand 3 view Left (Final result)  Result time 06/15/23 02:09:43      Final result by Krishna West MD (06/15/23 02:09:43)                   Impression:      No acute fracture.    Degenerative changes at the greater multangular joint with lateral subluxation of the 1st metacarpal relative to the trapezium.    Joint space narrowing at the IP joint of the thumb with periarticular erosive changes. Soft tissue swelling throughout the thumb.  Correlate clinically for erosive arthritis such as gout or septic arthritis.    Additional findings as above.      Electronically signed by: Krishna West MD  Date:    06/15/2023  Time:    02:09               Narrative:    EXAMINATION:  XR HAND COMPLETE 3 VIEW LEFT    CLINICAL HISTORY:  swollen thumb;    TECHNIQUE:  PA, lateral, and oblique views of the left hand were performed.    COMPARISON:  None    FINDINGS:  No acute displaced fracture or dislocation.  Degenerative changes at the greater multangular joint with lateral subluxation of the 1st metacarpal relative to the trapezium.  Joint space narrowing at the IP joint of the thumb with periarticular erosive changes.  Soft tissue swelling throughout the thumb.  Moderate degenerative changes present at the IP joint of the remaining digits.      Vascular calcifications present.                                       X-Ray Chest AP Portable (Final result)  Result time 06/15/23 01:38:32      Final result by Krishna West MD (06/15/23 01:38:32)                   Impression:      Cardiomegaly, sternotomy wires, pacer leads, perihilar edema and right effusion, similar to prior.      Electronically signed by: Krishna West MD  Date:    06/15/2023  Time:    01:38                Narrative:    EXAMINATION:  XR CHEST AP PORTABLE    CLINICAL HISTORY:  CHF;    TECHNIQUE:  Single frontal view of the chest was performed.    COMPARISON:  12/03/2022.    FINDINGS:  Cardiomegaly, sternotomy wires, pacer leads, perihilar edema and right effusion, similar to prior.    Heart and lungs  appear unchanged when allowing for differences in technique and positioning.                                    Assessment/Plan:     * Acute on chronic diastolic congestive heart failure  Chronic diastolic heart failure  89 y.o. patient with known diastolic heart failure now with worsening peripheral edema, etc   - CHF pathway initiated   - CXR revealed Cardiomegaly, sternotomy wires, pacer leads, perihilar edema and right effusion  - BNP >1000 / troponin chronically elevated, at baseline   - EKG ventricular paced   - repeat TTE ordered  - Begin diuresis with 40 mg IV BID   - strict Is/Os   - cardiac, low sodium diet with fluid restriction  - daily weights, preferably standing   - daily CMP; keep K >4 and Mg >2  - monitor on cardiac telemetry   - supplemental O2 as needed   Results for orders placed during the hospital encounter of 12/03/22    Echo    Interpretation Summary  · Mild left atrial enlargement.  · The left ventricle is normal in size with concentric remodeling and normal systolic function.  · The estimated ejection fraction is 65%.  · Mild right atrial enlargement.  · Mild right ventricular enlargement with mildly reduced right ventricular systolic function.  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 There is no aortic insufficiency present.  · Aortic valve area is 0.72 cm2; peak velocity is 2.91 m/s; mean gradient is 21 mmHg. The dimensionless index is 0.25.  · Mild-to-moderate mitral regurgitation.  · Moderate tricuspid regurgitation.  · The estimated PA systolic pressure is greater than 66 mmHg.    Acute renal failure superimposed on stage 3 chronic kidney disease  CKD (chronic kidney disease)  stage 3, GFR 30-59 ml/min  Suspect etiology is cardiorenal.  Cr 2.1.  Baseline Cr 1.7  - UA pending   - Renal US if no improvement in 24-48 hrs   - hold ACE-I or ARB while renal function dynamic  - Monitor UOP and follow serial BMP   - Adjust drugs to GFR/CrCL; avoid nephrotoxic drugs   -  Estimated Creatinine Clearance: 20.2 mL/min (A) (based on SCr of 2.1 mg/dL (H)).   - Monitor electrolytes, phos levels daily    Aortic valve stenosis  S/p AVR with possible stenosis     Pulmonary hypertension  - noted    Permanent atrial fibrillation  Long term (current) use of anticoagulants  Patient with Permanent atrial fibrillation which is controlled currently with Beta Blocker. Patient is currently in sinus rhythm.[id number] Score: 3. Anticoagulation indicated. Anticoagulation done with BID eliquis    CAD (coronary artery disease)  History of heart bypass surgery  History of MI (myocardial infarction)  Dyslipidemia  - history noted  - continue ASA and eliquis     Complete AV block  S/P placement of cardiac pacemaker  - noted  - monitor on tele     Renal artery stenosis  - noted     Essential hypertension  - controlled  - hold home lisinopril in setting of SARAH       VTE Risk Mitigation (From admission, onward)           Ordered     apixaban tablet 2.5 mg  2 times daily         06/15/23 0806     IP VTE HIGH RISK PATIENT  Once         06/15/23 0732     Place sequential compression device  Until discontinued         06/15/23 0732     Reason for No Pharmacological VTE Prophylaxis  Once        Question:  Reasons:  Answer:  Already adequately anticoagulated on oral Anticoagulants    06/15/23 0732                         On 06/15/2023, patient should be placed in hospital observation services under my care in collaboration with Victorino Zuñiga MD.      Stephanie Hinds PA-C  Department of Hospital Medicine  [facility]

## 2023-06-15 NOTE — ASSESSMENT & PLAN NOTE
History of heart bypass surgery  History of MI (myocardial infarction)  Dyslipidemia  - history noted  - continue ASA and eliquis

## 2023-06-15 NOTE — ASSESSMENT & PLAN NOTE
Chronic diastolic heart failure  89 y.o. patient with known diastolic heart failure now with worsening peripheral edema, etc   - CHF pathway initiated   - CXR revealed Cardiomegaly, sternotomy wires, pacer leads, perihilar edema and right effusion  - BNP >1000 / troponin chronically elevated, at baseline   - EKG ventricular paced   - repeat TTE ordered  - Begin diuresis with 40 mg IV BID   - strict Is/Os   - cardiac, low sodium diet with fluid restriction  - daily weights, preferably standing   - daily CMP; keep K >4 and Mg >2  - monitor on cardiac telemetry   - supplemental O2 as needed   Results for orders placed during the hospital encounter of 12/03/22    Echo    Interpretation Summary  · Mild left atrial enlargement.  · The left ventricle is normal in size with concentric remodeling and normal systolic function.  · The estimated ejection fraction is 65%.  · Mild right atrial enlargement.  · Mild right ventricular enlargement with mildly reduced right ventricular systolic function.  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 There is no aortic insufficiency present.  · Aortic valve area is 0.72 cm2; peak velocity is 2.91 m/s; mean gradient is 21 mmHg. The dimensionless index is 0.25.  · Mild-to-moderate mitral regurgitation.  · Moderate tricuspid regurgitation.  · The estimated PA systolic pressure is greater than 66 mmHg.

## 2023-06-15 NOTE — HPI
Deena Moody is a 89 y.o. male with CAD with hx MI s/p CABG, HFpEF, complete AV block s/p PPM, permanent atrial fibrillation on OAC, aortic valve stenosis s/p AVR, hyperlipidemia, hypertension and CKD being admitted to hospital medicine for management of acute on chronic CHF. Patient reports noticing increased scrotal and bilateral lower extremity edema over the past few days. Endorses associated fatigue with exertion. States he takes his diuretic medications as prescribed as denies missing any doses. States these symptoms are consistent with prior CHF exacerbations. Denies shortness of breath, chest pain, abdominal pain, nausea, vomiting, diarrhea, fever or chills. No history of tobacco, alcohol or drug use.      In ED: hypertensive to SBP 170s, vitals otherwise stable. CBC without leukocytosis, hgb at baseline. CMP with elevated creatinine of 2.1 (baseline ~1.7), suspect cardiorenal in nature. BNP >1000, trop 0.035 but lower than baseline. CXR showing cardiomegaly, sternotomy wires, pacer leads, perihilar edema and right effusion. EKG with ventricular paced rhythm. Given 80 mg IV lasix in the ED.

## 2023-06-15 NOTE — PLAN OF CARE
Problem: Adult Inpatient Plan of Care  Goal: Plan of Care Review  Outcome: Ongoing, Progressing  Goal: Patient-Specific Goal (Individualized)  Outcome: Ongoing, Progressing  Goal: Absence of Hospital-Acquired Illness or Injury  Outcome: Ongoing, Progressing  Goal: Optimal Comfort and Wellbeing  Outcome: Ongoing, Progressing  Goal: Readiness for Transition of Care  Outcome: Ongoing, Progressing     Problem: Fall Injury Risk  Goal: Absence of Fall and Fall-Related Injury  Outcome: Ongoing, Progressing     Problem: Skin Injury Risk Increased  Goal: Skin Health and Integrity  Outcome: Ongoing, Progressing     Problem: Fluid and Electrolyte Imbalance (Acute Kidney Injury/Impairment)  Goal: Fluid and Electrolyte Balance  Outcome: Ongoing, Progressing     Problem: Oral Intake Inadequate (Acute Kidney Injury/Impairment)  Goal: Optimal Nutrition Intake  Outcome: Ongoing, Progressing     Problem: Renal Function Impairment (Acute Kidney Injury/Impairment)  Goal: Effective Renal Function  Outcome: Ongoing, Progressing     Problem: Infection  Goal: Absence of Infection Signs and Symptoms  Outcome: Ongoing, Progressing

## 2023-06-15 NOTE — ASSESSMENT & PLAN NOTE
Long term (current) use of anticoagulants  Patient with Permanent atrial fibrillation which is controlled currently with Beta Blocker. Patient is currently in sinus rhythm.[id number] Score: 3. Anticoagulation indicated. Anticoagulation done with BID eliquis

## 2023-06-15 NOTE — ASSESSMENT & PLAN NOTE
CKD (chronic kidney disease) stage 3, GFR 30-59 ml/min  Suspect etiology is cardiorenal.  Cr 2.1.  Baseline Cr 1.7  - UA pending   - Renal US if no improvement in 24-48 hrs   - hold ACE-I or ARB while renal function dynamic  - Monitor UOP and follow serial BMP   - Adjust drugs to GFR/CrCL; avoid nephrotoxic drugs   -  Estimated Creatinine Clearance: 20.2 mL/min (A) (based on SCr of 2.1 mg/dL (H)).   - Monitor electrolytes, phos levels daily

## 2023-06-15 NOTE — SUBJECTIVE & OBJECTIVE
Past Medical History:   Diagnosis Date    Acute on chronic diastolic heart failure 9/1/2016    Coronary artery disease     Hyperlipidemia     Hypertension     Macular degeneration (senile) of retina, unspecified 12/12/2014    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    Persistent atrial fibrillation 11/10/2016    S/P placement of cardiac pacemaker 9/14/2016       Past Surgical History:   Procedure Laterality Date    AORTIC VALVE REPLACEMENT N/A     CARDIAC PACEMAKER PLACEMENT      CATARACT EXTRACTION W/  INTRAOCULAR LENS IMPLANT Right 2/16/2016    Dr. Whitley    CATARACT EXTRACTION W/  INTRAOCULAR LENS IMPLANT Left 3/1/2016    Dr. Whitley    CORONARY ARTERY BYPASS GRAFT      EAR EXAMINATION UNDER ANESTHESIA      EYE SURGERY         Review of patient's allergies indicates:   Allergen Reactions    Iodine and iodide containing products Other (See Comments)     Caused changes in skin color       No current facility-administered medications on file prior to encounter.     Current Outpatient Medications on File Prior to Encounter   Medication Sig    albuterol (PROVENTIL HFA) 90 mcg/actuation inhaler Inhale 2 puffs into the lungs every 6 (six) hours as needed for Wheezing. Rescue    apixaban (ELIQUIS) 2.5 mg Tab Take 1 tablet (2.5 mg total) by mouth 2 (two) times daily.    aspirin (ECOTRIN) 81 MG EC tablet Take 1 tablet (81 mg total) by mouth once daily.    benazepriL (LOTENSIN) 5 MG tablet Take 1 tablet (5 mg total) by mouth once daily.    carvediloL (COREG) 12.5 MG tablet TAKE 1 TABLET TWICE DAILY WITH MEALS    ferrous sulfate (FEOSOL) 325 mg (65 mg iron) Tab tablet Take 1 tablet (325 mg total) by mouth daily with breakfast.    fluticasone propionate (FLONASE) 50 mcg/actuation nasal spray 2 sprays (100 mcg total) by Each Nostril route once daily.    guaiFENesin (MUCINEX) 600 mg 12 hr tablet Take 1 tablet (600 mg total) by mouth 2 (two) times daily.    multivitamin (ONE DAILY MULTIVITAMIN) per tablet Take 1 tablet by mouth once  daily.    nitroGLYCERIN (NITROSTAT) 0.4 MG SL tablet Take one tablet every 5 minutes for chest pain. After the third tablet go to the ED.    omeprazole (PRILOSEC) 20 MG capsule Take 1 capsule (20 mg total) by mouth once daily.    polyethylene glycol (GLYCOLAX) 17 gram/dose powder Dissolve one capful (17 g) in liquid by mouth 3 (three) times daily as needed. Take 1 three times daily until well formed stool    potassium chloride (KLOR-CON) 8 MEQ TbSR TAKE 1 TABLET (8 MEQ TOTAL) BY MOUTH EVERY OTHER DAY.    torsemide (DEMADEX) 20 MG Tab TAKE 1 TABLET EVERY DAY     Family History       Problem Relation (Age of Onset)    Hypertension Brother    No Known Problems Mother, Father, Sister, Maternal Aunt, Maternal Uncle, Paternal Aunt, Paternal Uncle, Maternal Grandmother, Maternal Grandfather, Paternal Grandmother, Paternal Grandfather, Daughter, Son    Stroke Brother          Tobacco Use    Smoking status: Never     Passive exposure: Never    Smokeless tobacco: Never   Substance and Sexual Activity    Alcohol use: No    Drug use: No    Sexual activity: Yes     Partners: Female     Review of Systems   Constitutional:  Positive for fatigue. Negative for activity change, chills and fever.   HENT:  Negative for trouble swallowing.    Eyes:  Negative for photophobia and visual disturbance.   Respiratory:  Negative for cough, chest tightness, shortness of breath and wheezing.    Cardiovascular:  Positive for leg swelling. Negative for chest pain and palpitations.   Gastrointestinal:  Negative for abdominal pain, constipation, diarrhea, nausea and vomiting.   Genitourinary:  Positive for scrotal swelling. Negative for dysuria, frequency, hematuria and urgency.   Musculoskeletal:  Negative for arthralgias, back pain and gait problem.   Skin:  Negative for color change and rash.   Neurological:  Negative for dizziness, syncope, weakness, light-headedness, numbness and headaches.   Psychiatric/Behavioral:  Negative for agitation and  confusion. The patient is not nervous/anxious.    Objective:     Vital Signs (Most Recent):  Temp: 97.7 °F (36.5 °C) (06/15/23 1110)  Pulse: 68 (06/15/23 1110)  Resp: 20 (06/15/23 1110)  BP: (!) 129/58 (06/15/23 1110)  SpO2: (!) 91 % (06/15/23 1110) Vital Signs (24h Range):  Temp:  [97.2 °F (36.2 °C)-98.7 °F (37.1 °C)] 97.7 °F (36.5 °C)  Pulse:  [68-86] 68  Resp:  [16-20] 20  SpO2:  [91 %-98 %] 91 %  BP: (129-175)/(58-86) 129/58     Weight: 63.7 kg (140 lb 6.9 oz)  Body mass index is 20.15 kg/m².     Physical Exam  Vitals and nursing note reviewed.   Constitutional:       General: He is not in acute distress.     Appearance: He is well-developed. He is not ill-appearing.   HENT:      Head: Normocephalic and atraumatic.   Eyes:      Conjunctiva/sclera: Conjunctivae normal.   Neck:      Vascular: JVD present.   Cardiovascular:      Rate and Rhythm: Normal rate and regular rhythm.      Heart sounds: Normal heart sounds.   Pulmonary:      Effort: Pulmonary effort is normal. No respiratory distress.      Breath sounds: Rales (bibasilar, L>R) present. No wheezing.   Abdominal:      General: Bowel sounds are normal. There is no distension.      Palpations: Abdomen is soft.      Tenderness: There is no abdominal tenderness.   Musculoskeletal:         General: No tenderness. Normal range of motion.      Right lower leg: 3+ Pitting Edema present.      Left lower leg: 3+ Pitting Edema present.   Skin:     General: Skin is warm and dry.      Findings: No rash.   Neurological:      Mental Status: He is alert and oriented to person, place, and time.   Psychiatric:         Behavior: Behavior normal.         Thought Content: Thought content normal.         Judgment: Judgment normal.       Significant Labs: All pertinent labs within the past 24 hours have been reviewed.  CBC:   Recent Labs   Lab 06/15/23  0102   WBC 9.07   HGB 11.0*   HCT 35.2*        CMP:   Recent Labs   Lab 06/15/23  0102      K 3.9   CL 99   CO2 29    *   BUN 35*   CREATININE 2.1*   CALCIUM 9.3   PROT 7.7   ALBUMIN 3.5   BILITOT 1.0   ALKPHOS 141*   AST 26   ALT 7*   ANIONGAP 14     Cardiac Markers:   Recent Labs   Lab 06/15/23  0102   BNP 1,039*     Troponin:   Recent Labs   Lab 06/15/23  0102 06/15/23  0625   TROPONINI 0.033* 0.035*       Significant Imaging: I have reviewed all pertinent imaging results/findings within the past 24 hours.  Imaging Results              X-Ray Hand 3 view Left (Final result)  Result time 06/15/23 02:09:43      Final result by Krishna West MD (06/15/23 02:09:43)                   Impression:      No acute fracture.    Degenerative changes at the greater multangular joint with lateral subluxation of the 1st metacarpal relative to the trapezium.    Joint space narrowing at the IP joint of the thumb with periarticular erosive changes. Soft tissue swelling throughout the thumb.  Correlate clinically for erosive arthritis such as gout or septic arthritis.    Additional findings as above.      Electronically signed by: Krishna West MD  Date:    06/15/2023  Time:    02:09               Narrative:    EXAMINATION:  XR HAND COMPLETE 3 VIEW LEFT    CLINICAL HISTORY:  swollen thumb;    TECHNIQUE:  PA, lateral, and oblique views of the left hand were performed.    COMPARISON:  None    FINDINGS:  No acute displaced fracture or dislocation.  Degenerative changes at the greater multangular joint with lateral subluxation of the 1st metacarpal relative to the trapezium.  Joint space narrowing at the IP joint of the thumb with periarticular erosive changes.  Soft tissue swelling throughout the thumb.  Moderate degenerative changes present at the IP joint of the remaining digits.      Vascular calcifications present.                                       X-Ray Chest AP Portable (Final result)  Result time 06/15/23 01:38:32      Final result by Krishna West MD (06/15/23 01:38:32)                   Impression:      Cardiomegaly,  sternotomy wires, pacer leads, perihilar edema and right effusion, similar to prior.      Electronically signed by: Krishna West MD  Date:    06/15/2023  Time:    01:38               Narrative:    EXAMINATION:  XR CHEST AP PORTABLE    CLINICAL HISTORY:  CHF;    TECHNIQUE:  Single frontal view of the chest was performed.    COMPARISON:  12/03/2022.    FINDINGS:  Cardiomegaly, sternotomy wires, pacer leads, perihilar edema and right effusion, similar to prior.    Heart and lungs  appear unchanged when allowing for differences in technique and positioning.

## 2023-06-15 NOTE — PLAN OF CARE
[facility] - Observation [facility]  Initial Discharge Assessment       Primary Care Provider: Jam Rowell MD    Admission Diagnosis: Shortness of breath [R06.02]    Admission Date: 6/14/2023  Expected Discharge Date: 6/16/2023         Payor: [employer] MEDICARE / Plan: [employer] MEDICARE HMO / Product Type: Capitation /     Extended Emergency Contact Information  Primary Emergency Contact: Sarika Moody  Address: [address] [place] [address] [facility]  Home Phone: [phone]  Relation: Spouse  Secondary Emergency Contact: DomTroyAmelie   [facility]  Home Phone: [phone]  Mobile Phone: [phone]  Relation: Daughter    Discharge Plan A: (P) Home Health  Discharge Plan B: (P) Home      [facility] - [place], [place] - [address]  [address] [address]  Phone: [phone] Fax: [phone]    [id number] [address] AT [facility]  [address] [address]  Phone: [phone] Fax: [phone]    [facility]  [address] [address]  Phone: [phone] Fax: [phone]               SW completed Discharge Planning Assessment with patient's daughter, Amelie via telephone. Discharge planning booklet given to patient/family and whiteboard updated with TEOFILO and phone #. All questions answered.    Amelie reported that she will provide transportation for patient upon discharge.     Amelie reported that patient lives at home alone, and prior to hospitalization he was independent with his ADL's. Amelie reported that patient recently started using a walker to assist with his ambulation. Amelie reported that patient is not on dialysis and does not go to a Coumadin clinic.     Amelie reported that patient would benefit from receiving home health services once he is medically stable to discharge.     Patient  lives in a SSH with 0 steps to enter.       Jennifer Caicedo LMSW  [facility]  Ext. [phone]

## 2023-06-16 VITALS
TEMPERATURE: 98 F | OXYGEN SATURATION: 96 % | HEART RATE: 70 BPM | DIASTOLIC BLOOD PRESSURE: 66 MMHG | SYSTOLIC BLOOD PRESSURE: 147 MMHG | BODY MASS INDEX: 20.04 KG/M2 | WEIGHT: 140 LBS | HEIGHT: 70 IN | RESPIRATION RATE: 17 BRPM

## 2023-06-16 LAB
ANION GAP SERPL CALC-SCNC: 10 MMOL/L (ref 8–16)
BASOPHILS # BLD AUTO: 0.04 K/UL (ref 0–0.2)
BASOPHILS NFR BLD: 0.7 % (ref 0–1.9)
BUN SERPL-MCNC: 36 MG/DL (ref 10–30)
CALCIUM SERPL-MCNC: 8.7 MG/DL (ref 8.7–10.5)
CHLORIDE SERPL-SCNC: 105 MMOL/L (ref 95–110)
CO2 SERPL-SCNC: 30 MMOL/L (ref 23–29)
CREAT SERPL-MCNC: 1.7 MG/DL (ref 0.5–1.4)
DIFFERENTIAL METHOD: ABNORMAL
EOSINOPHIL # BLD AUTO: 0.3 K/UL (ref 0–0.5)
EOSINOPHIL NFR BLD: 5.7 % (ref 0–8)
ERYTHROCYTE [DISTWIDTH] IN BLOOD BY AUTOMATED COUNT: 15.5 % (ref 11.5–14.5)
EST. GFR  (NO RACE VARIABLE): 37.4 ML/MIN/1.73 M^2
GLUCOSE SERPL-MCNC: 84 MG/DL (ref 70–110)
HCT VFR BLD AUTO: 30.7 % (ref 40–54)
HGB BLD-MCNC: 9.6 G/DL (ref 14–18)
IMM GRANULOCYTES # BLD AUTO: 0.01 K/UL (ref 0–0.04)
IMM GRANULOCYTES NFR BLD AUTO: 0.2 % (ref 0–0.5)
LYMPHOCYTES # BLD AUTO: 1 K/UL (ref 1–4.8)
LYMPHOCYTES NFR BLD: 17.6 % (ref 18–48)
MAGNESIUM SERPL-MCNC: 2.5 MG/DL (ref 1.6–2.6)
MCH RBC QN AUTO: 31.5 PG (ref 27–31)
MCHC RBC AUTO-ENTMCNC: 31.3 G/DL (ref 32–36)
MCV RBC AUTO: 101 FL (ref 82–98)
MONOCYTES # BLD AUTO: 0.6 K/UL (ref 0.3–1)
MONOCYTES NFR BLD: 9.5 % (ref 4–15)
NEUTROPHILS # BLD AUTO: 3.9 K/UL (ref 1.8–7.7)
NEUTROPHILS NFR BLD: 66.3 % (ref 38–73)
NRBC BLD-RTO: 0 /100 WBC
PHOSPHATE SERPL-MCNC: 3.2 MG/DL (ref 2.7–4.5)
PLATELET # BLD AUTO: 144 K/UL (ref 150–450)
PMV BLD AUTO: 9.6 FL (ref 9.2–12.9)
POTASSIUM SERPL-SCNC: 3.3 MMOL/L (ref 3.5–5.1)
RBC # BLD AUTO: 3.05 M/UL (ref 4.6–6.2)
SODIUM SERPL-SCNC: 145 MMOL/L (ref 136–145)
WBC # BLD AUTO: 5.8 K/UL (ref 3.9–12.7)

## 2023-06-16 PROCEDURE — 80048 BASIC METABOLIC PNL TOTAL CA: CPT

## 2023-06-16 PROCEDURE — 36415 COLL VENOUS BLD VENIPUNCTURE: CPT

## 2023-06-16 PROCEDURE — G0378 HOSPITAL OBSERVATION PER HR: HCPCS

## 2023-06-16 PROCEDURE — 25000003 PHARM REV CODE 250

## 2023-06-16 PROCEDURE — 85025 COMPLETE CBC W/AUTO DIFF WBC: CPT

## 2023-06-16 PROCEDURE — 83735 ASSAY OF MAGNESIUM: CPT

## 2023-06-16 PROCEDURE — 99239 HOSP IP/OBS DSCHRG MGMT >30: CPT | Mod: ,,,

## 2023-06-16 PROCEDURE — 84100 ASSAY OF PHOSPHORUS: CPT

## 2023-06-16 PROCEDURE — 99239 PR HOSPITAL DISCHARGE DAY,>30 MIN: ICD-10-PCS | Mod: ,,,

## 2023-06-16 RX ORDER — BENAZEPRIL HYDROCHLORIDE 5 MG/1
5 TABLET ORAL DAILY
Qty: 30 TABLET | Refills: 0 | Status: SHIPPED | OUTPATIENT
Start: 2023-06-16 | End: 2023-06-20

## 2023-06-16 RX ORDER — OMEPRAZOLE 20 MG/1
20 CAPSULE, DELAYED RELEASE ORAL DAILY
Qty: 30 CAPSULE | Refills: 0 | Status: SHIPPED | OUTPATIENT
Start: 2023-06-16 | End: 2023-06-16 | Stop reason: SDUPTHER

## 2023-06-16 RX ORDER — OMEPRAZOLE 20 MG/1
20 CAPSULE, DELAYED RELEASE ORAL DAILY
Qty: 30 CAPSULE | Refills: 0 | Status: SHIPPED | OUTPATIENT
Start: 2023-06-16 | End: 2023-07-25

## 2023-06-16 RX ORDER — POTASSIUM CHLORIDE 20 MEQ/1
40 TABLET, EXTENDED RELEASE ORAL ONCE
Status: COMPLETED | OUTPATIENT
Start: 2023-06-16 | End: 2023-06-16

## 2023-06-16 RX ORDER — BENAZEPRIL HYDROCHLORIDE 5 MG/1
5 TABLET ORAL DAILY
Qty: 30 TABLET | Refills: 0 | Status: SHIPPED | OUTPATIENT
Start: 2023-06-16 | End: 2023-06-16 | Stop reason: SDUPTHER

## 2023-06-16 RX ADMIN — ASPIRIN 81 MG: 81 TABLET, COATED ORAL at 09:06

## 2023-06-16 RX ADMIN — POTASSIUM CHLORIDE 40 MEQ: 1500 TABLET, EXTENDED RELEASE ORAL at 09:06

## 2023-06-16 RX ADMIN — THERA TABS 1 TABLET: TAB at 09:06

## 2023-06-16 RX ADMIN — PANTOPRAZOLE SODIUM 20 MG: 20 TABLET, DELAYED RELEASE ORAL at 09:06

## 2023-06-16 RX ADMIN — CARVEDILOL 12.5 MG: 12.5 TABLET, FILM COATED ORAL at 08:06

## 2023-06-16 RX ADMIN — APIXABAN 2.5 MG: 2.5 TABLET, FILM COATED ORAL at 09:06

## 2023-06-16 RX ADMIN — FERROUS SULFATE TAB 325 MG (65 MG ELEMENTAL FE) 1 EACH: 325 (65 FE) TAB at 09:06

## 2023-06-16 RX ADMIN — POLYETHYLENE GLYCOL 3350 17 G: 17 POWDER, FOR SOLUTION ORAL at 09:06

## 2023-06-16 NOTE — PLAN OF CARE
06/16/23 1511   Post-Acute Status   Post-Acute Authorization Home Health   Home Health Status Referrals Sent     Pt is expected to discharge home with home health. SW sent referrals via [facility] and is waiting for an accepting agency.    ANN will continue to follow up.      Jennifer Caicedo LMSW  [facility]  Ext. [phone]

## 2023-06-16 NOTE — PLAN OF CARE
Problem: Adult Inpatient Plan of Care  Goal: Plan of Care Review  Outcome: Met  Goal: Patient-Specific Goal (Individualized)  Outcome: Met  Goal: Absence of Hospital-Acquired Illness or Injury  Outcome: Met  Goal: Optimal Comfort and Wellbeing  Outcome: Met  Goal: Readiness for Transition of Care  Outcome: Met     Problem: Fall Injury Risk  Goal: Absence of Fall and Fall-Related Injury  Outcome: Met     Problem: Skin Injury Risk Increased  Goal: Skin Health and Integrity  Outcome: Met     Problem: Fluid and Electrolyte Imbalance (Acute Kidney Injury/Impairment)  Goal: Fluid and Electrolyte Balance  Outcome: Met     Problem: Oral Intake Inadequate (Acute Kidney Injury/Impairment)  Goal: Optimal Nutrition Intake  Outcome: Met     Problem: Renal Function Impairment (Acute Kidney Injury/Impairment)  Goal: Effective Renal Function  Outcome: Met     Problem: Infection  Goal: Absence of Infection Signs and Symptoms  Outcome: Met

## 2023-06-16 NOTE — NURSING
Deena Moody is a 89 y.o.   male patient, who presents for a 6 minute walk test ordered by  .  The diagnosis is  .  The patient's BMI is 20.1 kg/m2.  Predicted distance (lower limit of normal) is 235.76 meters.      Test Results:    The test was  6 minutes.  The patient stopped 0  times for a total of 0 seconds.  The total time walked was 6 minutes.  During walking, the patient reported: that his feet hurt . The patient used  a walker during testing.     06/16/2023---------Distance:   ( )     O2 Sat % Supplemental Oxygen Heart Rate Blood Pressure Deven Scale   Pre-exercise  (Resting)  94%       mmHg     During Exercise         mmHg     Post-exercise  (Recovery)  96%        mmHg       Recovery Time:      Performing nurse/tech:

## 2023-06-16 NOTE — PLAN OF CARE
06/16/23 1552   Post-Acute Status   Post-Acute Authorization Home Health   Home Health Status Set-up Complete/Auth obtained     SW received notification that pt has been accepted with [facility] and they will admit on Sunday, 6/18, if not sooner.      Jennifer Caicedo LMSW  [facility]  Ext. [phone]

## 2023-06-16 NOTE — PROGRESS NOTES
"[facility]) nurse navigator notified of HFT[facility] candidate in need of education and introduction to 4-6 week program.      PT aao x 3 while lying in bed, CYNDI. Introduced self to pt as HFT[facility] nurse navigator.     Patient given "[facility] Home Care Guide" which includes "Daily weight and symptom tracker".  Encouraged pt to review information.      Reviewed the following key points of HFTCC program with pt and family:   1.) Take your medications as directed.    2.) Weight yourself daily   3.) Follow low salt and limited fluid diet.    4.) Stop smoking and start exercising   5.) Go to your appointments and call your team.      Pt reminded to follow Symptom tracker and to call at the onset of symptoms according to tracker.     Reviewed plan for follow up once discharged to include phone calls, in person and virtual visits to assist pt optimizing their heart failure medication regimen and encouraging healthy lifestyle modifications.  Reminded pt that program will assist them over the next 4-6 weeks and then patient will be transferred to long term care provider .  Reminded pt how to contact HFTCC navigator via phone and or via [employer].     Pt instructed appointment will be printed on hospital discharge paperwork.     Pt also reminded RN will call 48-72 hours after discharge to check on them.     Pt verbalized read back of information given.  Encouraged pt and family to read over information often and contact team with any questions or concerns.    "

## 2023-06-16 NOTE — PLAN OF CARE
[facility]      HOME HEALTH ORDERS  FACE TO FACE ENCOUNTER    Patient Name: Deena Moody  YOB: 1930    PCP: Jam Rowell MD   PCP Address: [address] / [place] [address]  PCP Phone Number: [phone]  PCP Fax: [phone]    Encounter Date: 6/14/23    Admit to Home Health    Diagnoses:  Active Hospital Problems    Diagnosis  POA    *Acute on chronic diastolic congestive heart failure [I50.33]  Yes    Acute renal failure superimposed on stage 3 chronic kidney disease [N17.9, N18.30]  Yes     Priority: 2     Aortic valve stenosis [I35.0]  Yes    Pulmonary hypertension [I27.20]  Yes     Chronic    Long term (current) use of anticoagulants [Z79.01]  Not Applicable    Permanent atrial fibrillation [I48.21]  Yes     Chronic    S/P placement of cardiac pacemaker [Z95.0]  Yes     Chronic    Chronic diastolic heart failure [I50.32]  Yes     Patient being diuresed.      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       Follow Up Appointments:  Future Appointments   Date Time Provider Department Center   7/8/2023 10:00 AM HOME MONITOR DEVICE CHECK, [facility]   7/25/2023  1:00 PM Jam Rowell MD [facility]       Allergies:  Review of patient's allergies indicates:   Allergen Reactions    Iodine and iodide containing products Other (See Comments)     Caused changes in skin color       Medications:  Review discharge medications with patient and family and provide education.    Current Facility-Administered Medications   Medication Dose Route Frequency Provider Last Rate Last Admin    acetaminophen tablet 650 mg  650 mg Oral Q8H PRN BLAYNE Beckman-JASIEL        acetaminophen tablet 650 mg  650 mg Oral Q4H PRN BLAYNE Beckman-C        albuterol inhaler 2 puff  2 puff Inhalation Q6H PRN BLAYNE Beckman-C        aluminum-magnesium hydroxide-simethicone 200-200-20 mg/5 mL suspension 30 mL  30 mL Oral QID PRN BLAYNE Beckman-JASIEL        apixaban tablet 2.5 mg  2.5 mg Oral BID BLAYNE Beckman-C   2.5 mg at 06/16/23 0917    aspirin EC tablet 81 mg  81 mg Oral Daily BLAYNE Beckman-C   81 mg at 06/16/23 0917    carvediloL tablet 12.5 mg  12.5 mg Oral BID WM BLAYNE Beckman-C   12.5 mg at 06/16/23 0817    dextrose 10% bolus 125 mL 125 mL  12.5 g Intravenous PRN BLAYNE Beckman-C        dextrose 10% bolus 250 mL 250 mL  25 g Intravenous PRN BLAYNE Beckman-C        dextrose 40 % gel 15,000 mg  15 g Oral PRN BLAYNE Beckman-C        dextrose 40 % gel 30,000 mg  30 g Oral PRN BLAYNE Beckman-JASIEL        ferrous sulfate tablet 1 each  1 tablet Oral Daily BLAYNE Beckman-C   1 each at 06/16/23 0917    glucagon (human recombinant) injection 1 mg  1 mg Intramuscular PRN BLAYNE Beckman-C        melatonin tablet 6 mg  6 mg Oral Nightly PRN BLAYNE Beckman-C        multivitamin tablet  1 tablet Oral Daily BLAYNE Beckman-C   1 tablet at 06/16/23 0917    naloxone 0.4 mg/mL injection 0.02 mg  0.02 mg Intravenous PRN BLAYNE Beckman-C        ondansetron disintegrating tablet 8 mg  8 mg Oral Q8H PRN Stephanie Hinds PA-C        ondansetron injection 4 mg  4 mg Intravenous Q8H PRN Stephanie Hinds PA-C        pantoprazole EC tablet 20 mg  20 mg Oral Daily Stephanie Hinds, PA-C   20 mg at 06/16/23 0917    polyethylene glycol packet 17 g  17 g  Oral BID Stephanie Hinds PA-C   17 g at 06/16/23 0918    sodium chloride 0.9% flush 10 mL  10 mL Intravenous PRN Stephanie Hinds PA-C         Current Discharge Medication List        CONTINUE these medications which have NOT CHANGED    Details   aspirin (ECOTRIN) 81 MG EC tablet Take 1 tablet (81 mg total) by mouth once daily.  Qty: 90 tablet, Refills: 3    Associated Diagnoses: Dyslipidemia      albuterol (PROVENTIL HFA) 90 mcg/actuation inhaler Inhale 2 puffs into the lungs every 6 (six) hours as needed for Wheezing. Rescue  Qty: 18 g, Refills: 1      apixaban (ELIQUIS) 2.5 mg Tab Take 1 tablet (2.5 mg total) by mouth 2 (two) times daily.  Qty: 180 tablet, Refills: 0    Associated Diagnoses: Persistent atrial fibrillation      benazepriL (LOTENSIN) 5 MG tablet Take 1 tablet (5 mg total) by mouth once daily.  Qty: 90 tablet, Refills: 0    Associated Diagnoses: CKD (chronic kidney disease) stage 3, GFR 30-59 ml/min; Essential hypertension      carvediloL (COREG) 12.5 MG tablet TAKE 1 TABLET TWICE DAILY WITH MEALS  Qty: 180 tablet, Refills: 3    Associated Diagnoses: Essential hypertension      ferrous sulfate (FEOSOL) 325 mg (65 mg iron) Tab tablet Take 1 tablet (325 mg total) by mouth daily with breakfast.  Qty: 90 tablet, Refills: 3    Associated Diagnoses: Chronic disease anemia      fluticasone propionate (FLONASE) 50 mcg/actuation nasal spray 2 sprays (100 mcg total) by Each Nostril route once daily.  Qty: 30 mL, Refills: 1    Associated Diagnoses: COVID-19 virus infection; Acute cough      guaiFENesin (MUCINEX) 600 mg 12 hr tablet Take 1 tablet (600 mg total) by mouth 2 (two) times daily.  Qty: 10 tablet, Refills: 0      multivitamin (ONE DAILY MULTIVITAMIN) per tablet Take 1 tablet by mouth once daily.    Associated Diagnoses: Chronic disease anemia      nitroGLYCERIN (NITROSTAT) 0.4 MG SL tablet Take one tablet every 5 minutes for chest pain. After the third tablet go to the ED.  Qty: 25 tablet,  Refills: 4    Associated Diagnoses: Persistent atrial fibrillation      omeprazole (PRILOSEC) 20 MG capsule Take 1 capsule (20 mg total) by mouth once daily.  Qty: 90 capsule, Refills: 3    Associated Diagnoses: Gastroesophageal reflux disease without esophagitis      polyethylene glycol (GLYCOLAX) 17 gram/dose powder Dissolve one capful (17 g) in liquid by mouth 3 (three) times daily as needed. Take 1 three times daily until well formed stool  Qty: 510 g, Refills: 0    Associated Diagnoses: Constipation, unspecified constipation type      potassium chloride (KLOR-CON) 8 MEQ TbSR TAKE 1 TABLET (8 MEQ TOTAL) BY MOUTH EVERY OTHER DAY.  Qty: 45 tablet, Refills: 3    Associated Diagnoses: CKD (chronic kidney disease) stage 3, GFR 30-59 ml/min      torsemide (DEMADEX) 20 MG Tab TAKE 1 TABLET EVERY DAY  Qty: 90 tablet, Refills: 3               I have seen and examined this patient within the last 30 days. My clinical findings that support the need for the home health skilled services and home bound status are the following:no   Weakness/numbness causing balance and gait disturbance due to Heart Failure and Weakness/Debility making it taxing to leave home.     Diet:   cardiac diet    Labs:  Per facility     Referrals/ Consults  Physical Therapy to evaluate and treat. Evaluate for home safety and equipment needs; Establish/upgrade home exercise program. Perform / instruct on therapeutic exercises, gait training, transfer training, and Range of Motion.  Occupational Therapy to evaluate and treat. Evaluate home environment for safety and equipment needs. Perform/Instruct on transfers, ADL training, ROM, and therapeutic exercises.   to evaluate for community resources/long-range planning.  Aide to provide assistance with personal care, ADLs, and vital signs.    Activities:   activity as tolerated    Nursing:   Agency to admit patient within 24 hours of hospital discharge unless specified on physician order or at  patient request    SN to complete comprehensive assessment including routine vital signs. Instruct on disease process and s/s of complications to report to MD. Review/verify medication list sent home with the patient at time of discharge  and instruct patient/caregiver as needed. Frequency may be adjusted depending on start of care date.     Skilled nurse to perform up to 3 visits PRN for symptoms related to diagnosis    Notify MD if SBP > 160 or < 90; DBP > 90 or < 50; HR > 120 or < 50; Temp > 101; O2 < 88%; Other:       Ok to schedule additional visits based on staff availability and patient request on consecutive days within the home health episode.    When multiple disciplines ordered:    Start of Care occurs on Sunday - Wednesday schedule remaining discipline evaluations as ordered on separate consecutive days following the start of care.    Thursday SOC -schedule subsequent evaluations Friday and Monday the following week.     Friday - Saturday SOC - schedule subsequent discipline evaluations on consecutive days starting Monday of the following week.    For all post-discharge communication and subsequent orders please contact patient's primary care physician. If unable to reach primary care physician or do not receive response within 30 minutes, please contact PCP for clinical staff order clarification    Miscellaneous   Heart Failure:      SN to instruct on the following:    Instruct on the definition of CHF.   Instruct on the signs/sympoms of CHF to be reported.   Instruct on and monitor daily weights.   Instruct on factors that cause exacerbation.   Instruct on action, dose, schedule, and side effects of medications.   Instruct on diet as prescribed.   Instruct on activity allowed.   Instruct on life-style modifications for life long management of CHF   SN to assess compliance with daily weights, diet, medications, fluid retention,    safety precautions, activities permitted and life-style  modifications.   Additional 1-2 SN visits per week as needed for signs and symptoms     of CHF exacerbation.      For Weight Gain > 2-3 lbs in 1 day or 4-6 lbs over 1 week notify PCP:  CHF DIURETIC SLIDING SCALE     Skilled nurse visits daily to instruct and monitor medication adherence until target weight. Then resume prior order frequency.     If weight gain exceeds 5 lbs over target weight, call MD  If weight gain of 3-4 lbs over target weight, then:     Increase Torsemide - Current dose (mg/day) to   double dose  and frequency of twice daily until target weight achieved or maximum 3 days.     If already on max oral daily dose, see IV diuretic instructions.    After 3 days of increased oral diuretic dose, get BMP. If patient is on increased oral diuretic dose greater than 5 days, repeat BMP at day 7 of increased dose.     Potassium supplementation   Scr > 1.5 mg/dl  Scr  1.5 mg/dl    K < 3.0 - NOTIFY MD and 40 mEq bid  40 mEq tid   K- 3.1-3.3  20 mEq bid  20 mEq tid    K 3.4-3.7  10 mEq bid  10 mEq tid      If target weight not reached after 5 days of increased oral diuretic, proceed to IV diuretic with daily patient contact to include face-to-face visit and telephone encounters.       Home Health Aide:  Nursing Three times weekly, Physical Therapy Three times weekly, Occupational Therapy Three times weekly, Medical Social Work Three times weekly, and Home Health Aide Three times weekly    Wound Care Orders  no    I certify that this patient is confined to his home and needs intermittent skilled nursing care, physical therapy, and occupational therapy.      Stephanie Hinds PA-C  [facility]   [facility] [address]

## 2023-06-17 LAB — BACTERIA UR CULT: ABNORMAL

## 2023-06-17 NOTE — DISCHARGE SUMMARY
[facility] [address] [place] [address] Medicine  Discharge Summary      Patient Name: Deena Moody  MRN: [id number]  HOMER: [id number]  Patient Class: OP- Observation  Admission Date: 6/14/2023  Hospital Length of Stay: 0 days  Discharge Date and Time: 6/16/2023  5:26 PM  Attending Physician: Kelly att. providers found   Discharging Provider: Stephanie Hinds PA-C  Primary Care Provider: Jam Rowell MD  [facility] Medicine Team: [facility]  Stephanie Hinds PA-C  Primary Care Team: [facility]    HPI:   Deena Moody is a 89 y.o. male with CAD with hx MI s/p CABG, HFpEF, complete AV block s/p PPM, permanent atrial fibrillation on OAC, aortic valve stenosis s/p AVR, hyperlipidemia, hypertension and CKD being admitted to hospital medicine for management of acute on chronic CHF. Patient reports noticing increased scrotal and bilateral lower extremity edema over the past few days. Endorses associated fatigue with exertion. States he takes his diuretic medications as prescribed as denies missing any doses. States these symptoms are consistent with prior CHF exacerbations. Denies shortness of breath, chest pain, abdominal pain, nausea, vomiting, diarrhea, fever or chills. No history of tobacco, alcohol or drug use.      In ED: hypertensive to SBP 170s, vitals otherwise stable. CBC without leukocytosis, hgb at baseline. CMP with elevated creatinine of 2.1 (baseline ~1.7), suspect cardiorenal in nature. BNP >1000, trop 0.035 but lower than baseline. CXR showing cardiomegaly, sternotomy wires, pacer leads, perihilar edema and right effusion. EKG with ventricular paced rhythm. Given 80 mg IV lasix in the ED.      * No surgery found *      Hospital Course:   Deena Moody was admitted to hospital medicine for management of acute on chronic CHF. Started on GI pathway with fluid restriction and IV diuresis with good urine output. Repeat TTE revealing G3DD with EF of 65%. Noted to have cardiorenal SARAH that  improved to baseline kidney function with treatment. Appeared euvolemic after appropriate diuresis and ready to return to home PO diuretics. Patient and daughter with concerns on how to prevent repeat admissions, given general instructions (daily weights, taking extra doses of diuretic for weight gain >3 lbs, etc) and referred to the transitional heart failure clinic for further resources and education. Patient currently lives alone with good family support but would benefit from  services to aid with medical management, orders placed prior to discharge.     On day of discharge patient's vital signs were stable and patient appeared clinically ready for discharge. Hospital course, discharge plan and return precautions discussed - patient expressed understanding. All questions answered at that time.        Goals of Care Treatment Preferences:  Code Status: Full Code      Consults:   Consults (From admission, onward)        Status Ordering Provider     Inpatient consult to Registered Dietitian/Nutritionist  Once        Provider:  (Not yet assigned)    Completed CANDIDO ROWE          No new Assessment & Plan notes have been filed under this hospital service since the last note was generated.  Service: Hospital Medicine    Final Active Diagnoses:    Diagnosis Date Noted POA    PRINCIPAL PROBLEM:  Acute on chronic diastolic congestive heart failure [I50.33] 05/27/2021 Yes    Acute renal failure superimposed on stage 3 chronic kidney disease [N17.9, N18.30] 12/03/2022 Yes    Aortic valve stenosis [I35.0] 06/15/2023 Yes    Pulmonary hypertension [I27.20] 02/01/2018 Yes     Chronic    Long term (current) use of anticoagulants [Z79.01] 07/11/2017 Not Applicable    Permanent atrial fibrillation [I48.21] 11/10/2016 Yes     Chronic    S/P placement of cardiac pacemaker [Z95.0] 09/14/2016 Yes     Chronic    Chronic diastolic heart failure [I50.32] 09/01/2016 Yes    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.2] 08/31/2016 Yes      Chronic    CAD (coronary artery disease) [I25.10] 08/31/2016 Yes     Chronic    S/P AVR (aortic valve replacement) with possible stenosis [Z95.2] 08/31/2016 Not Applicable     Chronic    History of heart bypass surgery [Z95.1] 10/26/2015 Not Applicable    Renal artery stenosis [I70.1] 10/26/2015 Yes    History of MI (myocardial infarction) [I25.2] 10/26/2015 Not Applicable    CKD (chronic kidney disease) stage 3, GFR 30-59 ml/min [N18.30] 11/18/2013 Yes     Chronic    Dyslipidemia [E78.5] 11/18/2013 Yes     Chronic    Essential hypertension [I10] 11/18/2013 Yes      Problems Resolved During this Admission:       Discharged Condition: good    Disposition: Home-Health Care c    Follow Up:   Follow-up Information     Jam Rowell MD Follow up.    Specialty: Family Medicine  Contact information:  [address] [facility]  [place] [id number] [phone]                       Patient Instructions:      Ambulatory referral/consult to Heart Failure Transitional Care Clinic   Standing Status: Future   Referral Priority: Routine Referral Type: Consultation   Referral Reason: Specialty Services Required   Requested Specialty: Cardiology   Number of Visits Requested: 1     Diet Cardiac     Call MD for:  temperature >100.4     Call MD for:  persistent nausea and vomiting or diarrhea     Call MD for:  severe uncontrolled pain     Call MD for:  redness, tenderness, or signs of infection (pain, swelling, redness, odor or green/yellow discharge around incision site)     Call MD for:  difficulty breathing or increased cough     Call MD for:  severe persistent headache     Call MD for:  worsening rash     Call MD for:  persistent dizziness, light-headedness, or visual disturbances     Call MD for:  increased confusion or weakness     Activity as tolerated       Significant Diagnostic Studies:     Lab Results   Component Value Date    WBC 5.80 06/16/2023    HGB 9.6 (L) 06/16/2023    HCT 30.7 (L) 06/16/2023     (H)  06/16/2023     (L) 06/16/2023       CMP  Sodium   Date Value Ref Range Status   06/16/2023 145 136 - 145 mmol/L Final     Potassium   Date Value Ref Range Status   06/16/2023 3.3 (L) 3.5 - 5.1 mmol/L Final     Chloride   Date Value Ref Range Status   06/16/2023 105 95 - 110 mmol/L Final     CO2   Date Value Ref Range Status   06/16/2023 30 (H) 23 - 29 mmol/L Final     Glucose   Date Value Ref Range Status   06/16/2023 84 70 - 110 mg/dL Final     BUN   Date Value Ref Range Status   06/16/2023 36 (H) 10 - 30 mg/dL Final     Creatinine   Date Value Ref Range Status   06/16/2023 1.7 (H) 0.5 - 1.4 mg/dL Final     Calcium   Date Value Ref Range Status   06/16/2023 8.7 8.7 - 10.5 mg/dL Final     Total Protein   Date Value Ref Range Status   06/15/2023 7.7 6.0 - 8.4 g/dL Final     Albumin   Date Value Ref Range Status   06/15/2023 3.5 3.5 - 5.2 g/dL Final     Total Bilirubin   Date Value Ref Range Status   06/15/2023 1.0 0.1 - 1.0 mg/dL Final     Comment:     For infants and newborns, interpretation of results should be based  on gestational age, weight and in agreement with clinical  observations.    Premature Infant recommended reference ranges:  Up to 24 hours.............<8.0 mg/dL  Up to 48 hours............<12.0 mg/dL  3-5 days..................<15.0 mg/dL  6-29 days.................<15.0 mg/dL       Alkaline Phosphatase   Date Value Ref Range Status   06/15/2023 141 (H) 55 - 135 U/L Final     AST   Date Value Ref Range Status   06/15/2023 26 10 - 40 U/L Final     ALT   Date Value Ref Range Status   06/15/2023 7 (L) 10 - 44 U/L Final     Anion Gap   Date Value Ref Range Status   06/16/2023 10 8 - 16 mmol/L Final     eGFR   Date Value Ref Range Status   06/16/2023 37.4 (A) >60 mL/min/1.73 m^2 Final     Imaging Results          X-Ray Hand 3 view Left (Final result)  Result time 06/15/23 02:09:43    Final result by Krishna West MD (06/15/23 02:09:43)                 Impression:      No acute  fracture.    Degenerative changes at the greater multangular joint with lateral subluxation of the 1st metacarpal relative to the trapezium.    Joint space narrowing at the IP joint of the thumb with periarticular erosive changes. Soft tissue swelling throughout the thumb.  Correlate clinically for erosive arthritis such as gout or septic arthritis.    Additional findings as above.      Electronically signed by: Krishna West MD  Date:    06/15/2023  Time:    02:09             Narrative:    EXAMINATION:  XR HAND COMPLETE 3 VIEW LEFT    CLINICAL HISTORY:  swollen thumb;    TECHNIQUE:  PA, lateral, and oblique views of the left hand were performed.    COMPARISON:  None    FINDINGS:  No acute displaced fracture or dislocation.  Degenerative changes at the greater multangular joint with lateral subluxation of the 1st metacarpal relative to the trapezium.  Joint space narrowing at the IP joint of the thumb with periarticular erosive changes.  Soft tissue swelling throughout the thumb.  Moderate degenerative changes present at the IP joint of the remaining digits.      Vascular calcifications present.                               X-Ray Chest AP Portable (Final result)  Result time 06/15/23 01:38:32    Final result by Krishna West MD (06/15/23 01:38:32)                 Impression:      Cardiomegaly, sternotomy wires, pacer leads, perihilar edema and right effusion, similar to prior.      Electronically signed by: Krishna West MD  Date:    06/15/2023  Time:    01:38             Narrative:    EXAMINATION:  XR CHEST AP PORTABLE    CLINICAL HISTORY:  CHF;    TECHNIQUE:  Single frontal view of the chest was performed.    COMPARISON:  12/03/2022.    FINDINGS:  Cardiomegaly, sternotomy wires, pacer leads, perihilar edema and right effusion, similar to prior.    Heart and lungs  appear unchanged when allowing for differences in technique and positioning.                              Results for orders placed during the  hospital encounter of 06/14/23    Echo    Interpretation Summary  · The left ventricle is normal in size with concentric remodeling and normal systolic function.  · The estimated ejection fraction is 65%.  · Grade III left ventricular diastolic dysfunction.  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  · The aortic valve mean gradient is 16 mmHg with a dimensionless index of 0.39.  · Mild-to-moderate mitral regurgitation.  · Mild right ventricular enlargement with mildly to moderately reduced right ventricular systolic function.  · Mild tricuspid regurgitation.  · Estimated PASP is at least 65mmHg.  · Moderate left atrial enlargement.      Pending Diagnostic Studies:     None         Medications:  Reconciled Home Medications:      Medication List      CONTINUE taking these medications    albuterol 90 mcg/actuation inhaler  Commonly known as: PROVENTIL HFA  Inhale 2 puffs into the lungs every 6 (six) hours as needed for Wheezing. Rescue     apixaban 2.5 mg Tab  Commonly known as: ELIQUIS  Take 1 tablet (2.5 mg total) by mouth 2 (two) times daily.     aspirin 81 MG EC tablet  Commonly known as: ECOTRIN  Take 1 tablet (81 mg total) by mouth once daily.     benazepriL 5 MG tablet  Commonly known as: LOTENSIN  Take 1 tablet (5 mg total) by mouth once daily.     carvediloL 12.5 MG tablet  Commonly known as: COREG  TAKE 1 TABLET TWICE DAILY WITH MEALS     ferrous sulfate 325 mg (65 mg iron) Tab tablet  Commonly known as: FEOSOL  Take 1 tablet (325 mg total) by mouth daily with breakfast.     fluticasone propionate 50 mcg/actuation nasal spray  Commonly known as: FLONASE  2 sprays (100 mcg total) by Each Nostril route once daily.     MUCUS RELIEF  mg 12 hr tablet  Generic drug: guaiFENesin  Take 1 tablet (600 mg total) by mouth 2 (two) times daily.     multivitamin per tablet  Commonly known as: ONE DAILY MULTIVITAMIN  Take 1 tablet by mouth once daily.     nitroGLYCERIN 0.4 MG SL tablet  Commonly known as:  NITROSTAT  Take one tablet every 5 minutes for chest pain. After the third tablet go to the ED.     omeprazole 20 MG capsule  Commonly known as: PRILOSEC  Take 1 capsule (20 mg total) by mouth once daily.     polyethylene glycol 17 gram/dose powder  Commonly known as: GLYCOLAX  Dissolve one capful (17 g) in liquid by mouth 3 (three) times daily as needed. Take 1 three times daily until well formed stool     potassium chloride 8 MEQ Tbsr  Commonly known as: KLOR-CON  TAKE 1 TABLET (8 MEQ TOTAL) BY MOUTH EVERY OTHER DAY.     torsemide 20 MG Tab  Commonly known as: DEMADEX  TAKE 1 TABLET EVERY DAY            Indwelling Lines/Drains at time of discharge:   Lines/Drains/Airways     None                 Time spent on the discharge of patient: 36 minutes         Stephanie Hinds PA-C  Department of Hospital Medicine  [facility]

## 2023-06-17 NOTE — HOSPITAL COURSE
Deena Moody was admitted to [facility] medicine for management of acute on chronic CHF. Started on GI pathway with fluid restriction and IV diuresis with good urine output. Repeat TTE revealing G3DD with EF of 65%. Noted to have cardiorenal SARAH that improved to baseline kidney function with treatment. Appeared euvolemic after appropriate diuresis and ready to return to home PO diuretics. Patient and daughter with concerns on how to prevent repeat admissions, given general instructions (daily weights, taking extra doses of diuretic for weight gain >3 lbs, etc) and referred to the transitional heart failure clinic for further resources and education. Patient currently lives alone with good family support but would benefit from  services to aid with medical management, orders placed prior to discharge.     On day of discharge patient's vital signs were stable and patient appeared clinically ready for discharge. Hospital course, discharge plan and return precautions discussed - patient expressed understanding. All questions answered at that time.

## 2023-06-19 DIAGNOSIS — I50.33 ACUTE ON CHRONIC DIASTOLIC CONGESTIVE HEART FAILURE: Primary | ICD-10-CM

## 2023-06-20 DIAGNOSIS — I10 ESSENTIAL HYPERTENSION: ICD-10-CM

## 2023-06-20 DIAGNOSIS — N18.30 CKD (CHRONIC KIDNEY DISEASE) STAGE 3, GFR 30-59 ML/MIN: ICD-10-CM

## 2023-06-20 RX ORDER — BENAZEPRIL HYDROCHLORIDE 5 MG/1
TABLET ORAL
Qty: 90 TABLET | Refills: 3 | Status: SHIPPED | OUTPATIENT
Start: 2023-06-20 | End: 2023-07-25 | Stop reason: SDUPTHER

## 2023-06-21 ENCOUNTER — TELEPHONE (OUTPATIENT)
Dept: CARDIOLOGY | Facility: CLINIC | Age: 88
End: 2023-06-21
Payer: MEDICARE

## 2023-06-21 NOTE — TELEPHONE ENCOUNTER
"Heart Failure Transitional Care Clinic([facility]) hospital discharge 48-72 hour phone follow up completed.     Most Recent Hospital Discharge Date: June 16, 2023    Last admission Diagnosis/chief complaint:SOB    TCC RN Navigator spoke with pt's daughter Amelie    Current Patient reported weight: Pt daughter did not have readings for today.    Current Patient reported blood pressure and heart rate: Pt daughter did not have readings for today.    Pt reports the following:  []  Shortness of Breath with Activity  []  Shortness of Breath at rest   []  Fatigue  []  Edema   [] Chest pain or tightness  [] Weight Increase since discharge  [x] None of the above    Medications:   Discharge medication reviewed with pt.  Pt reports having medication list available and has all medications at home for use per list.   Pt daughter denies needs for medication at this time.      Education:   Confirmed pt received "Home Care Guide for Heart Failure Patients" while admitted.  Reviewed key points as listed below.     Recommend 2 -3 gram sodium restriction and 1500 cc-2000 cc fluid restriction.  Encourage physical activity with graded exercise program.  Requested patient to weigh themselves daily, and to notify us if their weight increases by more than 3 lbs in 1 day or 5 lbs in 3 days.   Reminded patient to use "Daily weight and symptom tracker" to record and guide patient on when and how to call HFTCC. PT may also use symptom tracker if no scale available  Pt reports being in the GREEN Zone. If in yellow/red, reminded that they should be calling HFTCC today or now.     Watch for these Signs and Symptoms: If any of these occur, contact HFTCC immediately:   Increase in shortness of breath with movement   Increase in swelling in your legs and ankles   Weight gain of more than 3 pounds in a day or 5 pounds in 3 days.   Difficulty breathing when you are lying down   Worsening fatigue or tiredness   Stomach bloating, a full feeling or a loss of " appetite   Increased coughing--especially when you are lying down      Pt was able to verbalize back to RN in their own words correct diet/fluid restrictions, necessity for exercise, warning signs and symptoms, when and how to contact their TCC team.      Pt educated on follow-up plan while in HFTCC program to include:   Week 1 -  F/u appt with Provider and RN (Date) June 30, 2023 at 1 PM   Week 2-5 - In person/ Virtual/ phone call check ins    Week 5-7 - Pt will discharge from HFTCC and transition to longterm care provider (Cardiology/PCP/ Advanced Heart Failure).      Patient active on myChart? Yes      Pt given the following contact information for ease of communication: [phone] (Mon-Fri, 8a-5p) & for urgent issues on the weekend to page the Heart Transplant MD on call.  Pt also encouraged utilize [facility] messaging as well.      Will follow up with pt at first clinic visit and RN navigator available for pt questions, issues or concerns.

## 2023-06-26 NOTE — PROGRESS NOTES
HF TCC Provider Note (Initial Clinic) Consult Note    Date of original referral: 6/16/23  Age: 89 y.o.  Gender: male  Ethnicity: White  Number of admissions for CHF within the preceding year: 2   Type of Congestive Heart Failure: Diastolic   Etiology: Ischemic and Valvular   Enrolled in Infusion suite: no    Diagnostic Labs:   EKG - 06/15/2023  CXR - 06/15/2023  ECHO - 06/15/2023  Stress test -   Stress echo -   Pharmacologic stress -   Cardiac catheterization -    Cardiac MRI -     Lab Results   Component Value Date     06/16/2023     06/15/2023    K 3.3 (L) 06/16/2023    K 3.9 06/15/2023     06/16/2023    CL 99 06/15/2023    CO2 30 (H) 06/16/2023    CO2 29 06/15/2023    GLU 84 06/16/2023     (H) 06/15/2023    BUN 36 (H) 06/16/2023    BUN 35 (H) 06/15/2023    CREATININE 1.7 (H) 06/16/2023    CREATININE 2.1 (H) 06/15/2023    CALCIUM 8.7 06/16/2023    CALCIUM 9.3 06/15/2023    PROT 7.7 06/15/2023    PROT 7.3 12/06/2022    ALBUMIN 3.5 06/15/2023    ALBUMIN 3.0 (L) 12/06/2022    BILITOT 1.0 06/15/2023    BILITOT 0.9 12/06/2022    ALKPHOS 141 (H) 06/15/2023    ALKPHOS 131 12/06/2022    AST 26 06/15/2023    AST 17 12/06/2022    ALT 7 (L) 06/15/2023    ALT 12 12/06/2022    ANIONGAP 10 06/16/2023    ANIONGAP 14 06/15/2023    ESTGFRAFRICA 55.5 (A) 05/28/2021    ESTGFRAFRICA >60.0 05/27/2021    EGFRNONAA 48.0 (A) 05/28/2021    EGFRNONAA 52.9 (A) 05/27/2021       Lab Results   Component Value Date    WBC 5.80 06/16/2023    WBC 9.07 06/15/2023    RBC 3.05 (L) 06/16/2023    RBC 3.50 (L) 06/15/2023    HGB 9.6 (L) 06/16/2023    HGB 11.0 (L) 06/15/2023    HCT 30.7 (L) 06/16/2023    HCT 35.2 (L) 06/15/2023     (H) 06/16/2023     (H) 06/15/2023    MCH 31.5 (H) 06/16/2023    MCH 31.4 (H) 06/15/2023    MCHC 31.3 (L) 06/16/2023    MCHC 31.3 (L) 06/15/2023    RDW 15.5 (H) 06/16/2023    RDW 15.5 (H) 06/15/2023     (L) 06/16/2023     06/15/2023    MPV 9.6 06/16/2023    MPV 9.0 (L)  06/15/2023    IMMGR 0.2 06/16/2023    IMMGR 0.2 06/15/2023    IGABS 0.01 06/16/2023    IGABS 0.02 06/15/2023    LYMPH 1.0 06/16/2023    LYMPH 17.6 (L) 06/16/2023    MONO 0.6 06/16/2023    MONO 9.5 06/16/2023    EOS 0.3 06/16/2023    EOS 0.5 06/15/2023    BASO 0.04 06/16/2023    BASO 0.06 06/15/2023    NRBC 0 06/16/2023    NRBC 0 06/15/2023    GRAN 3.9 06/16/2023    GRAN 66.3 06/16/2023    EOSINOPHIL 5.7 06/16/2023    EOSINOPHIL 5.2 06/15/2023    BASOPHIL 0.7 06/16/2023    BASOPHIL 0.7 06/15/2023    PLTEST Normal 02/11/2012    PLTEST Normal 06/25/2004    ANISO sl 02/11/2012    ANISO 1+ (A) 06/25/2004    HYPO 1+ (A) 06/25/2004    HYPO sl 06/10/2004       Lab Results   Component Value Date    BNP 1,039 (H) 06/15/2023     (H) 12/07/2022    MG 2.5 06/16/2023    MG 2.5 06/15/2023    PHOS 3.2 06/16/2023    PHOS 3.3 12/06/2022    TROPONINI 0.035 (H) 06/15/2023    TROPONINI 0.033 (H) 06/15/2023    HGBA1C 5.1 06/15/2023    HGBA1C 5.0 12/04/2022    TSH 2.709 12/30/2017    TSH 2.195 05/08/2017       Lab Results   Component Value Date    CHOL 116 (L) 07/02/2018    TRIG 68 07/02/2018    HDL 34 (L) 07/02/2018    LDLCALC 68.4 07/02/2018    CHOLHDL 29.3 07/02/2018    TOTALCHOLEST 3.4 07/02/2018    NONHDLCHOL 82 07/02/2018    COLORU Yellow 06/15/2023    APPEARANCEUA Clear 06/15/2023    PHUR 8.0 06/15/2023    SPECGRAV 1.010 06/15/2023    PROTEINUA Negative 06/15/2023    GLUCUA Negative 06/15/2023    KETONESU Negative 06/15/2023    BILIRUBINUA Negative 06/15/2023    OCCULTUA Trace (A) 06/15/2023    NITRITE Negative 06/15/2023    LEUKOCYTESUR 1+ (A) 06/15/2023       List all implanted cardiac devices:   Pacemaker: dual chamber      Current Outpatient Medications on File Prior to Visit   Medication Sig Dispense Refill    albuterol (PROVENTIL HFA) 90 mcg/actuation inhaler Inhale 2 puffs into the lungs every 6 (six) hours as needed for Wheezing. Rescue 18 g 1    apixaban (ELIQUIS) 2.5 mg Tab Take 1 tablet (2.5 mg total) by mouth 2  (two) times daily. 180 tablet 0    aspirin (ECOTRIN) 81 MG EC tablet Take 1 tablet (81 mg total) by mouth once daily. 90 tablet 3    benazepriL (LOTENSIN) 5 MG tablet TAKE 1 TABLET(5 MG) BY MOUTH EVERY DAY 90 tablet 3    carvediloL (COREG) 12.5 MG tablet TAKE 1 TABLET TWICE DAILY WITH MEALS 180 tablet 3    ferrous sulfate (FEOSOL) 325 mg (65 mg iron) Tab tablet Take 1 tablet (325 mg total) by mouth daily with breakfast. 90 tablet 3    fluticasone propionate (FLONASE) 50 mcg/actuation nasal spray 2 sprays (100 mcg total) by Each Nostril route once daily. 30 mL 1    guaiFENesin (MUCINEX) 600 mg 12 hr tablet Take 1 tablet (600 mg total) by mouth 2 (two) times daily. 10 tablet 0    multivitamin (ONE DAILY MULTIVITAMIN) per tablet Take 1 tablet by mouth once daily.      nitroGLYCERIN (NITROSTAT) 0.4 MG SL tablet Take one tablet every 5 minutes for chest pain. After the third tablet go to the ED. 25 tablet 4    omeprazole (PRILOSEC) 20 MG capsule Take 1 capsule (20 mg total) by mouth once daily. 30 capsule 0    polyethylene glycol (GLYCOLAX) 17 gram/dose powder Dissolve one capful (17 g) in liquid by mouth 3 (three) times daily as needed. Take 1 three times daily until well formed stool 510 g 0    potassium chloride (KLOR-CON) 8 MEQ TbSR TAKE 1 TABLET (8 MEQ TOTAL) BY MOUTH EVERY OTHER DAY. 45 tablet 3    torsemide (DEMADEX) 20 MG Tab TAKE 1 TABLET EVERY DAY 90 tablet 3     No current facility-administered medications on file prior to visit.         HPI:  Patient denies appreciable SOB since discharge. Presents to clinic in wheelchair    Patient sleeps on 1 number of pillows   Patient wakes up SOB, has to get out of bed, associated cough- denies   Palpitations - denies    Dizzy, light-headed, pre-syncope or syncope- denies   Since discharge frequency of performing weights, home weight and weight change- 140lbs on home scale   Other information felt pertinent to HPI: Mr. Deena Moody is a 89 yo male with a PMHx of CAD  s/p CABG, HFpEF, CHB s/p PPM, permanent AF on OAC, AS s/p AVR, HLD, HTN and CKD who presents to first HFTCC visit following recent Obs admission for ADHF. In ED BNP >1000, trop 0.035 (at baseline). CXR showing cardiomegaly, sternotomy wires, pacer leads, perihilar edema and right effusion. EKG with ventricular paced rhythm. He was admitted to Obs and adequately diuresed with IVP lasix 40mg BID.     PHYSICAL:   Vitals:    06/30/23 1311   BP: (!) 151/68   Pulse: 70      Wt Readings from Last 3 Encounters:   06/30/23 65.1 kg (143 lb 9.6 oz)   06/15/23 63.5 kg (140 lb)   02/24/23 63.5 kg (140 lb)       JVD: yes, 3cm above clavicle sitting   Heart rhythm: regular  Cardiac murmur: Yes - best heard at RUSB    S3: yes  S4: no  Lungs: clear  Hepatojugular reflux: yes  Edema: yes, 2+ BLE      Echo 6/15/23:  The left ventricle is normal in size with concentric remodeling and normal systolic function.  The estimated ejection fraction is 65%.  Grade III left ventricular diastolic dysfunction.  There is a bioprosthetic aortic valve present.  The aortic valve mean gradient is 16 mmHg with a dimensionless index of 0.39.  Mild-to-moderate mitral regurgitation.  Mild right ventricular enlargement with mildly to moderately reduced right ventricular systolic function.  Mild tricuspid regurgitation.  Estimated PASP is at least 65mmHg.  Moderate left atrial enlargement.    ASSESSMENT: Chronic diastolic HF    PLAN:      Patient Instructions:   Instruct the patient to notify this clinic if HH, a physician or an advanced care provider wants to change medication one of their HF medications   Activity and Diet restrictions:   Recommend 2-3 gram sodium restriction and 1500cc- 2000cc fluid restriction.  Encourage physical activity with graded exercise program.  Requested patient to weigh themselves daily, and to notify us if their weight increases by more than 3 lbs in 1 day or 5 lbs in 3 days.    Assigned dry weight on home scale:  unsure  Medication changes (include current dose and changed dose): NYHA Class II-III symptoms. Did not take torsemide yesterday 2/2 running out of prescription. Volume up on exam. Increase torsemide frequency to 20mg BID with potassium 8meq daily.  Upcoming labs and date anticipated: plan for phone check in Monday to assess diuretic response and RTC in 1 week prn    Gudelia Dawkins PA-C

## 2023-06-28 RX ORDER — TORSEMIDE 20 MG/1
20 TABLET ORAL DAILY
Qty: 90 TABLET | Refills: 3 | Status: SHIPPED | OUTPATIENT
Start: 2023-06-28 | End: 2023-06-30

## 2023-06-28 NOTE — TELEPHONE ENCOUNTER
Refill Routing Note   Medication(s) are not appropriate for processing by [facility] for the following reason(s):      Patient seen in ED/Hospital since LOV with PCP  Required labs abnormal  Required vitals abnormal    [facility] action(s):  Defer Care Due:  None identified          Appointments  past 12m or future 3m with PCP    Date Provider   Last Visit   6/3/2021 Jam Rowell MD   Next Visit   7/25/2023 Jam Rowell MD   ED visits in past 90 days: 0        Note composed:3:26 PM 06/28/2023

## 2023-06-28 NOTE — TELEPHONE ENCOUNTER
No care due was identified.  [facility] Embedded Care Due Messages. Reference number: [id number].   6/28/2023 2:39:36 PM CDT   [FreeTextEntry1] : headache- may be secondary from tension headache versus migraine versus sinus headache\par c/w Tylenol, if pain becomes severe, can take Tramadol,  reviewed, Reference #: [id number]\par c/w Flonase nasal spray\par take Azithromycin as directed for possible underlying sinus headache\par return or call if symptoms worsen or don't improve- would consider imaging at that time, neurology evaluation

## 2023-06-29 ENCOUNTER — EXTERNAL HOME HEALTH (OUTPATIENT)
Dept: HOME HEALTH SERVICES | Facility: HOSPITAL | Age: 88
End: 2023-06-29
Payer: MEDICARE

## 2023-06-30 ENCOUNTER — OFFICE VISIT (OUTPATIENT)
Dept: CARDIOLOGY | Facility: CLINIC | Age: 88
End: 2023-06-30
Payer: MEDICARE

## 2023-06-30 ENCOUNTER — LAB VISIT (OUTPATIENT)
Dept: LAB | Facility: HOSPITAL | Age: 88
End: 2023-06-30
Payer: MEDICARE

## 2023-06-30 VITALS
SYSTOLIC BLOOD PRESSURE: 151 MMHG | DIASTOLIC BLOOD PRESSURE: 68 MMHG | HEART RATE: 70 BPM | WEIGHT: 143.63 LBS | BODY MASS INDEX: 22.54 KG/M2 | HEIGHT: 67 IN | OXYGEN SATURATION: 96 %

## 2023-06-30 DIAGNOSIS — Z95.0 S/P PLACEMENT OF CARDIAC PACEMAKER: Chronic | ICD-10-CM

## 2023-06-30 DIAGNOSIS — I44.2 COMPLETE AV BLOCK: Chronic | ICD-10-CM

## 2023-06-30 DIAGNOSIS — Z95.1 HISTORY OF HEART BYPASS SURGERY: ICD-10-CM

## 2023-06-30 DIAGNOSIS — I70.1 RENAL ARTERY STENOSIS: ICD-10-CM

## 2023-06-30 DIAGNOSIS — I50.33 ACUTE ON CHRONIC DIASTOLIC CONGESTIVE HEART FAILURE: Primary | ICD-10-CM

## 2023-06-30 DIAGNOSIS — I50.33 ACUTE ON CHRONIC DIASTOLIC CONGESTIVE HEART FAILURE: ICD-10-CM

## 2023-06-30 DIAGNOSIS — Z95.2 S/P AVR (AORTIC VALVE REPLACEMENT): Chronic | ICD-10-CM

## 2023-06-30 DIAGNOSIS — Z79.01 LONG TERM (CURRENT) USE OF ANTICOAGULANTS: ICD-10-CM

## 2023-06-30 DIAGNOSIS — E78.5 DYSLIPIDEMIA: Chronic | ICD-10-CM

## 2023-06-30 DIAGNOSIS — I48.21 PERMANENT ATRIAL FIBRILLATION: Chronic | ICD-10-CM

## 2023-06-30 DIAGNOSIS — N18.32 STAGE 3B CHRONIC KIDNEY DISEASE: ICD-10-CM

## 2023-06-30 DIAGNOSIS — I70.0 ATHEROSCLEROSIS OF AORTA: ICD-10-CM

## 2023-06-30 DIAGNOSIS — I35.0 AORTIC VALVE STENOSIS, ETIOLOGY OF CARDIAC VALVE DISEASE UNSPECIFIED: ICD-10-CM

## 2023-06-30 DIAGNOSIS — I10 ESSENTIAL HYPERTENSION: ICD-10-CM

## 2023-06-30 LAB
ALBUMIN SERPL BCP-MCNC: 3.3 G/DL (ref 3.5–5.2)
ALP SERPL-CCNC: 113 U/L (ref 55–135)
ALT SERPL W/O P-5'-P-CCNC: 8 U/L (ref 10–44)
ANION GAP SERPL CALC-SCNC: 14 MMOL/L (ref 8–16)
AST SERPL-CCNC: 13 U/L (ref 10–40)
BASOPHILS # BLD AUTO: 0.06 K/UL (ref 0–0.2)
BASOPHILS NFR BLD: 0.7 % (ref 0–1.9)
BILIRUB SERPL-MCNC: 1.4 MG/DL (ref 0.1–1)
BNP SERPL-MCNC: 988 PG/ML (ref 0–99)
BUN SERPL-MCNC: 51 MG/DL (ref 10–30)
CALCIUM SERPL-MCNC: 8.8 MG/DL (ref 8.7–10.5)
CHLORIDE SERPL-SCNC: 103 MMOL/L (ref 95–110)
CO2 SERPL-SCNC: 25 MMOL/L (ref 23–29)
CREAT SERPL-MCNC: 2 MG/DL (ref 0.5–1.4)
DIFFERENTIAL METHOD: ABNORMAL
EOSINOPHIL # BLD AUTO: 0.3 K/UL (ref 0–0.5)
EOSINOPHIL NFR BLD: 3.5 % (ref 0–8)
ERYTHROCYTE [DISTWIDTH] IN BLOOD BY AUTOMATED COUNT: 14.9 % (ref 11.5–14.5)
EST. GFR  (NO RACE VARIABLE): 30.7 ML/MIN/1.73 M^2
GLUCOSE SERPL-MCNC: 93 MG/DL (ref 70–110)
HCT VFR BLD AUTO: 31.4 % (ref 40–54)
HGB BLD-MCNC: 9.6 G/DL (ref 14–18)
IMM GRANULOCYTES # BLD AUTO: 0.03 K/UL (ref 0–0.04)
IMM GRANULOCYTES NFR BLD AUTO: 0.3 % (ref 0–0.5)
LYMPHOCYTES # BLD AUTO: 1.4 K/UL (ref 1–4.8)
LYMPHOCYTES NFR BLD: 16.5 % (ref 18–48)
MAGNESIUM SERPL-MCNC: 2.5 MG/DL (ref 1.6–2.6)
MCH RBC QN AUTO: 30.3 PG (ref 27–31)
MCHC RBC AUTO-ENTMCNC: 30.6 G/DL (ref 32–36)
MCV RBC AUTO: 99 FL (ref 82–98)
MONOCYTES # BLD AUTO: 1 K/UL (ref 0.3–1)
MONOCYTES NFR BLD: 11.1 % (ref 4–15)
NEUTROPHILS # BLD AUTO: 5.8 K/UL (ref 1.8–7.7)
NEUTROPHILS NFR BLD: 67.9 % (ref 38–73)
NRBC BLD-RTO: 0 /100 WBC
PHOSPHATE SERPL-MCNC: 3.6 MG/DL (ref 2.7–4.5)
PLATELET # BLD AUTO: 172 K/UL (ref 150–450)
PMV BLD AUTO: 8.9 FL (ref 9.2–12.9)
POTASSIUM SERPL-SCNC: 4.2 MMOL/L (ref 3.5–5.1)
PROT SERPL-MCNC: 8 G/DL (ref 6–8.4)
RBC # BLD AUTO: 3.17 M/UL (ref 4.6–6.2)
SODIUM SERPL-SCNC: 142 MMOL/L (ref 136–145)
WBC # BLD AUTO: 8.59 K/UL (ref 3.9–12.7)

## 2023-06-30 PROCEDURE — 36415 COLL VENOUS BLD VENIPUNCTURE: CPT

## 2023-06-30 PROCEDURE — 1101F PT FALLS ASSESS-DOCD LE1/YR: CPT | Mod: CPTII,S$GLB,,

## 2023-06-30 PROCEDURE — 99999 PR PBB SHADOW E&M-EST. PATIENT-LVL IV: CPT | Mod: PBBFAC,,,

## 2023-06-30 PROCEDURE — 83880 ASSAY OF NATRIURETIC PEPTIDE: CPT

## 2023-06-30 PROCEDURE — 3288F FALL RISK ASSESSMENT DOCD: CPT | Mod: CPTII,S$GLB,,

## 2023-06-30 PROCEDURE — 99214 PR OFFICE/OUTPT VISIT, EST, LEVL IV, 30-39 MIN: ICD-10-PCS | Mod: S$GLB,,,

## 2023-06-30 PROCEDURE — 1126F PR PAIN SEVERITY QUANTIFIED, NO PAIN PRESENT: ICD-10-PCS | Mod: CPTII,S$GLB,,

## 2023-06-30 PROCEDURE — 85025 COMPLETE CBC W/AUTO DIFF WBC: CPT

## 2023-06-30 PROCEDURE — 80053 COMPREHEN METABOLIC PANEL: CPT

## 2023-06-30 PROCEDURE — 99214 OFFICE O/P EST MOD 30 MIN: CPT | Mod: S$GLB,,,

## 2023-06-30 PROCEDURE — 3288F PR FALLS RISK ASSESSMENT DOCUMENTED: ICD-10-PCS | Mod: CPTII,S$GLB,,

## 2023-06-30 PROCEDURE — 1126F AMNT PAIN NOTED NONE PRSNT: CPT | Mod: CPTII,S$GLB,,

## 2023-06-30 PROCEDURE — 99999 PR PBB SHADOW E&M-EST. PATIENT-LVL IV: ICD-10-PCS | Mod: PBBFAC,,,

## 2023-06-30 PROCEDURE — 1101F PR PT FALLS ASSESS DOC 0-1 FALLS W/OUT INJ PAST YR: ICD-10-PCS | Mod: CPTII,S$GLB,,

## 2023-06-30 PROCEDURE — 83735 ASSAY OF MAGNESIUM: CPT

## 2023-06-30 PROCEDURE — 1159F PR MEDICATION LIST DOCUMENTED IN MEDICAL RECORD: ICD-10-PCS | Mod: CPTII,S$GLB,,

## 2023-06-30 PROCEDURE — 1159F MED LIST DOCD IN RCRD: CPT | Mod: CPTII,S$GLB,,

## 2023-06-30 PROCEDURE — 84100 ASSAY OF PHOSPHORUS: CPT

## 2023-06-30 RX ORDER — TORSEMIDE 20 MG/1
20 TABLET ORAL 2 TIMES DAILY
Qty: 90 TABLET | Refills: 6 | Status: SHIPPED | OUTPATIENT
Start: 2023-06-30 | End: 2023-07-25 | Stop reason: SDUPTHER

## 2023-06-30 RX ORDER — POTASSIUM CHLORIDE 600 MG/1
8 TABLET, FILM COATED, EXTENDED RELEASE ORAL DAILY
Qty: 30 TABLET | Refills: 11 | Status: SHIPPED | OUTPATIENT
Start: 2023-06-30 | End: 2023-07-25 | Stop reason: SDUPTHER

## 2023-07-03 ENCOUNTER — TELEPHONE (OUTPATIENT)
Dept: CARDIOLOGY | Facility: CLINIC | Age: 88
End: 2023-07-03
Payer: MEDICARE

## 2023-07-03 NOTE — PROGRESS NOTES
HF TCC Provider Note (Follow-up) Consult Note      HPI:     Patient denies appreciable SOB since discharge. Presents to clinic in wheelchair.               Patient sleeps on 1 number of pillows              Patient wakes up SOB, has to get out of bed, associated cough- denies PND symptoms. Endorses continued insomnia              Palpitations - denies               Dizzy, light-headed, pre-syncope or syncope- denies              Other information felt pertinent to HPI: Mr. Deena Moody is a 89 yo male with a PMHx of CAD s/p CABG, HFpEF, CHB s/p PPM, permanent AF on OAC, AS s/p AVR, HLD, HTN and CKD who presents to second HFTCC visit following recent Obs admission for ADHF. In ED BNP >1000, trop 0.035 (at baseline). CXR showing cardiomegaly, sternotomy wires, pacer leads, perihilar edema and right effusion. EKG with ventricular paced rhythm. He was admitted to Obs and adequately diuresed with IVP lasix 40mg BID.    7/7/23: Last visit we increased torsemide frequency to 20mg BID with potassium 8meq daily. Today his only complaint is L knee pain. Onset of pain ~2 weeks ago. Pain continuous, rated 9/10 in severity and described as a shooting/throbbing pain. Worsened with extending leg. History of OA. Denies precipitating falls/trauma or history of gout. Associated warmth, edema and erythema to joint. Denies fevers/chills. Otherwise denies worsening SOB.         PHYSICAL:   Vitals:    07/07/23 1338   BP: (!) 153/67   Pulse: 70      Wt Readings from Last 3 Encounters:   07/07/23 62.6 kg (138 lb)   06/30/23 65.1 kg (143 lb 9.6 oz)   06/15/23 63.5 kg (140 lb)       JVD: yes, 2cm above clavicle sitting   Heart rhythm: regular  Cardiac murmur: Yes - best heard at RUSB    S3: no  S4: no  Lungs: faint crackles in posterior lung bases  Hepatojugular reflux: yes  Edema: yes, 1+ BLE      Lab Results   Component Value Date     07/07/2023    K 3.8 07/07/2023    MG 2.3 07/07/2023     07/07/2023    CO2 30 (H) 07/07/2023     BUN 52 (H) 07/07/2023    CREATININE 2.2 (H) 07/07/2023    GLU 94 07/07/2023    CALCIUM 8.9 07/07/2023    AST 14 07/07/2023    ALT 6 (L) 07/07/2023    ALBUMIN 3.2 (L) 07/07/2023    PROT 7.9 07/07/2023    BILITOT 1.1 (H) 07/07/2023     Lab Results   Component Value Date     (H) 07/07/2023     (H) 06/30/2023    BNP 1,039 (H) 06/15/2023       ASSESSMENT: Chronic diastolic HF    PLAN:      Patient Instructions:   Instruct the patient to notify this clinic if HH, a physician or an advanced care provider wants to change medication one of their HF medications   Activity and Diet restrictions:   Recommend 2-3 gram sodium restriction and 1500cc- 2000cc fluid restriction.  Encourage physical activity with graded exercise program.  Requested patient to weigh themselves daily, and to notify us if their weight increases by more than 3 lbs in 1 day or 5 lbs in 3 days.    Assigned dry weight on home scale: unsure  Medication changes (include current dose and changed dose): NYHA Class II-III symptoms. Increased fluid volume on exam, improving from previous visit. Weight and BNP downtrending (1000->770). Continue torsemide 20mg BID. Orthopedics referral for further evaluation of acute L knee pain. Will message PCP staff if can arrange urgent clinic visit as well.      RD education provided during visit.    Upcoming labs and date anticipated: plan for weekly phone check ins with in person visits nabil Dawkins PA-C

## 2023-07-03 NOTE — TELEPHONE ENCOUNTER
Heart Failure Transitional Care Clinic    Attempted to call pt to complete weight and sxs assessment s/p changing medications. Unable to reach pt at listed phone numbers.  Was able to leave message on voicemail encouraging pt to return call with HFTCC phone number..     Will continue to try to reach patient.

## 2023-07-05 ENCOUNTER — TELEPHONE (OUTPATIENT)
Dept: CARDIOLOGY | Facility: CLINIC | Age: 88
End: 2023-07-05
Payer: MEDICARE

## 2023-07-05 NOTE — TELEPHONE ENCOUNTER
[facility] Failure [facility] Clinic    Attempted to call pt to complete weight and sx assessment after medication changes. Unable to reach pt at listed phone numbers.  Was able to leave message on voicemail encouraging pt to return call with HFT[facility] phone number..     Will continue to try to reach patient.

## 2023-07-06 ENCOUNTER — TELEPHONE (OUTPATIENT)
Dept: CARDIOLOGY | Facility: CLINIC | Age: 88
End: 2023-07-06
Payer: MEDICARE

## 2023-07-06 NOTE — TELEPHONE ENCOUNTER
"Heart Failure Transitional Care Clinic    Attempted to call pt to complete 1 week "check in" call. Unable to reach pt at listed phone numbers.  Was able to leave message on voicemail encouraging pt to return call with [facility] phone number.  RN attempted to contact pt x3 with no return call. Will no longer attempt to contact this pt for weekly f/u.   "

## 2023-07-07 ENCOUNTER — EDUCATION (OUTPATIENT)
Dept: TRANSPLANT | Facility: CLINIC | Age: 88
End: 2023-07-07
Payer: MEDICARE

## 2023-07-07 ENCOUNTER — OFFICE VISIT (OUTPATIENT)
Dept: CARDIOLOGY | Facility: CLINIC | Age: 88
End: 2023-07-07
Payer: MEDICARE

## 2023-07-07 ENCOUNTER — LAB VISIT (OUTPATIENT)
Dept: LAB | Facility: HOSPITAL | Age: 88
End: 2023-07-07
Payer: MEDICARE

## 2023-07-07 VITALS
OXYGEN SATURATION: 97 % | DIASTOLIC BLOOD PRESSURE: 67 MMHG | HEART RATE: 70 BPM | WEIGHT: 138 LBS | SYSTOLIC BLOOD PRESSURE: 153 MMHG | HEIGHT: 67 IN | BODY MASS INDEX: 21.66 KG/M2

## 2023-07-07 DIAGNOSIS — E78.5 DYSLIPIDEMIA: ICD-10-CM

## 2023-07-07 DIAGNOSIS — I35.0 AORTIC VALVE STENOSIS, ETIOLOGY OF CARDIAC VALVE DISEASE UNSPECIFIED: ICD-10-CM

## 2023-07-07 DIAGNOSIS — Z95.0 S/P PLACEMENT OF CARDIAC PACEMAKER: ICD-10-CM

## 2023-07-07 DIAGNOSIS — I44.2 COMPLETE AV BLOCK: ICD-10-CM

## 2023-07-07 DIAGNOSIS — N18.32 STAGE 3B CHRONIC KIDNEY DISEASE: ICD-10-CM

## 2023-07-07 DIAGNOSIS — Z79.01 LONG TERM (CURRENT) USE OF ANTICOAGULANTS: ICD-10-CM

## 2023-07-07 DIAGNOSIS — I10 ESSENTIAL HYPERTENSION: ICD-10-CM

## 2023-07-07 DIAGNOSIS — Z95.1 HISTORY OF HEART BYPASS SURGERY: ICD-10-CM

## 2023-07-07 DIAGNOSIS — I50.33 ACUTE ON CHRONIC DIASTOLIC CONGESTIVE HEART FAILURE: ICD-10-CM

## 2023-07-07 DIAGNOSIS — I70.1 RENAL ARTERY STENOSIS: ICD-10-CM

## 2023-07-07 DIAGNOSIS — I50.32 CHRONIC DIASTOLIC HEART FAILURE: Primary | ICD-10-CM

## 2023-07-07 DIAGNOSIS — I48.21 PERMANENT ATRIAL FIBRILLATION: ICD-10-CM

## 2023-07-07 DIAGNOSIS — M25.562 ACUTE PAIN OF LEFT KNEE: ICD-10-CM

## 2023-07-07 DIAGNOSIS — I70.0 ATHEROSCLEROSIS OF AORTA: ICD-10-CM

## 2023-07-07 DIAGNOSIS — Z95.2 S/P AVR (AORTIC VALVE REPLACEMENT): ICD-10-CM

## 2023-07-07 LAB
ALBUMIN SERPL BCP-MCNC: 3.2 G/DL (ref 3.5–5.2)
ALP SERPL-CCNC: 113 U/L (ref 55–135)
ALT SERPL W/O P-5'-P-CCNC: 6 U/L (ref 10–44)
ANION GAP SERPL CALC-SCNC: 11 MMOL/L (ref 8–16)
AST SERPL-CCNC: 14 U/L (ref 10–40)
BILIRUB SERPL-MCNC: 1.1 MG/DL (ref 0.1–1)
BNP SERPL-MCNC: 770 PG/ML (ref 0–99)
BUN SERPL-MCNC: 52 MG/DL (ref 10–30)
CALCIUM SERPL-MCNC: 8.9 MG/DL (ref 8.7–10.5)
CHLORIDE SERPL-SCNC: 102 MMOL/L (ref 95–110)
CO2 SERPL-SCNC: 30 MMOL/L (ref 23–29)
CREAT SERPL-MCNC: 2.2 MG/DL (ref 0.5–1.4)
EST. GFR  (NO RACE VARIABLE): 27.4 ML/MIN/1.73 M^2
GLUCOSE SERPL-MCNC: 94 MG/DL (ref 70–110)
MAGNESIUM SERPL-MCNC: 2.3 MG/DL (ref 1.6–2.6)
PHOSPHATE SERPL-MCNC: 3.4 MG/DL (ref 2.7–4.5)
POTASSIUM SERPL-SCNC: 3.8 MMOL/L (ref 3.5–5.1)
PROT SERPL-MCNC: 7.9 G/DL (ref 6–8.4)
SODIUM SERPL-SCNC: 143 MMOL/L (ref 136–145)

## 2023-07-07 PROCEDURE — 3288F FALL RISK ASSESSMENT DOCD: CPT | Mod: CPTII,S$GLB,,

## 2023-07-07 PROCEDURE — 99214 OFFICE O/P EST MOD 30 MIN: CPT | Mod: S$GLB,,,

## 2023-07-07 PROCEDURE — 83735 ASSAY OF MAGNESIUM: CPT

## 2023-07-07 PROCEDURE — 1159F MED LIST DOCD IN RCRD: CPT | Mod: CPTII,S$GLB,,

## 2023-07-07 PROCEDURE — 80053 COMPREHEN METABOLIC PANEL: CPT

## 2023-07-07 PROCEDURE — 1125F PR PAIN SEVERITY QUANTIFIED, PAIN PRESENT: ICD-10-PCS | Mod: CPTII,S$GLB,,

## 2023-07-07 PROCEDURE — 1160F PR REVIEW ALL MEDS BY PRESCRIBER/CLIN PHARMACIST DOCUMENTED: ICD-10-PCS | Mod: CPTII,S$GLB,,

## 2023-07-07 PROCEDURE — 99999 PR PBB SHADOW E&M-EST. PATIENT-LVL V: ICD-10-PCS | Mod: PBBFAC,,,

## 2023-07-07 PROCEDURE — 99999 PR PBB SHADOW E&M-EST. PATIENT-LVL V: CPT | Mod: PBBFAC,,,

## 2023-07-07 PROCEDURE — 99214 PR OFFICE/OUTPT VISIT, EST, LEVL IV, 30-39 MIN: ICD-10-PCS | Mod: S$GLB,,,

## 2023-07-07 PROCEDURE — 3288F PR FALLS RISK ASSESSMENT DOCUMENTED: ICD-10-PCS | Mod: CPTII,S$GLB,,

## 2023-07-07 PROCEDURE — 1101F PR PT FALLS ASSESS DOC 0-1 FALLS W/OUT INJ PAST YR: ICD-10-PCS | Mod: CPTII,S$GLB,,

## 2023-07-07 PROCEDURE — 1159F PR MEDICATION LIST DOCUMENTED IN MEDICAL RECORD: ICD-10-PCS | Mod: CPTII,S$GLB,,

## 2023-07-07 PROCEDURE — 1101F PT FALLS ASSESS-DOCD LE1/YR: CPT | Mod: CPTII,S$GLB,,

## 2023-07-07 PROCEDURE — 1125F AMNT PAIN NOTED PAIN PRSNT: CPT | Mod: CPTII,S$GLB,,

## 2023-07-07 PROCEDURE — 83880 ASSAY OF NATRIURETIC PEPTIDE: CPT

## 2023-07-07 PROCEDURE — 1160F RVW MEDS BY RX/DR IN RCRD: CPT | Mod: CPTII,S$GLB,,

## 2023-07-07 PROCEDURE — 84100 ASSAY OF PHOSPHORUS: CPT

## 2023-07-07 NOTE — PROGRESS NOTES
Met with pt to discuss 2g Na diet and 1500 ml fluid restriction      Pt states good sanya, no n/v/d/c, no prob chew/swallow      Pt stated diet is three meals per day. Breakfast is waffles, grits, or emails. Lunch is 1/2 turkey or chicken sandwich. Dinner is chicken or fish and a baked potato w/ honey on top. Pt drinks 40oz of fluid          Pt was educated on how to read nutrition labels to understand food has natural sodium present. Recc'd of 600 - 700mg Na per meal. Pt recc to drink no more than 1500ml / 50oz. Pt given nutrition handout and contact info if needs arise. Will follow up in HFTCC as needed

## 2023-07-08 ENCOUNTER — CLINICAL SUPPORT (OUTPATIENT)
Dept: CARDIOLOGY | Facility: HOSPITAL | Age: 88
End: 2023-07-08
Payer: MEDICARE

## 2023-07-08 DIAGNOSIS — I48.91 UNSPECIFIED ATRIAL FIBRILLATION: ICD-10-CM

## 2023-07-08 DIAGNOSIS — I44.2 ATRIOVENTRICULAR BLOCK, COMPLETE: ICD-10-CM

## 2023-07-08 DIAGNOSIS — Z95.0 PRESENCE OF CARDIAC PACEMAKER: ICD-10-CM

## 2023-07-08 PROCEDURE — 93296 REM INTERROG EVL PM/IDS: CPT | Performed by: INTERNAL MEDICINE

## 2023-07-08 PROCEDURE — 93294 REM INTERROG EVL PM/LDLS PM: CPT | Mod: ,,, | Performed by: INTERNAL MEDICINE

## 2023-07-08 PROCEDURE — 93294 CARDIAC DEVICE CHECK - REMOTE: ICD-10-PCS | Mod: ,,, | Performed by: INTERNAL MEDICINE

## 2023-07-12 ENCOUNTER — OFFICE VISIT (OUTPATIENT)
Dept: ORTHOPEDICS | Facility: CLINIC | Age: 88
End: 2023-07-12
Payer: MEDICARE

## 2023-07-12 ENCOUNTER — HOSPITAL ENCOUNTER (OUTPATIENT)
Dept: RADIOLOGY | Facility: HOSPITAL | Age: 88
Discharge: HOME | End: 2023-07-12
Attending: PHYSICIAN ASSISTANT
Payer: MEDICARE

## 2023-07-12 VITALS — HEART RATE: 69 BPM | DIASTOLIC BLOOD PRESSURE: 72 MMHG | SYSTOLIC BLOOD PRESSURE: 144 MMHG

## 2023-07-12 DIAGNOSIS — M25.562 ACUTE PAIN OF LEFT KNEE: ICD-10-CM

## 2023-07-12 DIAGNOSIS — M25.462 EFFUSION OF LEFT KNEE: Primary | ICD-10-CM

## 2023-07-12 LAB
APPEARANCE FLD: NORMAL
BODY FLD TYPE: ABNORMAL
BODY FLD TYPE: NORMAL
COLOR FLD: YELLOW
CRYSTALS FLD MICRO: POSITIVE
MONOS+MACROS NFR FLD MANUAL: 22 %
NEUTROPHILS NFR FLD MANUAL: 78 %
WBC # FLD: 5337 /CU MM

## 2023-07-12 PROCEDURE — 99213 OFFICE O/P EST LOW 20 MIN: CPT | Mod: 25,S$GLB,, | Performed by: PHYSICIAN ASSISTANT

## 2023-07-12 PROCEDURE — 99999 PR PBB SHADOW E&M-EST. PATIENT-LVL IV: CPT | Mod: PBBFAC,,, | Performed by: PHYSICIAN ASSISTANT

## 2023-07-12 PROCEDURE — 73560 XR KNEE ORTHO LEFT: ICD-10-PCS | Mod: 26,RT,, | Performed by: RADIOLOGY

## 2023-07-12 PROCEDURE — 73562 XR KNEE ORTHO LEFT: ICD-10-PCS | Mod: 26,LT,, | Performed by: RADIOLOGY

## 2023-07-12 PROCEDURE — 89060 EXAM SYNOVIAL FLUID CRYSTALS: CPT | Performed by: NURSE PRACTITIONER

## 2023-07-12 PROCEDURE — 20610 PR DRAIN/INJECT LARGE JOINT/BURSA: ICD-10-PCS | Mod: LT,S$GLB,, | Performed by: PHYSICIAN ASSISTANT

## 2023-07-12 PROCEDURE — 73560 X-RAY EXAM OF KNEE 1 OR 2: CPT | Mod: 26,RT,, | Performed by: RADIOLOGY

## 2023-07-12 PROCEDURE — 1160F PR REVIEW ALL MEDS BY PRESCRIBER/CLIN PHARMACIST DOCUMENTED: ICD-10-PCS | Mod: CPTII,S$GLB,, | Performed by: PHYSICIAN ASSISTANT

## 2023-07-12 PROCEDURE — 73562 X-RAY EXAM OF KNEE 3: CPT | Mod: 26,LT,, | Performed by: RADIOLOGY

## 2023-07-12 PROCEDURE — 20610 DRAIN/INJ JOINT/BURSA W/O US: CPT | Mod: LT,S$GLB,, | Performed by: PHYSICIAN ASSISTANT

## 2023-07-12 PROCEDURE — 89051 BODY FLUID CELL COUNT: CPT | Performed by: NURSE PRACTITIONER

## 2023-07-12 PROCEDURE — 1159F PR MEDICATION LIST DOCUMENTED IN MEDICAL RECORD: ICD-10-PCS | Mod: CPTII,S$GLB,, | Performed by: PHYSICIAN ASSISTANT

## 2023-07-12 PROCEDURE — 1159F MED LIST DOCD IN RCRD: CPT | Mod: CPTII,S$GLB,, | Performed by: PHYSICIAN ASSISTANT

## 2023-07-12 PROCEDURE — 99213 PR OFFICE/OUTPT VISIT, EST, LEVL III, 20-29 MIN: ICD-10-PCS | Mod: 25,S$GLB,, | Performed by: PHYSICIAN ASSISTANT

## 2023-07-12 PROCEDURE — 99999 PR PBB SHADOW E&M-EST. PATIENT-LVL IV: ICD-10-PCS | Mod: PBBFAC,,, | Performed by: PHYSICIAN ASSISTANT

## 2023-07-12 PROCEDURE — 73560 X-RAY EXAM OF KNEE 1 OR 2: CPT | Mod: TC,59,RT

## 2023-07-12 PROCEDURE — 1160F RVW MEDS BY RX/DR IN RCRD: CPT | Mod: CPTII,S$GLB,, | Performed by: PHYSICIAN ASSISTANT

## 2023-07-12 NOTE — PROGRESS NOTES
SUBJECTIVE:     Chief Complaint & History of Present Illness:  Deena Moody is a New patient 89 y.o. male who is seen here today with a complaint of    Chief Complaint   Patient presents with    Left Knee - Pain    .  Patient is here today for evaluation treatment of persistent and worsening pain of the left knee for the past week.  Does not remember specific trauma or injury but has developed intermittent episodes of swelling and effusion in the knee associated with standing and walking activities relieved with rest and elevation tenderness the anterior portion of the knee and most recently some dependent edema below the knee.  On a scale of 1-10, with 10 being worst pain imaginable, he rates this pain as 5 on good days and 7 on bad days.  he describes the pain as  tender and sore.    Review of patient's allergies indicates:   Allergen Reactions    Iodine and iodide containing products Other (See Comments)     Caused changes in skin color         Current Outpatient Medications   Medication Sig Dispense Refill    albuterol (PROVENTIL HFA) 90 mcg/actuation inhaler Inhale 2 puffs into the lungs every 6 (six) hours as needed for Wheezing. Rescue 18 g 1    apixaban (ELIQUIS) 2.5 mg Tab Take 1 tablet (2.5 mg total) by mouth 2 (two) times daily. 180 tablet 0    aspirin (ECOTRIN) 81 MG EC tablet Take 1 tablet (81 mg total) by mouth once daily. 90 tablet 3    benazepriL (LOTENSIN) 5 MG tablet TAKE 1 TABLET(5 MG) BY MOUTH EVERY DAY 90 tablet 3    carvediloL (COREG) 12.5 MG tablet TAKE 1 TABLET TWICE DAILY WITH MEALS 180 tablet 3    ferrous sulfate (FEOSOL) 325 mg (65 mg iron) Tab tablet Take 1 tablet (325 mg total) by mouth daily with breakfast. 90 tablet 3    fluticasone propionate (FLONASE) 50 mcg/actuation nasal spray 2 sprays (100 mcg total) by Each Nostril route once daily. 30 mL 1    guaiFENesin (MUCINEX) 600 mg 12 hr tablet Take 1 tablet (600 mg total) by mouth 2 (two) times daily. 10 tablet 0    multivitamin  (ONE DAILY MULTIVITAMIN) per tablet Take 1 tablet by mouth once daily.      nitroGLYCERIN (NITROSTAT) 0.4 MG SL tablet Take one tablet every 5 minutes for chest pain. After the third tablet go to the ED. 25 tablet 4    omeprazole (PRILOSEC) 20 MG capsule Take 1 capsule (20 mg total) by mouth once daily. 30 capsule 0    polyethylene glycol (GLYCOLAX) 17 gram/dose powder Dissolve one capful (17 g) in liquid by mouth 3 (three) times daily as needed. Take 1 three times daily until well formed stool 510 g 0    potassium chloride (KLOR-CON) 8 MEQ TbSR Take 1 tablet (8 mEq total) by mouth once daily. 30 tablet 11    torsemide (DEMADEX) 20 MG Tab Take 1 tablet (20 mg total) by mouth 2 (two) times a day. 90 tablet 6     No current facility-administered medications for this visit.       Past Medical History:   Diagnosis Date    Acute on chronic diastolic heart failure 9/1/2016    Coronary artery disease     Hyperlipidemia     Hypertension     Macular degeneration (senile) of retina, unspecified 12/12/2014    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    Persistent atrial fibrillation 11/10/2016    S/P placement of cardiac pacemaker 9/14/2016       Past Surgical History:   Procedure Laterality Date    AORTIC VALVE REPLACEMENT N/A     CARDIAC PACEMAKER PLACEMENT      CATARACT EXTRACTION W/  INTRAOCULAR LENS IMPLANT Right 2/16/2016    Dr. Whitley    CATARACT EXTRACTION W/  INTRAOCULAR LENS IMPLANT Left 3/1/2016    Dr. Whitley    CORONARY ARTERY BYPASS GRAFT      EAR EXAMINATION UNDER ANESTHESIA      EYE SURGERY         Vital Signs (Most Recent)  Vitals:    07/12/23 1417   BP: (!) 144/72   Pulse: 69           Review of Systems:  ROS:  Constitutional: no fever or chills  Eyes: no visual changes  ENT: no nasal congestion or sore throat, hearing l oss  right ear  Respiratory: no cough or shortness of breath, pulmonary hypertension atelectasis  Cardiovascular: no chest pain or palpitations, status post cardiac pacemaker placement, status  post aortic valve replacement renal artery stenosis persistent AFib   history of MI complete AV block CAD aortic atherosclerosis acute on chronic diastolic heart failure  Gastrointestinal: no nausea or vomiting, tolerating diet  Genitourinary: no hematuria or dysuria, CKD stage 3  Integument/Breast: no rash or pruritis  Hematologic/Lymphatic: no easy bruising or lymphadenopathy  Musculoskeletal: no arthralgias or myalgias  Neurological: no seizures or tremors  Behavioral/Psych: no auditory or visual hallucinations  Endocrine: no heat or cold intolerance                OBJECTIVE:     PHYSICAL EXAM:     , General Appearance: Well nourished, well developed, in no acute distress.  Neurological: Mood & affect are normal.    left  Knee Exam:  Knee Range of Motion:0-100 degrees flexion   Effusion:yes  Condition of skin:intact  mild anterior erythema  Location of tenderness: globally   Strength:limited by pain  Stability:  stable to testing, Lachman: stable, LCL: stable, MCL: stable, PCL: stable, and posteromedial (dial): stable  Varus /Valgus stress:  normal  Julio:   negative/negative    right  Knee Exam:  Knee Range of Motion:0-120 degrees flexion   Effusion:none  Condition of skin:intact  Location of tenderness:None   Strength:5 of 5  Stability:  Lachman: stable, LCL: stable, MCL: stable, PCL: stable, and posteromedial (dial): stable  Varus /Valgus stress:  normal  Julio:   negative/negative      Hip Examination:  normal    RADIOGRAPHS:   X-rays taken today films reviewed by me demonstrate moderate arthritic changes throughout the left knee with joint space narrowing osteophytic spurring early sclerotic changes no evidence of fracture dislocation or other bony abnormalities    ASSESSMENT/PLAN:       ICD-10-CM ICD-9-CM   1. Effusion of left knee  M25.462 719.06   2. Acute pain of left knee  M25.562 719.46       Plan: We discussed with the patient at length all the different treatment options available for  the knee  including anti-inflammatories, acetaminophen, rest, ice, knee strengthening exercise, occasional cortisone injections for temporary relief, Viscosupplimentation injections, arthroscopic debridement osteotomy, and finally knee arthroplasty.    Will proceed with aspiration of the left abner Parikh   The risks, benefits, pros, cons, and potential side effects of the procedure were discussed with the patient in detail all questions were answered.  The patient is comfortable and willing to proceed with the procedure. Verbal consent was obtained and the proper joint was identified by the patient and provider        The injection site was identified and the skin was prepared with a betadine solution. The   left  knee was  aspirated and 30 cc of normal-appearing fluid was withdrawn to be sent to the lab for evaluation. Deena Moody tolerated the procedure well, he was advised to rest the knee today, ice and elevation. he did receive immediate relief of the pain in and about his knee he was told this would be short lived and is secondary to the lidocaine. he may have an increase in his discomfort tonight      Fluid to besent to the lab for analysis I will contact the patient following analysis to discuss treatment options

## 2023-07-13 ENCOUNTER — TELEPHONE (OUTPATIENT)
Dept: ORTHOPEDICS | Facility: CLINIC | Age: 88
End: 2023-07-13
Payer: MEDICARE

## 2023-07-13 LAB — PATH INTERP FLD-IMP: NORMAL

## 2023-07-14 ENCOUNTER — OFFICE VISIT (OUTPATIENT)
Dept: ORTHOPEDICS | Facility: CLINIC | Age: 88
End: 2023-07-14
Payer: MEDICARE

## 2023-07-14 ENCOUNTER — TELEPHONE (OUTPATIENT)
Dept: CARDIOLOGY | Facility: CLINIC | Age: 88
End: 2023-07-14
Payer: MEDICARE

## 2023-07-14 DIAGNOSIS — M25.562 ACUTE PAIN OF LEFT KNEE: ICD-10-CM

## 2023-07-14 DIAGNOSIS — M10.9 ACUTE GOUT OF LEFT KNEE, UNSPECIFIED CAUSE: Primary | ICD-10-CM

## 2023-07-14 DIAGNOSIS — M25.462 EFFUSION OF LEFT KNEE: ICD-10-CM

## 2023-07-14 PROCEDURE — 20610 PR DRAIN/INJECT LARGE JOINT/BURSA: ICD-10-PCS | Mod: HCNC,LT,S$GLB, | Performed by: PHYSICIAN ASSISTANT

## 2023-07-14 PROCEDURE — 99999 PR PBB SHADOW E&M-EST. PATIENT-LVL III: CPT | Mod: PBBFAC,,, | Performed by: PHYSICIAN ASSISTANT

## 2023-07-14 PROCEDURE — 1159F PR MEDICATION LIST DOCUMENTED IN MEDICAL RECORD: ICD-10-PCS | Mod: HCNC,CPTII,S$GLB, | Performed by: PHYSICIAN ASSISTANT

## 2023-07-14 PROCEDURE — 99213 OFFICE O/P EST LOW 20 MIN: CPT | Mod: HCNC,25,S$GLB, | Performed by: PHYSICIAN ASSISTANT

## 2023-07-14 PROCEDURE — 3288F FALL RISK ASSESSMENT DOCD: CPT | Mod: HCNC,CPTII,S$GLB, | Performed by: PHYSICIAN ASSISTANT

## 2023-07-14 PROCEDURE — 1160F RVW MEDS BY RX/DR IN RCRD: CPT | Mod: HCNC,CPTII,S$GLB, | Performed by: PHYSICIAN ASSISTANT

## 2023-07-14 PROCEDURE — 1160F PR REVIEW ALL MEDS BY PRESCRIBER/CLIN PHARMACIST DOCUMENTED: ICD-10-PCS | Mod: HCNC,CPTII,S$GLB, | Performed by: PHYSICIAN ASSISTANT

## 2023-07-14 PROCEDURE — 99213 PR OFFICE/OUTPT VISIT, EST, LEVL III, 20-29 MIN: ICD-10-PCS | Mod: HCNC,25,S$GLB, | Performed by: PHYSICIAN ASSISTANT

## 2023-07-14 PROCEDURE — 1101F PT FALLS ASSESS-DOCD LE1/YR: CPT | Mod: HCNC,CPTII,S$GLB, | Performed by: PHYSICIAN ASSISTANT

## 2023-07-14 PROCEDURE — 3288F PR FALLS RISK ASSESSMENT DOCUMENTED: ICD-10-PCS | Mod: HCNC,CPTII,S$GLB, | Performed by: PHYSICIAN ASSISTANT

## 2023-07-14 PROCEDURE — 1159F MED LIST DOCD IN RCRD: CPT | Mod: HCNC,CPTII,S$GLB, | Performed by: PHYSICIAN ASSISTANT

## 2023-07-14 PROCEDURE — 1101F PR PT FALLS ASSESS DOC 0-1 FALLS W/OUT INJ PAST YR: ICD-10-PCS | Mod: HCNC,CPTII,S$GLB, | Performed by: PHYSICIAN ASSISTANT

## 2023-07-14 PROCEDURE — 20610 DRAIN/INJ JOINT/BURSA W/O US: CPT | Mod: HCNC,LT,S$GLB, | Performed by: PHYSICIAN ASSISTANT

## 2023-07-14 PROCEDURE — 99999 PR PBB SHADOW E&M-EST. PATIENT-LVL III: ICD-10-PCS | Mod: PBBFAC,,, | Performed by: PHYSICIAN ASSISTANT

## 2023-07-14 RX ORDER — METHYLPREDNISOLONE ACETATE 80 MG/ML
80 INJECTION, SUSPENSION INTRA-ARTICULAR; INTRALESIONAL; INTRAMUSCULAR; SOFT TISSUE
Status: COMPLETED | OUTPATIENT
Start: 2023-07-14 | End: 2023-07-14

## 2023-07-14 RX ADMIN — METHYLPREDNISOLONE ACETATE 80 MG: 80 INJECTION, SUSPENSION INTRA-ARTICULAR; INTRALESIONAL; INTRAMUSCULAR; SOFT TISSUE at 01:07

## 2023-07-14 NOTE — PROGRESS NOTES
SUBJECTIVE:     Chief Complaint & History of Present Illness:  Deena Moody is a Established patient 89 y.o. male who is seen here today with a complaint of    Chief Complaint   Patient presents with    Left Knee - Pain    .  He has patient well-known to me was last seen treated the clinic for this condition 07/12/2023 at which time he had undergone aspiration of the left knee.  Fluid was sent for analysis has returned positive for gout.  He is here today to undergo therapeutic cortisone injection as discussed at her previous visit  On a scale of 1-10, with 10 being worst pain imaginable, he rates this pain as 7 on good days and 10 on bad days.  he describes the pain as sore and achy.    Review of patient's allergies indicates:   Allergen Reactions    Iodine and iodide containing products Other (See Comments)     Caused changes in skin color         Current Outpatient Medications   Medication Sig Dispense Refill    albuterol (PROVENTIL HFA) 90 mcg/actuation inhaler Inhale 2 puffs into the lungs every 6 (six) hours as needed for Wheezing. Rescue 18 g 1    apixaban (ELIQUIS) 2.5 mg Tab Take 1 tablet (2.5 mg total) by mouth 2 (two) times daily. 180 tablet 0    aspirin (ECOTRIN) 81 MG EC tablet Take 1 tablet (81 mg total) by mouth once daily. 90 tablet 3    benazepriL (LOTENSIN) 5 MG tablet TAKE 1 TABLET(5 MG) BY MOUTH EVERY DAY 90 tablet 3    carvediloL (COREG) 12.5 MG tablet TAKE 1 TABLET TWICE DAILY WITH MEALS 180 tablet 3    ferrous sulfate (FEOSOL) 325 mg (65 mg iron) Tab tablet Take 1 tablet (325 mg total) by mouth daily with breakfast. 90 tablet 3    fluticasone propionate (FLONASE) 50 mcg/actuation nasal spray 2 sprays (100 mcg total) by Each Nostril route once daily. 30 mL 1    guaiFENesin (MUCINEX) 600 mg 12 hr tablet Take 1 tablet (600 mg total) by mouth 2 (two) times daily. 10 tablet 0    multivitamin (ONE DAILY MULTIVITAMIN) per tablet Take 1 tablet by mouth once daily.      nitroGLYCERIN (NITROSTAT)  0.4 MG SL tablet Take one tablet every 5 minutes for chest pain. After the third tablet go to the ED. 25 tablet 4    omeprazole (PRILOSEC) 20 MG capsule Take 1 capsule (20 mg total) by mouth once daily. 30 capsule 0    polyethylene glycol (GLYCOLAX) 17 gram/dose powder Dissolve one capful (17 g) in liquid by mouth 3 (three) times daily as needed. Take 1 three times daily until well formed stool 510 g 0    potassium chloride (KLOR-CON) 8 MEQ TbSR Take 1 tablet (8 mEq total) by mouth once daily. 30 tablet 11    torsemide (DEMADEX) 20 MG Tab Take 1 tablet (20 mg total) by mouth 2 (two) times a day. 90 tablet 6     No current facility-administered medications for this visit.       Past Medical History:   Diagnosis Date    Acute on chronic diastolic heart failure 9/1/2016    Coronary artery disease     Hyperlipidemia     Hypertension     Macular degeneration (senile) of retina, unspecified 12/12/2014    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    Persistent atrial fibrillation 11/10/2016    S/P placement of cardiac pacemaker 9/14/2016       Past Surgical History:   Procedure Laterality Date    AORTIC VALVE REPLACEMENT N/A     CARDIAC PACEMAKER PLACEMENT      CATARACT EXTRACTION W/  INTRAOCULAR LENS IMPLANT Right 2/16/2016    Dr. Whitley    CATARACT EXTRACTION W/  INTRAOCULAR LENS IMPLANT Left 3/1/2016    Dr. Whitley    CORONARY ARTERY BYPASS GRAFT      EAR EXAMINATION UNDER ANESTHESIA      EYE SURGERY         Vital Signs (Most Recent)  There were no vitals filed for this visit.        Review of Systems:  ROS:  Constitutional: no fever or chills  Eyes: no visual changes  ENT: no nasal congestion or sore throat, hearing l oss  right ear  Respiratory: no cough or shortness of breath, pulmonary hypertension atelectasis  Cardiovascular: no chest pain or palpitations, status post cardiac pacemaker placement, status post aortic valve replacement renal artery stenosis persistent AFib   history of MI complete AV block CAD aortic  atherosclerosis acute on chronic diastolic heart failure  Gastrointestinal: no nausea or vomiting, tolerating diet  Genitourinary: no hematuria or dysuria, CKD stage 3  Integument/Breast: no rash or pruritis  Hematologic/Lymphatic: no easy bruising or lymphadenopathy  Musculoskeletal: no arthralgias or myalgias  Neurological: no seizures or tremors  Behavioral/Psych: no auditory or visual hallucinations  Endocrine: no heat or cold intolerance                OBJECTIVE:     PHYSICAL EXAM:     , General Appearance: Well nourished, well developed, in no acute distress.  Neurological: Mood & affect are normal.  left  Knee Exam:  Knee Range of Motion:0-100 degrees flexion   Effusion:yes  Condition of skin:intact  mild anterior erythema  Location of tenderness: globally   Strength:limited by pain  Stability:  stable to testing, Lachman: stable, LCL: stable, MCL: stable, PCL: stable, and posteromedial (dial): stable  Varus /Valgus stress:  normal  Julio:   negative/negative     right  Knee Exam:  Knee Range of Motion:0-120 degrees flexion   Effusion:none  Condition of skin:intact  Location of tenderness:None   Strength:5 of 5  Stability:  Lachman: stable, LCL: stable, MCL: stable, PCL: stable, and posteromedial (dial): stable  Varus /Valgus stress:  normal  Julio:   negative/negative        Hip Examination:  normal     RADIOGRAPHS:   X-rays from previous visit films reviewed by me demonstrate moderate arthritic changes throughout the left knee with joint space narrowing osteophytic spurring early sclerotic changes no evidence of fracture dislocation or other bony abnormalities  ASSESSMENT/PLAN:       ICD-10-CM ICD-9-CM   1. Acute gout of left knee, unspecified cause  M10.9 274.01   2. Effusion of left knee  M25.462 719.06   3. Acute pain of left knee  M25.562 719.46       Plan: We discussed with the patient at length all the different treatment options available for  the knee including anti-inflammatories,  acetaminophen, rest, ice, knee strengthening exercise, occasional cortisone injections for temporary relief, Viscosupplimentation injections, arthroscopic debridement osteotomy, and finally knee arthroplasty.   Will proceed with cortisone injection left knee     The risks, benefits, pros, cons, and potential side effects of the procedure were discussed with the patient in detail all questions were answered.  The patient is comfortable and willing to proceed with the procedure. Verbal consent was obtained and the proper joint was identified by the patient and provider        The injection site was identified and the skin was prepared with a betadine solution. The   left  knee was injected with 1 ml of Celestone and 5 ml Lidocaine under sterile technique. Deena Moody tolerated the procedure well, he was advised to rest the knee today, ice and elevation. he did receive immediate relief of the pain in and about his knee he was told this would be short lived and is secondary to the lidocaine. he may have an increase in his discomfort tonight followed by steady improvement over the next several days. I may take 1-3 weeks following the injection to get the full benefit of the medication.

## 2023-07-17 ENCOUNTER — TELEPHONE (OUTPATIENT)
Dept: CARDIOLOGY | Facility: CLINIC | Age: 88
End: 2023-07-17
Payer: MEDICARE

## 2023-07-18 ENCOUNTER — TELEPHONE (OUTPATIENT)
Dept: CARDIOLOGY | Facility: CLINIC | Age: 88
End: 2023-07-18
Payer: MEDICARE

## 2023-07-18 NOTE — TELEPHONE ENCOUNTER
"[facility] weekly phone follow up / triage call completed.     [facility] RN Navigator spoke with  pt daughter, Amelie.    Current Patient reported weight: pt daughter unsure of VS at time of call     Recent Patient reported blood pressure and heart rate: Pt daughter unsure of VS at time of call    Pt reports the following: Pt daughter unsure of pt status for today. As of 7/17/2023 pt daughter denies sxs below:  []  Shortness of Breath with Activity  []  Shortness of Breath at rest   []  Fatigue  []  Edema   [] Chest pain or tightness  [] Weight Increase since discharge  [x] None of the above    Pt reports using "Daily weight and symptom tracker".    Pt reports being in the GREEN Zone. If in yellow/red, reminded that they should be calling [facility] today or now.     Medications:   Medication compliance reviewed with pt.  Pt reports having medication list available and has all medications at home for use per list.     Education:   Confirmed pt still has "[facility] Home Care Guide"  .     Reminded of key points as listed below.     Recommend 2 -3 gram sodium restriction and 1500 cc-2000 cc fluid restriction.  Encourage physical activity with graded exercise program.  Requested patient to weigh themselves daily, and to notify us if their weight increases by more than 3 lbs in 1 day or 5 lbs in 3 days.   Reminded to use "Daily weight and symptom tracker".  Even if pt does not have a scale, to use symptom tracker.       Watch for these Signs and Symptoms: If any of these occur, contact [facility] immediately:   Increase in shortness of breath with movement   Increase in swelling in your legs and ankles   Weight gain of more than 3 pounds in a day or 5 pounds in 3 days.   Difficulty breathing when you are lying down   Worsening fatigue or tiredness   Stomach bloating, a full feeling or a loss of appetite   Increased coughing--especially when you are lying down      Pt was " able to verbalize back to RN in their own words correct diet/fluid restrictions, necessity for exercise, warning signs and symptoms, when and how to contact their [facility] team.      Pt reminded of upcoming appointment.  PT reports they will attend.  [facility] will follow pt through phone. Pt window ends 7/18. RN to f/u with pt next for discharge.       Pt reminded of how and when to contact [facility]:  [phone] (Mon-Fri, 8a-5p) & for urgent issues on the weekend to page the Heart Transplant MD on call.  Pt also encouraged utilize [facility] messaging as well.      Pt daughter verbalized understanding and in agreement of plan.       Will follow up with pt at next clinic visit and RN navigator available for pt questions, issues or concerns.

## 2023-07-25 ENCOUNTER — OFFICE VISIT (OUTPATIENT)
Dept: FAMILY MEDICINE | Facility: CLINIC | Age: 88
End: 2023-07-25
Payer: MEDICARE

## 2023-07-25 VITALS
WEIGHT: 132.06 LBS | SYSTOLIC BLOOD PRESSURE: 146 MMHG | DIASTOLIC BLOOD PRESSURE: 60 MMHG | HEIGHT: 67 IN | HEART RATE: 70 BPM | OXYGEN SATURATION: 98 % | BODY MASS INDEX: 20.73 KG/M2

## 2023-07-25 DIAGNOSIS — I10 ESSENTIAL HYPERTENSION: ICD-10-CM

## 2023-07-25 DIAGNOSIS — I48.19 PERSISTENT ATRIAL FIBRILLATION: ICD-10-CM

## 2023-07-25 DIAGNOSIS — I50.42 CHRONIC COMBINED SYSTOLIC AND DIASTOLIC CONGESTIVE HEART FAILURE: ICD-10-CM

## 2023-07-25 DIAGNOSIS — N18.32 STAGE 3B CHRONIC KIDNEY DISEASE: ICD-10-CM

## 2023-07-25 DIAGNOSIS — T14.8XXA MUSCLE STRAIN: Primary | ICD-10-CM

## 2023-07-25 PROCEDURE — 1101F PR PT FALLS ASSESS DOC 0-1 FALLS W/OUT INJ PAST YR: ICD-10-PCS | Mod: HCNC,CPTII,S$GLB, | Performed by: FAMILY MEDICINE

## 2023-07-25 PROCEDURE — 1126F AMNT PAIN NOTED NONE PRSNT: CPT | Mod: HCNC,CPTII,S$GLB, | Performed by: FAMILY MEDICINE

## 2023-07-25 PROCEDURE — 1101F PT FALLS ASSESS-DOCD LE1/YR: CPT | Mod: HCNC,CPTII,S$GLB, | Performed by: FAMILY MEDICINE

## 2023-07-25 PROCEDURE — 1159F PR MEDICATION LIST DOCUMENTED IN MEDICAL RECORD: ICD-10-PCS | Mod: HCNC,CPTII,S$GLB, | Performed by: FAMILY MEDICINE

## 2023-07-25 PROCEDURE — 3288F PR FALLS RISK ASSESSMENT DOCUMENTED: ICD-10-PCS | Mod: HCNC,CPTII,S$GLB, | Performed by: FAMILY MEDICINE

## 2023-07-25 PROCEDURE — 1126F PR PAIN SEVERITY QUANTIFIED, NO PAIN PRESENT: ICD-10-PCS | Mod: HCNC,CPTII,S$GLB, | Performed by: FAMILY MEDICINE

## 2023-07-25 PROCEDURE — 1160F RVW MEDS BY RX/DR IN RCRD: CPT | Mod: HCNC,CPTII,S$GLB, | Performed by: FAMILY MEDICINE

## 2023-07-25 PROCEDURE — 99999 PR PBB SHADOW E&M-EST. PATIENT-LVL IV: CPT | Mod: PBBFAC,HCNC,, | Performed by: FAMILY MEDICINE

## 2023-07-25 PROCEDURE — 3288F FALL RISK ASSESSMENT DOCD: CPT | Mod: HCNC,CPTII,S$GLB, | Performed by: FAMILY MEDICINE

## 2023-07-25 PROCEDURE — 1160F PR REVIEW ALL MEDS BY PRESCRIBER/CLIN PHARMACIST DOCUMENTED: ICD-10-PCS | Mod: HCNC,CPTII,S$GLB, | Performed by: FAMILY MEDICINE

## 2023-07-25 PROCEDURE — 99999 PR PBB SHADOW E&M-EST. PATIENT-LVL IV: ICD-10-PCS | Mod: PBBFAC,HCNC,, | Performed by: FAMILY MEDICINE

## 2023-07-25 PROCEDURE — 99215 OFFICE O/P EST HI 40 MIN: CPT | Mod: HCNC,S$GLB,, | Performed by: FAMILY MEDICINE

## 2023-07-25 PROCEDURE — 99215 PR OFFICE/OUTPT VISIT, EST, LEVL V, 40-54 MIN: ICD-10-PCS | Mod: HCNC,S$GLB,, | Performed by: FAMILY MEDICINE

## 2023-07-25 PROCEDURE — 1159F MED LIST DOCD IN RCRD: CPT | Mod: HCNC,CPTII,S$GLB, | Performed by: FAMILY MEDICINE

## 2023-07-25 RX ORDER — DICLOFENAC SODIUM 10 MG/G
2 GEL TOPICAL DAILY PRN
Qty: 200 G | Refills: 0 | Status: SHIPPED | OUTPATIENT
Start: 2023-07-25

## 2023-07-25 RX ORDER — POTASSIUM CHLORIDE 600 MG/1
8 TABLET, FILM COATED, EXTENDED RELEASE ORAL DAILY
Qty: 90 TABLET | Refills: 3 | Status: SHIPPED | OUTPATIENT
Start: 2023-07-25 | End: 2024-07-24

## 2023-07-25 RX ORDER — BENAZEPRIL HYDROCHLORIDE 5 MG/1
5 TABLET ORAL DAILY
Qty: 90 TABLET | Refills: 3 | Status: SHIPPED | OUTPATIENT
Start: 2023-07-25

## 2023-07-25 RX ORDER — TORSEMIDE 20 MG/1
20 TABLET ORAL 2 TIMES DAILY
Qty: 180 TABLET | Refills: 3 | Status: ON HOLD | OUTPATIENT
Start: 2023-07-25 | End: 2023-09-23 | Stop reason: SDUPTHER

## 2023-07-25 NOTE — PROGRESS NOTES
Subjective     Patient ID: Deena Moody is a 89 y.o. male.    Chief Complaint: Wrist Pain    89 years old male came to the clinic for blood pressure check.  Blood pressure today stable.  No chest pain, palpitation, orthopnea or PND.  Last flare-up of CHF was resolved.  Patient is monitoring his weight at home.  Patient with decreased kidney function but stable in comparison with previous reports.  Patient with left lower extremity muscle strain.    Wrist Pain     Review of Systems   Constitutional:  Positive for fatigue.   HENT: Negative.     Eyes: Negative.    Respiratory: Negative.  Negative for shortness of breath.    Cardiovascular: Negative.  Negative for chest pain, palpitations, leg swelling and claudication.   Gastrointestinal: Negative.    Genitourinary: Negative.    Musculoskeletal:  Positive for myalgias.   Integumentary:  Negative.   Neurological: Negative.    Psychiatric/Behavioral: Negative.          Objective     Physical Exam  Vitals and nursing note reviewed.   Constitutional:       General: He is not in acute distress.     Appearance: He is well-developed. He is not diaphoretic.   HENT:      Head: Normocephalic and atraumatic.      Right Ear: External ear normal.      Left Ear: External ear normal.      Nose: Nose normal.      Mouth/Throat:      Pharynx: No oropharyngeal exudate.   Eyes:      General: No scleral icterus.        Right eye: No discharge.         Left eye: No discharge.      Conjunctiva/sclera: Conjunctivae normal.      Pupils: Pupils are equal, round, and reactive to light.   Neck:      Thyroid: No thyromegaly.      Vascular: No JVD.      Trachea: No tracheal deviation.   Cardiovascular:      Rate and Rhythm: Normal rate and regular rhythm.      Heart sounds: Normal heart sounds. No murmur heard.    No friction rub. No gallop.   Pulmonary:      Effort: Pulmonary effort is normal. No respiratory distress.      Breath sounds: Normal breath sounds. No stridor. No wheezing or rales.    Chest:      Chest wall: No tenderness.   Abdominal:      General: Bowel sounds are normal. There is no distension.      Palpations: Abdomen is soft. There is no mass.      Tenderness: There is no abdominal tenderness. There is no guarding or rebound.   Musculoskeletal:         General: Normal range of motion.      Cervical back: Normal range of motion and neck supple.      Left upper leg: Tenderness present.   Lymphadenopathy:      Cervical: No cervical adenopathy.   Skin:     General: Skin is warm and dry.      Coloration: Skin is not pale.      Findings: No erythema or rash.   Neurological:      Mental Status: He is alert and oriented to person, place, and time.      Cranial Nerves: No cranial nerve deficit.      Motor: Weakness present. No abnormal muscle tone.      Coordination: Coordination abnormal.      Gait: Gait abnormal.      Deep Tendon Reflexes: Reflexes are normal and symmetric. Reflexes normal.   Psychiatric:         Behavior: Behavior normal.         Thought Content: Thought content normal.         Judgment: Judgment normal.          Assessment and Plan     1. Muscle strain  -     diclofenac sodium (VOLTAREN) 1 % Gel; Apply 2 g topically daily as needed (pain).  Dispense: 200 g; Refill: 0    2. CKD (chronic kidney disease) stage 3, GFR 30-59 ml/min  -     benazepriL (LOTENSIN) 5 MG tablet; Take 1 tablet (5 mg total) by mouth once daily.  Dispense: 90 tablet; Refill: 3  -     Comprehensive Metabolic Panel; Future; Expected date: 07/25/2023    3. Essential hypertension  -     benazepriL (LOTENSIN) 5 MG tablet; Take 1 tablet (5 mg total) by mouth once daily.  Dispense: 90 tablet; Refill: 3  -     Urinalysis; Future  -     Comprehensive Metabolic Panel; Future; Expected date: 07/25/2023  -     Lipid Panel; Future; Expected date: 07/25/2023  -     CBC Auto Differential; Future; Expected date: 07/25/2023    4. Persistent atrial fibrillation  -     apixaban (ELIQUIS) 2.5 mg Tab; Take 1 tablet (2.5 mg  total) by mouth 2 (two) times daily.  Dispense: 180 tablet; Refill: 3  -     torsemide (DEMADEX) 20 MG Tab; Take 1 tablet (20 mg total) by mouth 2 (two) times a day.  Dispense: 180 tablet; Refill: 3  -     Comprehensive Metabolic Panel; Future; Expected date: 07/25/2023  -     CBC Auto Differential; Future; Expected date: 07/25/2023    5. Stage 3b chronic kidney disease  -     potassium chloride (KLOR-CON) 8 MEQ TbSR; Take 1 tablet (8 mEq total) by mouth once daily.  Dispense: 90 tablet; Refill: 3  -     Comprehensive Metabolic Panel; Future; Expected date: 07/25/2023  -     CBC Auto Differential; Future; Expected date: 07/25/2023        Decreased kidney function but stable in comparison with previous reports .  Increase fluid intake.  Avoid over-the-counter medicines like naproxen or ibuprofen.   Continue monitoring blood pressure at home, low sodium diet.

## 2023-08-01 ENCOUNTER — TELEPHONE (OUTPATIENT)
Dept: CARDIOLOGY | Facility: CLINIC | Age: 88
End: 2023-08-01
Payer: MEDICARE

## 2023-08-02 ENCOUNTER — TELEPHONE (OUTPATIENT)
Dept: CARDIOLOGY | Facility: CLINIC | Age: 88
End: 2023-08-02
Payer: MEDICARE

## 2023-08-02 NOTE — TELEPHONE ENCOUNTER
"Call to PT's Home Phone # [phone], PT doesn't seem to understand my questions. When I ask for Mrs. Moody he states that she is no longer with us and then when I ask if he is Mr. Moody he says no.  I ask if I should call his daughter and he states if you want to. The number I called is actually listed as the Daughter's number. I tell the Gentleman that we are calling to let him know that the [facility] will not be checking on him anymore and he says "oh" and hangs up. Ill dis-enroll this PT.  "

## 2023-08-18 ENCOUNTER — TELEPHONE (OUTPATIENT)
Dept: ELECTROPHYSIOLOGY | Facility: CLINIC | Age: 88
End: 2023-08-18
Payer: MEDICARE

## 2023-09-18 PROBLEM — N17.9 ACUTE RENAL FAILURE SUPERIMPOSED ON STAGE 3 CHRONIC KIDNEY DISEASE: Status: RESOLVED | Noted: 2022-12-03 | Resolved: 2023-09-18

## 2023-09-18 PROBLEM — N18.30 ACUTE RENAL FAILURE SUPERIMPOSED ON STAGE 3 CHRONIC KIDNEY DISEASE: Status: RESOLVED | Noted: 2022-12-03 | Resolved: 2023-09-18

## 2023-09-22 ENCOUNTER — HOSPITAL ENCOUNTER (OUTPATIENT)
Facility: HOSPITAL | Age: 88
Discharge: HOME | End: 2023-09-23
Attending: EMERGENCY MEDICINE | Admitting: INTERNAL MEDICINE
Payer: MEDICARE

## 2023-09-22 DIAGNOSIS — R07.9 CHEST PAIN: ICD-10-CM

## 2023-09-22 DIAGNOSIS — N39.0 URINARY TRACT INFECTION WITHOUT HEMATURIA, SITE UNSPECIFIED: ICD-10-CM

## 2023-09-22 DIAGNOSIS — I95.9 HYPOTENSION: ICD-10-CM

## 2023-09-22 DIAGNOSIS — N17.9 AKI (ACUTE KIDNEY INJURY): Primary | ICD-10-CM

## 2023-09-22 DIAGNOSIS — I48.19 PERSISTENT ATRIAL FIBRILLATION: ICD-10-CM

## 2023-09-22 LAB
ALBUMIN SERPL BCP-MCNC: 3.3 G/DL (ref 3.5–5.2)
ALP SERPL-CCNC: 113 U/L (ref 55–135)
ALT SERPL W/O P-5'-P-CCNC: 6 U/L (ref 10–44)
ANION GAP SERPL CALC-SCNC: 11 MMOL/L (ref 8–16)
AST SERPL-CCNC: 10 U/L (ref 10–40)
BASOPHILS # BLD AUTO: 0.05 K/UL (ref 0–0.2)
BASOPHILS NFR BLD: 0.6 % (ref 0–1.9)
BILIRUB SERPL-MCNC: 0.8 MG/DL (ref 0.1–1)
BILIRUB UR QL STRIP: NEGATIVE
BUN SERPL-MCNC: 74 MG/DL (ref 10–30)
CALCIUM SERPL-MCNC: 9.3 MG/DL (ref 8.7–10.5)
CHLORIDE SERPL-SCNC: 100 MMOL/L (ref 95–110)
CLARITY UR REFRACT.AUTO: ABNORMAL
CO2 SERPL-SCNC: 31 MMOL/L (ref 23–29)
COLOR UR AUTO: YELLOW
CREAT SERPL-MCNC: 3.1 MG/DL (ref 0.5–1.4)
DIFFERENTIAL METHOD: ABNORMAL
EOSINOPHIL # BLD AUTO: 0.3 K/UL (ref 0–0.5)
EOSINOPHIL NFR BLD: 3.8 % (ref 0–8)
ERYTHROCYTE [DISTWIDTH] IN BLOOD BY AUTOMATED COUNT: 14.4 % (ref 11.5–14.5)
EST. GFR  (NO RACE VARIABLE): 18.2 ML/MIN/1.73 M^2
GLUCOSE SERPL-MCNC: 145 MG/DL (ref 70–110)
GLUCOSE UR QL STRIP: NEGATIVE
HCT VFR BLD AUTO: 31 % (ref 40–54)
HGB BLD-MCNC: 10.1 G/DL (ref 14–18)
HGB UR QL STRIP: NEGATIVE
HYALINE CASTS UR QL AUTO: 2 /LPF
IMM GRANULOCYTES # BLD AUTO: 0.03 K/UL (ref 0–0.04)
IMM GRANULOCYTES NFR BLD AUTO: 0.4 % (ref 0–0.5)
KETONES UR QL STRIP: NEGATIVE
LACTATE SERPL-SCNC: 1.8 MMOL/L (ref 0.5–2.2)
LEUKOCYTE ESTERASE UR QL STRIP: ABNORMAL
LYMPHOCYTES # BLD AUTO: 1.5 K/UL (ref 1–4.8)
LYMPHOCYTES NFR BLD: 17.8 % (ref 18–48)
MCH RBC QN AUTO: 31.1 PG (ref 27–31)
MCHC RBC AUTO-ENTMCNC: 32.6 G/DL (ref 32–36)
MCV RBC AUTO: 95 FL (ref 82–98)
MICROSCOPIC COMMENT: ABNORMAL
MONOCYTES # BLD AUTO: 0.8 K/UL (ref 0.3–1)
MONOCYTES NFR BLD: 10.2 % (ref 4–15)
NEUTROPHILS # BLD AUTO: 5.5 K/UL (ref 1.8–7.7)
NEUTROPHILS NFR BLD: 67.2 % (ref 38–73)
NITRITE UR QL STRIP: NEGATIVE
NRBC BLD-RTO: 0 /100 WBC
PH UR STRIP: 6 [PH] (ref 5–8)
PLATELET # BLD AUTO: 157 K/UL (ref 150–450)
PMV BLD AUTO: 9 FL (ref 9.2–12.9)
POTASSIUM SERPL-SCNC: 3.8 MMOL/L (ref 3.5–5.1)
PROT SERPL-MCNC: 7.9 G/DL (ref 6–8.4)
PROT UR QL STRIP: NEGATIVE
RBC # BLD AUTO: 3.25 M/UL (ref 4.6–6.2)
RBC #/AREA URNS AUTO: 15 /HPF (ref 0–4)
SODIUM SERPL-SCNC: 142 MMOL/L (ref 136–145)
SP GR UR STRIP: 1.01 (ref 1–1.03)
SQUAMOUS #/AREA URNS AUTO: 0 /HPF
URN SPEC COLLECT METH UR: ABNORMAL
WBC # BLD AUTO: 8.13 K/UL (ref 3.9–12.7)
WBC #/AREA URNS AUTO: >100 /HPF (ref 0–5)
YEAST UR QL AUTO: ABNORMAL

## 2023-09-22 PROCEDURE — 25000003 PHARM REV CODE 250: Mod: HCNC

## 2023-09-22 PROCEDURE — 93010 EKG 12-LEAD: ICD-10-PCS | Mod: HCNC,,, | Performed by: INTERNAL MEDICINE

## 2023-09-22 PROCEDURE — 99223 PR INITIAL HOSPITAL CARE,LEVL III: ICD-10-PCS | Mod: HCNC,AI,,

## 2023-09-22 PROCEDURE — 80053 COMPREHEN METABOLIC PANEL: CPT | Mod: HCNC

## 2023-09-22 PROCEDURE — G0378 HOSPITAL OBSERVATION PER HR: HCPCS | Mod: HCNC

## 2023-09-22 PROCEDURE — 93005 ELECTROCARDIOGRAM TRACING: CPT | Mod: HCNC

## 2023-09-22 PROCEDURE — 63600175 PHARM REV CODE 636 W HCPCS: Mod: HCNC

## 2023-09-22 PROCEDURE — 99223 1ST HOSP IP/OBS HIGH 75: CPT | Mod: HCNC,AI,,

## 2023-09-22 PROCEDURE — 96361 HYDRATE IV INFUSION ADD-ON: CPT | Mod: HCNC

## 2023-09-22 PROCEDURE — 85025 COMPLETE CBC W/AUTO DIFF WBC: CPT | Mod: HCNC

## 2023-09-22 PROCEDURE — 99285 EMERGENCY DEPT VISIT HI MDM: CPT | Mod: HCNC

## 2023-09-22 PROCEDURE — 93010 ELECTROCARDIOGRAM REPORT: CPT | Mod: HCNC,,, | Performed by: INTERNAL MEDICINE

## 2023-09-22 PROCEDURE — 87086 URINE CULTURE/COLONY COUNT: CPT | Mod: HCNC

## 2023-09-22 PROCEDURE — 83605 ASSAY OF LACTIC ACID: CPT | Mod: HCNC

## 2023-09-22 PROCEDURE — 96360 HYDRATION IV INFUSION INIT: CPT | Mod: HCNC

## 2023-09-22 PROCEDURE — 81001 URINALYSIS AUTO W/SCOPE: CPT | Mod: HCNC

## 2023-09-22 RX ORDER — IBUPROFEN 200 MG
24 TABLET ORAL
Status: DISCONTINUED | OUTPATIENT
Start: 2023-09-22 | End: 2023-09-23 | Stop reason: HOSPADM

## 2023-09-22 RX ORDER — SODIUM CHLORIDE 0.9 % (FLUSH) 0.9 %
10 SYRINGE (ML) INJECTION
Status: DISCONTINUED | OUTPATIENT
Start: 2023-09-22 | End: 2023-09-23 | Stop reason: HOSPADM

## 2023-09-22 RX ORDER — GLUCAGON 1 MG
1 KIT INJECTION
Status: DISCONTINUED | OUTPATIENT
Start: 2023-09-22 | End: 2023-09-23 | Stop reason: HOSPADM

## 2023-09-22 RX ORDER — NALOXONE HCL 0.4 MG/ML
0.02 VIAL (ML) INJECTION
Status: DISCONTINUED | OUTPATIENT
Start: 2023-09-22 | End: 2023-09-23 | Stop reason: HOSPADM

## 2023-09-22 RX ORDER — TALC
6 POWDER (GRAM) TOPICAL NIGHTLY PRN
Status: DISCONTINUED | OUTPATIENT
Start: 2023-09-22 | End: 2023-09-23 | Stop reason: HOSPADM

## 2023-09-22 RX ORDER — ASPIRIN 81 MG/1
81 TABLET ORAL DAILY
Status: DISCONTINUED | OUTPATIENT
Start: 2023-09-22 | End: 2023-09-23 | Stop reason: HOSPADM

## 2023-09-22 RX ORDER — SIMETHICONE 80 MG
1 TABLET,CHEWABLE ORAL 4 TIMES DAILY PRN
Status: DISCONTINUED | OUTPATIENT
Start: 2023-09-22 | End: 2023-09-23 | Stop reason: HOSPADM

## 2023-09-22 RX ORDER — POLYETHYLENE GLYCOL 3350 17 G/17G
17 POWDER, FOR SOLUTION ORAL 2 TIMES DAILY PRN
Status: DISCONTINUED | OUTPATIENT
Start: 2023-09-22 | End: 2023-09-23 | Stop reason: HOSPADM

## 2023-09-22 RX ORDER — ONDANSETRON 8 MG/1
8 TABLET, ORALLY DISINTEGRATING ORAL EVERY 8 HOURS PRN
Status: DISCONTINUED | OUTPATIENT
Start: 2023-09-22 | End: 2023-09-23 | Stop reason: HOSPADM

## 2023-09-22 RX ORDER — ONDANSETRON 2 MG/ML
4 INJECTION INTRAMUSCULAR; INTRAVENOUS EVERY 8 HOURS PRN
Status: DISCONTINUED | OUTPATIENT
Start: 2023-09-22 | End: 2023-09-23 | Stop reason: HOSPADM

## 2023-09-22 RX ORDER — MAG HYDROX/ALUMINUM HYD/SIMETH 200-200-20
30 SUSPENSION, ORAL (FINAL DOSE FORM) ORAL 4 TIMES DAILY PRN
Status: DISCONTINUED | OUTPATIENT
Start: 2023-09-22 | End: 2023-09-23 | Stop reason: HOSPADM

## 2023-09-22 RX ORDER — ACETAMINOPHEN 325 MG/1
650 TABLET ORAL EVERY 8 HOURS PRN
Status: DISCONTINUED | OUTPATIENT
Start: 2023-09-22 | End: 2023-09-23 | Stop reason: HOSPADM

## 2023-09-22 RX ORDER — SODIUM CHLORIDE, SODIUM LACTATE, POTASSIUM CHLORIDE, CALCIUM CHLORIDE 600; 310; 30; 20 MG/100ML; MG/100ML; MG/100ML; MG/100ML
INJECTION, SOLUTION INTRAVENOUS CONTINUOUS
Status: ACTIVE | OUTPATIENT
Start: 2023-09-22 | End: 2023-09-23

## 2023-09-22 RX ORDER — IBUPROFEN 200 MG
16 TABLET ORAL
Status: DISCONTINUED | OUTPATIENT
Start: 2023-09-22 | End: 2023-09-23 | Stop reason: HOSPADM

## 2023-09-22 RX ORDER — ACETAMINOPHEN 325 MG/1
650 TABLET ORAL EVERY 4 HOURS PRN
Status: DISCONTINUED | OUTPATIENT
Start: 2023-09-22 | End: 2023-09-23 | Stop reason: HOSPADM

## 2023-09-22 RX ORDER — CARVEDILOL 12.5 MG/1
12.5 TABLET ORAL 2 TIMES DAILY WITH MEALS
Status: DISCONTINUED | OUTPATIENT
Start: 2023-09-23 | End: 2023-09-23 | Stop reason: HOSPADM

## 2023-09-22 RX ADMIN — SODIUM CHLORIDE, SODIUM LACTATE, POTASSIUM CHLORIDE, AND CALCIUM CHLORIDE 500 ML: .6; .31; .03; .02 INJECTION, SOLUTION INTRAVENOUS at 01:09

## 2023-09-22 RX ADMIN — APIXABAN 2.5 MG: 2.5 TABLET, FILM COATED ORAL at 08:09

## 2023-09-22 RX ADMIN — ASPIRIN 81 MG: 81 TABLET, COATED ORAL at 03:09

## 2023-09-22 RX ADMIN — SODIUM CHLORIDE, POTASSIUM CHLORIDE, SODIUM LACTATE AND CALCIUM CHLORIDE 500 ML: 600; 310; 30; 20 INJECTION, SOLUTION INTRAVENOUS at 12:09

## 2023-09-22 RX ADMIN — SODIUM CHLORIDE, SODIUM LACTATE, POTASSIUM CHLORIDE, AND CALCIUM CHLORIDE: 600; 310; 30; 20 INJECTION, SOLUTION INTRAVENOUS at 04:09

## 2023-09-22 NOTE — ASSESSMENT & PLAN NOTE
- Creatinine today Estimated Creatinine Clearance: 12.9 mL/min (A) (based on SCr of 3.1 mg/dL (H)). (baseline 1.9-2.1)  - Recent Labs   Lab 09/22/23  1218   CO2 31*   BUN 74*   CREATININE 3.1*     - BUN/Cr ratio > 20 therefore suspect pt has prerenal azotemia   - UA with 3+ leuks, >100 WBCs, 15 RBC, nitrite negative  - Monitor BMP daily, replete electrolytes if necessary  - Renally adjust drugs to CrCl; avoid nephrotoxic drugs   -  Estimated Creatinine Clearance: 12.9 mL/min (A) (based on SCr of 3.1 mg/dL (H)).    -  Hold lasix, ACE-I or ARB while renal function dynamic; told torsemide  - gentle hydration overnight to avoid volume overload in the setting of HFpEF  - Monitor I/Os  - Consider renal U/S if no improvement in 24-48 hrs

## 2023-09-22 NOTE — ASSESSMENT & PLAN NOTE
Patient with Permanent atrial fibrillation which is controlled currently with Beta Blocker. Patient is currently in sinus rhythm.[id number] Score: 3. Anticoagulation indicated. Anticoagulation done with eliquis.

## 2023-09-22 NOTE — SUBJECTIVE & OBJECTIVE
Past Medical History:   Diagnosis Date    Acute on chronic diastolic heart failure 9/1/2016    Coronary artery disease     Hyperlipidemia     Hypertension     Macular degeneration (senile) of retina, unspecified 12/12/2014    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    Persistent atrial fibrillation 11/10/2016    S/P placement of cardiac pacemaker 9/14/2016       Past Surgical History:   Procedure Laterality Date    AORTIC VALVE REPLACEMENT N/A     CARDIAC PACEMAKER PLACEMENT      CATARACT EXTRACTION W/  INTRAOCULAR LENS IMPLANT Right 2/16/2016    Dr. Whitley    CATARACT EXTRACTION W/  INTRAOCULAR LENS IMPLANT Left 3/1/2016    Dr. Whitley    CORONARY ARTERY BYPASS GRAFT      EAR EXAMINATION UNDER ANESTHESIA      EYE SURGERY         Review of patient's allergies indicates:   Allergen Reactions    Iodine and iodide containing products Other (See Comments)     Caused changes in skin color       No current facility-administered medications on file prior to encounter.     Current Outpatient Medications on File Prior to Encounter   Medication Sig    albuterol (PROVENTIL HFA) 90 mcg/actuation inhaler Inhale 2 puffs into the lungs every 6 (six) hours as needed for Wheezing. Rescue    apixaban (ELIQUIS) 2.5 mg Tab Take 1 tablet (2.5 mg total) by mouth 2 (two) times daily.    aspirin (ECOTRIN) 81 MG EC tablet Take 1 tablet (81 mg total) by mouth once daily.    benazepriL (LOTENSIN) 5 MG tablet Take 1 tablet (5 mg total) by mouth once daily.    carvediloL (COREG) 12.5 MG tablet TAKE 1 TABLET TWICE DAILY WITH MEALS    diclofenac sodium (VOLTAREN) 1 % Gel Apply 2 g topically daily as needed (pain).    ferrous sulfate (FEOSOL) 325 mg (65 mg iron) Tab tablet Take 1 tablet (325 mg total) by mouth daily with breakfast.    fluticasone propionate (FLONASE) 50 mcg/actuation nasal spray 2 sprays (100 mcg total) by Each Nostril route once daily.    multivitamin (ONE DAILY MULTIVITAMIN) per tablet Take 1 tablet by mouth once daily.     nitroGLYCERIN (NITROSTAT) 0.4 MG SL tablet Take one tablet every 5 minutes for chest pain. After the third tablet go to the ED.    polyethylene glycol (GLYCOLAX) 17 gram/dose powder Dissolve one capful (17 g) in liquid by mouth 3 (three) times daily as needed. Take 1 three times daily until well formed stool    potassium chloride (KLOR-CON) 8 MEQ TbSR Take 1 tablet (8 mEq total) by mouth once daily.    torsemide (DEMADEX) 20 MG Tab Take 1 tablet (20 mg total) by mouth 2 (two) times a day.     Family History       Problem Relation (Age of Onset)    Hypertension Brother    No Known Problems Mother, Father, Sister, Maternal Aunt, Maternal Uncle, Paternal Aunt, Paternal Uncle, Maternal Grandmother, Maternal Grandfather, Paternal Grandmother, Paternal Grandfather, Daughter, Son    Stroke Brother          Tobacco Use    Smoking status: Never     Passive exposure: Never    Smokeless tobacco: Never   Substance and Sexual Activity    Alcohol use: No    Drug use: No    Sexual activity: Yes     Partners: Female     Review of Systems   Constitutional:  Negative for activity change, chills and fever.   HENT:  Negative for trouble swallowing.    Eyes:  Negative for photophobia and visual disturbance.   Respiratory:  Negative for chest tightness, shortness of breath and wheezing.    Cardiovascular:  Negative for chest pain, palpitations and leg swelling.   Gastrointestinal:  Negative for abdominal pain, constipation, diarrhea, nausea and vomiting.   Genitourinary:  Negative for dysuria, frequency, hematuria and urgency.   Musculoskeletal:  Negative for arthralgias, back pain and gait problem.   Skin:  Negative for color change and rash.   Neurological:  Negative for dizziness, syncope, weakness, light-headedness, numbness and headaches.   Psychiatric/Behavioral:  Negative for agitation and confusion. The patient is not nervous/anxious.      Objective:     Vital Signs (Most Recent):  Temp: 97.9 °F (36.6 °C) (09/22/23 1148)  Pulse:  104 (09/22/23 1247)  Resp: 14 (09/22/23 1202)  BP: 138/65 (09/22/23 1247)  SpO2: 100 % (09/22/23 1202) Vital Signs (24h Range):  Temp:  [97.9 °F (36.6 °C)] 97.9 °F (36.6 °C)  Pulse:  [] 104  Resp:  [14-18] 14  SpO2:  [100 %] 100 %  BP: (129-138)/(60-72) 138/65     Weight: 59.9 kg (132 lb)  Body mass index is 20.67 kg/m².     Physical Exam  Vitals and nursing note reviewed.   Constitutional:       General: He is not in acute distress.     Appearance: He is well-developed.   HENT:      Head: Normocephalic and atraumatic.      Mouth/Throat:      Mouth: Mucous membranes are dry.      Pharynx: No oropharyngeal exudate.   Eyes:      Conjunctiva/sclera: Conjunctivae normal.      Pupils: Pupils are equal, round, and reactive to light.   Cardiovascular:      Rate and Rhythm: Normal rate and regular rhythm.      Heart sounds: Normal heart sounds.   Pulmonary:      Effort: Pulmonary effort is normal. No respiratory distress.      Breath sounds: Normal breath sounds. No wheezing.   Abdominal:      General: Bowel sounds are normal. There is no distension.      Palpations: Abdomen is soft.      Tenderness: There is no abdominal tenderness.   Musculoskeletal:         General: No tenderness. Normal range of motion.      Cervical back: Normal range of motion and neck supple.      Right lower leg: No edema.      Left lower leg: No edema.   Lymphadenopathy:      Cervical: No cervical adenopathy.   Skin:     General: Skin is warm and dry.      Capillary Refill: Capillary refill takes less than 2 seconds.      Findings: No rash.   Neurological:      Mental Status: He is alert and oriented to person, place, and time.      Cranial Nerves: No cranial nerve deficit.      Sensory: No sensory deficit.      Coordination: Coordination normal.   Psychiatric:         Behavior: Behavior normal.         Thought Content: Thought content normal.         Judgment: Judgment normal.              CRANIAL NERVES     CN III, IV, VI   Pupils are  equal, round, and reactive to light.       Significant Labs: All pertinent labs within the past 24 hours have been reviewed.  CBC:   Recent Labs   Lab 09/22/23  1218   WBC 8.13   HGB 10.1*   HCT 31.0*        CMP:   Recent Labs   Lab 09/22/23  1218      K 3.8      CO2 31*   *   BUN 74*   CREATININE 3.1*   CALCIUM 9.3   PROT 7.9   ALBUMIN 3.3*   BILITOT 0.8   ALKPHOS 113   AST 10   ALT 6*   ANIONGAP 11       Significant Imaging: I have reviewed all pertinent imaging results/findings within the past 24 hours.

## 2023-09-22 NOTE — H&P
[facility] - Emergency Dept  Hospital Medicine  History & Physical    Patient Name: Deena Moody  MRN: [id number]  Patient Class: OP- Observation  Admission Date: 9/22/2023  Attending Physician: Junior Myers MD   Primary Care Provider: Jam Roewll MD         Patient information was obtained from patient, relative(s), past medical records and ER records.     Subjective:     Principal Problem:<principal problem not specified>    Chief Complaint:   Chief Complaint   Patient presents with    Hypotension     Son checked pt's pressure at home, EMS states 70s systolic en route. Denies symptoms        HPI: Deena Moody is a 89 y.o. male with a history of HTN, CKD III, CAD (CABG/AVR 2004), AVB s/p PPM, AF on eliquis, who presented to the ED with complaints of hypotension. He was at home watching a movie and took his BP prior to taking his morning medications. States his BP cuff states his BP was 60/30. Denies feeling weak, dizziness, CP, SOB. Denies fevers, chills, dysuria, hematuria, frequency or urgency.    ED: VSS, CBC with chronic stable anemia. CMP with Cr of 3.1 (baseline 1.9-2.1). Orthostatics negative. UA with 3+ leuks, nitrite negative, 15 RBC, >100 WBCs. Given a total of 1L of IVF and admitted to hospital medicine.       Past Medical History:   Diagnosis Date    Acute on chronic diastolic heart failure 9/1/2016    Coronary artery disease     Hyperlipidemia     Hypertension     Macular degeneration (senile) of retina, unspecified 12/12/2014    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    Persistent atrial fibrillation 11/10/2016    S/P placement of cardiac pacemaker 9/14/2016       Past Surgical History:   Procedure Laterality Date    AORTIC VALVE REPLACEMENT N/A     CARDIAC PACEMAKER PLACEMENT      CATARACT EXTRACTION W/  INTRAOCULAR LENS IMPLANT Right 2/16/2016    Dr. Whitley    CATARACT EXTRACTION W/  INTRAOCULAR LENS IMPLANT Left 3/1/2016    Dr. Whitley    CORONARY ARTERY BYPASS GRAFT       EAR EXAMINATION UNDER ANESTHESIA      EYE SURGERY         Review of patient's allergies indicates:   Allergen Reactions    Iodine and iodide containing products Other (See Comments)     Caused changes in skin color       No current facility-administered medications on file prior to encounter.     Current Outpatient Medications on File Prior to Encounter   Medication Sig    albuterol (PROVENTIL HFA) 90 mcg/actuation inhaler Inhale 2 puffs into the lungs every 6 (six) hours as needed for Wheezing. Rescue    apixaban (ELIQUIS) 2.5 mg Tab Take 1 tablet (2.5 mg total) by mouth 2 (two) times daily.    aspirin (ECOTRIN) 81 MG EC tablet Take 1 tablet (81 mg total) by mouth once daily.    benazepriL (LOTENSIN) 5 MG tablet Take 1 tablet (5 mg total) by mouth once daily.    carvediloL (COREG) 12.5 MG tablet TAKE 1 TABLET TWICE DAILY WITH MEALS    diclofenac sodium (VOLTAREN) 1 % Gel Apply 2 g topically daily as needed (pain).    ferrous sulfate (FEOSOL) 325 mg (65 mg iron) Tab tablet Take 1 tablet (325 mg total) by mouth daily with breakfast.    fluticasone propionate (FLONASE) 50 mcg/actuation nasal spray 2 sprays (100 mcg total) by Each Nostril route once daily.    multivitamin (ONE DAILY MULTIVITAMIN) per tablet Take 1 tablet by mouth once daily.    nitroGLYCERIN (NITROSTAT) 0.4 MG SL tablet Take one tablet every 5 minutes for chest pain. After the third tablet go to the ED.    polyethylene glycol (GLYCOLAX) 17 gram/dose powder Dissolve one capful (17 g) in liquid by mouth 3 (three) times daily as needed. Take 1 three times daily until well formed stool    potassium chloride (KLOR-CON) 8 MEQ TbSR Take 1 tablet (8 mEq total) by mouth once daily.    torsemide (DEMADEX) 20 MG Tab Take 1 tablet (20 mg total) by mouth 2 (two) times a day.     Family History       Problem Relation (Age of Onset)    Hypertension Brother    No Known Problems Mother, Father, Sister, Maternal Aunt, Maternal Uncle, Paternal Aunt,  Paternal Uncle, Maternal Grandmother, Maternal Grandfather, Paternal Grandmother, Paternal Grandfather, Daughter, Son    Stroke Brother          Tobacco Use    Smoking status: Never     Passive exposure: Never    Smokeless tobacco: Never   Substance and Sexual Activity    Alcohol use: No    Drug use: No    Sexual activity: Yes     Partners: Female     Review of Systems   Constitutional:  Negative for activity change, chills and fever.   HENT:  Negative for trouble swallowing.    Eyes:  Negative for photophobia and visual disturbance.   Respiratory:  Negative for chest tightness, shortness of breath and wheezing.    Cardiovascular:  Negative for chest pain, palpitations and leg swelling.   Gastrointestinal:  Negative for abdominal pain, constipation, diarrhea, nausea and vomiting.   Genitourinary:  Negative for dysuria, frequency, hematuria and urgency.   Musculoskeletal:  Negative for arthralgias, back pain and gait problem.   Skin:  Negative for color change and rash.   Neurological:  Negative for dizziness, syncope, weakness, light-headedness, numbness and headaches.   Psychiatric/Behavioral:  Negative for agitation and confusion. The patient is not nervous/anxious.      Objective:     Vital Signs (Most Recent):  Temp: 97.9 °F (36.6 °C) (09/22/23 1148)  Pulse: 104 (09/22/23 1247)  Resp: 14 (09/22/23 1202)  BP: 138/65 (09/22/23 1247)  SpO2: 100 % (09/22/23 1202) Vital Signs (24h Range):  Temp:  [97.9 °F (36.6 °C)] 97.9 °F (36.6 °C)  Pulse:  [] 104  Resp:  [14-18] 14  SpO2:  [100 %] 100 %  BP: (129-138)/(60-72) 138/65     Weight: 59.9 kg (132 lb)  Body mass index is 20.67 kg/m².     Physical Exam  Vitals and nursing note reviewed.   Constitutional:       General: He is not in acute distress.     Appearance: He is well-developed.   HENT:      Head: Normocephalic and atraumatic.      Mouth/Throat:      Mouth: Mucous membranes are dry.      Pharynx: No oropharyngeal exudate.   Eyes:      Conjunctiva/sclera:  Conjunctivae normal.      Pupils: Pupils are equal, round, and reactive to light.   Cardiovascular:      Rate and Rhythm: Normal rate and regular rhythm.      Heart sounds: Normal heart sounds.   Pulmonary:      Effort: Pulmonary effort is normal. No respiratory distress.      Breath sounds: Normal breath sounds. No wheezing.   Abdominal:      General: Bowel sounds are normal. There is no distension.      Palpations: Abdomen is soft.      Tenderness: There is no abdominal tenderness.   Musculoskeletal:         General: No tenderness. Normal range of motion.      Cervical back: Normal range of motion and neck supple.      Right lower leg: No edema.      Left lower leg: No edema.   Lymphadenopathy:      Cervical: No cervical adenopathy.   Skin:     General: Skin is warm and dry.      Capillary Refill: Capillary refill takes less than 2 seconds.      Findings: No rash.   Neurological:      Mental Status: He is alert and oriented to person, place, and time.      Cranial Nerves: No cranial nerve deficit.      Sensory: No sensory deficit.      Coordination: Coordination normal.   Psychiatric:         Behavior: Behavior normal.         Thought Content: Thought content normal.         Judgment: Judgment normal.              CRANIAL NERVES     CN III, IV, VI   Pupils are equal, round, and reactive to light.       Significant Labs: All pertinent labs within the past 24 hours have been reviewed.  CBC:   Recent Labs   Lab 09/22/23  1218   WBC 8.13   HGB 10.1*   HCT 31.0*        CMP:   Recent Labs   Lab 09/22/23  1218      K 3.8      CO2 31*   *   BUN 74*   CREATININE 3.1*   CALCIUM 9.3   PROT 7.9   ALBUMIN 3.3*   BILITOT 0.8   ALKPHOS 113   AST 10   ALT 6*   ANIONGAP 11       Significant Imaging: I have reviewed all pertinent imaging results/findings within the past 24 hours.    Assessment/Plan:     UTI (urinary tract infection)  Uncomplicated  - SIRs 0/4  - UA: 3+ leuks, negative nitrite, >100 WBCs    - urine culture pending  - denies any symptoms including but not limited to dysuria, hematuria, urgency or frequency  - will hold off on treating for now as patient is asymptomatic       SARAH (acute kidney injury)  - Creatinine today Estimated Creatinine Clearance: 12.9 mL/min (A) (based on SCr of 3.1 mg/dL (H)). (baseline 1.9-2.1)  - Recent Labs   Lab 09/22/23  1218   CO2 31*   BUN 74*   CREATININE 3.1*     - BUN/Cr ratio > 20 therefore suspect pt has prerenal azotemia   - UA with 3+ leuks, >100 WBCs, 15 RBC, nitrite negative  - Monitor BMP daily, replete electrolytes if necessary  - Renally adjust drugs to CrCl; avoid nephrotoxic drugs   -  Estimated Creatinine Clearance: 12.9 mL/min (A) (based on SCr of 3.1 mg/dL (H)).    -  Hold lasix, ACE-I or ARB while renal function dynamic; told torsemide  - gentle hydration overnight to avoid volume overload in the setting of HFpEF  - Monitor I/Os  - Consider renal U/S if no improvement in 24-48 hrs    Permanent atrial fibrillation  Patient with Permanent atrial fibrillation which is controlled currently with Beta Blocker. Patient is currently in sinus rhythm.[id number] Score: 3. Anticoagulation indicated. Anticoagulation done with eliquis.    CAD (coronary artery disease)  - continue ASA and eliquis     Complete AV block  - s/p pacemaker    Essential hypertension  - continue metoprolol   - hold benazepril in the setting of SARAH   - vitals q4h    Dyslipidemia  - not currently on statin at home    VTE Risk Mitigation (From admission, onward)         Ordered     apixaban tablet 2.5 mg  2 times daily         09/22/23 1417     IP VTE HIGH RISK PATIENT  Once         09/22/23 1415     Place sequential compression device  Until discontinued         09/22/23 1415     Reason for No Pharmacological VTE Prophylaxis  Once        Question:  Reasons:  Answer:  Already adequately anticoagulated on oral Anticoagulants    09/22/23 1415                     On 09/22/2023, patient should be  placed in hospital observation services under my care in collaboration with Dr. Junior Myers.      Amy Reeder PA-C  Department of Hospital Medicine  [facility] - Emergency Dept

## 2023-09-22 NOTE — ASSESSMENT & PLAN NOTE
Uncomplicated  - SIRs 0/4  - UA: 3+ leuks, negative nitrite, >100 WBCs   - urine culture pending  - denies any symptoms including but not limited to dysuria, hematuria, urgency or frequency  - will hold off on treating for now as patient is asymptomatic

## 2023-09-22 NOTE — PLAN OF CARE
Problem: Adult Inpatient Plan of Care  Goal: Plan of Care Review  Outcome: Ongoing, Progressing  Goal: Patient-Specific Goal (Individualized)  Outcome: Ongoing, Progressing  Goal: Absence of Hospital-Acquired Illness or Injury  Outcome: Ongoing, Progressing  Goal: Optimal Comfort and Wellbeing  Outcome: Ongoing, Progressing  Goal: Readiness for Transition of Care  Outcome: Ongoing, Progressing     Problem: Infection  Goal: Absence of Infection Signs and Symptoms  Outcome: Ongoing, Progressing     Problem: Fluid and Electrolyte Imbalance (Acute Kidney Injury/Impairment)  Goal: Fluid and Electrolyte Balance  Outcome: Ongoing, Progressing     Problem: Oral Intake Inadequate (Acute Kidney Injury/Impairment)  Goal: Optimal Nutrition Intake  Outcome: Ongoing, Progressing     Problem: Renal Function Impairment (Acute Kidney Injury/Impairment)  Goal: Effective Renal Function  Outcome: Ongoing, Progressing     Pt progressing towards goals. No distress notice. No falls or injuries during shift. Bed in lowest position, call light within reach, belonging at bedside. Safety precaution maintain.Plan of Care reviewed. Pt verbalized understanding.

## 2023-09-22 NOTE — ED PROVIDER NOTES
Encounter Date: 9/22/2023       History     Chief Complaint   Patient presents with    Hypotension     Son checked pt's pressure at home, EMS states 70s systolic en route. Denies symptoms     The history is provided by the patient and the EMS personnel.     Deena Moody is a 89 y.o. male , PMH CKD, HLD, AV Block, CAD, Aortic valve replacement, Cardiac pacemaker, HTN,presenting with complaints of asymptomatic hypotension he noticed this morning when he checked his pressure.  Patient reports being in his normal state of health and denies having taken any of his morning medications prior to arrival.  Patient denies any symptoms of shortness of breath, chest pain, changes in his vision, lightheadedness, dizziness, presyncope.  Patient denies any recent changes in his medications.  He denies any recent nausea, vomiting, diarrhea, decreased oral intake.  Patient reports blood pressures of 60/30 at home, he arrives via EMS who reports normotensive pressures just prior to arrival at the ED.   Review of patient's allergies indicates:   Allergen Reactions    Iodine and iodide containing products Other (See Comments)     Caused changes in skin color     Past Medical History:   Diagnosis Date    Acute on chronic diastolic heart failure 9/1/2016    Coronary artery disease     Hyperlipidemia     Hypertension     Macular degeneration (senile) of retina, unspecified 12/12/2014    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    Persistent atrial fibrillation 11/10/2016    S/P placement of cardiac pacemaker 9/14/2016     Past Surgical History:   Procedure Laterality Date    AORTIC VALVE REPLACEMENT N/A     CARDIAC PACEMAKER PLACEMENT      CATARACT EXTRACTION W/  INTRAOCULAR LENS IMPLANT Right 2/16/2016    Dr. Whitley    CATARACT EXTRACTION W/  INTRAOCULAR LENS IMPLANT Left 3/1/2016    Dr. Whitley    CORONARY ARTERY BYPASS GRAFT      EAR EXAMINATION UNDER ANESTHESIA      EYE SURGERY       Family History   Problem Relation Age of Onset    No  Known Problems Mother     No Known Problems Father     No Known Problems Sister     Stroke Brother     Hypertension Brother     No Known Problems Maternal Aunt     No Known Problems Maternal Uncle     No Known Problems Paternal Aunt     No Known Problems Paternal Uncle     No Known Problems Maternal Grandmother     No Known Problems Maternal Grandfather     No Known Problems Paternal Grandmother     No Known Problems Paternal Grandfather     No Known Problems Daughter     No Known Problems Son     Amblyopia Neg Hx     Blindness Neg Hx     Cancer Neg Hx     Cataracts Neg Hx     Diabetes Neg Hx     Glaucoma Neg Hx     Macular degeneration Neg Hx     Retinal detachment Neg Hx     Strabismus Neg Hx     Thyroid disease Neg Hx      Social History     Tobacco Use    Smoking status: Never     Passive exposure: Never    Smokeless tobacco: Never   Substance Use Topics    Alcohol use: No    Drug use: No     Review of Systems    Physical Exam     Initial Vitals [09/22/23 1148]   BP Pulse Resp Temp SpO2   129/72 69 18 97.9 °F (36.6 °C) 100 %      MAP       --         Physical Exam    Nursing note and vitals reviewed.  Constitutional: He appears well-developed and well-nourished. He is not diaphoretic. No distress.   HENT:   Head: Normocephalic.   Eyes: Conjunctivae and EOM are normal.   Neck:   Normal range of motion.  Cardiovascular:  Normal rate and regular rhythm.     Exam reveals no gallop and no friction rub.       Murmur heard.  Systolic murmur is present.  Mechanical systolic murmur consistent with aortic valve replacement.    Pulmonary/Chest: Breath sounds normal. No respiratory distress. He has no wheezes. He has no rhonchi. He has no rales.   Abdominal: He exhibits no distension.   Musculoskeletal:         General: No edema.      Cervical back: Normal range of motion.     Neurological: He is alert. GCS score is 15. GCS eye subscore is 4. GCS verbal subscore is 5. GCS motor subscore is 6.   Skin: Skin is warm and dry.          ED Course   Procedures  Labs Reviewed   COMPREHENSIVE METABOLIC PANEL - Abnormal; Notable for the following components:       Result Value    CO2 31 (*)     Glucose 145 (*)     BUN 74 (*)     Creatinine 3.1 (*)     Albumin 3.3 (*)     ALT 6 (*)     eGFR 18.2 (*)     All other components within normal limits   CBC W/ AUTO DIFFERENTIAL - Abnormal; Notable for the following components:    RBC 3.25 (*)     Hemoglobin 10.1 (*)     Hematocrit 31.0 (*)     MCH 31.1 (*)     MPV 9.0 (*)     Lymph % 17.8 (*)     All other components within normal limits   URINALYSIS, REFLEX TO URINE CULTURE - Abnormal; Notable for the following components:    Appearance, UA Hazy (*)     Leukocytes, UA 3+ (*)     All other components within normal limits    Narrative:     Specimen Source->Urine   LACTIC ACID, PLASMA   URINALYSIS MICROSCOPIC     EKG Readings: (Independently Interpreted)   Initial Reading: No STEMI. Previous EKG: Compared with most recent EKG Previous EKG Date: 06/15/23. Heart Rate: 70. Axis: Left Axis Deviation.   Similar to previous EKG, wide QRS        Imaging Results    None          Medications   lactated ringers bolus 500 mL (500 mLs Intravenous New Bag 9/22/23 1315)   lactated ringers bolus 500 mL (0 mLs Intravenous Stopped 9/22/23 1320)     Medical Decision Making  Deena Moody  is a 89 y.o. male, presenting with complaints of asymptomatic hypotension, history of present illness obtained from patient and EMS as seen above. At time of initial exam patient found resting comfortably in bed, able to provide accurate history of present illness and tolerated physical exam well. Patient found to be well appearing no acute distress or fatigue, stable. Exam notable as above.     DDX includes but is not limited to:  Hypotension, cardiac event, excess diuresis, SARAH, presyncopal episode.  Patient denies any symptoms of dizziness, lightheadedness, changes in vision, or feeling that he might pass out; not consistent with  presyncopal episode.  EKG unchanged, denies any chest pain, SOB; unlikely to be cardiac event. Pt has concerns for SARAH most likely 2/2 to excess diuresis, increased BUN, Cr and decreased GFR.  Patient found to have concerning UA consistent with UTI, patient given 2 g Rocephin.    Data Reviewed/Counseling: I have reviwed the patient's vital signs, nursing notes, and other relevant tests/information. Any incidental findings were discussed with the patient. I had a detailed discussion with the patient regarding the historical points, exam findings, and any diagnostic results supporting the diagnosis. Discussed case with Hospital Medicine, who agreed to admit patient.  Disposition: Observation       Amount and/or Complexity of Data Reviewed  Independent Historian: EMS     Details: Per EMS patient continued to be asymptomatic en route, without symptoms of nausea, vomiting, changes in the vision, presyncopal episodes.  Patient found to be normotensive just prior to arrival but was noted to have low blood pressures at the scene. Denies any IVF en route.   Labs: ordered. Decision-making details documented in ED Course.               ED Course as of 09/22/23 1329   Fri Sep 22, 2023   1306 BUN(!): 74 [HB]   1306 Creatinine(!): 3.1 [HB]   1306 eGFR(!): 18.2 [HB]   1306 CBC auto differential(!)  At baseline [HB]   1306 Lactate, Joel: 1.8 [HB]   1306 BP: 138/65  Improved from initial.  [HB]      ED Course User Index  [HB] Patrica Sandoval MD                    Clinical Impression:   Final diagnoses:  [I95.9] Hypotension  [N17.9] SARAH (acute kidney injury) (Primary)        ED Disposition Condition    Observation Stable                       Patrica Sandoval MD  Resident  09/22/23 1973

## 2023-09-22 NOTE — HPI
Deena Moody is a 89 y.o. male with a history of HTN, CKD III, CAD (CABG/AVR 2004), AVB s/p PPM, AF on eliquis, who presented to the ED with complaints of hypotension. He was at home watching a movie and took his BP prior to taking his morning medications. States his BP cuff states his BP was 60/30. Denies feeling weak, dizziness, CP, SOB. Denies fevers, chills, dysuria, hematuria, frequency or urgency.    ED: VSS, CBC with chronic stable anemia. CMP with Cr of 3.1 (baseline 1.9-2.1). Orthostatics negative. UA with 3+ leuks, nitrite negative, 15 RBC, >100 WBCs. Given a total of 1L of IVF and admitted to hospital medicine.

## 2023-09-23 VITALS
SYSTOLIC BLOOD PRESSURE: 131 MMHG | DIASTOLIC BLOOD PRESSURE: 60 MMHG | RESPIRATION RATE: 18 BRPM | BODY MASS INDEX: 19.55 KG/M2 | OXYGEN SATURATION: 96 % | WEIGHT: 132 LBS | TEMPERATURE: 98 F | HEIGHT: 69 IN | HEART RATE: 69 BPM

## 2023-09-23 LAB
ALBUMIN SERPL BCP-MCNC: 2.8 G/DL (ref 3.5–5.2)
ALP SERPL-CCNC: 104 U/L (ref 55–135)
ALT SERPL W/O P-5'-P-CCNC: 5 U/L (ref 10–44)
ANION GAP SERPL CALC-SCNC: 10 MMOL/L (ref 8–16)
ANION GAP SERPL CALC-SCNC: 9 MMOL/L (ref 8–16)
AST SERPL-CCNC: 10 U/L (ref 10–40)
BACTERIA UR CULT: NO GROWTH
BASOPHILS # BLD AUTO: 0.05 K/UL (ref 0–0.2)
BASOPHILS NFR BLD: 0.7 % (ref 0–1.9)
BILIRUB SERPL-MCNC: 0.7 MG/DL (ref 0.1–1)
BUN SERPL-MCNC: 64 MG/DL (ref 10–30)
BUN SERPL-MCNC: 66 MG/DL (ref 10–30)
CALCIUM SERPL-MCNC: 8.4 MG/DL (ref 8.7–10.5)
CALCIUM SERPL-MCNC: 8.4 MG/DL (ref 8.7–10.5)
CHLORIDE SERPL-SCNC: 102 MMOL/L (ref 95–110)
CHLORIDE SERPL-SCNC: 102 MMOL/L (ref 95–110)
CO2 SERPL-SCNC: 27 MMOL/L (ref 23–29)
CO2 SERPL-SCNC: 28 MMOL/L (ref 23–29)
CREAT SERPL-MCNC: 2.6 MG/DL (ref 0.5–1.4)
CREAT SERPL-MCNC: 2.7 MG/DL (ref 0.5–1.4)
DIFFERENTIAL METHOD: ABNORMAL
EOSINOPHIL # BLD AUTO: 0.4 K/UL (ref 0–0.5)
EOSINOPHIL NFR BLD: 5.5 % (ref 0–8)
ERYTHROCYTE [DISTWIDTH] IN BLOOD BY AUTOMATED COUNT: 14.1 % (ref 11.5–14.5)
EST. GFR  (NO RACE VARIABLE): 21.4 ML/MIN/1.73 M^2
EST. GFR  (NO RACE VARIABLE): 22.4 ML/MIN/1.73 M^2
GLUCOSE SERPL-MCNC: 138 MG/DL (ref 70–110)
GLUCOSE SERPL-MCNC: 79 MG/DL (ref 70–110)
HCT VFR BLD AUTO: 29.9 % (ref 40–54)
HGB BLD-MCNC: 9.7 G/DL (ref 14–18)
IMM GRANULOCYTES # BLD AUTO: 0.02 K/UL (ref 0–0.04)
IMM GRANULOCYTES NFR BLD AUTO: 0.3 % (ref 0–0.5)
LYMPHOCYTES # BLD AUTO: 1.4 K/UL (ref 1–4.8)
LYMPHOCYTES NFR BLD: 20.5 % (ref 18–48)
MCH RBC QN AUTO: 32.1 PG (ref 27–31)
MCHC RBC AUTO-ENTMCNC: 32.4 G/DL (ref 32–36)
MCV RBC AUTO: 99 FL (ref 82–98)
MONOCYTES # BLD AUTO: 0.6 K/UL (ref 0.3–1)
MONOCYTES NFR BLD: 8.6 % (ref 4–15)
NEUTROPHILS # BLD AUTO: 4.5 K/UL (ref 1.8–7.7)
NEUTROPHILS NFR BLD: 64.4 % (ref 38–73)
NRBC BLD-RTO: 0 /100 WBC
PLATELET # BLD AUTO: 140 K/UL (ref 150–450)
PMV BLD AUTO: 9.2 FL (ref 9.2–12.9)
POTASSIUM SERPL-SCNC: 4 MMOL/L (ref 3.5–5.1)
POTASSIUM SERPL-SCNC: 4.5 MMOL/L (ref 3.5–5.1)
PROT SERPL-MCNC: 6.9 G/DL (ref 6–8.4)
RBC # BLD AUTO: 3.02 M/UL (ref 4.6–6.2)
SODIUM SERPL-SCNC: 138 MMOL/L (ref 136–145)
SODIUM SERPL-SCNC: 140 MMOL/L (ref 136–145)
WBC # BLD AUTO: 6.96 K/UL (ref 3.9–12.7)

## 2023-09-23 PROCEDURE — 80053 COMPREHEN METABOLIC PANEL: CPT | Mod: HCNC

## 2023-09-23 PROCEDURE — 99239 HOSP IP/OBS DSCHRG MGMT >30: CPT | Mod: HCNC,,,

## 2023-09-23 PROCEDURE — G0378 HOSPITAL OBSERVATION PER HR: HCPCS | Mod: HCNC

## 2023-09-23 PROCEDURE — 85025 COMPLETE CBC W/AUTO DIFF WBC: CPT | Mod: HCNC

## 2023-09-23 PROCEDURE — 36415 COLL VENOUS BLD VENIPUNCTURE: CPT | Mod: HCNC

## 2023-09-23 PROCEDURE — 99239 PR HOSPITAL DISCHARGE DAY,>30 MIN: ICD-10-PCS | Mod: HCNC,,,

## 2023-09-23 PROCEDURE — 25000003 PHARM REV CODE 250: Mod: HCNC

## 2023-09-23 PROCEDURE — 80048 BASIC METABOLIC PNL TOTAL CA: CPT | Mod: HCNC

## 2023-09-23 PROCEDURE — 63600175 PHARM REV CODE 636 W HCPCS: Mod: HCNC

## 2023-09-23 RX ORDER — SODIUM CHLORIDE, SODIUM LACTATE, POTASSIUM CHLORIDE, CALCIUM CHLORIDE 600; 310; 30; 20 MG/100ML; MG/100ML; MG/100ML; MG/100ML
INJECTION, SOLUTION INTRAVENOUS CONTINUOUS
Status: DISCONTINUED | OUTPATIENT
Start: 2023-09-23 | End: 2023-09-23 | Stop reason: HOSPADM

## 2023-09-23 RX ORDER — TORSEMIDE 20 MG/1
20 TABLET ORAL 2 TIMES DAILY
Qty: 180 TABLET | Refills: 3 | Status: SHIPPED | OUTPATIENT
Start: 2023-09-26 | End: 2024-09-25

## 2023-09-23 RX ADMIN — APIXABAN 2.5 MG: 2.5 TABLET, FILM COATED ORAL at 08:09

## 2023-09-23 RX ADMIN — ASPIRIN 81 MG: 81 TABLET, COATED ORAL at 08:09

## 2023-09-23 RX ADMIN — SODIUM CHLORIDE, POTASSIUM CHLORIDE, SODIUM LACTATE AND CALCIUM CHLORIDE: 600; 310; 30; 20 INJECTION, SOLUTION INTRAVENOUS at 11:09

## 2023-09-23 RX ADMIN — CARVEDILOL 12.5 MG: 12.5 TABLET, FILM COATED ORAL at 08:09

## 2023-09-23 NOTE — HOSPITAL COURSE
Deena Moody is a 89 y.o. male who was admitted to hospital medicine for an SARAH. Cr 3.1 on admission, improved to 2.7 following IVF. Continued IVF throughout the day with improvement to 2.6. Will have a follow up BMP on Monday, 9/25 as an outpatient. Discusses holding torsemide until Tuesday follow repeat BMP. Orthostats positive initially with HR 69 sitting and increasing to 104 with standing. UA appeared infectious but patient asymptomatic, will not initiate abx at this time. On my evaluation this morning, the patient reports feeling well and is eager to discharge home. All questions were answered. Patient acknowledged understanding of discharge instructions and feels safe to discharge home. Patient was discharged on 9/23/2023 in stable condition with PCP follow-up. Education regarding condition provided and return precautions given.

## 2023-09-23 NOTE — PLAN OF CARE
Pt in bed, awake and alert, able to make needs known. Ambulates to bathroom w/o assistance. Po meds administered w/o difficulty. NAD noted at present. Will continue to monitor.  Problem: Adult Inpatient Plan of Care  Goal: Plan of Care Review  Outcome: Ongoing, Progressing     Problem: Infection  Goal: Absence of Infection Signs and Symptoms  Outcome: Ongoing, Progressing     Problem: Fluid and Electrolyte Imbalance (Acute Kidney Injury/Impairment)  Goal: Fluid and Electrolyte Balance  Outcome: Ongoing, Progressing     Problem: Oral Intake Inadequate (Acute Kidney Injury/Impairment)  Goal: Optimal Nutrition Intake  Outcome: Ongoing, Progressing     Problem: Renal Function Impairment (Acute Kidney Injury/Impairment)  Goal: Effective Renal Function  Outcome: Ongoing, Progressing

## 2023-09-23 NOTE — NURSING
Nurses Note -- 4 Eyes      9/22/2023   6:00 pm    Skin assessed during: Admit      [x] No Altered Skin Integrity Present    []Prevention Measures Documented      [] Yes- Altered Skin Integrity Present or Discovered   [] LDA Added if Not in Epic (Describe Wound)   [] New Altered Skin Integrity was Present on Admit and Documented in LDA   [] Wound Image Taken    Wound Care Consulted? No    Attending Nurse:  Kimberly Rg RN/Staff Member:   Cuca

## 2023-09-23 NOTE — PLAN OF CARE
[facility]  Discharge Final Note    Primary Care Provider: Jam Rowell MD    Expected Discharge Date: 9/23/2023    Final Discharge Note (most recent)       Final Note - 09/23/23 1621          Final Note    Assessment Type Final Discharge Note     Anticipated Discharge Disposition Home or Self Care     Hospital Resources/Appts/Education Provided Provided patient/caregiver with written discharge plan information        Post-Acute Status    Post-Acute Authorization Other     Other Status No Post-Acute Service Needs     Discharge Delays None known at this time                     JEROME Moreno, LCSW  [facility] [place] [facility]  [facility] [phone]

## 2023-09-23 NOTE — PLAN OF CARE
[facility]  Discharge Assessment    Primary Care Provider: Jam Rowell MD     Discharge Assessment (most recent)       BRIEF DISCHARGE ASSESSMENT - 09/23/23 1617          Discharge Planning    Assessment Type Discharge Planning Brief Assessment     Resource/Environmental Concerns none     Support Systems Children     Assistance Needed none     Equipment Currently Used at Home none     Current Living Arrangements home     Care Facility Name none     Patient/Family Anticipates Transition to home     Patient/Family Anticipated Services at Transition none     DME Needed Upon Discharge  none     Discharge Plan A Home with family     Discharge Plan B Home        Physical Activity    On average, how many days per week do you engage in moderate to strenuous exercise (like a brisk walk)? 0 days     On average, how many minutes do you engage in exercise at this level? 0 min        Financial Resource Strain    How hard is it for you to pay for the very basics like food, housing, medical care, and heating? Not hard at all        Housing Stability    In the last 12 months, was there a time when you were not able to pay the mortgage or rent on time? No     In the last 12 months, how many places have you lived? 1     In the last 12 months, was there a time when you did not have a steady place to sleep or slept in a shelter (including now)? No        Transportation Needs    In the past 12 months, has lack of transportation kept you from medical appointments or from getting medications? No     In the past 12 months, has lack of transportation kept you from meetings, work, or from getting things needed for daily living? No        Food Insecurity    Within the past 12 months, you worried that your food would run out before you got the money to buy more. Never true     Within the past 12 months, the food you bought just didn't last and you didn't have money to get more. Never true        Stress    Do you  feel stress - tense, restless, nervous, or anxious, or unable to sleep at night because your mind is troubled all the time - these days? Not at all        Social Connections    In a typical week, how many times do you talk on the phone with family, friends, or neighbors? More than three times a week     How often do you get together with friends or relatives? Three times a week     How often do you attend Hoahaoism or Pentecostal services? Never     Do you belong to any clubs or organizations such as Hoahaoism groups, unions, fraternal or athletic groups, or school groups? No     How often do you attend meetings of the clubs or organizations you belong to? Never     Are you , , , , never , or living with a partner?         Alcohol Use    Q1: How often do you have a drink containing alcohol? Never     Q2: How many drinks containing alcohol do you have on a typical day when you are drinking? Patient does not drink     Q3: How often do you have six or more drinks on one occasion? Never                      met with pt at bedside. Pt confirmed face sheet information. Lives alone. Has support from dtr and she will provide ride home. Pt has a RW and shower chair at home. No HH. No coumadin. No dialysis. Preferred pharmacy [facility]. Pt to be discharged home today.     Leola Plummer, JEROME, LCSW  [facility] [phone]

## 2023-09-28 NOTE — DISCHARGE SUMMARY
[facility] [address] [place] [address] Medicine  Discharge Summary      Patient Name: Deena Moody  MRN: [id number]  HOMER: [id number]  Patient Class: OP- Observation  Admission Date: 9/22/2023  Hospital Length of Stay: 0 days  Discharge Date and Time: 9/23/2023  6:12 PM  Attending Physician: Kelly att. providers found   Discharging Provider: Amy Reeder PA-C  Primary Care Provider: Jam Rowell MD  [facility] Team: [facility]  Amy Reeder PA-C  Primary Care Team: [facility]    HPI:   Deena Moody is a 89 y.o. male with a history of HTN, CKD III, CAD (CABG/AVR 2004), AVB s/p PPM, AF on eliquis, who presented to the ED with complaints of hypotension. He was at home watching a movie and took his BP prior to taking his morning medications. States his BP cuff states his BP was 60/30. Denies feeling weak, dizziness, CP, SOB. Denies fevers, chills, dysuria, hematuria, frequency or urgency.    ED: VSS, CBC with chronic stable anemia. CMP with Cr of 3.1 (baseline 1.9-2.1). Orthostatics negative. UA with 3+ leuks, nitrite negative, 15 RBC, >100 WBCs. Given a total of 1L of IVF and admitted to hospital medicine.       * No surgery found *      Hospital Course:   Deena Moody is a 89 y.o. male who was admitted to hospital medicine for an SARAH. Cr 3.1 on admission, improved to 2.7 following IVF. Continued IVF throughout the day with improvement to 2.6. Will have a follow up BMP on Monday, 9/25 as an outpatient. Discusses holding torsemide until Tuesday follow repeat BMP. Orthostats positive initially with HR 69 sitting and increasing to 104 with standing. UA appeared infectious but patient asymptomatic, will not initiate abx at this time. On my evaluation this morning, the patient reports feeling well and is eager to discharge home. All questions were answered. Patient acknowledged understanding of discharge instructions and feels safe to discharge home. Patient was discharged on 9/23/2023 in stable  condition with PCP follow-up. Education regarding condition provided and return precautions given.          Goals of Care Treatment Preferences:  Code Status: Full Code      Consults:     No new Assessment & Plan notes have been filed under this hospital service since the last note was generated.  Service: Hospital Medicine    Final Active Diagnoses:    Diagnosis Date Noted POA    PRINCIPAL PROBLEM:  SARAH (acute kidney injury) [N17.9] 12/03/2022 Yes    UTI (urinary tract infection) [N39.0] 09/22/2023 Yes    Permanent atrial fibrillation [I48.21] 11/10/2016 Yes     Chronic    S/P placement of cardiac pacemaker [Z95.0] 09/14/2016 Yes     Chronic    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.2] 08/31/2016 Yes     Chronic    CAD (coronary artery disease) [I25.10] 08/31/2016 Yes     Chronic    S/P AVR (aortic valve replacement) with possible stenosis [Z95.2] 08/31/2016 Not Applicable     Chronic    Dyslipidemia [E78.5] 11/18/2013 Yes     Chronic    Essential hypertension [I10] 11/18/2013 Yes      Problems Resolved During this Admission:    Diagnosis Date Noted Date Resolved POA    Pacemaker [Z95.0] 11/10/2016 09/22/2023 Yes     Chronic       Discharged Condition: stable    Disposition: Home or Self Care    Follow Up:    Patient Instructions:      BASIC METABOLIC PANEL   Standing Status: Future Standing Exp. Date: 11/21/24     Ambulatory referral/consult to Family Practice   Standing Status: Future   Referral Priority: Routine Referral Type: Consultation   Referral Reason: Specialty Services Required   Requested Specialty: Family Medicine   Number of Visits Requested: 1     Diet Cardiac     Notify your health care provider if you experience any of the following:  persistent nausea and vomiting or diarrhea     Notify your health care provider if you experience any of the following:  persistent dizziness, light-headedness, or visual disturbances     Notify your health care provider if you experience any of the following:  increased  confusion or weakness     Activity as tolerated       Significant Diagnostic Studies: Labs: All labs within the past 24 hours have been reviewed    Pending Diagnostic Studies:     None         Medications:  Reconciled Home Medications:      Medication List      CONTINUE taking these medications    albuterol 90 mcg/actuation inhaler  Commonly known as: PROVENTIL HFA  Inhale 2 puffs into the lungs every 6 (six) hours as needed for Wheezing. Rescue     apixaban 2.5 mg Tab  Commonly known as: ELIQUIS  Take 1 tablet (2.5 mg total) by mouth 2 (two) times daily.     aspirin 81 MG EC tablet  Commonly known as: ECOTRIN  Take 1 tablet (81 mg total) by mouth once daily.     benazepriL 5 MG tablet  Commonly known as: LOTENSIN  Take 1 tablet (5 mg total) by mouth once daily.     carvediloL 12.5 MG tablet  Commonly known as: COREG  TAKE 1 TABLET TWICE DAILY WITH MEALS     diclofenac sodium 1 % Gel  Commonly known as: VOLTAREN  Apply 2 g topically daily as needed (pain).     ferrous sulfate 325 mg (65 mg iron) Tab tablet  Commonly known as: FEOSOL  Take 1 tablet (325 mg total) by mouth daily with breakfast.     fluticasone propionate 50 mcg/actuation nasal spray  Commonly known as: FLONASE  2 sprays (100 mcg total) by Each Nostril route once daily.     multivitamin per tablet  Commonly known as: ONE DAILY MULTIVITAMIN  Take 1 tablet by mouth once daily.     nitroGLYCERIN 0.4 MG SL tablet  Commonly known as: NITROSTAT  Take one tablet every 5 minutes for chest pain. After the third tablet go to the ED.     polyethylene glycol 17 gram/dose powder  Commonly known as: GLYCOLAX  Dissolve one capful (17 g) in liquid by mouth 3 (three) times daily as needed. Take 1 three times daily until well formed stool     potassium chloride 8 MEQ Tbsr  Commonly known as: KLOR-CON  Take 1 tablet (8 mEq total) by mouth once daily.     torsemide 20 MG Tab  Commonly known as: DEMADEX  Take 1 tablet (20 mg total) by mouth 2 (two) times a day.             Indwelling Lines/Drains at time of discharge:   Lines/Drains/Airways     None                 Time spent on the discharge of patient: 36 minutes         Amy Reeder PA-C  Department of Hospital Medicine  [facility]

## 2023-09-30 ENCOUNTER — OFFICE VISIT (OUTPATIENT)
Dept: FAMILY MEDICINE | Facility: CLINIC | Age: 88
End: 2023-09-30
Payer: MEDICARE

## 2023-09-30 VITALS
HEART RATE: 70 BPM | WEIGHT: 134.06 LBS | OXYGEN SATURATION: 99 % | DIASTOLIC BLOOD PRESSURE: 60 MMHG | BODY MASS INDEX: 19.86 KG/M2 | HEIGHT: 69 IN | SYSTOLIC BLOOD PRESSURE: 130 MMHG

## 2023-09-30 DIAGNOSIS — E86.0 DEHYDRATION: ICD-10-CM

## 2023-09-30 DIAGNOSIS — D63.8 CHRONIC DISEASE ANEMIA: ICD-10-CM

## 2023-09-30 DIAGNOSIS — N18.4 CKD (CHRONIC KIDNEY DISEASE) STAGE 4, GFR 15-29 ML/MIN: Primary | ICD-10-CM

## 2023-09-30 DIAGNOSIS — N17.9 AKI (ACUTE KIDNEY INJURY): ICD-10-CM

## 2023-09-30 DIAGNOSIS — I10 ESSENTIAL HYPERTENSION: ICD-10-CM

## 2023-09-30 DIAGNOSIS — Z23 NEED FOR VACCINATION: ICD-10-CM

## 2023-09-30 PROCEDURE — G0008 FLU VACCINE - QUADRIVALENT - ADJUVANTED: ICD-10-PCS | Mod: HCNC,S$GLB,, | Performed by: FAMILY MEDICINE

## 2023-09-30 PROCEDURE — 1160F PR REVIEW ALL MEDS BY PRESCRIBER/CLIN PHARMACIST DOCUMENTED: ICD-10-PCS | Mod: HCNC,CPTII,S$GLB, | Performed by: FAMILY MEDICINE

## 2023-09-30 PROCEDURE — 1160F RVW MEDS BY RX/DR IN RCRD: CPT | Mod: HCNC,CPTII,S$GLB, | Performed by: FAMILY MEDICINE

## 2023-09-30 PROCEDURE — 90694 FLU VACCINE - QUADRIVALENT - ADJUVANTED: ICD-10-PCS | Mod: HCNC,S$GLB,, | Performed by: FAMILY MEDICINE

## 2023-09-30 PROCEDURE — 3288F FALL RISK ASSESSMENT DOCD: CPT | Mod: HCNC,CPTII,S$GLB, | Performed by: FAMILY MEDICINE

## 2023-09-30 PROCEDURE — 99999 PR PBB SHADOW E&M-EST. PATIENT-LVL IV: CPT | Mod: PBBFAC,HCNC,, | Performed by: FAMILY MEDICINE

## 2023-09-30 PROCEDURE — 1101F PR PT FALLS ASSESS DOC 0-1 FALLS W/OUT INJ PAST YR: ICD-10-PCS | Mod: HCNC,CPTII,S$GLB, | Performed by: FAMILY MEDICINE

## 2023-09-30 PROCEDURE — 1159F MED LIST DOCD IN RCRD: CPT | Mod: HCNC,CPTII,S$GLB, | Performed by: FAMILY MEDICINE

## 2023-09-30 PROCEDURE — 1101F PT FALLS ASSESS-DOCD LE1/YR: CPT | Mod: HCNC,CPTII,S$GLB, | Performed by: FAMILY MEDICINE

## 2023-09-30 PROCEDURE — 1159F PR MEDICATION LIST DOCUMENTED IN MEDICAL RECORD: ICD-10-PCS | Mod: HCNC,CPTII,S$GLB, | Performed by: FAMILY MEDICINE

## 2023-09-30 PROCEDURE — 90694 VACC AIIV4 NO PRSRV 0.5ML IM: CPT | Mod: HCNC,S$GLB,, | Performed by: FAMILY MEDICINE

## 2023-09-30 PROCEDURE — 99999 PR PBB SHADOW E&M-EST. PATIENT-LVL IV: ICD-10-PCS | Mod: PBBFAC,HCNC,, | Performed by: FAMILY MEDICINE

## 2023-09-30 PROCEDURE — 99215 OFFICE O/P EST HI 40 MIN: CPT | Mod: HCNC,S$GLB,, | Performed by: FAMILY MEDICINE

## 2023-09-30 PROCEDURE — G0008 ADMIN INFLUENZA VIRUS VAC: HCPCS | Mod: HCNC,S$GLB,, | Performed by: FAMILY MEDICINE

## 2023-09-30 PROCEDURE — 99215 PR OFFICE/OUTPT VISIT, EST, LEVL V, 40-54 MIN: ICD-10-PCS | Mod: HCNC,S$GLB,, | Performed by: FAMILY MEDICINE

## 2023-09-30 PROCEDURE — 1125F AMNT PAIN NOTED PAIN PRSNT: CPT | Mod: HCNC,CPTII,S$GLB, | Performed by: FAMILY MEDICINE

## 2023-09-30 PROCEDURE — 1125F PR PAIN SEVERITY QUANTIFIED, PAIN PRESENT: ICD-10-PCS | Mod: HCNC,CPTII,S$GLB, | Performed by: FAMILY MEDICINE

## 2023-09-30 PROCEDURE — 3288F PR FALLS RISK ASSESSMENT DOCUMENTED: ICD-10-PCS | Mod: HCNC,CPTII,S$GLB, | Performed by: FAMILY MEDICINE

## 2023-09-30 NOTE — PROGRESS NOTES
Subjective     Patient ID: Deena Moody is a 89 y.o. male.    Chief Complaint: Follow-up    89 years old male who came to the clinic after recent hospitalization secondary to acute kidney failure associated with dehydration.  Last kidney function was stable with chronic kidney disease stage 4.  Patient is drinking at least 1 L and a half daily.  Patient with anemia but stable in comparison with previous reports.  Patient due for his flu shot.    Follow-up  Pertinent negatives include no chest pain.     Review of Systems   Constitutional: Negative.    HENT: Negative.     Eyes: Negative.    Respiratory: Negative.     Cardiovascular: Negative.  Negative for chest pain, palpitations, leg swelling and claudication.   Gastrointestinal: Negative.    Genitourinary: Negative.    Musculoskeletal:  Positive for gait problem.   Integumentary:  Negative.   Psychiatric/Behavioral: Negative.            Objective     Physical Exam  Vitals and nursing note reviewed.   Constitutional:       General: He is not in acute distress.     Appearance: He is well-developed. He is not diaphoretic.   HENT:      Head: Normocephalic and atraumatic.      Right Ear: External ear normal.      Left Ear: External ear normal.      Nose: Nose normal.      Mouth/Throat:      Pharynx: No oropharyngeal exudate.   Eyes:      General: No scleral icterus.        Right eye: No discharge.         Left eye: No discharge.      Conjunctiva/sclera: Conjunctivae normal.      Pupils: Pupils are equal, round, and reactive to light.   Neck:      Thyroid: No thyromegaly.      Vascular: No JVD.      Trachea: No tracheal deviation.   Cardiovascular:      Rate and Rhythm: Normal rate and regular rhythm.      Heart sounds: Normal heart sounds. No murmur heard.     No friction rub. No gallop.   Pulmonary:      Effort: Pulmonary effort is normal. No respiratory distress.      Breath sounds: Normal breath sounds. No stridor. No wheezing or rales.   Chest:      Chest wall:  No tenderness.   Abdominal:      General: Bowel sounds are normal. There is no distension.      Palpations: Abdomen is soft. There is no mass.      Tenderness: There is no abdominal tenderness. There is no guarding or rebound.   Musculoskeletal:         General: No tenderness. Normal range of motion.      Cervical back: Normal range of motion and neck supple.   Lymphadenopathy:      Cervical: No cervical adenopathy.   Skin:     General: Skin is warm and dry.      Coloration: Skin is not pale.      Findings: No erythema or rash.   Neurological:      Mental Status: He is alert and oriented to person, place, and time.      Cranial Nerves: No cranial nerve deficit.      Motor: Weakness present. No abnormal muscle tone.      Coordination: Coordination abnormal.      Gait: Gait abnormal.      Deep Tendon Reflexes: Reflexes are normal and symmetric. Reflexes normal.   Psychiatric:         Behavior: Behavior normal.         Thought Content: Thought content normal.         Judgment: Judgment normal.            Assessment and Plan     1. CKD (chronic kidney disease) stage 4, GFR 15-29 ml/min  -     Urinalysis; Future  -     Comprehensive Metabolic Panel; Future; Expected date: 09/30/2023  -     TSH; Future; Expected date: 09/30/2023  -     CBC Auto Differential; Future; Expected date: 09/30/2023    2. Dehydration  -     Ambulatory referral/consult to Internal Medicine    3. SARAH (acute kidney injury)  -     Ambulatory referral/consult to Family Practice    4. Need for vaccination  -     Influenza (FLUAD) - Quadrivalent (Adjuvanted) *Preferred* (65+) (PF)    5. Chronic disease anemia  -     CBC Auto Differential; Future; Expected date: 09/30/2023    6. Essential hypertension  -     Urinalysis; Future  -     Comprehensive Metabolic Panel; Future; Expected date: 09/30/2023  -     Lipid Panel; Future; Expected date: 09/30/2023  -     TSH; Future; Expected date: 09/30/2023  -     CBC Auto Differential; Future; Expected date:  09/30/2023        Continue monitoring blood pressure at home, low sodium diet.          Follow up in about 4 months (around 1/30/2024), or if symptoms worsen or fail to improve.

## 2023-10-06 ENCOUNTER — CLINICAL SUPPORT (OUTPATIENT)
Dept: CARDIOLOGY | Facility: HOSPITAL | Age: 88
End: 2023-10-06
Payer: MEDICARE

## 2023-10-06 DIAGNOSIS — I48.91 UNSPECIFIED ATRIAL FIBRILLATION: ICD-10-CM

## 2023-10-06 DIAGNOSIS — I44.2 ATRIOVENTRICULAR BLOCK, COMPLETE: ICD-10-CM

## 2023-10-06 DIAGNOSIS — Z95.0 PRESENCE OF CARDIAC PACEMAKER: ICD-10-CM

## 2023-10-06 PROCEDURE — 93296 REM INTERROG EVL PM/IDS: CPT | Mod: HCNC | Performed by: INTERNAL MEDICINE

## 2023-12-08 NOTE — ASSESSMENT & PLAN NOTE
- hematuria via ramirez seen on 12/6  - H/H stable and hemodynamically stable  - holding home Eliquis and ASA   - urology consulted. followup recs     Behavioral Health Psychotherapy Progress Note    Psychotherapy Provided: Individual Psychotherapy  at [address]    1. Current mild episode of major depressive disorder without prior episode ([address])        2. Adjustment disorder with depressed mood          Goals addressed in session: supportive psychotherapist     DATA: Yas Mcgill reports ongoing struggle with her daughter and the strict boundaries she had set. Yas Mcgill will be traveling to [place] for the winter. At this point, we are going to end therapy. She is able to come back if she wants. Clinician utilized the following therapeutic modalities: Client-centered Therapy, Cognitive Behavioral Therapy, and Supportive Psychotherapy. Substance Abuse was not addressed during this session. If the client is diagnosed with a co-occurring substance use disorder, please indicate any changes in the frequency or amount of use: na. Stage of change for addressing substance use diagnoses: No substance use/Not applicable    ASSESSMENT:  Christiana Richards presents with a Dysthymic mood. her affect is Normal range and intensity, which is congruent, with her mood and the content of the session. The client has made progress on their goals. Christiana Richards does not present as a risk of harm to self or others. For any risk assessment that surpasses a "low" rating, a safety plan must be developed. A safety plan was indicated: no  If yes, describe in detail na    PLAN: Yas Mcgill reports she is going to stop therapy at this time, however is open to coming back if anything happens that requires support. Behavioral Health Treatment Plan and Discharge Planning: Christiana Richards is aware of and agrees to continue to work on their treatment plan. They have identified and are working toward their discharge goals.  yes    Visit start and stop times:    12/08/2023  Start Time: 1404  Stop Time: 9503  Total Visit Time: 50 minutes no

## 2023-12-25 PROBLEM — N17.9 AKI (ACUTE KIDNEY INJURY): Status: RESOLVED | Noted: 2022-12-03 | Resolved: 2023-12-25

## 2023-12-25 PROBLEM — N39.0 UTI (URINARY TRACT INFECTION): Status: RESOLVED | Noted: 2023-09-22 | Resolved: 2023-12-25

## 2024-01-05 ENCOUNTER — CLINICAL SUPPORT (OUTPATIENT)
Dept: CARDIOLOGY | Facility: HOSPITAL | Age: 89
End: 2024-01-05
Payer: MEDICARE

## 2024-01-05 ENCOUNTER — CLINICAL SUPPORT (OUTPATIENT)
Dept: CARDIOLOGY | Facility: HOSPITAL | Age: 89
End: 2024-01-05
Attending: INTERNAL MEDICINE
Payer: MEDICARE

## 2024-01-05 DIAGNOSIS — I48.91 UNSPECIFIED ATRIAL FIBRILLATION: ICD-10-CM

## 2024-01-05 PROCEDURE — 93296 REM INTERROG EVL PM/IDS: CPT | Mod: HCNC | Performed by: INTERNAL MEDICINE

## 2024-01-05 PROCEDURE — 93294 REM INTERROG EVL PM/LDLS PM: CPT | Mod: HCNC,,, | Performed by: INTERNAL MEDICINE

## 2024-01-11 ENCOUNTER — TELEPHONE (OUTPATIENT)
Dept: ELECTROPHYSIOLOGY | Facility: CLINIC | Age: 89
End: 2024-01-11
Payer: MEDICARE

## 2024-01-11 NOTE — TELEPHONE ENCOUNTER
----- Message from Hiral Wheeler MA sent at 1/9/2024  4:35 PM CST -----    ----- Message -----  From: Eddie Shabazz  Sent: 1/9/2024   1:03 PM CST  To: [facility] Staff    HI,     This pt is past due for his annual ck up and in clinic PPM ck.  Last CV was on 3/17/2022.  Of note, pt has had recent appts in other clinics here @ [facility]. If someone would please contact pt's daughter Amelie, to schedule his needed appts.    Thanks,  Eddie       No

## 2024-01-17 LAB
OHS CV AF BURDEN PERCENT: 99
OHS CV DC REMOTE DEVICE TYPE: NORMAL
OHS CV ICD SHOCK: NO
OHS CV RV PACING PERCENT: 99 %

## 2024-01-31 ENCOUNTER — OFFICE VISIT (OUTPATIENT)
Dept: ORTHOPEDICS | Facility: CLINIC | Age: 89
End: 2024-01-31
Payer: MEDICARE

## 2024-01-31 VITALS — DIASTOLIC BLOOD PRESSURE: 62 MMHG | HEART RATE: 68 BPM | SYSTOLIC BLOOD PRESSURE: 114 MMHG

## 2024-01-31 DIAGNOSIS — M10.9 ACUTE GOUT OF RIGHT KNEE, UNSPECIFIED CAUSE: Primary | ICD-10-CM

## 2024-01-31 PROCEDURE — 1160F RVW MEDS BY RX/DR IN RCRD: CPT | Mod: HCNC,CPTII,S$GLB, | Performed by: PHYSICIAN ASSISTANT

## 2024-01-31 PROCEDURE — 99214 OFFICE O/P EST MOD 30 MIN: CPT | Mod: HCNC,25,S$GLB, | Performed by: PHYSICIAN ASSISTANT

## 2024-01-31 PROCEDURE — 99999 PR PBB SHADOW E&M-EST. PATIENT-LVL III: CPT | Mod: PBBFAC,HCNC,, | Performed by: PHYSICIAN ASSISTANT

## 2024-01-31 PROCEDURE — 20610 DRAIN/INJ JOINT/BURSA W/O US: CPT | Mod: HCNC,RT,S$GLB, | Performed by: PHYSICIAN ASSISTANT

## 2024-01-31 PROCEDURE — 1159F MED LIST DOCD IN RCRD: CPT | Mod: HCNC,CPTII,S$GLB, | Performed by: PHYSICIAN ASSISTANT

## 2024-01-31 RX ORDER — BETAMETHASONE SODIUM PHOSPHATE AND BETAMETHASONE ACETATE 3; 3 MG/ML; MG/ML
6 INJECTION, SUSPENSION INTRA-ARTICULAR; INTRALESIONAL; INTRAMUSCULAR; SOFT TISSUE
Status: COMPLETED | OUTPATIENT
Start: 2024-01-31 | End: 2024-01-31

## 2024-01-31 RX ADMIN — BETAMETHASONE SODIUM PHOSPHATE AND BETAMETHASONE ACETATE 6 MG: 3; 3 INJECTION, SUSPENSION INTRA-ARTICULAR; INTRALESIONAL; INTRAMUSCULAR; SOFT TISSUE at 03:01

## 2024-01-31 NOTE — PROGRESS NOTES
SUBJECTIVE:     Chief Complaint & History of Present Illness:  Deena Moody is a Established patient 89 y.o. male who is seen here today with a complaint of    Chief Complaint   Patient presents with    Right Knee - Pain    .  He is patient well-known to me was last seen treated the clinic for similar condition 07/12/2023 at which time he would undergone aspiration and cortisone injection of in effusion of the left knee states dairy to very good results.  He is begun to have return of very similar symptoms this time in the right knee with less of an effusion but very tender to palpation without an inciting event.  On a scale of 1-10, with 10 being worst pain imaginable, he rates this pain as 6 on good days and 10 on bad days.  he describes the pain as tender.    Review of patient's allergies indicates:   Allergen Reactions    Iodine and iodide containing products Other (See Comments)     Caused changes in skin color         Current Outpatient Medications   Medication Sig Dispense Refill    albuterol (PROVENTIL HFA) 90 mcg/actuation inhaler Inhale 2 puffs into the lungs every 6 (six) hours as needed for Wheezing. Rescue 18 g 1    apixaban (ELIQUIS) 2.5 mg Tab Take 1 tablet (2.5 mg total) by mouth 2 (two) times daily. 180 tablet 3    aspirin (ECOTRIN) 81 MG EC tablet Take 1 tablet (81 mg total) by mouth once daily. 90 tablet 3    benazepriL (LOTENSIN) 5 MG tablet Take 1 tablet (5 mg total) by mouth once daily. 90 tablet 3    carvediloL (COREG) 12.5 MG tablet TAKE 1 TABLET TWICE DAILY WITH MEALS 180 tablet 3    diclofenac sodium (VOLTAREN) 1 % Gel Apply 2 g topically daily as needed (pain). 200 g 0    ferrous sulfate (FEOSOL) 325 mg (65 mg iron) Tab tablet Take 1 tablet (325 mg total) by mouth daily with breakfast. 90 tablet 3    fluticasone propionate (FLONASE) 50 mcg/actuation nasal spray 2 sprays (100 mcg total) by Each Nostril route once daily. 30 mL 1    multivitamin (ONE DAILY MULTIVITAMIN) per tablet Take 1  tablet by mouth once daily.      nitroGLYCERIN (NITROSTAT) 0.4 MG SL tablet Take one tablet every 5 minutes for chest pain. After the third tablet go to the ED. 25 tablet 4    polyethylene glycol (GLYCOLAX) 17 gram/dose powder Dissolve one capful (17 g) in liquid by mouth 3 (three) times daily as needed. Take 1 three times daily until well formed stool 510 g 0    potassium chloride (KLOR-CON) 8 MEQ TbSR Take 1 tablet (8 mEq total) by mouth once daily. 90 tablet 3    torsemide (DEMADEX) 20 MG Tab Take 1 tablet (20 mg total) by mouth 2 (two) times a day. 180 tablet 3     No current facility-administered medications for this visit.       Past Medical History:   Diagnosis Date    Acute on chronic diastolic heart failure 9/1/2016    Coronary artery disease     Hyperlipidemia     Hypertension     Macular degeneration (senile) of retina, unspecified 12/12/2014    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    Persistent atrial fibrillation 11/10/2016    S/P placement of cardiac pacemaker 9/14/2016       Past Surgical History:   Procedure Laterality Date    AORTIC VALVE REPLACEMENT N/A     CARDIAC PACEMAKER PLACEMENT      CATARACT EXTRACTION W/  INTRAOCULAR LENS IMPLANT Right 2/16/2016    Dr. Whitley    CATARACT EXTRACTION W/  INTRAOCULAR LENS IMPLANT Left 3/1/2016    Dr. Whitley    CORONARY ARTERY BYPASS GRAFT      EAR EXAMINATION UNDER ANESTHESIA      EYE SURGERY         Vital Signs (Most Recent)  Vitals:    01/31/24 1457   BP: 114/62   Pulse: 68           Review of Systems:  ROS:  Constitutional: no fever or chills  Eyes: no visual changes  ENT: no nasal congestion or sore throat, hearing l oss  right ear  Respiratory: no cough or shortness of breath, pulmonary hypertension atelectasis  Cardiovascular: no chest pain or palpitations, status post cardiac pacemaker placement, status post aortic valve replacement renal artery stenosis persistent AFib   history of MI complete AV block CAD aortic atherosclerosis acute on chronic  diastolic heart failure  Gastrointestinal: no nausea or vomiting, tolerating diet  Genitourinary: no hematuria or dysuria, CKD stage 3  Integument/Breast: no rash or pruritis  Hematologic/Lymphatic: no easy bruising or lymphadenopathy  Musculoskeletal: no arthralgias or myalgias  Neurological: no seizures or tremors  Behavioral/Psych: no auditory or visual hallucinations  Endocrine: no heat or cold intolerance                OBJECTIVE:     PHYSICAL EXAM:     , General Appearance: Well nourished, well developed, in no acute distress.  Neurological: Mood & affect are normal.    right  Knee Exam:  Knee Range of Motion:3-95 degrees flexion   Effusion:  Mild  Condition of skin:intact  Location of tenderness:  Globally   Strength:limited by pain and 5 of 5  Stability:  Lachman: stable, LCL: stable, MCL: stable, PCL: stable, and posteromedial (dial): stable  Varus /Valgus stress:  normal  Julio:   negative/negative    left  Knee Exam:  Knee Range of Motion:limited by pain and 0-115 degrees flexion   Effusion:none  Condition of skin:intact  Location of tenderness:  Globally   Strength:limited by pain and 5 of 5  Stability:  Lachman: stable, LCL: stable, MCL: stable, PCL: stable, and posteromedial (dial): stable  Varus /Valgus stress:  normal  Julio:   negative/negative      Hip Examination:  normal    RADIOGRAPHS:  X-rays from previous visit reviewed by me today demonstrate mild arthritic changes throughout both knees with some medial joint space narrowing bilaterally no marked osteophytic spurring some sclerotic changes noted tricompartmentally    ASSESSMENT/PLAN:       ICD-10-CM ICD-9-CM   1. Acute gout of right knee, unspecified cause  M10.9 274.01       Plan: We discussed with the patient at length all the different treatment options available for  the knee including anti-inflammatories, acetaminophen, rest, ice, knee strengthening exercise, occasional cortisone injections for temporary relief,  Viscosupplimentation injections, arthroscopic debridement osteotomy, and finally knee arthroplasty.   Will proceed with aspiration cortisone injection of the right knee     The risks, benefits, pros, cons, and potential side effects of the procedure were discussed with the patient in detail all questions were answered.  The patient is comfortable and willing to proceed with the procedure. Verbal consent was obtained and the proper joint was identified by the patient and provider        The injection site was identified and the skin was prepared with a betadine solution. The   right  knee was aspirated but no fluid was withdrawn the knee was then injected with 1 ml of Celestone and 5 ml Lidocaine under sterile technique. Deena Moody tolerated the procedure well, he was advised to rest the knee today, ice and elevation. he did receive immediate relief of the pain in and about his knee he was told this would be short lived and is secondary to the lidocaine. he may have an increase in his discomfort tonight followed by steady improvement over the next several days. I may take 1-3 weeks following the injection to get the full benefit of the medication.  I will see him back in 3-6 months. Sooner if he has any problems or concerns.

## 2024-04-05 ENCOUNTER — CLINICAL SUPPORT (OUTPATIENT)
Dept: CARDIOLOGY | Facility: HOSPITAL | Age: 89
End: 2024-04-05
Attending: INTERNAL MEDICINE
Payer: MEDICARE

## 2024-04-05 ENCOUNTER — CLINICAL SUPPORT (OUTPATIENT)
Dept: CARDIOLOGY | Facility: HOSPITAL | Age: 89
End: 2024-04-05
Payer: MEDICARE

## 2024-04-05 DIAGNOSIS — I48.91 UNSPECIFIED ATRIAL FIBRILLATION: ICD-10-CM

## 2024-04-05 PROCEDURE — 93296 REM INTERROG EVL PM/IDS: CPT | Mod: HCNC | Performed by: INTERNAL MEDICINE

## 2024-04-05 PROCEDURE — 93294 REM INTERROG EVL PM/LDLS PM: CPT | Mod: HCNC,,, | Performed by: INTERNAL MEDICINE

## 2024-04-17 LAB
OHS CV AF BURDEN PERCENT: 100
OHS CV DC REMOTE DEVICE TYPE: NORMAL
OHS CV ICD SHOCK: NO
OHS CV RV PACING PERCENT: 99 %

## 2024-04-28 ENCOUNTER — HOSPITAL ENCOUNTER (INPATIENT)
Facility: HOSPITAL | Age: 89
LOS: 9 days | Discharge: NURSING, REHAB OR LONG-TERM CARE | DRG: 554 | End: 2024-05-08
Attending: EMERGENCY MEDICINE | Admitting: HOSPITALIST
Payer: MEDICARE

## 2024-04-28 DIAGNOSIS — M86.9 OSTEOMYELITIS OF FINGER OF LEFT HAND: Primary | ICD-10-CM

## 2024-04-28 DIAGNOSIS — R07.9 CHEST PAIN: ICD-10-CM

## 2024-04-28 DIAGNOSIS — R54 FRAILTY: ICD-10-CM

## 2024-04-28 DIAGNOSIS — M10.00 ACUTE IDIOPATHIC GOUT, UNSPECIFIED SITE: ICD-10-CM

## 2024-04-28 DIAGNOSIS — M79.89 SWELLING OF DIGIT OF LEFT HAND: ICD-10-CM

## 2024-04-28 DIAGNOSIS — L03.012 CELLULITIS OF FINGER OF LEFT HAND: ICD-10-CM

## 2024-04-28 DIAGNOSIS — I48.19 PERSISTENT ATRIAL FIBRILLATION: ICD-10-CM

## 2024-04-28 DIAGNOSIS — I25.2 HISTORY OF MI (MYOCARDIAL INFARCTION): ICD-10-CM

## 2024-04-28 DIAGNOSIS — L03.114 CELLULITIS OF HAND, LEFT: ICD-10-CM

## 2024-04-28 PROBLEM — D75.89 MACROCYTOSIS: Status: ACTIVE | Noted: 2024-04-28

## 2024-04-28 LAB
ALBUMIN SERPL BCP-MCNC: 3.3 G/DL (ref 3.5–5.2)
ALP SERPL-CCNC: 135 U/L (ref 55–135)
ALT SERPL W/O P-5'-P-CCNC: 10 U/L (ref 10–44)
ANION GAP SERPL CALC-SCNC: 13 MMOL/L (ref 8–16)
APTT PPP: 37 SEC (ref 21–32)
APTT PPP: 46.4 SEC (ref 21–32)
AST SERPL-CCNC: 25 U/L (ref 10–40)
BASOPHILS # BLD AUTO: 0.05 K/UL (ref 0–0.2)
BASOPHILS # BLD AUTO: 0.07 K/UL (ref 0–0.2)
BASOPHILS NFR BLD: 0.5 % (ref 0–1.9)
BASOPHILS NFR BLD: 0.7 % (ref 0–1.9)
BILIRUB SERPL-MCNC: 1.3 MG/DL (ref 0.1–1)
BUN SERPL-MCNC: 33 MG/DL (ref 10–30)
CALCIUM SERPL-MCNC: 9.3 MG/DL (ref 8.7–10.5)
CHLORIDE SERPL-SCNC: 105 MMOL/L (ref 95–110)
CO2 SERPL-SCNC: 20 MMOL/L (ref 23–29)
CREAT SERPL-MCNC: 1.8 MG/DL (ref 0.5–1.4)
CRP SERPL-MCNC: 125.4 MG/L (ref 0–8.2)
CRP SERPL-MCNC: 82.8 MG/L (ref 0–8.2)
DIFFERENTIAL METHOD BLD: ABNORMAL
DIFFERENTIAL METHOD BLD: ABNORMAL
EOSINOPHIL # BLD AUTO: 0.1 K/UL (ref 0–0.5)
EOSINOPHIL # BLD AUTO: 0.1 K/UL (ref 0–0.5)
EOSINOPHIL NFR BLD: 0.8 % (ref 0–8)
EOSINOPHIL NFR BLD: 1.1 % (ref 0–8)
ERYTHROCYTE [DISTWIDTH] IN BLOOD BY AUTOMATED COUNT: 13.6 % (ref 11.5–14.5)
ERYTHROCYTE [DISTWIDTH] IN BLOOD BY AUTOMATED COUNT: 13.7 % (ref 11.5–14.5)
ERYTHROCYTE [SEDIMENTATION RATE] IN BLOOD BY PHOTOMETRIC METHOD: 113 MM/HR (ref 0–23)
ERYTHROCYTE [SEDIMENTATION RATE] IN BLOOD BY PHOTOMETRIC METHOD: 55 MM/HR (ref 0–23)
EST. GFR  (NO RACE VARIABLE): 34.7 ML/MIN/1.73 M^2
GLUCOSE SERPL-MCNC: 117 MG/DL (ref 70–110)
HCT VFR BLD AUTO: 34.7 % (ref 40–54)
HCT VFR BLD AUTO: 36.8 % (ref 40–54)
HGB BLD-MCNC: 10.8 G/DL (ref 14–18)
HGB BLD-MCNC: 11.2 G/DL (ref 14–18)
IMM GRANULOCYTES # BLD AUTO: 0.03 K/UL (ref 0–0.04)
IMM GRANULOCYTES # BLD AUTO: 0.03 K/UL (ref 0–0.04)
IMM GRANULOCYTES NFR BLD AUTO: 0.3 % (ref 0–0.5)
IMM GRANULOCYTES NFR BLD AUTO: 0.3 % (ref 0–0.5)
INR PPP: 1.3 (ref 0.8–1.2)
LYMPHOCYTES # BLD AUTO: 1.2 K/UL (ref 1–4.8)
LYMPHOCYTES # BLD AUTO: 1.4 K/UL (ref 1–4.8)
LYMPHOCYTES NFR BLD: 10.9 % (ref 18–48)
LYMPHOCYTES NFR BLD: 14 % (ref 18–48)
MCH RBC QN AUTO: 30.5 PG (ref 27–31)
MCH RBC QN AUTO: 30.8 PG (ref 27–31)
MCHC RBC AUTO-ENTMCNC: 30.4 G/DL (ref 32–36)
MCHC RBC AUTO-ENTMCNC: 31.1 G/DL (ref 32–36)
MCV RBC AUTO: 100 FL (ref 82–98)
MCV RBC AUTO: 99 FL (ref 82–98)
MONOCYTES # BLD AUTO: 1.4 K/UL (ref 0.3–1)
MONOCYTES # BLD AUTO: 1.4 K/UL (ref 0.3–1)
MONOCYTES NFR BLD: 12.9 % (ref 4–15)
MONOCYTES NFR BLD: 14 % (ref 4–15)
NEUTROPHILS # BLD AUTO: 7.1 K/UL (ref 1.8–7.7)
NEUTROPHILS # BLD AUTO: 7.9 K/UL (ref 1.8–7.7)
NEUTROPHILS NFR BLD: 70.4 % (ref 38–73)
NEUTROPHILS NFR BLD: 74.1 % (ref 38–73)
NRBC BLD-RTO: 0 /100 WBC
NRBC BLD-RTO: 0 /100 WBC
PLATELET # BLD AUTO: 227 K/UL (ref 150–450)
PLATELET # BLD AUTO: 255 K/UL (ref 150–450)
PMV BLD AUTO: 8.6 FL (ref 9.2–12.9)
PMV BLD AUTO: 8.7 FL (ref 9.2–12.9)
POCT GLUCOSE: 104 MG/DL (ref 70–110)
POTASSIUM SERPL-SCNC: 4.9 MMOL/L (ref 3.5–5.1)
PROT SERPL-MCNC: 8.1 G/DL (ref 6–8.4)
PROTHROMBIN TIME: 14 SEC (ref 9–12.5)
RBC # BLD AUTO: 3.51 M/UL (ref 4.6–6.2)
RBC # BLD AUTO: 3.67 M/UL (ref 4.6–6.2)
SODIUM SERPL-SCNC: 138 MMOL/L (ref 136–145)
WBC # BLD AUTO: 10.1 K/UL (ref 3.9–12.7)
WBC # BLD AUTO: 10.62 K/UL (ref 3.9–12.7)

## 2024-04-28 PROCEDURE — 25000003 PHARM REV CODE 250: Mod: HCNC | Performed by: EMERGENCY MEDICINE

## 2024-04-28 PROCEDURE — 85730 THROMBOPLASTIN TIME PARTIAL: CPT | Mod: HCNC

## 2024-04-28 PROCEDURE — 63600175 PHARM REV CODE 636 W HCPCS: Mod: HCNC | Performed by: EMERGENCY MEDICINE

## 2024-04-28 PROCEDURE — 85025 COMPLETE CBC W/AUTO DIFF WBC: CPT | Mod: 91,HCNC

## 2024-04-28 PROCEDURE — 86140 C-REACTIVE PROTEIN: CPT | Mod: HCNC | Performed by: EMERGENCY MEDICINE

## 2024-04-28 PROCEDURE — G0378 HOSPITAL OBSERVATION PER HR: HCPCS | Mod: HCNC

## 2024-04-28 PROCEDURE — 80053 COMPREHEN METABOLIC PANEL: CPT | Mod: HCNC | Performed by: EMERGENCY MEDICINE

## 2024-04-28 PROCEDURE — 36415 COLL VENOUS BLD VENIPUNCTURE: CPT | Mod: HCNC | Performed by: HOSPITALIST

## 2024-04-28 PROCEDURE — 85610 PROTHROMBIN TIME: CPT | Mod: HCNC

## 2024-04-28 PROCEDURE — 85025 COMPLETE CBC W/AUTO DIFF WBC: CPT | Mod: HCNC | Performed by: EMERGENCY MEDICINE

## 2024-04-28 PROCEDURE — 85652 RBC SED RATE AUTOMATED: CPT | Mod: HCNC | Performed by: EMERGENCY MEDICINE

## 2024-04-28 PROCEDURE — 63600175 PHARM REV CODE 636 W HCPCS: Mod: HCNC

## 2024-04-28 PROCEDURE — 25000003 PHARM REV CODE 250: Mod: HCNC

## 2024-04-28 PROCEDURE — 86140 C-REACTIVE PROTEIN: CPT | Mod: 91,HCNC

## 2024-04-28 PROCEDURE — 85730 THROMBOPLASTIN TIME PARTIAL: CPT | Mod: 91,HCNC | Performed by: HOSPITALIST

## 2024-04-28 PROCEDURE — 85652 RBC SED RATE AUTOMATED: CPT | Mod: 91,HCNC

## 2024-04-28 RX ORDER — HEPARIN SODIUM,PORCINE/D5W 25000/250
0-40 INTRAVENOUS SOLUTION INTRAVENOUS CONTINUOUS
Status: DISCONTINUED | OUTPATIENT
Start: 2024-04-28 | End: 2024-05-03

## 2024-04-28 RX ORDER — LISINOPRIL 5 MG/1
5 TABLET ORAL DAILY
Status: DISCONTINUED | OUTPATIENT
Start: 2024-04-28 | End: 2024-05-01

## 2024-04-28 RX ORDER — TORSEMIDE 20 MG/1
20 TABLET ORAL 2 TIMES DAILY
Status: DISCONTINUED | OUTPATIENT
Start: 2024-04-29 | End: 2024-05-01

## 2024-04-28 RX ORDER — OXYCODONE HYDROCHLORIDE 5 MG/1
5 TABLET ORAL EVERY 6 HOURS PRN
Status: DISCONTINUED | OUTPATIENT
Start: 2024-04-28 | End: 2024-05-08 | Stop reason: HOSPADM

## 2024-04-28 RX ORDER — OXYCODONE HYDROCHLORIDE 10 MG/1
10 TABLET ORAL EVERY 6 HOURS PRN
Status: DISCONTINUED | OUTPATIENT
Start: 2024-04-28 | End: 2024-05-08 | Stop reason: HOSPADM

## 2024-04-28 RX ORDER — BISACODYL 10 MG/1
10 SUPPOSITORY RECTAL DAILY PRN
Status: DISCONTINUED | OUTPATIENT
Start: 2024-04-28 | End: 2024-05-08 | Stop reason: HOSPADM

## 2024-04-28 RX ORDER — PROMETHAZINE HYDROCHLORIDE 25 MG/1
25 TABLET ORAL EVERY 6 HOURS PRN
Status: DISCONTINUED | OUTPATIENT
Start: 2024-04-28 | End: 2024-05-08 | Stop reason: HOSPADM

## 2024-04-28 RX ORDER — ACETAMINOPHEN 500 MG
1000 TABLET ORAL EVERY 8 HOURS PRN
Status: DISCONTINUED | OUTPATIENT
Start: 2024-04-28 | End: 2024-05-08 | Stop reason: HOSPADM

## 2024-04-28 RX ORDER — TALC
6 POWDER (GRAM) TOPICAL NIGHTLY PRN
Status: DISCONTINUED | OUTPATIENT
Start: 2024-04-28 | End: 2024-05-08 | Stop reason: HOSPADM

## 2024-04-28 RX ORDER — NALOXONE HCL 0.4 MG/ML
0.02 VIAL (ML) INJECTION
Status: DISCONTINUED | OUTPATIENT
Start: 2024-04-28 | End: 2024-05-08 | Stop reason: HOSPADM

## 2024-04-28 RX ORDER — ONDANSETRON 8 MG/1
8 TABLET, ORALLY DISINTEGRATING ORAL EVERY 8 HOURS PRN
Status: DISCONTINUED | OUTPATIENT
Start: 2024-04-28 | End: 2024-04-28

## 2024-04-28 RX ORDER — LANOLIN ALCOHOL/MO/W.PET/CERES
1 CREAM (GRAM) TOPICAL DAILY
Status: DISCONTINUED | OUTPATIENT
Start: 2024-04-29 | End: 2024-05-08 | Stop reason: HOSPADM

## 2024-04-28 RX ORDER — CARVEDILOL 12.5 MG/1
12.5 TABLET ORAL 2 TIMES DAILY WITH MEALS
Status: DISCONTINUED | OUTPATIENT
Start: 2024-04-28 | End: 2024-05-01

## 2024-04-28 RX ORDER — IBUPROFEN 200 MG
16 TABLET ORAL
Status: DISCONTINUED | OUTPATIENT
Start: 2024-04-28 | End: 2024-05-08 | Stop reason: HOSPADM

## 2024-04-28 RX ORDER — IBUPROFEN 200 MG
24 TABLET ORAL
Status: DISCONTINUED | OUTPATIENT
Start: 2024-04-28 | End: 2024-05-08 | Stop reason: HOSPADM

## 2024-04-28 RX ORDER — HYDROMORPHONE HYDROCHLORIDE 1 MG/ML
1 INJECTION, SOLUTION INTRAMUSCULAR; INTRAVENOUS; SUBCUTANEOUS EVERY 6 HOURS PRN
Status: DISCONTINUED | OUTPATIENT
Start: 2024-04-28 | End: 2024-05-08 | Stop reason: HOSPADM

## 2024-04-28 RX ORDER — IPRATROPIUM BROMIDE AND ALBUTEROL SULFATE 2.5; .5 MG/3ML; MG/3ML
3 SOLUTION RESPIRATORY (INHALATION) EVERY 4 HOURS PRN
Status: DISCONTINUED | OUTPATIENT
Start: 2024-04-28 | End: 2024-05-08 | Stop reason: HOSPADM

## 2024-04-28 RX ORDER — SODIUM CHLORIDE 0.9 % (FLUSH) 0.9 %
10 SYRINGE (ML) INJECTION EVERY 12 HOURS PRN
Status: DISCONTINUED | OUTPATIENT
Start: 2024-04-28 | End: 2024-05-08 | Stop reason: HOSPADM

## 2024-04-28 RX ORDER — MORPHINE SULFATE 4 MG/ML
4 INJECTION, SOLUTION INTRAMUSCULAR; INTRAVENOUS
Status: COMPLETED | OUTPATIENT
Start: 2024-04-28 | End: 2024-04-28

## 2024-04-28 RX ORDER — POLYETHYLENE GLYCOL 3350 17 G/17G
17 POWDER, FOR SOLUTION ORAL DAILY PRN
Status: DISCONTINUED | OUTPATIENT
Start: 2024-04-28 | End: 2024-05-08 | Stop reason: HOSPADM

## 2024-04-28 RX ORDER — GLUCAGON 1 MG
1 KIT INJECTION
Status: DISCONTINUED | OUTPATIENT
Start: 2024-04-28 | End: 2024-05-08 | Stop reason: HOSPADM

## 2024-04-28 RX ORDER — HYDRALAZINE HYDROCHLORIDE 20 MG/ML
10 INJECTION INTRAMUSCULAR; INTRAVENOUS EVERY 8 HOURS PRN
Status: DISCONTINUED | OUTPATIENT
Start: 2024-04-28 | End: 2024-05-08 | Stop reason: HOSPADM

## 2024-04-28 RX ORDER — ACETAMINOPHEN 325 MG/1
650 TABLET ORAL EVERY 4 HOURS PRN
Status: DISCONTINUED | OUTPATIENT
Start: 2024-04-28 | End: 2024-04-28

## 2024-04-28 RX ORDER — PROCHLORPERAZINE MALEATE 5 MG
10 TABLET ORAL 3 TIMES DAILY PRN
Status: DISCONTINUED | OUTPATIENT
Start: 2024-04-28 | End: 2024-05-08 | Stop reason: HOSPADM

## 2024-04-28 RX ORDER — ASPIRIN 81 MG/1
81 TABLET ORAL DAILY
Status: DISCONTINUED | OUTPATIENT
Start: 2024-04-29 | End: 2024-04-28

## 2024-04-28 RX ADMIN — OXYCODONE 5 MG: 5 TABLET ORAL at 09:04

## 2024-04-28 RX ADMIN — LISINOPRIL 5 MG: 5 TABLET ORAL at 04:04

## 2024-04-28 RX ADMIN — HEPARIN SODIUM 12 UNITS/KG/HR: 10000 INJECTION, SOLUTION INTRAVENOUS at 05:04

## 2024-04-28 RX ADMIN — MORPHINE SULFATE 4 MG: 4 INJECTION INTRAVENOUS at 02:04

## 2024-04-28 RX ADMIN — HYDRALAZINE HYDROCHLORIDE 10 MG: 20 INJECTION, SOLUTION INTRAMUSCULAR; INTRAVENOUS at 06:04

## 2024-04-28 RX ADMIN — VANCOMYCIN HYDROCHLORIDE 1250 MG: 1.25 INJECTION, POWDER, LYOPHILIZED, FOR SOLUTION INTRAVENOUS at 02:04

## 2024-04-28 RX ADMIN — Medication 6 MG: at 09:04

## 2024-04-28 RX ADMIN — CARVEDILOL 12.5 MG: 12.5 TABLET, FILM COATED ORAL at 05:04

## 2024-04-28 NOTE — CONSULTS
Pharmacokinetic Initial Assessment: IV Vancomycin    Assessment/Plan:  Consulted for IV vancomycin TDM indicated for osteomyelitis. UOP adequate and creatinine appears to be stable. However, patient has poor creatinine clearance and history of CKD3.   Initiate intravenous vancomycin with loading dose of 1250 mg once with subsequent doses when random concentrations are less than 20 mcg/mL  Desired empiric serum trough concentration is 15 to 20 mcg/mL  Draw vancomycin random level on 4/29 at 1300.  Pharmacy will continue to follow and monitor vancomycin.      Thank you for the consult,   Bong Alvares, Pharm.D.  PGY-1 Pharmacy Resident  [id number]     Patient brief summary:  Deena Moody is a 89 y.o. male initiated on antimicrobial therapy with IV Vancomycin for treatment of suspected bone/joint infection    Drug Allergies:   Review of patient's allergies indicates:   Allergen Reactions    Iodine and iodide containing products Other (See Comments)     Caused changes in skin color       Actual Body Weight:   60.8 kg    Renal Function:   Estimated Creatinine Clearance: 22 mL/min (A) (based on SCr of 1.8 mg/dL (H)).,     Dialysis Method (if applicable):  N/A    CBC (last 72 hours):  Recent Labs   Lab Result Units 04/26/24 2002 04/28/24  1310   WBC K/uL 8.53 10.62   Hemoglobin g/dL 11.5* 11.2*   Hematocrit % 37.2* 36.8*   Platelets K/uL 302 255   Gran % % 70.4 74.1*   Lymph % % 15.4* 10.9*   Mono % % 8.9 12.9   Eosinophil % % 4.2 1.1   Basophil % % 0.9 0.7   Differential Method  Automated Automated       Metabolic Panel (last 72 hours):  Recent Labs   Lab Result Units 04/26/24 2002 04/28/24  1310   Sodium mmol/L 138 138   Potassium mmol/L 5.3* 4.9   Chloride mmol/L 106 105   CO2 mmol/L 24 20*   Glucose mg/dL 108 117*   BUN mg/dL 33* 33*   Creatinine mg/dL 2.2* 1.8*   Albumin g/dL 3.5 3.3*   Total Bilirubin mg/dL 0.9 1.3*   Alkaline Phosphatase U/L 145* 135   AST U/L 21 25   ALT U/L 8* 10       Drug levels (last 3  "results):  No results for input(s): "VANCOMYCINRA", "VANCORANDOM", "VANCOMYCINPE", "VANCOPEAK", "VANCOMYCINTR", "VANCOTROUGH" in the last 72 hours.    Microbiologic Results:  Microbiology Results (last 7 days)       ** No results found for the last 168 hours. **            "

## 2024-04-28 NOTE — ED NOTES
Patient placed in hospital gown at this time. Patient placed on cardiac monitor, bp cuff, and continuous pulse ox. Call light within reach. Patient provided with blankets. White board updated at this time.

## 2024-04-28 NOTE — SUBJECTIVE & OBJECTIVE
Past Medical History:   Diagnosis Date    Acute on chronic diastolic heart failure 9/1/2016    Coronary artery disease     Hyperlipidemia     Hypertension     Macular degeneration (senile) of retina, unspecified 12/12/2014    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    Persistent atrial fibrillation 11/10/2016    S/P placement of cardiac pacemaker 9/14/2016       Past Surgical History:   Procedure Laterality Date    AORTIC VALVE REPLACEMENT N/A     CARDIAC PACEMAKER PLACEMENT      CATARACT EXTRACTION W/  INTRAOCULAR LENS IMPLANT Right 2/16/2016    Dr. Whitley    CATARACT EXTRACTION W/  INTRAOCULAR LENS IMPLANT Left 3/1/2016    Dr. Whitley    CORONARY ARTERY BYPASS GRAFT      EAR EXAMINATION UNDER ANESTHESIA      EYE SURGERY         Review of patient's allergies indicates:   Allergen Reactions    Iodine and iodide containing products Other (See Comments)     Caused changes in skin color       Current Facility-Administered Medications   Medication Dose Route Frequency Provider Last Rate Last Admin    acetaminophen tablet 650 mg  650 mg Oral Q4H Juan Manuel Taylor PA-C        albuterol-ipratropium 2.5 mg-0.5 mg/3 mL nebulizer solution 3 mL  3 mL Nebulization Q4H Juan Manuel Taylor, AJMIE        bisacodyL suppository 10 mg  10 mg Rectal Daily PRJuan Manuel Montenegro PA-C        dextrose 10% bolus 125 mL 125 mL  12.5 g Intravenous Juan Manuel Taylor PA-C        dextrose 10% bolus 250 mL 250 mL  25 g Intravenous Juan Manuel Taylor PA-C        glucagon (human recombinant) injection 1 mg  1 mg Intramuscular Juan Manuel Taylor, JAMIE        glucose chewable tablet 16 g  16 g Oral Juan Manuel Taylor PA-C        glucose chewable tablet 24 g  24 g Oral PRJuan Manuel Montenegro PA-C        melatonin tablet 6 mg  6 mg Oral Nightly Juan Manuel Taylor PA-C        naloxone 0.4 mg/mL injection 0.02 mg  0.02 mg Intravenous PRJuan Manuel Montenegro PA-C        polyethylene glycol packet 17 g  17 g Oral Daily PRJuan Manuel Montenegro, JAMIE         prochlorperazine tablet 10 mg  10 mg Oral TID PRN Juan Manuel Cornell PA-C        promethazine tablet 25 mg  25 mg Oral Q6H PRN Juan Manuel Cornell PA-C        sodium chloride 0.9% flush 10 mL  10 mL Intravenous Q12H PRN Juan Manuel Cornell PA-C        vancomycin 1,250 mg in dextrose 5 % (D5W) 250 mL IVPB (Vial-Mate)  1,250 mg Intravenous ED 1 Time Luis Alberto Tesfaye .7 mL/hr at 04/28/24 1405 1,250 mg at 04/28/24 1405     Current Outpatient Medications   Medication Sig Dispense Refill    albuterol (PROVENTIL HFA) 90 mcg/actuation inhaler Inhale 2 puffs into the lungs every 6 (six) hours as needed for Wheezing. Rescue 18 g 1    apixaban (ELIQUIS) 2.5 mg Tab Take 1 tablet (2.5 mg total) by mouth 2 (two) times daily. 180 tablet 3    aspirin (ECOTRIN) 81 MG EC tablet Take 1 tablet (81 mg total) by mouth once daily. 90 tablet 3    benazepriL (LOTENSIN) 5 MG tablet Take 1 tablet (5 mg total) by mouth once daily. 90 tablet 3    carvediloL (COREG) 12.5 MG tablet TAKE 1 TABLET TWICE DAILY WITH MEALS 180 tablet 3    cephALEXin (KEFLEX) 500 MG capsule Take 1 capsule (500 mg total) by mouth 4 (four) times daily. for 7 days 28 capsule 0    diclofenac sodium (VOLTAREN) 1 % Gel Apply 2 g topically daily as needed (pain). 200 g 0    ferrous sulfate (FEOSOL) 325 mg (65 mg iron) Tab tablet Take 1 tablet (325 mg total) by mouth daily with breakfast. 90 tablet 3    fluticasone propionate (FLONASE) 50 mcg/actuation nasal spray 2 sprays (100 mcg total) by Each Nostril route once daily. 30 mL 1    multivitamin (ONE DAILY MULTIVITAMIN) per tablet Take 1 tablet by mouth once daily.      nitroGLYCERIN (NITROSTAT) 0.4 MG SL tablet Take one tablet every 5 minutes for chest pain. After the third tablet go to the ED. 25 tablet 4    polyethylene glycol (GLYCOLAX) 17 gram/dose powder Dissolve one capful (17 g) in liquid by mouth 3 (three) times daily as needed. Take 1 three times daily until well formed stool 510 g 0    potassium chloride (KLOR-CON) 8  MEQ TbSR Take 1 tablet (8 mEq total) by mouth once daily. 90 tablet 3    torsemide (DEMADEX) 20 MG Tab Take 1 tablet (20 mg total) by mouth 2 (two) times a day. 180 tablet 3     Family History       Problem Relation (Age of Onset)    Hypertension Brother    No Known Problems Mother, Father, Sister, Maternal Aunt, Maternal Uncle, Paternal Aunt, Paternal Uncle, Maternal Grandmother, Maternal Grandfather, Paternal Grandmother, Paternal Grandfather, Daughter, Son    Stroke Brother          Tobacco Use    Smoking status: Never     Passive exposure: Never    Smokeless tobacco: Never   Substance and Sexual Activity    Alcohol use: No    Drug use: No    Sexual activity: Yes     Partners: Female     Review of Systems   Constitutional:  Negative for activity change, chills and fever.   HENT:  Positive for hearing loss. Negative for trouble swallowing.    Eyes:  Negative for photophobia and visual disturbance.   Respiratory:  Negative for chest tightness, shortness of breath and wheezing.    Cardiovascular:  Negative for chest pain, palpitations and leg swelling.   Gastrointestinal:  Negative for abdominal pain, constipation, diarrhea, nausea and vomiting.   Genitourinary:  Negative for dysuria, frequency, hematuria and urgency.   Musculoskeletal:  Negative for arthralgias, back pain and gait problem.   Skin:  Positive for color change. Negative for rash and wound.   Neurological:  Negative for dizziness, syncope, weakness, light-headedness, numbness and headaches.   Psychiatric/Behavioral:  Negative for agitation and confusion. The patient is not nervous/anxious.      Objective:     Vital Signs (Most Recent):  Temp: 97.8 °F (36.6 °C) (04/28/24 1218)  Pulse: 68 (04/28/24 1335)  Resp: (!) 22 (04/28/24 1402)  BP: (!) 188/79 (04/28/24 1335)  SpO2: 98 % (04/28/24 1335) Vital Signs (24h Range):  Temp:  [97.8 °F (36.6 °C)] 97.8 °F (36.6 °C)  Pulse:  [68-73] 68  Resp:  [16-22] 22  SpO2:  [98 %-99 %] 98 %  BP: (175-188)/(74-79) 188/79      Weight: 60.8 kg (134 lb 0.6 oz)  Body mass index is 19.79 kg/m².     Physical Exam  Vitals and nursing note reviewed.   Constitutional:       General: He is not in acute distress.     Appearance: He is well-developed.   HENT:      Head: Normocephalic and atraumatic.      Ears:      Comments: Bilateral hear loss, does not wear hearing aides.     Mouth/Throat:      Pharynx: No oropharyngeal exudate.   Eyes:      Conjunctiva/sclera: Conjunctivae normal.      Pupils: Pupils are equal, round, and reactive to light.   Cardiovascular:      Rate and Rhythm: Normal rate and regular rhythm.      Heart sounds: Normal heart sounds.   Pulmonary:      Effort: Pulmonary effort is normal. No respiratory distress.      Breath sounds: Normal breath sounds. No wheezing.   Abdominal:      General: Bowel sounds are normal. There is no distension.      Palpations: Abdomen is soft.      Tenderness: There is no abdominal tenderness.   Musculoskeletal:      Left wrist: Swelling and tenderness present.      Left hand: Swelling and tenderness present. Decreased range of motion. Decreased strength.      Cervical back: Normal range of motion and neck supple.      Right lower leg: Edema present.      Left lower leg: Edema present.      Comments: See media. Erythema and swelling in left hand to left wrist. ROM limited 2/2 pain. Supination and pronation limited 2/2 pain.   Lymphadenopathy:      Cervical: No cervical adenopathy.   Skin:     General: Skin is warm and dry.      Capillary Refill: Capillary refill takes less than 2 seconds.      Findings: No rash.   Neurological:      General: No focal deficit present.      Mental Status: He is alert and oriented to person, place, and time.      Cranial Nerves: No cranial nerve deficit.      Sensory: No sensory deficit.      Coordination: Coordination normal.   Psychiatric:         Behavior: Behavior normal.         Thought Content: Thought content normal.         Judgment: Judgment normal.               CRANIAL NERVES     CN III, IV, VI   Pupils are equal, round, and reactive to light.       Significant Labs: All pertinent labs within the past 24 hours have been reviewed.  CBC:   Recent Labs   Lab 04/26/24 2002 04/28/24  1310   WBC 8.53 10.62   HGB 11.5* 11.2*   HCT 37.2* 36.8*    255     CMP:   Recent Labs   Lab 04/26/24 2002 04/28/24  1310    138   K 5.3* 4.9    105   CO2 24 20*    117*   BUN 33* 33*   CREATININE 2.2* 1.8*   CALCIUM 9.2 9.3   PROT 8.6* 8.1   ALBUMIN 3.5 3.3*   BILITOT 0.9 1.3*   ALKPHOS 145* 135   AST 21 25   ALT 8* 10   ANIONGAP 8 13       Significant Imaging: I have reviewed all pertinent imaging results/findings within the past 24 hours.  Imaging Results              X-Ray Hand 3 view Left (In process)

## 2024-04-28 NOTE — ASSESSMENT & PLAN NOTE
Left hand swelling and pain x 2-3 weeks. Seen in ED 4/26, ring removed, discharged with oral keflex. Returns today with worsening pain, swelling, and erythema that has now progressed to left wrist. See media.     - afebrile, no leukocytosis  - ESR and CRP elevated   - received IV vanc in ED  - initial XR images concerning for osteo    - ortho consulted, appreciate assistance   - cont IV vanc   - trend ESR/CRP  - NPO midnight as precaution  - pain control

## 2024-04-28 NOTE — SUBJECTIVE & OBJECTIVE
Past Medical History:   Diagnosis Date    Acute on chronic diastolic heart failure 9/1/2016    Coronary artery disease     Hyperlipidemia     Hypertension     Macular degeneration (senile) of retina, unspecified 12/12/2014    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    Persistent atrial fibrillation 11/10/2016    S/P placement of cardiac pacemaker 9/14/2016       Past Surgical History:   Procedure Laterality Date    AORTIC VALVE REPLACEMENT N/A     CARDIAC PACEMAKER PLACEMENT      CATARACT EXTRACTION W/  INTRAOCULAR LENS IMPLANT Right 2/16/2016    Dr. Whitley    CATARACT EXTRACTION W/  INTRAOCULAR LENS IMPLANT Left 3/1/2016    Dr. Whitley    CORONARY ARTERY BYPASS GRAFT      EAR EXAMINATION UNDER ANESTHESIA      EYE SURGERY         Review of patient's allergies indicates:   Allergen Reactions    Iodine and iodide containing products Other (See Comments)     Caused changes in skin color       Current Facility-Administered Medications   Medication Dose Route Frequency Provider Last Rate Last Admin    acetaminophen tablet 1,000 mg  1,000 mg Oral Q8H PRJuan Manuel Montenegro PA-C        albuterol-ipratropium 2.5 mg-0.5 mg/3 mL nebulizer solution 3 mL  3 mL Nebulization Q4H PRN Juan Manuel Cornell PA-C        apixaban tablet 2.5 mg  2.5 mg Oral BID Juan Manuel Cornell PA-C        [START ON 4/29/2024] aspirin EC tablet 81 mg  81 mg Oral Daily Juan Manuel Cornell PA-C        bisacodyL suppository 10 mg  10 mg Rectal Daily PRN Juan Manuel Cornell PA-C        carvediloL tablet 12.5 mg  12.5 mg Oral BID WM Juan Manuel Cornell PA-C        dextrose 10% bolus 125 mL 125 mL  12.5 g Intravenous PRN Juan Manuel Cornell PA-C        dextrose 10% bolus 250 mL 250 mL  25 g Intravenous PRN Juan Manuel Cornell PA-C        [START ON 4/29/2024] ferrous sulfate tablet 1 each  1 tablet Oral Daily Juan Manuel Cornell PA-C        glucagon (human recombinant) injection 1 mg  1 mg Intramuscular PRN Juan Manuel Cornell PA-C        glucose chewable tablet 16 g  16 g Oral PRN Kraig  Juan Manuel ANNE PA-C        glucose chewable tablet 24 g  24 g Oral PRN Juan Manuel Cornell PA-C        hydrALAZINE injection 10 mg  10 mg Intravenous Q8H PRJuan Manuel Montenegro PA-C        HYDROmorphone injection 1 mg  1 mg Intravenous Q6H PRN Juan Manuel Cornell PA-C        lisinopriL tablet 5 mg  5 mg Oral Daily Juan Manuel Cornell PA-C        melatonin tablet 6 mg  6 mg Oral Nightly PRN Juan Manuel Cornell PA-C        naloxone 0.4 mg/mL injection 0.02 mg  0.02 mg Intravenous PRN Juan Manuel Cornell PA-C        oxyCODONE immediate release tablet 10 mg  10 mg Oral Q6H PRJuan Manuel Montenegro PA-C        oxyCODONE immediate release tablet 5 mg  5 mg Oral Q6H PRJuan Manuel Montenegro PA-C        polyethylene glycol packet 17 g  17 g Oral Daily PRJuan Manuel Montenegro PA-C        prochlorperazine tablet 10 mg  10 mg Oral TID PRN Juan Maunel Cornell PA-C        promethazine tablet 25 mg  25 mg Oral Q6H PRJuan Manuel Montenegro PA-C        sodium chloride 0.9% flush 10 mL  10 mL Intravenous Q12H PRJuan Manuel Montenegro PA-C        [START ON 4/29/2024] torsemide tablet 20 mg  20 mg Oral BID Juan Manuel Cornell PA-C        vancomycin - pharmacy to dose   Intravenous pharmacy to manage frequency Juan Manuel Cornell PA-C         Current Outpatient Medications   Medication Sig Dispense Refill    albuterol (PROVENTIL HFA) 90 mcg/actuation inhaler Inhale 2 puffs into the lungs every 6 (six) hours as needed for Wheezing. Rescue 18 g 1    apixaban (ELIQUIS) 2.5 mg Tab Take 1 tablet (2.5 mg total) by mouth 2 (two) times daily. 180 tablet 3    aspirin (ECOTRIN) 81 MG EC tablet Take 1 tablet (81 mg total) by mouth once daily. 90 tablet 3    benazepriL (LOTENSIN) 5 MG tablet Take 1 tablet (5 mg total) by mouth once daily. 90 tablet 3    carvediloL (COREG) 12.5 MG tablet TAKE 1 TABLET TWICE DAILY WITH MEALS 180 tablet 3    cephALEXin (KEFLEX) 500 MG capsule Take 1 capsule (500 mg total) by mouth 4 (four) times daily. for 7 days 28 capsule 0    diclofenac sodium (VOLTAREN) 1 % Gel  Apply 2 g topically daily as needed (pain). 200 g 0    ferrous sulfate (FEOSOL) 325 mg (65 mg iron) Tab tablet Take 1 tablet (325 mg total) by mouth daily with breakfast. 90 tablet 3    fluticasone propionate (FLONASE) 50 mcg/actuation nasal spray 2 sprays (100 mcg total) by Each Nostril route once daily. 30 mL 1    multivitamin (ONE DAILY MULTIVITAMIN) per tablet Take 1 tablet by mouth once daily.      nitroGLYCERIN (NITROSTAT) 0.4 MG SL tablet Take one tablet every 5 minutes for chest pain. After the third tablet go to the ED. 25 tablet 4    polyethylene glycol (GLYCOLAX) 17 gram/dose powder Dissolve one capful (17 g) in liquid by mouth 3 (three) times daily as needed. Take 1 three times daily until well formed stool 510 g 0    potassium chloride (KLOR-CON) 8 MEQ TbSR Take 1 tablet (8 mEq total) by mouth once daily. 90 tablet 3    torsemide (DEMADEX) 20 MG Tab Take 1 tablet (20 mg total) by mouth 2 (two) times a day. 180 tablet 3     Family History       Problem Relation (Age of Onset)    Hypertension Brother    No Known Problems Mother, Father, Sister, Maternal Aunt, Maternal Uncle, Paternal Aunt, Paternal Uncle, Maternal Grandmother, Maternal Grandfather, Paternal Grandmother, Paternal Grandfather, Daughter, Son    Stroke Brother          Tobacco Use    Smoking status: Never     Passive exposure: Never    Smokeless tobacco: Never   Substance and Sexual Activity    Alcohol use: No    Drug use: No    Sexual activity: Yes     Partners: Female     ROS  Constitutional: negative for fevers  Eyes: negative visual changes  ENT: negative for hearing loss  Respiratory: negative for dyspnea  Cardiovascular: negative for chest pain  Gastrointestinal: negative for abdominal pain  Genitourinary: negative for dysuria  Neurological: negative for headaches  Behavioral/Psych: negative for hallucinations  Endocrine: negative for temperature intolerance    Objective:     Vital Signs (Most Recent):  Temp: 97.8 °F (36.6 °C) (04/28/24  "1218)  Pulse: 69 (04/28/24 1435)  Resp: 18 (04/28/24 1435)  BP: (!) 165/103 (04/28/24 1435)  SpO2: 97 % (04/28/24 1435) Vital Signs (24h Range):  Temp:  [97.8 °F (36.6 °C)] 97.8 °F (36.6 °C)  Pulse:  [68-73] 69  Resp:  [16-22] 18  SpO2:  [97 %-99 %] 97 %  BP: (165-188)/() 165/103     Weight: 60.8 kg (134 lb 0.6 oz)  Height: 5' 9" (175.3 cm)  Body mass index is 19.79 kg/m².      Intake/Output Summary (Last 24 hours) at 4/28/2024 1553  Last data filed at 4/28/2024 1532  Gross per 24 hour   Intake 250 ml   Output 200 ml   Net 50 ml        Ortho/SPM Exam  General:  no acute distress, appears stated age   Neuro: alert and oriented x3  Psych: normal mood  Head: normocephalic, atraumatic.  Eyes: no scleral icterus  Mouth: moist mucous membranes  Cardiovascular: extremities warm and well perfused  Lungs: breathing comfortably, equal chest rise bilat  Skin: clean, dry, intact (any exceptions noted in below musculoskeletal exam)    MSK:  RUE:  - Skin intact throughout, no open wounds  - No swelling  - No ecchymosis, erythema, or signs of cellulitis  - NonTTP throughout  - AROM and PROM of the shoulder, elbow, wrist, and hand intact without pain  - Axillary/AIN/PIN/Radial/Median/Ulnar Nerves assessed in isolation without deficit  - SILT throughout  - Compartments soft  - Radial artery palpated   - Capillary Refill <3s    LUE:  - Swelling of the fingers and hand, worst in the index finger  - erythema of the dorsal hand  - TTP throughout the dorsal hand and fingers, worst in the index finger  - AROM and PROM of the shoulder and elbow intact without pain  - Pain with any ROM of the fingers and some pain with ROM of the wrist  - Sensation and motor grossly intact  - Compartments soft  - Radial artery palpated   - Capillary Refill <3s    RLE:  - Skin intact throughout, no open wounds  - No swelling  - No ecchymosis, erythema, or signs of cellulitis  - NonTTP throughout  - AROM and PROM of the hip, knee, ankle, and foot intact " without pain  - TA/EHL/Gastroc/FHL assessed in isolation without deficit  - SILT throughout  - Compartments soft  - DP and PT palpated  - Capillary Refill <3s  - Negative [facility]  - Negative [facility]    LLE:  - Skin intact throughout, no open wounds  - No swelling  - No ecchymosis, erythema, or signs of cellulitis  - NonTTP throughout  - AROM and PROM of the hip, knee, ankle, and foot intact without pain  - TA/EHL/Gastroc/FHL assessed in isolation without deficit  - SILT throughout  - Compartments soft  - DP and PT palpated  - Capillary Refill <3s  - Negative [facility]  - [facility]       Significant Labs: All pertinent labs within the past 24 hours have been reviewed.    Significant Imaging: I have reviewed and interpreted all pertinent imaging results/findings.  XR left hand: erosive changes of the P2 index finger

## 2024-04-28 NOTE — ASSESSMENT & PLAN NOTE
History of heart bypass surgery  History of MI (myocardial infarction)  - hold asa given possible OR in AM, heparin infusion for now  - previously intolerant to statins 2/2 myalgias

## 2024-04-28 NOTE — ED PROVIDER NOTES
Chief Complaint   hand swelling (Redness and swelling to left hand/wrist x1 week. Radial pulses intact.)      History Of Present Illness   Deena Moody is a 89 y.o. male presenting with left hand swelling and pain that has been present for several days.  Patient was seen 2 days ago for similar issues, diagnosis cellulitis and started on antibiotics but the symptoms have worsened.  No fever or chills.  They had to remove his ring and he had trouble taking off a bracelet.        Review of patient's allergies indicates:   Allergen Reactions    Iodine and iodide containing products Other (See Comments)     Caused changes in skin color       No current facility-administered medications on file prior to encounter.     Current Outpatient Medications on File Prior to Encounter   Medication Sig Dispense Refill    albuterol (PROVENTIL HFA) 90 mcg/actuation inhaler Inhale 2 puffs into the lungs every 6 (six) hours as needed for Wheezing. Rescue 18 g 1    apixaban (ELIQUIS) 2.5 mg Tab Take 1 tablet (2.5 mg total) by mouth 2 (two) times daily. 180 tablet 3    aspirin (ECOTRIN) 81 MG EC tablet Take 1 tablet (81 mg total) by mouth once daily. 90 tablet 3    benazepriL (LOTENSIN) 5 MG tablet Take 1 tablet (5 mg total) by mouth once daily. 90 tablet 3    carvediloL (COREG) 12.5 MG tablet TAKE 1 TABLET TWICE DAILY WITH MEALS 180 tablet 3    cephALEXin (KEFLEX) 500 MG capsule Take 1 capsule (500 mg total) by mouth 4 (four) times daily. for 7 days 28 capsule 0    diclofenac sodium (VOLTAREN) 1 % Gel Apply 2 g topically daily as needed (pain). 200 g 0    ferrous sulfate (FEOSOL) 325 mg (65 mg iron) Tab tablet Take 1 tablet (325 mg total) by mouth daily with breakfast. 90 tablet 3    fluticasone propionate (FLONASE) 50 mcg/actuation nasal spray 2 sprays (100 mcg total) by Each Nostril route once daily. 30 mL 1    multivitamin (ONE DAILY MULTIVITAMIN) per tablet Take 1 tablet by mouth once daily.      nitroGLYCERIN (NITROSTAT) 0.4 MG  SL tablet Take one tablet every 5 minutes for chest pain. After the third tablet go to the ED. 25 tablet 4    polyethylene glycol (GLYCOLAX) 17 gram/dose powder Dissolve one capful (17 g) in liquid by mouth 3 (three) times daily as needed. Take 1 three times daily until well formed stool 510 g 0    potassium chloride (KLOR-CON) 8 MEQ TbSR Take 1 tablet (8 mEq total) by mouth once daily. 90 tablet 3    torsemide (DEMADEX) 20 MG Tab Take 1 tablet (20 mg total) by mouth 2 (two) times a day. 180 tablet 3       Past History   As per HPI and below:  Past Medical History:   Diagnosis Date    Acute on chronic diastolic heart failure 9/1/2016    Coronary artery disease     Hyperlipidemia     Hypertension     Macular degeneration (senile) of retina, unspecified 12/12/2014    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    Persistent atrial fibrillation 11/10/2016    S/P placement of cardiac pacemaker 9/14/2016     Past Surgical History:   Procedure Laterality Date    AORTIC VALVE REPLACEMENT N/A     CARDIAC PACEMAKER PLACEMENT      CATARACT EXTRACTION W/  INTRAOCULAR LENS IMPLANT Right 2/16/2016    Dr. Whitley    CATARACT EXTRACTION W/  INTRAOCULAR LENS IMPLANT Left 3/1/2016    Dr. Whitley    CORONARY ARTERY BYPASS GRAFT      EAR EXAMINATION UNDER ANESTHESIA      EYE SURGERY         Social History     Tobacco Use    Smoking status: Never     Passive exposure: Never    Smokeless tobacco: Never   Substance Use Topics    Alcohol use: No    Drug use: No       Family History   Problem Relation Name Age of Onset    No Known Problems Mother      No Known Problems Father      No Known Problems Sister x4     Stroke Brother x3     Hypertension Brother x3     No Known Problems Maternal Aunt      No Known Problems Maternal Uncle      No Known Problems Paternal Aunt      No Known Problems Paternal Uncle      No Known Problems Maternal Grandmother      No Known Problems Maternal Grandfather      No Known Problems Paternal Grandmother      No Known  Problems Paternal Grandfather      No Known Problems Daughter      No Known Problems Son      Amblyopia Neg Hx      Blindness Neg Hx      Cancer Neg Hx      Cataracts Neg Hx      Diabetes Neg Hx      Glaucoma Neg Hx      Macular degeneration Neg Hx      Retinal detachment Neg Hx      Strabismus Neg Hx      Thyroid disease Neg Hx         Physical Exam     Vitals:    04/28/24 1305 04/28/24 1335 04/28/24 1402 04/28/24 1435   BP: (!) 175/74 (!) 188/79  (!) 165/103   Pulse: 68 68  69   Resp: 20 18 (!) 22 18   Temp:       TempSrc:       SpO2: 99% 98%  97%   Weight:       Height:         Appearance: No acute distress.  Skin: No rashes seen.  Good turgor.  No abrasions.  No ecchymoses.  Eyes: No conjunctival injection.  ENT: Oropharynx clear.    Chest: Clear to auscultation bilaterally.  Good air movement.  No wheezes.  No rhonchi.  Cardiovascular: Regular rate and rhythm.  No murmurs. No gallops. No rubs.  Abdomen: Soft.  Not distended.  Nontender.  No guarding.  No rebound.  Musculoskeletal:  Swollen, tender left hand and wrist.  Index finger is a bit more swollen than the rest.  Neurologic: Motor intact.  Sensation intact.  Cranial nerves intact.  Mental Status:  Alert and oriented x 3.  Appropriate, conversant.      Initial MDM   Left hand cellulitis, worsening despite 2 days of oral antibiotics.  I am concerned for possible deep space infection as well.  I ordered MRI, but the patient has a pacemaker that is incompatible, so I have canceled the MRI.  Will do blood work, antibiotics, admission.    External Records Reviewed:  Chart reviewed, given Keflex by previous ED doctor.    Medications Given     Medications   vancomycin 1,250 mg in dextrose 5 % (D5W) 250 mL IVPB (Vial-Mate) (1,250 mg Intravenous New Bag 4/28/24 1405)   sodium chloride 0.9% flush 10 mL (has no administration in time range)   albuterol-ipratropium 2.5 mg-0.5 mg/3 mL nebulizer solution 3 mL (has no administration in time range)   melatonin tablet 6 mg  (has no administration in time range)   promethazine tablet 25 mg (has no administration in time range)   polyethylene glycol packet 17 g (has no administration in time range)   bisacodyL suppository 10 mg (has no administration in time range)   naloxone 0.4 mg/mL injection 0.02 mg (has no administration in time range)   glucose chewable tablet 16 g (has no administration in time range)   glucose chewable tablet 24 g (has no administration in time range)   glucagon (human recombinant) injection 1 mg (has no administration in time range)   dextrose 10% bolus 125 mL 125 mL (has no administration in time range)   dextrose 10% bolus 250 mL 250 mL (has no administration in time range)   prochlorperazine tablet 10 mg (has no administration in time range)   apixaban tablet 2.5 mg (has no administration in time range)   aspirin EC tablet 81 mg (has no administration in time range)   lisinopriL tablet 5 mg (has no administration in time range)   carvediloL tablet 12.5 mg (has no administration in time range)   ferrous sulfate tablet 1 each (has no administration in time range)   torsemide tablet 20 mg (has no administration in time range)   vancomycin - pharmacy to dose (has no administration in time range)   oxyCODONE immediate release tablet 5 mg (has no administration in time range)   acetaminophen tablet 1,000 mg (has no administration in time range)   oxyCODONE immediate release tablet 10 mg (has no administration in time range)   HYDROmorphone injection 1 mg (has no administration in time range)   hydrALAZINE injection 10 mg (has no administration in time range)   morphine injection 4 mg (4 mg Intravenous Given 4/28/24 1402)       Results and Course     Labs Reviewed   CBC W/ AUTO DIFFERENTIAL - Abnormal; Notable for the following components:       Result Value    RBC 3.67 (*)     Hemoglobin 11.2 (*)     Hematocrit 36.8 (*)      (*)     MCHC 30.4 (*)     MPV 8.6 (*)     Gran # (ANC) 7.9 (*)     Mono # 1.4 (*)      Gran % 74.1 (*)     Lymph % 10.9 (*)     All other components within normal limits   COMPREHENSIVE METABOLIC PANEL - Abnormal; Notable for the following components:    CO2 20 (*)     Glucose 117 (*)     BUN 33 (*)     Creatinine 1.8 (*)     Albumin 3.3 (*)     Total Bilirubin 1.3 (*)     eGFR 34.7 (*)     All other components within normal limits   SEDIMENTATION RATE - Abnormal; Notable for the following components:    Sed Rate 55 (*)     All other components within normal limits   C-REACTIVE PROTEIN - Abnormal; Notable for the following components:    CRP 82.8 (*)     All other components within normal limits       Imaging Results              X-Ray Hand 3 view Left (Final result)  Result time 04/28/24 15:21:44      Final result by Leon Ortega MD (04/28/24 15:21:44)                   Impression:      1.  Diffuse soft tissue swelling of the left hand.    2.  Erosive change involving the head of the 2nd middle phalanx.  This may represent a focus of infection.  Additional evaluation, as clinically warranted.    3.  No evidence of a fracture or dislocation.      Electronically signed by: Leon Ortega MD  Date:    04/28/2024  Time:    15:21               Narrative:    EXAMINATION:  XR HAND COMPLETE 3 VIEW LEFT    CLINICAL HISTORY:  hand swelling;.    TECHNIQUE:  PA, lateral, and oblique views of the left hand were performed.    COMPARISON:  06/15/2023.    FINDINGS:  There is demineralization of the osseous structures.  There is unchanged appearance of chronic changes involving the left distal radius and ulna.  There is no cortical step-off.  There is no evidence of periostitis.    There is joint space narrowing with osteophytosis.  There are advanced degenerative changes involving the left thumb.  There is an erosive change involving the head of the 2nd middle phalanx.    There is diffuse soft tissue swelling of the left hand.  There are advanced vascular calcifications in the left hand.    There is no evidence of  a fracture or dislocation.                                      ED Course as of 04/28/24 1529   Sun Apr 28, 2024   1359 Creatinine(!): 1.8  baseline [DC]   1359 WBC: 10.62 [DC]   1359 Hemoglobin(!): 11.2 [DC]   1359 Platelet Count: 255 [DC]   1359 Sed Rate(!): 55 [DC]   1359 CRP(!): 82.8 [DC]      ED Course User Index  [DC] Luis Alberto Tesfaye MD       Xray shows no fracture or gas, but bony destruction of 2nd middle phalanx per my independent interpretation.            MDM, Impression and Plan   89 y.o. male with cellulitis of the left hand, failed outpatient antibiotics.  Xray shows likely osteo given bony destruction.  IV antibiotics given.  Discussed with hospital medicine for admission.         Final diagnoses:  [L03.012] Cellulitis of finger of left hand  [L03.114] Cellulitis of hand, left  [M86.9] Osteomyelitis of finger of left hand (Primary)        ED Disposition Condition    Admit Stable                  Luis Alberto Tesfaye MD  04/28/24 0055

## 2024-04-28 NOTE — CONSULTS
[facility] - Emergency Dept  Orthopedics  Consult Note    Patient Name: Deena Moody  MRN: [id number]  Admission Date: 4/28/2024  Hospital Length of Stay: 0 days  Attending Provider: Chel Mares MD  Primary Care Provider: Jam Rowell MD      Inpatient consult to Orthopedics  Consult performed by: OMID Valentin MD  Consult ordered by: Juan Manuel Cornell PA-C        Subjective:     Principal Problem:Osteomyelitis of left hand    Chief Complaint:   Chief Complaint   Patient presents with    hand swelling     Redness and swelling to left hand/wrist x1 week. Radial pulses intact.        HPI: Deena Moody is a 89 y.o. male with PMH significant for CAD, CHF, pacemaker, afib (on eliquis) presenting with left hand pain. Patient states his hand became painful about 2 weeks ago and began to get swollen. He was in the ED 2 days ago and diagnosed with cellulitis and prescribed PO keflex and discharged. He returned today for worsening pain and swelling of the dorsal hand. Patient has a history of pacemaker that is not MRI compatible. He denies trauma to the hand. The patient denies prior hx of falls. Patient denies numbness and tingling. Denies any other musculoskeletal pain or injuries.    Past Medical History:   Diagnosis Date    Acute on chronic diastolic heart failure 9/1/2016    Coronary artery disease     Hyperlipidemia     Hypertension     Macular degeneration (senile) of retina, unspecified 12/12/2014    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    Persistent atrial fibrillation 11/10/2016    S/P placement of cardiac pacemaker 9/14/2016       Past Surgical History:   Procedure Laterality Date    AORTIC VALVE REPLACEMENT N/A     CARDIAC PACEMAKER PLACEMENT      CATARACT EXTRACTION W/  INTRAOCULAR LENS IMPLANT Right 2/16/2016    Dr. Whitley    CATARACT EXTRACTION W/  INTRAOCULAR LENS IMPLANT Left 3/1/2016    Dr. Whitley    CORONARY ARTERY BYPASS GRAFT      EAR EXAMINATION UNDER ANESTHESIA      EYE SURGERY          Review of patient's allergies indicates:   Allergen Reactions    Iodine and iodide containing products Other (See Comments)     Caused changes in skin color       Current Facility-Administered Medications   Medication Dose Route Frequency Provider Last Rate Last Admin    acetaminophen tablet 1,000 mg  1,000 mg Oral Q8H PRN Juan Manuel Cornell PA-C        albuterol-ipratropium 2.5 mg-0.5 mg/3 mL nebulizer solution 3 mL  3 mL Nebulization Q4H PRN Juan Manuel Cornell PA-C        apixaban tablet 2.5 mg  2.5 mg Oral BID Juan Manuel Cornell PA-C        [START ON 4/29/2024] aspirin EC tablet 81 mg  81 mg Oral Daily Juan Manuel Cornell PA-C        bisacodyL suppository 10 mg  10 mg Rectal Daily PRN Juan Manuel Cornell PA-C        carvediloL tablet 12.5 mg  12.5 mg Oral BID WM Juan Manuel Cornell PA-C        dextrose 10% bolus 125 mL 125 mL  12.5 g Intravenous PRN Juan Manuel Cornell PA-C        dextrose 10% bolus 250 mL 250 mL  25 g Intravenous PRN Juan Manuel Cornell PA-C        [START ON 4/29/2024] ferrous sulfate tablet 1 each  1 tablet Oral Daily Juan Manuel Corenll PA-C        glucagon (human recombinant) injection 1 mg  1 mg Intramuscular PRN Juan Manuel Cornell PA-C        glucose chewable tablet 16 g  16 g Oral PRN Juan Manuel Cornell PA-C        glucose chewable tablet 24 g  24 g Oral PRN Juan Manuel Cornell PA-C        hydrALAZINE injection 10 mg  10 mg Intravenous Q8H PRN Juan Manuel Cornell PA-C        HYDROmorphone injection 1 mg  1 mg Intravenous Q6H PRN Juan Manuel Cornell PA-C        lisinopriL tablet 5 mg  5 mg Oral Daily Juan Manuel Cornell PA-C        melatonin tablet 6 mg  6 mg Oral Nightly PRN Juan Manuel Cornell PA-C        naloxone 0.4 mg/mL injection 0.02 mg  0.02 mg Intravenous PRN Juan Manuel Cornell PA-C        oxyCODONE immediate release tablet 10 mg  10 mg Oral Q6H PRN Juan Manuel Cornell PA-C        oxyCODONE immediate release tablet 5 mg  5 mg Oral Q6H PRN Juan Manuel Cornell PA-C        polyethylene glycol packet 17 g  17 g Oral Daily PRN  Juan Manuel Cornell PA-C        prochlorperazine tablet 10 mg  10 mg Oral TID PRN Juan Manuel Cornell PA-C        promethazine tablet 25 mg  25 mg Oral Q6H PRN Juan Manuel Cornell PA-C        sodium chloride 0.9% flush 10 mL  10 mL Intravenous Q12H PRN Juan Manuel Cornell PA-C        [START ON 4/29/2024] torsemide tablet 20 mg  20 mg Oral BID Juan Manuel Cornell PA-C        vancomycin - pharmacy to dose   Intravenous pharmacy to manage frequency Juan Manuel Cornell PA-C         Current Outpatient Medications   Medication Sig Dispense Refill    albuterol (PROVENTIL HFA) 90 mcg/actuation inhaler Inhale 2 puffs into the lungs every 6 (six) hours as needed for Wheezing. Rescue 18 g 1    apixaban (ELIQUIS) 2.5 mg Tab Take 1 tablet (2.5 mg total) by mouth 2 (two) times daily. 180 tablet 3    aspirin (ECOTRIN) 81 MG EC tablet Take 1 tablet (81 mg total) by mouth once daily. 90 tablet 3    benazepriL (LOTENSIN) 5 MG tablet Take 1 tablet (5 mg total) by mouth once daily. 90 tablet 3    carvediloL (COREG) 12.5 MG tablet TAKE 1 TABLET TWICE DAILY WITH MEALS 180 tablet 3    cephALEXin (KEFLEX) 500 MG capsule Take 1 capsule (500 mg total) by mouth 4 (four) times daily. for 7 days 28 capsule 0    diclofenac sodium (VOLTAREN) 1 % Gel Apply 2 g topically daily as needed (pain). 200 g 0    ferrous sulfate (FEOSOL) 325 mg (65 mg iron) Tab tablet Take 1 tablet (325 mg total) by mouth daily with breakfast. 90 tablet 3    fluticasone propionate (FLONASE) 50 mcg/actuation nasal spray 2 sprays (100 mcg total) by Each Nostril route once daily. 30 mL 1    multivitamin (ONE DAILY MULTIVITAMIN) per tablet Take 1 tablet by mouth once daily.      nitroGLYCERIN (NITROSTAT) 0.4 MG SL tablet Take one tablet every 5 minutes for chest pain. After the third tablet go to the ED. 25 tablet 4    polyethylene glycol (GLYCOLAX) 17 gram/dose powder Dissolve one capful (17 g) in liquid by mouth 3 (three) times daily as needed. Take 1 three times daily until well formed stool  "510 g 0    potassium chloride (KLOR-CON) 8 MEQ TbSR Take 1 tablet (8 mEq total) by mouth once daily. 90 tablet 3    torsemide (DEMADEX) 20 MG Tab Take 1 tablet (20 mg total) by mouth 2 (two) times a day. 180 tablet 3     Family History       Problem Relation (Age of Onset)    Hypertension Brother    No Known Problems Mother, Father, Sister, Maternal Aunt, Maternal Uncle, Paternal Aunt, Paternal Uncle, Maternal Grandmother, Maternal Grandfather, Paternal Grandmother, Paternal Grandfather, Daughter, Son    Stroke Brother          Tobacco Use    Smoking status: Never     Passive exposure: Never    Smokeless tobacco: Never   Substance and Sexual Activity    Alcohol use: No    Drug use: No    Sexual activity: Yes     Partners: Female     ROS  Constitutional: negative for fevers  Eyes: negative visual changes  ENT: negative for hearing loss  Respiratory: negative for dyspnea  Cardiovascular: negative for chest pain  Gastrointestinal: negative for abdominal pain  Genitourinary: negative for dysuria  Neurological: negative for headaches  Behavioral/Psych: negative for hallucinations  Endocrine: negative for temperature intolerance    Objective:     Vital Signs (Most Recent):  Temp: 97.8 °F (36.6 °C) (04/28/24 1218)  Pulse: 69 (04/28/24 1435)  Resp: 18 (04/28/24 1435)  BP: (!) 165/103 (04/28/24 1435)  SpO2: 97 % (04/28/24 1435) Vital Signs (24h Range):  Temp:  [97.8 °F (36.6 °C)] 97.8 °F (36.6 °C)  Pulse:  [68-73] 69  Resp:  [16-22] 18  SpO2:  [97 %-99 %] 97 %  BP: (165-188)/() 165/103     Weight: 60.8 kg (134 lb 0.6 oz)  Height: 5' 9" (175.3 cm)  Body mass index is 19.79 kg/m².      Intake/Output Summary (Last 24 hours) at 4/28/2024 1553  Last data filed at 4/28/2024 1532  Gross per 24 hour   Intake 250 ml   Output 200 ml   Net 50 ml        Ortho/SPM Exam  General:  no acute distress, appears stated age   Neuro: alert and oriented x3  Psych: normal mood  Head: normocephalic, atraumatic.  Eyes: no scleral " icterus  Mouth: moist mucous membranes  Cardiovascular: extremities warm and well perfused  Lungs: breathing comfortably, equal chest rise bilat  Skin: clean, dry, intact (any exceptions noted in below musculoskeletal exam)    MSK:  RUE:  - Skin intact throughout, no open wounds  - No swelling  - No ecchymosis, erythema, or signs of cellulitis  - NonTTP throughout  - AROM and PROM of the shoulder, elbow, wrist, and hand intact without pain  - Axillary/AIN/PIN/Radial/Median/Ulnar Nerves assessed in isolation without deficit  - SILT throughout  - Compartments soft  - Radial artery palpated   - Capillary Refill <3s    LUE:  - Swelling of the fingers and hand, worst in the index finger  - erythema of the dorsal hand  - TTP throughout the dorsal hand and fingers, worst in the index finger  - AROM and PROM of the shoulder and elbow intact without pain  - Pain with any ROM of the fingers and some pain with ROM of the wrist  - Sensation and motor grossly intact  - Compartments soft  - Radial artery palpated   - Capillary Refill <3s    RLE:  - Skin intact throughout, no open wounds  - No swelling  - No ecchymosis, erythema, or signs of cellulitis  - NonTTP throughout  - AROM and PROM of the hip, knee, ankle, and foot intact without pain  - TA/EHL/Gastroc/FHL assessed in isolation without deficit  - SILT throughout  - Compartments soft  - DP and PT palpated  - Capillary Refill <3s  - Negative Log roll  - Negative Stinchfield    LLE:  - Skin intact throughout, no open wounds  - No swelling  - No ecchymosis, erythema, or signs of cellulitis  - NonTTP throughout  - AROM and PROM of the hip, knee, ankle, and foot intact without pain  - TA/EHL/Gastroc/FHL assessed in isolation without deficit  - SILT throughout  - Compartments soft  - DP and PT palpated  - Capillary Refill <3s  - Negative Log roll  - Negative Stinchfield       Significant Labs: All pertinent labs within the past 24 hours have been reviewed.    Significant Imaging:  I have reviewed and interpreted all pertinent imaging results/findings.  XR left hand: erosive changes of the P2 index finger  Assessment/Plan:     * Osteomyelitis of left hand  Deena Moody is a 89 y.o. male with PMH significant for CAD, CHF, pacemaker, afib (on eliquis) presenting with left hand pain. Patient states his hand became painful about 2 weeks ago and began to get swollen. He was in the ED 2 days ago and diagnosed with cellulitis and prescribed PO keflex and discharged. He returned today for worsening pain and swelling of the dorsal hand. Patient has a history of pacemaker that is not MRI compatible. XR with erosive changes of P2 index finger left hand. Exam is consistent with dorsal hand cellulitis, no ttp in the volar hand or flexor tendon sheath. CRP 82, ESR 55, no leukocytosis. Erosive change likely consistent with osteomyelitis of the index finger.     - Recommend broad spectrum iv abx   - Wrap with ace bandage, elevate, will continue to trend crp and exam  - If no clinical improvement, will likely obtain CT with contrast as patient not able to undergo MRI   - Will discuss with staff further, remain NPO at midnight as precaution      OMID Valentin MD  Orthopedics  [facility] - Emergency Dept

## 2024-04-28 NOTE — HPI
Deena Moody is a 89 y.o. male with PMHx HTN, HLD, CAD, diastolic HF, A-fib with pacemaker, CABG, hx of MI, AV block, and CKD 3 who presents to [facility] ED with chief complaint of left hand swelling and pain. He reports this issues has been present x 2-3 weeks. He was in ED two days prior wherein his ring had to be cut from finger due to swelling. He was then sent home with keflex and PCP follow up. Since then patient reports worsening swelling, redness and pain. He denies associated HA, fever, chills, chest pain, shortness of breath, abdominal pain, n/v/d, urinary symptoms, numbness or tingling. He takes eliquis. Patient's pacemaker is not MRI compatible. Patient with significant hear loss bilaterally.     In the ED: Hypertensive, RR 22, otherwise VSSAF. CBC without leukocytosis. CMP without emergent abnormalities, Cr 1.8 (baseline). ESR 55 and CRP 82.8 (elevated from 53.6 on 4/26). Received morphine and vanc. Admitted to  for further management.

## 2024-04-28 NOTE — Clinical Note
Diagnosis: Cellulitis of finger of left hand [[id number]]   Future Attending Provider: CLAIRE MORGAN [[id number]]

## 2024-04-28 NOTE — HPI
Deena Moody is a 89 y.o. male with PMH significant for CAD, CHF, pacemaker, afib (on eliquis) presenting with left hand pain. Patient states his hand became painful about 2 weeks ago and began to get swollen. He was in the ED 2 days ago and diagnosed with cellulitis and prescribed PO keflex and discharged. He returned today for worsening pain and swelling of the dorsal hand. Patient has a history of pacemaker that is not MRI compatible. He denies trauma to the hand. The patient denies prior hx of falls. Patient denies numbness and tingling. Denies any other musculoskeletal pain or injuries.

## 2024-04-28 NOTE — ASSESSMENT & PLAN NOTE
Long term (current) use of anticoagulants  S/P placement of cardiac pacemaker  - cont coreg  - pacemaker not MRI compatible   - hold eliquis in setting of possible OR in AM, heparin infusion for now  - cont tele

## 2024-04-28 NOTE — ASSESSMENT & PLAN NOTE
- no signs of acute overload  - cont torsemide  - hold KCl for now (recently hyperK 4/26)  - monitor UOP

## 2024-04-28 NOTE — ASSESSMENT & PLAN NOTE
- chronic, uncontrolled  - cont home coreg and torsemide  - substitute home benzapril for lisinopril  - prn hydralazine for sbp > 180  - monitor

## 2024-04-28 NOTE — ASSESSMENT & PLAN NOTE
Long term (current) use of anticoagulants  S/P placement of cardiac pacemaker  - cont eliquis and coreg  - pacemaker not MRI compatible   - cont tele

## 2024-04-28 NOTE — ED TRIAGE NOTES
Patient presents to the ED via EMS from home with complaints of swelling to his left wrist and hand x a week and a half. Patient also endorses pain to his left foot. Patient denies any recent falls or injuries at this time.

## 2024-04-28 NOTE — ASSESSMENT & PLAN NOTE
Deena Moody is a 89 y.o. male with PMH significant for CAD, CHF, pacemaker, afib (on eliquis) presenting with left hand pain. Patient states his hand became painful about 2 weeks ago and began to get swollen. He was in the ED 2 days ago and diagnosed with cellulitis and prescribed PO keflex and discharged. He returned today for worsening pain and swelling of the dorsal hand. Patient has a history of pacemaker that is not MRI compatible. XR with erosive changes of P2 index finger left hand. Exam is consistent with dorsal hand cellulitis, no ttp in the volar hand or flexor tendon sheath. CRP 82, ESR 55, no leukocytosis. Erosive change likely consistent with osteomyelitis of the index finger.     - Recommend broad spectrum iv abx   - Wrap with ace bandage, elevate, will continue to trend crp and exam  - If no clinical improvement, will likely obtain CT with contrast as patient not able to undergo MRI   - Will discuss with staff further, remain NPO at midnight as precaution

## 2024-04-28 NOTE — ASSESSMENT & PLAN NOTE
History of heart bypass surgery  History of MI (myocardial infarction)  - cont asa  - previously intolerant to statins 2/2 myalgias

## 2024-04-28 NOTE — H&P
[facility] - Emergency Dept  [facility] Medicine  History & Physical    Patient Name: Deena Moody  MRN: [id number]  Patient Class: OP- Observation  Admission Date: 4/28/2024  Attending Physician: Chel Mares MD   Primary Care Provider: Jam Rowell MD         Patient information was obtained from patient and ER records.     Subjective:     Principal Problem:Osteomyelitis of left hand    Chief Complaint:   Chief Complaint   Patient presents with    hand swelling     Redness and swelling to left hand/wrist x1 week. Radial pulses intact.        HPI: Deena Moody is a 89 y.o. male with PMHx HTN, HLD, CAD, diastolic HF, A-fib with pacemaker, CABG, hx of MI, AV block, and CKD 3 who presents to [facility] ED with chief complaint of left hand swelling and pain. He reports this issues has been present x 2-3 weeks. He was in ED two days prior wherein his ring had to be cut from finger due to swelling. He was then sent home with keflex and PCP follow up. Since then patient reports worsening swelling, redness and pain. He denies associated HA, fever, chills, chest pain, shortness of breath, abdominal pain, n/v/d, urinary symptoms, numbness or tingling. He takes eliquis. Patient's pacemaker is not MRI compatible. Patient with significant hear loss bilaterally.     In the ED: Hypertensive, RR 22, otherwise VSSAF. CBC without leukocytosis. CMP without emergent abnormalities, Cr 1.8 (baseline). ESR 55 and CRP 82.8 (elevated from 53.6 on 4/26). Received morphine and vanc. Admitted to  for further management.    Past Medical History:   Diagnosis Date    Acute on chronic diastolic heart failure 9/1/2016    Coronary artery disease     Hyperlipidemia     Hypertension     Macular degeneration (senile) of retina, unspecified 12/12/2014    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    Persistent atrial fibrillation 11/10/2016    S/P placement of cardiac pacemaker 9/14/2016       Past Surgical History:   Procedure Laterality Date    AORTIC  VALVE REPLACEMENT N/A     CARDIAC PACEMAKER PLACEMENT      CATARACT EXTRACTION W/  INTRAOCULAR LENS IMPLANT Right 2/16/2016    Dr. Whitley    CATARACT EXTRACTION W/  INTRAOCULAR LENS IMPLANT Left 3/1/2016    Dr. Whitley    CORONARY ARTERY BYPASS GRAFT      EAR EXAMINATION UNDER ANESTHESIA      EYE SURGERY         Review of patient's allergies indicates:   Allergen Reactions    Iodine and iodide containing products Other (See Comments)     Caused changes in skin color       Current Facility-Administered Medications   Medication Dose Route Frequency Provider Last Rate Last Admin    acetaminophen tablet 650 mg  650 mg Oral Q4H PRJuan Manuel Montenegro PA-C        albuterol-ipratropium 2.5 mg-0.5 mg/3 mL nebulizer solution 3 mL  3 mL Nebulization Q4H PRJuan Manuel Montenegro PA-C        bisacodyL suppository 10 mg  10 mg Rectal Daily PRJuan Manuel Montenegro PA-C        dextrose 10% bolus 125 mL 125 mL  12.5 g Intravenous PRJuan Manuel Montenegro PA-C        dextrose 10% bolus 250 mL 250 mL  25 g Intravenous PRJuan Manuel Montenegro PA-C        glucagon (human recombinant) injection 1 mg  1 mg Intramuscular PRJuan Manuel Montenegro PA-C        glucose chewable tablet 16 g  16 g Oral Juan Manuel Taylor PA-C        glucose chewable tablet 24 g  24 g Oral PRJuan Manuel Montenegro PA-C        melatonin tablet 6 mg  6 mg Oral Nightly PRJuan Manuel Montenegro PA-C        naloxone 0.4 mg/mL injection 0.02 mg  0.02 mg Intravenous PRJuan Manuel Montenegro PA-C        polyethylene glycol packet 17 g  17 g Oral Daily PRJuan Manuel Montenegro PA-C        prochlorperazine tablet 10 mg  10 mg Oral TID PRJuan Manuel Montenegro PA-C        promethazine tablet 25 mg  25 mg Oral Q6H PRJuan Manuel Montenegro PA-C        sodium chloride 0.9% flush 10 mL  10 mL Intravenous Q12H PRN Juan Manuel Cornell PA-C        vancomycin 1,250 mg in dextrose 5 % (D5W) 250 mL IVPB (Vial-Mate)  1,250 mg Intravenous ED 1 Time Luis Alberto Tesfaye .7 mL/hr at 04/28/24 1405 1,250 mg at 04/28/24  1405     Current Outpatient Medications   Medication Sig Dispense Refill    albuterol (PROVENTIL HFA) 90 mcg/actuation inhaler Inhale 2 puffs into the lungs every 6 (six) hours as needed for Wheezing. Rescue 18 g 1    apixaban (ELIQUIS) 2.5 mg Tab Take 1 tablet (2.5 mg total) by mouth 2 (two) times daily. 180 tablet 3    aspirin (ECOTRIN) 81 MG EC tablet Take 1 tablet (81 mg total) by mouth once daily. 90 tablet 3    benazepriL (LOTENSIN) 5 MG tablet Take 1 tablet (5 mg total) by mouth once daily. 90 tablet 3    carvediloL (COREG) 12.5 MG tablet TAKE 1 TABLET TWICE DAILY WITH MEALS 180 tablet 3    cephALEXin (KEFLEX) 500 MG capsule Take 1 capsule (500 mg total) by mouth 4 (four) times daily. for 7 days 28 capsule 0    diclofenac sodium (VOLTAREN) 1 % Gel Apply 2 g topically daily as needed (pain). 200 g 0    ferrous sulfate (FEOSOL) 325 mg (65 mg iron) Tab tablet Take 1 tablet (325 mg total) by mouth daily with breakfast. 90 tablet 3    fluticasone propionate (FLONASE) 50 mcg/actuation nasal spray 2 sprays (100 mcg total) by Each Nostril route once daily. 30 mL 1    multivitamin (ONE DAILY MULTIVITAMIN) per tablet Take 1 tablet by mouth once daily.      nitroGLYCERIN (NITROSTAT) 0.4 MG SL tablet Take one tablet every 5 minutes for chest pain. After the third tablet go to the ED. 25 tablet 4    polyethylene glycol (GLYCOLAX) 17 gram/dose powder Dissolve one capful (17 g) in liquid by mouth 3 (three) times daily as needed. Take 1 three times daily until well formed stool 510 g 0    potassium chloride (KLOR-CON) 8 MEQ TbSR Take 1 tablet (8 mEq total) by mouth once daily. 90 tablet 3    torsemide (DEMADEX) 20 MG Tab Take 1 tablet (20 mg total) by mouth 2 (two) times a day. 180 tablet 3     Family History       Problem Relation (Age of Onset)    Hypertension Brother    No Known Problems Mother, Father, Sister, Maternal Aunt, Maternal Uncle, Paternal Aunt, Paternal Uncle, Maternal Grandmother, Maternal Grandfather,  Paternal Grandmother, Paternal Grandfather, Daughter, Son    Stroke Brother          Tobacco Use    Smoking status: Never     Passive exposure: Never    Smokeless tobacco: Never   Substance and Sexual Activity    Alcohol use: No    Drug use: No    Sexual activity: Yes     Partners: Female     Review of Systems   Constitutional:  Negative for activity change, chills and fever.   HENT:  Positive for hearing loss. Negative for trouble swallowing.    Eyes:  Negative for photophobia and visual disturbance.   Respiratory:  Negative for chest tightness, shortness of breath and wheezing.    Cardiovascular:  Negative for chest pain, palpitations and leg swelling.   Gastrointestinal:  Negative for abdominal pain, constipation, diarrhea, nausea and vomiting.   Genitourinary:  Negative for dysuria, frequency, hematuria and urgency.   Musculoskeletal:  Negative for arthralgias, back pain and gait problem.   Skin:  Positive for color change. Negative for rash and wound.   Neurological:  Negative for dizziness, syncope, weakness, light-headedness, numbness and headaches.   Psychiatric/Behavioral:  Negative for agitation and confusion. The patient is not nervous/anxious.      Objective:     Vital Signs (Most Recent):  Temp: 97.8 °F (36.6 °C) (04/28/24 1218)  Pulse: 68 (04/28/24 1335)  Resp: (!) 22 (04/28/24 1402)  BP: (!) 188/79 (04/28/24 1335)  SpO2: 98 % (04/28/24 1335) Vital Signs (24h Range):  Temp:  [97.8 °F (36.6 °C)] 97.8 °F (36.6 °C)  Pulse:  [68-73] 68  Resp:  [16-22] 22  SpO2:  [98 %-99 %] 98 %  BP: (175-188)/(74-79) 188/79     Weight: 60.8 kg (134 lb 0.6 oz)  Body mass index is 19.79 kg/m².     Physical Exam  Vitals and nursing note reviewed.   Constitutional:       General: He is not in acute distress.     Appearance: He is well-developed.   HENT:      Head: Normocephalic and atraumatic.      Ears:      Comments: Bilateral hear loss, does not wear hearing aides.     Mouth/Throat:      Pharynx: No oropharyngeal exudate.    Eyes:      Conjunctiva/sclera: Conjunctivae normal.      Pupils: Pupils are equal, round, and reactive to light.   Cardiovascular:      Rate and Rhythm: Normal rate and regular rhythm.      Heart sounds: Normal heart sounds.   Pulmonary:      Effort: Pulmonary effort is normal. No respiratory distress.      Breath sounds: Normal breath sounds. No wheezing.   Abdominal:      General: Bowel sounds are normal. There is no distension.      Palpations: Abdomen is soft.      Tenderness: There is no abdominal tenderness.   Musculoskeletal:      Left wrist: Swelling and tenderness present.      Left hand: Swelling and tenderness present. Decreased range of motion. Decreased strength.      Cervical back: Normal range of motion and neck supple.      Right lower leg: Edema present.      Left lower leg: Edema present.      Comments: See media. Erythema and swelling in left hand to left wrist. ROM limited 2/2 pain. Supination and pronation limited 2/2 pain.   Lymphadenopathy:      Cervical: No cervical adenopathy.   Skin:     General: Skin is warm and dry.      Capillary Refill: Capillary refill takes less than 2 seconds.      Findings: No rash.   Neurological:      General: No focal deficit present.      Mental Status: He is alert and oriented to person, place, and time.      Cranial Nerves: No cranial nerve deficit.      Sensory: No sensory deficit.      Coordination: Coordination normal.   Psychiatric:         Behavior: Behavior normal.         Thought Content: Thought content normal.         Judgment: Judgment normal.              CRANIAL NERVES     CN III, IV, VI   Pupils are equal, round, and reactive to light.       Significant Labs: All pertinent labs within the past 24 hours have been reviewed.  CBC:   Recent Labs   Lab 04/26/24 2002 04/28/24  1310   WBC 8.53 10.62   HGB 11.5* 11.2*   HCT 37.2* 36.8*    255     CMP:   Recent Labs   Lab 04/26/24 2002 04/28/24  1310    138   K 5.3* 4.9    105   CO2  24 20*    117*   BUN 33* 33*   CREATININE 2.2* 1.8*   CALCIUM 9.2 9.3   PROT 8.6* 8.1   ALBUMIN 3.5 3.3*   BILITOT 0.9 1.3*   ALKPHOS 145* 135   AST 21 25   ALT 8* 10   ANIONGAP 8 13       Significant Imaging: I have reviewed all pertinent imaging results/findings within the past 24 hours.  Imaging Results              X-Ray Hand 3 view Left (In process)                     Assessment/Plan:     * Osteomyelitis of left hand  Left hand swelling and pain x 2-3 weeks. Seen in ED 4/26, ring removed, discharged with oral keflex. Returns today with worsening pain, swelling, and erythema that has now progressed to left wrist. See media.     - afebrile, no leukocytosis  - ESR and CRP elevated   - received IV vanc in ED  - initial XR images concerning for osteo    - ortho consulted, appreciate assistance   - cont IV vanc   - trend ESR/CRP  - NPO midnight as precaution  - pain control    Macrocytosis  - history noted    Permanent atrial fibrillation  Long term (current) use of anticoagulants  S/P placement of cardiac pacemaker  - cont coreg  - pacemaker not MRI compatible   - hold eliquis in setting of possible OR in AM, heparin infusion for now  - cont tele    S/P placement of cardiac pacemaker  - abbott pacemaker  - not MRI compatible    Chronic diastolic heart failure  - no signs of acute overload  - cont torsemide  - hold KCl for now (recently hyperK 4/26)  - monitor UOP    CAD (coronary artery disease)  History of heart bypass surgery  History of MI (myocardial infarction)  - hold asa given possible OR in AM, heparin infusion for now  - previously intolerant to statins 2/2 myalgias    Complete AV block  - s/p pacemaker    Essential hypertension  - chronic, uncontrolled  - cont home coreg and torsemide  - substitute home benzapril for lisinopril  - prn hydralazine for sbp > 180  - monitor     Dyslipidemia  - statin intolerance 2/2 myalgias     CKD (chronic kidney disease) stage 3, GFR 30-59 ml/min  - Cr 1.8 on  admit, near baseline  - serial BMP, monitor UOP  - avoid nephrotoxins, renally dose meds      VTE Risk Mitigation (From admission, onward)           Ordered     apixaban tablet 2.5 mg  2 times daily         04/28/24 1503     Reason for No Pharmacological VTE Prophylaxis  Once        Question:  Reasons:  Answer:  Already adequately anticoagulated on oral Anticoagulants    04/28/24 1431     IP VTE HIGH RISK PATIENT  Once         04/28/24 1431     Place sequential compression device  Until discontinued         04/28/24 1431                         On 04/28/2024, patient should be placed in hospital observation services under my care in collaboration with Chel Mares MD.           CUAUHTEMOC NoelC  Department of Hospital Medicine  [facility] - Emergency Dept

## 2024-04-28 NOTE — ASSESSMENT & PLAN NOTE
- Cr 1.8 on admit, near baseline  - serial BMP, monitor UOP  - avoid nephrotoxins, renally dose meds

## 2024-04-29 LAB
ALBUMIN SERPL BCP-MCNC: 2.9 G/DL (ref 3.5–5.2)
ALP SERPL-CCNC: 122 U/L (ref 55–135)
ALT SERPL W/O P-5'-P-CCNC: 9 U/L (ref 10–44)
ANION GAP SERPL CALC-SCNC: 12 MMOL/L (ref 8–16)
APTT PPP: 28 SEC (ref 21–32)
APTT PPP: 51.8 SEC (ref 21–32)
APTT PPP: 71.8 SEC (ref 21–32)
AST SERPL-CCNC: 22 U/L (ref 10–40)
BASOPHILS # BLD AUTO: 0.04 K/UL (ref 0–0.2)
BASOPHILS # BLD AUTO: 0.04 K/UL (ref 0–0.2)
BASOPHILS NFR BLD: 0.4 % (ref 0–1.9)
BASOPHILS NFR BLD: 0.4 % (ref 0–1.9)
BILIRUB SERPL-MCNC: 1.2 MG/DL (ref 0.1–1)
BUN SERPL-MCNC: 35 MG/DL (ref 10–30)
CALCIUM SERPL-MCNC: 8.9 MG/DL (ref 8.7–10.5)
CHLORIDE SERPL-SCNC: 107 MMOL/L (ref 95–110)
CO2 SERPL-SCNC: 16 MMOL/L (ref 23–29)
CREAT SERPL-MCNC: 1.6 MG/DL (ref 0.5–1.4)
DIFFERENTIAL METHOD BLD: ABNORMAL
DIFFERENTIAL METHOD BLD: ABNORMAL
EOSINOPHIL # BLD AUTO: 0 K/UL (ref 0–0.5)
EOSINOPHIL # BLD AUTO: 0 K/UL (ref 0–0.5)
EOSINOPHIL NFR BLD: 0.4 % (ref 0–8)
EOSINOPHIL NFR BLD: 0.4 % (ref 0–8)
ERYTHROCYTE [DISTWIDTH] IN BLOOD BY AUTOMATED COUNT: 13.6 % (ref 11.5–14.5)
ERYTHROCYTE [DISTWIDTH] IN BLOOD BY AUTOMATED COUNT: 13.6 % (ref 11.5–14.5)
EST. GFR  (NO RACE VARIABLE): 39.9 ML/MIN/1.73 M^2
GLUCOSE SERPL-MCNC: 87 MG/DL (ref 70–110)
HCT VFR BLD AUTO: 34.3 % (ref 40–54)
HCT VFR BLD AUTO: 34.3 % (ref 40–54)
HGB BLD-MCNC: 11 G/DL (ref 14–18)
HGB BLD-MCNC: 11 G/DL (ref 14–18)
IMM GRANULOCYTES # BLD AUTO: 0.06 K/UL (ref 0–0.04)
IMM GRANULOCYTES # BLD AUTO: 0.06 K/UL (ref 0–0.04)
IMM GRANULOCYTES NFR BLD AUTO: 0.6 % (ref 0–0.5)
IMM GRANULOCYTES NFR BLD AUTO: 0.6 % (ref 0–0.5)
LYMPHOCYTES # BLD AUTO: 1.3 K/UL (ref 1–4.8)
LYMPHOCYTES # BLD AUTO: 1.3 K/UL (ref 1–4.8)
LYMPHOCYTES NFR BLD: 13.1 % (ref 18–48)
LYMPHOCYTES NFR BLD: 13.1 % (ref 18–48)
MAGNESIUM SERPL-MCNC: 2.2 MG/DL (ref 1.6–2.6)
MCH RBC QN AUTO: 31 PG (ref 27–31)
MCH RBC QN AUTO: 31 PG (ref 27–31)
MCHC RBC AUTO-ENTMCNC: 32.1 G/DL (ref 32–36)
MCHC RBC AUTO-ENTMCNC: 32.1 G/DL (ref 32–36)
MCV RBC AUTO: 97 FL (ref 82–98)
MCV RBC AUTO: 97 FL (ref 82–98)
MONOCYTES # BLD AUTO: 1.4 K/UL (ref 0.3–1)
MONOCYTES # BLD AUTO: 1.4 K/UL (ref 0.3–1)
MONOCYTES NFR BLD: 14.5 % (ref 4–15)
MONOCYTES NFR BLD: 14.5 % (ref 4–15)
NEUTROPHILS # BLD AUTO: 6.8 K/UL (ref 1.8–7.7)
NEUTROPHILS # BLD AUTO: 6.8 K/UL (ref 1.8–7.7)
NEUTROPHILS NFR BLD: 71 % (ref 38–73)
NEUTROPHILS NFR BLD: 71 % (ref 38–73)
NRBC BLD-RTO: 0 /100 WBC
NRBC BLD-RTO: 0 /100 WBC
PHOSPHATE SERPL-MCNC: 3.5 MG/DL (ref 2.7–4.5)
PLATELET # BLD AUTO: 208 K/UL (ref 150–450)
PLATELET # BLD AUTO: 208 K/UL (ref 150–450)
PMV BLD AUTO: 9 FL (ref 9.2–12.9)
PMV BLD AUTO: 9 FL (ref 9.2–12.9)
POCT GLUCOSE: 118 MG/DL (ref 70–110)
POCT GLUCOSE: 91 MG/DL (ref 70–110)
POCT GLUCOSE: 99 MG/DL (ref 70–110)
POTASSIUM SERPL-SCNC: 5.3 MMOL/L (ref 3.5–5.1)
PROT SERPL-MCNC: 7.4 G/DL (ref 6–8.4)
RBC # BLD AUTO: 3.55 M/UL (ref 4.6–6.2)
RBC # BLD AUTO: 3.55 M/UL (ref 4.6–6.2)
SODIUM SERPL-SCNC: 135 MMOL/L (ref 136–145)
VANCOMYCIN SERPL-MCNC: 11.7 UG/ML
VIT B12 SERPL-MCNC: 435 PG/ML (ref 210–950)
WBC # BLD AUTO: 9.61 K/UL (ref 3.9–12.7)
WBC # BLD AUTO: 9.61 K/UL (ref 3.9–12.7)

## 2024-04-29 PROCEDURE — 82607 VITAMIN B-12: CPT | Mod: HCNC | Performed by: HOSPITALIST

## 2024-04-29 PROCEDURE — 85025 COMPLETE CBC W/AUTO DIFF WBC: CPT | Mod: HCNC

## 2024-04-29 PROCEDURE — 36415 COLL VENOUS BLD VENIPUNCTURE: CPT | Mod: HCNC | Performed by: HOSPITALIST

## 2024-04-29 PROCEDURE — 84100 ASSAY OF PHOSPHORUS: CPT | Mod: HCNC

## 2024-04-29 PROCEDURE — 63600175 PHARM REV CODE 636 W HCPCS: Mod: HCNC | Performed by: STUDENT IN AN ORGANIZED HEALTH CARE EDUCATION/TRAINING PROGRAM

## 2024-04-29 PROCEDURE — 99223 1ST HOSP IP/OBS HIGH 75: CPT | Mod: HCNC,,, | Performed by: ORTHOPAEDIC SURGERY

## 2024-04-29 PROCEDURE — 63600175 PHARM REV CODE 636 W HCPCS: Mod: HCNC

## 2024-04-29 PROCEDURE — 85730 THROMBOPLASTIN TIME PARTIAL: CPT | Mod: 91,HCNC | Performed by: STUDENT IN AN ORGANIZED HEALTH CARE EDUCATION/TRAINING PROGRAM

## 2024-04-29 PROCEDURE — 85730 THROMBOPLASTIN TIME PARTIAL: CPT | Mod: HCNC | Performed by: HOSPITALIST

## 2024-04-29 PROCEDURE — 25000003 PHARM REV CODE 250: Mod: HCNC | Performed by: STUDENT IN AN ORGANIZED HEALTH CARE EDUCATION/TRAINING PROGRAM

## 2024-04-29 PROCEDURE — 36415 COLL VENOUS BLD VENIPUNCTURE: CPT | Mod: HCNC | Performed by: STUDENT IN AN ORGANIZED HEALTH CARE EDUCATION/TRAINING PROGRAM

## 2024-04-29 PROCEDURE — 83735 ASSAY OF MAGNESIUM: CPT | Mod: HCNC

## 2024-04-29 PROCEDURE — 21400001 HC TELEMETRY ROOM: Mod: HCNC

## 2024-04-29 PROCEDURE — 80202 ASSAY OF VANCOMYCIN: CPT | Mod: HCNC | Performed by: HOSPITALIST

## 2024-04-29 PROCEDURE — 25000003 PHARM REV CODE 250: Mod: HCNC

## 2024-04-29 PROCEDURE — 80053 COMPREHEN METABOLIC PANEL: CPT | Mod: HCNC

## 2024-04-29 RX ORDER — DIPHENHYDRAMINE HYDROCHLORIDE 50 MG/ML
50 INJECTION INTRAMUSCULAR; INTRAVENOUS ONCE
Status: DISCONTINUED | OUTPATIENT
Start: 2024-04-29 | End: 2024-05-01

## 2024-04-29 RX ADMIN — LISINOPRIL 5 MG: 5 TABLET ORAL at 08:04

## 2024-04-29 RX ADMIN — Medication 6 MG: at 08:04

## 2024-04-29 RX ADMIN — OXYCODONE HYDROCHLORIDE 10 MG: 10 TABLET ORAL at 08:04

## 2024-04-29 RX ADMIN — FERROUS SULFATE TAB EC 325 MG (65 MG FE EQUIVALENT) 1 EACH: 325 (65 FE) TABLET DELAYED RESPONSE at 08:04

## 2024-04-29 RX ADMIN — TORSEMIDE 20 MG: 20 TABLET ORAL at 08:04

## 2024-04-29 RX ADMIN — SODIUM ZIRCONIUM CYCLOSILICATE 10 G: 10 POWDER, FOR SUSPENSION ORAL at 03:04

## 2024-04-29 RX ADMIN — SODIUM ZIRCONIUM CYCLOSILICATE 10 G: 10 POWDER, FOR SUSPENSION ORAL at 08:04

## 2024-04-29 RX ADMIN — HYDROMORPHONE HYDROCHLORIDE 1 MG: 1 INJECTION, SOLUTION INTRAMUSCULAR; INTRAVENOUS; SUBCUTANEOUS at 09:04

## 2024-04-29 RX ADMIN — HEPARIN SODIUM 13 UNITS/KG/HR: 10000 INJECTION, SOLUTION INTRAVENOUS at 09:04

## 2024-04-29 RX ADMIN — VANCOMYCIN HYDROCHLORIDE 1000 MG: 1 INJECTION, POWDER, LYOPHILIZED, FOR SOLUTION INTRAVENOUS at 02:04

## 2024-04-29 RX ADMIN — CARVEDILOL 12.5 MG: 12.5 TABLET, FILM COATED ORAL at 08:04

## 2024-04-29 RX ADMIN — HYDROMORPHONE HYDROCHLORIDE 1 MG: 1 INJECTION, SOLUTION INTRAMUSCULAR; INTRAVENOUS; SUBCUTANEOUS at 11:04

## 2024-04-29 NOTE — CONSULTS
Pharmacokinetic Assessment Follow Up: IV Vancomycin    Vancomycin Regimen Assessment/Plan:    Random level resulted as 11.7 mcg/mL (~23 hours post dose)  Continue pulse dosing for now, monitor renal function  Will dose Vancomycin 1000 mg IV x 1 today  Next random level due 4/30 at 1300  Pharmacy will continue to follow and monitor vancomycin.    Drug levels (last 3 results):  Recent Labs   Lab Result Units 04/29/24  1245   Vancomycin, Random ug/mL 11.7       Please contact pharmacy at extension [phone] for questions regarding this assessment.    Thank you for the consult,   Yuliet Vitale, PharmD       Patient brief summary:  Deena Moody is a 89 y.o. male initiated on antimicrobial therapy with IV Vancomycin for treatment of bone/joint infection    Drug Allergies:   Review of patient's allergies indicates:   Allergen Reactions    Iodine and iodide containing products Other (See Comments)     Caused changes in skin color       Actual Body Weight:   62.7 kg    Renal Function:   Estimated Creatinine Clearance: 25.6 mL/min (A) (based on SCr of 1.6 mg/dL (H)).,     Dialysis Method (if applicable):  N/A    CBC (last 72 hours):  Recent Labs   Lab Result Units 04/26/24 2002 04/28/24  1310 04/28/24  1657 04/29/24  0438   WBC K/uL 8.53 10.62 10.10 9.61  9.61   Hemoglobin g/dL 11.5* 11.2* 10.8* 11.0*  11.0*   Hematocrit % 37.2* 36.8* 34.7* 34.3*  34.3*   Platelets K/uL 302 255 227 208  208   Gran % % 70.4 74.1* 70.4 71.0  71.0   Lymph % % 15.4* 10.9* 14.0* 13.1*  13.1*   Mono % % 8.9 12.9 14.0 14.5  14.5   Eosinophil % % 4.2 1.1 0.8 0.4  0.4   Basophil % % 0.9 0.7 0.5 0.4  0.4   Differential Method  Automated Automated Automated Automated  Automated       Metabolic Panel (last 72 hours):  Recent Labs   Lab Result Units 04/26/24 2002 04/28/24  1310 04/29/24  0438   Sodium mmol/L 138 138 135*   Potassium mmol/L 5.3* 4.9 5.3*   Chloride mmol/L 106 105 107   CO2 mmol/L 24 20* 16*   Glucose mg/dL 108 117* 87   BUN mg/dL  33* 33* 35*   Creatinine mg/dL 2.2* 1.8* 1.6*   Albumin g/dL 3.5 3.3* 2.9*   Total Bilirubin mg/dL 0.9 1.3* 1.2*   Alkaline Phosphatase U/L 145* 135 122   AST U/L 21 25 22   ALT U/L 8* 10 9*   Magnesium mg/dL  --   --  2.2   Phosphorus mg/dL  --   --  3.5       Vancomycin Administrations:  vancomycin given in the last 96 hours                     vancomycin 1,250 mg in dextrose 5 % (D5W) 250 mL IVPB (Vial-Mate) (mg) 1,250 mg New Bag 04/28/24 1405                    Microbiologic Results:  Microbiology Results (last 7 days)       ** No results found for the last 168 hours. **

## 2024-04-29 NOTE — ED NOTES
APPEARANCE: awake and alert in NAD.  SKIN: warm, dry and intact. + scattered bruising noted, redness and swelling noted to left hand and wrist.  MUSCULOSKELETAL: Patient moving all extremities spontaneously, no obvious swelling or deformities noted. + ambulates with minimal assistance  RESPIRATORY: Denies shortness of breath.Respirations unlabored.   CARDIAC: Denies CP, 2+ distal pulses; no peripheral edema  ABDOMEN: S/ND/NT, Denies nausea, LBM: 4/26  : voids spontaneously, denies difficulty  Neurologic: AAO x 4; follows commands equal strength in all extremities; denies numbness/tingling. Denies dizziness, CHACHO, pupils 3mm

## 2024-04-29 NOTE — SUBJECTIVE & OBJECTIVE
Interval History:   Patient asleep at the time of rounds. Per family, patient had been in a lot of pain in Left hand earlier today for which he received PO and Iv opioid analgesics. Patient had just been able to sleep and family prefers we dont wake him up right now. Ortho rec CT with contrast for R hand, however due to hx of CKD stage 4 patient at risk for contrast induced nephropathy. Discussed with family who would like to hold off on contrast administration for patient and treat empirically for now. Consulted ID for assistance with antibiotics       Review of Systems  Objective:     Vital Signs (Most Recent):  Temp: 97.8 °F (36.6 °C) (04/29/24 1102)  Pulse: 69 (04/29/24 1102)  Resp: 16 (04/29/24 1102)  BP: 123/66 (04/29/24 1102)  SpO2: (!) 92 % (04/29/24 1102) Vital Signs (24h Range):  Temp:  [97.8 °F (36.6 °C)-98.3 °F (36.8 °C)] 97.8 °F (36.6 °C)  Pulse:  [66-79] 69  Resp:  [14-22] 16  SpO2:  [92 %-99 %] 92 %  BP: (123-195)/() 123/66     Weight: 62.7 kg (138 lb 3.7 oz)  Body mass index is 20.41 kg/m².    Intake/Output Summary (Last 24 hours) at 4/29/2024 1420  Last data filed at 4/28/2024 1532  Gross per 24 hour   Intake 250 ml   Output 200 ml   Net 50 ml         Physical Exam  Vitals and nursing note reviewed.   Constitutional:       General: He is not in acute distress.     Appearance: He is well-developed.   HENT:      Head: Normocephalic and atraumatic.      Ears:      Comments: Bilateral hear loss, does not wear hearing aides.     Mouth/Throat:      Pharynx: No oropharyngeal exudate.   Eyes:      Conjunctiva/sclera: Conjunctivae normal.      Pupils: Pupils are equal, round, and reactive to light.   Cardiovascular:      Rate and Rhythm: Normal rate and regular rhythm.      Heart sounds: Normal heart sounds.   Pulmonary:      Effort: Pulmonary effort is normal. No respiratory distress.      Breath sounds: Normal breath sounds. No wheezing.   Abdominal:      General: Bowel sounds are normal. There is no  distension.      Palpations: Abdomen is soft.      Tenderness: There is no abdominal tenderness.   Musculoskeletal:      Left wrist: Swelling and tenderness present.      Left hand: Swelling and tenderness present. Decreased range of motion. Decreased strength.      Cervical back: Normal range of motion and neck supple.      Right lower leg: Edema present.      Left lower leg: Edema present.      Comments: See media. Erythema and swelling in left hand to left wrist. ROM limited 2/2 pain. Supination and pronation limited 2/2 pain.   Lymphadenopathy:      Cervical: No cervical adenopathy.   Skin:     General: Skin is warm and dry.      Capillary Refill: Capillary refill takes less than 2 seconds.      Findings: No rash.   Neurological:      General: No focal deficit present.      Mental Status: He is alert and oriented to person, place, and time.      Cranial Nerves: No cranial nerve deficit.      Sensory: No sensory deficit.      Coordination: Coordination normal.   Psychiatric:         Behavior: Behavior normal.         Thought Content: Thought content normal.         Judgment: Judgment normal.             Significant Labs: All pertinent labs within the past 24 hours have been reviewed.  BMP:   Recent Labs   Lab 04/29/24  0438   GLU 87   *   K 5.3*      CO2 16*   BUN 35*   CREATININE 1.6*   CALCIUM 8.9   MG 2.2     CBC:   Recent Labs   Lab 04/28/24  1310 04/28/24  1657 04/29/24  0438   WBC 10.62 10.10 9.61  9.61   HGB 11.2* 10.8* 11.0*  11.0*   HCT 36.8* 34.7* 34.3*  34.3*    227 208  208       Significant Imaging: I have reviewed all pertinent imaging results/findings within the past 24 hours.

## 2024-04-29 NOTE — NURSING
Nurses Note -- 4 Eyes      4/29/2024   4:54 AM      Skin assessed during: Admit      [x] No Altered Skin Integrity Present    []Prevention Measures Documented      [] Yes- Altered Skin Integrity Present or Discovered   [] LDA Added if Not in Epic (Describe Wound)   [] New Altered Skin Integrity was Present on Admit and Documented in LDA   [] Wound Image Taken    Wound Care Consulted? No    Attending Nurse:  Gordy Rg RN/Staff Member:  Nikki

## 2024-04-29 NOTE — ASSESSMENT & PLAN NOTE
Left hand swelling and pain x 2-3 weeks. Seen in ED 4/26, ring removed, discharged with oral keflex. Returns today with worsening pain, swelling, and erythema that has now progressed to left wrist. See media.     - afebrile, no leukocytosis  - ESR and CRP elevated   - received IV vanc in ED  - initial XR images concerning for osteo  - cont IV vanc   - trend ESR/CRP  - pain control  - Consulted ID  - Ortho consulted. Recommend broad spectrum iv abx. Wrap with ace bandage, elevate, will continue to trend crp and exam. If no clinical improvement, will likely need CT with contrast as patient not able to undergo MRI. However due to hx of CKD stage 4 patient at risk for contrast induced nephropathy. Discussed with family who would like to hold off on contrast administration for patient and treat empirically for now.

## 2024-04-29 NOTE — PLAN OF CARE
Sadiq alonso [facility]  Initial Discharge Assessment       Primary Care Provider: Jam Rowell MD    Admission Diagnosis: Osteomyelitis of finger of left hand [M86.9]  Cellulitis of finger of left hand [L03.012]  Chest pain [R07.9]  Cellulitis of hand, left [L03.114]    Admission Date: 4/28/2024  Expected Discharge Date: 4/30/2024    Transition of Care Barriers: None    Payor: HUMANA MANAGED MEDICARE / Plan: HU[employer]NA MEDICARE HMO / Product Type: Capitation /     Extended Emergency Contact Information  Primary Emergency Contact: Amelie Fernandes   [place]  Mobile Phone: [phone]  Relation: Daughter  Secondary Emergency Contact: MattarnoldoElsa  Mobile Phone: [phone]  Relation: Grandchild    Discharge Plan A: Home, Home with family, Home Health (Family would like HH for patient.)  Discharge Plan B: Home, Home with family      [facility] - [facility] [address]  [address] [address]  Phone: [phone] Fax: [phone]    [id number] [address] AT [facility]  [address] [facility] [facility] [address]  Phone: [phone] Fax: [phone]    [facility]  [address]  [place] [address]  Phone: [phone] Fax: [phone]      Initial Assessment (most recent)       Adult Discharge Assessment - 04/29/24 1542          Discharge Assessment    Assessment Type Discharge Planning Assessment     Confirmed/corrected address, phone number and insurance Yes     Confirmed Demographics Correct on Facesheet     Source of Information family     When was your last doctors appointment? --   SW verified Jam Rowell as PCP.    Communicated TEOFILO with patient/caregiver Yes     Reason For Admission Osteomyelitis of left hand     People in Home grandchild(kelly)     Do you expect to return to your current living situation? Yes     Do you have help at  home or someone to help you manage your care at home? Yes     Who are your caregiver(s) and their phone number(s)? Elsa MoodyHndozhy-Ntwqrehmzzjjq-[phone]     Prior to hospitilization cognitive status: Alert/Oriented     Current cognitive status: Alert/Oriented     Walking or Climbing Stairs Difficulty yes     Walking or Climbing Stairs ambulation difficulty, requires equipment     Mobility Management Pt uses a rolling walker.     Dressing/Bathing Difficulty no     Equipment Currently Used at Home walker, rolling     Readmission within 30 days? No     Patient currently being followed by outpatient case management? Yes   Referral for OPCM completed.    Do you currently have service(s) that help you manage your care at home? No   Family interested in Home Health for patient.    Do you take prescription medications? Yes     Do you have prescription coverage? Yes     Coverage [employer] Managed Medicare     Do you have any problems affording any of your prescribed medications? No     Is the patient taking medications as prescribed? yes     Who is going to help you get home at discharge? Family will provide transportation home.     How do you get to doctors appointments? family or friend will provide     Are you on dialysis? No     Do you take coumadin? No   Pt takes Eliquis    Discharge Plan A Home;Home with family;Home Health   Family would like HH for patient.    Discharge Plan B Home;Home with family     DME Needed Upon Discharge  none     Discharge Plan discussed with: Patient;Adult children     Transition of Care Barriers None        Physical Activity    On average, how many days per week do you engage in moderate to strenuous exercise (like a brisk walk)? 0 days     On average, how many minutes do you engage in exercise at this level? 0 min        Financial Resource Strain    How hard is it for you to pay for the very basics like food, housing, medical care, and heating? Not hard at all        Housing Stability     In the last 12 months, was there a time when you were not able to pay the mortgage or rent on time? No     At any time in the past 12 months, were you homeless or living in a shelter (including now)? No        Transportation Needs    In the past 12 months, has lack of transportation kept you from medical appointments or from getting medications? No     In the past 12 months, has lack of transportation kept you from meetings, work, or from getting things needed for daily living? No        Food Insecurity    Within the past 12 months, you worried that your food would run out before you got the money to buy more. Never true     Within the past 12 months, the food you bought just didn't last and you didn't have money to get more. Never true        Stress    Do you feel stress - tense, restless, nervous, or anxious, or unable to sleep at night because your mind is troubled all the time - these days? Rather much        Social Connections    In a typical week, how many times do you talk on the phone with family, friends, or neighbors? More than three times a week     How often do you get together with friends or relatives? More than three times a week     How often do you attend Zoroastrianism or Congregation services? More than 4 times per year     Do you belong to any clubs or organizations such as Zoroastrianism groups, unions, fraternal or athletic groups, or school groups? No     How often do you attend meetings of the clubs or organizations you belong to? Never     Are you , , , , never , or living with a partner?         Alcohol Use    Q1: How often do you have a drink containing alcohol? Never     Q2: How many drinks containing alcohol do you have on a typical day when you are drinking? Patient does not drink     Q3: How often do you have six or more drinks on one occasion? Never        OTHER    Name(s) of People in Home Elsa MoodyJkuinhj-Icjuwxzbtcfgn-[phone]                   SW  completed initial discharge assessment with pt's family at bedside.  No hospital readmissions within the last 30 days.  Pt's family will provide transportation home.  Updated contacts on demographics.      Pt lives with his granddaughter in a H.  Pt's family reported prior to hospitalization pt ambulated with a rolling walker.  Pt's family reported pt was independent with ADLs.  DME:  Rolling walker.  Pt has support at home from family.      Pt does not receive coumadin, dialysis, or HH services.  Pt currently taking Eliquis.  Family would like HH at the time of discharge.      Disp:  1.  Home w/ HH, referral to OPCM.  2. Home w/ family.     Discharge Plan A and Plan B have been determined by review of patient's clinical status, future medical and therapeutic needs, and coverage/benefits for post-acute care in coordination with multidisciplinary team members.    Aziza Brock LMSW  Part-Time-  [facility]  Ext. [phone]

## 2024-04-29 NOTE — PROGRESS NOTES
[facility] Medicine  Progress Note    Patient Name: Deena Moody  MRN: [id number]  Patient Class: IP- Inpatient   Admission Date: 4/28/2024  Length of Stay: 0 days  Attending Physician: Maximo Howell MD  Primary Care Provider: Jam Rowell MD        Subjective:     Principal Problem:Osteomyelitis of left hand        HPI:  Deena Moody is a 89 y.o. male with PMHx HTN, HLD, CAD, diastolic HF, A-fib with pacemaker, CABG, hx of MI, AV block, and CKD 3 who presents to [facility] ED with chief complaint of left hand swelling and pain. He reports this issues has been present x 2-3 weeks. He was in ED two days prior wherein his ring had to be cut from finger due to swelling. He was then sent home with keflex and PCP follow up. Since then patient reports worsening swelling, redness and pain. He denies associated HA, fever, chills, chest pain, shortness of breath, abdominal pain, n/v/d, urinary symptoms, numbness or tingling. He takes eliquis. Patient's pacemaker is not MRI compatible. Patient with significant hear loss bilaterally.     In the ED: Hypertensive, RR 22, otherwise VSSAF. CBC without leukocytosis. CMP without emergent abnormalities, Cr 1.8 (baseline). ESR 55 and CRP 82.8 (elevated from 53.6 on 4/26). Received morphine and vanc. Admitted to  for further management.    Overview/Hospital Course:  No notes on file    Interval History:   Patient asleep at the time of rounds. Per family, patient had been in a lot of pain in Left hand earlier today for which he received PO and Iv opioid analgesics. Patient had just been able to sleep and family prefers we dont wake him up right now. Ortho rec CT with contrast for R hand, however due to hx of CKD stage 4 patient at risk for contrast induced nephropathy. Discussed with family who would like to hold off on contrast administration for patient and treat empirically for now. Consulted ID for assistance with antibiotics       Review of  Systems  Objective:     Vital Signs (Most Recent):  Temp: 97.8 °F (36.6 °C) (04/29/24 1102)  Pulse: 69 (04/29/24 1102)  Resp: 16 (04/29/24 1102)  BP: 123/66 (04/29/24 1102)  SpO2: (!) 92 % (04/29/24 1102) Vital Signs (24h Range):  Temp:  [97.8 °F (36.6 °C)-98.3 °F (36.8 °C)] 97.8 °F (36.6 °C)  Pulse:  [66-79] 69  Resp:  [14-22] 16  SpO2:  [92 %-99 %] 92 %  BP: (123-195)/() 123/66     Weight: 62.7 kg (138 lb 3.7 oz)  Body mass index is 20.41 kg/m².    Intake/Output Summary (Last 24 hours) at 4/29/2024 1420  Last data filed at 4/28/2024 1532  Gross per 24 hour   Intake 250 ml   Output 200 ml   Net 50 ml         Physical Exam  Vitals and nursing note reviewed.   Constitutional:       General: He is not in acute distress.     Appearance: He is well-developed.   HENT:      Head: Normocephalic and atraumatic.      Ears:      Comments: Bilateral hear loss, does not wear hearing aides.     Mouth/Throat:      Pharynx: No oropharyngeal exudate.   Eyes:      Conjunctiva/sclera: Conjunctivae normal.      Pupils: Pupils are equal, round, and reactive to light.   Cardiovascular:      Rate and Rhythm: Normal rate and regular rhythm.      Heart sounds: Normal heart sounds.   Pulmonary:      Effort: Pulmonary effort is normal. No respiratory distress.      Breath sounds: Normal breath sounds. No wheezing.   Abdominal:      General: Bowel sounds are normal. There is no distension.      Palpations: Abdomen is soft.      Tenderness: There is no abdominal tenderness.   Musculoskeletal:      Left wrist: Swelling and tenderness present.      Left hand: Swelling and tenderness present. Decreased range of motion. Decreased strength.      Cervical back: Normal range of motion and neck supple.      Right lower leg: Edema present.      Left lower leg: Edema present.      Comments: See media. Erythema and swelling in left hand to left wrist. ROM limited 2/2 pain. Supination and pronation limited 2/2 pain.   Lymphadenopathy:      Cervical:  No cervical adenopathy.   Skin:     General: Skin is warm and dry.      Capillary Refill: Capillary refill takes less than 2 seconds.      Findings: No rash.   Neurological:      General: No focal deficit present.      Mental Status: He is alert and oriented to person, place, and time.      Cranial Nerves: No cranial nerve deficit.      Sensory: No sensory deficit.      Coordination: Coordination normal.   Psychiatric:         Behavior: Behavior normal.         Thought Content: Thought content normal.         Judgment: Judgment normal.             Significant Labs: All pertinent labs within the past 24 hours have been reviewed.  BMP:   Recent Labs   Lab 04/29/24  0438   GLU 87   *   K 5.3*      CO2 16*   BUN 35*   CREATININE 1.6*   CALCIUM 8.9   MG 2.2     CBC:   Recent Labs   Lab 04/28/24  1310 04/28/24  1657 04/29/24  0438   WBC 10.62 10.10 9.61  9.61   HGB 11.2* 10.8* 11.0*  11.0*   HCT 36.8* 34.7* 34.3*  34.3*    227 208  208       Significant Imaging: I have reviewed all pertinent imaging results/findings within the past 24 hours.    Assessment/Plan:      * Osteomyelitis of left hand  Left hand swelling and pain x 2-3 weeks. Seen in ED 4/26, ring removed, discharged with oral keflex. Returns today with worsening pain, swelling, and erythema that has now progressed to left wrist. See media.     - afebrile, no leukocytosis  - ESR and CRP elevated   - received IV vanc in ED  - initial XR images concerning for osteo  - cont IV vanc   - trend ESR/CRP  - pain control  - Consulted ID  - Ortho consulted. Recommend broad spectrum iv abx. Wrap with ace bandage, elevate, will continue to trend crp and exam. If no clinical improvement, will likely need CT with contrast as patient not able to undergo MRI. However due to hx of CKD stage 4 patient at risk for contrast induced nephropathy. Discussed with family who would like to hold off on contrast administration for patient and treat empirically for  now.            Macrocytosis  - history noted    Permanent atrial fibrillation  Long term (current) use of anticoagulants  S/P placement of cardiac pacemaker  - cont coreg  - pacemaker not MRI compatible   - hold eliquis in setting of possible OR in AM, heparin infusion for now  - cont tele    S/P placement of cardiac pacemaker  - abbott pacemaker  - not MRI compatible    Chronic diastolic heart failure  - no signs of acute overload  - cont torsemide  - hold KCl for now (recently hyperK 4/26)  - monitor UOP    CAD (coronary artery disease)  History of heart bypass surgery  History of MI (myocardial infarction)  - hold asa given possible OR in AM, heparin infusion for now  - previously intolerant to statins 2/2 myalgias    Complete AV block  - s/p pacemaker    Essential hypertension  - chronic, uncontrolled  - cont home coreg and torsemide  - substitute home benzapril for lisinopril  - prn hydralazine for sbp > 180  - monitor     Dyslipidemia  - statin intolerance 2/2 myalgias     CKD (chronic kidney disease) stage 3, GFR 30-59 ml/min  - Cr 1.8 on admit, near baseline  - serial BMP, monitor UOP  - avoid nephrotoxins, renally dose meds      VTE Risk Mitigation (From admission, onward)           Ordered     heparin 25,000 units in dextrose 5% (100 units/ml) IV bolus from bag LOW INTENSITY nomogram - [facility]  As needed (PRN)        Question:  Heparin Infusion Adjustment (DO NOT MODIFY ANSWER)  Answer:  \\[facility]\[employer]\Images\Pharmacy\HeparinInfusions\heparin LOW INTENSITY nomogram for [facility] [id number].pdf    04/28/24 1628     heparin 25,000 units in dextrose 5% (100 units/ml) IV bolus from bag LOW INTENSITY nomogram - [facility]  As needed (PRN)        Question:  Heparin Infusion Adjustment (DO NOT MODIFY ANSWER)  Answer:  \[email or web address]\Images\Pharmacy\HeparinInfusions\heparin LOW INTENSITY nomogram for [facility] [id number].pdf    04/28/24 1628     heparin 25,000 units in dextrose 5% (100 units/ml) IV bolus from bag LOW INTENSITY nomogram  - [facility]  Once        Question:  Heparin Infusion Adjustment (DO NOT MODIFY ANSWER)  Answer:  \\[facility]\[employer]\Images\Pharmacy\HeparinInfusions\heparin LOW INTENSITY nomogram for [facility] [id number]    04/28/24 1628     heparin 25,000 units in dextrose 5% 250 mL (100 units/mL) infusion LOW INTENSITY nomogram - [facility]  Continuous        Question:  Begin at (units/kg/hr)  Answer:  12    04/28/24 1614     Reason for No Pharmacological VTE Prophylaxis  Once        Question:  Reasons:  Answer:  Already adequately anticoagulated on oral Anticoagulants    04/28/24 1431     IP VTE HIGH RISK PATIENT  Once         04/28/24 1431     Place sequential compression device  Until discontinued         04/28/24 1431                    Discharge Planning   TEOFILO: 4/30/2024     Code Status: Full Code   Is the patient medically ready for discharge?:     Reason for patient still in hospital (select all that apply): Patient trending condition                     Maximo Howell MD  Department of Hospital Medicine   [facility]

## 2024-04-29 NOTE — ED NOTES
Report received from Leola MONROY RN. Assume care of pt. Pt resting comfortably and independently repositioned in stretcher with bed locked in lowest position for safety. NAD noted at this time. Position repositioned for comfort with an additional pillow. Respirations even and unlabored and visible chest rise noted.  Patient offered bathroom assistance and denies need at this time. Pt instructed to call if assistance is needed. Pt on continuous cardiac, BP, and O2 monitoring. Call light within reach. Family at bedside. No needs at this time. Will continue to monitor.

## 2024-04-29 NOTE — PLAN OF CARE
Problem: Adult Inpatient Plan of Care  Goal: Plan of Care Review  Outcome: Progressing  Goal: Patient-Specific Goal (Individualized)  Outcome: Progressing  Goal: Absence of Hospital-Acquired Illness or Injury  Outcome: Progressing  Goal: Optimal Comfort and Wellbeing  Outcome: Progressing  Goal: Readiness for Transition of Care  Outcome: Progressing     Problem: Infection  Goal: Absence of Infection Signs and Symptoms  Outcome: Progressing     Problem: Fall Injury Risk  Goal: Absence of Fall and Fall-Related Injury  Outcome: Progressing

## 2024-04-30 PROBLEM — M79.89 SWELLING OF DIGIT OF LEFT HAND: Status: ACTIVE | Noted: 2024-04-28

## 2024-04-30 PROBLEM — E87.5 HYPERKALEMIA: Status: ACTIVE | Noted: 2024-04-30

## 2024-04-30 PROBLEM — M79.89 SWELLING OF LEFT HAND: Status: ACTIVE | Noted: 2024-04-30

## 2024-04-30 LAB
ALBUMIN SERPL BCP-MCNC: 2.9 G/DL (ref 3.5–5.2)
ALP SERPL-CCNC: 117 U/L (ref 55–135)
ALT SERPL W/O P-5'-P-CCNC: 8 U/L (ref 10–44)
ANION GAP SERPL CALC-SCNC: 12 MMOL/L (ref 8–16)
APTT PPP: 34.7 SEC (ref 21–32)
APTT PPP: 43.9 SEC (ref 21–32)
APTT PPP: 44.1 SEC (ref 21–32)
AST SERPL-CCNC: 21 U/L (ref 10–40)
BASOPHILS # BLD AUTO: 0.05 K/UL (ref 0–0.2)
BASOPHILS NFR BLD: 0.3 % (ref 0–1.9)
BILIRUB SERPL-MCNC: 1.2 MG/DL (ref 0.1–1)
BUN SERPL-MCNC: 47 MG/DL (ref 10–30)
CALCIUM SERPL-MCNC: 9.1 MG/DL (ref 8.7–10.5)
CHLORIDE SERPL-SCNC: 103 MMOL/L (ref 95–110)
CK SERPL-CCNC: 108 U/L (ref 20–200)
CO2 SERPL-SCNC: 19 MMOL/L (ref 23–29)
CREAT SERPL-MCNC: 2.1 MG/DL (ref 0.5–1.4)
CRP SERPL-MCNC: 237.9 MG/L (ref 0–8.2)
DIFFERENTIAL METHOD BLD: ABNORMAL
EOSINOPHIL # BLD AUTO: 0.1 K/UL (ref 0–0.5)
EOSINOPHIL NFR BLD: 0.3 % (ref 0–8)
ERYTHROCYTE [DISTWIDTH] IN BLOOD BY AUTOMATED COUNT: 13.8 % (ref 11.5–14.5)
EST. GFR  (NO RACE VARIABLE): 28.8 ML/MIN/1.73 M^2
GLUCOSE SERPL-MCNC: 101 MG/DL (ref 70–110)
HCT VFR BLD AUTO: 32.1 % (ref 40–54)
HGB BLD-MCNC: 10.1 G/DL (ref 14–18)
IMM GRANULOCYTES # BLD AUTO: 0.08 K/UL (ref 0–0.04)
IMM GRANULOCYTES NFR BLD AUTO: 0.5 % (ref 0–0.5)
LYMPHOCYTES # BLD AUTO: 1 K/UL (ref 1–4.8)
LYMPHOCYTES NFR BLD: 6.1 % (ref 18–48)
MAGNESIUM SERPL-MCNC: 2.3 MG/DL (ref 1.6–2.6)
MCH RBC QN AUTO: 31 PG (ref 27–31)
MCHC RBC AUTO-ENTMCNC: 31.5 G/DL (ref 32–36)
MCV RBC AUTO: 99 FL (ref 82–98)
MONOCYTES # BLD AUTO: 1.8 K/UL (ref 0.3–1)
MONOCYTES NFR BLD: 10.8 % (ref 4–15)
NEUTROPHILS # BLD AUTO: 13.9 K/UL (ref 1.8–7.7)
NEUTROPHILS NFR BLD: 82 % (ref 38–73)
NRBC BLD-RTO: 0 /100 WBC
PHOSPHATE SERPL-MCNC: 5 MG/DL (ref 2.7–4.5)
PLATELET # BLD AUTO: 254 K/UL (ref 150–450)
PMV BLD AUTO: 8.9 FL (ref 9.2–12.9)
POCT GLUCOSE: 118 MG/DL (ref 70–110)
POCT GLUCOSE: 120 MG/DL (ref 70–110)
POCT GLUCOSE: 92 MG/DL (ref 70–110)
POCT GLUCOSE: 97 MG/DL (ref 70–110)
POTASSIUM SERPL-SCNC: 5.3 MMOL/L (ref 3.5–5.1)
PROT SERPL-MCNC: 7.7 G/DL (ref 6–8.4)
RBC # BLD AUTO: 3.26 M/UL (ref 4.6–6.2)
SODIUM SERPL-SCNC: 134 MMOL/L (ref 136–145)
URATE SERPL-MCNC: 10.5 MG/DL (ref 3.4–7)
WBC # BLD AUTO: 16.93 K/UL (ref 3.9–12.7)

## 2024-04-30 PROCEDURE — 25000003 PHARM REV CODE 250: Mod: HCNC

## 2024-04-30 PROCEDURE — 84550 ASSAY OF BLOOD/URIC ACID: CPT | Mod: HCNC

## 2024-04-30 PROCEDURE — 21400001 HC TELEMETRY ROOM: Mod: HCNC

## 2024-04-30 PROCEDURE — 63600175 PHARM REV CODE 636 W HCPCS: Mod: HCNC

## 2024-04-30 PROCEDURE — 36415 COLL VENOUS BLD VENIPUNCTURE: CPT | Mod: HCNC | Performed by: STUDENT IN AN ORGANIZED HEALTH CARE EDUCATION/TRAINING PROGRAM

## 2024-04-30 PROCEDURE — 85025 COMPLETE CBC W/AUTO DIFF WBC: CPT | Mod: HCNC

## 2024-04-30 PROCEDURE — 80053 COMPREHEN METABOLIC PANEL: CPT | Mod: HCNC

## 2024-04-30 PROCEDURE — 63600175 PHARM REV CODE 636 W HCPCS: Mod: HCNC | Performed by: PHYSICIAN ASSISTANT

## 2024-04-30 PROCEDURE — 86140 C-REACTIVE PROTEIN: CPT | Mod: HCNC | Performed by: STUDENT IN AN ORGANIZED HEALTH CARE EDUCATION/TRAINING PROGRAM

## 2024-04-30 PROCEDURE — 85730 THROMBOPLASTIN TIME PARTIAL: CPT | Mod: HCNC

## 2024-04-30 PROCEDURE — 82550 ASSAY OF CK (CPK): CPT | Mod: HCNC

## 2024-04-30 PROCEDURE — 85730 THROMBOPLASTIN TIME PARTIAL: CPT | Mod: 91,HCNC | Performed by: STUDENT IN AN ORGANIZED HEALTH CARE EDUCATION/TRAINING PROGRAM

## 2024-04-30 PROCEDURE — 99499 UNLISTED E&M SERVICE: CPT | Mod: HCNC,,, | Performed by: PHYSICIAN ASSISTANT

## 2024-04-30 PROCEDURE — 99223 1ST HOSP IP/OBS HIGH 75: CPT | Mod: HCNC,,, | Performed by: PHYSICIAN ASSISTANT

## 2024-04-30 PROCEDURE — 83735 ASSAY OF MAGNESIUM: CPT | Mod: HCNC

## 2024-04-30 PROCEDURE — 84100 ASSAY OF PHOSPHORUS: CPT | Mod: HCNC

## 2024-04-30 PROCEDURE — 25000003 PHARM REV CODE 250: Mod: HCNC | Performed by: PHYSICIAN ASSISTANT

## 2024-04-30 RX ADMIN — OXYCODONE HYDROCHLORIDE 10 MG: 10 TABLET ORAL at 10:04

## 2024-04-30 RX ADMIN — OXYCODONE HYDROCHLORIDE 10 MG: 10 TABLET ORAL at 01:04

## 2024-04-30 RX ADMIN — CEFTRIAXONE 2 G: 2 INJECTION, POWDER, FOR SOLUTION INTRAMUSCULAR; INTRAVENOUS at 01:04

## 2024-04-30 RX ADMIN — HEPARIN SODIUM 15 UNITS/KG/HR: 10000 INJECTION, SOLUTION INTRAVENOUS at 03:04

## 2024-04-30 RX ADMIN — Medication 6 MG: at 08:04

## 2024-04-30 RX ADMIN — DAPTOMYCIN 500 MG: 500 INJECTION, POWDER, LYOPHILIZED, FOR SOLUTION INTRAVENOUS at 01:04

## 2024-04-30 RX ADMIN — TORSEMIDE 20 MG: 20 TABLET ORAL at 08:04

## 2024-04-30 NOTE — SUBJECTIVE & OBJECTIVE
Principal Problem:Osteomyelitis of left hand    Principal Orthopedic Problem: as above    Interval History: Afebrile, VSS, NAEON.  WBC 16.93 from 9.6, .9 from 125.  Elevated overnight.  Compressive dressing removed at bedside; swelling at dorsum of left hand appears minimal without any obvious fluid collections.  Patient agitated and endorsing pain with any movement of the left upper extremity but is unable to localize pain.  CT of the left hand pending for further evaluation.  NPO as precaution pending imaging results.          Review of patient's allergies indicates:   Allergen Reactions    Iodine and iodide containing products Other (See Comments)     Caused changes in skin color       Current Facility-Administered Medications   Medication Dose Route Frequency Provider Last Rate Last Admin    acetaminophen tablet 1,000 mg  1,000 mg Oral Q8H PRN Juan Manuel Cornell PA-C        albuterol-ipratropium 2.5 mg-0.5 mg/3 mL nebulizer solution 3 mL  3 mL Nebulization Q4H PRN Juan Manuel Cornell PA-C        bisacodyL suppository 10 mg  10 mg Rectal Daily PRN Juan Manuel Cornell PA-C        carvediloL tablet 12.5 mg  12.5 mg Oral BID WM Juan Manuel Cornell PA-C   12.5 mg at 04/29/24 0823    dextrose 10% bolus 125 mL 125 mL  12.5 g Intravenous PRN Juan Manuel Cornell PA-C        dextrose 10% bolus 250 mL 250 mL  25 g Intravenous PRN Juan Manuel Cornell PA-C        diphenhydrAMINE injection 50 mg  50 mg Intramuscular Once OMID Valentin MD        ferrous sulfate tablet 1 each  1 tablet Oral Daily Juan Manuel Cornell PA-C   1 each at 04/29/24 0824    glucagon (human recombinant) injection 1 mg  1 mg Intramuscular PRN Juan Manuel Cornell PA-C        glucose chewable tablet 16 g  16 g Oral PRN Juan Manuel Cornell PA-C        glucose chewable tablet 24 g  24 g Oral PRN Juan Manuel Cornell PA-C        heparin 25,000 units in dextrose 5% (100 units/ml) IV bolus from bag LOW INTENSITY nomogram - [facility]  60 Units/kg (Adjusted) Intravenous Once Juan Manuel Cornell  JAMIE ANNE        heparin 25,000 units in dextrose 5% (100 units/ml) IV bolus from bag LOW INTENSITY nomogram - [facility]  60 Units/kg (Adjusted) Intravenous PRN Juan Manuel Cornell PA-C   3,640 Units at 04/29/24 0632    heparin 25,000 units in dextrose 5% (100 units/ml) IV bolus from bag LOW INTENSITY nomogram - [facility]  30 Units/kg (Adjusted) Intravenous PRN Juan Manuel Cornell PA-C   1,820 Units at 04/30/24 0351    heparin 25,000 units in dextrose 5% 250 mL (100 units/mL) infusion LOW INTENSITY nomogram - [facility]  0-40 Units/kg/hr (Adjusted) Intravenous Continuous Juan Manuel Cornell PA-C 9.1 mL/hr at 04/30/24 0351 15 Units/kg/hr at 04/30/24 0351    hydrALAZINE injection 10 mg  10 mg Intravenous Q8H PRN Juan Manuel Cornell PA-C   10 mg at 04/28/24 1835    hydrocortisone sodium succinate injection 200 mg  200 mg Intravenous Once OMID Valentin MD        HYDROmorphone injection 1 mg  1 mg Intravenous Q6H PRJuan Manuel Montenegro PA-C   1 mg at 04/29/24 2334    lisinopriL tablet 5 mg  5 mg Oral Daily Juan Manuel Cornell PA-C   5 mg at 04/29/24 0823    melatonin tablet 6 mg  6 mg Oral Nightly PRN Juan Manuel Cornell PA-C   6 mg at 04/29/24 2058    naloxone 0.4 mg/mL injection 0.02 mg  0.02 mg Intravenous PRN Juan Manuel Cornell PA-C        oxyCODONE immediate release tablet 5 mg  5 mg Oral Q6H PRJuan Manuel Montenegro PA-C   5 mg at 04/28/24 2126    oxyCODONE immediate release tablet Tab 10 mg  10 mg Oral Q6H PRJuan Manuel Montenegro PA-C   10 mg at 04/29/24 2058    polyethylene glycol packet 17 g  17 g Oral Daily PRN Juan Manuel Cornell PA-C        prochlorperazine tablet 10 mg  10 mg Oral TID PRN Juan Manuel Cornell, PA-C        promethazine tablet 25 mg  25 mg Oral Q6H PRN Juan Manuel Cornell PA-C        sodium chloride 0.9% bolus 500 mL 500 mL  500 mL Intravenous Once Maximo Howell MD        sodium chloride 0.9% flush 10 mL  10 mL Intravenous Q12H PRN Juan Manuel Cornell PA-C        torsemide tablet 20 mg  20 mg Oral BID Juan Manuel Cornell PA-C   20 mg at 04/29/24 2040  "   vancomycin - pharmacy to dose   Intravenous pharmacy to manage frequency Juan Manuel Cornell, JAMIE         Objective:     Vital Signs (Most Recent):  Temp: 97.5 °F (36.4 °C) (04/30/24 0810)  Pulse: 70 (04/30/24 0810)  Resp: 20 (04/30/24 0830)  BP: (!) 153/70 (04/30/24 0810)  SpO2: 99 % (04/30/24 0810) Vital Signs (24h Range):  Temp:  [97.4 °F (36.3 °C)-98.2 °F (36.8 °C)] 97.5 °F (36.4 °C)  Pulse:  [68-70] 70  Resp:  [16-20] 20  SpO2:  [92 %-99 %] 99 %  BP: (123-153)/(65-72) 153/70     Weight: 62.7 kg (138 lb 3.7 oz)  Height: 5' 9" (175.3 cm)  Body mass index is 20.41 kg/m².      Intake/Output Summary (Last 24 hours) at 4/30/2024 0922  Last data filed at 4/30/2024 0559  Gross per 24 hour   Intake --   Output 450 ml   Net -450 ml        Ortho/SPM Exam  AAOx1  NAD  Reg rate  No increased WOB    LUE:  Swelling present at index finger; mild swelling present at dorsum of hand  Erythema at index finger/dorsal hand   Index finger held in extension  Able to demonstrate AROM at MCP/PIP/DIPj's of IF  FROM shoulder, elbow, and wrist  Sensation and motor grossly intact  Radial artery palpable w/<3s cap refill        Significant Labs: All pertinent labs within the past 24 hours have been reviewed.    Significant Imaging: I have reviewed all pertinent imaging results/findings.  "

## 2024-04-30 NOTE — CONSULTS
[facility] Acosta - [facility]  Infectious Disease  Consult Note    Patient Name: Deena Moody  MRN: [id number]  Admission Date: 4/28/2024  Hospital Length of Stay: 1 days  Attending Physician: Maximo Howell MD  Primary Care Provider: Jam Rowell MD         Inpatient consult to Infectious Diseases  Consult performed by: Patrica Carson PA-C  Consult ordered by: Maximo Howell MD            ID consult received. Chart being reviewed. Full consult note with recommendations to follow.      In the interim, please secure chat with any questions.    Thank you,  Patrica Carson PA-C

## 2024-04-30 NOTE — ASSESSMENT & PLAN NOTE
Left hand swelling and pain x 2-3 weeks. Seen in ED 4/26, ring removed, discharged with oral keflex. Returns today with worsening pain, swelling, and erythema that has now progressed to left wrist. See media.     - afebrile, no leukocytosis  - ESR and CRP elevated   - received IV vanc in ED  - initial XR images concerning for osteo  - cont IV vanc   - trend ESR/CRP  - pain control  - Consulted ID  - Ortho consulted. Recommend broad spectrum iv abx. Wrap with ace bandage, elevate, will continue to trend crp and exam.   -CT L hand w/o contrast shows new erosive changes at the distal aspect of the middle phalanx of the 2nd finger concerning for osteomyelitis or other inflammatory arthritides.  - ID following. On CTX and dapto.  - Per ortho, no plans for procedures today, will reassess tomorrow.

## 2024-04-30 NOTE — ASSESSMENT & PLAN NOTE
This patient has hyperkalemia which is uncontrolled. We will monitor for arrhythmias with EKG or continuous telemetry.   The patients latest potassium has been reviewed and the results are listed below  Recent Labs   Lab 04/30/24  0252   K 5.3*

## 2024-04-30 NOTE — PROGRESS NOTES
VANCOMYCIN DOSING BY PHARMACY DISCONTINUATION NOTE    Deena Moody is a 89 y.o. male who had been consulted for vancomycin dosing.    The pharmacy consult for vancomycin dosing has been discontinued.     Vancomycin Dosing by Pharmacy Consult will sign-off. Please reconsult if necessary. Thank you for allowing us to participate in this patient's care.     Yuliet Vitale, PharmD  Ext. [phone]

## 2024-04-30 NOTE — SUBJECTIVE & OBJECTIVE
Past Medical History:   Diagnosis Date    Acute on chronic diastolic heart failure 9/1/2016    Coronary artery disease     Hyperlipidemia     Hypertension     Macular degeneration (senile) of retina, unspecified 12/12/2014    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    Persistent atrial fibrillation 11/10/2016    S/P placement of cardiac pacemaker 9/14/2016       Past Surgical History:   Procedure Laterality Date    AORTIC VALVE REPLACEMENT N/A     CARDIAC PACEMAKER PLACEMENT      CATARACT EXTRACTION W/  INTRAOCULAR LENS IMPLANT Right 2/16/2016    Dr. Whitley    CATARACT EXTRACTION W/  INTRAOCULAR LENS IMPLANT Left 3/1/2016    Dr. Whitley    CORONARY ARTERY BYPASS GRAFT      EAR EXAMINATION UNDER ANESTHESIA      EYE SURGERY         Review of patient's allergies indicates:   Allergen Reactions    Iodine and iodide containing products Other (See Comments)     Caused changes in skin color       Medications:  Current Facility-Administered Medications   Medication Dose Route Frequency Provider Last Rate Last Admin    acetaminophen tablet 1,000 mg  1,000 mg Oral Q8H PRJuan Manuel Montenegro PA-C        albuterol-ipratropium 2.5 mg-0.5 mg/3 mL nebulizer solution 3 mL  3 mL Nebulization Q4H PRN Juan Manuel Cornell PA-C        bisacodyL suppository 10 mg  10 mg Rectal Daily PRN Juan Manuel Cornell PA-C        carvediloL tablet 12.5 mg  12.5 mg Oral BID Juan Manuel Wolf PA-C   12.5 mg at 04/29/24 0823    cefTRIAXone (ROCEPHIN) 2 g in dextrose 5 % in water (D5W) 100 mL IVPB (MB+)  2 g Intravenous Q24H Patrica Carson PA-C        DAPTOmycin (CUBICIN) 500 mg in sodium chloride 0.9% SolP 50 mL IVPB  8 mg/kg Intravenous Q48H Patrica Carson PA-C 100 mL/hr at 04/30/24 1300 500 mg at 04/30/24 1300    dextrose 10% bolus 125 mL 125 mL  12.5 g Intravenous PRN Juan Manuel Cornell PA-C        dextrose 10% bolus 250 mL 250 mL  25 g Intravenous PRN Juan Manuel Cornell PA-C        diphenhydrAMINE injection 50 mg  50 mg Intramuscular Once OMID Valentin  MD Ernie        ferrous sulfate tablet 1 each  1 tablet Oral Daily Juan Manuel Cornell PA-C   1 each at 04/29/24 0824    glucagon (human recombinant) injection 1 mg  1 mg Intramuscular PRN Juan Manuel Cornell PA-C        glucose chewable tablet 16 g  16 g Oral PRN Juan Manuel Cornell PA-C        glucose chewable tablet 24 g  24 g Oral PRN Juan Manuel Cornell PA-C        heparin 25,000 units in dextrose 5% (100 units/ml) IV bolus from bag LOW INTENSITY nomogram - [facility]  60 Units/kg (Adjusted) Intravenous Once Juan Manuel Cornell PA-C        heparin 25,000 units in dextrose 5% (100 units/ml) IV bolus from bag LOW INTENSITY nomogram - [facility]  60 Units/kg (Adjusted) Intravenous PRN Juan Manuel Cornell PA-C   3,640 Units at 04/29/24 0632    heparin 25,000 units in dextrose 5% (100 units/ml) IV bolus from bag LOW INTENSITY nomogram - [facility]  30 Units/kg (Adjusted) Intravenous PRN Juan Manuel Cornell PA-C   1,820 Units at 04/30/24 0351    heparin 25,000 units in dextrose 5% 250 mL (100 units/mL) infusion LOW INTENSITY nomogram - [facility]  0-40 Units/kg/hr (Adjusted) Intravenous Continuous Juan Manuel Cornell PA-C 9.1 mL/hr at 04/30/24 0351 15 Units/kg/hr at 04/30/24 0351    hydrALAZINE injection 10 mg  10 mg Intravenous Q8H PRN Juan Manuel Cornell PA-C   10 mg at 04/28/24 1835    hydrocortisone sodium succinate injection 200 mg  200 mg Intravenous Once OMID Valentin MD        HYDROmorphone injection 1 mg  1 mg Intravenous Q6H PRN Juan Manuel Cornell PA-C   1 mg at 04/29/24 2334    lisinopriL tablet 5 mg  5 mg Oral Daily Juan Manuel Cornell PA-C   5 mg at 04/29/24 0823    melatonin tablet 6 mg  6 mg Oral Nightly PRN Juan Manuel Cornell PA-C   6 mg at 04/29/24 2058    naloxone 0.4 mg/mL injection 0.02 mg  0.02 mg Intravenous PRN Juan Manuel Cornell PA-C        oxyCODONE immediate release tablet 5 mg  5 mg Oral Q6H PRN Juan Manuel Cornell PA-C   5 mg at 04/28/24 2126    oxyCODONE immediate release tablet Tab 10 mg  10 mg Oral Q6H PRN Juan Manuel Cornell PA-C   10 mg at 04/30/24  1305    polyethylene glycol packet 17 g  17 g Oral Daily PRN Juan Manuel Cornell PA-C        prochlorperazine tablet 10 mg  10 mg Oral TID PRN Juan Manuel Cornell PA-C        promethazine tablet 25 mg  25 mg Oral Q6H PRN Juan Manuel Cornell PA-C        sodium chloride 0.9% bolus 500 mL 500 mL  500 mL Intravenous Once Maximo Howell MD        sodium chloride 0.9% flush 10 mL  10 mL Intravenous Q12H PRN Juan Manuel Cornell PA-C        torsemide tablet 20 mg  20 mg Oral BID Juan Manuel Cornell PA-C   20 mg at 04/29/24 2040     Antibiotics (From admission, onward)      Start     Stop Route Frequency Ordered    04/30/24 1100  DAPTOmycin (CUBICIN) 500 mg in sodium chloride 0.9% SolP 50 mL IVPB         -- IV Every 48 hours (non-standard times) 04/30/24 0950    04/30/24 1100  cefTRIAXone (ROCEPHIN) 2 g in dextrose 5 % in water (D5W) 100 mL IVPB (MB+)         -- IV Every 24 hours (non-standard times) 04/30/24 0950          Antifungals (From admission, onward)      None          Antivirals (From admission, onward)      None             Immunization History   Administered Date(s) Administered    COVID-19, MRNA, LN-S, PF (MODERNA FULL 0.5 ML DOSE) 03/04/2021, 04/01/2021    Influenza 10/17/2007, 10/15/2008, 10/14/2009, 09/16/2010, 10/03/2011    Influenza (FLUAD) - Quadrivalent - Adjuvanted - PF *Preferred* (65+) 10/15/2020, 09/30/2023    Influenza - High Dose - PF (65 years and older) 11/18/2013, 12/15/2014, 10/26/2015, 11/10/2016, 01/02/2018, 11/08/2018, 10/02/2019    Influenza Split 10/17/2007, 10/15/2008, 10/14/2009, 09/16/2010, 10/03/2011    Pneumococcal Conjugate - 13 Valent 02/01/2016    Pneumococcal Polysaccharide - 23 Valent 11/18/2013    Tdap 08/08/2016       Family History       Problem Relation (Age of Onset)    Hypertension Brother    No Known Problems Mother, Father, Sister, Maternal Aunt, Maternal Uncle, Paternal Aunt, Paternal Uncle, Maternal Grandmother, Maternal Grandfather, Paternal Grandmother, Paternal Grandfather,  Daughter, Son    Stroke Brother          Social History     Socioeconomic History    Marital status:    Tobacco Use    Smoking status: Never     Passive exposure: Never    Smokeless tobacco: Never   Substance and Sexual Activity    Alcohol use: No    Drug use: No    Sexual activity: Yes     Partners: Female     Social Determinants of Health     Financial Resource Strain: Low Risk  (4/29/2024)    Overall Financial Resource Strain (CARDIA)     Difficulty of Paying Living Expenses: Not hard at all   Food Insecurity: No Food Insecurity (4/29/2024)    Hunger Vital Sign     Worried About Running Out of Food in the Last Year: Never true     Ran Out of Food in the Last Year: Never true   Transportation Needs: No Transportation Needs (4/29/2024)    PRAPARE - Transportation     Lack of Transportation (Medical): No     Lack of Transportation (Non-Medical): No   Physical Activity: Inactive (4/29/2024)    Exercise Vital Sign     Days of Exercise per Week: 0 days     Minutes of Exercise per Session: 0 min   Stress: Stress Concern Present (4/29/2024)    [employer] - Occupational Stress Questionnaire     Feeling of Stress : Rather much   Housing Stability: Low Risk  (4/29/2024)    Housing Stability Vital Sign     Unable to Pay for Housing in the Last Year: No     Homeless in the Last Year: No     Review of Systems   Constitutional:  Negative for appetite change, chills, diaphoresis, fatigue and fever.   HENT:  Positive for hearing loss.    Respiratory:  Negative for cough and shortness of breath.    Cardiovascular:  Negative for chest pain and leg swelling.   Gastrointestinal:  Negative for abdominal pain, diarrhea, nausea and vomiting.   Genitourinary:  Negative for dysuria, frequency and hematuria.   Musculoskeletal:  Positive for arthralgias and joint swelling. Negative for back pain.   Skin:  Positive for color change. Negative for rash and wound.   Neurological:  Negative for dizziness and  headaches.   Psychiatric/Behavioral:  Positive for confusion. Negative for agitation. The patient is not nervous/anxious.    All other systems reviewed and are negative.    Objective:     Vital Signs (Most Recent):  Temp: 98 °F (36.7 °C) (04/30/24 1159)  Pulse: 70 (04/30/24 1159)  Resp: 20 (04/30/24 1305)  BP: (!) 105/59 (04/30/24 1159)  SpO2: 96 % (04/30/24 1159) Vital Signs (24h Range):  Temp:  [97.4 °F (36.3 °C)-98.2 °F (36.8 °C)] 98 °F (36.7 °C)  Pulse:  [66-70] 70  Resp:  [16-20] 20  SpO2:  [96 %-99 %] 96 %  BP: (105-153)/(59-72) 105/59     Weight: 62.7 kg (138 lb 3.7 oz)  Body mass index is 20.41 kg/m².    Estimated Creatinine Clearance: 19.5 mL/min (A) (based on SCr of 2.1 mg/dL (H)).     Physical Exam  Vitals and nursing note reviewed.   Constitutional:       General: He is not in acute distress.     Appearance: Normal appearance. He is well-developed. He is not ill-appearing or diaphoretic.   HENT:      Head: Normocephalic and atraumatic.      Right Ear: External ear normal.      Left Ear: External ear normal.      Nose: Nose normal.   Eyes:      General: No scleral icterus.        Right eye: No discharge.         Left eye: No discharge.      Extraocular Movements: Extraocular movements intact.      Conjunctiva/sclera: Conjunctivae normal.   Pulmonary:      Effort: Pulmonary effort is normal. No respiratory distress.      Breath sounds: No stridor.   Abdominal:      General: There is no distension.      Palpations: Abdomen is soft.   Musculoskeletal:         General: Swelling and tenderness present.      Right lower leg: No edema.      Left lower leg: No edema.      Comments: Left hand and finger swelling with erythema present   Skin:     General: Skin is dry.      Coloration: Skin is not jaundiced or pale.      Findings: Erythema present.      Comments: Thickened long toenails   Neurological:      General: No focal deficit present.      Mental Status: He is alert and oriented to person, place, and time.  "Mental status is at baseline.   Psychiatric:         Mood and Affect: Mood normal.         Behavior: Behavior normal.         Thought Content: Thought content normal.         Judgment: Judgment normal.          Significant Labs: Blood Culture: No results for input(s): "LABBLOO" in the last 4320 hours.  CBC:   Recent Labs   Lab 04/28/24  1657 04/29/24  0438 04/30/24  0252   WBC 10.10 9.61  9.61 16.93*   HGB 10.8* 11.0*  11.0* 10.1*   HCT 34.7* 34.3*  34.3* 32.1*    208  208 254     CMP:   Recent Labs   Lab 04/29/24  0438 04/30/24  0252   * 134*   K 5.3* 5.3*    103   CO2 16* 19*   GLU 87 101   BUN 35* 47*   CREATININE 1.6* 2.1*   CALCIUM 8.9 9.1   PROT 7.4 7.7   ALBUMIN 2.9* 2.9*   BILITOT 1.2* 1.2*   ALKPHOS 122 117   AST 22 21   ALT 9* 8*   ANIONGAP 12 12     Microbiology Results (last 7 days)       ** No results found for the last 168 hours. **          Recent Lab Results  (Last 5 results in the past 24 hours)        04/30/24  0958   04/30/24  0813   04/30/24  0252   04/29/24  2043   04/29/24  1934        Albumin     2.9           ALP     117           ALT     8           Anion Gap     12           PTT 43.9  Comment: Refer to local heparin nomogram for intensity/dose specific   therapeutic   range.       34.7  Comment: Refer to local heparin nomogram for intensity/dose specific   therapeutic   range.       51.8  Comment: Refer to local heparin nomogram for intensity/dose specific   therapeutic   range.         AST     21           Baso #     0.05           Basophil %     0.3           BILIRUBIN TOTAL     1.2  Comment: For infants and newborns, interpretation of results should be based  on gestational age, weight and in agreement with clinical  observations.    Premature Infant recommended reference ranges:  Up to 24 hours.............<8.0 mg/dL  Up to 48 hours............<12.0 mg/dL  3-5 days..................<15.0 mg/dL  6-29 days.................<15.0 mg/dL             BUN     47           " Calcium     9.1           Chloride     103           CO2     19           CPK     108           Creatinine     2.1           CRP     237.9           Differential Method     Automated           eGFR     28.8           Eos #     0.1           Eos %     0.3           Glucose     101           Gran # (ANC)     13.9           Gran %     82.0           Hematocrit     32.1           Hemoglobin     10.1           Immature Grans (Abs)     0.08  Comment: Mild elevation in immature granulocytes is non specific and   can be seen in a variety of conditions including stress response,   acute inflammation, trauma and pregnancy. Correlation with other   laboratory and clinical findings is essential.             Immature Granulocytes     0.5           Lymph #     1.0           Lymph %     6.1           Magnesium      2.3           MCH     31.0           MCHC     31.5           MCV     99           Mono #     1.8           Mono %     10.8           MPV     8.9           nRBC     0           Phosphorus Level     5.0           Platelet Count     254           POCT Glucose   118     99         Potassium     5.3  Comment: *No Visible Hemolysis           PROTEIN TOTAL     7.7           RBC     3.26           RDW     13.8           Sodium     134           WBC     16.93                                  Significant Imaging:     Imaging Results              X-Ray Hand 3 view Left (Final result)  Result time 04/28/24 15:21:44      Final result by Leon Ortega MD (04/28/24 15:21:44)                   Impression:      1.  Diffuse soft tissue swelling of the left hand.    2.  Erosive change involving the head of the 2nd middle phalanx.  This may represent a focus of infection.  Additional evaluation, as clinically warranted.    3.  No evidence of a fracture or dislocation.      Electronically signed by: Leon Ortega MD  Date:    04/28/2024  Time:    15:21               Narrative:    EXAMINATION:  XR HAND COMPLETE 3 VIEW LEFT    CLINICAL  HISTORY:  hand swelling;.    TECHNIQUE:  PA, lateral, and oblique views of the left hand were performed.    COMPARISON:  06/15/2023.    FINDINGS:  There is demineralization of the osseous structures.  There is unchanged appearance of chronic changes involving the left distal radius and ulna.  There is no cortical step-off.  There is no evidence of periostitis.    There is joint space narrowing with osteophytosis.  There are advanced degenerative changes involving the left thumb.  There is an erosive change involving the head of the 2nd middle phalanx.    There is diffuse soft tissue swelling of the left hand.  There are advanced vascular calcifications in the left hand.    There is no evidence of a fracture or dislocation.

## 2024-04-30 NOTE — ASSESSMENT & PLAN NOTE
Deena Moody is a 89 y.o. male with PMH significant for CAD, CHF, pacemaker, afib (on eliquis) presenting with left hand pain. Patient states his hand became painful about 2 weeks ago and began to get swollen. He was in the ED 2 days ago and diagnosed with cellulitis and prescribed PO keflex and discharged. He returned today for worsening pain and swelling of the dorsal hand. Patient has a history of pacemaker that is not MRI compatible. XR with erosive changes of P2 index finger left hand. Exam is consistent with dorsal hand cellulitis, no ttp in the volar hand or flexor tendon sheath. CRP 82, ESR 55, no leukocytosis. Erosive change likely consistent with osteomyelitis of the index finger.     - Abx: continue broad spectrum per primary  - Continue ROMAT/WBAT to the LUE  - Compressive ace wrap dressing to remain in place  - CT without contrast ordered for further evaluation given patient's inability to undergo MRI  - Continue NPO status pending imaging results

## 2024-04-30 NOTE — HPI
Deena Moody is a 89 y.o. male with PMHx significant for CAD, HTN, CHF, A-fib, pacemaker, CKD, gout who presented to the ED 4/28 with left hand pain and swelling. Patient states his hand became painful about 2 weeks ago and began to get swollen. He was in the ED 2 days prior to admit and diagnosed with cellulitis. He was never started on antibiotics after discharged from the ED. He returned for worsening pain and swelling of the dorsal hand. He also endorses redness. He denies known trauma to the hand. Denies open cuts, though his wedding ring was cut off during last ED visit. He also recently had some skin cancer removed on his left arm earlier this year. Denies recent procedures otherwise. In 2020, has hx of pseudomonas bacteremia presumed from intraabdominal source treated with 2 weeks of IV cefepime.     In the ED: Hypertensive otherwise VSS. No leukocytosis. ESR 55 and CRP 82.8.    X-ray with Diffuse soft tissue swelling of the left hand and erosive change involving the head of the 2nd middle phalanx.  This may represent a focus of infection. CT ordered for further evaluation ( cannot get MRI ). Patient is currently on Vancomycin. ID consulted for antibiotic recommendations.    Of note, his inflammatory markers and WBC count have increased since admit. He also has developed worsening renal function.  Labs 4/30 revealed a WBC of 16K, , CR 2.1.

## 2024-04-30 NOTE — SUBJECTIVE & OBJECTIVE
Interval History:   Leukocytosis and CRP untrending. CT L hand w/o contrast shows new erosive changes at the distal aspect of the middle phalanx of the 2nd finger concerning for osteomyelitis or other inflammatory arthritides. ID following. On CTX and dapto. Per ortho, no plans for procedures today, will reassess tomorrow. NPO MN tentatively.          Objective:     Vital Signs (Most Recent):  Temp: 98 °F (36.7 °C) (04/30/24 1159)  Pulse: 70 (04/30/24 1159)  Resp: 20 (04/30/24 1305)  BP: (!) 105/59 (04/30/24 1159)  SpO2: 96 % (04/30/24 1159) Vital Signs (24h Range):  Temp:  [97.4 °F (36.3 °C)-98.2 °F (36.8 °C)] 98 °F (36.7 °C)  Pulse:  [66-70] 70  Resp:  [16-20] 20  SpO2:  [96 %-99 %] 96 %  BP: (105-153)/(59-72) 105/59     Weight: 62.7 kg (138 lb 3.7 oz)  Body mass index is 20.41 kg/m².    Intake/Output Summary (Last 24 hours) at 4/30/2024 1322  Last data filed at 4/30/2024 0559  Gross per 24 hour   Intake --   Output 450 ml   Net -450 ml         Physical Exam  Vitals and nursing note reviewed.   Constitutional:       General: He is not in acute distress.     Appearance: He is well-developed.   HENT:      Head: Normocephalic and atraumatic.      Ears:      Comments: Bilateral hear loss, does not wear hearing aides.     Mouth/Throat:      Pharynx: No oropharyngeal exudate.   Eyes:      Conjunctiva/sclera: Conjunctivae normal.      Pupils: Pupils are equal, round, and reactive to light.   Cardiovascular:      Rate and Rhythm: Normal rate and regular rhythm.      Heart sounds: Normal heart sounds.   Pulmonary:      Effort: Pulmonary effort is normal. No respiratory distress.      Breath sounds: Normal breath sounds. No wheezing.   Abdominal:      General: Bowel sounds are normal. There is no distension.      Palpations: Abdomen is soft.      Tenderness: There is no abdominal tenderness.   Musculoskeletal:      Left wrist: Swelling and tenderness present.      Left hand: Swelling and tenderness present. Decreased range  of motion. Decreased strength.      Cervical back: Normal range of motion and neck supple.      Right lower leg: Edema present.      Left lower leg: Edema present.      Comments: See media. Erythema and swelling in left hand to left wrist. ROM limited 2/2 pain. Supination and pronation limited 2/2 pain.   Lymphadenopathy:      Cervical: No cervical adenopathy.   Skin:     General: Skin is warm and dry.      Capillary Refill: Capillary refill takes less than 2 seconds.      Findings: No rash.   Neurological:      General: No focal deficit present.      Mental Status: He is alert and oriented to person, place, and time.      Cranial Nerves: No cranial nerve deficit.      Sensory: No sensory deficit.      Coordination: Coordination normal.   Psychiatric:         Behavior: Behavior normal.         Thought Content: Thought content normal.         Judgment: Judgment normal.             Significant Labs: All pertinent labs within the past 24 hours have been reviewed.  BMP:   Recent Labs   Lab 04/30/24  0252      *   K 5.3*      CO2 19*   BUN 47*   CREATININE 2.1*   CALCIUM 9.1   MG 2.3     CBC:   Recent Labs   Lab 04/28/24  1657 04/29/24  0438 04/30/24  0252   WBC 10.10 9.61  9.61 16.93*   HGB 10.8* 11.0*  11.0* 10.1*   HCT 34.7* 34.3*  34.3* 32.1*    208  208 254       Significant Imaging: I have reviewed all pertinent imaging results/findings within the past 24 hours.

## 2024-04-30 NOTE — PROGRESS NOTES
[facility] Medicine  Progress Note    Patient Name: Deena Moody  MRN: [id number]  Patient Class: IP- Inpatient   Admission Date: 4/28/2024  Length of Stay: 1 days  Attending Physician: Maximo Howell MD  Primary Care Provider: Jam Rowell MD        Subjective:     Principal Problem:Swelling of digit of left hand        HPI:  Deena Moody is a 89 y.o. male with PMHx HTN, HLD, CAD, diastolic HF, A-fib with pacemaker, CABG, hx of MI, AV block, and CKD 3 who [place] [facility] [place] chief complaint of left hand swelling and pain. He reports this issues has been present x 2-3 weeks. He was in ED two days prior wherein his ring had to be cut from finger due to swelling. He was then sent home with keflex and PCP follow up. Since then patient reports worsening swelling, redness and pain. He denies associated HA, fever, chills, chest pain, shortness of breath, abdominal pain, n/v/d, urinary symptoms, numbness or tingling. He takes eliquis. Patient's pacemaker is not MRI compatible. Patient with significant hear loss bilaterally.     In the ED: Hypertensive, RR 22, otherwise VSSAF. CBC without leukocytosis. CMP without emergent abnormalities, Cr 1.8 (baseline). ESR 55 and CRP 82.8 (elevated from 53.6 on 4/26). Received morphine and vanc. Admitted to  for further management.    Overview/Hospital Course:  No notes on file    Interval History:   Leukocytosis and CRP untrending. CT L hand w/o contrast shows new erosive changes at the distal aspect of the middle phalanx of the 2nd finger concerning for osteomyelitis or other inflammatory arthritides. ID following. On CTX and dapto. Per ortho, no plans for procedures today, will reassess tomorrow. NPO MN tentatively.          Objective:     Vital Signs (Most Recent):  Temp: 98 °F (36.7 °C) (04/30/24 1159)  Pulse: 70 (04/30/24 1159)  Resp: 20 (04/30/24 1305)  BP: (!) 105/59 (04/30/24 1159)  SpO2: 96 % (04/30/24 1159) Vital Signs (24h  Range):  Temp:  [97.4 °F (36.3 °C)-98.2 °F (36.8 °C)] 98 °F (36.7 °C)  Pulse:  [66-70] 70  Resp:  [16-20] 20  SpO2:  [96 %-99 %] 96 %  BP: (105-153)/(59-72) 105/59     Weight: 62.7 kg (138 lb 3.7 oz)  Body mass index is 20.41 kg/m².    Intake/Output Summary (Last 24 hours) at 4/30/2024 1322  Last data filed at 4/30/2024 0559  Gross per 24 hour   Intake --   Output 450 ml   Net -450 ml         Physical Exam  Vitals and nursing note reviewed.   Constitutional:       General: He is not in acute distress.     Appearance: He is well-developed.   HENT:      Head: Normocephalic and atraumatic.      Ears:      Comments: Bilateral hear loss, does not wear hearing aides.     Mouth/Throat:      Pharynx: No oropharyngeal exudate.   Eyes:      Conjunctiva/sclera: Conjunctivae normal.      Pupils: Pupils are equal, round, and reactive to light.   Cardiovascular:      Rate and Rhythm: Normal rate and regular rhythm.      Heart sounds: Normal heart sounds.   Pulmonary:      Effort: Pulmonary effort is normal. No respiratory distress.      Breath sounds: Normal breath sounds. No wheezing.   Abdominal:      General: Bowel sounds are normal. There is no distension.      Palpations: Abdomen is soft.      Tenderness: There is no abdominal tenderness.   Musculoskeletal:      Left wrist: Swelling and tenderness present.      Left hand: Swelling and tenderness present. Decreased range of motion. Decreased strength.      Cervical back: Normal range of motion and neck supple.      Right lower leg: Edema present.      Left lower leg: Edema present.      Comments: See media. Erythema and swelling in left hand to left wrist. ROM limited 2/2 pain. Supination and pronation limited 2/2 pain.   Lymphadenopathy:      Cervical: No cervical adenopathy.   Skin:     General: Skin is warm and dry.      Capillary Refill: Capillary refill takes less than 2 seconds.      Findings: No rash.   Neurological:      General: No focal deficit present.      Mental  Status: He is alert and oriented to person, place, and time.      Cranial Nerves: No cranial nerve deficit.      Sensory: No sensory deficit.      Coordination: Coordination normal.   Psychiatric:         Behavior: Behavior normal.         Thought Content: Thought content normal.         Judgment: Judgment normal.             Significant Labs: All pertinent labs within the past 24 hours have been reviewed.  BMP:   Recent Labs   Lab 04/30/24  0252      *   K 5.3*      CO2 19*   BUN 47*   CREATININE 2.1*   CALCIUM 9.1   MG 2.3     CBC:   Recent Labs   Lab 04/28/24  1657 04/29/24  0438 04/30/24  0252   WBC 10.10 9.61  9.61 16.93*   HGB 10.8* 11.0*  11.0* 10.1*   HCT 34.7* 34.3*  34.3* 32.1*    208  208 254       Significant Imaging: I have reviewed all pertinent imaging results/findings within the past 24 hours.    Assessment/Plan:      * Swelling of digit of left hand    Left hand swelling and pain x 2-3 weeks. Seen in ED 4/26, ring removed, discharged with oral keflex. Returns today with worsening pain, swelling, and erythema that has now progressed to left wrist. See media.     - afebrile, no leukocytosis  - ESR and CRP elevated   - received IV vanc in ED  - initial XR images concerning for osteo  - cont IV vanc   - trend ESR/CRP  - pain control  - Consulted ID  - Ortho consulted. Recommend broad spectrum iv abx. Wrap with ace bandage, elevate, will continue to trend crp and exam.   -CT L hand w/o contrast shows new erosive changes at the distal aspect of the middle phalanx of the 2nd finger concerning for osteomyelitis or other inflammatory arthritides.  - ID following. On CTX and dapto.  - Per ortho, no plans for procedures today, will reassess tomorrow.     Hyperkalemia  This patient has hyperkalemia which is uncontrolled. We will monitor for arrhythmias with EKG or continuous telemetry.   The patients latest potassium has been reviewed and the results are listed below  Recent Labs    Lab 04/30/24  0252   K 5.3*             Macrocytosis  - history noted    Permanent atrial fibrillation  Long term (current) use of anticoagulants  S/P placement of cardiac pacemaker  - cont coreg  - pacemaker not MRI compatible   - hold eliquis in setting of possible OR in AM, heparin infusion for now  - cont tele    S/P placement of cardiac pacemaker  - abbott pacemaker  - not MRI compatible    Chronic diastolic heart failure  - no signs of acute overload  - cont torsemide  - hold KCl for now (recently hyperK 4/26)  - monitor UOP    CAD (coronary artery disease)  History of heart bypass surgery  History of MI (myocardial infarction)  - hold asa given possible OR in AM, heparin infusion for now  - previously intolerant to statins 2/2 myalgias    Complete AV block  - s/p pacemaker    Essential hypertension  - chronic, uncontrolled  - cont home coreg and torsemide  - substitute home benzapril for lisinopril  - prn hydralazine for sbp > 180  - monitor     Dyslipidemia  - statin intolerance 2/2 myalgias     CKD (chronic kidney disease) stage 3, GFR 30-59 ml/min  - Cr 1.8 on admit, near baseline  - serial BMP, monitor UOP  - avoid nephrotoxins, renally dose meds      VTE Risk Mitigation (From admission, onward)           Ordered     heparin 25,000 units in dextrose 5% (100 units/ml) IV bolus from bag LOW INTENSITY nomogram - [facility]  As needed (PRN)        Question:  Heparin Infusion Adjustment (DO NOT MODIFY ANSWER)  Answer:  \[email or web address]\Images\Pharmacy\HeparinInfusions\heparin LOW INTENSITY nomogram for [facility] [id number]    04/28/24 1628     heparin 25,000 units in dextrose 5% (100 units/ml) IV bolus from bag LOW INTENSITY nomogram - [facility]  As needed (PRN)        Question:  Heparin Infusion Adjustment (DO NOT MODIFY ANSWER)  Answer:  \[email or web address]\Images\Pharmacy\HeparinInfusions\heparin LOW INTENSITY nomogram for [facility] [id number]    04/28/24 1628     heparin 25,000 units in dextrose 5% (100 units/ml) IV bolus from  bag LOW INTENSITY nomogram - [facility]  Once        Question:  Heparin Infusion Adjustment (DO NOT MODIFY ANSWER)  Answer:  [email or web address]\Images\Pharmacy\HeparinInfusions\heparin LOW INTENSITY nomogram for [facility] [id number]    04/28/24 1628     heparin 25,000 units in dextrose 5% 250 mL (100 units/mL) infusion LOW INTENSITY nomogram - [facility]  Continuous        Question:  Begin at (units/kg/hr)  Answer:  12    04/28/24 1614     Reason for No Pharmacological VTE Prophylaxis  Once        Question:  Reasons:  Answer:  Already adequately anticoagulated on oral Anticoagulants    04/28/24 1431     IP VTE HIGH RISK PATIENT  Once         04/28/24 1431     Place sequential compression device  Until discontinued         04/28/24 1431                    Discharge Planning   TEOFILO: 5/2/2024     Code Status: Full Code   Is the patient medically ready for discharge?:     Reason for patient still in hospital (select all that apply): Patient trending condition  Discharge Plan A: Home, Home with family, Home Health (Family would like HH for patient.)                  Maximo Howell MD  Department of Hospital Medicine   [facility] Surg

## 2024-04-30 NOTE — PROGRESS NOTES
[facility] - [facility] S[facility]  Orthopedics  Progress Note    Patient Name: Deena Moody  MRN: [id number]  Admission Date: 4/28/2024  Hospital Length of Stay: 1 days  Attending Provider: Maximo Howell MD  Primary Care Provider: Jam Rowell MD    Subjective:     Principal Problem:Osteomyelitis of left hand    Principal Orthopedic Problem: as above    Interval History: Afebrile, VSS, NAEON.  WBC 16.93 from 9.6, .9 from 125.  Elevated overnight.  Compressive dressing removed at bedside; swelling at dorsum of left hand appears minimal without any obvious fluid collections.  Patient agitated and endorsing pain with any movement of the left upper extremity but is unable to localize pain.  CT of the left hand pending for further evaluation.  NPO as precaution pending imaging results.          Review of patient's allergies indicates:   Allergen Reactions    Iodine and iodide containing products Other (See Comments)     Caused changes in skin color       Current Facility-Administered Medications   Medication Dose Route Frequency Provider Last Rate Last Admin    acetaminophen tablet 1,000 mg  1,000 mg Oral Q8H PRN Juan Manuel Cornell PA-C        albuterol-ipratropium 2.5 mg-0.5 mg/3 mL nebulizer solution 3 mL  3 mL Nebulization Q4H PRN Juan Manuel Cornell PA-C        bisacodyL suppository 10 mg  10 mg Rectal Daily PRN Juan Manuel Cornell PA-C        carvediloL tablet 12.5 mg  12.5 mg Oral BID WM Juan Manuel Cornell PA-C   12.5 mg at 04/29/24 0823    dextrose 10% bolus 125 mL 125 mL  12.5 g Intravenous PRN Juan Manuel Cornell PA-C        dextrose 10% bolus 250 mL 250 mL  25 g Intravenous PRN Juan Manuel Cornell PA-C        diphenhydrAMINE injection 50 mg  50 mg Intramuscular Once OMID Valentin MD        ferrous sulfate tablet 1 each  1 tablet Oral Daily Juan Manuel Cornell PA-C   1 each at 04/29/24 0824    glucagon (human recombinant) injection 1 mg  1 mg Intramuscular PRN Juan Manuel Cornell PA-C        glucose chewable tablet 16 g  16  g Oral PRN Juan Manuel Cornell PA-C        glucose chewable tablet 24 g  24 g Oral PRN Juan Manuel Cornell PA-C        heparin 25,000 units in dextrose 5% (100 units/ml) IV bolus from bag LOW INTENSITY nomogram - [facility]  60 Units/kg (Adjusted) Intravenous Once Juan Manuel Cornell PA-C        heparin 25,000 units in dextrose 5% (100 units/ml) IV bolus from bag LOW INTENSITY nomogram - [facility]  60 Units/kg (Adjusted) Intravenous PRN Juan Manuel Cornell PA-C   3,640 Units at 04/29/24 0632    heparin 25,000 units in dextrose 5% (100 units/ml) IV bolus from bag LOW INTENSITY nomogram - [facility]  30 Units/kg (Adjusted) Intravenous PRN Juan Manuel Cornell PA-C   1,820 Units at 04/30/24 0351    heparin 25,000 units in dextrose 5% 250 mL (100 units/mL) infusion LOW INTENSITY nomogram - [facility]  0-40 Units/kg/hr (Adjusted) Intravenous Continuous Juan Manuel Cornell PA-C 9.1 mL/hr at 04/30/24 0351 15 Units/kg/hr at 04/30/24 0351    hydrALAZINE injection 10 mg  10 mg Intravenous Q8H PRJuan Manuel Montenegro PA-C   10 mg at 04/28/24 1835    hydrocortisone sodium succinate injection 200 mg  200 mg Intravenous Once OMID Valentin MD        HYDROmorphone injection 1 mg  1 mg Intravenous Q6H PRN Juan Manuel Cornell PA-C   1 mg at 04/29/24 2334    lisinopriL tablet 5 mg  5 mg Oral Daily Juan Manuel Cornell PA-C   5 mg at 04/29/24 0823    melatonin tablet 6 mg  6 mg Oral Nightly PRN Juan Manuel Cornell PA-C   6 mg at 04/29/24 2058    naloxone 0.4 mg/mL injection 0.02 mg  0.02 mg Intravenous PRN Juan Manuel Cornell PA-C        oxyCODONE immediate release tablet 5 mg  5 mg Oral Q6H PRN Juan Manuel Cornell PA-C   5 mg at 04/28/24 2126    oxyCODONE immediate release tablet Tab 10 mg  10 mg Oral Q6H PRN Juan Manuel Cornell PA-C   10 mg at 04/29/24 2058    polyethylene glycol packet 17 g  17 g Oral Daily PRN Juan Manuel Cornell PA-C        prochlorperazine tablet 10 mg  10 mg Oral TID PRN Juan Manuel Cornell PA-C        promethazine tablet 25 mg  25 mg Oral Q6H PRN Juan Manuel Cornell PA-C         "sodium chloride 0.9% bolus 500 mL 500 mL  500 mL Intravenous Once Maximo Howell MD        sodium chloride 0.9% flush 10 mL  10 mL Intravenous Q12H PRN Juan Manuel Cornell PA-C        torsemide tablet 20 mg  20 mg Oral BID Juan Manuel Cornell PA-C   20 mg at 04/29/24 2040    vancomycin - pharmacy to dose   Intravenous pharmacy to manage frequency Juan Manuel Cornell PA-C         Objective:     Vital Signs (Most Recent):  Temp: 97.5 °F (36.4 °C) (04/30/24 0810)  Pulse: 70 (04/30/24 0810)  Resp: 20 (04/30/24 0830)  BP: (!) 153/70 (04/30/24 0810)  SpO2: 99 % (04/30/24 0810) Vital Signs (24h Range):  Temp:  [97.4 °F (36.3 °C)-98.2 °F (36.8 °C)] 97.5 °F (36.4 °C)  Pulse:  [68-70] 70  Resp:  [16-20] 20  SpO2:  [92 %-99 %] 99 %  BP: (123-153)/(65-72) 153/70     Weight: 62.7 kg (138 lb 3.7 oz)  Height: 5' 9" (175.3 cm)  Body mass index is 20.41 kg/m².      Intake/Output Summary (Last 24 hours) at 4/30/2024 0922  Last data filed at 4/30/2024 0559  Gross per 24 hour   Intake --   Output 450 ml   Net -450 ml        Ortho/SPM Exam  AAOx1  NAD  Reg rate  No increased WOB    LUE:  Swelling present at index finger; mild swelling present at dorsum of hand  Erythema at index finger/dorsal hand   Index finger held in extension  Able to demonstrate AROM at MCP/PIP/DIPj's of IF  FROM shoulder, elbow, and wrist  Sensation and motor grossly intact  Radial artery palpable w/<3s cap refill        Significant Labs: All pertinent labs within the past 24 hours have been reviewed.    Significant Imaging: I have reviewed all pertinent imaging results/findings.  Assessment/Plan:     * Osteomyelitis of left hand  Deena Moody is a 89 y.o. male with PMH significant for CAD, CHF, pacemaker, afib (on eliquis) presenting with left hand pain. Patient states his hand became painful about 2 weeks ago and began to get swollen. He was in the ED 2 days ago and diagnosed with cellulitis and prescribed PO keflex and discharged. He returned today for worsening " pain and swelling of the dorsal hand. Patient has a history of pacemaker that is not MRI compatible. XR with erosive changes of P2 index finger left hand. Exam is consistent with dorsal hand cellulitis, no ttp in the volar hand or flexor tendon sheath. CRP 82, ESR 55, no leukocytosis. Erosive change likely consistent with osteomyelitis of the index finger.     - Abx: continue broad spectrum per primary  - Continue ROMAT/WBAT to the LUE  - Compressive ace wrap dressing to remain in place  - CT without contrast ordered for further evaluation given patient's inability to undergo MRI  - Continue NPO status pending imaging results           QING Srivastava MD  Orthopedics  [facility] S[facility]

## 2024-04-30 NOTE — CONSULTS
[facility]  Infectious Disease  Consult Note    Patient Name: Deena Moody  MRN: [id number]  Admission Date: 4/28/2024  Hospital Length of Stay: 1 days  Attending Physician: Maximo Howell MD  Primary Care Provider: Jam Rowell MD     Isolation Status: No active isolations    Patient information was obtained from patient, relative(s), and past medical records.      Consults  Assessment/Plan:     Orthopedic  Swelling of left hand    89 year old male with cardiac disease, pacemaker, CKD, gout who presented to the ED 4/28 with left hand pain, swelling and erythema that has been present for over a week without known trauma.    Afebrile without systemic signs of infection. Labs today revealed a leukocytosis (16K) and elevated inflammatory markers. X-ray with diffuse soft tissue swelling of the left hand and erosive change involving the head of the 2nd middle phalanx. CT ordered for further evaluation ( cannot get MRI ). Patient is currently on Vancomycin. ID consulted for antibiotic recommendations.    Recommendations  Discontinue Vancomycin given poor renal function and advanced age. Start empiric Daptomycin and Ceftriaxone. CPK ordered  Follow up CT hand results  Uric acid ordered given hx of gout  Orthopedic surgery following. If any joint effusions noted on imaging or fluid collections visualized, please aspirate if possible and send for cell count w/ diff, crystals, gram stain and cultures  ID will continue to follow.          Thank you for your consult. I will follow-up with patient. Please contact us if you have any additional questions.    Patrica Carson PA-C  Infectious Disease  [facility]    Subjective:     Principal Problem: Osteomyelitis of left hand    HPI: Deena Moody is a 89 y.o. male with PMHx significant for CAD, HTN, CHF, A-fib, pacemaker, CKD, gout who presented to the ED 4/28 with left hand pain and swelling. Patient states his hand became painful about 2 weeks  ago and began to get swollen. He was in the ED 2 days prior to admit and diagnosed with cellulitis. He was never started on antibiotics after discharged from the ED. He returned for worsening pain and swelling of the dorsal hand. He also endorses redness. He denies known trauma to the hand. Denies open cuts, though his wedding ring was cut off during last ED visit. He also recently had some skin cancer removed on his left arm earlier this year. Denies recent procedures otherwise. In 2020, has hx of pseudomonas bacteremia presumed from intraabdominal source treated with 2 weeks of IV cefepime.     In the ED: Hypertensive otherwise VSS. No leukocytosis. ESR 55 and CRP 82.8.    X-ray with Diffuse soft tissue swelling of the left hand and erosive change involving the head of the 2nd middle phalanx.  This may represent a focus of infection. CT ordered for further evaluation ( cannot get MRI ). Patient is currently on Vancomycin. ID consulted for antibiotic recommendations.    Of note, his inflammatory markers and WBC count have increased since admit. He also has developed worsening renal function.  Labs 4/30 revealed a WBC of 16K, , CR 2.1.     Past Medical History:   Diagnosis Date    Acute on chronic diastolic heart failure 9/1/2016    Coronary artery disease     Hyperlipidemia     Hypertension     Macular degeneration (senile) of retina, unspecified 12/12/2014    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    Persistent atrial fibrillation 11/10/2016    S/P placement of cardiac pacemaker 9/14/2016       Past Surgical History:   Procedure Laterality Date    AORTIC VALVE REPLACEMENT N/A     CARDIAC PACEMAKER PLACEMENT      CATARACT EXTRACTION W/  INTRAOCULAR LENS IMPLANT Right 2/16/2016    Dr. Whitley    CATARACT EXTRACTION W/  INTRAOCULAR LENS IMPLANT Left 3/1/2016    Dr. Whitley    CORONARY ARTERY BYPASS GRAFT      EAR EXAMINATION UNDER ANESTHESIA      EYE SURGERY         Review of patient's allergies indicates:    Allergen Reactions    Iodine and iodide containing products Other (See Comments)     Caused changes in skin color       Medications:  Current Facility-Administered Medications   Medication Dose Route Frequency Provider Last Rate Last Admin    acetaminophen tablet 1,000 mg  1,000 mg Oral Q8H PRN Juan Manuel Cornell PA-C        albuterol-ipratropium 2.5 mg-0.5 mg/3 mL nebulizer solution 3 mL  3 mL Nebulization Q4H PRN Juan Manuel Cornell PA-C        bisacodyL suppository 10 mg  10 mg Rectal Daily PRN Juan Manuel Cornell PA-C        carvediloL tablet 12.5 mg  12.5 mg Oral BID WM Juan Manuel Cornell PA-C   12.5 mg at 04/29/24 0823    cefTRIAXone (ROCEPHIN) 2 g in dextrose 5 % in water (D5W) 100 mL IVPB (MB+)  2 g Intravenous Q24H Patrica Carson PA-C        DAPTOmycin (CUBICIN) 500 mg in sodium chloride 0.9% SolP 50 mL IVPB  8 mg/kg Intravenous Q48H Patrica Carson PA-C 100 mL/hr at 04/30/24 1300 500 mg at 04/30/24 1300    dextrose 10% bolus 125 mL 125 mL  12.5 g Intravenous PRN Juan Manuel Cornell PA-C        dextrose 10% bolus 250 mL 250 mL  25 g Intravenous PRN Juan Manuel Cornell PA-C        diphenhydrAMINE injection 50 mg  50 mg Intramuscular Once OMID Valentin MD        ferrous sulfate tablet 1 each  1 tablet Oral Daily Juan Manuel Cornell PA-C   1 each at 04/29/24 0824    glucagon (human recombinant) injection 1 mg  1 mg Intramuscular PRN Juan Manuel Cornell PA-C        glucose chewable tablet 16 g  16 g Oral PRN Juan Manuel Cornell PA-C        glucose chewable tablet 24 g  24 g Oral PRN Juan Manuel Cornell PA-C        heparin 25,000 units in dextrose 5% (100 units/ml) IV bolus from bag LOW INTENSITY nomogram - [facility]  60 Units/kg (Adjusted) Intravenous Once Juan Manuel Cornell PA-C        heparin 25,000 units in dextrose 5% (100 units/ml) IV bolus from bag LOW INTENSITY nomogram - [facility]  60 Units/kg (Adjusted) Intravenous PRN Juan Manuel Cornell, PA-C   3,640 Units at 04/29/24 0632    heparin 25,000 units in dextrose 5% (100 units/ml) IV  bolus from bag LOW INTENSITY nomogram - [facility]  30 Units/kg (Adjusted) Intravenous PRN Juan Manuel Cornell PA-C   1,820 Units at 04/30/24 0351    heparin 25,000 units in dextrose 5% 250 mL (100 units/mL) infusion LOW INTENSITY nomogram - [facility]  0-40 Units/kg/hr (Adjusted) Intravenous Continuous Juan Manuel Cornell PA-C 9.1 mL/hr at 04/30/24 0351 15 Units/kg/hr at 04/30/24 0351    hydrALAZINE injection 10 mg  10 mg Intravenous Q8H PRN Juan Manuel Cornell PA-C   10 mg at 04/28/24 1835    hydrocortisone sodium succinate injection 200 mg  200 mg Intravenous Once OMID Valentin MD        HYDROmorphone injection 1 mg  1 mg Intravenous Q6H PRJuan Manuel Montenegro PA-C   1 mg at 04/29/24 2334    lisinopriL tablet 5 mg  5 mg Oral Daily Juan Manuel Cornell PA-C   5 mg at 04/29/24 0823    melatonin tablet 6 mg  6 mg Oral Nightly PRN Juan Manuel Cornell PA-C   6 mg at 04/29/24 2058    naloxone 0.4 mg/mL injection 0.02 mg  0.02 mg Intravenous PRN Juan Manuel Cornell PA-C        oxyCODONE immediate release tablet 5 mg  5 mg Oral Q6H PRJuan Manuel Montenegro PA-C   5 mg at 04/28/24 2126    oxyCODONE immediate release tablet Tab 10 mg  10 mg Oral Q6H PRN Juan Manuel Cornell PA-C   10 mg at 04/30/24 1305    polyethylene glycol packet 17 g  17 g Oral Daily PRN Juan Manuel Cornell PA-C        prochlorperazine tablet 10 mg  10 mg Oral TID PRN Juan Manuel Cornell PA-JASIEL        promethazine tablet 25 mg  25 mg Oral Q6H PRN Juan Manuel Cornell, PA-C        sodium chloride 0.9% bolus 500 mL 500 mL  500 mL Intravenous Once Maximo Howell MD        sodium chloride 0.9% flush 10 mL  10 mL Intravenous Q12H PRN Juan Manuel Cornell PA-C        torsemide tablet 20 mg  20 mg Oral BID Juan Manuel Cornell PA-C   20 mg at 04/29/24 2040     Antibiotics (From admission, onward)      Start     Stop Route Frequency Ordered    04/30/24 1100  DAPTOmycin (CUBICIN) 500 mg in sodium chloride 0.9% SolP 50 mL IVPB         -- IV Every 48 hours (non-standard times) 04/30/24 0950    04/30/24 1100  cefTRIAXone  (ROCEPHIN) 2 g in dextrose 5 % in water (D5W) 100 mL IVPB (MB+)         -- IV Every 24 hours (non-standard times) 04/30/24 0950          Antifungals (From admission, onward)      None          Antivirals (From admission, onward)      None             Immunization History   Administered Date(s) Administered    COVID-19, MRNA, LN-S, PF (MODERNA FULL 0.5 ML DOSE) 03/04/2021, 04/01/2021    Influenza 10/17/2007, 10/15/2008, 10/14/2009, 09/16/2010, 10/03/2011    Influenza (FLUAD) - Quadrivalent - Adjuvanted - PF *Preferred* (65+) 10/15/2020, 09/30/2023    Influenza - High Dose - PF (65 years and older) 11/18/2013, 12/15/2014, 10/26/2015, 11/10/2016, 01/02/2018, 11/08/2018, 10/02/2019    Influenza Split 10/17/2007, 10/15/2008, 10/14/2009, 09/16/2010, 10/03/2011    Pneumococcal Conjugate - 13 Valent 02/01/2016    Pneumococcal Polysaccharide - 23 Valent 11/18/2013    Tdap 08/08/2016       Family History       Problem Relation (Age of Onset)    Hypertension Brother    No Known Problems Mother, Father, Sister, Maternal Aunt, Maternal Uncle, Paternal Aunt, Paternal Uncle, Maternal Grandmother, Maternal Grandfather, Paternal Grandmother, Paternal Grandfather, Daughter, Son    Stroke Brother          Social History     Socioeconomic History    Marital status:    Tobacco Use    Smoking status: Never     Passive exposure: Never    Smokeless tobacco: Never   Substance and Sexual Activity    Alcohol use: No    Drug use: No    Sexual activity: Yes     Partners: Female     Social Determinants of Health     Financial Resource Strain: Low Risk  (4/29/2024)    Overall Financial Resource Strain (CARDIA)     Difficulty of Paying Living Expenses: Not hard at all   Food Insecurity: No Food Insecurity (4/29/2024)    Hunger Vital Sign     Worried About Running Out of Food in the Last Year: Never true     Ran Out of Food in the Last Year: Never true   Transportation Needs: No Transportation Needs (4/29/2024)    PRAPARE - Transportation      Lack of Transportation (Medical): No     Lack of Transportation (Non-Medical): No   Physical Activity: Inactive (4/29/2024)    Exercise Vital Sign     Days of Exercise per Week: 0 days     Minutes of Exercise per Session: 0 min   Stress: Stress Concern Present (4/29/2024)    [place] citizen [employer] - Occupational Stress Questionnaire     Feeling of Stress : Rather much   Housing Stability: Low Risk  (4/29/2024)    Housing Stability Vital Sign     Unable to Pay for Housing in the Last Year: No     Homeless in the Last Year: No     Review of Systems   Constitutional:  Negative for appetite change, chills, diaphoresis, fatigue and fever.   HENT:  Positive for hearing loss.    Respiratory:  Negative for cough and shortness of breath.    Cardiovascular:  Negative for chest pain and leg swelling.   Gastrointestinal:  Negative for abdominal pain, diarrhea, nausea and vomiting.   Genitourinary:  Negative for dysuria, frequency and hematuria.   Musculoskeletal:  Positive for arthralgias and joint swelling. Negative for back pain.   Skin:  Positive for color change. Negative for rash and wound.   Neurological:  Negative for dizziness and headaches.   Psychiatric/Behavioral:  Positive for confusion. Negative for agitation. The patient is not nervous/anxious.    All other systems reviewed and are negative.    Objective:     Vital Signs (Most Recent):  Temp: 98 °F (36.7 °C) (04/30/24 1159)  Pulse: 70 (04/30/24 1159)  Resp: 20 (04/30/24 1305)  BP: (!) 105/59 (04/30/24 1159)  SpO2: 96 % (04/30/24 1159) Vital Signs (24h Range):  Temp:  [97.4 °F (36.3 °C)-98.2 °F (36.8 °C)] 98 °F (36.7 °C)  Pulse:  [66-70] 70  Resp:  [16-20] 20  SpO2:  [96 %-99 %] 96 %  BP: (105-153)/(59-72) 105/59     Weight: 62.7 kg (138 lb 3.7 oz)  Body mass index is 20.41 kg/m².    Estimated Creatinine Clearance: 19.5 mL/min (A) (based on SCr of 2.1 mg/dL (H)).     Physical Exam  Vitals and nursing note reviewed.   Constitutional:        "General: He is not in acute distress.     Appearance: Normal appearance. He is well-developed. He is not ill-appearing or diaphoretic.   HENT:      Head: Normocephalic and atraumatic.      Right Ear: External ear normal.      Left Ear: External ear normal.      Nose: Nose normal.   Eyes:      General: No scleral icterus.        Right eye: No discharge.         Left eye: No discharge.      Extraocular Movements: Extraocular movements intact.      Conjunctiva/sclera: Conjunctivae normal.   Pulmonary:      Effort: Pulmonary effort is normal. No respiratory distress.      Breath sounds: No stridor.   Abdominal:      General: There is no distension.      Palpations: Abdomen is soft.   Musculoskeletal:         General: Swelling and tenderness present.      Right lower leg: No edema.      Left lower leg: No edema.      Comments: Left hand and finger swelling with erythema present   Skin:     General: Skin is dry.      Coloration: Skin is not jaundiced or pale.      Findings: Erythema present.      Comments: Thickened long toenails   Neurological:      General: No focal deficit present.      Mental Status: He is alert and oriented to person, place, and time. Mental status is at baseline.   Psychiatric:         Mood and Affect: Mood normal.         Behavior: Behavior normal.         Thought Content: Thought content normal.         Judgment: Judgment normal.          Significant Labs: Blood Culture: No results for input(s): "LABBLOO" in the last 4320 hours.  CBC:   Recent Labs   Lab 04/28/24  1657 04/29/24  0438 04/30/24  0252   WBC 10.10 9.61  9.61 16.93*   HGB 10.8* 11.0*  11.0* 10.1*   HCT 34.7* 34.3*  34.3* 32.1*    208  208 254     CMP:   Recent Labs   Lab 04/29/24  0438 04/30/24  0252   * 134*   K 5.3* 5.3*    103   CO2 16* 19*   GLU 87 101   BUN 35* 47*   CREATININE 1.6* 2.1*   CALCIUM 8.9 9.1   PROT 7.4 7.7   ALBUMIN 2.9* 2.9*   BILITOT 1.2* 1.2*   ALKPHOS 122 117   AST 22 21   ALT 9* 8* "   ANIONGAP 12 12     Microbiology Results (last 7 days)       ** No results found for the last 168 hours. **          Recent Lab Results  (Last 5 results in the past 24 hours)        04/30/24  0958   04/30/24  0813   04/30/24  0252   04/29/24 2043   04/29/24  1934        Albumin     2.9           ALP     117           ALT     8           Anion Gap     12           PTT 43.9  Comment: Refer to local heparin nomogram for intensity/dose specific   therapeutic   range.       34.7  Comment: Refer to local heparin nomogram for intensity/dose specific   therapeutic   range.       51.8  Comment: Refer to local heparin nomogram for intensity/dose specific   therapeutic   range.         AST     21           Baso #     0.05           Basophil %     0.3           BILIRUBIN TOTAL     1.2  Comment: For infants and newborns, interpretation of results should be based  on gestational age, weight and in agreement with clinical  observations.    Premature Infant recommended reference ranges:  Up to 24 hours.............<8.0 mg/dL  Up to 48 hours............<12.0 mg/dL  3-5 days..................<15.0 mg/dL  6-29 days.................<15.0 mg/dL             BUN     47           Calcium     9.1           Chloride     103           CO2     19           CPK     108           Creatinine     2.1           CRP     237.9           Differential Method     Automated           eGFR     28.8           Eos #     0.1           Eos %     0.3           Glucose     101           Gran # (ANC)     13.9           Gran %     82.0           Hematocrit     32.1           Hemoglobin     10.1           Immature Grans (Abs)     0.08  Comment: Mild elevation in immature granulocytes is non specific and   can be seen in a variety of conditions including stress response,   acute inflammation, trauma and pregnancy. Correlation with other   laboratory and clinical findings is essential.             Immature Granulocytes     0.5           Lymph #     1.0            Lymph %     6.1           Magnesium      2.3           MCH     31.0           MCHC     31.5           MCV     99           Mono #     1.8           Mono %     10.8           MPV     8.9           nRBC     0           Phosphorus Level     5.0           Platelet Count     254           POCT Glucose   118     99         Potassium     5.3  Comment: *No Visible Hemolysis           PROTEIN TOTAL     7.7           RBC     3.26           RDW     13.8           Sodium     134           WBC     16.93                                  Significant Imaging:     Imaging Results              X-Ray Hand 3 view Left (Final result)  Result time 04/28/24 15:21:44      Final result by Leon Ortega MD (04/28/24 15:21:44)                   Impression:      1.  Diffuse soft tissue swelling of the left hand.    2.  Erosive change involving the head of the 2nd middle phalanx.  This may represent a focus of infection.  Additional evaluation, as clinically warranted.    3.  No evidence of a fracture or dislocation.      Electronically signed by: Leon Ortega MD  Date:    04/28/2024  Time:    15:21               Narrative:    EXAMINATION:  XR HAND COMPLETE 3 VIEW LEFT    CLINICAL HISTORY:  hand swelling;.    TECHNIQUE:  PA, lateral, and oblique views of the left hand were performed.    COMPARISON:  06/15/2023.    FINDINGS:  There is demineralization of the osseous structures.  There is unchanged appearance of chronic changes involving the left distal radius and ulna.  There is no cortical step-off.  There is no evidence of periostitis.    There is joint space narrowing with osteophytosis.  There are advanced degenerative changes involving the left thumb.  There is an erosive change involving the head of the 2nd middle phalanx.    There is diffuse soft tissue swelling of the left hand.  There are advanced vascular calcifications in the left hand.    There is no evidence of a fracture or dislocation.

## 2024-04-30 NOTE — ASSESSMENT & PLAN NOTE
89 year old male with cardiac disease, pacemaker, CKD, gout who presented to the ED 4/28 with left hand pain, swelling and erythema that has been present for over a week without known trauma.    Afebrile without systemic signs of infection. Labs today revealed a leukocytosis (16K) and elevated inflammatory markers. X-ray with diffuse soft tissue swelling of the left hand and erosive change involving the head of the 2nd middle phalanx. CT ordered for further evaluation ( cannot get MRI ). Patient is currently on Vancomycin. ID consulted for antibiotic recommendations.    Recommendations  Discontinue Vancomycin given poor renal function and advanced age. Start empiric Daptomycin and Ceftriaxone. CPK ordered  Follow up CT hand results  Uric acid ordered given hx of gout  Orthopedic surgery following. If any joint effusions noted on imaging or fluid collections visualized, please aspirate if possible and send for cell count w/ diff, crystals, gram stain and cultures  ID will continue to follow.

## 2024-05-01 PROBLEM — E79.0 ELEVATED URIC ACID IN BLOOD: Status: ACTIVE | Noted: 2024-05-01

## 2024-05-01 LAB
ALBUMIN SERPL BCP-MCNC: 2.4 G/DL (ref 3.5–5.2)
ALP SERPL-CCNC: 102 U/L (ref 55–135)
ALT SERPL W/O P-5'-P-CCNC: 12 U/L (ref 10–44)
ANION GAP SERPL CALC-SCNC: 11 MMOL/L (ref 8–16)
APTT PPP: 50 SEC (ref 21–32)
AST SERPL-CCNC: 35 U/L (ref 10–40)
BASOPHILS # BLD AUTO: 0.02 K/UL (ref 0–0.2)
BASOPHILS NFR BLD: 0.1 % (ref 0–1.9)
BILIRUB SERPL-MCNC: 1.2 MG/DL (ref 0.1–1)
BUN SERPL-MCNC: 58 MG/DL (ref 10–30)
CALCIUM SERPL-MCNC: 8.3 MG/DL (ref 8.7–10.5)
CHLORIDE SERPL-SCNC: 102 MMOL/L (ref 95–110)
CO2 SERPL-SCNC: 20 MMOL/L (ref 23–29)
CREAT SERPL-MCNC: 2.6 MG/DL (ref 0.5–1.4)
CRP SERPL-MCNC: 274.4 MG/L (ref 0–8.2)
DIFFERENTIAL METHOD BLD: ABNORMAL
EOSINOPHIL # BLD AUTO: 0 K/UL (ref 0–0.5)
EOSINOPHIL NFR BLD: 0.1 % (ref 0–8)
ERYTHROCYTE [DISTWIDTH] IN BLOOD BY AUTOMATED COUNT: 14.2 % (ref 11.5–14.5)
EST. GFR  (NO RACE VARIABLE): 22.3 ML/MIN/1.73 M^2
GLUCOSE SERPL-MCNC: 75 MG/DL (ref 70–110)
HCT VFR BLD AUTO: 28.9 % (ref 40–54)
HGB BLD-MCNC: 8.8 G/DL (ref 14–18)
IMM GRANULOCYTES # BLD AUTO: 0.1 K/UL (ref 0–0.04)
IMM GRANULOCYTES NFR BLD AUTO: 0.7 % (ref 0–0.5)
LYMPHOCYTES # BLD AUTO: 1.1 K/UL (ref 1–4.8)
LYMPHOCYTES NFR BLD: 7.4 % (ref 18–48)
MAGNESIUM SERPL-MCNC: 2.2 MG/DL (ref 1.6–2.6)
MCH RBC QN AUTO: 30.3 PG (ref 27–31)
MCHC RBC AUTO-ENTMCNC: 30.4 G/DL (ref 32–36)
MCV RBC AUTO: 100 FL (ref 82–98)
MONOCYTES # BLD AUTO: 1.7 K/UL (ref 0.3–1)
MONOCYTES NFR BLD: 11.4 % (ref 4–15)
NEUTROPHILS # BLD AUTO: 11.9 K/UL (ref 1.8–7.7)
NEUTROPHILS NFR BLD: 80.3 % (ref 38–73)
NRBC BLD-RTO: 0 /100 WBC
PHOSPHATE SERPL-MCNC: 4.9 MG/DL (ref 2.7–4.5)
PLATELET # BLD AUTO: 197 K/UL (ref 150–450)
PMV BLD AUTO: 9.3 FL (ref 9.2–12.9)
POCT GLUCOSE: 100 MG/DL (ref 70–110)
POCT GLUCOSE: 115 MG/DL (ref 70–110)
POCT GLUCOSE: 88 MG/DL (ref 70–110)
POCT GLUCOSE: 90 MG/DL (ref 70–110)
POTASSIUM SERPL-SCNC: 5.3 MMOL/L (ref 3.5–5.1)
PROT SERPL-MCNC: 6.7 G/DL (ref 6–8.4)
RBC # BLD AUTO: 2.9 M/UL (ref 4.6–6.2)
SODIUM SERPL-SCNC: 133 MMOL/L (ref 136–145)
URATE SERPL-MCNC: 11.6 MG/DL (ref 3.4–7)
WBC # BLD AUTO: 14.79 K/UL (ref 3.9–12.7)

## 2024-05-01 PROCEDURE — 25000003 PHARM REV CODE 250: Mod: HCNC | Performed by: PHYSICIAN ASSISTANT

## 2024-05-01 PROCEDURE — 80053 COMPREHEN METABOLIC PANEL: CPT | Mod: HCNC

## 2024-05-01 PROCEDURE — 63600175 PHARM REV CODE 636 W HCPCS: Mod: HCNC

## 2024-05-01 PROCEDURE — 25000003 PHARM REV CODE 250: Mod: HCNC

## 2024-05-01 PROCEDURE — 25000003 PHARM REV CODE 250: Mod: HCNC | Performed by: STUDENT IN AN ORGANIZED HEALTH CARE EDUCATION/TRAINING PROGRAM

## 2024-05-01 PROCEDURE — 86140 C-REACTIVE PROTEIN: CPT | Mod: HCNC | Performed by: STUDENT IN AN ORGANIZED HEALTH CARE EDUCATION/TRAINING PROGRAM

## 2024-05-01 PROCEDURE — 84100 ASSAY OF PHOSPHORUS: CPT | Mod: HCNC

## 2024-05-01 PROCEDURE — 83735 ASSAY OF MAGNESIUM: CPT | Mod: HCNC

## 2024-05-01 PROCEDURE — 63600175 PHARM REV CODE 636 W HCPCS: Mod: HCNC | Performed by: STUDENT IN AN ORGANIZED HEALTH CARE EDUCATION/TRAINING PROGRAM

## 2024-05-01 PROCEDURE — 84550 ASSAY OF BLOOD/URIC ACID: CPT | Mod: HCNC | Performed by: PHYSICIAN ASSISTANT

## 2024-05-01 PROCEDURE — 85025 COMPLETE CBC W/AUTO DIFF WBC: CPT | Mod: HCNC

## 2024-05-01 PROCEDURE — 63600175 PHARM REV CODE 636 W HCPCS: Mod: HCNC | Performed by: PHYSICIAN ASSISTANT

## 2024-05-01 PROCEDURE — 99222 1ST HOSP IP/OBS MODERATE 55: CPT | Mod: HCNC,,, | Performed by: INTERNAL MEDICINE

## 2024-05-01 PROCEDURE — 99233 SBSQ HOSP IP/OBS HIGH 50: CPT | Mod: HCNC,,, | Performed by: PHYSICIAN ASSISTANT

## 2024-05-01 PROCEDURE — 36415 COLL VENOUS BLD VENIPUNCTURE: CPT | Mod: HCNC

## 2024-05-01 PROCEDURE — 92610 EVALUATE SWALLOWING FUNCTION: CPT | Mod: HCNC

## 2024-05-01 PROCEDURE — 21400001 HC TELEMETRY ROOM: Mod: HCNC

## 2024-05-01 PROCEDURE — 85730 THROMBOPLASTIN TIME PARTIAL: CPT | Mod: HCNC

## 2024-05-01 RX ORDER — TORSEMIDE 20 MG/1
20 TABLET ORAL 2 TIMES DAILY
Status: DISCONTINUED | OUTPATIENT
Start: 2024-05-02 | End: 2024-05-01

## 2024-05-01 RX ORDER — LISINOPRIL 5 MG/1
5 TABLET ORAL DAILY
Status: DISCONTINUED | OUTPATIENT
Start: 2024-05-02 | End: 2024-05-01

## 2024-05-01 RX ADMIN — FERROUS SULFATE TAB EC 325 MG (65 MG FE EQUIVALENT) 1 EACH: 325 (65 FE) TABLET DELAYED RESPONSE at 09:05

## 2024-05-01 RX ADMIN — METHYLPREDNISOLONE SODIUM SUCCINATE 80 MG: 40 INJECTION, POWDER, FOR SOLUTION INTRAMUSCULAR; INTRAVENOUS at 03:05

## 2024-05-01 RX ADMIN — SODIUM CHLORIDE, POTASSIUM CHLORIDE, SODIUM LACTATE AND CALCIUM CHLORIDE 500 ML: 600; 310; 30; 20 INJECTION, SOLUTION INTRAVENOUS at 05:05

## 2024-05-01 RX ADMIN — CEFTRIAXONE 2 G: 2 INJECTION, POWDER, FOR SOLUTION INTRAMUSCULAR; INTRAVENOUS at 11:05

## 2024-05-01 RX ADMIN — HEPARIN SODIUM 15 UNITS/KG/HR: 10000 INJECTION, SOLUTION INTRAVENOUS at 02:05

## 2024-05-01 RX ADMIN — SODIUM ZIRCONIUM CYCLOSILICATE 10 G: 10 POWDER, FOR SUSPENSION ORAL at 02:05

## 2024-05-01 RX ADMIN — CARVEDILOL 12.5 MG: 12.5 TABLET, FILM COATED ORAL at 09:05

## 2024-05-01 NOTE — ASSESSMENT & PLAN NOTE
Long term (current) use of anticoagulants  S/P placement of cardiac pacemaker  - cont coreg  - pacemaker not MRI compatible   - hold eliquis in setting of possible OR , heparin infusion for now  - cont tele

## 2024-05-01 NOTE — PROGRESS NOTES
[facility] Medicine  Progress Note    Patient Name: Deena Moody  MRN: [id number]  Patient Class: IP- Inpatient   Admission Date: 4/28/2024  Length of Stay: 2 days  Attending Physician: Maximo Howell MD  Primary Care Provider: Jam Rowell MD        Subjective:     Principal Problem:Swelling of digit of left hand        HPI:  Deena Moody is a 89 y.o. male with PMHx HTN, HLD, CAD, diastolic HF, A-fib with pacemaker, CABG, hx of MI, AV block, and CKD 3 who [place] [facility] [place] chief complaint of left hand swelling and pain. He reports this issues has been present x 2-3 weeks. He was in ED two days prior wherein his ring had to be cut from finger due to swelling. He was then sent home with keflex and PCP follow up. Since then patient reports worsening swelling, redness and pain. He denies associated HA, fever, chills, chest pain, shortness of breath, abdominal pain, n/v/d, urinary symptoms, numbness or tingling. He takes eliquis. Patient's pacemaker is not MRI compatible. Patient with significant hear loss bilaterally.     In the ED: Hypertensive, RR 22, otherwise VSSAF. CBC without leukocytosis. CMP without emergent abnormalities, Cr 1.8 (baseline). ESR 55 and CRP 82.8 (elevated from 53.6 on 4/26). Received morphine and vanc. Admitted to  for further management.    Overview/Hospital Course:  No notes on file    Interval History:   Left hand swelling and erythema slightly improved but remains TTP.  ID following. On CTX and dapto. Per ortho, no plans for procedures today, will reassess tomorrow. Uric acid elvated, given hx of gout, consulted rheum for evaluation of gout flare.     Objective:     Vital Signs (Most Recent):  Temp: 98.3 °F (36.8 °C) (05/01/24 1145)  Pulse: 68 (05/01/24 1145)  Resp: 16 (05/01/24 1145)  BP: (!) 90/50 (05/01/24 1145)  SpO2: (!) 93 % (05/01/24 1145) Vital Signs (24h Range):  Temp:  [98.1 °F (36.7 °C)-99.3 °F (37.4 °C)] 98.3 °F (36.8 °C)  Pulse:  [67-71]  68  Resp:  [16-18] 16  SpO2:  [92 %-97 %] 93 %  BP: ()/(50-67) 90/50     Weight: 62.7 kg (138 lb 3.7 oz)  Body mass index is 20.41 kg/m².    Intake/Output Summary (Last 24 hours) at 5/1/2024 3746  Last data filed at 5/1/2024 0650  Gross per 24 hour   Intake --   Output 400 ml   Net -400 ml         Physical Exam  Vitals and nursing note reviewed.   Constitutional:       General: He is not in acute distress.     Appearance: He is well-developed.   HENT:      Head: Normocephalic and atraumatic.      Ears:      Comments: Bilateral hear loss, does not wear hearing aides.     Mouth/Throat:      Pharynx: No oropharyngeal exudate.   Eyes:      Conjunctiva/sclera: Conjunctivae normal.      Pupils: Pupils are equal, round, and reactive to light.   Cardiovascular:      Rate and Rhythm: Normal rate and regular rhythm.      Heart sounds: Normal heart sounds.   Pulmonary:      Effort: Pulmonary effort is normal. No respiratory distress.      Breath sounds: Normal breath sounds. No wheezing.   Abdominal:      General: Bowel sounds are normal. There is no distension.      Palpations: Abdomen is soft.      Tenderness: There is no abdominal tenderness.   Musculoskeletal:      Left wrist: Swelling and tenderness present.      Left hand: Swelling and tenderness present. Decreased range of motion. Decreased strength.      Cervical back: Normal range of motion and neck supple.      Right lower leg: Edema present.      Left lower leg: Edema present.      Comments: See media. Erythema and swelling in left hand to left wrist. ROM limited 2/2 pain. Supination and pronation limited 2/2 pain.   Lymphadenopathy:      Cervical: No cervical adenopathy.   Skin:     General: Skin is warm and dry.      Capillary Refill: Capillary refill takes less than 2 seconds.      Findings: No rash.   Neurological:      General: No focal deficit present.      Mental Status: He is alert and oriented to person, place, and time.      Cranial Nerves: No  cranial nerve deficit.      Sensory: No sensory deficit.      Coordination: Coordination normal.   Psychiatric:         Behavior: Behavior normal.         Thought Content: Thought content normal.         Judgment: Judgment normal.             Significant Labs: All pertinent labs within the past 24 hours have been reviewed.  BMP:   Recent Labs   Lab 05/01/24 0518   GLU 75   *   K 5.3*      CO2 20*   BUN 58*   CREATININE 2.6*   CALCIUM 8.3*   MG 2.2     CBC:   Recent Labs   Lab 04/30/24 0252 05/01/24 0518   WBC 16.93* 14.79*   HGB 10.1* 8.8*   HCT 32.1* 28.9*    197       Significant Imaging: I have reviewed all pertinent imaging results/findings within the past 24 hours.      Assessment/Plan:      * Swelling of digit of left hand  Presented with Left hand swelling and pain x 2-3 weeks. Seen in ED 4/26, ring removed, discharged with oral keflex. Returns with worsening pain, swelling, and erythema that has now progressed to left wrist. See media.     - afebrile, leukocytosis. ESR and CRP elevated   - initial XR images concerning for osteo  - Ortho consulted. Recommend broad spectrum iv abx. Wrap with ace bandage, elevate, will continue to trend crp and exam.   - CT L hand w/o contrast shows new erosive changes at the distal aspect of the middle phalanx of the 2nd finger concerning for osteomyelitis or other inflammatory arthritides.  - ID following. On CTX and dapto.      Elevated uric acid in blood  Uric acid elevated 11.6.  Given hx of gout, consulting rheumatology for evaluation of gout flare.       Hyperkalemia  This patient has hyperkalemia which is uncontrolled. We will monitor for arrhythmias with EKG or continuous telemetry.   The patients latest potassium has been reviewed and the results are listed below  Recent Labs   Lab 04/30/24  0252   K 5.3*             Macrocytosis  - history noted    Permanent atrial fibrillation  Long term (current) use of anticoagulants  S/P placement of cardiac  pacemaker  - cont coreg  - pacemaker not MRI compatible   - hold eliquis in setting of possible OR in AM, heparin infusion for now  - cont tele    S/P placement of cardiac pacemaker  - abbott pacemaker  - not MRI compatible    Chronic diastolic heart failure  - no signs of acute overload  - cont torsemide  - hold KCl for now (recently hyperK 4/26)  - monitor UOP    CAD (coronary artery disease)  History of heart bypass surgery  History of MI (myocardial infarction)  - hold asa given possible OR in AM, heparin infusion for now  - previously intolerant to statins 2/2 myalgias    Complete AV block  - s/p pacemaker    Essential hypertension  - chronic, uncontrolled  - cont home coreg and torsemide  - substitute home benzapril for lisinopril  - prn hydralazine for sbp > 180  - monitor     Dyslipidemia  - statin intolerance 2/2 myalgias     CKD (chronic kidney disease) stage 3, GFR 30-59 ml/min  - Cr 1.8 on admit, near baseline  - serial BMP, monitor UOP  - avoid nephrotoxins, renally dose meds      VTE Risk Mitigation (From admission, onward)           Ordered     heparin 25,000 units in dextrose 5% (100 units/ml) IV bolus from bag LOW INTENSITY nomogram - [facility]  As needed (PRN)        Question:  Heparin Infusion Adjustment (DO NOT MODIFY ANSWER)  Answer:  \\[email or web address]\Images\Pharmacy\HeparinInfusions\heparin LOW INTENSITY nomogram for [facility] [id number]    04/28/24 1628     heparin 25,000 units in dextrose 5% (100 units/ml) IV bolus from bag LOW INTENSITY nomogram - [facility]  As needed (PRN)        Question:  Heparin Infusion Adjustment (DO NOT MODIFY ANSWER)  Answer:  \[email or web address]\Images\Pharmacy\HeparinInfusions\heparin LOW INTENSITY nomogram for [facility] [id number]    04/28/24 1628     heparin 25,000 units in dextrose 5% (100 units/ml) IV bolus from bag LOW INTENSITY nomogram - [facility]  Once        Question:  Heparin Infusion Adjustment (DO NOT MODIFY ANSWER)  Answer:   [email or web address]\Images\Pharmacy\HeparinInfusions\heparin LOW INTENSITY nomogram for [facility] [id number]    04/28/24 1628     heparin 25,000 units in dextrose 5% 250 mL (100 units/mL) infusion LOW INTENSITY nomogram - [facility]  Continuous        Question:  Begin at (units/kg/hr)  Answer:  12    04/28/24 1614     Reason for No Pharmacological VTE Prophylaxis  Once        Question:  Reasons:  Answer:  Already adequately anticoagulated on oral Anticoagulants    04/28/24 1431     IP VTE HIGH RISK PATIENT  Once         04/28/24 1431     Place sequential compression device  Until discontinued         04/28/24 1431                    Discharge Planning   TEOFILO: 5/2/2024     Code Status: Full Code   Is the patient medically ready for discharge?:     Reason for patient still in hospital (select all that apply): Patient trending condition  Discharge Plan A: Home, Home with family, Home Health (Family would like HH for patient.)                  Maximo Howell MD  Department of Hospital Medicine   [facility]

## 2024-05-01 NOTE — ASSESSMENT & PLAN NOTE
Uric acid elevated 11.6.  Given hx of gout, consulting rheumatology for evaluation of gout flare.

## 2024-05-01 NOTE — NURSING
Patient is in bed with no complaints at this hour. Was medicated for pain in his left leg this shift. Patient granddaughter provided the patient comfort while with him. Heparin infusing. Patient offered water periodically and when in room. Bed is in the lowest position. Call light is within reach.

## 2024-05-01 NOTE — PLAN OF CARE
Heparin gtt infusing at 15 units/kg. Blood pressure is 88/53. LR bolus administered.  Problem: Adult Inpatient Plan of Care  Goal: Plan of Care Review  Outcome: Progressing  Goal: Patient-Specific Goal (Individualized)  Outcome: Progressing  Goal: Absence of Hospital-Acquired Illness or Injury  Outcome: Progressing  Goal: Optimal Comfort and Wellbeing  Outcome: Progressing  Goal: Readiness for Transition of Care  Outcome: Progressing     Problem: Infection  Goal: Absence of Infection Signs and Symptoms  Outcome: Progressing     Problem: Fall Injury Risk  Goal: Absence of Fall and Fall-Related Injury  Outcome: Progressing     Problem: Skin Injury Risk Increased  Goal: Skin Health and Integrity  Outcome: Progressing

## 2024-05-01 NOTE — ASSESSMENT & PLAN NOTE
Presented with Left hand swelling and pain x 2-3 weeks. Seen in ED 4/26, ring removed, discharged with oral keflex. Returns with worsening pain, swelling, and erythema that has now progressed to left wrist. See media.     - afebrile, leukocytosis. ESR and CRP elevated   - initial XR images concerning for osteo  - Ortho consulted. Recommend broad spectrum iv abx. Wrap with ace bandage, elevate, will continue to trend crp and exam.   - CT L hand w/o contrast shows new erosive changes at the distal aspect of the middle phalanx of the 2nd finger concerning for osteomyelitis or other inflammatory arthritides.  - ID following. On CTX and dapto.     14

## 2024-05-01 NOTE — ASSESSMENT & PLAN NOTE
89 year old male with cardiac disease, pacemaker, CKD, gout who presented to the ED 4/28 with left hand pain, swelling and erythema that has been present for over a week without known trauma.    Afebrile without systemic signs of infection. Labs revealed a leukocytosis and elevated inflammatory markers. Uric acid level 11. CT with erosive changes at the distal aspect of the middle phalanx of the 2nd finger concerning for osteomyelitis or other inflammatory arthritides. Orthopedic surgery following. No plans for surgery at this time. He has been on IV antibiotics without clinical improvement and increased CRP.    Recommendations  Continue empiric Daptomycin and Ceftriaxone. .   Concerned for gout given history and elevated uric acid. Recommend starting treatment for acute gout flare. No absolute contraindication to starting steroids from an ID standpoint if indicated.   Orthopedic surgery following. If taken for surgery, please send tissue and fluid for cell count w/ diff, crystals, gram stain and cultures  Discussed with hospital medicine staff. ID will continue to follow.

## 2024-05-01 NOTE — ASSESSMENT & PLAN NOTE
Deena Moody is a 89 y.o. M with a past medical history of HTN, HLD, CAD, diastolic HF, A-fib with pacemaker, CABG, hx of MI, AV block, and CKD 3 who was admitted to [facility] [place] on 4/28 with left hand swelling and pain that had been persistent for 2-3 weeks.     The patient has a longstanding history of crystal-proven gouty arthritis with flares that typically involve his knees but have affected his feet in the past as well. He was not on maintenance therapy as outpatient. He is now presenting with subacute pain and swelling of th L hand that has been persistent for 2-3 weeks. He failed outpatient abx management and then was started on Vancomycin which has since been changed to Ceftriaxone and Daptomycin. He was found to have an elevated uric acid level, significantly elevated CRP (274) and elevated sed rate (113). CT scan of the L hand concerning for osteomyelitis vs inflammatory arthropathy.     Recommendations:   - The patient has a history of CKD and heart failure therefore we cannot use colchicine   - ID OK with steroids while receiving treatment for suspected OM  - Start Solumedrol 80 mg daily for 2 doses, will continue to evaluate response  - Will need to be started on maintenance therapy if he responds to steroids

## 2024-05-01 NOTE — SUBJECTIVE & OBJECTIVE
Interval History: NAEON. Afebrile. Resting at bedside. Daughter present. Hand pain and swelling persistent. Not swallowing well. Pill stuck to denture. CRP increased. Uric acid elevated. Discussed with primary team about starting gout treatment.    Review of Systems   Constitutional:  Negative for chills, diaphoresis, fatigue and fever.   HENT:  Positive for hearing loss.    Respiratory:  Negative for cough and shortness of breath.    Cardiovascular:  Negative for chest pain and leg swelling.   Gastrointestinal:  Negative for abdominal pain, diarrhea, nausea and vomiting.   Genitourinary:  Negative for dysuria and frequency.   Musculoskeletal:  Positive for arthralgias and joint swelling. Negative for back pain.   Skin:  Positive for color change. Negative for rash and wound.   Neurological:  Negative for dizziness and headaches.   Psychiatric/Behavioral:  Positive for confusion and decreased concentration. The patient is not nervous/anxious.    All other systems reviewed and are negative.    Objective:     Vital Signs (Most Recent):  Temp: 98.3 °F (36.8 °C) (05/01/24 1145)  Pulse: 68 (05/01/24 1145)  Resp: 16 (05/01/24 1145)  BP: (!) 90/50 (05/01/24 1145)  SpO2: (!) 93 % (05/01/24 1145) Vital Signs (24h Range):  Temp:  [98.1 °F (36.7 °C)-99.3 °F (37.4 °C)] 98.3 °F (36.8 °C)  Pulse:  [67-71] 68  Resp:  [16-20] 16  SpO2:  [92 %-97 %] 93 %  BP: ()/(50-67) 90/50     Weight: 62.7 kg (138 lb 3.7 oz)  Body mass index is 20.41 kg/m².    Estimated Creatinine Clearance: 15.7 mL/min (A) (based on SCr of 2.6 mg/dL (H)).     Physical Exam  Vitals and nursing note reviewed.   Constitutional:       General: He is not in acute distress.     Appearance: Normal appearance. He is well-developed. He is not ill-appearing or diaphoretic.   HENT:      Head: Normocephalic and atraumatic.      Right Ear: External ear normal.      Left Ear: External ear normal.      Nose: Nose normal.   Eyes:      General: No scleral icterus.         Right eye: No discharge.         Left eye: No discharge.      Extraocular Movements: Extraocular movements intact.      Conjunctiva/sclera: Conjunctivae normal.   Pulmonary:      Effort: Pulmonary effort is normal. No respiratory distress.      Breath sounds: No stridor.   Abdominal:      General: There is no distension.      Palpations: Abdomen is soft.   Genitourinary:     Comments: Tea colored urine in cannister  Musculoskeletal:         General: Swelling and tenderness present.      Right lower leg: No edema.      Left lower leg: No edema.      Comments: Left hand and finger swelling with erythema present   Skin:     General: Skin is dry.      Coloration: Skin is not jaundiced or pale.      Findings: Erythema present.      Comments: Thickened long toenails   Neurological:      General: No focal deficit present.      Mental Status: He is alert and oriented to person, place, and time. Mental status is at baseline.   Psychiatric:         Mood and Affect: Mood normal.         Behavior: Behavior normal.         Thought Content: Thought content normal.         Judgment: Judgment normal.          Significant Labs: BMP:   Recent Labs   Lab 05/01/24 0518   GLU 75   *   K 5.3*      CO2 20*   BUN 58*   CREATININE 2.6*   CALCIUM 8.3*   MG 2.2     CBC:   Recent Labs   Lab 04/30/24  0252 05/01/24  0518   WBC 16.93* 14.79*   HGB 10.1* 8.8*   HCT 32.1* 28.9*    197     CMP:   Recent Labs   Lab 04/30/24  0252 05/01/24  0518   * 133*   K 5.3* 5.3*    102   CO2 19* 20*    75   BUN 47* 58*   CREATININE 2.1* 2.6*   CALCIUM 9.1 8.3*   PROT 7.7 6.7   ALBUMIN 2.9* 2.4*   BILITOT 1.2* 1.2*   ALKPHOS 117 102   AST 21 35   ALT 8* 12   ANIONGAP 12 11     Microbiology Results (last 7 days)       ** No results found for the last 168 hours. **          Recent Lab Results  (Last 5 results in the past 24 hours)        05/01/24  1140   05/01/24  0913   05/01/24  0518   04/30/24 1955 04/30/24  1824         Albumin     2.4           ALP     102           ALT     12           Anion Gap     11           PTT     50.0  Comment: Refer to local heparin nomogram for intensity/dose specific   therapeutic   range.       44.1  Comment: Refer to local heparin nomogram for intensity/dose specific   therapeutic   range.         AST     35           Baso #     0.02           Basophil %     0.1           BILIRUBIN TOTAL     1.2  Comment: For infants and newborns, interpretation of results should be based  on gestational age, weight and in agreement with clinical  observations.    Premature Infant recommended reference ranges:  Up to 24 hours.............<8.0 mg/dL  Up to 48 hours............<12.0 mg/dL  3-5 days..................<15.0 mg/dL  6-29 days.................<15.0 mg/dL             BUN     58           Calcium     8.3           Chloride     102           CO2     20           Creatinine     2.6           CRP     274.4           Differential Method     Automated           eGFR     22.3           Eos #     0.0           Eos %     0.1           Glucose     75           Gran # (ANC)     11.9           Gran %     80.3           Hematocrit     28.9           Hemoglobin     8.8           Immature Grans (Abs)     0.10  Comment: Mild elevation in immature granulocytes is non specific and   can be seen in a variety of conditions including stress response,   acute inflammation, trauma and pregnancy. Correlation with other   laboratory and clinical findings is essential.             Immature Granulocytes     0.7           Lymph #     1.1           Lymph %     7.4           Magnesium      2.2           MCH     30.3           MCHC     30.4           MCV     100           Mono #     1.7           Mono %     11.4           MPV     9.3           nRBC     0           Phosphorus Level     4.9           Platelet Count     197           POCT Glucose 88   90     120         Potassium     5.3  Comment: *No Visible Hemolysis           PROTEIN TOTAL      6.7           RBC     2.90           RDW     14.2           Sodium     133           Uric Acid     11.6           WBC     14.79                                  Significant Imaging:     Imaging Results              X-Ray Hand 3 view Left (Final result)  Result time 04/28/24 15:21:44      Final result by Leon Ortega MD (04/28/24 15:21:44)                   Impression:      1.  Diffuse soft tissue swelling of the left hand.    2.  Erosive change involving the head of the 2nd middle phalanx.  This may represent a focus of infection.  Additional evaluation, as clinically warranted.    3.  No evidence of a fracture or dislocation.      Electronically signed by: Leon Ortega MD  Date:    04/28/2024  Time:    15:21               Narrative:    EXAMINATION:  XR HAND COMPLETE 3 VIEW LEFT    CLINICAL HISTORY:  hand swelling;.    TECHNIQUE:  PA, lateral, and oblique views of the left hand were performed.    COMPARISON:  06/15/2023.    FINDINGS:  There is demineralization of the osseous structures.  There is unchanged appearance of chronic changes involving the left distal radius and ulna.  There is no cortical step-off.  There is no evidence of periostitis.    There is joint space narrowing with osteophytosis.  There are advanced degenerative changes involving the left thumb.  There is an erosive change involving the head of the 2nd middle phalanx.    There is diffuse soft tissue swelling of the left hand.  There are advanced vascular calcifications in the left hand.    There is no evidence of a fracture or dislocation.

## 2024-05-01 NOTE — PLAN OF CARE
Ice chips and Puree for pleasure only when fully awake.  Problem: SLP  Goal: SLP Goal  Description: Goals due 5/8  1.  Pt. Will participate in ongoing assessment of swallow at bedside   Outcome: Progressing

## 2024-05-01 NOTE — CONSULTS
[facility] - Med Surg  Rheumatology  Consult Note    Patient Name: Deena Moody  MRN: [id number]  Admission Date: 4/28/2024  Hospital Length of Stay: 2 days  Code Status: Full Code   Attending Provider: Maximo Howell MD  Primary Care Physician: Jam Rowell MD  Principal Problem:Swelling of digit of left hand    Inpatient consult to Rheumatology  Consult performed by: Lucy Mar MD  Consult ordered by: Maxiom Howell MD        Subjective:     HPI: Deena Moody is a 89 y.o. M with a past medical history of HTN, HLD, CAD, diastolic HF, A-fib with pacemaker, CABG, hx of MI, AV block, and CKD 3 who [place] [facility] [place] left hand swelling and pain that had been persistent for 2-3 weeks.    The patient has a long history of gout. His gout attacks typically involve his knees but have involved his feet in the past as well. He had an arthrocentesis of the L knee done on 7/12/23 which was positive for crystals.      Latest Reference Range & Units 07/12/23 15:38   Body Fluid Crystal Negative  Positive !   Body Fluid Source, Crystal Exam  Joint Fluid, Left Knee   !: Intra and extracellular urate crystals present     About 2 weeks ago he developed pain and then swelling of the L hand. He was initially seen in the ED 2 days before admission and diagnosed with cellulitis and prescribed PO keflex and discharged. He returned because the pain and swelling continued to worsen. Laboratory studies: No leukocytosis. ESR 55 and CRP 82.8. X-ray with diffuse soft tissue swelling of the left hand and erosive change involving the head of the 2nd middle phalanx. He was started Vancomycin. ID and Orthopedics were consulted.     Throughout his hospitalization, his WBC and CRP have uptrended, currently WBC of 14.7 and CRP of 274.4. CT ordered for further evaluation which showed new erosive changes at the distal aspect of the middle phalanx of the 2nd finger concerning for osteomyelitis or other inflammatory  "arthritides.     On 4/30, Cr increased. Vancomycin was discontinued per ID given poor renal function and advanced age. He was started on empiric Daptomycin and Ceftriaxone on 4/30. ID also ordered a uric acid level which was elevated at 11.6 on 4/30.     Rheumatology consulted for "eval for gout flare, elevated uric acid".     Upon initial evaluation, the patient is not very responsive, yelling out in pain. The patients daughter is at bedside who provided the above history. She reports that he seemed more "with it" yesterday and seems confused today. She does not believe that he was on any daily treatment for his gout.    Past Medical History:   Diagnosis Date    Acute on chronic diastolic heart failure 9/1/2016    Coronary artery disease     Hyperlipidemia     Hypertension     Macular degeneration (senile) of retina, unspecified 12/12/2014    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    Persistent atrial fibrillation 11/10/2016    S/P placement of cardiac pacemaker 9/14/2016       Past Surgical History:   Procedure Laterality Date    AORTIC VALVE REPLACEMENT N/A     CARDIAC PACEMAKER PLACEMENT      CATARACT EXTRACTION W/  INTRAOCULAR LENS IMPLANT Right 2/16/2016    Dr. Whitley    CATARACT EXTRACTION W/  INTRAOCULAR LENS IMPLANT Left 3/1/2016    Dr. Whitley    CORONARY ARTERY BYPASS GRAFT      EAR EXAMINATION UNDER ANESTHESIA      EYE SURGERY         Immunization History   Administered Date(s) Administered    COVID-19, MRNA, LN-S, PF (MODERNA FULL 0.5 ML DOSE) 03/04/2021, 04/01/2021    Influenza 10/17/2007, 10/15/2008, 10/14/2009, 09/16/2010, 10/03/2011    Influenza (FLUAD) - Quadrivalent - Adjuvanted - PF *Preferred* (65+) 10/15/2020, 09/30/2023    Influenza - High Dose - PF (65 years and older) 11/18/2013, 12/15/2014, 10/26/2015, 11/10/2016, 01/02/2018, 11/08/2018, 10/02/2019    Influenza Split 10/17/2007, 10/15/2008, 10/14/2009, 09/16/2010, 10/03/2011    Pneumococcal Conjugate - 13 Valent 02/01/2016    Pneumococcal " Polysaccharide - 23 Valent 11/18/2013    Tdap 08/08/2016       Review of patient's allergies indicates:   Allergen Reactions    Iodine and iodide containing products Other (See Comments)     Caused changes in skin color     Current Facility-Administered Medications   Medication Dose Route Frequency Provider Last Rate Last Admin    acetaminophen tablet 1,000 mg  1,000 mg Oral Q8H PRN Juan Manuel Cornell PA-C        albuterol-ipratropium 2.5 mg-0.5 mg/3 mL nebulizer solution 3 mL  3 mL Nebulization Q4H PRN Juan Manuel Cornell PA-C        bisacodyL suppository 10 mg  10 mg Rectal Daily PRN Juan Manuel Cornell PA-C        carvediloL tablet 12.5 mg  12.5 mg Oral BID WM Juan Manuel Cornell PA-C   12.5 mg at 05/01/24 0924    cefTRIAXone (ROCEPHIN) 2 g in dextrose 5 % in water (D5W) 100 mL IVPB (MB+)  2 g Intravenous Q24H Patrica Carson PA-C   Stopped at 04/30/24 1349    DAPTOmycin (CUBICIN) 500 mg in sodium chloride 0.9% SolP 50 mL IVPB  8 mg/kg Intravenous Q48H Patrica Carson PA-C   Stopped at 04/30/24 1330    dextrose 10% bolus 125 mL 125 mL  12.5 g Intravenous PRN Juan Manuel Cornell PA-C        dextrose 10% bolus 250 mL 250 mL  25 g Intravenous PRN Juan Manuel Cornell PA-C        diphenhydrAMINE injection 50 mg  50 mg Intramuscular Once OMID Valentin MD        ferrous sulfate tablet 1 each  1 tablet Oral Daily Juan Manuel Cornell PA-C   1 each at 05/01/24 0924    glucagon (human recombinant) injection 1 mg  1 mg Intramuscular PRN Juan Manuel Cornell PA-C        glucose chewable tablet 16 g  16 g Oral PRN Juan Manuel Cornell PA-C        glucose chewable tablet 24 g  24 g Oral PRN Juan Manuel Cornell PA-C        heparin 25,000 units in dextrose 5% (100 units/ml) IV bolus from bag LOW INTENSITY nomogram - [facility]  60 Units/kg (Adjusted) Intravenous Once Juan Manuel Cornell PA-C        heparin 25,000 units in dextrose 5% (100 units/ml) IV bolus from bag LOW INTENSITY nomogram - [facility]  60 Units/kg (Adjusted) Intravenous PRN Juan Manuel Cornell PA-C    3,640 Units at 04/29/24 0632    heparin 25,000 units in dextrose 5% (100 units/ml) IV bolus from bag LOW INTENSITY nomogram - [facility]  30 Units/kg (Adjusted) Intravenous PRN Juan Maneul Cornell PA-C   1,820 Units at 04/30/24 0351    heparin 25,000 units in dextrose 5% 250 mL (100 units/mL) infusion LOW INTENSITY nomogram - [facility]  0-40 Units/kg/hr (Adjusted) Intravenous Continuous Juan Manuel Cornell PA-C 9.1 mL/hr at 05/01/24 0235 15 Units/kg/hr at 05/01/24 0235    hydrALAZINE injection 10 mg  10 mg Intravenous Q8H PRJuan Manuel Montenegro PA-C   10 mg at 04/28/24 1835    hydrocortisone sodium succinate injection 200 mg  200 mg Intravenous Once OMID Valentin MD        HYDROmorphone injection 1 mg  1 mg Intravenous Q6H PRJuan Manuel Montenegro PA-C   1 mg at 04/29/24 2334    [START ON 5/2/2024] lisinopriL tablet 5 mg  5 mg Oral Daily Maximo Howell MD        melatonin tablet 6 mg  6 mg Oral Nightly PRN Juan Manuel Cornell PA-C   6 mg at 04/30/24 2035    naloxone 0.4 mg/mL injection 0.02 mg  0.02 mg Intravenous PRN Juan Manuel Cornell PA-C        oxyCODONE immediate release tablet 5 mg  5 mg Oral Q6H PRJuan Manuel Montenegro PA-C   5 mg at 04/28/24 2126    oxyCODONE immediate release tablet Tab 10 mg  10 mg Oral Q6H PRN Juan Manuel Cornell PA-C   10 mg at 04/30/24 2219    polyethylene glycol packet 17 g  17 g Oral Daily PRN Juan Manuel Cornell PA-C        prochlorperazine tablet 10 mg  10 mg Oral TID PRN Juan Manuel Cornell PA-C        promethazine tablet 25 mg  25 mg Oral Q6H PRN Juan Manuel Cornell PA-C        sodium chloride 0.9% bolus 500 mL 500 mL  500 mL Intravenous Once Maximo Howell MD        sodium chloride 0.9% flush 10 mL  10 mL Intravenous Q12H PRN Juan Manuel Cornell PA-C        sodium zirconium cyclosilicate packet 10 g  10 g Oral TID Maximo Howell MD        [START ON 5/2/2024] torsemide tablet 20 mg  20 mg Oral BID Maximo Howell MD         Family History       Problem Relation (Age of Onset)    Hypertension Brother    No Known Problems  Mother, Father, Sister, Maternal Aunt, Maternal Uncle, Paternal Aunt, Paternal Uncle, Maternal Grandmother, Maternal Grandfather, Paternal Grandmother, Paternal Grandfather, Daughter, Son    Stroke Brother          Tobacco Use    Smoking status: Never     Passive exposure: Never    Smokeless tobacco: Never   Substance and Sexual Activity    Alcohol use: No    Drug use: No    Sexual activity: Yes     Partners: Female     Review of Systems   Unable to perform ROS: Mental status change     Objective:     Vital Signs (Most Recent):  Temp: 98.1 °F (36.7 °C) (05/01/24 0906)  Pulse: 68 (05/01/24 1101)  Resp: 16 (05/01/24 0529)  BP: 138/67 (05/01/24 0924)  SpO2: (!) 92 % (05/01/24 0906) Vital Signs (24h Range):  Temp:  [98 °F (36.7 °C)-99.3 °F (37.4 °C)] 98.1 °F (36.7 °C)  Pulse:  [67-71] 68  Resp:  [16-20] 16  SpO2:  [92 %-97 %] 92 %  BP: ()/(55-67) 138/67     Weight: 62.7 kg (138 lb 3.7 oz) (04/29/24 0318)  Body mass index is 20.41 kg/m².  Body surface area is 1.75 meters squared.      Intake/Output Summary (Last 24 hours) at 5/1/2024 1132  Last data filed at 5/1/2024 0650  Gross per 24 hour   Intake --   Output 400 ml   Net -400 ml         Physical Exam   Constitutional: No distress.   HENT:   Head: Normocephalic and atraumatic.   Nose: Nose normal.   Mouth/Throat: Mucous membranes are dry. Oropharynx is clear.   Eyes: Conjunctivae are normal.   Cardiovascular: Normal rate and normal pulses.   Pulmonary/Chest: Effort normal and breath sounds normal. No respiratory distress.   Abdominal: Soft. He exhibits no distension.   Musculoskeletal:         General: Swelling and tenderness present.      Cervical back: Normal range of motion.      Comments: Significant tenderness to L hand, unable to properly exam it because the patient was screaming in pain. The R hand appears normal. Tenderness and swelling of the L elbow. Swelling and tenderness of the R knee noted, L knee appears normal. The patient also reported pain in  the L and R ankle. The L ankle appeared slightly erythematous and warm to the touch, possible minimal swelling.   Skin: Bruising noted. There is erythema.        Significant Labs:  All pertinent lab results from the last 24 hours have been reviewed.    Significant Imaging:  Imaging results within the past 24 hours have been reviewed.  Assessment/Plan:     Orthopedic  Swelling of left hand  Deena Moody is a 89 y.o. M with a past medical history of HTN, HLD, CAD, diastolic HF, A-fib with pacemaker, CABG, hx of MI, AV block, and CKD 3 who was admitted to [facility] [place] on 4/28 with left hand swelling and pain that had been persistent for 2-3 weeks.     The patient has a longstanding history of crystal-proven gouty arthritis with flares that typically involve his knees but have affected his feet in the past as well. He was not on maintenance therapy as outpatient. He is now presenting with subacute pain and swelling of th L hand that has been persistent for 2-3 weeks. He failed outpatient abx management and then was started on Vancomycin which has since been changed to Ceftriaxone and Daptomycin. He was found to have an elevated uric acid level, significantly elevated CRP (274) and elevated sed rate (113). CT scan of the L hand concerning for osteomyelitis vs inflammatory arthropathy.     Recommendations:   - The patient has a history of CKD and heart failure therefore we cannot use colchicine   - ID OK with steroids while receiving treatment for suspected OM  - Start Solumedrol 80 mg daily for 2 doses, will continue to evaluate response  - Will need to be started on maintenance therapy if he responds to steroids       Thank you for this consult. These are preliminary recommendations. Please see attending attestation and follow recommendations. Please message with any questions or concerns.     Lucy Mar MD  Rheumatology  [facility] - Med Surg

## 2024-05-01 NOTE — SUBJECTIVE & OBJECTIVE
Past Medical History:   Diagnosis Date    Acute on chronic diastolic heart failure 9/1/2016    Coronary artery disease     Hyperlipidemia     Hypertension     Macular degeneration (senile) of retina, unspecified 12/12/2014    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    Persistent atrial fibrillation 11/10/2016    S/P placement of cardiac pacemaker 9/14/2016       Past Surgical History:   Procedure Laterality Date    AORTIC VALVE REPLACEMENT N/A     CARDIAC PACEMAKER PLACEMENT      CATARACT EXTRACTION W/  INTRAOCULAR LENS IMPLANT Right 2/16/2016    Dr. Whitley    CATARACT EXTRACTION W/  INTRAOCULAR LENS IMPLANT Left 3/1/2016    Dr. Whitley    CORONARY ARTERY BYPASS GRAFT      EAR EXAMINATION UNDER ANESTHESIA      EYE SURGERY         Immunization History   Administered Date(s) Administered    COVID-19, MRNA, LN-S, PF (MODERNA FULL 0.5 ML DOSE) 03/04/2021, 04/01/2021    Influenza 10/17/2007, 10/15/2008, 10/14/2009, 09/16/2010, 10/03/2011    Influenza (FLUAD) - Quadrivalent - Adjuvanted - PF *Preferred* (65+) 10/15/2020, 09/30/2023    Influenza - High Dose - PF (65 years and older) 11/18/2013, 12/15/2014, 10/26/2015, 11/10/2016, 01/02/2018, 11/08/2018, 10/02/2019    Influenza Split 10/17/2007, 10/15/2008, 10/14/2009, 09/16/2010, 10/03/2011    Pneumococcal Conjugate - 13 Valent 02/01/2016    Pneumococcal Polysaccharide - 23 Valent 11/18/2013    Tdap 08/08/2016       Review of patient's allergies indicates:   Allergen Reactions    Iodine and iodide containing products Other (See Comments)     Caused changes in skin color     Current Facility-Administered Medications   Medication Dose Route Frequency Provider Last Rate Last Admin    acetaminophen tablet 1,000 mg  1,000 mg Oral Q8H PRN Juan Manuel Cornell PA-C        albuterol-ipratropium 2.5 mg-0.5 mg/3 mL nebulizer solution 3 mL  3 mL Nebulization Q4H PRN Juan Manuel Cornell PA-C        bisacodyL suppository 10 mg  10 mg Rectal Daily PRN Juan Manuel Cornell PA-C        carvediloL tablet  12.5 mg  12.5 mg Oral BID WM Jua nManuel Cornell PA-C   12.5 mg at 05/01/24 0924    cefTRIAXone (ROCEPHIN) 2 g in dextrose 5 % in water (D5W) 100 mL IVPB (MB+)  2 g Intravenous Q24H Patrica Carson PA-C   Stopped at 04/30/24 1349    DAPTOmycin (CUBICIN) 500 mg in sodium chloride 0.9% SolP 50 mL IVPB  8 mg/kg Intravenous Q48H Patrica Carson PA-C   Stopped at 04/30/24 1330    dextrose 10% bolus 125 mL 125 mL  12.5 g Intravenous PRN Juan Manuel Cornell PA-C        dextrose 10% bolus 250 mL 250 mL  25 g Intravenous PRN Juan Manuel Cornell PA-C        diphenhydrAMINE injection 50 mg  50 mg Intramuscular Once OMID Valentin MD        ferrous sulfate tablet 1 each  1 tablet Oral Daily Juan Manuel Cornell PA-C   1 each at 05/01/24 0924    glucagon (human recombinant) injection 1 mg  1 mg Intramuscular PRN Juan Manuel Cornell PA-C        glucose chewable tablet 16 g  16 g Oral PRJuan Manuel Montenegro PA-C        glucose chewable tablet 24 g  24 g Oral PRN Juan Manuel Cornell PA-C        heparin 25,000 units in dextrose 5% (100 units/ml) IV bolus from bag LOW INTENSITY nomogram - [facility]  60 Units/kg (Adjusted) Intravenous Once Juan Manuel Cornell PA-C        heparin 25,000 units in dextrose 5% (100 units/ml) IV bolus from bag LOW INTENSITY nomogram - [facility]  60 Units/kg (Adjusted) Intravenous PRN Juan Manuel Cornell PA-C   3,640 Units at 04/29/24 0632    heparin 25,000 units in dextrose 5% (100 units/ml) IV bolus from bag LOW INTENSITY nomogram - [facility]  30 Units/kg (Adjusted) Intravenous PRN Juan Manuel Cornell PA-C   1,820 Units at 04/30/24 0351    heparin 25,000 units in dextrose 5% 250 mL (100 units/mL) infusion LOW INTENSITY nomogram - [facility]  0-40 Units/kg/hr (Adjusted) Intravenous Continuous Juan Manuel Cornell PA-C 9.1 mL/hr at 05/01/24 0235 15 Units/kg/hr at 05/01/24 0235    hydrALAZINE injection 10 mg  10 mg Intravenous Q8H PRN Juan Manuel Cornell PA-C   10 mg at 04/28/24 1835    hydrocortisone sodium succinate injection 200 mg  200 mg Intravenous Once  OMID Valentin MD        HYDROmorphone injection 1 mg  1 mg Intravenous Q6H PRJuan Manuel Montenegro PA-C   1 mg at 04/29/24 2334    [START ON 5/2/2024] lisinopriL tablet 5 mg  5 mg Oral Daily Maximo Howell MD        melatonin tablet 6 mg  6 mg Oral Nightly PRJuan Manuel Montenegro PA-C   6 mg at 04/30/24 2035    naloxone 0.4 mg/mL injection 0.02 mg  0.02 mg Intravenous PRN Juan Manuel Cornell PA-C        oxyCODONE immediate release tablet 5 mg  5 mg Oral Q6H PRJuan Manuel Montenegro PA-C   5 mg at 04/28/24 2126    oxyCODONE immediate release tablet Tab 10 mg  10 mg Oral Q6H PRJuan Manuel Montenegro PA-C   10 mg at 04/30/24 2219    polyethylene glycol packet 17 g  17 g Oral Daily PRN Juan Manuel Cornell PA-C        prochlorperazine tablet 10 mg  10 mg Oral TID PRN Juan Manuel Cornell PA-C        promethazine tablet 25 mg  25 mg Oral Q6H PRJuan Manuel Montenegro PA-C        sodium chloride 0.9% bolus 500 mL 500 mL  500 mL Intravenous Once Maximo Howell MD        sodium chloride 0.9% flush 10 mL  10 mL Intravenous Q12H PRJuan Manuel Montenegro PA-C        sodium zirconium cyclosilicate packet 10 g  10 g Oral TID Maximo Howell MD        [START ON 5/2/2024] torsemide tablet 20 mg  20 mg Oral BID Maximo Howell MD         Family History       Problem Relation (Age of Onset)    Hypertension Brother    No Known Problems Mother, Father, Sister, Maternal Aunt, Maternal Uncle, Paternal Aunt, Paternal Uncle, Maternal Grandmother, Maternal Grandfather, Paternal Grandmother, Paternal Grandfather, Daughter, Son    Stroke Brother          Tobacco Use    Smoking status: Never     Passive exposure: Never    Smokeless tobacco: Never   Substance and Sexual Activity    Alcohol use: No    Drug use: No    Sexual activity: Yes     Partners: Female     Review of Systems   Unable to perform ROS: Mental status change     Objective:     Vital Signs (Most Recent):  Temp: 98.1 °F (36.7 °C) (05/01/24 0906)  Pulse: 68 (05/01/24 1101)  Resp: 16 (05/01/24 0529)  BP:  138/67 (05/01/24 0924)  SpO2: (!) 92 % (05/01/24 0906) Vital Signs (24h Range):  Temp:  [98 °F (36.7 °C)-99.3 °F (37.4 °C)] 98.1 °F (36.7 °C)  Pulse:  [67-71] 68  Resp:  [16-20] 16  SpO2:  [92 %-97 %] 92 %  BP: ()/(55-67) 138/67     Weight: 62.7 kg (138 lb 3.7 oz) (04/29/24 0318)  Body mass index is 20.41 kg/m².  Body surface area is 1.75 meters squared.      Intake/Output Summary (Last 24 hours) at 5/1/2024 1132  Last data filed at 5/1/2024 0650  Gross per 24 hour   Intake --   Output 400 ml   Net -400 ml         Physical Exam   Constitutional: No distress.   HENT:   Head: Normocephalic and atraumatic.   Nose: Nose normal.   Mouth/Throat: Mucous membranes are dry. Oropharynx is clear.   Eyes: Conjunctivae are normal.   Cardiovascular: Normal rate and normal pulses.   Pulmonary/Chest: Effort normal and breath sounds normal. No respiratory distress.   Abdominal: Soft. He exhibits no distension.   Musculoskeletal:         General: Swelling and tenderness present.      Cervical back: Normal range of motion.      Comments: Significant tenderness to L hand, unable to properly exam it because the patient was screaming in pain. The R hand appears normal. Tenderness and swelling of the L elbow. Swelling and tenderness of the R knee noted, L knee appears normal. The patient also reported pain in the L and R ankle. The L ankle appeared slightly erythematous and warm to the touch, possible minimal swelling.   Skin: Bruising noted. There is erythema.        Significant Labs:  All pertinent lab results from the last 24 hours have been reviewed.    Significant Imaging:  Imaging results within the past 24 hours have been reviewed.

## 2024-05-01 NOTE — PROGRESS NOTES
[facility] - Med Surg  Infectious Disease  Progress Note    Patient Name: Deena Moody  MRN: [id number]  Admission Date: 4/28/2024  Length of Stay: 2 days  Attending Physician: Maximo Howell MD  Primary Care Provider: Jam Rowell MD    Isolation Status: No active isolations  Assessment/Plan:      Orthopedic  Swelling of left hand    89 year old male with cardiac disease, pacemaker, CKD, gout who presented to the ED 4/28 with left hand pain, swelling and erythema that has been present for over a week without known trauma.    Afebrile without systemic signs of infection. Labs revealed a leukocytosis and elevated inflammatory markers. Uric acid level 11. CT with erosive changes at the distal aspect of the middle phalanx of the 2nd finger concerning for osteomyelitis or other inflammatory arthritides. Orthopedic surgery following. No plans for surgery at this time. He has been on IV antibiotics without clinical improvement and increased CRP.    Recommendations  Continue empiric Daptomycin and Ceftriaxone. .   Concerned for gout given history and elevated uric acid. Recommend starting treatment for acute gout flare. No absolute contraindication to starting steroids from an ID standpoint if indicated.   Orthopedic surgery following. If taken for surgery, please send tissue and fluid for cell count w/ diff, crystals, gram stain and cultures  Discussed with hospital medicine staff. ID will continue to follow.          Thank you for the consult. Please secure chat for any questions.  Patrica Carson PA-C    Subjective:     Principal Problem:Swelling of digit of left hand    HPI: Deena Moody is a 89 y.o. male with PMHx significant for CAD, HTN, CHF, A-fib, pacemaker, CKD, gout who presented to the ED 4/28 with left hand pain and swelling. Patient states his hand became painful about 2 weeks ago and began to get swollen. He was in the ED 2 days prior to admit and diagnosed with cellulitis. He was never  started on antibiotics after discharged from the ED. He returned for worsening pain and swelling of the dorsal hand. He also endorses redness. He denies known trauma to the hand. Denies open cuts, though his wedding ring was cut off during last ED visit. He also recently had some skin cancer removed on his left arm earlier this year. Denies recent procedures otherwise. In 2020, has hx of pseudomonas bacteremia presumed from intraabdominal source treated with 2 weeks of IV cefepime.     In the ED: Hypertensive otherwise VSS. No leukocytosis. ESR 55 and CRP 82.8.    X-ray with Diffuse soft tissue swelling of the left hand and erosive change involving the head of the 2nd middle phalanx.  This may represent a focus of infection. CT ordered for further evaluation ( cannot get MRI ). Patient is currently on Vancomycin. ID consulted for antibiotic recommendations.    Of note, his inflammatory markers and WBC count have increased since admit. He also has developed worsening renal function.  Labs 4/30 revealed a WBC of 16K, , CR 2.1.   Interval History: NAEON. Afebrile. Resting at bedside. Daughter present. Hand pain and swelling persistent. Not swallowing well. Pill stuck to denture. CRP increased. Uric acid elevated. Discussed with primary team about starting gout treatment.    Review of Systems   Constitutional:  Negative for chills, diaphoresis, fatigue and fever.   HENT:  Positive for hearing loss.    Respiratory:  Negative for cough and shortness of breath.    Cardiovascular:  Negative for chest pain and leg swelling.   Gastrointestinal:  Negative for abdominal pain, diarrhea, nausea and vomiting.   Genitourinary:  Negative for dysuria and frequency.   Musculoskeletal:  Positive for arthralgias and joint swelling. Negative for back pain.   Skin:  Positive for color change. Negative for rash and wound.   Neurological:  Negative for dizziness and headaches.   Psychiatric/Behavioral:  Positive for confusion and  decreased concentration. The patient is not nervous/anxious.    All other systems reviewed and are negative.    Objective:     Vital Signs (Most Recent):  Temp: 98.3 °F (36.8 °C) (05/01/24 1145)  Pulse: 68 (05/01/24 1145)  Resp: 16 (05/01/24 1145)  BP: (!) 90/50 (05/01/24 1145)  SpO2: (!) 93 % (05/01/24 1145) Vital Signs (24h Range):  Temp:  [98.1 °F (36.7 °C)-99.3 °F (37.4 °C)] 98.3 °F (36.8 °C)  Pulse:  [67-71] 68  Resp:  [16-20] 16  SpO2:  [92 %-97 %] 93 %  BP: ()/(50-67) 90/50     Weight: 62.7 kg (138 lb 3.7 oz)  Body mass index is 20.41 kg/m².    Estimated Creatinine Clearance: 15.7 mL/min (A) (based on SCr of 2.6 mg/dL (H)).     Physical Exam  Vitals and nursing note reviewed.   Constitutional:       General: He is not in acute distress.     Appearance: Normal appearance. He is well-developed. He is not ill-appearing or diaphoretic.   HENT:      Head: Normocephalic and atraumatic.      Right Ear: External ear normal.      Left Ear: External ear normal.      Nose: Nose normal.   Eyes:      General: No scleral icterus.        Right eye: No discharge.         Left eye: No discharge.      Extraocular Movements: Extraocular movements intact.      Conjunctiva/sclera: Conjunctivae normal.   Pulmonary:      Effort: Pulmonary effort is normal. No respiratory distress.      Breath sounds: No stridor.   Abdominal:      General: There is no distension.      Palpations: Abdomen is soft.   Genitourinary:     Comments: Tea colored urine in cannister  Musculoskeletal:         General: Swelling and tenderness present.      Right lower leg: No edema.      Left lower leg: No edema.      Comments: Left hand and finger swelling with erythema present   Skin:     General: Skin is dry.      Coloration: Skin is not jaundiced or pale.      Findings: Erythema present.      Comments: Thickened long toenails   Neurological:      General: No focal deficit present.      Mental Status: He is alert and oriented to person, place, and  time. Mental status is at baseline.   Psychiatric:         Mood and Affect: Mood normal.         Behavior: Behavior normal.         Thought Content: Thought content normal.         Judgment: Judgment normal.          Significant Labs: BMP:   Recent Labs   Lab 05/01/24  0518   GLU 75   *   K 5.3*      CO2 20*   BUN 58*   CREATININE 2.6*   CALCIUM 8.3*   MG 2.2     CBC:   Recent Labs   Lab 04/30/24 0252 05/01/24 0518   WBC 16.93* 14.79*   HGB 10.1* 8.8*   HCT 32.1* 28.9*    197     CMP:   Recent Labs   Lab 04/30/24 0252 05/01/24 0518   * 133*   K 5.3* 5.3*    102   CO2 19* 20*    75   BUN 47* 58*   CREATININE 2.1* 2.6*   CALCIUM 9.1 8.3*   PROT 7.7 6.7   ALBUMIN 2.9* 2.4*   BILITOT 1.2* 1.2*   ALKPHOS 117 102   AST 21 35   ALT 8* 12   ANIONGAP 12 11     Microbiology Results (last 7 days)       ** No results found for the last 168 hours. **          Recent Lab Results  (Last 5 results in the past 24 hours)        05/01/24  1140   05/01/24  0913   05/01/24  0518   04/30/24  1955   04/30/24  1824        Albumin     2.4           ALP     102           ALT     12           Anion Gap     11           PTT     50.0  Comment: Refer to local heparin nomogram for intensity/dose specific   therapeutic   range.       44.1  Comment: Refer to local heparin nomogram for intensity/dose specific   therapeutic   range.         AST     35           Baso #     0.02           Basophil %     0.1           BILIRUBIN TOTAL     1.2  Comment: For infants and newborns, interpretation of results should be based  on gestational age, weight and in agreement with clinical  observations.    Premature Infant recommended reference ranges:  Up to 24 hours.............<8.0 mg/dL  Up to 48 hours............<12.0 mg/dL  3-5 days..................<15.0 mg/dL  6-29 days.................<15.0 mg/dL             BUN     58           Calcium     8.3           Chloride     102           CO2     20           Creatinine      2.6           CRP     274.4           Differential Method     Automated           eGFR     22.3           Eos #     0.0           Eos %     0.1           Glucose     75           Gran # (ANC)     11.9           Gran %     80.3           Hematocrit     28.9           Hemoglobin     8.8           Immature Grans (Abs)     0.10  Comment: Mild elevation in immature granulocytes is non specific and   can be seen in a variety of conditions including stress response,   acute inflammation, trauma and pregnancy. Correlation with other   laboratory and clinical findings is essential.             Immature Granulocytes     0.7           Lymph #     1.1           Lymph %     7.4           Magnesium      2.2           MCH     30.3           MCHC     30.4           MCV     100           Mono #     1.7           Mono %     11.4           MPV     9.3           nRBC     0           Phosphorus Level     4.9           Platelet Count     197           POCT Glucose 88   90     120         Potassium     5.3  Comment: *No Visible Hemolysis           PROTEIN TOTAL     6.7           RBC     2.90           RDW     14.2           Sodium     133           Uric Acid     11.6           WBC     14.79                                  Significant Imaging:     Imaging Results              X-Ray Hand 3 view Left (Final result)  Result time 04/28/24 15:21:44      Final result by Leon Ortega MD (04/28/24 15:21:44)                   Impression:      1.  Diffuse soft tissue swelling of the left hand.    2.  Erosive change involving the head of the 2nd middle phalanx.  This may represent a focus of infection.  Additional evaluation, as clinically warranted.    3.  No evidence of a fracture or dislocation.      Electronically signed by: Leon Ortega MD  Date:    04/28/2024  Time:    15:21               Narrative:    EXAMINATION:  XR HAND COMPLETE 3 VIEW LEFT    CLINICAL HISTORY:  hand swelling;.    TECHNIQUE:  PA, lateral, and oblique views of the left  hand were performed.    COMPARISON:  06/15/2023.    FINDINGS:  There is demineralization of the osseous structures.  There is unchanged appearance of chronic changes involving the left distal radius and ulna.  There is no cortical step-off.  There is no evidence of periostitis.    There is joint space narrowing with osteophytosis.  There are advanced degenerative changes involving the left thumb.  There is an erosive change involving the head of the 2nd middle phalanx.    There is diffuse soft tissue swelling of the left hand.  There are advanced vascular calcifications in the left hand.    There is no evidence of a fracture or dislocation.

## 2024-05-01 NOTE — CARE UPDATE
Care Update Orthopedic Surgery     Left hand examined at bedside today. Diffuse pain with palpation of entire left arm below the elbow. CRP up trending. No specific areas of cellulitis, induration, or fluctuance. No significant [place] signs. Will continue to trend CRP for now.  Okay for diet today.     Ish Wheat PGY-3   Orthopedic Surgery Resident

## 2024-05-01 NOTE — HPI
"Deena Moody is a 89 y.o. M with a past medical history of HTN, HLD, CAD, diastolic HF, A-fib with pacemaker, CABG, hx of MI, AV block, and CKD 3 who [place] [facility] with left hand swelling and pain that had been persistent for 2-3 weeks.    The patient has a long history of gout. His gout attacks typically involve his knees but have involved his feet in the past as well. He had an arthrocentesis of the L knee done on 7/12/23 which was positive for crystals.      Latest Reference Range & Units 07/12/23 15:38   Body Fluid Crystal Negative  Positive !   Body Fluid Source, Crystal Exam  Joint Fluid, Left Knee   !: Intra and extracellular urate crystals present     About 2 weeks ago he developed pain and then swelling of the L hand. He was initially seen in the ED 2 days before admission and diagnosed with cellulitis and prescribed PO keflex and discharged. He returned because the pain and swelling continued to worsen. Laboratory studies: No leukocytosis. ESR 55 and CRP 82.8. X-ray with diffuse soft tissue swelling of the left hand and erosive change involving the head of the 2nd middle phalanx. He was started Vancomycin. ID and Orthopedics were consulted.     Throughout his hospitalization, his WBC and CRP have uptrended, currently WBC of 14.7 and CRP of 274.4. CT ordered for further evaluation which showed new erosive changes at the distal aspect of the middle phalanx of the 2nd finger concerning for osteomyelitis or other inflammatory arthritides.     On 4/30, Cr increased. Vancomycin was discontinued per ID given poor renal function and advanced age. He was started on empiric Daptomycin and Ceftriaxone on 4/30. ID also ordered a uric acid level which was elevated at 11.6 on 4/30.     Rheumatology consulted for "eval for gout flare, elevated uric acid".     Upon initial evaluation, the patient is not very responsive, yelling out in pain. The patients daughter is at bedside who provided the above history. " "She reports that he seemed more "with it" yesterday and seems confused today. She does not believe that he was on any daily treatment for his gout.  "

## 2024-05-01 NOTE — HOSPITAL COURSE
Presented with Left hand swelling and pain x 2-3 weeks. Seen in ED 4/26, ring removed, discharged with oral keflex. Returns with worsening pain, swelling, and erythema that has now progressed to left wrist. Labs with leukocytosis. ESR and CRP elevated. Initial XR images concerning for osteo. Ortho consulted. Recommend broad spectrum iv abx. Wrap with ace bandage, elevate, will continue to trend crp and exam. CT L hand w/o contrast shows new erosive changes at the distal aspect of the middle phalanx of the 2nd finger concerning for osteomyelitis or other inflammatory arthritides.ID following. On CTX and dapto. Uric acid elevated 11.6. Given hx of gout, consulting rheumatology for evaluation of gout flare. Hx of Afib, holding eliquis in setting of possible OR, heparin infusion for now. Started on Prednisone with improvement in hand pain/swelling. CTX and Daptomycin discontinued by ID on 5/03, as felt his symptoms are from gout. CRP trending down. Kidney function is improving daily. Will complete oral steroids on 5/7. Ambulatory referral to rheumatology clinic for follow up and maintenance therapy discussions. US LUE negative for DVT. Swelling improved with elevation. Renal function improving daily. Now near baseline. PT/OT recommending SNF. Patient stable for discharge to [facility] on 5/8.

## 2024-05-01 NOTE — ASSESSMENT & PLAN NOTE
History of heart bypass surgery  History of MI (myocardial infarction)  - Holding Aspirin for now for possible procedure in future   - previously intolerant to statins 2/2 myalgias

## 2024-05-01 NOTE — PT/OT/SLP EVAL
"Speech Language Pathology Evaluation  Bedside Swallow    Patient Name:  Deena Moody   MRN:  [id number]  Admitting Diagnosis: Swelling of digit of left hand    Recommendations:                 General Recommendations:  Dysphagia therapy  Diet recommendations:  NPO, Pleasure Feeding, NPO, Other (Comment) (ice chips for pleasure when fully alert)   Aspiration Precautions: Eliminate distractions, Feed only when awake/alert, HOB to 90 degrees, Ice chips sparingly, and Puree for pleasure   General Precautions: Standard, aspiration  Communication strategies:  [place]  Assessment:     Deena Moody is a 89 y.o. male with an SLP diagnosis of Dysphagia.  He presents with decreased alertness.    History:     Past Medical History:   Diagnosis Date    Acute on chronic diastolic heart failure 9/1/2016    Coronary artery disease     Hyperlipidemia     Hypertension     Macular degeneration (senile) of retina, unspecified 12/12/2014    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    Persistent atrial fibrillation 11/10/2016    S/P placement of cardiac pacemaker 9/14/2016       Past Surgical History:   Procedure Laterality Date    AORTIC VALVE REPLACEMENT N/A     CARDIAC PACEMAKER PLACEMENT      CATARACT EXTRACTION W/  INTRAOCULAR LENS IMPLANT Right 2/16/2016    Dr. Whitley    CATARACT EXTRACTION W/  INTRAOCULAR LENS IMPLANT Left 3/1/2016    Dr. Whitley    CORONARY ARTERY BYPASS GRAFT      EAR EXAMINATION UNDER ANESTHESIA      EYE SURGERY         Social History: Patient lives with family.        Prior diet: regular with thin.    Occupation/hobbies/homemaking: na.    Subjective     "He ate a roast beef po boy" per family   Patient goals: syl     Pain/Comfort:  Pain Rating 1: 0/10  Pain Rating Post-Intervention 1: 0/10    Respiratory Status: Room air    Objective:     Oral Musculature Evaluation  Oral Musculature: unable to assess due to poor participation/comprehension  Dentition: edentulous, uses dentures to eat  Secretion Management: " adequate  Mucosal Quality: dry  Mandibular Strength and Mobility: WFL  Lingual Strength and Mobility: WFL  Velar Elevation: WFL  Buccal Strength and Mobility: WFL  Volitional Cough: not elicited  Volitional Swallow: not elicited  Voice Prior to PO Intake: wfl    Bedside Swallow Eval:   Consistencies Assessed:  Thin liquids 1/2 teaspoon of water x4 trials  Puree 1/2 teaspoon of pudding x3      Oral Phase:   WFL    Pharyngeal Phase:   no overt clinical signs/symptoms of aspiration    Compensatory Strategies  None    Treatment: pt. With decreased level of alertness however did rouse with stimulation.  Speech was 100% intelligible and vocal quality and intensity wfl.  Pt. Did remove bolus' from spoon with cues and initiated oral transit without significant delay.  No coughing, throat clearing noted however pt. At risk of aspiration due to decreased alertness.      Goals:   Multidisciplinary Problems       SLP Goals          Problem: SLP    Goal Priority Disciplines Outcome   SLP Goal     SLP Progressing   Description: Goals due 5/8  1.  Pt. Will participate in ongoing assessment of swallow at bedside                        Plan:     Patient to be seen:  4 x/week   Plan of Care expires:  05/29/24  Plan of Care reviewed with:  patient   SLP Follow-Up:  Yes       Discharge recommendations:  Low Intensity Therapy   Barriers to Discharge:   not medicallyl ready    Time Tracking:     SLP Treatment Date:   05/01/24  Speech Start Time:  1235  Speech Stop Time:  1245     Speech Total Time (min):  10 min    Billable Minutes: Eval Swallow and Oral Function 10    05/01/2024

## 2024-05-01 NOTE — ASSESSMENT & PLAN NOTE
89 year old male with cardiac disease, pacemaker, CKD, gout who presented to the ED 4/28 with left hand pain and swelling and that has been present for over a week without known trauma.    Afebrile without systemic signs of infection. Labs revealed a leukocytosis and elevated inflammatory markers. Uric acid level 11. CT with erosive changes at the distal aspect of the middle phalanx of the 2nd finger concerning for osteomyelitis or other inflammatory arthritides. Orthopedic surgery following. No plans for surgery at this time. He has been on IV antibiotics without clinical improvement. Rheumatology consulted and started IV solumedrol yesterday. Today, he is tolerating examination better with slight improvement in symptoms.    Recommendations  Continue empiric Daptomycin and Ceftriaxone for now pending orthopedic surgery plans. If taken for surgery, please send tissue and fluid for cell count w/ diff, crystals, gram stain and cultures  At this time, suspect his clinical picture represents an acute gout flare and less likely infection. Continue management per rheumatology. Will likely discontinue antibiotics if he continues to respond to steroids.   Discussed with ID staff. ID will follow.

## 2024-05-01 NOTE — SUBJECTIVE & OBJECTIVE
Interval History:   Left hand swelling and erythema slightly improved but remains TTP.  ID following. On CTX and dapto. Per ortho, no plans for procedures today, will reassess tomorrow. Uric acid elvated, given hx of gout, consulted rheum for evaluation of gout flare.     Objective:     Vital Signs (Most Recent):  Temp: 98.3 °F (36.8 °C) (05/01/24 1145)  Pulse: 68 (05/01/24 1145)  Resp: 16 (05/01/24 1145)  BP: (!) 90/50 (05/01/24 1145)  SpO2: (!) 93 % (05/01/24 1145) Vital Signs (24h Range):  Temp:  [98.1 °F (36.7 °C)-99.3 °F (37.4 °C)] 98.3 °F (36.8 °C)  Pulse:  [67-71] 68  Resp:  [16-18] 16  SpO2:  [92 %-97 %] 93 %  BP: ()/(50-67) 90/50     Weight: 62.7 kg (138 lb 3.7 oz)  Body mass index is 20.41 kg/m².    Intake/Output Summary (Last 24 hours) at 5/1/2024 1357  Last data filed at 5/1/2024 0650  Gross per 24 hour   Intake --   Output 400 ml   Net -400 ml         Physical Exam  Vitals and nursing note reviewed.   Constitutional:       General: He is not in acute distress.     Appearance: He is well-developed.   HENT:      Head: Normocephalic and atraumatic.      Ears:      Comments: Bilateral hear loss, does not wear hearing aides.     Mouth/Throat:      Pharynx: No oropharyngeal exudate.   Eyes:      Conjunctiva/sclera: Conjunctivae normal.      Pupils: Pupils are equal, round, and reactive to light.   Cardiovascular:      Rate and Rhythm: Normal rate and regular rhythm.      Heart sounds: Normal heart sounds.   Pulmonary:      Effort: Pulmonary effort is normal. No respiratory distress.      Breath sounds: Normal breath sounds. No wheezing.   Abdominal:      General: Bowel sounds are normal. There is no distension.      Palpations: Abdomen is soft.      Tenderness: There is no abdominal tenderness.   Musculoskeletal:      Left wrist: Swelling and tenderness present.      Left hand: Swelling and tenderness present. Decreased range of motion. Decreased strength.      Cervical back: Normal range of motion and  neck supple.      Right lower leg: Edema present.      Left lower leg: Edema present.      Comments: See media. Erythema and swelling in left hand to left wrist. ROM limited 2/2 pain. Supination and pronation limited 2/2 pain.   Lymphadenopathy:      Cervical: No cervical adenopathy.   Skin:     General: Skin is warm and dry.      Capillary Refill: Capillary refill takes less than 2 seconds.      Findings: No rash.   Neurological:      General: No focal deficit present.      Mental Status: He is alert and oriented to person, place, and time.      Cranial Nerves: No cranial nerve deficit.      Sensory: No sensory deficit.      Coordination: Coordination normal.   Psychiatric:         Behavior: Behavior normal.         Thought Content: Thought content normal.         Judgment: Judgment normal.             Significant Labs: All pertinent labs within the past 24 hours have been reviewed.  BMP:   Recent Labs   Lab 05/01/24  0518   GLU 75   *   K 5.3*      CO2 20*   BUN 58*   CREATININE 2.6*   CALCIUM 8.3*   MG 2.2     CBC:   Recent Labs   Lab 04/30/24  0252 05/01/24  0518   WBC 16.93* 14.79*   HGB 10.1* 8.8*   HCT 32.1* 28.9*    197       Significant Imaging: I have reviewed all pertinent imaging results/findings within the past 24 hours.

## 2024-05-02 LAB
APTT PPP: 49.6 SEC (ref 21–32)
CRP SERPL-MCNC: 289.6 MG/L (ref 0–8.2)
POCT GLUCOSE: 130 MG/DL (ref 70–110)
POCT GLUCOSE: 150 MG/DL (ref 70–110)
POCT GLUCOSE: 154 MG/DL (ref 70–110)
POCT GLUCOSE: 165 MG/DL (ref 70–110)

## 2024-05-02 PROCEDURE — 97535 SELF CARE MNGMENT TRAINING: CPT | Mod: HCNC

## 2024-05-02 PROCEDURE — 63600175 PHARM REV CODE 636 W HCPCS: Mod: HCNC

## 2024-05-02 PROCEDURE — 99231 SBSQ HOSP IP/OBS SF/LOW 25: CPT | Mod: HCNC,,, | Performed by: INTERNAL MEDICINE

## 2024-05-02 PROCEDURE — 21400001 HC TELEMETRY ROOM: Mod: HCNC

## 2024-05-02 PROCEDURE — 63600175 PHARM REV CODE 636 W HCPCS: Mod: HCNC | Performed by: PHYSICIAN ASSISTANT

## 2024-05-02 PROCEDURE — 63600175 PHARM REV CODE 636 W HCPCS: Mod: HCNC | Performed by: STUDENT IN AN ORGANIZED HEALTH CARE EDUCATION/TRAINING PROGRAM

## 2024-05-02 PROCEDURE — 25000003 PHARM REV CODE 250: Mod: HCNC | Performed by: PHYSICIAN ASSISTANT

## 2024-05-02 PROCEDURE — 36415 COLL VENOUS BLD VENIPUNCTURE: CPT | Mod: HCNC

## 2024-05-02 PROCEDURE — 86140 C-REACTIVE PROTEIN: CPT | Mod: HCNC | Performed by: STUDENT IN AN ORGANIZED HEALTH CARE EDUCATION/TRAINING PROGRAM

## 2024-05-02 PROCEDURE — 99233 SBSQ HOSP IP/OBS HIGH 50: CPT | Mod: HCNC,,, | Performed by: PHYSICIAN ASSISTANT

## 2024-05-02 PROCEDURE — 85730 THROMBOPLASTIN TIME PARTIAL: CPT | Mod: HCNC

## 2024-05-02 RX ORDER — PREDNISONE 20 MG/1
40 TABLET ORAL DAILY
Status: COMPLETED | OUTPATIENT
Start: 2024-05-03 | End: 2024-05-07

## 2024-05-02 RX ADMIN — DAPTOMYCIN 500 MG: 500 INJECTION, POWDER, LYOPHILIZED, FOR SOLUTION INTRAVENOUS at 12:05

## 2024-05-02 RX ADMIN — CEFTRIAXONE 2 G: 2 INJECTION, POWDER, FOR SOLUTION INTRAMUSCULAR; INTRAVENOUS at 11:05

## 2024-05-02 RX ADMIN — METHYLPREDNISOLONE SODIUM SUCCINATE 80 MG: 40 INJECTION, POWDER, FOR SOLUTION INTRAMUSCULAR; INTRAVENOUS at 02:05

## 2024-05-02 RX ADMIN — HEPARIN SODIUM 15 UNITS/KG/HR: 10000 INJECTION, SOLUTION INTRAVENOUS at 06:05

## 2024-05-02 NOTE — SUBJECTIVE & OBJECTIVE
Interval History:   Left hand swelling and erythema progressively improving. Started on IV solumedrol per rheum recs for concern of acute polyarticular gout. ID following. Continue empiric CTX and dapto. Per ortho, no plans for procedures today. However would like to continue to hold home eliquis and continue IV heparin for now in case he needs a procedure later.    Objective:     Vital Signs (Most Recent):  Temp: 97.3 °F (36.3 °C) (05/02/24 0759)  Pulse: 69 (05/02/24 1125)  Resp: 18 (05/02/24 0759)  BP: 138/60 (05/02/24 0759)  SpO2: 97 % (05/02/24 0439) Vital Signs (24h Range):  Temp:  [97.3 °F (36.3 °C)-97.8 °F (36.6 °C)] 97.3 °F (36.3 °C)  Pulse:  [67-69] 69  Resp:  [16-18] 18  SpO2:  [94 %-97 %] 97 %  BP: ()/(53-77) 138/60     Weight: 62.7 kg (138 lb 3.7 oz)  Body mass index is 20.41 kg/m².    Intake/Output Summary (Last 24 hours) at 5/2/2024 1210  Last data filed at 5/2/2024 0602  Gross per 24 hour   Intake 109.2 ml   Output 200 ml   Net -90.8 ml         Physical Exam  Vitals and nursing note reviewed.   Constitutional:       General: He is not in acute distress.     Appearance: He is well-developed.   HENT:      Head: Normocephalic and atraumatic.      Ears:      Comments: Bilateral hearing loss, does not wear hearing aides.     Mouth/Throat:      Pharynx: No oropharyngeal exudate.   Eyes:      Conjunctiva/sclera: Conjunctivae normal.      Pupils: Pupils are equal, round, and reactive to light.   Cardiovascular:      Rate and Rhythm: Normal rate and regular rhythm.      Heart sounds: Normal heart sounds.   Pulmonary:      Effort: Pulmonary effort is normal. No respiratory distress.      Breath sounds: Normal breath sounds. No wheezing.   Abdominal:      General: Bowel sounds are normal. There is no distension.      Palpations: Abdomen is soft.      Tenderness: There is no abdominal tenderness.   Musculoskeletal:      Left wrist: Swelling and tenderness present.      Left hand: Swelling and tenderness  present. Decreased range of motion. Decreased strength.      Cervical back: Normal range of motion and neck supple.      Comments: See media. Erythema and swelling in left hand to left wrist. ROM limited 2/2 pain. Supination and pronation limited 2/2 pain.   Lymphadenopathy:      Cervical: No cervical adenopathy.   Skin:     General: Skin is warm and dry.      Capillary Refill: Capillary refill takes less than 2 seconds.      Findings: No rash.   Neurological:      General: No focal deficit present.      Mental Status: He is alert and oriented to person, place, and time.      Cranial Nerves: No cranial nerve deficit.      Sensory: No sensory deficit.      Coordination: Coordination normal.   Psychiatric:         Behavior: Behavior normal.         Thought Content: Thought content normal.         Judgment: Judgment normal.             Significant Labs: All pertinent labs within the past 24 hours have been reviewed.  BMP:   Recent Labs   Lab 05/01/24  0518   GLU 75   *   K 5.3*      CO2 20*   BUN 58*   CREATININE 2.6*   CALCIUM 8.3*   MG 2.2     CBC:   Recent Labs   Lab 05/01/24  0518   WBC 14.79*   HGB 8.8*   HCT 28.9*          Significant Imaging: I have reviewed all pertinent imaging results/findings within the past 24 hours.

## 2024-05-02 NOTE — SUBJECTIVE & OBJECTIVE
Interval History: Significant improvement in pain as well as mental status today. Patient much more alert and able to participate in conversation.     Current Facility-Administered Medications   Medication Dose Route Frequency Provider Last Rate Last Admin    acetaminophen tablet 1,000 mg  1,000 mg Oral Q8H PRJuan Manuel Montenegro PA-C        albuterol-ipratropium 2.5 mg-0.5 mg/3 mL nebulizer solution 3 mL  3 mL Nebulization Q4H PRN Juan Manuel Cornell PA-C        bisacodyL suppository 10 mg  10 mg Rectal Daily PRN Juan Manuel Cornell PA-C        cefTRIAXone (ROCEPHIN) 2 g in dextrose 5 % in water (D5W) 100 mL IVPB (MB+)  2 g Intravenous Q24H Patrica Carson PA-C   Stopped at 05/02/24 1146    DAPTOmycin (CUBICIN) 500 mg in sodium chloride 0.9% SolP 50 mL IVPB  8 mg/kg Intravenous Q48H Patrica Carson PA-C   Stopped at 05/02/24 1235    dextrose 10% bolus 125 mL 125 mL  12.5 g Intravenous PRN Juan Manuel Cornell PA-C        dextrose 10% bolus 250 mL 250 mL  25 g Intravenous PRN Juan Manuel Cornell PA-C        ferrous sulfate tablet 1 each  1 tablet Oral Daily Juan Manuel Cornell PA-C   1 each at 05/01/24 0924    glucagon (human recombinant) injection 1 mg  1 mg Intramuscular PRN Juan Manuel Cornell PA-C        glucose chewable tablet 16 g  16 g Oral PRN Juan Manuel Cornell PA-C        glucose chewable tablet 24 g  24 g Oral PRN Juan Manuel Cornell PA-C        heparin 25,000 units in dextrose 5% (100 units/ml) IV bolus from bag LOW INTENSITY nomogram - [facility]  60 Units/kg (Adjusted) Intravenous Once Juan Manuel Cornell PA-C        heparin 25,000 units in dextrose 5% (100 units/ml) IV bolus from bag LOW INTENSITY nomogram - [facility]  60 Units/kg (Adjusted) Intravenous PRN Juan Manuel Cornell PA-C   3,640 Units at 04/29/24 0632    heparin 25,000 units in dextrose 5% (100 units/ml) IV bolus from bag LOW INTENSITY nomogram - [facility]  30 Units/kg (Adjusted) Intravenous PRN Juan Manuel Cornell, PA-C   1,820 Units at 04/30/24 0351    heparin 25,000 units in dextrose  5% 250 mL (100 units/mL) infusion LOW INTENSITY nomogram - [facility]  0-40 Units/kg/hr (Adjusted) Intravenous Continuous Juan Manuel Cornell PA-C 9.1 mL/hr at 05/02/24 0605 15 Units/kg/hr at 05/02/24 0605    hydrALAZINE injection 10 mg  10 mg Intravenous Q8H PRN Juan Manuel Cornell PA-C   10 mg at 04/28/24 1835    HYDROmorphone injection 1 mg  1 mg Intravenous Q6H PRN Juan Manuel Cornell PA-C   1 mg at 04/29/24 2334    melatonin tablet 6 mg  6 mg Oral Nightly PRJuan Manuel Montenegro PA-C   6 mg at 04/30/24 2035    naloxone 0.4 mg/mL injection 0.02 mg  0.02 mg Intravenous PRN Juan Manuel Cornell PA-C        oxyCODONE immediate release tablet 5 mg  5 mg Oral Q6H PRJuan Manuel Montenegro PA-C   5 mg at 04/28/24 2126    oxyCODONE immediate release tablet Tab 10 mg  10 mg Oral Q6H PRJuan Manuel Montenegro PA-C   10 mg at 04/30/24 2219    polyethylene glycol packet 17 g  17 g Oral Daily PRN Juan Manuel Cornell PA-C        [START ON 5/3/2024] predniSONE tablet 40 mg  40 mg Oral Daily Maximo Howell MD        prochlorperazine tablet 10 mg  10 mg Oral TID PRN Juan Manuel Cornell PA-C        promethazine tablet 25 mg  25 mg Oral Q6H PRN Juan Manuel Cornell PA-C        sodium chloride 0.9% bolus 500 mL 500 mL  500 mL Intravenous Once Maximo Howell MD        sodium chloride 0.9% flush 10 mL  10 mL Intravenous Q12H PRN Juan Manuel Cornell PA-C         Objective:     Vital Signs (Most Recent):  Temp: 97.5 °F (36.4 °C) (05/02/24 1237)  Pulse: 69 (05/02/24 1511)  Resp: 18 (05/02/24 1237)  BP: (!) 104/54 (05/02/24 1237)  SpO2: 97 % (05/02/24 1237) Vital Signs (24h Range):  Temp:  [97.3 °F (36.3 °C)-97.8 °F (36.6 °C)] 97.5 °F (36.4 °C)  Pulse:  [67-69] 69  Resp:  [16-18] 18  SpO2:  [94 %-97 %] 97 %  BP: ()/(53-77) 104/54     Weight: 62.7 kg (138 lb 3.7 oz) (04/29/24 0318)  Body mass index is 20.41 kg/m².  Body surface area is 1.75 meters squared.      Intake/Output Summary (Last 24 hours) at 5/2/2024 0946  Last data filed at 5/2/2024 0602  Gross per 24 hour    Intake 109.2 ml   Output 200 ml   Net -90.8 ml        Physical Exam   Constitutional: No distress.   HENT:   Head: Normocephalic and atraumatic.   Nose: Nose normal.   Mouth/Throat: Mucous membranes are dry. Oropharynx is clear.   Eyes: Conjunctivae are normal.   Cardiovascular: Normal rate and normal pulses.   Pulmonary/Chest: Effort normal and breath sounds normal. No respiratory distress.   Abdominal: Soft. He exhibits no distension.   Musculoskeletal:         General: Swelling and tenderness (tenderness of L hand) present.      Cervical back: Normal range of motion.      Comments: Significant improvement in tenderness of L hand today. Some swelling and slight tenderness of L elbow but again significantly improved. Slight swelling of R knee and L ankle but no tenderness.    Skin: Bruising noted. There is erythema.        Significant Labs:  All pertinent lab results from the last 24 hours have been reviewed.    Significant Imaging:  Imaging results within the past 24 hours have been reviewed.

## 2024-05-02 NOTE — PROGRESS NOTES
[facility] - Med S[facility]  Rheumatology  Progress Note    Patient Name: Deena Moody  MRN: [id number]  Admission Date: 4/28/2024  Hospital Length of Stay: 3 days  Code Status: Full Code   Attending Provider: Maximo Howell MD  Primary Care Physician: Jam Rowell MD  Principal Problem: Swelling of digit of left hand    Subjective:     HPI: Deena Moody is a 89 y.o. M with a past medical history of HTN, HLD, CAD, diastolic HF, A-fib with pacemaker, CABG, hx of MI, AV block, and CKD 3 [place] [facility] [place] left hand swelling and pain that had been persistent for 2-3 weeks.    The patient has a long history of gout. His gout attacks typically involve his knees but have involved his feet in the past as well. He had an arthrocentesis of the L knee done on 7/12/23 which was positive for crystals.      Latest Reference Range & Units 07/12/23 15:38   Body Fluid Crystal Negative  Positive !   Body Fluid Source, Crystal Exam  Joint Fluid, Left Knee   !: Intra and extracellular urate crystals present     About 2 weeks ago he developed pain and then swelling of the L hand. He was initially seen in the ED 2 days before admission and diagnosed with cellulitis and prescribed PO keflex and discharged. He returned because the pain and swelling continued to worsen. Laboratory studies: No leukocytosis. ESR 55 and CRP 82.8. X-ray with diffuse soft tissue swelling of the left hand and erosive change involving the head of the 2nd middle phalanx. He was started Vancomycin. ID and Orthopedics were consulted.     Throughout his hospitalization, his WBC and CRP have uptrended, currently WBC of 14.7 and CRP of 274.4. CT ordered for further evaluation which showed new erosive changes at the distal aspect of the middle phalanx of the 2nd finger concerning for osteomyelitis or other inflammatory arthritides.     On 4/30, Cr increased. Vancomycin was discontinued per ID given poor renal function and advanced age. He was  "started on empiric Daptomycin and Ceftriaxone on 4/30. ID also ordered a uric acid level which was elevated at 11.6 on 4/30.     Rheumatology consulted for "eval for gout flare, elevated uric acid".     Upon initial evaluation, the patient is not very responsive, yelling out in pain. The patients daughter is at bedside who provided the above history. She reports that he seemed more "with it" yesterday and seems confused today. She does not believe that he was on any daily treatment for his gout.    Interval History: Significant improvement in pain as well as mental status today. Patient much more alert and able to participate in conversation.     Current Facility-Administered Medications   Medication Dose Route Frequency Provider Last Rate Last Admin    acetaminophen tablet 1,000 mg  1,000 mg Oral Q8H PRN Juan Manuel Cornell PA-C        albuterol-ipratropium 2.5 mg-0.5 mg/3 mL nebulizer solution 3 mL  3 mL Nebulization Q4H PRN Juan Manuel Cornell PA-C        bisacodyL suppository 10 mg  10 mg Rectal Daily PRN Juan Manuel Cornell PA-C        cefTRIAXone (ROCEPHIN) 2 g in dextrose 5 % in water (D5W) 100 mL IVPB (MB+)  2 g Intravenous Q24H Patrica Carson PA-C   Stopped at 05/02/24 1146    DAPTOmycin (CUBICIN) 500 mg in sodium chloride 0.9% SolP 50 mL IVPB  8 mg/kg Intravenous Q48H Patrica Carson PA-C   Stopped at 05/02/24 1235    dextrose 10% bolus 125 mL 125 mL  12.5 g Intravenous PRN Juan Manuel Cornell PA-C        dextrose 10% bolus 250 mL 250 mL  25 g Intravenous PRN Juan Manuel Cornell PA-C        ferrous sulfate tablet 1 each  1 tablet Oral Daily Juan Manuel Cornell PA-C   1 each at 05/01/24 0924    glucagon (human recombinant) injection 1 mg  1 mg Intramuscular PRN Juan Manuel Cornell PA-C        glucose chewable tablet 16 g  16 g Oral PRN Juan Manuel Cornell PA-C        glucose chewable tablet 24 g  24 g Oral PRN Juan Manuel Cornell PA-C        heparin 25,000 units in dextrose 5% (100 units/ml) IV bolus from bag LOW INTENSITY " nomogram - [facility]  60 Units/kg (Adjusted) Intravenous Once Juan Manuel Cornell PA-C        heparin 25,000 units in dextrose 5% (100 units/ml) IV bolus from bag LOW INTENSITY nomogram - [facility]  60 Units/kg (Adjusted) Intravenous PRJuan Manuel Montenegro PA-C   3,640 Units at 04/29/24 0632    heparin 25,000 units in dextrose 5% (100 units/ml) IV bolus from bag LOW INTENSITY nomogram - [facility]  30 Units/kg (Adjusted) Intravenous PRJuan Manuel Montenegro PA-C   1,820 Units at 04/30/24 0351    heparin 25,000 units in dextrose 5% 250 mL (100 units/mL) infusion LOW INTENSITY nomogram - [facility]  0-40 Units/kg/hr (Adjusted) Intravenous Continuous Juan Manuel Cornell PA-C 9.1 mL/hr at 05/02/24 0605 15 Units/kg/hr at 05/02/24 0605    hydrALAZINE injection 10 mg  10 mg Intravenous Q8H PRJuan Manuel Montenegro PA-C   10 mg at 04/28/24 1835    HYDROmorphone injection 1 mg  1 mg Intravenous Q6H PRJuan Manuel Montenegro PA-C   1 mg at 04/29/24 2334    melatonin tablet 6 mg  6 mg Oral Nightly PRJuan Manuel Montenegro PA-C   6 mg at 04/30/24 2035    naloxone 0.4 mg/mL injection 0.02 mg  0.02 mg Intravenous PRJuan Manuel Montenegro PA-C        oxyCODONE immediate release tablet 5 mg  5 mg Oral Q6H PRJuan Manuel Montenegro PA-C   5 mg at 04/28/24 2126    oxyCODONE immediate release tablet Tab 10 mg  10 mg Oral Q6H PRJuan Manuel Montenegro PA-C   10 mg at 04/30/24 2219    polyethylene glycol packet 17 g  17 g Oral Daily PRJuan Manuel Montenegro PA-C        [START ON 5/3/2024] predniSONE tablet 40 mg  40 mg Oral Daily Maximo Howell MD        prochlorperazine tablet 10 mg  10 mg Oral TID PRN Juan Manuel Cornell PA-C        promethazine tablet 25 mg  25 mg Oral Q6H PRJuan Manuel Montenegro PA-C        sodium chloride 0.9% bolus 500 mL 500 mL  500 mL Intravenous Once Maximo Howell MD        sodium chloride 0.9% flush 10 mL  10 mL Intravenous Q12H PRN Juan Manuel Cornell PA-C         Objective:     Vital Signs (Most Recent):  Temp: 97.5 °F (36.4 °C) (05/02/24 1237)  Pulse: 69 (05/02/24 1511)  Resp: 18  (05/02/24 1237)  BP: (!) 104/54 (05/02/24 1237)  SpO2: 97 % (05/02/24 1237) Vital Signs (24h Range):  Temp:  [97.3 °F (36.3 °C)-97.8 °F (36.6 °C)] 97.5 °F (36.4 °C)  Pulse:  [67-69] 69  Resp:  [16-18] 18  SpO2:  [94 %-97 %] 97 %  BP: ()/(53-77) 104/54     Weight: 62.7 kg (138 lb 3.7 oz) (04/29/24 0318)  Body mass index is 20.41 kg/m².  Body surface area is 1.75 meters squared.      Intake/Output Summary (Last 24 hours) at 5/2/2024 1555  Last data filed at 5/2/2024 0602  Gross per 24 hour   Intake 109.2 ml   Output 200 ml   Net -90.8 ml        Physical Exam   Constitutional: No distress.   HENT:   Head: Normocephalic and atraumatic.   Nose: Nose normal.   Mouth/Throat: Mucous membranes are dry. Oropharynx is clear.   Eyes: Conjunctivae are normal.   Cardiovascular: Normal rate and normal pulses.   Pulmonary/Chest: Effort normal and breath sounds normal. No respiratory distress.   Abdominal: Soft. He exhibits no distension.   Musculoskeletal:         General: Swelling and tenderness (tenderness of L hand) present.      Cervical back: Normal range of motion.      Comments: Significant improvement in tenderness of L hand today. Some swelling and slight tenderness of L elbow but again significantly improved. Slight swelling of R knee and L ankle but no tenderness.    Skin: Bruising noted. There is erythema.        Significant Labs:  All pertinent lab results from the last 24 hours have been reviewed.    Significant Imaging:  Imaging results within the past 24 hours have been reviewed.  Assessment/Plan:     Orthopedic  Swelling of left hand  Deena Moody is a 89 y.o. M with a past medical history of HTN, HLD, CAD, diastolic HF, A-fib with pacemaker, CABG, hx of MI, AV block, and CKD 3 who was admitted to [facility] [place] on 4/28 with left hand swelling and pain that had been persistent for 2-3 weeks.     The patient has a longstanding history of crystal-proven gouty arthritis with flares that typically involve his knees but  have affected his feet in the past as well. He was not on maintenance therapy as outpatient. He is now presenting with subacute pain and swelling of th L hand that has been persistent for 2-3 weeks. He failed outpatient abx management and then was started on Vancomycin which has since been changed to Ceftriaxone and Daptomycin. He was found to have an elevated uric acid level, significantly elevated CRP (274) and elevated sed rate (113). CT scan of the L hand concerning for osteomyelitis vs inflammatory arthropathy.     Recommendations:   - The patient has a history of CKD and heart failure therefore we cannot use colchicine   - Received Solumedrol 80 mg daily x 2 doses  - Transition to Prednisone 40 mg daily for 5 days tomorrow   - Will need to be started on maintenance therapy as outpatient give response to steroids       Assessment and plan discussed with attending physician, Dr. Lopez. Please see attending attestation and follow recommendations. Please message with any questions or concerns.     Lucy Mar MD  Rheumatology  [facility]

## 2024-05-02 NOTE — ASSESSMENT & PLAN NOTE
Deena Moody is a 89 y.o. male with PMH significant for CAD, CHF, pacemaker, afib (on eliquis) presenting with left hand pain. Patient states his hand became painful about 2 weeks ago and began to get swollen. He was in the ED 2 days ago and diagnosed with cellulitis and prescribed PO keflex and discharged. He returned today for worsening pain and swelling of the dorsal hand. Patient has a history of pacemaker that is not MRI compatible. XR with erosive changes of P2 index finger left hand. Exam is consistent with dorsal hand cellulitis, no ttp in the volar hand or flexor tendon sheath. CRP 82, ESR 55, no leukocytosis. Erosive change likely consistent with osteomyelitis of the index finger.     - Abx: continue broad spectrum per primary  - Continue ROMAT/WBAT to the LUE  - Compressive dressing in place, keep LUE elevated  - CT findings concerning for new erosive changes to second digit of the left hand     Ok for a diet today, pending further discussion with staff.

## 2024-05-02 NOTE — PROGRESS NOTES
[facility] Medicine  Progress Note    Patient Name: Deena Moody  MRN: [id number]  Patient Class: IP- Inpatient   Admission Date: 4/28/2024  Length of Stay: 3 days  Attending Physician: Maximo Howell MD  Primary Care Provider: Jam Rowell MD        Subjective:     Principal Problem:Swelling of digit of left hand        HPI:  Deena Moody is a 89 y.o. male with PMHx HTN, HLD, CAD, diastolic HF, A-fib with pacemaker, CABG, hx of MI, AV block, and CKD 3 who presents to [facility] ED with chief complaint of left hand swelling and pain. He reports this issues has been present x 2-3 weeks. He was in ED two days prior wherein his ring had to be cut from finger due to swelling. He was then sent home with keflex and PCP follow up. Since then patient reports worsening swelling, redness and pain. He denies associated HA, fever, chills, chest pain, shortness of breath, abdominal pain, n/v/d, urinary symptoms, numbness or tingling. He takes eliquis. Patient's pacemaker is not MRI compatible. Patient with significant hear loss bilaterally.     In the ED: Hypertensive, RR 22, otherwise VSSAF. CBC without leukocytosis. CMP without emergent abnormalities, Cr 1.8 (baseline). ESR 55 and CRP 82.8 (elevated from 53.6 on 4/26). Received morphine and vanc. Admitted to  for further management.    Overview/Hospital Course:  Presented with Left hand swelling and pain x 2-3 weeks. Seen in ED 4/26, ring removed, discharged with oral keflex. Returns with worsening pain, swelling, and erythema that has now progressed to left wrist. Labs with leukocytosis. ESR and CRP elevated. Initial XR images concerning for osteo. Ortho consulted. Recommend broad spectrum iv abx. Wrap with ace bandage, elevate, will continue to trend crp and exam. CT L hand w/o contrast shows new erosive changes at the distal aspect of the middle phalanx of the 2nd finger concerning for osteomyelitis or other inflammatory arthritides.ID  following. On CTX and dapto. Uric acid elevated 11.6. Given hx of gout, consulting rheumatology for evaluation of gout flare. Hx of Afib, holding eliquis in setting of possible OR, heparin infusion for now         Interval History:   Left hand swelling and erythema progressively improving. Started on IV solumedrol per rheum recs for concern of acute polyarticular gout. ID following. Continue empiric CTX and dapto. Per ortho, no plans for procedures today. However would like to continue to hold home eliquis and continue IV heparin for now in case he needs a procedure later.    Objective:     Vital Signs (Most Recent):  Temp: 97.3 °F (36.3 °C) (05/02/24 0759)  Pulse: 69 (05/02/24 1125)  Resp: 18 (05/02/24 0759)  BP: 138/60 (05/02/24 0759)  SpO2: 97 % (05/02/24 0439) Vital Signs (24h Range):  Temp:  [97.3 °F (36.3 °C)-97.8 °F (36.6 °C)] 97.3 °F (36.3 °C)  Pulse:  [67-69] 69  Resp:  [16-18] 18  SpO2:  [94 %-97 %] 97 %  BP: ()/(53-77) 138/60     Weight: 62.7 kg (138 lb 3.7 oz)  Body mass index is 20.41 kg/m².    Intake/Output Summary (Last 24 hours) at 5/2/2024 1210  Last data filed at 5/2/2024 0602  Gross per 24 hour   Intake 109.2 ml   Output 200 ml   Net -90.8 ml         Physical Exam  Vitals and nursing note reviewed.   Constitutional:       General: He is not in acute distress.     Appearance: He is well-developed.   HENT:      Head: Normocephalic and atraumatic.      Ears:      Comments: Bilateral hearing loss, does not wear hearing aides.     Mouth/Throat:      Pharynx: No oropharyngeal exudate.   Eyes:      Conjunctiva/sclera: Conjunctivae normal.      Pupils: Pupils are equal, round, and reactive to light.   Cardiovascular:      Rate and Rhythm: Normal rate and regular rhythm.      Heart sounds: Normal heart sounds.   Pulmonary:      Effort: Pulmonary effort is normal. No respiratory distress.      Breath sounds: Normal breath sounds. No wheezing.   Abdominal:      General: Bowel sounds are normal. There  is no distension.      Palpations: Abdomen is soft.      Tenderness: There is no abdominal tenderness.   Musculoskeletal:      Left wrist: Swelling and tenderness present.      Left hand: Swelling and tenderness present. Decreased range of motion. Decreased strength.      Cervical back: Normal range of motion and neck supple.      Comments: See media. Erythema and swelling in left hand to left wrist. ROM limited 2/2 pain. Supination and pronation limited 2/2 pain.   Lymphadenopathy:      Cervical: No cervical adenopathy.   Skin:     General: Skin is warm and dry.      Capillary Refill: Capillary refill takes less than 2 seconds.      Findings: No rash.   Neurological:      General: No focal deficit present.      Mental Status: He is alert and oriented to person, place, and time.      Cranial Nerves: No cranial nerve deficit.      Sensory: No sensory deficit.      Coordination: Coordination normal.   Psychiatric:         Behavior: Behavior normal.         Thought Content: Thought content normal.         Judgment: Judgment normal.             Significant Labs: All pertinent labs within the past 24 hours have been reviewed.  BMP:   Recent Labs   Lab 05/01/24  0518   GLU 75   *   K 5.3*      CO2 20*   BUN 58*   CREATININE 2.6*   CALCIUM 8.3*   MG 2.2     CBC:   Recent Labs   Lab 05/01/24  0518   WBC 14.79*   HGB 8.8*   HCT 28.9*          Significant Imaging: I have reviewed all pertinent imaging results/findings within the past 24 hours.        Assessment/Plan:      * Swelling of digit of left hand  Presented with Left hand swelling and pain x 2-3 weeks. Seen in ED 4/26, ring removed, discharged with oral keflex. Returns with worsening pain, swelling, and erythema that has now progressed to left wrist. See media.     - afebrile, leukocytosis. ESR and CRP elevated   - initial XR images concerning for osteo  - Ortho consulted. Recommend broad spectrum iv abx. Wrap with ace bandage, elevate, will  continue to trend crp and exam.   - CT L hand w/o contrast shows new erosive changes at the distal aspect of the middle phalanx of the 2nd finger concerning for osteomyelitis or other inflammatory arthritides.  - ID following. On CTX and dapto.      Elevated uric acid in blood  Acute polyarticular gout   Uric acid elevated 11.6.  Given hx of gout, consulting rheumatology for evaluation of gout flare. Concern for acute polyarticular gout with +/- cellulitis.   Started on IV solumedrol 80mg x 2 days       Hyperkalemia  This patient has hyperkalemia which is uncontrolled. We will monitor for arrhythmias with EKG or continuous telemetry.   The patients latest potassium has been reviewed and the results are listed below  Recent Labs   Lab 04/30/24  0252   K 5.3*             Macrocytosis  - history noted    Permanent atrial fibrillation  Long term (current) use of anticoagulants  S/P placement of cardiac pacemaker  - cont coreg  - pacemaker not MRI compatible   - hold eliquis in setting of possible OR , heparin infusion for now  - cont tele    S/P placement of cardiac pacemaker  - abbott pacemaker  - not MRI compatible    Chronic diastolic heart failure  - no signs of acute overload  - cont torsemide  - hold KCl for now (recently hyperK 4/26)  - monitor UOP    CAD (coronary artery disease)  History of heart bypass surgery  History of MI (myocardial infarction)  - Holding Aspirin for now for possible procedure in future   - previously intolerant to statins 2/2 myalgias    Complete AV block  - s/p pacemaker    Essential hypertension  - chronic, uncontrolled  - cont home coreg and torsemide  - substitute home benzapril for lisinopril  - prn hydralazine for sbp > 180  - monitor     Dyslipidemia  - statin intolerance 2/2 myalgias     CKD (chronic kidney disease) stage 3, GFR 30-59 ml/min  - Cr 1.8 on admit, near baseline  - serial BMP, monitor UOP  - avoid nephrotoxins, renally dose meds      VTE Risk Mitigation (From  admission, onward)           Ordered     heparin 25,000 units in dextrose 5% (100 units/ml) IV bolus from bag LOW INTENSITY nomogram - [facility]  As needed (PRN)        Question:  Heparin Infusion Adjustment (DO NOT MODIFY ANSWER)  Answer:  [email or web address]\Images\Pharmacy\HeparinInfusions\heparin LOW INTENSITY nomogram for [facility] [id number]    04/28/24 1628     heparin 25,000 units in dextrose 5% (100 units/ml) IV bolus from bag LOW INTENSITY nomogram - [facility]  As needed (PRN)        Question:  Heparin Infusion Adjustment (DO NOT MODIFY ANSWER)  Answer:  [email or web address]\Images\Pharmacy\HeparinInfusions\heparin LOW INTENSITY nomogram for [facility] [id number]    04/28/24 1628     heparin 25,000 units in dextrose 5% (100 units/ml) IV bolus from bag LOW INTENSITY nomogram - [facility]  Once        Question:  Heparin Infusion Adjustment (DO NOT MODIFY ANSWER)  Answer:  [email or web address]\epic\Images\Pharmacy\HeparinInfusions\heparin LOW INTENSITY nomogram for [facility] [id number]    04/28/24 1628     heparin 25,000 units in dextrose 5% 250 mL (100 units/mL) infusion LOW INTENSITY nomogram - [facility]  Continuous        Question:  Begin at (units/kg/hr)  Answer:  12    04/28/24 1614     Reason for No Pharmacological VTE Prophylaxis  Once        Question:  Reasons:  Answer:  Already adequately anticoagulated on oral Anticoagulants    04/28/24 1431     IP VTE HIGH RISK PATIENT  Once         04/28/24 1431     Place sequential compression device  Until discontinued         04/28/24 1431                    Discharge Planning   TEOFILO: 5/3/2024     Code Status: Full Code   Is the patient medically ready for discharge?:     Reason for patient still in hospital (select all that apply): Patient trending condition  Discharge Plan A: Home, Home with family, Home Health (Family would like HH for patient.)                  Maximo Howell MD  Department of Hospital Medicine   [facility]

## 2024-05-02 NOTE — ASSESSMENT & PLAN NOTE
Deena Moody is a 89 y.o. M with a past medical history of HTN, HLD, CAD, diastolic HF, A-fib with pacemaker, CABG, hx of MI, AV block, and CKD 3 who was [place] [facility] [place] with left hand swelling and pain that had been persistent for 2-3 weeks.     The patient has a longstanding history of crystal-proven gouty arthritis with flares that typically involve his knees but have affected his feet in the past as well. He was not on maintenance therapy as outpatient. He is now presenting with subacute pain and swelling of th L hand that has been persistent for 2-3 weeks. He failed outpatient abx management and then was started on Vancomycin which has since been changed to Ceftriaxone and Daptomycin. He was found to have an elevated uric acid level, significantly elevated CRP (274) and elevated sed rate (113). CT scan of the L hand concerning for osteomyelitis vs inflammatory arthropathy.     Recommendations:   - The patient has a history of CKD and heart failure therefore we cannot use colchicine   - Received Solumedrol 80 mg daily x 2 doses  - Transition to Prednisone 40 mg daily for 5 days tomorrow   - Will need to be started on maintenance therapy as outpatient give response to steroids

## 2024-05-02 NOTE — PLAN OF CARE
Problem: Adult Inpatient Plan of Care  Goal: Plan of Care Review  Outcome: Progressing  Goal: Patient-Specific Goal (Individualized)  Outcome: Progressing  Goal: Absence of Hospital-Acquired Illness or Injury  Outcome: Progressing  Goal: Optimal Comfort and Wellbeing  Outcome: Progressing  Goal: Readiness for Transition of Care  Outcome: Progressing     Problem: Infection  Goal: Absence of Infection Signs and Symptoms  Outcome: Progressing     Problem: Fall Injury Risk  Goal: Absence of Fall and Fall-Related Injury  Outcome: Progressing     Problem: Skin Injury Risk Increased  Goal: Skin Health and Integrity  Outcome: Progressing

## 2024-05-02 NOTE — PROGRESS NOTES
[facility]  Orthopedics  Progress Note    Patient Name: Deena Moody  MRN: [id number]  Admission Date: 4/28/2024  Hospital Length of Stay: 3 days  Attending Provider: Maximo Howell MD  Primary Care Provider: Jam Rowell MD    Subjective:     Principal Problem:Swelling of digit of left hand    Principal Orthopedic Problem: same as above    Interval History: NAEON. VSS. AF. Patient states that dorsum of the hand still painful today but mild improvement.  yesterday increased from 239 the day before, AM CRP pending. WBC 14.79 down from 16.93.. Hgb 8.8. Receiving dapto and rocephin. No discrete area of swelling or fluctuance in the hand.    Plan to continue to watch and trend CRP this AM.       Review of patient's allergies indicates:   Allergen Reactions    Iodine and iodide containing products Other (See Comments)     Caused changes in skin color       Current Facility-Administered Medications   Medication Dose Route Frequency Provider Last Rate Last Admin    acetaminophen tablet 1,000 mg  1,000 mg Oral Q8H PRN Juan Manuel Cornell PA-C        albuterol-ipratropium 2.5 mg-0.5 mg/3 mL nebulizer solution 3 mL  3 mL Nebulization Q4H PRN Juan Manuel Cornell PA-C        bisacodyL suppository 10 mg  10 mg Rectal Daily PRN Juan Manuel Cornell PA-C        cefTRIAXone (ROCEPHIN) 2 g in dextrose 5 % in water (D5W) 100 mL IVPB (MB+)  2 g Intravenous Q24H Patrica Carson PA-C   Stopped at 05/01/24 1223    DAPTOmycin (CUBICIN) 500 mg in sodium chloride 0.9% SolP 50 mL IVPB  8 mg/kg Intravenous Q48H Patrica Carson PA-C   Stopped at 04/30/24 1330    dextrose 10% bolus 125 mL 125 mL  12.5 g Intravenous PRN Juan Manuel Cornell PA-C        dextrose 10% bolus 250 mL 250 mL  25 g Intravenous PRN Juan Manuel Cornell PA-C        ferrous sulfate tablet 1 each  1 tablet Oral Daily Juan Manuel Cornell PA-C   1 each at 05/01/24 0924    glucagon (human recombinant) injection 1 mg  1 mg Intramuscular PRN Kraig, Juan Manuel J., PA-C         glucose chewable tablet 16 g  16 g Oral PRN Juan Manuel Cornell PA-C        glucose chewable tablet 24 g  24 g Oral PRN Juan Manuel Cornell PA-C        heparin 25,000 units in dextrose 5% (100 units/ml) IV bolus from bag LOW INTENSITY nomogram - [facility]  60 Units/kg (Adjusted) Intravenous Once Juan Manuel Cornell PA-C        heparin 25,000 units in dextrose 5% (100 units/ml) IV bolus from bag LOW INTENSITY nomogram - [facility]  60 Units/kg (Adjusted) Intravenous PRN Juan Manuel Cornell PA-C   3,640 Units at 04/29/24 0632    heparin 25,000 units in dextrose 5% (100 units/ml) IV bolus from bag LOW INTENSITY nomogram - [facility]  30 Units/kg (Adjusted) Intravenous PRJuan Manuel Montenegro PA-C   1,820 Units at 04/30/24 0351    heparin 25,000 units in dextrose 5% 250 mL (100 units/mL) infusion LOW INTENSITY nomogram - [facility]  0-40 Units/kg/hr (Adjusted) Intravenous Continuous Juan Manuel Cornell PA-C 9.1 mL/hr at 05/02/24 0605 15 Units/kg/hr at 05/02/24 0605    hydrALAZINE injection 10 mg  10 mg Intravenous Q8H PRJuan Manuel Montenegro PA-C   10 mg at 04/28/24 1835    HYDROmorphone injection 1 mg  1 mg Intravenous Q6H PRJuan Manuel Montenegro PA-C   1 mg at 04/29/24 2334    melatonin tablet 6 mg  6 mg Oral Nightly PRJuan Manuel Montenegro PA-C   6 mg at 04/30/24 2035    methylPREDNISolone sodium succinate injection 80 mg  80 mg Intravenous Daily Maximo Howell MD   80 mg at 05/01/24 1553    naloxone 0.4 mg/mL injection 0.02 mg  0.02 mg Intravenous PRJuan Manuel Montenegro PA-C        oxyCODONE immediate release tablet 5 mg  5 mg Oral Q6H PRJuan Manuel Montenegro PA-C   5 mg at 04/28/24 2126    oxyCODONE immediate release tablet Tab 10 mg  10 mg Oral Q6H PRJuan Manuel Montenegro PA-C   10 mg at 04/30/24 2219    polyethylene glycol packet 17 g  17 g Oral Daily PRJuan Manuel Montenegro PA-C        prochlorperazine tablet 10 mg  10 mg Oral TID PRJuan Manuel Montenegro PA-C        promethazine tablet 25 mg  25 mg Oral Q6H PRJuan Manuel Montenegro PA-C        sodium chloride 0.9% bolus 500 mL  "500 mL  500 mL Intravenous Once Maximo Howell MD        sodium chloride 0.9% flush 10 mL  10 mL Intravenous Q12H PRN Juan Manuel Cornell, PAKarinaC        sodium zirconium cyclosilicate packet 10 g  10 g Oral TID Maximo Howell MD   10 g at 05/01/24 1415     Objective:     Vital Signs (Most Recent):  Temp: 97.4 °F (36.3 °C) (05/02/24 0439)  Pulse: 67 (05/02/24 0439)  Resp: 18 (05/02/24 0439)  BP: 107/63 (05/02/24 0439)  SpO2: 97 % (05/02/24 0439) Vital Signs (24h Range):  Temp:  [97.4 °F (36.3 °C)-98.3 °F (36.8 °C)] 97.4 °F (36.3 °C)  Pulse:  [67-70] 67  Resp:  [16-18] 18  SpO2:  [92 %-97 %] 97 %  BP: ()/(50-77) 107/63     Weight: 62.7 kg (138 lb 3.7 oz)  Height: 5' 9" (175.3 cm)  Body mass index is 20.41 kg/m².      Intake/Output Summary (Last 24 hours) at 5/2/2024 0646  Last data filed at 5/2/2024 0602  Gross per 24 hour   Intake 109.2 ml   Output 400 ml   Net -290.8 ml        Ortho/SPM Exam     Gen:  No acute distress, well-developed, well nourished.  CV:  Peripherally well-perfused. 2+ radial pulses, symmetric.  Respiratory:  Normal respiratory effort. No accessory muscle use.   Head/Neck:  Normocephalic.  Atraumatic. Sclera anicteric. TM. Neck supple.  Neuro: CN 2-12 grossly intact. No FND. Awake. Alert. Oriented to person, place, time, and situation.   Abdomen: Soft, NTND.      MSK:  Left Upper Extremity  Inspection  - Skin intact throughout, no open wounds  - No swelling  - No ecchymosis, dorsum of the hand does not appear erythematous  Palpation  - Diffuse TTP over the dorsum of the hand, most prominent over the dorsum of the long finger  - nontender to elbow or shoulder  Range of motion  - AROM and PROM of the shoulder, elbow intact  - pain with ROM of each finger and wrist   Stability  - No evidence of joint dislocation or abnormal laxity   Neurovascular  - AIN/PIN/Radial/Median/Ulnar Nerves assessed in isolation without deficit  - Able to give thumbs up, make "OK" sign, cross IF/LF, abduct/adduct fingers, " make fist  - SILT throughout  - Compartments soft  - Radial artery 2+  - Capillary Refill <3s  - Muscle tone normal        Significant Labs: CBC:   Recent Labs   Lab 05/01/24  0518   WBC 14.79*   HGB 8.8*   HCT 28.9*        CRP:   Recent Labs   Lab 05/01/24  0518 05/02/24  0549   .4* 289.6*     All pertinent labs within the past 24 hours have been reviewed.    Significant Imaging: CT: I have reviewed all pertinent results/findings and my personal findings are:  CT of the left hand shows erosive changes to distal aspect of intermediate phalanx of 2nd finger.   I have reviewed and interpreted all pertinent imaging results/findings.  Assessment/Plan:     * Swelling of digit of left hand  Deena Moody is a 89 y.o. male with PMH significant for CAD, CHF, pacemaker, afib (on eliquis) presenting with left hand pain. Patient states his hand became painful about 2 weeks ago and began to get swollen. He was in the ED 2 days ago and diagnosed with cellulitis and prescribed PO keflex and discharged. He returned today for worsening pain and swelling of the dorsal hand. Patient has a history of pacemaker that is not MRI compatible. XR with erosive changes of P2 index finger left hand. Exam is consistent with dorsal hand cellulitis, no ttp in the volar hand or flexor tendon sheath. CRP 82, ESR 55, no leukocytosis. Erosive change likely consistent with osteomyelitis of the index finger.     - Abx: continue broad spectrum per primary  - Continue ROMAT/WBAT to the LUE  - Compressive dressing in place, keep LUE elevated  - CT findings concerning for new erosive changes to second digit of the left hand     Ok for a diet today, pending further discussion with staff.          CANDIDA RANDOLPH MD  Orthopedics  [facility]

## 2024-05-02 NOTE — SUBJECTIVE & OBJECTIVE
Interval History: NAEON. Afebrile. Family at bedside. Pain and swelling have slightly improved with initiation of steroids. Tolerating examination better. Remains confused. CRP increasing.      Review of Systems   Constitutional:  Negative for chills, diaphoresis, fatigue and fever.   HENT:  Positive for hearing loss.    Respiratory:  Negative for cough and shortness of breath.    Cardiovascular:  Negative for chest pain and leg swelling.   Gastrointestinal:  Negative for abdominal pain, diarrhea, nausea and vomiting.   Genitourinary:  Negative for dysuria and frequency.   Musculoskeletal:  Positive for arthralgias and joint swelling. Negative for back pain.   Skin:  Positive for color change. Negative for rash and wound.   Neurological:  Negative for dizziness and headaches.   Psychiatric/Behavioral:  Positive for confusion and decreased concentration. The patient is not nervous/anxious.    All other systems reviewed and are negative.    Objective:     Vital Signs (Most Recent):  Temp: 97.3 °F (36.3 °C) (05/02/24 0759)  Pulse: 69 (05/02/24 1125)  Resp: 18 (05/02/24 0759)  BP: 138/60 (05/02/24 0759)  SpO2: 97 % (05/02/24 0439) Vital Signs (24h Range):  Temp:  [97.3 °F (36.3 °C)-98.3 °F (36.8 °C)] 97.3 °F (36.3 °C)  Pulse:  [67-69] 69  Resp:  [16-18] 18  SpO2:  [93 %-97 %] 97 %  BP: ()/(50-77) 138/60     Weight: 62.7 kg (138 lb 3.7 oz)  Body mass index is 20.41 kg/m².    Estimated Creatinine Clearance: 15.7 mL/min (A) (based on SCr of 2.6 mg/dL (H)).     Physical Exam  Vitals and nursing note reviewed.   Constitutional:       General: He is not in acute distress.     Appearance: Normal appearance. He is well-developed. He is not ill-appearing or diaphoretic.   HENT:      Head: Normocephalic and atraumatic.      Right Ear: External ear normal.      Left Ear: External ear normal.      Nose: Nose normal.   Eyes:      General: No scleral icterus.        Right eye: No discharge.         Left eye: No discharge.       Extraocular Movements: Extraocular movements intact.      Conjunctiva/sclera: Conjunctivae normal.   Pulmonary:      Effort: Pulmonary effort is normal. No respiratory distress.      Breath sounds: No stridor.   Abdominal:      General: There is no distension.      Palpations: Abdomen is soft.   Genitourinary:     Comments: Tea colored urine in cannister  Musculoskeletal:         General: Swelling and tenderness present.      Right lower leg: No edema.      Left lower leg: No edema.      Comments: Left hand swelling and tenderness improved    Skin:     General: Skin is dry.      Coloration: Skin is not jaundiced or pale.      Comments: Thickened long toenails   Neurological:      General: No focal deficit present.      Mental Status: He is alert and oriented to person, place, and time. Mental status is at baseline.   Psychiatric:         Mood and Affect: Mood normal.         Behavior: Behavior normal.         Thought Content: Thought content normal.         Judgment: Judgment normal.          Significant Labs: BMP:   Recent Labs   Lab 05/01/24 0518   GLU 75   *   K 5.3*      CO2 20*   BUN 58*   CREATININE 2.6*   CALCIUM 8.3*   MG 2.2     CBC:   Recent Labs   Lab 05/01/24 0518   WBC 14.79*   HGB 8.8*   HCT 28.9*        CMP:   Recent Labs   Lab 05/01/24 0518   *   K 5.3*      CO2 20*   GLU 75   BUN 58*   CREATININE 2.6*   CALCIUM 8.3*   PROT 6.7   ALBUMIN 2.4*   BILITOT 1.2*   ALKPHOS 102   AST 35   ALT 12   ANIONGAP 11     Microbiology Results (last 7 days)       ** No results found for the last 168 hours. **          Recent Lab Results  (Last 5 results in the past 24 hours)        05/02/24  0846   05/02/24  0549   05/01/24  2107   05/01/24  1700   05/01/24  1140        PTT   49.6  Comment: Refer to local heparin nomogram for intensity/dose specific   therapeutic   range.               CRP   289.6             POCT Glucose 154     115   100   88                               Significant Imaging:     Imaging Results              X-Ray Hand 3 view Left (Final result)  Result time 04/28/24 15:21:44      Final result by Leon Ortega MD (04/28/24 15:21:44)                   Impression:      1.  Diffuse soft tissue swelling of the left hand.    2.  Erosive change involving the head of the 2nd middle phalanx.  This may represent a focus of infection.  Additional evaluation, as clinically warranted.    3.  No evidence of a fracture or dislocation.      Electronically signed by: Leon Ortega MD  Date:    04/28/2024  Time:    15:21               Narrative:    EXAMINATION:  XR HAND COMPLETE 3 VIEW LEFT    CLINICAL HISTORY:  hand swelling;.    TECHNIQUE:  PA, lateral, and oblique views of the left hand were performed.    COMPARISON:  06/15/2023.    FINDINGS:  There is demineralization of the osseous structures.  There is unchanged appearance of chronic changes involving the left distal radius and ulna.  There is no cortical step-off.  There is no evidence of periostitis.    There is joint space narrowing with osteophytosis.  There are advanced degenerative changes involving the left thumb.  There is an erosive change involving the head of the 2nd middle phalanx.    There is diffuse soft tissue swelling of the left hand.  There are advanced vascular calcifications in the left hand.    There is no evidence of a fracture or dislocation.

## 2024-05-02 NOTE — PROGRESS NOTES
[facility]  Infectious Disease  Progress Note    Patient Name: Deena Moody  MRN: [id number]  Admission Date: 4/28/2024  Length of Stay: 3 days  Attending Physician: Maximo Howell MD  Primary Care Provider: Jam Rowell MD    Isolation Status: No active isolations  Assessment/Plan:      Orthopedic  Swelling of left hand    89 year old male with cardiac disease, pacemaker, CKD, gout who presented to the ED 4/28 with left hand pain and swelling and that has been present for over a week without known trauma.    Afebrile without systemic signs of infection. Labs revealed a leukocytosis and elevated inflammatory markers. Uric acid level 11. CT with erosive changes at the distal aspect of the middle phalanx of the 2nd finger concerning for osteomyelitis or other inflammatory arthritides. Orthopedic surgery following. No plans for surgery at this time. He has been on IV antibiotics without clinical improvement. Rheumatology consulted and started IV solumedrol yesterday. Today, he is tolerating examination better with slight improvement in symptoms.    Recommendations  Continue empiric Daptomycin and Ceftriaxone for now pending orthopedic surgery plans. If taken for surgery, please send tissue and fluid for cell count w/ diff, crystals, gram stain and cultures  At this time, suspect his clinical picture represents an acute gout flare and less likely infection. Continue management per rheumatology. Will likely discontinue antibiotics if he continues to respond to steroids.   Discussed with ID staff. ID will follow.          Thank you for the consult. Please secure chat for any questions.  Patrica Carson PA-C    Subjective:     Principal Problem:Swelling of digit of left hand    HPI: Deena Moody is a 89 y.o. male with PMHx significant for CAD, HTN, CHF, A-fib, pacemaker, CKD, gout who presented to the ED 4/28 with left hand pain and swelling. Patient states his hand became painful about 2 weeks  ago and began to get swollen. He was in the ED 2 days prior to admit and diagnosed with cellulitis. He was never started on antibiotics after discharged from the ED. He returned for worsening pain and swelling of the dorsal hand. He also endorses redness. He denies known trauma to the hand. Denies open cuts, though his wedding ring was cut off during last ED visit. He also recently had some skin cancer removed on his left arm earlier this year. Denies recent procedures otherwise. In 2020, has hx of pseudomonas bacteremia presumed from intraabdominal source treated with 2 weeks of IV cefepime.     In the ED: Hypertensive otherwise VSS. No leukocytosis. ESR 55 and CRP 82.8.    X-ray with Diffuse soft tissue swelling of the left hand and erosive change involving the head of the 2nd middle phalanx.  This may represent a focus of infection. CT ordered for further evaluation ( cannot get MRI ). Patient is currently on Vancomycin. ID consulted for antibiotic recommendations.    Of note, his inflammatory markers and WBC count have increased since admit. He also has developed worsening renal function.  Labs 4/30 revealed a WBC of 16K, , CR 2.1.   Interval History: NAEON. Afebrile. Family at bedside. Pain and swelling have slightly improved with initiation of steroids. Tolerating examination better. Remains confused. CRP increasing.      Review of Systems   Constitutional:  Negative for chills, diaphoresis, fatigue and fever.   HENT:  Positive for hearing loss.    Respiratory:  Negative for cough and shortness of breath.    Cardiovascular:  Negative for chest pain and leg swelling.   Gastrointestinal:  Negative for abdominal pain, diarrhea, nausea and vomiting.   Genitourinary:  Negative for dysuria and frequency.   Musculoskeletal:  Positive for arthralgias and joint swelling. Negative for back pain.   Skin:  Positive for color change. Negative for rash and wound.   Neurological:  Negative for dizziness and headaches.    Psychiatric/Behavioral:  Positive for confusion and decreased concentration. The patient is not nervous/anxious.    All other systems reviewed and are negative.    Objective:     Vital Signs (Most Recent):  Temp: 97.3 °F (36.3 °C) (05/02/24 0759)  Pulse: 69 (05/02/24 1125)  Resp: 18 (05/02/24 0759)  BP: 138/60 (05/02/24 0759)  SpO2: 97 % (05/02/24 0439) Vital Signs (24h Range):  Temp:  [97.3 °F (36.3 °C)-98.3 °F (36.8 °C)] 97.3 °F (36.3 °C)  Pulse:  [67-69] 69  Resp:  [16-18] 18  SpO2:  [93 %-97 %] 97 %  BP: ()/(50-77) 138/60     Weight: 62.7 kg (138 lb 3.7 oz)  Body mass index is 20.41 kg/m².    Estimated Creatinine Clearance: 15.7 mL/min (A) (based on SCr of 2.6 mg/dL (H)).     Physical Exam  Vitals and nursing note reviewed.   Constitutional:       General: He is not in acute distress.     Appearance: Normal appearance. He is well-developed. He is not ill-appearing or diaphoretic.   HENT:      Head: Normocephalic and atraumatic.      Right Ear: External ear normal.      Left Ear: External ear normal.      Nose: Nose normal.   Eyes:      General: No scleral icterus.        Right eye: No discharge.         Left eye: No discharge.      Extraocular Movements: Extraocular movements intact.      Conjunctiva/sclera: Conjunctivae normal.   Pulmonary:      Effort: Pulmonary effort is normal. No respiratory distress.      Breath sounds: No stridor.   Abdominal:      General: There is no distension.      Palpations: Abdomen is soft.   Genitourinary:     Comments: Tea colored urine in cannister  Musculoskeletal:         General: Swelling and tenderness present.      Right lower leg: No edema.      Left lower leg: No edema.      Comments: Left hand swelling and tenderness improved    Skin:     General: Skin is dry.      Coloration: Skin is not jaundiced or pale.      Comments: Thickened long toenails   Neurological:      General: No focal deficit present.      Mental Status: He is alert and oriented to person, place,  and time. Mental status is at baseline.   Psychiatric:         Mood and Affect: Mood normal.         Behavior: Behavior normal.         Thought Content: Thought content normal.         Judgment: Judgment normal.          Significant Labs: BMP:   Recent Labs   Lab 05/01/24  0518   GLU 75   *   K 5.3*      CO2 20*   BUN 58*   CREATININE 2.6*   CALCIUM 8.3*   MG 2.2     CBC:   Recent Labs   Lab 05/01/24  0518   WBC 14.79*   HGB 8.8*   HCT 28.9*        CMP:   Recent Labs   Lab 05/01/24  0518   *   K 5.3*      CO2 20*   GLU 75   BUN 58*   CREATININE 2.6*   CALCIUM 8.3*   PROT 6.7   ALBUMIN 2.4*   BILITOT 1.2*   ALKPHOS 102   AST 35   ALT 12   ANIONGAP 11     Microbiology Results (last 7 days)       ** No results found for the last 168 hours. **          Recent Lab Results  (Last 5 results in the past 24 hours)        05/02/24  0846   05/02/24  0549   05/01/24  2107   05/01/24  1700   05/01/24  1140        PTT   49.6  Comment: Refer to local heparin nomogram for intensity/dose specific   therapeutic   range.               CRP   289.6             POCT Glucose 154     115   100   88                              Significant Imaging:     Imaging Results              X-Ray Hand 3 view Left (Final result)  Result time 04/28/24 15:21:44      Final result by Leon Ortega MD (04/28/24 15:21:44)                   Impression:      1.  Diffuse soft tissue swelling of the left hand.    2.  Erosive change involving the head of the 2nd middle phalanx.  This may represent a focus of infection.  Additional evaluation, as clinically warranted.    3.  No evidence of a fracture or dislocation.      Electronically signed by: Leon Ortega MD  Date:    04/28/2024  Time:    15:21               Narrative:    EXAMINATION:  XR HAND COMPLETE 3 VIEW LEFT    CLINICAL HISTORY:  hand swelling;.    TECHNIQUE:  PA, lateral, and oblique views of the left hand were performed.    COMPARISON:  06/15/2023.    FINDINGS:  There is  demineralization of the osseous structures.  There is unchanged appearance of chronic changes involving the left distal radius and ulna.  There is no cortical step-off.  There is no evidence of periostitis.    There is joint space narrowing with osteophytosis.  There are advanced degenerative changes involving the left thumb.  There is an erosive change involving the head of the 2nd middle phalanx.    There is diffuse soft tissue swelling of the left hand.  There are advanced vascular calcifications in the left hand.    There is no evidence of a fracture or dislocation.

## 2024-05-02 NOTE — SUBJECTIVE & OBJECTIVE
Principal Problem:Swelling of digit of left hand    Principal Orthopedic Problem: same as above    Interval History: NAEON. VSS. AF. Patient states that dorsum of the hand still painful today but mild improvement.  yesterday increased from 239 the day before, AM CRP pending. WBC 14.79 down from 16.93.. Hgb 8.8. Receiving dapto and rocephin. No discrete area of swelling or fluctuance in the hand.    Plan to continue to watch and trend CRP this AM.       Review of patient's allergies indicates:   Allergen Reactions    Iodine and iodide containing products Other (See Comments)     Caused changes in skin color       Current Facility-Administered Medications   Medication Dose Route Frequency Provider Last Rate Last Admin    acetaminophen tablet 1,000 mg  1,000 mg Oral Q8H PRJuan Manuel Montenegro PA-C        albuterol-ipratropium 2.5 mg-0.5 mg/3 mL nebulizer solution 3 mL  3 mL Nebulization Q4H PRJuan Manuel Montenegro PA-C        bisacodyL suppository 10 mg  10 mg Rectal Daily PRN Juan Manuel Cornell PA-C        cefTRIAXone (ROCEPHIN) 2 g in dextrose 5 % in water (D5W) 100 mL IVPB (MB+)  2 g Intravenous Q24H Patrica Carson PA-C   Stopped at 05/01/24 1223    DAPTOmycin (CUBICIN) 500 mg in sodium chloride 0.9% SolP 50 mL IVPB  8 mg/kg Intravenous Q48H Patrica Carson PA-C   Stopped at 04/30/24 1330    dextrose 10% bolus 125 mL 125 mL  12.5 g Intravenous PRN Juan Manuel Cornell PA-C        dextrose 10% bolus 250 mL 250 mL  25 g Intravenous PRN Juan Manuel Cornell PA-C        ferrous sulfate tablet 1 each  1 tablet Oral Daily Juan Manuel Cornell PA-C   1 each at 05/01/24 0924    glucagon (human recombinant) injection 1 mg  1 mg Intramuscular PRN Juan Manuel Cornell PA-C        glucose chewable tablet 16 g  16 g Oral PRN Juan Manuel Cornell PA-C        glucose chewable tablet 24 g  24 g Oral PRN Juan Manuel Cornell PA-C        heparin 25,000 units in dextrose 5% (100 units/ml) IV bolus from bag LOW INTENSITY nomogram - [facility]  60 Units/kg  (Adjusted) Intravenous Once Juan Manuel Cornell PA-C        heparin 25,000 units in dextrose 5% (100 units/ml) IV bolus from bag LOW INTENSITY nomogram - [facility]  60 Units/kg (Adjusted) Intravenous PRJuan Manuel Montenegro PA-C   3,640 Units at 04/29/24 0632    heparin 25,000 units in dextrose 5% (100 units/ml) IV bolus from bag LOW INTENSITY nomogram - [facility]  30 Units/kg (Adjusted) Intravenous PRJuan Manuel Montenegro PA-C   1,820 Units at 04/30/24 0351    heparin 25,000 units in dextrose 5% 250 mL (100 units/mL) infusion LOW INTENSITY nomogram - [facility]  0-40 Units/kg/hr (Adjusted) Intravenous Continuous Juan Manuel Cornell PA-C 9.1 mL/hr at 05/02/24 0605 15 Units/kg/hr at 05/02/24 0605    hydrALAZINE injection 10 mg  10 mg Intravenous Q8H PRJuan Manuel Montenegro PA-C   10 mg at 04/28/24 1835    HYDROmorphone injection 1 mg  1 mg Intravenous Q6H PRJuan Manuel Montenegro PA-C   1 mg at 04/29/24 2334    melatonin tablet 6 mg  6 mg Oral Nightly PRJuan Manuel Montenegro PA-C   6 mg at 04/30/24 2035    methylPREDNISolone sodium succinate injection 80 mg  80 mg Intravenous Daily Maximo Howell MD   80 mg at 05/01/24 1553    naloxone 0.4 mg/mL injection 0.02 mg  0.02 mg Intravenous PRJuan Manuel Montenegro PA-C        oxyCODONE immediate release tablet 5 mg  5 mg Oral Q6H PRJuan Manuel Montenegro PA-C   5 mg at 04/28/24 2126    oxyCODONE immediate release tablet Tab 10 mg  10 mg Oral Q6H PRJuan Manuel Montenegro PA-C   10 mg at 04/30/24 2219    polyethylene glycol packet 17 g  17 g Oral Daily PRN Juan Manuel Cornell PA-C        prochlorperazine tablet 10 mg  10 mg Oral TID PRJuan Manuel Montenegro PA-C        promethazine tablet 25 mg  25 mg Oral Q6H PRN Juan Manuel Cornell PA-C        sodium chloride 0.9% bolus 500 mL 500 mL  500 mL Intravenous Once Maximo Howell MD        sodium chloride 0.9% flush 10 mL  10 mL Intravenous Q12H PRN Juan Manuel Cornell PA-C        sodium zirconium cyclosilicate packet 10 g  10 g Oral TID Maximo Howell MD   10 g at 05/01/24 4129  "    Objective:     Vital Signs (Most Recent):  Temp: 97.4 °F (36.3 °C) (05/02/24 0439)  Pulse: 67 (05/02/24 0439)  Resp: 18 (05/02/24 0439)  BP: 107/63 (05/02/24 0439)  SpO2: 97 % (05/02/24 0439) Vital Signs (24h Range):  Temp:  [97.4 °F (36.3 °C)-98.3 °F (36.8 °C)] 97.4 °F (36.3 °C)  Pulse:  [67-70] 67  Resp:  [16-18] 18  SpO2:  [92 %-97 %] 97 %  BP: ()/(50-77) 107/63     Weight: 62.7 kg (138 lb 3.7 oz)  Height: 5' 9" (175.3 cm)  Body mass index is 20.41 kg/m².      Intake/Output Summary (Last 24 hours) at 5/2/2024 0646  Last data filed at 5/2/2024 0602  Gross per 24 hour   Intake 109.2 ml   Output 400 ml   Net -290.8 ml        Ortho/SPM Exam     Gen:  No acute distress, well-developed, well nourished.  CV:  Peripherally well-perfused. 2+ radial pulses, symmetric.  Respiratory:  Normal respiratory effort. No accessory muscle use.   Head/Neck:  Normocephalic.  Atraumatic. Sclera anicteric. TM. Neck supple.  Neuro: CN 2-12 grossly intact. No FND. Awake. Alert. Oriented to person, place, time, and situation.   Abdomen: Soft, NTND.      MSK:  Left Upper Extremity  Inspection  - Skin intact throughout, no open wounds  - No swelling  - No ecchymosis, dorsum of the hand does not appear erythematous  Palpation  - Diffuse TTP over the dorsum of the hand, most prominent over the dorsum of the long finger  - nontender to elbow or shoulder  Range of motion  - AROM and PROM of the shoulder, elbow intact  - pain with ROM of each finger and wrist   Stability  - No evidence of joint dislocation or abnormal laxity   Neurovascular  - AIN/PIN/Radial/Median/Ulnar Nerves assessed in isolation without deficit  - Able to give thumbs up, make "OK" sign, cross IF/LF, abduct/adduct fingers, make fist  - SILT throughout  - Compartments soft  - Radial artery 2+  - Capillary Refill <3s  - Muscle tone normal        Significant Labs: CBC:   Recent Labs   Lab 05/01/24  0518   WBC 14.79*   HGB 8.8*   HCT 28.9*        CRP:   Recent " Labs   Lab 05/01/24  0518 05/02/24  0549   .4* 289.6*     All pertinent labs within the past 24 hours have been reviewed.    Significant Imaging: CT: I have reviewed all pertinent results/findings and my personal findings are:  CT of the left hand shows erosive changes to distal aspect of intermediate phalanx of 2nd finger.   I have reviewed and interpreted all pertinent imaging results/findings.

## 2024-05-02 NOTE — PT/OT/SLP PROGRESS
Speech Language Pathology Treatment    Patient Name:  Deena Moody   MRN:  [id number]  Admitting Diagnosis: Swelling of digit of left hand    Recommendations:                 General Recommendations:  Dysphagia therapy  Diet recommendations:  NPO, Pleasure Feeding, NPO, Other (Comment) (ice chips for pleasure when fully alert)   Aspiration Precautions: Eliminate distractions, Feed only when awake/alert, HOB to 90 degrees, Ice chips sparingly, and Puree for pleasure   General Precautions: Standard, aspiration  Communication strategies:  [facility]    Assessment:     Deena Moody is a 89 y.o. male with an SLP diagnosis of Dysphagia.  He presents with poor participation, confusion and refusal of PO trials despite education this date.    Subjective     Spoke with RN prior to session who report pt has not been cooperative all morning. Pt resting in bed with family members at bedside.     Pain/Comfort:  Pain Rating 1:  (none rated)    Respiratory Status: Room air    Objective:     Has the patient been evaluated by SLP for swallowing?   Yes  Keep patient NPO?       Pt seen bedside for ongoing swallow assessment. Pt roused with verbal and tactile cued and HOB raised to 60 degrees. Pt confused, intermittently yelling at clinician and not cooperative with accepting PO trials for diet advancement. He refused trials of ice, water, sprite and pudding from clinician and family member despite education about role of SLP, reason for visit, need for assessment prior to diet placement/advancement and max encouragement.  Continue to recommend ice chips sparingly, meds crushed in puree and puree for pleasure when awake and accepting. Education provided re: role of SLP, diet recs, swallow precs, s/s aspiration and POC.  Pt required ongoing reinforcement however, family verbalized understanding and agreement.     Goals:   Multidisciplinary Problems       SLP Goals          Problem: SLP    Goal Priority Disciplines Outcome   SLP Goal     SLP  Progressing   Description: Goals due 5/8  1.  Pt. Will participate in ongoing assessment of swallow at bedside                      Plan:     Patient to be seen:  4 x/week   Plan of Care expires:  05/29/24  Plan of Care reviewed with:  patient, family   SLP Follow-Up:  Yes       Discharge recommendations:  Low Intensity Therapy   Barriers to Discharge:  Level of Skilled Assistance Needed      Time Tracking:     SLP Treatment Date:   05/02/24  Speech Start Time:  1009  Speech Stop Time:  1024     Speech Total Time (min):  15 min    Billable Minutes: Self Care/Home Management Training 15    05/02/2024

## 2024-05-02 NOTE — ASSESSMENT & PLAN NOTE
Acute polyarticular gout   Uric acid elevated 11.6.  Given hx of gout, consulting rheumatology for evaluation of gout flare. Concern for acute polyarticular gout with +/- cellulitis.   Started on IV solumedrol 80mg x 2 days

## 2024-05-03 PROBLEM — E87.5 HYPERKALEMIA: Status: RESOLVED | Noted: 2024-04-30 | Resolved: 2024-05-03

## 2024-05-03 PROBLEM — Z79.01 LONG TERM (CURRENT) USE OF ANTICOAGULANTS: Status: RESOLVED | Noted: 2017-07-11 | Resolved: 2024-05-03

## 2024-05-03 PROBLEM — M79.89 SWELLING OF LEFT HAND: Status: RESOLVED | Noted: 2024-04-30 | Resolved: 2024-05-03

## 2024-05-03 PROBLEM — M10.9 GOUT: Status: RESOLVED | Noted: 2024-05-03 | Resolved: 2024-05-03

## 2024-05-03 PROBLEM — M10.9 GOUT: Status: ACTIVE | Noted: 2024-05-03

## 2024-05-03 PROBLEM — D75.89 MACROCYTOSIS: Status: RESOLVED | Noted: 2024-04-28 | Resolved: 2024-05-03

## 2024-05-03 LAB
ALBUMIN SERPL BCP-MCNC: 2.4 G/DL (ref 3.5–5.2)
ALP SERPL-CCNC: 114 U/L (ref 55–135)
ALT SERPL W/O P-5'-P-CCNC: 21 U/L (ref 10–44)
ANION GAP SERPL CALC-SCNC: 14 MMOL/L (ref 8–16)
ANION GAP SERPL CALC-SCNC: 15 MMOL/L (ref 8–16)
APTT PPP: 52.3 SEC (ref 21–32)
AST SERPL-CCNC: 36 U/L (ref 10–40)
BASOPHILS # BLD AUTO: 0.02 K/UL (ref 0–0.2)
BASOPHILS NFR BLD: 0.2 % (ref 0–1.9)
BILIRUB SERPL-MCNC: 1.1 MG/DL (ref 0.1–1)
BODY FLD TYPE: ABNORMAL
BUN SERPL-MCNC: 104 MG/DL (ref 10–30)
BUN SERPL-MCNC: 108 MG/DL (ref 10–30)
CALCIUM SERPL-MCNC: 8.2 MG/DL (ref 8.7–10.5)
CALCIUM SERPL-MCNC: 8.5 MG/DL (ref 8.7–10.5)
CHLORIDE SERPL-SCNC: 100 MMOL/L (ref 95–110)
CHLORIDE SERPL-SCNC: 101 MMOL/L (ref 95–110)
CO2 SERPL-SCNC: 18 MMOL/L (ref 23–29)
CO2 SERPL-SCNC: 19 MMOL/L (ref 23–29)
CREAT SERPL-MCNC: 3.2 MG/DL (ref 0.5–1.4)
CREAT SERPL-MCNC: 3.3 MG/DL (ref 0.5–1.4)
CREAT UR-MCNC: 70 MG/DL (ref 23–375)
CRP SERPL-MCNC: 223.5 MG/L (ref 0–8.2)
CRYSTALS FLD MICRO: POSITIVE
DIFFERENTIAL METHOD BLD: ABNORMAL
EOSINOPHIL # BLD AUTO: 0 K/UL (ref 0–0.5)
EOSINOPHIL NFR BLD: 0 % (ref 0–8)
ERYTHROCYTE [DISTWIDTH] IN BLOOD BY AUTOMATED COUNT: 13.5 % (ref 11.5–14.5)
EST. GFR  (NO RACE VARIABLE): 16.7 ML/MIN/1.73 M^2
EST. GFR  (NO RACE VARIABLE): 17.4 ML/MIN/1.73 M^2
GLUCOSE SERPL-MCNC: 128 MG/DL (ref 70–110)
GLUCOSE SERPL-MCNC: 133 MG/DL (ref 70–110)
GRAM STN SPEC: NORMAL
GRAM STN SPEC: NORMAL
HCT VFR BLD AUTO: 29.3 % (ref 40–54)
HGB BLD-MCNC: 9.6 G/DL (ref 14–18)
IMM GRANULOCYTES # BLD AUTO: 0.05 K/UL (ref 0–0.04)
IMM GRANULOCYTES NFR BLD AUTO: 0.4 % (ref 0–0.5)
LYMPHOCYTES # BLD AUTO: 0.6 K/UL (ref 1–4.8)
LYMPHOCYTES NFR BLD: 5 % (ref 18–48)
MCH RBC QN AUTO: 30.9 PG (ref 27–31)
MCHC RBC AUTO-ENTMCNC: 32.8 G/DL (ref 32–36)
MCV RBC AUTO: 94 FL (ref 82–98)
MONOCYTES # BLD AUTO: 0.7 K/UL (ref 0.3–1)
MONOCYTES NFR BLD: 5.2 % (ref 4–15)
NEUTROPHILS # BLD AUTO: 11.2 K/UL (ref 1.8–7.7)
NEUTROPHILS NFR BLD: 89.2 % (ref 38–73)
NRBC BLD-RTO: 0 /100 WBC
OSMOLALITY UR: 372 MOSM/KG (ref 50–1200)
PLATELET # BLD AUTO: 165 K/UL (ref 150–450)
PMV BLD AUTO: 9.6 FL (ref 9.2–12.9)
POCT GLUCOSE: 136 MG/DL (ref 70–110)
POCT GLUCOSE: 144 MG/DL (ref 70–110)
POCT GLUCOSE: 156 MG/DL (ref 70–110)
POTASSIUM SERPL-SCNC: 5.1 MMOL/L (ref 3.5–5.1)
POTASSIUM SERPL-SCNC: 5.8 MMOL/L (ref 3.5–5.1)
PROT SERPL-MCNC: 7.2 G/DL (ref 6–8.4)
RBC # BLD AUTO: 3.11 M/UL (ref 4.6–6.2)
SODIUM SERPL-SCNC: 133 MMOL/L (ref 136–145)
SODIUM SERPL-SCNC: 134 MMOL/L (ref 136–145)
SODIUM UR-SCNC: 20 MMOL/L (ref 20–250)
WBC # BLD AUTO: 12.55 K/UL (ref 3.9–12.7)

## 2024-05-03 PROCEDURE — 94640 AIRWAY INHALATION TREATMENT: CPT | Mod: HCNC

## 2024-05-03 PROCEDURE — 80048 BASIC METABOLIC PNL TOTAL CA: CPT | Mod: HCNC,XB | Performed by: STUDENT IN AN ORGANIZED HEALTH CARE EDUCATION/TRAINING PROGRAM

## 2024-05-03 PROCEDURE — 87040 BLOOD CULTURE FOR BACTERIA: CPT | Mod: 59,HCNC

## 2024-05-03 PROCEDURE — 87102 FUNGUS ISOLATION CULTURE: CPT | Mod: HCNC

## 2024-05-03 PROCEDURE — 63600175 PHARM REV CODE 636 W HCPCS: Mod: HCNC | Performed by: STUDENT IN AN ORGANIZED HEALTH CARE EDUCATION/TRAINING PROGRAM

## 2024-05-03 PROCEDURE — 87205 SMEAR GRAM STAIN: CPT | Mod: HCNC

## 2024-05-03 PROCEDURE — 82570 ASSAY OF URINE CREATININE: CPT | Mod: HCNC | Performed by: STUDENT IN AN ORGANIZED HEALTH CARE EDUCATION/TRAINING PROGRAM

## 2024-05-03 PROCEDURE — 87075 CULTR BACTERIA EXCEPT BLOOD: CPT | Mod: HCNC

## 2024-05-03 PROCEDURE — 36415 COLL VENOUS BLD VENIPUNCTURE: CPT | Mod: HCNC,XB | Performed by: STUDENT IN AN ORGANIZED HEALTH CARE EDUCATION/TRAINING PROGRAM

## 2024-05-03 PROCEDURE — 89060 EXAM SYNOVIAL FLUID CRYSTALS: CPT | Mod: HCNC

## 2024-05-03 PROCEDURE — 92526 ORAL FUNCTION THERAPY: CPT | Mod: HCNC

## 2024-05-03 PROCEDURE — 97165 OT EVAL LOW COMPLEX 30 MIN: CPT | Mod: HCNC

## 2024-05-03 PROCEDURE — 97162 PT EVAL MOD COMPLEX 30 MIN: CPT | Mod: HCNC

## 2024-05-03 PROCEDURE — 25000003 PHARM REV CODE 250: Mod: HCNC

## 2024-05-03 PROCEDURE — 36415 COLL VENOUS BLD VENIPUNCTURE: CPT | Mod: HCNC

## 2024-05-03 PROCEDURE — 87206 SMEAR FLUORESCENT/ACID STAI: CPT | Mod: HCNC

## 2024-05-03 PROCEDURE — 97530 THERAPEUTIC ACTIVITIES: CPT | Mod: HCNC

## 2024-05-03 PROCEDURE — 85730 THROMBOPLASTIN TIME PARTIAL: CPT | Mod: HCNC

## 2024-05-03 PROCEDURE — 87116 MYCOBACTERIA CULTURE: CPT | Mod: HCNC

## 2024-05-03 PROCEDURE — 80053 COMPREHEN METABOLIC PANEL: CPT | Mod: HCNC | Performed by: STUDENT IN AN ORGANIZED HEALTH CARE EDUCATION/TRAINING PROGRAM

## 2024-05-03 PROCEDURE — 86140 C-REACTIVE PROTEIN: CPT | Mod: HCNC | Performed by: STUDENT IN AN ORGANIZED HEALTH CARE EDUCATION/TRAINING PROGRAM

## 2024-05-03 PROCEDURE — 87070 CULTURE OTHR SPECIMN AEROBIC: CPT | Mod: HCNC

## 2024-05-03 PROCEDURE — 0R9P3ZX DRAINAGE OF LEFT WRIST JOINT, PERCUTANEOUS APPROACH, DIAGNOSTIC: ICD-10-PCS | Performed by: ORTHOPAEDIC SURGERY

## 2024-05-03 PROCEDURE — 99233 SBSQ HOSP IP/OBS HIGH 50: CPT | Mod: HCNC,,, | Performed by: PHYSICIAN ASSISTANT

## 2024-05-03 PROCEDURE — 83935 ASSAY OF URINE OSMOLALITY: CPT | Mod: HCNC | Performed by: STUDENT IN AN ORGANIZED HEALTH CARE EDUCATION/TRAINING PROGRAM

## 2024-05-03 PROCEDURE — 84300 ASSAY OF URINE SODIUM: CPT | Mod: HCNC | Performed by: STUDENT IN AN ORGANIZED HEALTH CARE EDUCATION/TRAINING PROGRAM

## 2024-05-03 PROCEDURE — 25000242 PHARM REV CODE 250 ALT 637 W/ HCPCS: Mod: HCNC | Performed by: STUDENT IN AN ORGANIZED HEALTH CARE EDUCATION/TRAINING PROGRAM

## 2024-05-03 PROCEDURE — 94761 N-INVAS EAR/PLS OXIMETRY MLT: CPT | Mod: HCNC

## 2024-05-03 PROCEDURE — 21400001 HC TELEMETRY ROOM: Mod: HCNC

## 2024-05-03 PROCEDURE — 97116 GAIT TRAINING THERAPY: CPT | Mod: HCNC

## 2024-05-03 PROCEDURE — 85025 COMPLETE CBC W/AUTO DIFF WBC: CPT | Mod: HCNC | Performed by: STUDENT IN AN ORGANIZED HEALTH CARE EDUCATION/TRAINING PROGRAM

## 2024-05-03 PROCEDURE — 25000003 PHARM REV CODE 250: Mod: HCNC | Performed by: STUDENT IN AN ORGANIZED HEALTH CARE EDUCATION/TRAINING PROGRAM

## 2024-05-03 RX ORDER — SODIUM CHLORIDE 9 MG/ML
INJECTION, SOLUTION INTRAVENOUS CONTINUOUS
Status: ACTIVE | OUTPATIENT
Start: 2024-05-03 | End: 2024-05-03

## 2024-05-03 RX ORDER — ALBUTEROL SULFATE 2.5 MG/.5ML
10 SOLUTION RESPIRATORY (INHALATION) ONCE
Status: COMPLETED | OUTPATIENT
Start: 2024-05-03 | End: 2024-05-03

## 2024-05-03 RX ORDER — BALSAM PERU/CASTOR OIL
OINTMENT (GRAM) TOPICAL 2 TIMES DAILY
Status: DISCONTINUED | OUTPATIENT
Start: 2024-05-03 | End: 2024-05-08 | Stop reason: HOSPADM

## 2024-05-03 RX ADMIN — SODIUM CHLORIDE: 9 INJECTION, SOLUTION INTRAVENOUS at 12:05

## 2024-05-03 RX ADMIN — SODIUM ZIRCONIUM CYCLOSILICATE 10 G: 10 POWDER, FOR SUSPENSION ORAL at 05:05

## 2024-05-03 RX ADMIN — FERROUS SULFATE TAB EC 325 MG (65 MG FE EQUIVALENT) 1 EACH: 325 (65 FE) TABLET DELAYED RESPONSE at 09:05

## 2024-05-03 RX ADMIN — ALBUTEROL SULFATE 10 MG: 2.5 SOLUTION RESPIRATORY (INHALATION) at 11:05

## 2024-05-03 RX ADMIN — Medication: at 05:05

## 2024-05-03 RX ADMIN — APIXABAN 2.5 MG: 2.5 TABLET, FILM COATED ORAL at 08:05

## 2024-05-03 RX ADMIN — PREDNISONE 40 MG: 20 TABLET ORAL at 09:05

## 2024-05-03 NOTE — SUBJECTIVE & OBJECTIVE
Interval History:   L hand pain has improved. Hyperkalemic on AM labs, shifting.    Review of Systems   Constitutional:  Negative for chills, diaphoresis, fatigue and fever.   HENT:  Positive for hearing loss.    Respiratory:  Negative for cough and shortness of breath.    Cardiovascular:  Negative for chest pain and leg swelling.   Gastrointestinal:  Negative for abdominal pain, diarrhea, nausea and vomiting.   Genitourinary:  Negative for dysuria and frequency.   Musculoskeletal:  Positive for arthralgias and joint swelling. Negative for back pain.   Skin:  Positive for color change. Negative for rash and wound.   Neurological:  Negative for dizziness and headaches.   Psychiatric/Behavioral:  Positive for confusion. Negative for agitation. The patient is not nervous/anxious.    All other systems reviewed and are negative.    Objective:     Vital Signs (Most Recent):  Temp: 97.6 °F (36.4 °C) (05/03/24 1051)  Pulse: 70 (05/03/24 1051)  Resp: 18 (05/03/24 1051)  BP: 135/62 (05/03/24 1051)  SpO2: 96 % (05/03/24 1051) Vital Signs (24h Range):  Temp:  [97.3 °F (36.3 °C)-97.6 °F (36.4 °C)] 97.6 °F (36.4 °C)  Pulse:  [68-70] 70  Resp:  [18] 18  SpO2:  [96 %-99 %] 96 %  BP: ()/(54-69) 135/62     Weight: 62.7 kg (138 lb 3.7 oz)  Body mass index is 20.41 kg/m².    Intake/Output Summary (Last 24 hours) at 5/3/2024 1126  Last data filed at 5/3/2024 0606  Gross per 24 hour   Intake 109 ml   Output 300 ml   Net -191 ml         Physical Exam  Vitals reviewed.   Constitutional:       General: He is not in acute distress.     Appearance: Normal appearance. He is not ill-appearing.   HENT:      Head: Normocephalic.   Eyes:      Extraocular Movements: Extraocular movements intact.   Cardiovascular:      Rate and Rhythm: Normal rate.   Pulmonary:      Effort: Pulmonary effort is normal.   Abdominal:      General: There is no distension.   Musculoskeletal:      Comments: No obvious joint swelling or tenderness.   Neurological:       Mental Status: He is alert. Mental status is at baseline.             Significant Labs: All pertinent labs within the past 24 hours have been reviewed.

## 2024-05-03 NOTE — ASSESSMENT & PLAN NOTE
Long term (current) use of anticoagulants  S/P placement of cardiac pacemaker    - pacemaker not MRI compatible   - hold eliquis in setting of possible OR , heparin infusion for now  - cont tele

## 2024-05-03 NOTE — ASSESSMENT & PLAN NOTE
Acute polyarticular gout   Uric acid elevated 11.6.  Given hx of gout, consulting rheumatology for evaluation of gout flare.   Started on IV solumedrol 80mg x 2 days, now on PO prednisone for 5 days.

## 2024-05-03 NOTE — PT/OT/SLP EVAL
Occupational Therapy   Co-Evaluation    Name: Deena Moody  MRN: [id number]  Admitting Diagnosis: Swelling of digit of left hand  Recent Surgery: * No surgery found *      Recommendations:     Discharge Recommendations: Moderate Intensity Therapy  Discharge Equipment Recommendations:     Barriers to discharge:  None    Assessment:     Deena Moody is a 89 y.o. male with a medical diagnosis of Swelling of digit of left hand.  Performance deficits affecting function: weakness, impaired endurance, impaired self care skills, impaired functional mobility, gait instability, impaired balance, decreased lower extremity function, pain, decreased safety awareness.      Rehab Prognosis: Good; patient would benefit from acute skilled OT services to address these deficits and reach maximum level of function.       Plan:     Patient to be seen 4 x/week to address the above listed problems via self-care/home management, therapeutic activities, therapeutic exercises, neuromuscular re-education  Plan of Care Expires: 06/03/24  Plan of Care Reviewed with: patient, spouse    Subjective     Chief Complaint: L arm pain and L knee pain   Patient/Family Comments/goals: Pt.reports his arms when he moves it but he has been in the bed for 5 days     Occupational Profile:  Living Environment: Lives in a [facility] with a ramp entrance, with his granddaughter, however stays without throughout the day without supervision  Previous level of function: Ind in all ADLs and Mod Ind fxl mob, with RW  Equipment Used at Home: walker, rolling, wheelchair  Assistance upon Discharge: Limited from family    Pain/Comfort:  Pain Rating 1:  (did not rate)  Location - Side 1: Left  Location 1:  (arm and knee)  Pain Addressed 1: Reposition, Distraction, Cessation of Activity    Patients cultural, spiritual, Episcopal conflicts given the current situation: no    Objective:     Communicated with: nurse prior to session.  Patient found HOB elevated with peripheral IV,  Alexi upon OT entry to room.    General Precautions: Standard, fall  Orthopedic Precautions: N/A  Braces: N/A  Respiratory Status: Room air    Occupational Performance:    Bed Mobility:    Patient completed Supine to Sit with moderate assistance  Patient completed Sit to Supine with moderate assistance from a stretcher    Functional Mobility/Transfers:  Patient completed Sit <> Stand Transfer with moderate assistance  with  rolling walker   Noted L lateral lean at EOB and at bed level  Functional Mobility: Pt demonstrated functional mobility training to simulate household from EOB to doorway with RW with moderate assistance and no LOB and good visual search and navigation strategies.   Additional gait training the hallway with PT with RW with Assistance (see PT)  Activities of Daily Living:  Lower Body Dressing: maximal assistance don and doff socks     Cognitive/Visual Perceptual:  Cognitive/Psychosocial Skills:     -       Oriented to: Person, Place, and Situation   -       Follows Commands/attention:Follows multistep  commands  -       Communication: clear/fluent  -       Memory: STM and LTM impaired  -       Safety awareness/insight to disability: impaired   -       Mood/Affect/Coping skills/emotional control: Appropriate to situation    Physical Exam:  Balance:  Static Sitting:  CGA-Min A  Dynamic Sitting: Min A  Static Standing: Min A   Edema: noted LUE moderate edema from finger to elbow  Upper Extremity Range of Motion:     -       Right Upper Extremity: WFL  -       Left Upper Extremity: WFL AROM, PROM achieved at least 90 degrees with shoulder flexion, horizontal Abd,  Upper Extremity Strength:    -       Right Upper Extremity: WFL  -       Left Upper Extremity: WFL   Strength:    -       Right Upper Extremity: WFL  -       Left Upper Extremity: WFL    AMPAC 6 Click ADL:  AMPAC Total Score: 21    Treatment & Education:  Pt educated on role of occupational therapy, POC, and safety during ADLs and  functional mobility. Pt and OT discussed importance of safe, continued mobility to optimize daily living skills. Pt verbalized understanding. Pt given instruction to call for medical staff/nurse for assistance.   PT present for coeval due to pt's multiple medical comorbidities and functional/cognition deficits requiring two skilled therapists to appropriately progress pt's musculoskeletal strength, neuromuscular control, and endurance while taking into consideration medical acuity and pt safety.     Patient left  on stretcher with to go to ultrasound  with all lines intact, call button in reach, nurse notified, and nurse and son  present    GOALS:   Multidisciplinary Problems       Occupational Therapy Goals          Problem: Occupational Therapy    Goal Priority Disciplines Outcome Interventions   Occupational Therapy Goal     OT, PT/OT Progressing    Description: Goals to be met by: 6/2/24     Patient will increase functional independence with ADLs by performing:    Grooming while standing at sink with Supervision.  Toileting from bedside commode with Supervision for hygiene and clothing management.   Rolling to Bilateral with [place].   Supine to sit with Modified [place].  Toilet transfer to bedside commode with Supervision.                         History:     Past Medical History:   Diagnosis Date    Acute on chronic diastolic heart failure 9/1/2016    Coronary artery disease     Hyperlipidemia     Hypertension     Macular degeneration (senile) of retina, unspecified 12/12/2014    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    Persistent atrial fibrillation 11/10/2016    S/P placement of cardiac pacemaker 9/14/2016       Past Surgical History:   Procedure Laterality Date    AORTIC VALVE REPLACEMENT N/A     CARDIAC PACEMAKER PLACEMENT      CATARACT EXTRACTION W/  INTRAOCULAR LENS IMPLANT Right 2/16/2016    Dr. Whitley    CATARACT EXTRACTION W/  INTRAOCULAR LENS IMPLANT Left 3/1/2016    Dr. Whitley    CORONARY  ARTERY BYPASS GRAFT      EAR EXAMINATION UNDER ANESTHESIA      EYE SURGERY         Time Tracking:     OT Date of Treatment: 05/03/24  OT Start Time: 1311  OT Stop Time: 1335  OT Total Time (min): 24 min    Billable Minutes:Evaluation 12  Therapeutic Activity 12    5/3/2024

## 2024-05-03 NOTE — PLAN OF CARE
Problem: Occupational Therapy  Goal: Occupational Therapy Goal  Description: Goals to be met by: 6/2/24     Patient will increase functional independence with ADLs by performing:    Grooming while standing at sink with Supervision.  Toileting from bedside commode with Supervision for hygiene and clothing management.   Rolling to Bilateral with [place].   Supine to sit with Modified [place].  Toilet transfer to bedside commode with Supervision.    Outcome: Progressing

## 2024-05-03 NOTE — ASSESSMENT & PLAN NOTE
89 year old male with cardiac disease, pacemaker, CKD, gout who presented to the ED 4/28 with left hand pain and swelling that has been present for over a week without known trauma.    Afebrile without systemic signs of infection. Labs revealed a leukocytosis and elevated inflammatory markers. Uric acid level 11. CT with erosive changes at the distal aspect of the middle phalanx of the 2nd finger concerning for osteomyelitis or other inflammatory arthritides. Orthopedic surgery following. No plans for surgery at this time. He has been on IV antibiotics without clinical improvement. Rheumatology consulted and started IV solumedrol 5/1. Today, patient is significantly improved clinically with reduction in CRP.    Recommendations  At this time, suspect his clinical picture represents an acute gout flare and less likely infection given lack of response to antibiotics, though with dramatic response to steroids. Continue management per rheumatology. Will discontinue and monitor off antibiotics.   Discussed with ID staff. ID will sign off. Feel free to re-consult ID with any infectious concerns.

## 2024-05-03 NOTE — PT/OT/SLP PROGRESS
Speech Language Pathology Treatment    Patient Name:  Deena Moody   MRN:  [id number]  Admitting Diagnosis: Swelling of digit of left hand    Recommendations:                 General Recommendations:  Dysphagia therapy  Diet recommendations:  Regular & thin  Aspiration Precautions: Eliminate distractions, Feed only when awake/alert, HOB to 90 degrees, Ice chips sparingly, and Puree for pleasure   General Precautions: Standard, aspiration  Communication strategies:  [facility]    Assessment:     Deena Moody is a 89 y.o. male with an SLP diagnosis of  functional swallow.  He presents with poor participation, confusion and refusal of PO trials despite education this date.    Subjective     Spoke with RN prior to session who report pt has not been cooperative all morning. Pt resting in bed with family members at bedside.     Pain/Comfort:  Pain Rating 1: 0/10  Pain Rating Post-Intervention 1: 0/10    Respiratory Status: Room air    Objective:     Has the patient been evaluated by SLP for swallowing?   Yes  Keep patient NPO? No     RN reached out stating that pt is more awake/alert, sitting upright and took pills without difficulty. Pt seen bedside for ongoing swallow assessment. Pt awake/alert and pleasant. He was sitting upright in bed with son at bedside. Son provided feed assist with sips of water from straw x 8, tsp puree x 3, bites of cracker x 4. Pt demonstrated adequate mastication AP transit and timely swallow trigger. No overt s/sx of airway compromise appreciated. Recommend a regular diet and thin liquids at this time. SLP will follow up to ensure tolerance. Education provided re: role of SLP, diet recs, swallow precs, s/s aspiration and POC.  Pt verbalized understanding and agreement. RN and team updated post session.     Goals:   Multidisciplinary Problems       SLP Goals          Problem: SLP    Goal Priority Disciplines Outcome   SLP Goal     SLP Progressing   Description: Goals due 5/8  1.  Pt. Will  participate in ongoing assessment of swallow at bedside                      Plan:     Patient to be seen:  2 x/week   Plan of Care expires:  05/29/24  Plan of Care reviewed with:  patient, son   SLP Follow-Up:  Yes       Discharge recommendations:  No Therapy Indicated   Barriers to Discharge:  Level of Skilled Assistance Needed      Time Tracking:     SLP Treatment Date:   05/03/24  Speech Start Time:  1028  Speech Stop Time:  1036     Speech Total Time (min):  8 min    Billable Minutes: Self Care/Home Management Training 8    05/03/2024

## 2024-05-03 NOTE — SUBJECTIVE & OBJECTIVE
Interval History: NAEON. Afebrile. Patient much more alert, interactive and pleasant today with improvement in pain and swelling with initiation of steroids. CRP decreasing.      Review of Systems   Constitutional:  Negative for chills, diaphoresis, fatigue and fever.   HENT:  Positive for hearing loss.    Respiratory:  Negative for cough and shortness of breath.    Cardiovascular:  Negative for chest pain and leg swelling.   Gastrointestinal:  Negative for abdominal pain, diarrhea, nausea and vomiting.   Genitourinary:  Negative for dysuria and frequency.   Musculoskeletal:  Positive for arthralgias and joint swelling. Negative for back pain.   Skin:  Positive for color change. Negative for rash and wound.   Neurological:  Negative for dizziness and headaches.   Psychiatric/Behavioral:  Positive for confusion. Negative for agitation. The patient is not nervous/anxious.    All other systems reviewed and are negative.    Objective:     Vital Signs (Most Recent):  Temp: 97.6 °F (36.4 °C) (05/03/24 1051)  Pulse: 70 (05/03/24 1051)  Resp: 18 (05/03/24 1051)  BP: 135/62 (05/03/24 1051)  SpO2: 96 % (05/03/24 1051) Vital Signs (24h Range):  Temp:  [97.3 °F (36.3 °C)-97.6 °F (36.4 °C)] 97.6 °F (36.4 °C)  Pulse:  [68-70] 70  Resp:  [18] 18  SpO2:  [96 %-99 %] 96 %  BP: ()/(54-69) 135/62     Weight: 62.7 kg (138 lb 3.7 oz)  Body mass index is 20.41 kg/m².    Estimated Creatinine Clearance: 12.4 mL/min (A) (based on SCr of 3.3 mg/dL (H)).     Physical Exam  Vitals and nursing note reviewed.   Constitutional:       General: He is not in acute distress.     Appearance: Normal appearance. He is well-developed. He is not ill-appearing or diaphoretic.   HENT:      Head: Normocephalic and atraumatic.      Right Ear: External ear normal.      Left Ear: External ear normal.      Nose: Nose normal.   Eyes:      General: No scleral icterus.        Right eye: No discharge.         Left eye: No discharge.      Extraocular Movements:  Extraocular movements intact.      Conjunctiva/sclera: Conjunctivae normal.   Pulmonary:      Effort: Pulmonary effort is normal. No respiratory distress.      Breath sounds: No stridor.   Abdominal:      General: There is no distension.      Palpations: Abdomen is soft.   Musculoskeletal:         General: Swelling and tenderness present.      Right lower leg: No edema.      Left lower leg: No edema.      Comments: Left hand swelling and tenderness improved    Skin:     General: Skin is dry.      Coloration: Skin is not jaundiced or pale.      Comments: Thickened long toenails   Neurological:      General: No focal deficit present.      Mental Status: He is alert. Mental status is at baseline.      Comments: Pleasant and conversant today   Psychiatric:         Mood and Affect: Mood normal.         Behavior: Behavior normal.         Thought Content: Thought content normal.         Judgment: Judgment normal.          Significant Labs: BMP:   Recent Labs   Lab 05/03/24 0832   *   *   K 5.8*      CO2 18*   *   CREATININE 3.3*   CALCIUM 8.5*     CBC:   Recent Labs   Lab 05/03/24 0832   WBC 12.55   HGB 9.6*   HCT 29.3*        CMP:   Recent Labs   Lab 05/03/24  0832   *   K 5.8*      CO2 18*   *   *   CREATININE 3.3*   CALCIUM 8.5*   PROT 7.2   ALBUMIN 2.4*   BILITOT 1.1*   ALKPHOS 114   AST 36   ALT 21   ANIONGAP 15     Microbiology Results (last 7 days)       Procedure Component Value Units Date/Time    Blood culture [[id number]] Collected: 05/03/24 0832    Order Status: Sent Specimen: Blood Updated: 05/03/24 0842    Blood culture [[id number]] Collected: 05/03/24 0832    Order Status: Sent Specimen: Blood Updated: 05/03/24 0842          Recent Lab Results  (Last 5 results in the past 24 hours)        05/03/24  0836   05/03/24  0832   05/03/24  0603   05/02/24  1935   05/02/24  1621        Albumin   2.4             ALP   114             ALT   21              Anion Gap   15             PTT     52.3  Comment: Refer to local heparin nomogram for intensity/dose specific   therapeutic   range.             AST   36             Baso #   0.02             Basophil %   0.2             BILIRUBIN TOTAL   1.1  Comment: For infants and newborns, interpretation of results should be based  on gestational age, weight and in agreement with clinical  observations.    Premature Infant recommended reference ranges:  Up to 24 hours.............<8.0 mg/dL  Up to 48 hours............<12.0 mg/dL  3-5 days..................<15.0 mg/dL  6-29 days.................<15.0 mg/dL               BUN   104             Calcium   8.5             Chloride   101             CO2   18             Creatinine   3.3             CRP     223.5           Differential Method   Automated             eGFR   16.7             Eos #   0.0             Eos %   0.0             Glucose   128             Gran # (ANC)   11.2             Gran %   89.2             Hematocrit   29.3             Hemoglobin   9.6             Immature Grans (Abs)   0.05  Comment: Mild elevation in immature granulocytes is non specific and   can be seen in a variety of conditions including stress response,   acute inflammation, trauma and pregnancy. Correlation with other   laboratory and clinical findings is essential.               Immature Granulocytes   0.4             Lymph #   0.6             Lymph %   5.0             MCH   30.9             MCHC   32.8             MCV   94             Mono #   0.7             Mono %   5.2             MPV   9.6             nRBC   0             Platelet Count   165             POCT Glucose 144       165   130       Potassium   5.8             PROTEIN TOTAL   7.2             RBC   3.11             RDW   13.5             Sodium   134             WBC   12.55                                    Significant Imaging:     Imaging Results              X-Ray Hand 3 view Left (Final result)  Result time 04/28/24 15:21:44       Final result by Leon Ortega MD (04/28/24 15:21:44)                   Impression:      1.  Diffuse soft tissue swelling of the left hand.    2.  Erosive change involving the head of the 2nd middle phalanx.  This may represent a focus of infection.  Additional evaluation, as clinically warranted.    3.  No evidence of a fracture or dislocation.      Electronically signed by: Leon Ortega MD  Date:    04/28/2024  Time:    15:21               Narrative:    EXAMINATION:  XR HAND COMPLETE 3 VIEW LEFT    CLINICAL HISTORY:  hand swelling;.    TECHNIQUE:  PA, lateral, and oblique views of the left hand were performed.    COMPARISON:  06/15/2023.    FINDINGS:  There is demineralization of the osseous structures.  There is unchanged appearance of chronic changes involving the left distal radius and ulna.  There is no cortical step-off.  There is no evidence of periostitis.    There is joint space narrowing with osteophytosis.  There are advanced degenerative changes involving the left thumb.  There is an erosive change involving the head of the 2nd middle phalanx.    There is diffuse soft tissue swelling of the left hand.  There are advanced vascular calcifications in the left hand.    There is no evidence of a fracture or dislocation.

## 2024-05-03 NOTE — PROGRESS NOTES
[facility] - Med Surg  Rheumatology  Progress Note    Patient Name: Deena Moody  MRN: [id number]  Admission Date: 4/28/2024  Hospital Length of Stay: 4 days  Code Status: Full Code   Attending Provider: Masha Bailey MD  Primary Care Physician: Jam Rowell MD  Principal Problem: Swelling of digit of left hand    Subjective:     HPI: Deena Moody is a 89 y.o. M with a past medical history of HTN, HLD, CAD, diastolic HF, A-fib with pacemaker, CABG, hx of MI, AV block, and CKD 3 [place] [facility] [place] left hand swelling and pain that had been persistent for 2-3 weeks.    The patient has a long history of gout. His gout attacks typically involve his knees but have involved his feet in the past as well. He had an arthrocentesis of the L knee done on 7/12/23 which was positive for crystals.      Latest Reference Range & Units 07/12/23 15:38   Body Fluid Crystal Negative  Positive !   Body Fluid Source, Crystal Exam  Joint Fluid, Left Knee   !: Intra and extracellular urate crystals present     About 2 weeks ago he developed pain and then swelling of the L hand. He was initially seen in the ED 2 days before admission and diagnosed with cellulitis and prescribed PO keflex and discharged. He returned because the pain and swelling continued to worsen. Laboratory studies: No leukocytosis. ESR 55 and CRP 82.8. X-ray with diffuse soft tissue swelling of the left hand and erosive change involving the head of the 2nd middle phalanx. He was started Vancomycin. ID and Orthopedics were consulted.     Throughout his hospitalization, his WBC and CRP have uptrended, currently WBC of 14.7 and CRP of 274.4. CT ordered for further evaluation which showed new erosive changes at the distal aspect of the middle phalanx of the 2nd finger concerning for osteomyelitis or other inflammatory arthritides.     On 4/30, Cr increased. Vancomycin was discontinued per ID given poor renal function and advanced age. He was  "started on empiric Daptomycin and Ceftriaxone on 4/30. ID also ordered a uric acid level which was elevated at 11.6 on 4/30.     Rheumatology consulted for "eval for gout flare, elevated uric acid".     Upon initial evaluation, the patient is not very responsive, yelling out in pain. The patients daughter is at bedside who provided the above history. She reports that he seemed more "with it" yesterday and seems confused today. She does not believe that he was on any daily treatment for his gout.    Interval History: Continues to report improvement in his pain. Worked with PT today. Had some pain in his L hand after using the walker. Ortho at bedside doing aspiration.     Current Facility-Administered Medications   Medication Dose Route Frequency Provider Last Rate Last Admin    0.9%  NaCl infusion   Intravenous Continuous Masha Bailey  mL/hr at 05/03/24 1244 New Bag at 05/03/24 1244    acetaminophen tablet 1,000 mg  1,000 mg Oral Q8H PRN Juan Manuel Cornell PA-C        albuterol-ipratropium 2.5 mg-0.5 mg/3 mL nebulizer solution 3 mL  3 mL Nebulization Q4H PRJuan Manuel Montenegro PA-C        balsam peru-castor oiL Oint   Topical (Top) BID Masha Bailey MD        bisacodyL suppository 10 mg  10 mg Rectal Daily PRN Juan Manuel Cornell PA-C        dextrose 10% bolus 125 mL 125 mL  12.5 g Intravenous PRN Juan Manuel Cornell PA-C        dextrose 10% bolus 250 mL 250 mL  25 g Intravenous PRN Juan Manuel Cornell PA-C        ferrous sulfate tablet 1 each  1 tablet Oral Daily Juan Manuel Cornell PA-C   1 each at 05/03/24 0959    glucagon (human recombinant) injection 1 mg  1 mg Intramuscular PRJuan Manuel Montenegro PA-C        glucose chewable tablet 16 g  16 g Oral PRJuan Manuel Montenegro PA-C        glucose chewable tablet 24 g  24 g Oral PRN Juan Manuel Cornell PA-C        heparin 25,000 units in dextrose 5% (100 units/ml) IV bolus from bag LOW INTENSITY nomogram - [facility]  60 Units/kg (Adjusted) Intravenous PRN Juan Manuel Cornell PAKarinaC   " 3,640 Units at 04/29/24 0632    heparin 25,000 units in dextrose 5% (100 units/ml) IV bolus from bag LOW INTENSITY nomogram - [facility]  30 Units/kg (Adjusted) Intravenous PRJuan Manuel Montenegro PA-C   1,820 Units at 04/30/24 0351    heparin 25,000 units in dextrose 5% 250 mL (100 units/mL) infusion LOW INTENSITY nomogram - [facility]  0-40 Units/kg/hr (Adjusted) Intravenous Continuous Juan Manuel Cornell PA-C 9.1 mL/hr at 05/02/24 0605 15 Units/kg/hr at 05/02/24 0605    hydrALAZINE injection 10 mg  10 mg Intravenous Q8H PRJuan Manuel Montenegro PA-C   10 mg at 04/28/24 1835    HYDROmorphone injection 1 mg  1 mg Intravenous Q6H PRJuan Manuel Montenegro PA-C   1 mg at 04/29/24 2334    melatonin tablet 6 mg  6 mg Oral Nightly PRJuan Manuel Montenegro PA-C   6 mg at 04/30/24 2035    naloxone 0.4 mg/mL injection 0.02 mg  0.02 mg Intravenous PRJuan Manuel Montenegro PA-C        oxyCODONE immediate release tablet 5 mg  5 mg Oral Q6H PRJuan Manuel Montenegro PA-C   5 mg at 04/28/24 2126    oxyCODONE immediate release tablet Tab 10 mg  10 mg Oral Q6H PRJuan Manuel Montenegro PA-C   10 mg at 04/30/24 2219    polyethylene glycol packet 17 g  17 g Oral Daily PRN Juan Manuel Cornell PA-C        predniSONE tablet 40 mg  40 mg Oral Daily Maximo Howell MD   40 mg at 05/03/24 0959    prochlorperazine tablet 10 mg  10 mg Oral TID PRN Juan Manuel Cornell PA-C        promethazine tablet 25 mg  25 mg Oral Q6H PRN Juan Manuel Cornell PA-C        sodium chloride 0.9% flush 10 mL  10 mL Intravenous Q12H PRN Juan Manuel Cornell PA-C        sodium zirconium cyclosilicate packet 10 g  10 g Oral Once Masha Bailey MD         Objective:     Vital Signs (Most Recent):  Temp: 99.1 °F (37.3 °C) (05/03/24 1556)  Pulse: 69 (05/03/24 1559)  Resp: 18 (05/03/24 1556)  BP: (!) 109/54 (05/03/24 1556)  SpO2: 99 % (05/03/24 1556) Vital Signs (24h Range):  Temp:  [97.3 °F (36.3 °C)-99.1 °F (37.3 °C)] 99.1 °F (37.3 °C)  Pulse:  [67-70] 69  Resp:  [18] 18  SpO2:  [96 %-99 %] 99 %  BP: ()/(54-69)  109/54     Weight: 62.7 kg (138 lb 3.7 oz) (04/29/24 0318)  Body mass index is 20.41 kg/m².  Body surface area is 1.75 meters squared.      Intake/Output Summary (Last 24 hours) at 5/3/2024 1621  Last data filed at 5/3/2024 0606  Gross per 24 hour   Intake 109 ml   Output 300 ml   Net -191 ml        Physical Exam   Constitutional: No distress.   HENT:   Head: Normocephalic and atraumatic.   Nose: Nose normal.   Mouth/Throat: Mucous membranes are dry. Oropharynx is clear.   Eyes: Conjunctivae are normal.   Cardiovascular: Normal rate and normal pulses.   Pulmonary/Chest: Effort normal and breath sounds normal. No respiratory distress.   Abdominal: Soft. He exhibits no distension.   Musculoskeletal:         General: Swelling and tenderness (tenderness of L hand) present.      Cervical back: Normal range of motion.      Comments: Significant improvement in tenderness of L hand today. Some swelling and slight tenderness of L elbow but again significantly improved. Slight swelling of R knee and L ankle but no tenderness.    Skin: Bruising noted. There is erythema.        Significant Labs:  All pertinent lab results from the last 24 hours have been reviewed.    Significant Imaging:  Imaging results within the past 24 hours have been reviewed.  Assessment/Plan:     Orthopedic  Swelling of left hand-resolved as of 5/3/2024  Deena Moody is a 89 y.o. M with a past medical history of HTN, HLD, CAD, diastolic HF, A-fib with pacemaker, CABG, hx of MI, AV block, and CKD 3 who [place] [facility] [place] left hand swelling and pain that had been persistent for 2-3 weeks.     The patient has a longstanding history of crystal-proven gouty arthritis with flares that typically involve his knees but have affected his feet in the past as well. He was not on maintenance therapy as outpatient. He is now presenting with subacute pain and swelling of th L hand that has been persistent for 2-3 weeks. He failed outpatient abx  management and then was started on Vancomycin which has since been changed to Ceftriaxone and Daptomycin. He was found to have an elevated uric acid level, significantly elevated CRP (274) and elevated sed rate (113). CT scan of the L hand concerning for osteomyelitis vs inflammatory arthropathy.     Recommendations:   - The patient has a history of CKD and heart failure therefore we cannot use colchicine   - Received Solumedrol 80 mg daily x 2 doses  - Transition to Prednisone 40 mg daily, continue for a total of 5 days   - Will need to be started on maintenance therapy as outpatient give response to steroids     Joint aspiration of the L wrist showed urate crystals:        Assessment and plan discussed with attending physician, Dr. Lopez. Please see attending attestation and follow recommendations. The rheumatology service will sign off at this time. Please message with any questions or concerns.       Lucy Mar MD  Rheumatology  [facility] S[facility]

## 2024-05-03 NOTE — PLAN OF CARE
Procedure Note: Left Wrist aspiration  Patient was explained risks, benefits, and alternatives to treatment and verbalized consent to proceed. Time out was performed and patient name, , site, and procedure were confirmed. Skin was sterilely prepared with alcohol solution. 21 gauge needle on a 60 cc syringe was used for aspiration through dorsal approach. 0.5 cc of serosanguinous colored fluid collected. Fluid and cultures were obtained and sent for analysis. Aspiration site was covered with a bandaid.    Joint fluid analysis pending. Rheumatology present at bedside, who asked for syringe to exam fluid microscopically.   --  Avel Dalton MD

## 2024-05-03 NOTE — PLAN OF CARE
[facility] - Med Surg  Discharge Reassessment    Primary Care Provider: Jam Rowell MD    Expected Discharge Date: 5/4/2024    SW met with pt and pt's son to discuss discharge recommendations and interest in  services.  Pt worked with therapy this afternoon and recommendations for mod intensity.      Discharge Plan A and Plan B have been determined by review of patient's clinical status, future medical and therapeutic needs, and coverage/benefits for post-acute care in coordination with multidisciplinary team members.    Reassessment (most recent)       Discharge Reassessment - 05/03/24 1615          Discharge Reassessment    Assessment Type Discharge Planning Reassessment     Did the patient's condition or plan change since previous assessment? No     Discharge Plan discussed with: Patient;Adult children     Communicated TEOFILO with patient/caregiver Yes     Discharge Plan A Home;Home with family;Home Health     Discharge Plan B Skilled Nursing Facility     DME Needed Upon Discharge  other (see comments)   TBD    Transition of Care Barriers None     Why the patient remains in the hospital Requires continued medical care        Post-Acute Status    Coverage [employer]     Discharge Delays None known at this time                   Aziza Brock LMSW  Part-Time-  [facility]  Ext. [phone]

## 2024-05-03 NOTE — PT/OT/SLP EVAL
"Physical Therapy Evaluation/co eval with OT    Patient Name:  Deena Moody   MRN:  [id number]    Recommendations:     Discharge Recommendations: Moderate Intensity Therapy   Discharge Equipment Recommendations: none   Barriers to discharge: Decreased caregiver support pt lives with his granddaughter who works outside the home    Assessment:     Deena Moody is a 89 y.o. male admitted with a medical diagnosis of Swelling of digit of left hand.  He presents with the following impairments/functional limitations: weakness, impaired functional mobility, gait instability, impaired endurance, impaired cognition, decreased lower extremity function, impaired skin, decreased safety awareness, decreased upper extremity function . Pt is unsafe with functional mobility at this time due to pt requires moderate assist for bed mobility, moderate assist for transfers, and moderate assist +1 to follow with bedside chair for safety for gait due to weakness, difficulty directing the walker, and instability. Pt is motivated to progress with functional mobility.     Rehab Prognosis: Good; patient would benefit from acute skilled PT services to address these deficits and reach maximum level of function.    Recent Surgery: * No surgery found *      Plan:     During this hospitalization, patient to be seen 4 x/week to address the identified rehab impairments via gait training, therapeutic activities, therapeutic exercises, neuromuscular re-education and progress toward the following goals:    Plan of Care Expires:  06/02/24    Subjective   "My shoulder hurts more when I move it"    Pain/Comfort:  Pain Rating 1:  (pt c/o L UE pain in the shoulder mostly, unable to grade)  Location - Side 1: Left  Location - Orientation 1: upper  Location 1: arm  Pain Addressed 1: Reposition, Cessation of Activity  Pain Rating Post-Intervention 1: 0/10 (upon return to supine on the stretcher)    Patients cultural, spiritual, Spiritism conflicts given the " current situation: no    Living Environment:  Pt lives with his granddaughter (who works outside the home) in a 1 story home with ramp to enter  Prior to admission, patients level of function was independent with gait with no AD (RW at times) and able to perform ADls.  Equipment used at home: walker, rolling, wheelchair (pt has walk in shower).   Upon discharge, patient will have assistance from [employer] (not 24/7).    Objective:     Communicated with nurse prior to session.  Patient found HOB elevated with PureWick, peripheral IV  upon PT entry to room.    General Precautions: Standard, fall, pt [place]  Orthopedic Precautions:N/A   Braces: N/A  Respiratory Status: Room air    Exams:  Cognitive Exam:  Patient is oriented to Person  Sensation:    -       Intact  light/touch B LE  RLE ROM: WFL  RLE Strength: Deficits: hip flex 3+/5; knee flex/ext 4-/5; ankle DF 4-/5  LLE ROM: WFL  LLE Strength: Deficits: hip flex 3+/5; knee flex/ext 4-/5; ankle DF 3/5    Functional Mobility:  Bed Mobility:     Supine to Sit: moderate assistance  Sit to Supine: moderate assistance; pt assisted to supine on a stretcher due to transport present to take pt to a test  Transfers:     Sit to Stand:  moderate assistance with rolling walker  Gait: 30ft then 25ft with RW with moderate assist due to pt with posterior instability, difficulty directing the walker, decreased clearance of B feet during swing phase and at slow pace.     AM-PAC 6 CLICK MOBILITY  Total Score:11     Treatment & Education:  Pt sat on the EOB ~ 6 min with CGA when not moving; minimal assist with UE/LE movement due to posterior lean  Co-treat with OT due to medical complexity of pt and need for skilled hands for safe intervention.   Patient left HOB elevated with  nurse notified and transport and son present.    GOALS:   Multidisciplinary Problems       Physical Therapy Goals          Problem: Physical Therapy    Goal Priority Disciplines Outcome Goal Variances Interventions    Physical Therapy Goal     PT, PT/OT Progressing     Description: PT goals until 5/20/24    1. Pt supine to sit with CGA-not met  2. Pt sit to supine with CGA-not met  3. Pt sit to stand with RW with CGA-not met  4. Pt to perform gait 120ft with RW with CGA.-not met  5. Pt to perform B LE exs in sitting or supine x 10 reps to strengthen B LE to improve functional mobility.-not met                        History:     Past Medical History:   Diagnosis Date    Acute on chronic diastolic heart failure 9/1/2016    Coronary artery disease     Hyperlipidemia     Hypertension     Macular degeneration (senile) of retina, unspecified 12/12/2014    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    Persistent atrial fibrillation 11/10/2016    S/P placement of cardiac pacemaker 9/14/2016       Past Surgical History:   Procedure Laterality Date    AORTIC VALVE REPLACEMENT N/A     CARDIAC PACEMAKER PLACEMENT      CATARACT EXTRACTION W/  INTRAOCULAR LENS IMPLANT Right 2/16/2016    Dr. Whitley    CATARACT EXTRACTION W/  INTRAOCULAR LENS IMPLANT Left 3/1/2016    Dr. Whitley    CORONARY ARTERY BYPASS GRAFT      EAR EXAMINATION UNDER ANESTHESIA      EYE SURGERY         Time Tracking:     PT Received On: 05/03/24  PT Start Time: 1311     PT Stop Time: 1335  PT Total Time (min): 24 min     Billable Minutes: Evaluation 10 and Gait Training 14      05/03/2024

## 2024-05-03 NOTE — ASSESSMENT & PLAN NOTE
Cr 1.8 on admit, near baseline. Cr rising throughout admission, now 3.3 on AM labs with hyperkalemia 5.8.     - Urine studies, RP US.  - Good UOP as of now. Strict I&O.   - Shifting with albuterol and added Lokelma. Repeat BMP in the afternoon.   - avoid nephrotoxins, renally dose meds

## 2024-05-03 NOTE — PROGRESS NOTES
[facility] - Med Surg  Infectious Disease  Progress Note    Patient Name: Deena Moody  MRN: [id number]  Admission Date: 4/28/2024  Length of Stay: 4 days  Attending Physician: Masha Bailey MD  Primary Care Provider: Jam Rowell MD    Isolation Status: No active isolations  Assessment/Plan:      Orthopedic  Gout    89 year old male with cardiac disease, pacemaker, CKD, gout who presented to the ED 4/28 with left hand pain and swelling that has been present for over a week without known trauma.    Afebrile without systemic signs of infection. Labs revealed a leukocytosis and elevated inflammatory markers. Uric acid level 11. CT with erosive changes at the distal aspect of the middle phalanx of the 2nd finger concerning for osteomyelitis or other inflammatory arthritides. Orthopedic surgery following. No plans for surgery at this time. He has been on IV antibiotics without clinical improvement. Rheumatology consulted and started IV solumedrol 5/1. Today, patient is significantly improved clinically with reduction in CRP.    Recommendations  At this time, suspect his clinical picture represents an acute gout flare and less likely infection given lack of response to antibiotics, though with dramatic response to steroids. Continue management per rheumatology. Will discontinue and monitor off antibiotics.   Discussed with ID staff. ID will sign off. Feel free to re-consult ID with any infectious concerns.        Thank you for the consult. Please secure chat for any questions.  Patrica Carson PA-C      Subjective:     Principal Problem:Swelling of digit of left hand    HPI: Deena Moody is a 89 y.o. male with PMHx significant for CAD, HTN, CHF, A-fib, pacemaker, CKD, gout who presented to the ED 4/28 with left hand pain and swelling. Patient states his hand became painful about 2 weeks ago and began to get swollen. He was in the ED 2 days prior to admit and diagnosed with cellulitis. He was never  started on antibiotics after discharged from the ED. He returned for worsening pain and swelling of the dorsal hand. He also endorses redness. He denies known trauma to the hand. Denies open cuts, though his wedding ring was cut off during last ED visit. He also recently had some skin cancer removed on his left arm earlier this year. Denies recent procedures otherwise. In 2020, has hx of pseudomonas bacteremia presumed from intraabdominal source treated with 2 weeks of IV cefepime.     In the ED: Hypertensive otherwise VSS. No leukocytosis. ESR 55 and CRP 82.8.    X-ray with Diffuse soft tissue swelling of the left hand and erosive change involving the head of the 2nd middle phalanx.  This may represent a focus of infection. CT ordered for further evaluation ( cannot get MRI ). Patient is currently on Vancomycin. ID consulted for antibiotic recommendations.    Of note, his inflammatory markers and WBC count have increased since admit. He also has developed worsening renal function.  Labs 4/30 revealed a WBC of 16K, , CR 2.1.   Interval History: NAEON. Afebrile. Patient much more alert, interactive and pleasant today with improvement in pain and swelling with initiation of steroids. CRP decreasing.      Review of Systems   Constitutional:  Negative for chills, diaphoresis, fatigue and fever.   HENT:  Positive for hearing loss.    Respiratory:  Negative for cough and shortness of breath.    Cardiovascular:  Negative for chest pain and leg swelling.   Gastrointestinal:  Negative for abdominal pain, diarrhea, nausea and vomiting.   Genitourinary:  Negative for dysuria and frequency.   Musculoskeletal:  Positive for arthralgias and joint swelling. Negative for back pain.   Skin:  Positive for color change. Negative for rash and wound.   Neurological:  Negative for dizziness and headaches.   Psychiatric/Behavioral:  Positive for confusion. Negative for agitation. The patient is not nervous/anxious.    All other  systems reviewed and are negative.    Objective:     Vital Signs (Most Recent):  Temp: 97.6 °F (36.4 °C) (05/03/24 1051)  Pulse: 70 (05/03/24 1051)  Resp: 18 (05/03/24 1051)  BP: 135/62 (05/03/24 1051)  SpO2: 96 % (05/03/24 1051) Vital Signs (24h Range):  Temp:  [97.3 °F (36.3 °C)-97.6 °F (36.4 °C)] 97.6 °F (36.4 °C)  Pulse:  [68-70] 70  Resp:  [18] 18  SpO2:  [96 %-99 %] 96 %  BP: ()/(54-69) 135/62     Weight: 62.7 kg (138 lb 3.7 oz)  Body mass index is 20.41 kg/m².    Estimated Creatinine Clearance: 12.4 mL/min (A) (based on SCr of 3.3 mg/dL (H)).     Physical Exam  Vitals and nursing note reviewed.   Constitutional:       General: He is not in acute distress.     Appearance: Normal appearance. He is well-developed. He is not ill-appearing or diaphoretic.   HENT:      Head: Normocephalic and atraumatic.      Right Ear: External ear normal.      Left Ear: External ear normal.      Nose: Nose normal.   Eyes:      General: No scleral icterus.        Right eye: No discharge.         Left eye: No discharge.      Extraocular Movements: Extraocular movements intact.      Conjunctiva/sclera: Conjunctivae normal.   Pulmonary:      Effort: Pulmonary effort is normal. No respiratory distress.      Breath sounds: No stridor.   Abdominal:      General: There is no distension.      Palpations: Abdomen is soft.   Musculoskeletal:         General: Swelling and tenderness present.      Right lower leg: No edema.      Left lower leg: No edema.      Comments: Left hand swelling and tenderness improved    Skin:     General: Skin is dry.      Coloration: Skin is not jaundiced or pale.      Comments: Thickened long toenails   Neurological:      General: No focal deficit present.      Mental Status: He is alert. Mental status is at baseline.      Comments: Pleasant and conversant today   Psychiatric:         Mood and Affect: Mood normal.         Behavior: Behavior normal.         Thought Content: Thought content normal.          Judgment: Judgment normal.          Significant Labs: BMP:   Recent Labs   Lab 05/03/24 0832   *   *   K 5.8*      CO2 18*   *   CREATININE 3.3*   CALCIUM 8.5*     CBC:   Recent Labs   Lab 05/03/24 0832   WBC 12.55   HGB 9.6*   HCT 29.3*        CMP:   Recent Labs   Lab 05/03/24 0832   *   K 5.8*      CO2 18*   *   *   CREATININE 3.3*   CALCIUM 8.5*   PROT 7.2   ALBUMIN 2.4*   BILITOT 1.1*   ALKPHOS 114   AST 36   ALT 21   ANIONGAP 15     Microbiology Results (last 7 days)       Procedure Component Value Units Date/Time    Blood culture [[id number]] Collected: 05/03/24 0832    Order Status: Sent Specimen: Blood Updated: 05/03/24 0842    Blood culture [[id number]] Collected: 05/03/24 0832    Order Status: Sent Specimen: Blood Updated: 05/03/24 0842          Recent Lab Results  (Last 5 results in the past 24 hours)        05/03/24  0836   05/03/24  0832   05/03/24  0603   05/02/24  1935   05/02/24  1621        Albumin   2.4             ALP   114             ALT   21             Anion Gap   15             PTT     52.3  Comment: Refer to local heparin nomogram for intensity/dose specific   therapeutic   range.             AST   36             Baso #   0.02             Basophil %   0.2             BILIRUBIN TOTAL   1.1  Comment: For infants and newborns, interpretation of results should be based  on gestational age, weight and in agreement with clinical  observations.    Premature Infant recommended reference ranges:  Up to 24 hours.............<8.0 mg/dL  Up to 48 hours............<12.0 mg/dL  3-5 days..................<15.0 mg/dL  6-29 days.................<15.0 mg/dL               BUN   104             Calcium   8.5             Chloride   101             CO2   18             Creatinine   3.3             CRP     223.5           Differential Method   Automated             eGFR   16.7             Eos #   0.0             Eos %   0.0             Glucose   128              Gran # (ANC)   11.2             Gran %   89.2             Hematocrit   29.3             Hemoglobin   9.6             Immature Grans (Abs)   0.05  Comment: Mild elevation in immature granulocytes is non specific and   can be seen in a variety of conditions including stress response,   acute inflammation, trauma and pregnancy. Correlation with other   laboratory and clinical findings is essential.               Immature Granulocytes   0.4             Lymph #   0.6             Lymph %   5.0             MCH   30.9             MCHC   32.8             MCV   94             Mono #   0.7             Mono %   5.2             MPV   9.6             nRBC   0             Platelet Count   165             POCT Glucose 144       165   130       Potassium   5.8             PROTEIN TOTAL   7.2             RBC   3.11             RDW   13.5             Sodium   134             WBC   12.55                                    Significant Imaging:     Imaging Results              X-Ray Hand 3 view Left (Final result)  Result time 04/28/24 15:21:44      Final result by Leon Ortega MD (04/28/24 15:21:44)                   Impression:      1.  Diffuse soft tissue swelling of the left hand.    2.  Erosive change involving the head of the 2nd middle phalanx.  This may represent a focus of infection.  Additional evaluation, as clinically warranted.    3.  No evidence of a fracture or dislocation.      Electronically signed by: Leon Ortega MD  Date:    04/28/2024  Time:    15:21               Narrative:    EXAMINATION:  XR HAND COMPLETE 3 VIEW LEFT    CLINICAL HISTORY:  hand swelling;.    TECHNIQUE:  PA, lateral, and oblique views of the left hand were performed.    COMPARISON:  06/15/2023.    FINDINGS:  There is demineralization of the osseous structures.  There is unchanged appearance of chronic changes involving the left distal radius and ulna.  There is no cortical step-off.  There is no evidence of periostitis.    There is joint  space narrowing with osteophytosis.  There are advanced degenerative changes involving the left thumb.  There is an erosive change involving the head of the 2nd middle phalanx.    There is diffuse soft tissue swelling of the left hand.  There are advanced vascular calcifications in the left hand.    There is no evidence of a fracture or dislocation.

## 2024-05-03 NOTE — ASSESSMENT & PLAN NOTE
Presented with Left hand swelling and pain x 2-3 weeks. Seen in ED 4/26, ring removed, discharged with oral keflex. Returns with worsening pain, swelling, and erythema that has now progressed to left wrist. See media. On admission, he was afebrile, leukocytosis. ESR and CRP elevated. Initial XR images concerning for osteo.     - Ortho consulted. Recommend broad spectrum iv abx. Wrap with ace bandage, elevate, will continue to trend crp and exam. On heparin gtt in case of procedure.   - CT L hand w/o contrast shows new erosive changes at the distal aspect of the middle phalanx of the 2nd finger concerning for osteomyelitis or other inflammatory arthritides.  - ID following. On CTX and dapto, but discontinued on 5/03 as suspect his symptoms were related to gout flare.  - Rheum consulted, started on steroids. Will need PO prednisone for 5 days.

## 2024-05-03 NOTE — PLAN OF CARE
Problem: Physical Therapy  Goal: Physical Therapy Goal  Description: PT goals until 5/20/24    1. Pt supine to sit with CGA-not met  2. Pt sit to supine with CGA-not met  3. Pt sit to stand with RW with CGA-not met  4. Pt to perform gait 120ft with RW with CGA.-not met  5. Pt to perform B LE exs in sitting or supine x 10 reps to strengthen B LE to improve functional mobility.-not met   Outcome: Progressing   Pt's goals set and pt will benefit from skilled PT services to work towards improved functional mobility including: bed mobility, transfers, and gait. Ying Colon PT  5/3/2024

## 2024-05-03 NOTE — SUBJECTIVE & OBJECTIVE
Interval History: Continues to report improvement in his pain. Worked with PT today. Had some pain in his L hand after using the walker. Ortho at bedside doing aspiration.     Current Facility-Administered Medications   Medication Dose Route Frequency Provider Last Rate Last Admin    0.9%  NaCl infusion   Intravenous Continuous Masha Bailey  mL/hr at 05/03/24 1244 New Bag at 05/03/24 1244    acetaminophen tablet 1,000 mg  1,000 mg Oral Q8H PRN Juan Manuel Cornell PA-C        albuterol-ipratropium 2.5 mg-0.5 mg/3 mL nebulizer solution 3 mL  3 mL Nebulization Q4H PRJuan Manuel Montenegro PA-C        balsam peru-castor oiL Oint   Topical (Top) BID Masha Bailey MD        bisacodyL suppository 10 mg  10 mg Rectal Daily PRJuan Manuel Montenegro PA-C        dextrose 10% bolus 125 mL 125 mL  12.5 g Intravenous PRJuan Manuel Montenegro PA-C        dextrose 10% bolus 250 mL 250 mL  25 g Intravenous PRJuan Manuel Montenegro PA-C        ferrous sulfate tablet 1 each  1 tablet Oral Daily Juan Manuel Cornell PA-C   1 each at 05/03/24 0959    glucagon (human recombinant) injection 1 mg  1 mg Intramuscular PRJuan Manuel Montenegro PA-C        glucose chewable tablet 16 g  16 g Oral PRJuan Manuel Montenegro PA-C        glucose chewable tablet 24 g  24 g Oral PRN Juan Manuel Cornell PA-C        heparin 25,000 units in dextrose 5% (100 units/ml) IV bolus from bag LOW INTENSITY nomogram - [facility]  60 Units/kg (Adjusted) Intravenous PRJuan Manuel Montenegro PA-C   3,640 Units at 04/29/24 0632    heparin 25,000 units in dextrose 5% (100 units/ml) IV bolus from bag LOW INTENSITY nomogram - [facility]  30 Units/kg (Adjusted) Intravenous PRJuan Manuel Montenegro PA-C   1,820 Units at 04/30/24 0351    heparin 25,000 units in dextrose 5% 250 mL (100 units/mL) infusion LOW INTENSITY nomogram - [facility]  0-40 Units/kg/hr (Adjusted) Intravenous Continuous Juan Manuel Cornell, PA-C 9.1 mL/hr at 05/02/24 0605 15 Units/kg/hr at 05/02/24 0605    hydrALAZINE injection 10 mg  10 mg Intravenous Q8H  Juan Manuel Taylor PA-C   10 mg at 04/28/24 1835    HYDROmorphone injection 1 mg  1 mg Intravenous Q6H PRJuan Manuel Montenegro PA-C   1 mg at 04/29/24 2334    melatonin tablet 6 mg  6 mg Oral Nightly PRJuan Manuel Montenegro PA-C   6 mg at 04/30/24 2035    naloxone 0.4 mg/mL injection 0.02 mg  0.02 mg Intravenous PRJuan Manuel Montenegro PA-C        oxyCODONE immediate release tablet 5 mg  5 mg Oral Q6H PRJuan Manuel Montenegro PA-C   5 mg at 04/28/24 2126    oxyCODONE immediate release tablet Tab 10 mg  10 mg Oral Q6H PRJuan Manuel Montenegro PA-C   10 mg at 04/30/24 2219    polyethylene glycol packet 17 g  17 g Oral Daily PRJuan Manuel Montenegro PA-C        predniSONE tablet 40 mg  40 mg Oral Daily Maximo Howell MD   40 mg at 05/03/24 0959    prochlorperazine tablet 10 mg  10 mg Oral TID PRJuan Manuel Montenegro PA-C        promethazine tablet 25 mg  25 mg Oral Q6H PRJuan Manuel Montenegro PA-C        sodium chloride 0.9% flush 10 mL  10 mL Intravenous Q12H PRJuan Manuel Montenegro PA-C        sodium zirconium cyclosilicate packet 10 g  10 g Oral Once Masha Bailey MD         Objective:     Vital Signs (Most Recent):  Temp: 99.1 °F (37.3 °C) (05/03/24 1556)  Pulse: 69 (05/03/24 1559)  Resp: 18 (05/03/24 1556)  BP: (!) 109/54 (05/03/24 1556)  SpO2: 99 % (05/03/24 1556) Vital Signs (24h Range):  Temp:  [97.3 °F (36.3 °C)-99.1 °F (37.3 °C)] 99.1 °F (37.3 °C)  Pulse:  [67-70] 69  Resp:  [18] 18  SpO2:  [96 %-99 %] 99 %  BP: ()/(54-69) 109/54     Weight: 62.7 kg (138 lb 3.7 oz) (04/29/24 0318)  Body mass index is 20.41 kg/m².  Body surface area is 1.75 meters squared.      Intake/Output Summary (Last 24 hours) at 5/3/2024 1621  Last data filed at 5/3/2024 0606  Gross per 24 hour   Intake 109 ml   Output 300 ml   Net -191 ml        Physical Exam   Constitutional: No distress.   HENT:   Head: Normocephalic and atraumatic.   Nose: Nose normal.   Mouth/Throat: Mucous membranes are dry. Oropharynx is clear.   Eyes: Conjunctivae are normal.    Cardiovascular: Normal rate and normal pulses.   Pulmonary/Chest: Effort normal and breath sounds normal. No respiratory distress.   Abdominal: Soft. He exhibits no distension.   Musculoskeletal:         General: Swelling and tenderness (tenderness of L hand) present.      Cervical back: Normal range of motion.      Comments: Significant improvement in tenderness of L hand today. Some swelling and slight tenderness of L elbow but again significantly improved. Slight swelling of R knee and L ankle but no tenderness.    Skin: Bruising noted. There is erythema.        Significant Labs:  All pertinent lab results from the last 24 hours have been reviewed.    Significant Imaging:  Imaging results within the past 24 hours have been reviewed.

## 2024-05-03 NOTE — PROGRESS NOTES
[facility] Medicine  Progress Note    Patient Name: Deena Moody  MRN: [id number]  Patient Class: IP- Inpatient   Admission Date: 4/28/2024  Length of Stay: 4 days  Attending Physician: Masha Bailey MD  Primary Care Provider: Jam Rowell MD        Subjective:     Principal Problem:Swelling of digit of left hand    HPI:  Deena Moody is a 89 y.o. male with PMHx HTN, HLD, CAD, diastolic HF, A-fib with pacemaker, CABG, hx of MI, AV block, and CKD 3 who presents to [facility] ED with chief complaint of left hand swelling and pain. He reports this issues has been present x 2-3 weeks. He was in ED two days prior wherein his ring had to be cut from finger due to swelling. He was then sent home with keflex and PCP follow up. Since then patient reports worsening swelling, redness and pain. He denies associated HA, fever, chills, chest pain, shortness of breath, abdominal pain, n/v/d, urinary symptoms, numbness or tingling. He takes eliquis. Patient's pacemaker is not MRI compatible. Patient with significant hear loss bilaterally.     In the ED: Hypertensive, RR 22, otherwise VSSAF. CBC without leukocytosis. CMP without emergent abnormalities, Cr 1.8 (baseline). ESR 55 and CRP 82.8 (elevated from 53.6 on 4/26). Received morphine and vanc. Admitted to  for further management.    Overview/Hospital Course:  Presented with Left hand swelling and pain x 2-3 weeks. Seen in ED 4/26, ring removed, discharged with oral keflex. Returns with worsening pain, swelling, and erythema that has now progressed to left wrist. Labs with leukocytosis. ESR and CRP elevated. Initial XR images concerning for osteo. Ortho consulted. Recommend broad spectrum iv abx. Wrap with ace bandage, elevate, will continue to trend crp and exam. CT L hand w/o contrast shows new erosive changes at the distal aspect of the middle phalanx of the 2nd finger concerning for osteomyelitis or other inflammatory arthritides.ID following.  On CTX and dapto. Uric acid elevated 11.6. Given hx of gout, consulting rheumatology for evaluation of gout flare. Hx of Afib, holding eliquis in setting of possible OR, heparin infusion for now. Started on Prednisone with improvement in hand pain/swelling. CTX and Daptomycin discontinued by ID on 5/03, as felt his symptoms are from gout. CRP trending down.          Interval History:   L hand pain has improved. Hyperkalemic on AM labs, shifting.    Review of Systems   Constitutional:  Negative for chills, diaphoresis, fatigue and fever.   HENT:  Positive for hearing loss.    Respiratory:  Negative for cough and shortness of breath.    Cardiovascular:  Negative for chest pain and leg swelling.   Gastrointestinal:  Negative for abdominal pain, diarrhea, nausea and vomiting.   Genitourinary:  Negative for dysuria and frequency.   Musculoskeletal:  Positive for arthralgias and joint swelling. Negative for back pain.   Skin:  Positive for color change. Negative for rash and wound.   Neurological:  Negative for dizziness and headaches.   Psychiatric/Behavioral:  Positive for confusion. Negative for agitation. The patient is not nervous/anxious.    All other systems reviewed and are negative.    Objective:     Vital Signs (Most Recent):  Temp: 97.6 °F (36.4 °C) (05/03/24 1051)  Pulse: 70 (05/03/24 1051)  Resp: 18 (05/03/24 1051)  BP: 135/62 (05/03/24 1051)  SpO2: 96 % (05/03/24 1051) Vital Signs (24h Range):  Temp:  [97.3 °F (36.3 °C)-97.6 °F (36.4 °C)] 97.6 °F (36.4 °C)  Pulse:  [68-70] 70  Resp:  [18] 18  SpO2:  [96 %-99 %] 96 %  BP: ()/(54-69) 135/62     Weight: 62.7 kg (138 lb 3.7 oz)  Body mass index is 20.41 kg/m².    Intake/Output Summary (Last 24 hours) at 5/3/2024 1126  Last data filed at 5/3/2024 0606  Gross per 24 hour   Intake 109 ml   Output 300 ml   Net -191 ml         Physical Exam  Vitals reviewed.   Constitutional:       General: He is not in acute distress.     Appearance: Normal appearance. He is  not ill-appearing.   HENT:      Head: Normocephalic.   Eyes:      Extraocular Movements: Extraocular movements intact.   Cardiovascular:      Rate and Rhythm: Normal rate.   Pulmonary:      Effort: Pulmonary effort is normal.   Abdominal:      General: There is no distension.   Musculoskeletal:      Comments: No obvious joint swelling or tenderness.   Neurological:      Mental Status: He is alert. Mental status is at baseline.             Significant Labs: All pertinent labs within the past 24 hours have been reviewed.      Assessment/Plan:      * Swelling of digit of left hand  Presented with Left hand swelling and pain x 2-3 weeks. Seen in ED 4/26, ring removed, discharged with oral keflex. Returns with worsening pain, swelling, and erythema that has now progressed to left wrist. See media. On admission, he was afebrile, leukocytosis. ESR and CRP elevated. Initial XR images concerning for osteo.     - Ortho consulted. Recommend broad spectrum iv abx. Wrap with ace bandage, elevate, will continue to trend crp and exam. On heparin gtt in case of procedure.   - CT L hand w/o contrast shows new erosive changes at the distal aspect of the middle phalanx of the 2nd finger concerning for osteomyelitis or other inflammatory arthritides.  - ID following. On CTX and dapto, but discontinued on 5/03 as suspect his symptoms were related to gout flare.  - Rheum consulted, started on steroids. Will need PO prednisone for 5 days.      Elevated uric acid in blood  Acute polyarticular gout   Uric acid elevated 11.6.  Given hx of gout, consulting rheumatology for evaluation of gout flare.   Started on IV solumedrol 80mg x 2 days, now on PO prednisone for 5 days.      Permanent atrial fibrillation  Long term (current) use of anticoagulants  S/P placement of cardiac pacemaker    - pacemaker not MRI compatible   - hold eliquis in setting of possible OR , heparin infusion for now  - cont tele    Chronic diastolic heart failure  - no  signs of acute overload  - Holding torsemide in setting of SARAH  - monitor UOP, signs of volume overload.    CAD (coronary artery disease)  History of heart bypass surgery  History of MI (myocardial infarction)    - Holding Aspirin for now for possible procedure in future   - previously intolerant to statins 2/2 myalgias    Complete AV block  - s/p pacemaker, not MRI compatible.     Essential hypertension  - chronic, uncontrolled  - Holding coreg and torsemide  - monitor     Dyslipidemia  - statin intolerance 2/2 myalgias     Acute kidney injury superimposed on chronic kidney disease  Cr 1.8 on admit, near baseline. Cr rising throughout admission, now 3.3 on AM labs with hyperkalemia 5.8.     - Urine studies, RP US.  - Good UOP as of now. Strict I&O.   - Shifting with albuterol and added Lokelma. Repeat BMP in the afternoon.   - avoid nephrotoxins, renally dose meds      VTE Risk Mitigation (From admission, onward)           Ordered     heparin 25,000 units in dextrose 5% (100 units/ml) IV bolus from bag LOW INTENSITY nomogram - [facility]  As needed (PRN)        Question:  Heparin Infusion Adjustment (DO NOT MODIFY ANSWER)  Answer:  \[email or web address]\epic\Images\Pharmacy\HeparinInfusions\heparin LOW INTENSITY nomogram for [facility] [id number]    04/28/24 1628     heparin 25,000 units in dextrose 5% (100 units/ml) IV bolus from bag LOW INTENSITY nomogram - [facility]  As needed (PRN)        Question:  Heparin Infusion Adjustment (DO NOT MODIFY ANSWER)  Answer:  \[email or web address]\epic\Images\Pharmacy\HeparinInfusions\heparin LOW INTENSITY nomogram for [facility] [id number]    04/28/24 1628     heparin 25,000 units in dextrose 5% (100 units/ml) IV bolus from bag LOW INTENSITY nomogram - [facility]  Once        Question:  Heparin Infusion Adjustment (DO NOT MODIFY ANSWER)  Answer:  \[email or web address]\epic\Images\Pharmacy\HeparinInfusions\heparin LOW INTENSITY nomogram for [facility] [id number]    04/28/24 1628     heparin 25,000 units in dextrose 5% 250 mL (100 units/mL)  infusion LOW INTENSITY nomogram - [facility]  Continuous        Question:  Begin at (units/kg/hr)  Answer:  12    04/28/24 1614     Reason for No Pharmacological VTE Prophylaxis  Once        Question:  Reasons:  Answer:  Already adequately anticoagulated on oral Anticoagulants    04/28/24 1431     IP VTE HIGH RISK PATIENT  Once         04/28/24 1431     Place sequential compression device  Until discontinued         04/28/24 1431                    Discharge Planning   TEOFILO: 5/3/2024     Code Status: Full Code   Is the patient medically ready for discharge?:     Reason for patient still in hospital (select all that apply): Patient new problem, Patient trending condition, Treatment, and Consult recommendations  Discharge Plan A: Home, Home with family, Home Health (Family would like  for patient.)                Masha Albert MD  Department of Hospital Medicine   [facility] Surg

## 2024-05-03 NOTE — ASSESSMENT & PLAN NOTE
Deena Moody is a 89 y.o. M with a past medical history of HTN, HLD, CAD, diastolic HF, A-fib with pacemaker, CABG, hx of MI, AV block, and CKD 3 who was admitted to [facility] on 4/28 with left hand swelling and pain that had been persistent for 2-3 weeks.     The patient has a longstanding history of crystal-proven gouty arthritis with flares that typically involve his knees but have affected his feet in the past as well. He was not on maintenance therapy as outpatient. He is now presenting with subacute pain and swelling of th L hand that has been persistent for 2-3 weeks. He failed outpatient abx management and then was started on Vancomycin which has since been changed to Ceftriaxone and Daptomycin. He was found to have an elevated uric acid level, significantly elevated CRP (274) and elevated sed rate (113). CT scan of the L hand concerning for osteomyelitis vs inflammatory arthropathy.     Recommendations:   - The patient has a history of CKD and heart failure therefore we cannot use colchicine   - Received Solumedrol 80 mg daily x 2 doses  - Transition to Prednisone 40 mg daily, continue for a total of 5 days   - Will need to be started on maintenance therapy as outpatient give response to steroids     Joint aspiration of the L wrist showed urate crystals:

## 2024-05-03 NOTE — CONSULTS
[facility] - Med Surg  Wound Care    Patient Name:  Deena Moody   MRN:  [id number]  Date: 5/3/2024  Diagnosis: Swelling of digit of left hand    History:     Past Medical History:   Diagnosis Date    Acute on chronic diastolic heart failure 9/1/2016    Coronary artery disease     Hyperlipidemia     Hypertension     Macular degeneration (senile) of retina, unspecified 12/12/2014    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    Persistent atrial fibrillation 11/10/2016    S/P placement of cardiac pacemaker 9/14/2016       Social History     Socioeconomic History    Marital status:    Tobacco Use    Smoking status: Never     Passive exposure: Never    Smokeless tobacco: Never   Substance and Sexual Activity    Alcohol use: No    Drug use: No    Sexual activity: Yes     Partners: Female     Social Determinants of Health     Financial Resource Strain: Low Risk  (4/29/2024)    Overall Financial Resource Strain (CARDIA)     Difficulty of Paying Living Expenses: Not hard at all   Food Insecurity: No Food Insecurity (4/29/2024)    Hunger Vital Sign     Worried About Running Out of Food in the Last Year: Never true     Ran Out of Food in the Last Year: Never true   Transportation Needs: No Transportation Needs (4/29/2024)    PRAPARE - Transportation     Lack of Transportation (Medical): No     Lack of Transportation (Non-Medical): No   Physical Activity: Inactive (4/29/2024)    Exercise Vital Sign     Days of Exercise per Week: 0 days     Minutes of Exercise per Session: 0 min   Stress: Stress Concern Present (4/29/2024)    [employer] - Occupational Stress Questionnaire     Feeling of Stress : Rather much   Housing Stability: Low Risk  (4/29/2024)    Housing Stability Vital Sign     Unable to Pay for Housing in the Last Year: No     Homeless in the Last Year: No       Precautions:     Allergies as of 04/28/2024 - Reviewed 04/28/2024   Allergen Reaction Noted    Iodine and iodide containing products Other  (See Comments) 07/20/2012       [facility] Assessment Details/Treatment     Patient seen for wound care consultation.   Reviewed chart for this encounter.   See Flow Sheet for findings.      RECOMMENDATIONS: Patient's family at the bedside and agreeable to inpatient wound care. RN consult for right heel. Right heel noted to be discolored, purple, and blanchable. EHOB boots for pressure redistribution. Mepilex heel foam border dressings applied. Apply BPCO BID and PRN to stimulate capillaries. Skin integrity EMILIO consulted. Waffle matress overlay ordered for pressure redistribution. No other issues or concerns at this time. Will follow up 5/10/2024 or sooner if needed.    Discussed POC with patient and primary nurse.   See EMR for orders & patient education.    Discussed nutrition and the role of protein in wound healing with the patient. Instructed patient to optimize protein for wound healing.    Bedside nursing to continue care & monitoring.  Bedside nursing to maintain pressure injury prevention interventions.            05/03/24 0940   WOCN Assessment   WOCN Total Time (mins) 30   Visit Date 05/03/24   Visit Time 0940   Consult Type New   WO Speciality Wound   Intervention assessed;changed;applied;chart review;coordination of care;orders   Teaching on-going        Wound 05/03/24 0500 Pressure Injury Left Heel   Date First Assessed/Time First Assessed: 05/03/24 0500   Primary Wound Type: Pressure Injury  Side: Left  Location: Heel   Wound Image    Dressing Appearance Open to air;Dry;Intact;Clean   Drainage Amount None   Drainage Characteristics/Odor No odor   Appearance Intact;Purple;Maroon     Orders placed.   Ganga ZEPEDA, RN  05/03/2024

## 2024-05-03 NOTE — ASSESSMENT & PLAN NOTE
- no signs of acute overload  - Holding torsemide in setting of SARAH  - monitor UOP, signs of volume overload.

## 2024-05-04 LAB
ALBUMIN SERPL BCP-MCNC: 2.5 G/DL (ref 3.5–5.2)
ANION GAP SERPL CALC-SCNC: 17 MMOL/L (ref 8–16)
BUN SERPL-MCNC: 110 MG/DL (ref 10–30)
CALCIUM SERPL-MCNC: 8.2 MG/DL (ref 8.7–10.5)
CHLORIDE SERPL-SCNC: 103 MMOL/L (ref 95–110)
CO2 SERPL-SCNC: 17 MMOL/L (ref 23–29)
CREAT SERPL-MCNC: 3 MG/DL (ref 0.5–1.4)
CRP SERPL-MCNC: 153.8 MG/L (ref 0–8.2)
EST. GFR  (NO RACE VARIABLE): 18.8 ML/MIN/1.73 M^2
GLUCOSE SERPL-MCNC: 111 MG/DL (ref 70–110)
PHOSPHATE SERPL-MCNC: 6.2 MG/DL (ref 2.7–4.5)
POCT GLUCOSE: 121 MG/DL (ref 70–110)
POTASSIUM SERPL-SCNC: 4.8 MMOL/L (ref 3.5–5.1)
SODIUM SERPL-SCNC: 137 MMOL/L (ref 136–145)

## 2024-05-04 PROCEDURE — 86140 C-REACTIVE PROTEIN: CPT | Mod: HCNC | Performed by: STUDENT IN AN ORGANIZED HEALTH CARE EDUCATION/TRAINING PROGRAM

## 2024-05-04 PROCEDURE — 63600175 PHARM REV CODE 636 W HCPCS: Mod: HCNC | Performed by: STUDENT IN AN ORGANIZED HEALTH CARE EDUCATION/TRAINING PROGRAM

## 2024-05-04 PROCEDURE — 25000003 PHARM REV CODE 250: Mod: HCNC

## 2024-05-04 PROCEDURE — 21400001 HC TELEMETRY ROOM: Mod: HCNC

## 2024-05-04 PROCEDURE — 25000003 PHARM REV CODE 250: Mod: HCNC | Performed by: STUDENT IN AN ORGANIZED HEALTH CARE EDUCATION/TRAINING PROGRAM

## 2024-05-04 PROCEDURE — 36415 COLL VENOUS BLD VENIPUNCTURE: CPT | Mod: HCNC | Performed by: STUDENT IN AN ORGANIZED HEALTH CARE EDUCATION/TRAINING PROGRAM

## 2024-05-04 PROCEDURE — 80069 RENAL FUNCTION PANEL: CPT | Mod: HCNC | Performed by: STUDENT IN AN ORGANIZED HEALTH CARE EDUCATION/TRAINING PROGRAM

## 2024-05-04 RX ADMIN — PREDNISONE 40 MG: 20 TABLET ORAL at 09:05

## 2024-05-04 RX ADMIN — FERROUS SULFATE TAB EC 325 MG (65 MG FE EQUIVALENT) 1 EACH: 325 (65 FE) TABLET DELAYED RESPONSE at 09:05

## 2024-05-04 RX ADMIN — OXYCODONE HYDROCHLORIDE 10 MG: 10 TABLET ORAL at 11:05

## 2024-05-04 RX ADMIN — Medication: at 09:05

## 2024-05-04 RX ADMIN — APIXABAN 2.5 MG: 2.5 TABLET, FILM COATED ORAL at 09:05

## 2024-05-04 NOTE — SUBJECTIVE & OBJECTIVE
Interval History: NAEON  L hand pain has improved.     Review of Systems   Constitutional:  Negative for chills, diaphoresis, fatigue and fever.   HENT:  Positive for hearing loss.    Respiratory:  Negative for cough and shortness of breath.    Cardiovascular:  Negative for chest pain and leg swelling.   Gastrointestinal:  Negative for abdominal pain, diarrhea, nausea and vomiting.   Genitourinary:  Negative for dysuria and frequency.   Musculoskeletal:  Positive for arthralgias and joint swelling. Negative for back pain.   Skin:  Positive for color change. Negative for rash and wound.   Neurological:  Negative for dizziness and headaches.   Psychiatric/Behavioral:  Positive for confusion. Negative for agitation. The patient is not nervous/anxious.    All other systems reviewed and are negative.    Objective:     Vital Signs (Most Recent):  Temp: 97.3 °F (36.3 °C) (05/04/24 1111)  Pulse: 69 (05/04/24 1111)  Resp: 19 (05/04/24 1123)  BP: (!) 99/56 (05/04/24 1111)  SpO2: (!) 94 % (05/04/24 1111) Vital Signs (24h Range):  Temp:  [97 °F (36.1 °C)-99.1 °F (37.3 °C)] 97.3 °F (36.3 °C)  Pulse:  [66-70] 69  Resp:  [16-19] 19  SpO2:  [94 %-99 %] 94 %  BP: ()/(54-63) 99/56     Weight: 62.7 kg (138 lb 3.7 oz)  Body mass index is 20.41 kg/m².    Intake/Output Summary (Last 24 hours) at 5/4/2024 1247  Last data filed at 5/4/2024 0529  Gross per 24 hour   Intake 390 ml   Output 300 ml   Net 90 ml         Physical Exam  Vitals reviewed.   Constitutional:       General: He is not in acute distress.     Appearance: Normal appearance. He is not ill-appearing.   HENT:      Head: Normocephalic.   Eyes:      Extraocular Movements: Extraocular movements intact.   Cardiovascular:      Rate and Rhythm: Normal rate.   Pulmonary:      Effort: Pulmonary effort is normal.   Abdominal:      General: There is no distension.   Musculoskeletal:      Comments: No obvious joint swelling or tenderness.   Neurological:      Mental Status: He is  alert. Mental status is at baseline.             Significant Labs: All pertinent labs within the past 24 hours have been reviewed.

## 2024-05-04 NOTE — PLAN OF CARE
Problem: Adult Inpatient Plan of Care  Goal: Plan of Care Review  Outcome: Progressing  Goal: Patient-Specific Goal (Individualized)  Outcome: Progressing  Goal: Absence of Hospital-Acquired Illness or Injury  Outcome: Progressing  Goal: Optimal Comfort and Wellbeing  Outcome: Progressing  Goal: Readiness for Transition of Care  Outcome: Progressing     Problem: Infection  Goal: Absence of Infection Signs and Symptoms  Outcome: Progressing     Problem: Fall Injury Risk  Goal: Absence of Fall and Fall-Related Injury  Outcome: Progressing     Problem: Skin Injury Risk Increased  Goal: Skin Health and Integrity  Outcome: Progressing     Problem: Wound  Goal: Optimal Coping  Outcome: Progressing  Goal: Optimal Functional Ability  Outcome: Progressing  Goal: Absence of Infection Signs and Symptoms  Outcome: Progressing  Goal: Improved Oral Intake  Outcome: Progressing  Goal: Optimal Pain Control and Function  Outcome: Progressing  Goal: Skin Health and Integrity  Outcome: Progressing  Goal: Optimal Wound Healing  Outcome: Progressing     Problem: Acute Kidney Injury/Impairment  Goal: Fluid and Electrolyte Balance  Outcome: Progressing  Goal: Improved Oral Intake  Outcome: Progressing  Goal: Effective Renal Function  Outcome: Progressing

## 2024-05-04 NOTE — ASSESSMENT & PLAN NOTE
Presented with Left hand swelling and pain x 2-3 weeks. Seen in ED 4/26, ring removed, discharged with oral keflex. Returns with worsening pain, swelling, and erythema that has now progressed to left wrist. See media. On admission, he was afebrile, leukocytosis. ESR and CRP elevated. Initial XR images concerning for osteo.     - Ortho consulted. Recommend broad spectrum iv abx. Wrap with ace bandage, elevate, will continue to trend crp and exam. On heparin gtt in case of procedure.   - CT L hand w/o contrast shows new erosive changes at the distal aspect of the middle phalanx of the 2nd finger concerning for osteomyelitis or other inflammatory arthritides.  - ID following. On CTX and dapto, but discontinued on 5/03 as suspect his symptoms were related to gout flare.  - Rheum consulted, started on steroids.  PO prednisone for 5 days.

## 2024-05-04 NOTE — PROGRESS NOTES
[facility]  Progress Note    Patient Name: Deena Moody  MRN: [id number]  Patient Class: IP- Inpatient   Admission Date: 4/28/2024  Length of Stay: 5 days  Attending Physician: Marco Nagel MD  Primary Care Provider: Jam Rowell MD        Subjective:     Principal Problem:Swelling of digit of left hand        HPI:  Deena Moody is a 89 y.o. male with PMHx HTN, HLD, CAD, diastolic HF, A-fib with pacemaker, CABG, hx of MI, AV block, and CKD 3 who presents to [facility] ED with chief complaint of left hand swelling and pain. He reports this issues has been present x 2-3 weeks. He was in ED two days prior wherein his ring had to be cut from finger due to swelling. He was then sent home with keflex and PCP follow up. Since then patient reports worsening swelling, redness and pain. He denies associated HA, fever, chills, chest pain, shortness of breath, abdominal pain, n/v/d, urinary symptoms, numbness or tingling. He takes eliquis. Patient's pacemaker is not MRI compatible. Patient with significant hear loss bilaterally.     In the ED: Hypertensive, RR 22, otherwise VSSAF. CBC without leukocytosis. CMP without emergent abnormalities, Cr 1.8 (baseline). ESR 55 and CRP 82.8 (elevated from 53.6 on 4/26). Received morphine and vanc. Admitted to  for further management.    Overview/Hospital Course:  Presented with Left hand swelling and pain x 2-3 weeks. Seen in ED 4/26, ring removed, discharged with oral keflex. Returns with worsening pain, swelling, and erythema that has now progressed to left wrist. Labs with leukocytosis. ESR and CRP elevated. Initial XR images concerning for osteo. Ortho consulted. Recommend broad spectrum iv abx. Wrap with ace bandage, elevate, will continue to trend crp and exam. CT L hand w/o contrast shows new erosive changes at the distal aspect of the middle phalanx of the 2nd finger concerning for osteomyelitis or other inflammatory arthritides.ID  following. On CTX and dapto. Uric acid elevated 11.6. Given hx of gout, consulting rheumatology for evaluation of gout flare. Hx of Afib, holding eliquis in setting of possible OR, heparin infusion for now. Started on Prednisone with improvement in hand pain/swelling. CTX and Daptomycin discontinued by ID on 5/03, as felt his symptoms are from gout. CRP trending down.          Interval History: NAEON  L hand pain has improved.     Review of Systems   Constitutional:  Negative for chills, diaphoresis, fatigue and fever.   HENT:  Positive for hearing loss.    Respiratory:  Negative for cough and shortness of breath.    Cardiovascular:  Negative for chest pain and leg swelling.   Gastrointestinal:  Negative for abdominal pain, diarrhea, nausea and vomiting.   Genitourinary:  Negative for dysuria and frequency.   Musculoskeletal:  Positive for arthralgias and joint swelling. Negative for back pain.   Skin:  Positive for color change. Negative for rash and wound.   Neurological:  Negative for dizziness and headaches.   Psychiatric/Behavioral:  Positive for confusion. Negative for agitation. The patient is not nervous/anxious.    All other systems reviewed and are negative.    Objective:     Vital Signs (Most Recent):  Temp: 97.3 °F (36.3 °C) (05/04/24 1111)  Pulse: 69 (05/04/24 1111)  Resp: 19 (05/04/24 1123)  BP: (!) 99/56 (05/04/24 1111)  SpO2: (!) 94 % (05/04/24 1111) Vital Signs (24h Range):  Temp:  [97 °F (36.1 °C)-99.1 °F (37.3 °C)] 97.3 °F (36.3 °C)  Pulse:  [66-70] 69  Resp:  [16-19] 19  SpO2:  [94 %-99 %] 94 %  BP: ()/(54-63) 99/56     Weight: 62.7 kg (138 lb 3.7 oz)  Body mass index is 20.41 kg/m².    Intake/Output Summary (Last 24 hours) at 5/4/2024 3989  Last data filed at 5/4/2024 0537  Gross per 24 hour   Intake 390 ml   Output 300 ml   Net 90 ml         Physical Exam  Vitals reviewed.   Constitutional:       General: He is not in acute distress.     Appearance: Normal appearance. He is not  ill-appearing.   HENT:      Head: Normocephalic.   Eyes:      Extraocular Movements: Extraocular movements intact.   Cardiovascular:      Rate and Rhythm: Normal rate.   Pulmonary:      Effort: Pulmonary effort is normal.   Abdominal:      General: There is no distension.   Musculoskeletal:      Comments: No obvious joint swelling or tenderness.   Neurological:      Mental Status: He is alert. Mental status is at baseline.             Significant Labs: All pertinent labs within the past 24 hours have been reviewed.      Assessment/Plan:      * Swelling of digit of left hand  Presented with Left hand swelling and pain x 2-3 weeks. Seen in ED 4/26, ring removed, discharged with oral keflex. Returns with worsening pain, swelling, and erythema that has now progressed to left wrist. See media. On admission, he was afebrile, leukocytosis. ESR and CRP elevated. Initial XR images concerning for osteo.     - Ortho consulted. Recommend broad spectrum iv abx. Wrap with ace bandage, elevate, will continue to trend crp and exam. On heparin gtt in case of procedure.   - CT L hand w/o contrast shows new erosive changes at the distal aspect of the middle phalanx of the 2nd finger concerning for osteomyelitis or other inflammatory arthritides.  - ID following. On CTX and dapto, but discontinued on 5/03 as suspect his symptoms were related to gout flare.  - Rheum consulted, started on steroids.  PO prednisone for 5 days.      Elevated uric acid in blood  Acute polyarticular gout   Uric acid elevated 11.6.  Given hx of gout, consulting rheumatology for evaluation of gout flare.   Started on IV solumedrol 80mg x 2 days, now on PO prednisone for 5 days.      Permanent atrial fibrillation  Long term (current) use of anticoagulants  S/P placement of cardiac pacemaker    - pacemaker not MRI compatible   - hold eliquis in setting of possible OR , heparin infusion for now  - cont tele    Chronic diastolic heart failure  - no signs of acute  overload  - Holding torsemide in setting of SARAH  - monitor UOP, signs of volume overload.    CAD (coronary artery disease)  History of heart bypass surgery  History of MI (myocardial infarction)    - Holding Aspirin for now for possible procedure in future   - previously intolerant to statins 2/2 myalgias    Complete AV block  - s/p pacemaker, not MRI compatible.     Essential hypertension  - chronic, uncontrolled  - Holding coreg and torsemide  - monitor     Dyslipidemia  - statin intolerance 2/2 myalgias     Acute kidney injury superimposed on chronic kidney disease  Cr 1.8 on admit, near baseline. Cr rising throughout admission, now 3.0 on AM labs     - Urine studies, RP US.  - Good UOP as of now. Strict I&O.   - avoid nephrotoxins, renally dose meds      VTE Risk Mitigation (From admission, onward)           Ordered     apixaban tablet 2.5 mg  2 times daily         05/03/24 1650     Reason for No Pharmacological VTE Prophylaxis  Once        Question:  Reasons:  Answer:  Already adequately anticoagulated on oral Anticoagulants    04/28/24 1431     IP VTE HIGH RISK PATIENT  Once         04/28/24 1431     Place sequential compression device  Until discontinued         04/28/24 1431                    Discharge Planning   TEOFILO: 5/4/2024     Code Status: Full Code   Is the patient medically ready for discharge?:     Reason for patient still in hospital (select all that apply): Treatment and Consult recommendations  Discharge Plan A: Home, Home with family, Home Health   Discharge Delays: None known at this time              Marco Nagel MD  Department of Hospital Medicine   [facility] - Med Surg

## 2024-05-05 LAB
ALBUMIN SERPL BCP-MCNC: 2.3 G/DL (ref 3.5–5.2)
ANION GAP SERPL CALC-SCNC: 15 MMOL/L (ref 8–16)
BUN SERPL-MCNC: 113 MG/DL (ref 10–30)
CALCIUM SERPL-MCNC: 8.1 MG/DL (ref 8.7–10.5)
CHLORIDE SERPL-SCNC: 108 MMOL/L (ref 95–110)
CO2 SERPL-SCNC: 17 MMOL/L (ref 23–29)
CREAT SERPL-MCNC: 2.6 MG/DL (ref 0.5–1.4)
CRP SERPL-MCNC: 94.1 MG/L (ref 0–8.2)
EST. GFR  (NO RACE VARIABLE): 22.3 ML/MIN/1.73 M^2
GLUCOSE SERPL-MCNC: 90 MG/DL (ref 70–110)
PHOSPHATE SERPL-MCNC: 5.7 MG/DL (ref 2.7–4.5)
POTASSIUM SERPL-SCNC: 4.1 MMOL/L (ref 3.5–5.1)
SODIUM SERPL-SCNC: 140 MMOL/L (ref 136–145)

## 2024-05-05 PROCEDURE — 21400001 HC TELEMETRY ROOM: Mod: HCNC

## 2024-05-05 PROCEDURE — 25000003 PHARM REV CODE 250: Mod: HCNC

## 2024-05-05 PROCEDURE — 25000003 PHARM REV CODE 250: Mod: HCNC | Performed by: STUDENT IN AN ORGANIZED HEALTH CARE EDUCATION/TRAINING PROGRAM

## 2024-05-05 PROCEDURE — 86140 C-REACTIVE PROTEIN: CPT | Mod: HCNC | Performed by: STUDENT IN AN ORGANIZED HEALTH CARE EDUCATION/TRAINING PROGRAM

## 2024-05-05 PROCEDURE — 63600175 PHARM REV CODE 636 W HCPCS: Mod: HCNC | Performed by: STUDENT IN AN ORGANIZED HEALTH CARE EDUCATION/TRAINING PROGRAM

## 2024-05-05 PROCEDURE — 97535 SELF CARE MNGMENT TRAINING: CPT | Mod: HCNC

## 2024-05-05 PROCEDURE — 36415 COLL VENOUS BLD VENIPUNCTURE: CPT | Mod: HCNC | Performed by: STUDENT IN AN ORGANIZED HEALTH CARE EDUCATION/TRAINING PROGRAM

## 2024-05-05 PROCEDURE — 80069 RENAL FUNCTION PANEL: CPT | Mod: HCNC | Performed by: STUDENT IN AN ORGANIZED HEALTH CARE EDUCATION/TRAINING PROGRAM

## 2024-05-05 PROCEDURE — 97116 GAIT TRAINING THERAPY: CPT | Mod: HCNC

## 2024-05-05 RX ADMIN — APIXABAN 2.5 MG: 2.5 TABLET, FILM COATED ORAL at 11:05

## 2024-05-05 RX ADMIN — FERROUS SULFATE TAB EC 325 MG (65 MG FE EQUIVALENT) 1 EACH: 325 (65 FE) TABLET DELAYED RESPONSE at 09:05

## 2024-05-05 RX ADMIN — APIXABAN 2.5 MG: 2.5 TABLET, FILM COATED ORAL at 09:05

## 2024-05-05 RX ADMIN — Medication: at 09:05

## 2024-05-05 RX ADMIN — ACETAMINOPHEN 1000 MG: 500 TABLET ORAL at 11:05

## 2024-05-05 RX ADMIN — Medication: at 10:05

## 2024-05-05 RX ADMIN — ACETAMINOPHEN 1000 MG: 500 TABLET ORAL at 09:05

## 2024-05-05 RX ADMIN — PREDNISONE 40 MG: 20 TABLET ORAL at 09:05

## 2024-05-05 NOTE — ASSESSMENT & PLAN NOTE
Cr 1.8 on admit, near baseline. Cr rising throughout admission, now improving. 2.6 on AM labs     - Urine studies, RP US.  - Good UOP as of now. Strict I&O.   - avoid nephrotoxins, renally dose meds

## 2024-05-05 NOTE — PT/OT/SLP PROGRESS
"Physical Therapy   Co-Treatment    Patient Name:  Deena Moody   MRN:  [id number]    Recommendations:     Discharge Recommendations: Moderate Intensity Therapy  Discharge Equipment Recommendations: none  Barriers to discharge: Decreased caregiver support and increased level of skilled assist    Assessment:     Deena Moody is a 89 y.o. male admitted with a medical diagnosis of Swelling of digit of left hand.  He presents with the following impairments/functional limitations: weakness, impaired endurance, impaired functional mobility, pain, edema, impaired skin, decreased lower extremity function, decreased upper extremity function, decreased ROM, gait instability, impaired balance, decreased safety awareness, impaired cognition Patient is pleasantly agreeable to therapy evaluation, tolerated the session well although with increasing fatigue. Significant edema still noted in LUE, along with pain as limiting factors. Patient able to complete bed mobility, transfers and short ambulation trial to chair. Patient will continue to benefit from skilled PT during this admit to address BLE strength and endurance deficits, and maximize independence with functional mobility.    Rehab Prognosis: Good; patient would benefit from acute skilled PT services to address these deficits and reach maximum level of function.    Recent Surgery: * No surgery found *      Plan:     During this hospitalization, patient to be seen 4 x/week to address the identified rehab impairments via gait training, therapeutic activities, therapeutic exercises, neuromuscular re-education and progress toward the following goals:    Plan of Care Expires:  06/02/24    Subjective     Chief Complaint: "This left arm is no good"  Patient/Family Comments/goals: increase strength and independence  Pain/Comfort:  Pain Rating 1:  (not rated, grimacing with all movement)  Location - Side 1: Left  Location - Orientation 1: generalized  Location 1: arm  Pain Addressed " 1: Reposition, Distraction, Cessation of Activity  Pain Rating Post-Intervention 1: 0/10      Objective:     Communicated with RN prior to session.  Patient found HOB elevated with PureWick, peripheral IV upon PT entry to room.     General Precautions: Standard, fall, hearing impaired  Orthopedic Precautions: N/A  Braces: N/A  Respiratory Status: Room air     Functional Mobility:  Bed Mobility:     Rolling Right: moderate assistance and of 2 persons  Scooting: moderate assistance and of 2 persons  Supine to Sit: moderate assistance and of 2 persons  Transfers:     Sit to Stand:  minimum assistance and of 2 persons with rolling walker x2 trials from EOB, LUE on walker handle d/t inability to push up from EOB  Bed to Chair: minimum assistance and of 2 persons with  rolling walker  using  Step Transfer  Gait: 4 forward, backward, R steps to chair with RW and min A x2, decreased step length, facundo and forward flexed posture noted. No LOB. Assist required with RW management  Balance:   Static sitting: CGA  Dynamic sitting: min A occasionally  Standing: min A x2 with RW for BUE support      AM-PAC 6 CLICK MOBILITY  Turning over in bed (including adjusting bedclothes, sheets and blankets)?: 2  Sitting down on and standing up from a chair with arms (e.g., wheelchair, bedside commode, etc.): 2  Moving from lying on back to sitting on the side of the bed?: 2  Moving to and from a bed to a chair (including a wheelchair)?: 2  Need to walk in hospital room?: 2  Climbing 3-5 steps with a railing?: 1  Basic Mobility Total Score: 11       Treatment & Education:  Patient educated on importance of OOB activity to promote overall endurance.  Patient educated on calling for assistance for any needs to improve overall safety awareness.  Patient educated on current level of function and progression towards therapeutic goals.  Co-treatment with OT due to patient's poor activity tolerance and medical complexity requiring skilled  assistance from 2 therapists.     Patient left up in chair with all lines intact, call button in reach, and RN/PCT notified..    GOALS:   Multidisciplinary Problems       Physical Therapy Goals          Problem: Physical Therapy    Goal Priority Disciplines Outcome Goal Variances Interventions   Physical Therapy Goal     PT, PT/OT Progressing     Description: PT goals until 5/20/24    1. Pt supine to sit with CGA-not met  2. Pt sit to supine with CGA-not met  3. Pt sit to stand with RW with CGA-not met  4. Pt to perform gait 120ft with RW with CGA.-not met  5. Pt to perform B LE exs in sitting or supine x 10 reps to strengthen B LE to improve functional mobility.-not met                        Time Tracking:     PT Received On: 05/05/24  PT Start Time: 1521     PT Stop Time: 1540  PT Total Time (min): 19 min     Billable Minutes: Gait Training 19       PT/PTA: PT     Number of PTA visits since last PT visit: 0     05/05/2024

## 2024-05-05 NOTE — PLAN OF CARE
Pt engaged well in therapy this date.     Problem: Occupational Therapy  Goal: Occupational Therapy Goal  Description: Goals to be met by: 6/2/24     Patient will increase functional independence with ADLs by performing:    Grooming while standing at sink with Supervision.  Toileting from bedside commode with Supervision for hygiene and clothing management.   Rolling to Bilateral with [place].   Supine to sit with Modified [place].  Toilet transfer to bedside commode with Supervision.    Outcome: Progressing

## 2024-05-05 NOTE — SUBJECTIVE & OBJECTIVE
Interval History: NAEON. He denies any pain this am       Review of Systems   Constitutional:  Negative for chills, diaphoresis, fatigue and fever.   HENT:  Positive for hearing loss.    Respiratory:  Negative for cough and shortness of breath.    Cardiovascular:  Negative for chest pain and leg swelling.   Gastrointestinal:  Negative for abdominal pain, diarrhea, nausea and vomiting.   Genitourinary:  Negative for dysuria and frequency.   Musculoskeletal:  Positive for joint swelling. Negative for arthralgias and back pain.   Skin:  Positive for color change. Negative for rash and wound.   Neurological:  Negative for dizziness and headaches.   Psychiatric/Behavioral:  Positive for confusion. Negative for agitation. The patient is not nervous/anxious.    All other systems reviewed and are negative.    Objective:     Vital Signs (Most Recent):  Temp: 97.6 °F (36.4 °C) (05/05/24 0738)  Pulse: 68 (05/05/24 0738)  Resp: 16 (05/05/24 0738)  BP: (!) 127/58 (05/05/24 0738)  SpO2: 96 % (05/05/24 0738) Vital Signs (24h Range):  Temp:  [97.1 °F (36.2 °C)-98.5 °F (36.9 °C)] 97.6 °F (36.4 °C)  Pulse:  [66-93] 68  Resp:  [16-19] 16  SpO2:  [94 %-100 %] 96 %  BP: ()/(54-77) 127/58     Weight: 62.7 kg (138 lb 3.7 oz)  Body mass index is 20.41 kg/m².    Intake/Output Summary (Last 24 hours) at 5/5/2024 1026  Last data filed at 5/5/2024 0306  Gross per 24 hour   Intake 90 ml   Output 300 ml   Net -210 ml         Physical Exam  Vitals reviewed.   Constitutional:       General: He is not in acute distress.     Appearance: Normal appearance. He is not ill-appearing.   HENT:      Head: Normocephalic.   Eyes:      Extraocular Movements: Extraocular movements intact.   Cardiovascular:      Rate and Rhythm: Normal rate.   Pulmonary:      Effort: Pulmonary effort is normal.   Abdominal:      General: There is no distension.   Musculoskeletal:      Comments: No obvious joint swelling or tenderness.   Neurological:      Mental Status:  He is alert. Mental status is at baseline.             Significant Labs: All pertinent labs within the past 24 hours have been reviewed.

## 2024-05-05 NOTE — ASSESSMENT & PLAN NOTE
Long term (current) use of anticoagulants  S/P placement of cardiac pacemaker    - pacemaker not MRI compatible   - eliquis resumed   - cont tele

## 2024-05-05 NOTE — PT/OT/SLP PROGRESS
"Occupational Therapy  Co -  Treatment with PT    Co-evaluation/treatment performed due to patient's multiple deficits requiring two skilled therapists to appropriately and safely assess patient's strength and endurance while facilitating functional tasks in addition to accommodating for patient's activity tolerance.       Name: Deena Moody  MRN: [id number]  Admitting Diagnosis:  Swelling of digit of left hand       Recommendations:     Discharge Recommendations: Moderate Intensity Therapy  Discharge Equipment Recommendations:     Barriers to discharge:  None    Assessment:     Deena Moody is a 89 y.o. male with a medical diagnosis of Swelling of digit of left hand.  He presents with performance deficits affecting function are weakness, impaired endurance, impaired self care skills, impaired functional mobility, gait instability, impaired balance, decreased lower extremity function, pain, decreased safety awareness.     Pt encountered with HOB raised, sleeping but easily awoken and delightful, agreeable to therapy.  Pt able to complete bed mobility with mod of 2 persons while guarding LUE 2* pain.  Pt able to sit EOB with min - CGA for upright balance, and completed STS transfers across 2 trials with RW and min A of 2.  Pt transitioned to chair with min A of 2 persons and RW.  Pt positioned in upright chair with BUE raised on pillows.  Pt on pathway to post acute therapy at moderate level of intensity.     Rehab Prognosis:  Good; patient would benefit from acute skilled OT services to address these deficits and reach maximum level of function.       Plan:     Patient to be seen 4 x/week to address the above listed problems via self-care/home management, therapeutic activities, therapeutic exercises, neuromuscular re-education  Plan of Care Expires: 06/03/24  Plan of Care Reviewed with: patient    Subjective     Chief Complaint: "You just have to be careful with my arm, you know"   Patient/Family Comments/goals: " Get better, return home   Pain/Comfort:  Pain Rating 1:  not rated but winced/groaned during movement\  Location - Side 1: Left  Location - Orientation 1: generalized  Location 1: arm  Pain Addressed 1: Reposition, Cessation of Activity  Pain Rating Post-Intervention 1: 0/10 (upon placement of arm on pillow in upright chair)    Objective:     Communicated with: DAYANA Chacon prior to session.  Patient found HOB elevated with PureWick, peripheral IV upon OT entry to room.    General Precautions: Standard, fall    Orthopedic Precautions:N/A  Braces: N/A  Respiratory Status: Room air     Occupational Performance:     Bed Mobility:    Patient completed Rolling/Turning to Left with  moderate assistance and 2 persons  Patient completed Rolling/Turning to Right with moderate assistance and 52 persons  Patient completed Scooting/Bridging with moderate assistance and 2 persons  Patient completed Supine to Sit with moderate assistance and 2 persons   Pt able to sit EOB with fair balance, forward flexion possibly due to kyphosis, min A to CGA for upright posture.     Functional Mobility/Transfers:  Patient completed Sit <> Stand Transfer with minimum assistance and of 2 persons  with  rolling walker  across 2 trials  Pt able to walk 4 JENNIFER forward/backward with min A of 2 persons and RW  Pt completed bed to chair transfer with min A of 2 persons with step transfer      Activities of Daily Living:  Grooming: set up assistance completing facial hygiene with warm washcloth   Upper Body Dressing: moderate assistance affixing gown at neck and back to maximize coverage   Lower Body Dressing: maximal assistance adjusting bilateral socks for maximum fit  Toileting: total assistance managing purewick      AMPAC 6 Click ADL: 18    Treatment & Education:  Pt educated on role of OT, POC, and goals for therapy.    POC was dicussed with patient/caregiver, who was included in its development and is in agreement with the identified goals and  treatment plan.   Patient and family aware of patient's deficits and therapy progression.   Time provided for therapeutic counseling and discussion of health disposition.   Educated on importance of EOB/OOB mobility, maintaining routine, sitting up in chair, and maximizing independence with ADLs during admission   Pt completed ADLs and functional mobility for treatment session as noted above   Pt/caregiver verbalized understanding and expressed no further concerns/questions.  Updated communication board with level of assist required       Patient left up in chair with all lines intact, call button in reach, RN notified, and PCT present    GOALS:   Multidisciplinary Problems       Occupational Therapy Goals          Problem: Occupational Therapy    Goal Priority Disciplines Outcome Interventions   Occupational Therapy Goal     OT, PT/OT Progressing    Description: Goals to be met by: 6/2/24     Patient will increase functional independence with ADLs by performing:    Grooming while standing at sink with Supervision.  Toileting from bedside commode with Supervision for hygiene and clothing management.   Rolling to Bilateral with [place].   Supine to sit with Modified [place].  Toilet transfer to bedside commode with Supervision.                         Time Tracking:     OT Date of Treatment: 05/05/24  OT Start Time: 1521  OT Stop Time: 1540  OT Total Time (min): 19 min    Billable Minutes:Self Care/Home Management 19    OT/ÁLVARO: OT          5/5/2024

## 2024-05-05 NOTE — PROGRESS NOTES
[facility] Medicine  Progress Note    Patient Name: Deena Moody  MRN: [id number]  Patient Class: IP- Inpatient   Admission Date: 4/28/2024  Length of Stay: 6 days  Attending Physician: Marco Nagel MD  Primary Care Provider: Jam Rowell MD        Subjective:     Principal Problem:Swelling of digit of left hand        HPI:  Deena Moody is a 89 y.o. male with PMHx HTN, HLD, CAD, diastolic HF, A-fib with pacemaker, CABG, hx of MI, AV block, and CKD 3 who presents to [facility] ED with chief complaint of left hand swelling and pain. He reports this issues has been present x 2-3 weeks. He was in ED two days prior wherein his ring had to be cut from finger due to swelling. He was then sent home with keflex and PCP follow up. Since then patient reports worsening swelling, redness and pain. He denies associated HA, fever, chills, chest pain, shortness of breath, abdominal pain, n/v/d, urinary symptoms, numbness or tingling. He takes eliquis. Patient's pacemaker is not MRI compatible. Patient with significant hear loss bilaterally.     In the ED: Hypertensive, RR 22, otherwise VSSAF. CBC without leukocytosis. CMP without emergent abnormalities, Cr 1.8 (baseline). ESR 55 and CRP 82.8 (elevated from 53.6 on 4/26). Received morphine and vanc. Admitted to  for further management.    Overview/Hospital Course:  Presented with Left hand swelling and pain x 2-3 weeks. Seen in ED 4/26, ring removed, discharged with oral keflex. Returns with worsening pain, swelling, and erythema that has now progressed to left wrist. Labs with leukocytosis. ESR and CRP elevated. Initial XR images concerning for osteo. Ortho consulted. Recommend broad spectrum iv abx. Wrap with ace bandage, elevate, will continue to trend crp and exam. CT L hand w/o contrast shows new erosive changes at the distal aspect of the middle phalanx of the 2nd finger concerning for osteomyelitis or other inflammatory arthritides.ID  following. On CTX and dapto. Uric acid elevated 11.6. Given hx of gout, consulting rheumatology for evaluation of gout flare. Hx of Afib, holding eliquis in setting of possible OR, heparin infusion for now. Started on Prednisone with improvement in hand pain/swelling. CTX and Daptomycin discontinued by ID on 5/03, as felt his symptoms are from gout. CRP trending down. Kidney function is improving          Interval History: NAEON. He denies any pain this am       Review of Systems   Constitutional:  Negative for chills, diaphoresis, fatigue and fever.   HENT:  Positive for hearing loss.    Respiratory:  Negative for cough and shortness of breath.    Cardiovascular:  Negative for chest pain and leg swelling.   Gastrointestinal:  Negative for abdominal pain, diarrhea, nausea and vomiting.   Genitourinary:  Negative for dysuria and frequency.   Musculoskeletal:  Positive for joint swelling. Negative for arthralgias and back pain.   Skin:  Positive for color change. Negative for rash and wound.   Neurological:  Negative for dizziness and headaches.   Psychiatric/Behavioral:  Positive for confusion. Negative for agitation. The patient is not nervous/anxious.    All other systems reviewed and are negative.    Objective:     Vital Signs (Most Recent):  Temp: 97.6 °F (36.4 °C) (05/05/24 0738)  Pulse: 68 (05/05/24 0738)  Resp: 16 (05/05/24 0738)  BP: (!) 127/58 (05/05/24 0738)  SpO2: 96 % (05/05/24 0738) Vital Signs (24h Range):  Temp:  [97.1 °F (36.2 °C)-98.5 °F (36.9 °C)] 97.6 °F (36.4 °C)  Pulse:  [66-93] 68  Resp:  [16-19] 16  SpO2:  [94 %-100 %] 96 %  BP: ()/(54-77) 127/58     Weight: 62.7 kg (138 lb 3.7 oz)  Body mass index is 20.41 kg/m².    Intake/Output Summary (Last 24 hours) at 5/5/2024 1026  Last data filed at 5/5/2024 0306  Gross per 24 hour   Intake 90 ml   Output 300 ml   Net -210 ml         Physical Exam  Vitals reviewed.   Constitutional:       General: He is not in acute distress.     Appearance:  Normal appearance. He is not ill-appearing.   HENT:      Head: Normocephalic.   Eyes:      Extraocular Movements: Extraocular movements intact.   Cardiovascular:      Rate and Rhythm: Normal rate.   Pulmonary:      Effort: Pulmonary effort is normal.   Abdominal:      General: There is no distension.   Musculoskeletal:      Comments: No obvious joint swelling or tenderness.   Neurological:      Mental Status: He is alert. Mental status is at baseline.             Significant Labs: All pertinent labs within the past 24 hours have been reviewed.      Assessment/Plan:      * Swelling of digit of left hand  Presented with Left hand swelling and pain x 2-3 weeks. Seen in ED 4/26, ring removed, discharged with oral keflex. Returns with worsening pain, swelling, and erythema that has now progressed to left wrist. See media. On admission, he was afebrile, leukocytosis. ESR and CRP elevated. Initial XR images concerning for osteo.     - Ortho consulted. Recommend broad spectrum iv abx. Wrap with ace bandage, elevate, will continue to trend crp and exam. On heparin gtt in case of procedure.   - CT L hand w/o contrast shows new erosive changes at the distal aspect of the middle phalanx of the 2nd finger concerning for osteomyelitis or other inflammatory arthritides.  - ID following. On CTX and dapto, but discontinued on 5/03 as suspect his symptoms were related to gout flare.  - Rheum consulted, started on steroids.  PO prednisone for 5 days.      Elevated uric acid in blood  Acute polyarticular gout   Uric acid elevated 11.6.  Given hx of gout, consulting rheumatology for evaluation of gout flare.   Started on IV solumedrol 80mg x 2 days, now on PO prednisone for 5 days.      Permanent atrial fibrillation  Long term (current) use of anticoagulants  S/P placement of cardiac pacemaker    - pacemaker not MRI compatible   - eliquis resumed   - cont tele    Chronic diastolic heart failure  - no signs of acute overload  - Holding  torsemide in setting of SARAH  - monitor UOP, signs of volume overload.    CAD (coronary artery disease)  History of heart bypass surgery  History of MI (myocardial infarction)    - Holding Aspirin for now for possible procedure in future   - previously intolerant to statins 2/2 myalgias    Complete AV block  - s/p pacemaker, not MRI compatible.     Essential hypertension  - chronic, controlled  - Holding coreg and torsemide  - monitor     Dyslipidemia  - statin intolerance 2/2 myalgias     Acute kidney injury superimposed on chronic kidney disease  Cr 1.8 on admit, near baseline. Cr rising throughout admission, now improving. 2.6 on AM labs     - Urine studies, RP US.  - Good UOP as of now. Strict I&O.   - avoid nephrotoxins, renally dose meds      VTE Risk Mitigation (From admission, onward)           Ordered     apixaban tablet 2.5 mg  2 times daily         05/03/24 1650     Reason for No Pharmacological VTE Prophylaxis  Once        Question:  Reasons:  Answer:  Already adequately anticoagulated on oral Anticoagulants    04/28/24 1431     IP VTE HIGH RISK PATIENT  Once         04/28/24 1431     Place sequential compression device  Until discontinued         04/28/24 1431                    Discharge Planning   TEOFILO: 5/4/2024     Code Status: Full Code   Is the patient medically ready for discharge?:     Reason for patient still in hospital (select all that apply): Treatment and Pending disposition  Discharge Plan A: Home, Home with family, Home Health   Discharge Delays: None known at this time              Marco Nagel MD  Department of Hospital Medicine   [facility] [address] [facility] - Med Surg

## 2024-05-06 LAB
ALBUMIN SERPL BCP-MCNC: 2.3 G/DL (ref 3.5–5.2)
ANION GAP SERPL CALC-SCNC: 12 MMOL/L (ref 8–16)
BACTERIA SPEC AEROBE CULT: NO GROWTH
BUN SERPL-MCNC: 118 MG/DL (ref 10–30)
CALCIUM SERPL-MCNC: 7.9 MG/DL (ref 8.7–10.5)
CHLORIDE SERPL-SCNC: 108 MMOL/L (ref 95–110)
CO2 SERPL-SCNC: 20 MMOL/L (ref 23–29)
CREAT SERPL-MCNC: 2.3 MG/DL (ref 0.5–1.4)
CRP SERPL-MCNC: 60.9 MG/L (ref 0–8.2)
EST. GFR  (NO RACE VARIABLE): 25.8 ML/MIN/1.73 M^2
GLUCOSE SERPL-MCNC: 120 MG/DL (ref 70–110)
PATH INTERP FLD-IMP: NORMAL
PHOSPHATE SERPL-MCNC: 4.6 MG/DL (ref 2.7–4.5)
POTASSIUM SERPL-SCNC: 3.6 MMOL/L (ref 3.5–5.1)
SODIUM SERPL-SCNC: 140 MMOL/L (ref 136–145)

## 2024-05-06 PROCEDURE — 97535 SELF CARE MNGMENT TRAINING: CPT | Mod: HCNC

## 2024-05-06 PROCEDURE — 36415 COLL VENOUS BLD VENIPUNCTURE: CPT | Mod: HCNC | Performed by: STUDENT IN AN ORGANIZED HEALTH CARE EDUCATION/TRAINING PROGRAM

## 2024-05-06 PROCEDURE — 80069 RENAL FUNCTION PANEL: CPT | Mod: HCNC | Performed by: STUDENT IN AN ORGANIZED HEALTH CARE EDUCATION/TRAINING PROGRAM

## 2024-05-06 PROCEDURE — 97116 GAIT TRAINING THERAPY: CPT | Mod: HCNC,CQ

## 2024-05-06 PROCEDURE — 63600175 PHARM REV CODE 636 W HCPCS: Mod: HCNC | Performed by: STUDENT IN AN ORGANIZED HEALTH CARE EDUCATION/TRAINING PROGRAM

## 2024-05-06 PROCEDURE — 97530 THERAPEUTIC ACTIVITIES: CPT | Mod: HCNC

## 2024-05-06 PROCEDURE — 86140 C-REACTIVE PROTEIN: CPT | Mod: HCNC | Performed by: STUDENT IN AN ORGANIZED HEALTH CARE EDUCATION/TRAINING PROGRAM

## 2024-05-06 PROCEDURE — 25000003 PHARM REV CODE 250: Mod: HCNC | Performed by: STUDENT IN AN ORGANIZED HEALTH CARE EDUCATION/TRAINING PROGRAM

## 2024-05-06 PROCEDURE — 92526 ORAL FUNCTION THERAPY: CPT | Mod: HCNC

## 2024-05-06 PROCEDURE — 25000003 PHARM REV CODE 250: Mod: HCNC

## 2024-05-06 PROCEDURE — 21400001 HC TELEMETRY ROOM: Mod: HCNC

## 2024-05-06 RX ORDER — ASPIRIN 81 MG/1
81 TABLET ORAL DAILY
Status: DISCONTINUED | OUTPATIENT
Start: 2024-05-07 | End: 2024-05-08 | Stop reason: HOSPADM

## 2024-05-06 RX ADMIN — PREDNISONE 40 MG: 20 TABLET ORAL at 08:05

## 2024-05-06 RX ADMIN — APIXABAN 2.5 MG: 2.5 TABLET, FILM COATED ORAL at 09:05

## 2024-05-06 RX ADMIN — APIXABAN 2.5 MG: 2.5 TABLET, FILM COATED ORAL at 08:05

## 2024-05-06 RX ADMIN — Medication: at 09:05

## 2024-05-06 RX ADMIN — FERROUS SULFATE TAB EC 325 MG (65 MG FE EQUIVALENT) 1 EACH: 325 (65 FE) TABLET DELAYED RESPONSE at 08:05

## 2024-05-06 RX ADMIN — Medication: at 08:05

## 2024-05-06 RX ADMIN — OXYCODONE HYDROCHLORIDE 10 MG: 10 TABLET ORAL at 12:05

## 2024-05-06 NOTE — PROGRESS NOTES
[facility] Medicine  Progress Note    Patient Name: Deena Moody  MRN: [id number]  Patient Class: IP- Inpatient   Admission Date: 4/28/2024  Length of Stay: 7 days  Attending Physician: Mike Watt DO  Primary Care Provider: Jam Rowell MD        Subjective:     Principal Problem:Swelling of digit of left hand        HPI:  Deena Moody is a 89 y.o. male with PMHx HTN, HLD, CAD, diastolic HF, A-fib with pacemaker, CABG, hx of MI, AV block, and CKD 3 who [place] [facility] [place] with chief complaint of left hand swelling and pain. He reports this issues has been present x 2-3 weeks. He was in ED two days prior wherein his ring had to be cut from finger due to swelling. He was then sent home with keflex and PCP follow up. Since then patient reports worsening swelling, redness and pain. He denies associated HA, fever, chills, chest pain, shortness of breath, abdominal pain, n/v/d, urinary symptoms, numbness or tingling. He takes eliquis. Patient's pacemaker is not MRI compatible. Patient with significant hear loss bilaterally.     In the ED: Hypertensive, RR 22, otherwise VSSAF. CBC without leukocytosis. CMP without emergent abnormalities, Cr 1.8 (baseline). ESR 55 and CRP 82.8 (elevated from 53.6 on 4/26). Received morphine and vanc. Admitted to  for further management.    Overview/Hospital Course:  Presented with Left hand swelling and pain x 2-3 weeks. Seen in ED 4/26, ring removed, discharged with oral keflex. Returns with worsening pain, swelling, and erythema that has now progressed to left wrist. Labs with leukocytosis. ESR and CRP elevated. Initial XR images concerning for osteo. Ortho consulted. Recommend broad spectrum iv abx. Wrap with ace bandage, elevate, will continue to trend crp and exam. CT L hand w/o contrast shows new erosive changes at the distal aspect of the middle phalanx of the 2nd finger concerning for osteomyelitis or other inflammatory arthritides.ID  following. On CTX and dapto. Uric acid elevated 11.6. Given hx of gout, consulting rheumatology for evaluation of gout flare. Hx of Afib, holding eliquis in setting of possible OR, heparin infusion for now. Started on Prednisone with improvement in hand pain/swelling. CTX and Daptomycin discontinued by ID on 5/03, as felt his symptoms are from gout. CRP trending down. Kidney function is improving daily. Will complete oral steroids on 5/7. Ambulatory referral to rheumatology clinic for follow up and maintenance therapy discussions.     PT/OT recommending SNF. Pending US of the LUE to rule out DVT and placement. Pending continued improvement in renal function.     Interval History:     NAEON. He denies any pain this am. Discussed plan of care with daughter. She is concerned about possible discharge directly home with niece (where he currently resides) as he would be alone during the day. Would like to explore placement at SNF with eventual discharge home. PT/OT in agreement. Pending LUE US to rule out DVT and placement.     Review of Systems   Constitutional:  Negative for chills, diaphoresis, fatigue and fever.   HENT:  Positive for hearing loss.    Respiratory:  Negative for cough and shortness of breath.    Cardiovascular:  Negative for chest pain and leg swelling.   Gastrointestinal:  Negative for abdominal pain, diarrhea, nausea and vomiting.   Genitourinary:  Negative for dysuria and frequency.   Musculoskeletal:  Positive for joint swelling. Negative for arthralgias and back pain.   Skin:  Negative for color change, rash and wound.   Neurological:  Negative for dizziness and headaches.   Psychiatric/Behavioral:  Positive for confusion. Negative for agitation. The patient is not nervous/anxious.    All other systems reviewed and are negative.    Objective:     Vital Signs (Most Recent):  Temp: 97.8 °F (36.6 °C) (05/06/24 1129)  Pulse: 70 (05/06/24 1129)  Resp: 16 (05/06/24 1245)  BP: (!) 149/66 (05/06/24  1129)  SpO2: 97 % (05/06/24 1129) Vital Signs (24h Range):  Temp:  [97.5 °F (36.4 °C)-97.8 °F (36.6 °C)] 97.8 °F (36.6 °C)  Pulse:  [64-70] 70  Resp:  [16-18] 16  SpO2:  [95 %-97 %] 97 %  BP: (131-171)/(60-75) 149/66     Weight: 62.7 kg (138 lb 3.7 oz)  Body mass index is 20.41 kg/m².    Intake/Output Summary (Last 24 hours) at 5/6/2024 1404  Last data filed at 5/6/2024 0616  Gross per 24 hour   Intake 180 ml   Output 950 ml   Net -770 ml         Physical Exam  Vitals reviewed.   Constitutional:       General: He is not in acute distress.     Appearance: Normal appearance. He is not ill-appearing.   HENT:      Head: Normocephalic.      Nose: Nose normal.      Mouth/Throat:      Mouth: Mucous membranes are moist.   Eyes:      General: No scleral icterus.  Cardiovascular:      Rate and Rhythm: Normal rate.      Pulses: Normal pulses.   Pulmonary:      Effort: Pulmonary effort is normal. No respiratory distress.   Abdominal:      General: Abdomen is flat. There is no distension.      Palpations: Abdomen is soft.   Musculoskeletal:         General: Swelling (of the LUE) present.      Comments: No obvious joint swelling or tenderness.   Skin:     General: Skin is warm and dry.      Coloration: Skin is not jaundiced.   Neurological:      Mental Status: He is alert. Mental status is at baseline.             Significant Labs: All pertinent labs within the past 24 hours have been reviewed.      Assessment/Plan:      * Swelling of digit of left hand  Presented with Left hand swelling and pain x 2-3 weeks. Seen in ED 4/26, ring removed, discharged with oral keflex. Returns with worsening pain, swelling, and erythema that has now progressed to left wrist. See media. On admission, he was afebrile, leukocytosis. ESR and CRP elevated. Initial XR images concerning for osteo.     - Ortho consulted. Recommend broad spectrum iv abx. Wrap with ace bandage, elevate, will continue to trend crp and exam. On heparin gtt in case of  procedure.   - CT L hand w/o contrast shows new erosive changes at the distal aspect of the middle phalanx of the 2nd finger concerning for osteomyelitis or other inflammatory arthritides.  - ID following. On CTX and dapto, but discontinued on 5/03 as suspect his symptoms were related to gout flare.  - Rheum consulted, started on steroids.  PO prednisone for 5 days. Will complete on 5/7  - Outpatient rheumatology follow up for maintenance therapy       Elevated uric acid in blood  Acute polyarticular gout   Uric acid elevated 11.6.  Given hx of gout, consulting rheumatology for evaluation of gout flare.   Started on IV solumedrol 80mg x 2 days, now on PO prednisone for 5 days; will complete on 5/7  Outpatient rheumatology follow up       Permanent atrial fibrillation  Long term (current) use of anticoagulants  S/P placement of cardiac pacemaker    - pacemaker not MRI compatible   - eliquis continued   - cont tele    Chronic diastolic heart failure  - no signs of acute overload  - Holding torsemide in setting of SARAH  - monitor UOP, signs of volume overload.    CAD (coronary artery disease)  History of heart bypass surgery  History of MI (myocardial infarction)    - Resume ASA   - previously intolerant to statins 2/2 myalgias    Complete AV block  - s/p pacemaker, not MRI compatible.     History of MI (myocardial infarction)  Noted history of       History of heart bypass surgery  Noted history of     Essential hypertension  - chronic, controlled  - Holding coreg and torsemide  - monitor     Dyslipidemia  - statin intolerance 2/2 myalgias     Acute kidney injury superimposed on chronic kidney disease  Cr 1.8 on admit, near baseline. Cr rising throughout admission, now improving. 2.3 on AM labs     - Urine studies, RP US.  - Good UOP as of now. Strict I&O.   - avoid nephrotoxins, renally dose meds      VTE Risk Mitigation (From admission, onward)           Ordered     apixaban tablet 2.5 mg  2 times daily          05/03/24 1650     Reason for No Pharmacological VTE Prophylaxis  Once        Question:  Reasons:  Answer:  Already adequately anticoagulated on oral Anticoagulants    04/28/24 1431     IP VTE HIGH RISK PATIENT  Once         04/28/24 1431     Place sequential compression device  Until discontinued         04/28/24 1431                    Discharge Planning   TEOFILO: 5/7/2024     Code Status: Full Code   Is the patient medically ready for discharge?:     Reason for patient still in hospital (select all that apply): Patient trending condition, PT / OT recommendations, and Pending disposition  Discharge Plan A: Skilled Nursing Facility   Discharge Delays: None known at this time    Mike Watt DO  Department of Hospital Medicine   [facility] - Med Surg

## 2024-05-06 NOTE — PT/OT/SLP PROGRESS
"Physical Therapy Treatment    Patient Name:  Deena Moody   MRN:  [id number]    Recommendations:     Discharge Recommendations: Moderate Intensity Therapy  Discharge Equipment Recommendations: none  Barriers to discharge: Decreased caregiver support    Assessment:     Deena Moody is a 89 y.o. male admitted with a medical diagnosis of Swelling of digit of left hand.  He presents with the following impairments/functional limitations: weakness, impaired endurance, impaired self care skills, impaired functional mobility, gait instability, impaired balance, decreased upper extremity function, pain, decreased ROM, impaired skin, edema.    Rehab Prognosis: Good; patient would benefit from acute skilled PT services to address these deficits and reach maximum level of function.    Recent Surgery: * No surgery found *      Plan:     During this hospitalization, patient to be seen 4 x/week to address the identified rehab impairments via gait training, therapeutic activities, therapeutic exercises, neuromuscular re-education and progress toward the following goals:    Plan of Care Expires:  06/02/24    Subjective     Chief Complaint: pt agreeable to session, but having pain in L shoulder   Patient/Family Comments/goals: to get stronger  Pain/Comfort:  Pain Rating 1:  ("a little")  Location - Side 1: Left  Location 1:  (arm and legs)  Pain Addressed 1: Pre-medicate for activity, Distraction      Objective:     Communicated with NSG prior to session.  Patient found HOB elevated with peripheral IV, PureWick upon PT entry to room.     General Precautions: Standard, fall, hearing impaired  Orthopedic Precautions: N/A  Braces: N/A  Respiratory Status: Room air     Functional Mobility:  Bed Mobility:     Supine to Sit: moderate assistance and of 2 persons  Transfers:     Sit to Stand:  moderate assistance with rolling walker  Gait: 46 feet with CGA with RW; increased kyphosis and flexed posture, decreased step length   Stand to " sit: min A for descent       AM-PAC 6 CLICK MOBILITY  Turning over in bed (including adjusting bedclothes, sheets and blankets)?: 2  Sitting down on and standing up from a chair with arms (e.g., wheelchair, bedside commode, etc.): 2  Moving from lying on back to sitting on the side of the bed?: 2  Moving to and from a bed to a chair (including a wheelchair)?: 3  Need to walk in hospital room?: 3  Climbing 3-5 steps with a railing?: 1  Basic Mobility Total Score: 13       Treatment & Education:  Pt sat EOB and donned second gown as well as did ROM with OT for LUE.   Bedside table in front of patient and area set up for function, convenience, and safety. RN aware of patient's mobility needs and status. Questions/concerns addressed within PTA scope of practice; patient  with no further questions. Time was provided for active listening, discussion of health disposition, and discussion of safe discharge.      Patient left up in chair with all lines intact, call button in reach, and family present..    GOALS:   Multidisciplinary Problems       Physical Therapy Goals          Problem: Physical Therapy    Goal Priority Disciplines Outcome Goal Variances Interventions   Physical Therapy Goal     PT, PT/OT Progressing     Description: PT goals until 5/20/24    1. Pt supine to sit with CGA-not met  2. Pt sit to supine with CGA-not met  3. Pt sit to stand with RW with CGA-not met  4. Pt to perform gait 120ft with RW with CGA.-not met  5. Pt to perform B LE exs in sitting or supine x 10 reps to strengthen B LE to improve functional mobility.-not met                        Time Tracking:     PT Received On: 05/06/24  PT Start Time: 1139     PT Stop Time: 1202  PT Total Time (min): 23 min     Billable Minutes: Gait Training 23    Treatment Type: Treatment  PT/PTA: PTA     Number of PTA visits since last PT visit: 1 05/06/2024

## 2024-05-06 NOTE — PT/OT/SLP PROGRESS
Speech Language Pathology Treatment    Patient Name:  Deena Moody   MRN:  [id number]  Admitting Diagnosis: Swelling of digit of left hand    Recommendations:                 General Recommendations:  Monitor diet tolerance   Diet recommendations:  Minced & Moist Diet - IDDSI Level 5, Liquid Diet Level: Thin liquids - IDDSI Level 0   Aspiration Precautions: 1 bite/sip at a time, Alternating bites/sips, Assistance with meals, Feed only when awake/alert, HOB to 90 degrees, Meds whole 1 at a time, Small bites/sips, and Standard aspiration precautions   General Precautions: Standard, fall, hearing impaired  Communication strategies:   speak up    Assessment:     Deena Moody is a 89 y.o. male with an SLP diagnosis of  functional swallow of modified diet .      Subjective     Spoke with RN prior to session. Pt awake with breakfast tray present. Daughter at bedside.     Pain/Comfort:  Pain Rating 1: 0/10  Pain Rating Post-Intervention 1: 0/10    Respiratory Status: Room air    Objective:     Has the patient been evaluated by SLP for swallowing?   Yes  Keep patient NPO? No     Pt seen bedside for dysphagia therapy. RN assisted with boosting pt upright in bed prior to PO intake. HOB raised and daughter at bedside providing feed assist with meal tray. Daughter reports pt had choking episode of regular textured piece of sausage over the weekend and was downgraded to softer foods which has been going great. She states he gets altered at night and will become agitated and refuse anything to eat/drink. Pt tolerated small single bites sips and minced and moist solids and thin liquids from meal tray this date without any concerns for airway compromise. Daughter repots feeling much more comfortable feeding him softer foods at this time. Recommend he continue level 5 diet and SLP will continue to follow to monitor diet tolerance. Education provided re: role of SLP, diet recs, swallow precs, s/s aspiration and POC.  Pt and daughter  verbalized understanding and agreement. White board updated upon SLP exit.     Goals:   Multidisciplinary Problems       SLP Goals          Problem: SLP    Goal Priority Disciplines Outcome   SLP Goal     SLP Progressing   Description: Goals due 5/8  1.  Pt. Will participate in ongoing assessment of swallow at bedside                      Plan:     Patient to be seen:  3 x/week   Plan of Care expires:  05/29/24  Plan of Care reviewed with:  patient, daughter   SLP Follow-Up:  Yes       Discharge recommendations:  No Therapy Indicated   Barriers to Discharge:  None    Time Tracking:     SLP Treatment Date:   05/06/24  Speech Start Time:  0915  Speech Stop Time:  0928     Speech Total Time (min):  13 min    Billable Minutes: Treatment Swallowing Dysfunction 5 and Self Care/Home Management Training 8    05/06/2024

## 2024-05-06 NOTE — ASSESSMENT & PLAN NOTE
Cr 1.8 on admit, near baseline. Cr rising throughout admission, now improving. 2.3 on AM labs     - Urine studies, RP US.  - Good UOP as of now. Strict I&O.   - avoid nephrotoxins, renally dose meds

## 2024-05-06 NOTE — ASSESSMENT & PLAN NOTE
Presented with Left hand swelling and pain x 2-3 weeks. Seen in ED 4/26, ring removed, discharged with oral keflex. Returns with worsening pain, swelling, and erythema that has now progressed to left wrist. See media. On admission, he was afebrile, leukocytosis. ESR and CRP elevated. Initial XR images concerning for osteo.     - Ortho consulted. Recommend broad spectrum iv abx. Wrap with ace bandage, elevate, will continue to trend crp and exam. On heparin gtt in case of procedure.   - CT L hand w/o contrast shows new erosive changes at the distal aspect of the middle phalanx of the 2nd finger concerning for osteomyelitis or other inflammatory arthritides.  - ID following. On CTX and dapto, but discontinued on 5/03 as suspect his symptoms were related to gout flare.  - Rheum consulted, started on steroids.  PO prednisone for 5 days. Will complete on 5/7  - Outpatient rheumatology follow up for maintenance therapy

## 2024-05-06 NOTE — ASSESSMENT & PLAN NOTE
Long term (current) use of anticoagulants  S/P placement of cardiac pacemaker    - pacemaker not MRI compatible   - eliquis continued   - cont tele

## 2024-05-06 NOTE — ASSESSMENT & PLAN NOTE
Acute polyarticular gout   Uric acid elevated 11.6.  Given hx of gout, consulting rheumatology for evaluation of gout flare.   Started on IV solumedrol 80mg x 2 days, now on PO prednisone for 5 days; will complete on 5/7  Outpatient rheumatology follow up

## 2024-05-06 NOTE — ASSESSMENT & PLAN NOTE
History of heart bypass surgery  History of MI (myocardial infarction)    - Resume ASA   - previously intolerant to statins 2/2 myalgias

## 2024-05-06 NOTE — PROGRESS NOTES
[facility] - Med Surg  Wound Care    Patient Name:  Deena Moody   MRN:  [id number]  Date: 5/6/2024  Diagnosis: Swelling of digit of left hand    History:     Past Medical History:   Diagnosis Date    Acute on chronic diastolic heart failure 9/1/2016    Coronary artery disease     Hyperlipidemia     Hypertension     Macular degeneration (senile) of retina, unspecified 12/12/2014    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    Persistent atrial fibrillation 11/10/2016    S/P placement of cardiac pacemaker 9/14/2016       Social History     Socioeconomic History    Marital status:    Tobacco Use    Smoking status: Never     Passive exposure: Never    Smokeless tobacco: Never   Substance and Sexual Activity    Alcohol use: No    Drug use: No    Sexual activity: Yes     Partners: Female     Social Determinants of Health     Financial Resource Strain: Low Risk  (4/29/2024)    Overall Financial Resource Strain (CARDIA)     Difficulty of Paying Living Expenses: Not hard at all   Food Insecurity: No Food Insecurity (4/29/2024)    Hunger Vital Sign     Worried About Running Out of Food in the Last Year: Never true     Ran Out of Food in the Last Year: Never true   Transportation Needs: No Transportation Needs (4/29/2024)    PRAPARE - Transportation     Lack of Transportation (Medical): No     Lack of Transportation (Non-Medical): No   Physical Activity: Inactive (4/29/2024)    Exercise Vital Sign     Days of Exercise per Week: 0 days     Minutes of Exercise per Session: 0 min   Stress: Stress Concern Present (4/29/2024)    [employer] - Occupational Stress Questionnaire     Feeling of Stress : Rather much   Housing Stability: Low Risk  (4/29/2024)    Housing Stability Vital Sign     Unable to Pay for Housing in the Last Year: No     Homeless in the Last Year: No       Precautions:     Allergies as of 04/28/2024 - Reviewed 04/28/2024   Allergen Reaction Noted    Iodine and iodide containing products Other  (See Comments) 07/20/2012       [facility] Assessment Details/Treatment   Patient seen for wound care consultation.   Reviewed chart for this encounter.   See Flow Sheet for findings.    Pt in bed and agreeable to care. Intact, maroon, and purple discoloration to the buttocks and the right heel. Bpco applied and covered with a foam dressing. The left heel is intact and blistered with purple discoloration. Hydrofera blue ready applied and secured with a foam dressing. Pt and pt's family educated on the wound care and the importance of turning every 2 hours.     RECOMMENDATIONS:  - Right heel and buttocks: bedside nursing to cleanse with NS, pat dry, apply bpco and cover with a foam dressing bid  - Scrotum: bpco bid  - Left heel: bedside nursing to cleanse with NS, pat dry, apply hydrofera blue ready to the wound bed and secure with a foam dressing every 3 days  - Turning every 2 hours  - Heel protecting boots  - Immerse mattress   - Bedside nursing to maintain pressure injury prevention interventions     05/06/24 1114        Wound 05/03/24 0500 Pressure Injury Left Heel   Date First Assessed/Time First Assessed: 05/03/24 0500   Primary Wound Type: Pressure Injury  Side: Left  Location: Heel   Wound Image    Pressure Injury Stage DTPI   Dressing Appearance Intact;Dry   Drainage Amount None   Appearance Intact;Purple;Dry   Periwound Area Intact;Dry   Care Cleansed with:;Sterile normal saline   Dressing Applied;Foam;Other (comment)  (hydrofera)        Wound 05/03/24 1700 Other (comment) Buttocks   Date First Assessed/Time First Assessed: 05/03/24 1700   Present on Original Admission: No  Primary Wound Type: Other (comment)  Location: Buttocks   Wound Image    Dressing Appearance Dry;Intact   Drainage Amount None   Appearance Intact;Dry;Maroon;Purple   Periwound Area Intact;Dry   Care Cleansed with:;Sterile normal saline   Dressing Applied;Other (comment);Foam  (BPCO)        Wound 05/06/24 1114 Other (comment) Right Heel   Date  First Assessed/Time First Assessed: 05/06/24 1114   Present on Original Admission: No  Primary Wound Type: Other (comment)  Side: Right  Location: Heel   Wound Image    Dressing Appearance Open to air   Drainage Amount None   Appearance Intact;Red;Maroon;Purple;Dry   Periwound Area Intact;Dry   Care Cleansed with:;Sterile normal saline   Dressing Applied;Other (comment);Foam  (BPCO)   Off Loading Off loading shoe     Recommendations made to primary team for above plan via secure chat. Wound care will follow-up as needed.   05/06/2024

## 2024-05-06 NOTE — SUBJECTIVE & OBJECTIVE
Interval History:     NAEON. He denies any pain this am. Discussed plan of care with daughter. She is concerned about possible discharge directly home with niece (where he currently resides) as he would be alone during the day. Would like to explore placement at SNF with eventual discharge home. PT/OT in agreement. Pending LUE US to rule out DVT and placement.     Review of Systems   Constitutional:  Negative for chills, diaphoresis, fatigue and fever.   HENT:  Positive for hearing loss.    Respiratory:  Negative for cough and shortness of breath.    Cardiovascular:  Negative for chest pain and leg swelling.   Gastrointestinal:  Negative for abdominal pain, diarrhea, nausea and vomiting.   Genitourinary:  Negative for dysuria and frequency.   Musculoskeletal:  Positive for joint swelling. Negative for arthralgias and back pain.   Skin:  Negative for color change, rash and wound.   Neurological:  Negative for dizziness and headaches.   Psychiatric/Behavioral:  Positive for confusion. Negative for agitation. The patient is not nervous/anxious.    All other systems reviewed and are negative.    Objective:     Vital Signs (Most Recent):  Temp: 97.8 °F (36.6 °C) (05/06/24 1129)  Pulse: 70 (05/06/24 1129)  Resp: 16 (05/06/24 1245)  BP: (!) 149/66 (05/06/24 1129)  SpO2: 97 % (05/06/24 1129) Vital Signs (24h Range):  Temp:  [97.5 °F (36.4 °C)-97.8 °F (36.6 °C)] 97.8 °F (36.6 °C)  Pulse:  [64-70] 70  Resp:  [16-18] 16  SpO2:  [95 %-97 %] 97 %  BP: (131-171)/(60-75) 149/66     Weight: 62.7 kg (138 lb 3.7 oz)  Body mass index is 20.41 kg/m².    Intake/Output Summary (Last 24 hours) at 5/6/2024 1404  Last data filed at 5/6/2024 0616  Gross per 24 hour   Intake 180 ml   Output 950 ml   Net -770 ml         Physical Exam  Vitals reviewed.   Constitutional:       General: He is not in acute distress.     Appearance: Normal appearance. He is not ill-appearing.   HENT:      Head: Normocephalic.      Nose: Nose normal.       Mouth/Throat:      Mouth: Mucous membranes are moist.   Eyes:      General: No scleral icterus.  Cardiovascular:      Rate and Rhythm: Normal rate.      Pulses: Normal pulses.   Pulmonary:      Effort: Pulmonary effort is normal. No respiratory distress.   Abdominal:      General: Abdomen is flat. There is no distension.      Palpations: Abdomen is soft.   Musculoskeletal:         General: Swelling (of the LUE) present.      Comments: No obvious joint swelling or tenderness.   Skin:     General: Skin is warm and dry.      Coloration: Skin is not jaundiced.   Neurological:      Mental Status: He is alert. Mental status is at baseline.             Significant Labs: All pertinent labs within the past 24 hours have been reviewed.

## 2024-05-06 NOTE — PT/OT/SLP PROGRESS
"Occupational Therapy   Co-Treatment  Co-treatment performed due to patient's multiple deficits requiring two skilled therapists to appropriately and safely assess patient's strength and endurance while facilitating functional tasks in addition to accommodating for patient's activity tolerance.        Name: Deena Moody  MRN: [id number]  Admitting Diagnosis:  Swelling of digit of left hand       Recommendations:     Discharge Recommendations: Moderate Intensity Therapy  Discharge Equipment Recommendations:  none  Barriers to discharge:  None    Assessment:     Deena Moody is a 89 y.o. male with a medical diagnosis of Swelling of digit of left hand.  He presents with the following performance deficits affecting function are weakness, impaired endurance, impaired self care skills, impaired functional mobility, decreased lower extremity function.     Pt agreeable to therapy and tolerated fairly well. Pt demonstrated improvements with activity tolerance & safety awareness while participating in functional mobility activity. However, pt remains limited in ADLs, functional mobility, and functional transfers and is currently not performing tasks at PLOF. Pt would continue to benefit from skilled OT services to maximize functional independence with ADLs and functional mobility, reduce caregiver burden, and facilitate safe discharge in the least restrictive environment.    Rehab Prognosis:  Good; patient would benefit from acute skilled OT services to address these deficits and reach maximum level of function.       Plan:     Patient to be seen 4 x/week to address the above listed problems via self-care/home management, therapeutic activities, therapeutic exercises  Plan of Care Expires: 06/06/24  Plan of Care Reviewed with: patient, family    Subjective     Chief Complaint: pain in the left shoulder  Patient/Family Comments/goals: regain PLOF  Pain/Comfort:  Pain Rating 1: other (see comments) ("I'm always having pain in " "my right knee")  Location - Side 1: Right  Location - Orientation 1: generalized  Location - Side 2: Left  Location - Orientation 2: generalized  Location 2: shoulder  Pain Addressed 2: Reposition, Distraction    Objective:     Communicated with: RN prior to session.  Patient found HOB elevated with peripheral IV, PureWick upon OT entry to room.    General Precautions: Standard, fall, hearing impaired    Orthopedic Precautions:N/A  Braces: N/A  Respiratory Status: Room air     Occupational Performance:     Bed Mobility:    Patient completed Supine to Sit with moderate assistance and 2 persons     Functional Mobility/Transfers:  Patient completed Sit -> Stand Transfer (from EOB)with moderate assistance  with  rolling walker   Stand ->sit in chair with minimum assistance and verbal cues for hand & L LE placement  Functional Mobility: pt ambulated 46 ft with CGA using RW in room & hallway with chair follow assist  No LOB/SOB noted. However, pt demonstrated increased flexed cervical & trunk posture     [facility] 6 Click ADL: 15    Treatment & Education:  -Education on task modification to maximize safety and (I) during ADLs and mobility  -Education on importance of OOB activity to improve overall activity tolerance and promote recovery  -Pt educated on hand & RW placement for transfers & functional mobility  -Pt educated to call for assistance and to transfer with hospital staff only  Pt had no further questions & when asked whether there were any concerns pt reported none.     Patient left up in chair with all lines intact, call button in reach, RN notified, and family present    GOALS:   Multidisciplinary Problems       Occupational Therapy Goals          Problem: Occupational Therapy    Goal Priority Disciplines Outcome Interventions   Occupational Therapy Goal     OT, PT/OT Progressing    Description: Goals to be met by: 6/2/24     Patient will increase functional independence with ADLs by performing:    Grooming while " standing at sink with Supervision.  Toileting from bedside commode with Supervision for hygiene and clothing management.   Rolling to Bilateral with [place].   Supine to sit with Modified [place].  Toilet transfer to bedside commode with Supervision.                         Time Tracking:     OT Date of Treatment: 05/06/24  OT Start Time: 1139  OT Stop Time: 1202  OT Total Time (min): 23 min    Billable Minutes:Therapeutic Activity 23    OT/ÁLVARO: OT          5/6/2024

## 2024-05-06 NOTE — PLAN OF CARE
[facility] - Med Surg  Discharge Reassessment    Primary Care Provider: Jam Rowell MD    Expected Discharge Date: 5/7/2024    SW met with pt and daughter (Troy) and Granddaughter (Elsa) to review discharge recommendation of Mod intensity/SNF and is agreeable to plan    Patient/family provided list of facilities in-network with patient's payor plan. Providers that are owned, operated, or affiliated with [facility] are included on the list.     Notified that referral sent to below listed facilities from in-network list based on proximity to home/family support:   [facility]  Pt's daughter will call SW back with additional choices.    Patient/family instructed to identify preference.    Preferred Facility: (if more than 1, listed in order of descending preference)  [facility]    If an additional preferred facility not listed above is identified, additional referral to be sent. If above facilities unable to accept, will send additional referrals to in-network providers.     Discharge Plan A and Plan B have been determined by review of patient's clinical status, future medical and therapeutic needs, and coverage/benefits for post-acute care in coordination with multidisciplinary team members.    1:05 PM  SW received a call from pt's daughter, Amelie, stating they prefer [facility] as their first choice and [facility] 2nd choice.    1:13 PM  CHW-will complete LOCET.     Reassessment (most recent)       Discharge Reassessment - 05/06/24 1250          Discharge Reassessment    Assessment Type Discharge Planning Reassessment     Did the patient's condition or plan change since previous assessment? No     Discharge Plan discussed with: Adult children     Communicated TEOFILO with patient/caregiver Yes     Discharge Plan A Skilled Nursing Facility     Discharge Plan B Home;Home with family;Home Health     DME Needed Upon Discharge  other (see comments)   TBD    Transition of Care  Barriers None        Post-Acute Status    Post-Acute Authorization Placement     Post-Acute Placement Status Referrals Sent     Coverage [employer] Managed Medicare     Discharge Delays None known at this time                   Aziza Brock LMSW  Part-Time-  [facility]  Ext. [phone]

## 2024-05-07 ENCOUNTER — PATIENT MESSAGE (OUTPATIENT)
Dept: RHEUMATOLOGY | Facility: CLINIC | Age: 89
End: 2024-05-07
Payer: MEDICARE

## 2024-05-07 LAB
ALBUMIN SERPL BCP-MCNC: 2.6 G/DL (ref 3.5–5.2)
ANION GAP SERPL CALC-SCNC: 13 MMOL/L (ref 8–16)
BASOPHILS # BLD AUTO: 0.03 K/UL (ref 0–0.2)
BASOPHILS NFR BLD: 0.2 % (ref 0–1.9)
BUN SERPL-MCNC: 105 MG/DL (ref 10–30)
CALCIUM SERPL-MCNC: 8.4 MG/DL (ref 8.7–10.5)
CHLORIDE SERPL-SCNC: 108 MMOL/L (ref 95–110)
CO2 SERPL-SCNC: 21 MMOL/L (ref 23–29)
CREAT SERPL-MCNC: 1.9 MG/DL (ref 0.5–1.4)
DIFFERENTIAL METHOD BLD: ABNORMAL
EOSINOPHIL # BLD AUTO: 0 K/UL (ref 0–0.5)
EOSINOPHIL NFR BLD: 0 % (ref 0–8)
ERYTHROCYTE [DISTWIDTH] IN BLOOD BY AUTOMATED COUNT: 13.7 % (ref 11.5–14.5)
EST. GFR  (NO RACE VARIABLE): 32.5 ML/MIN/1.73 M^2
GLUCOSE SERPL-MCNC: 108 MG/DL (ref 70–110)
HCT VFR BLD AUTO: 33.5 % (ref 40–54)
HGB BLD-MCNC: 10.4 G/DL (ref 14–18)
IMM GRANULOCYTES # BLD AUTO: 0.18 K/UL (ref 0–0.04)
IMM GRANULOCYTES NFR BLD AUTO: 1.1 % (ref 0–0.5)
LYMPHOCYTES # BLD AUTO: 1.1 K/UL (ref 1–4.8)
LYMPHOCYTES NFR BLD: 7 % (ref 18–48)
MCH RBC QN AUTO: 30.5 PG (ref 27–31)
MCHC RBC AUTO-ENTMCNC: 31 G/DL (ref 32–36)
MCV RBC AUTO: 98 FL (ref 82–98)
MONOCYTES # BLD AUTO: 1.1 K/UL (ref 0.3–1)
MONOCYTES NFR BLD: 7.1 % (ref 4–15)
NEUTROPHILS # BLD AUTO: 13.3 K/UL (ref 1.8–7.7)
NEUTROPHILS NFR BLD: 84.6 % (ref 38–73)
NRBC BLD-RTO: 0 /100 WBC
PHOSPHATE SERPL-MCNC: 3.3 MG/DL (ref 2.7–4.5)
PLATELET # BLD AUTO: 223 K/UL (ref 150–450)
PMV BLD AUTO: 9.5 FL (ref 9.2–12.9)
POTASSIUM SERPL-SCNC: 3.8 MMOL/L (ref 3.5–5.1)
RBC # BLD AUTO: 3.41 M/UL (ref 4.6–6.2)
SARS-COV-2 RNA RESP QL NAA+PROBE: NOT DETECTED
SODIUM SERPL-SCNC: 142 MMOL/L (ref 136–145)
WBC # BLD AUTO: 15.75 K/UL (ref 3.9–12.7)

## 2024-05-07 PROCEDURE — 21400001 HC TELEMETRY ROOM: Mod: HCNC

## 2024-05-07 PROCEDURE — 36415 COLL VENOUS BLD VENIPUNCTURE: CPT | Mod: HCNC | Performed by: STUDENT IN AN ORGANIZED HEALTH CARE EDUCATION/TRAINING PROGRAM

## 2024-05-07 PROCEDURE — 25000003 PHARM REV CODE 250: Mod: HCNC | Performed by: STUDENT IN AN ORGANIZED HEALTH CARE EDUCATION/TRAINING PROGRAM

## 2024-05-07 PROCEDURE — 85025 COMPLETE CBC W/AUTO DIFF WBC: CPT | Mod: HCNC | Performed by: STUDENT IN AN ORGANIZED HEALTH CARE EDUCATION/TRAINING PROGRAM

## 2024-05-07 PROCEDURE — 25000003 PHARM REV CODE 250: Mod: HCNC

## 2024-05-07 PROCEDURE — 63600175 PHARM REV CODE 636 W HCPCS: Mod: HCNC | Performed by: STUDENT IN AN ORGANIZED HEALTH CARE EDUCATION/TRAINING PROGRAM

## 2024-05-07 PROCEDURE — 80069 RENAL FUNCTION PANEL: CPT | Mod: HCNC | Performed by: STUDENT IN AN ORGANIZED HEALTH CARE EDUCATION/TRAINING PROGRAM

## 2024-05-07 PROCEDURE — 87635 SARS-COV-2 COVID-19 AMP PRB: CPT | Mod: HCNC

## 2024-05-07 RX ADMIN — OXYCODONE HYDROCHLORIDE 10 MG: 10 TABLET ORAL at 10:05

## 2024-05-07 RX ADMIN — Medication: at 10:05

## 2024-05-07 RX ADMIN — FERROUS SULFATE TAB EC 325 MG (65 MG FE EQUIVALENT) 1 EACH: 325 (65 FE) TABLET DELAYED RESPONSE at 10:05

## 2024-05-07 RX ADMIN — Medication: at 08:05

## 2024-05-07 RX ADMIN — ASPIRIN 81 MG: 81 TABLET, COATED ORAL at 10:05

## 2024-05-07 RX ADMIN — APIXABAN 2.5 MG: 2.5 TABLET, FILM COATED ORAL at 08:05

## 2024-05-07 RX ADMIN — PREDNISONE 40 MG: 20 TABLET ORAL at 10:05

## 2024-05-07 RX ADMIN — APIXABAN 2.5 MG: 2.5 TABLET, FILM COATED ORAL at 10:05

## 2024-05-07 NOTE — PROGRESS NOTES
[facility] Medicine  Progress Note    Patient Name: Deena Moody  MRN: [id number]  Patient Class: IP- Inpatient   Admission Date: 4/28/2024  Length of Stay: 8 days  Attending Physician: Mike Watt DO  Primary Care Provider: Jam Rowell MD        Subjective:     Principal Problem:Swelling of digit of left hand        HPI:  Deena Moody is a 89 y.o. male with PMHx HTN, HLD, CAD, diastolic HF, A-fib with pacemaker, CABG, hx of MI, AV block, and CKD 3 who presents to [facility] ED with chief complaint of left hand swelling and pain. He reports this issues has been present x 2-3 weeks. He was in ED two days prior wherein his ring had to be cut from finger due to swelling. He was then sent home with keflex and PCP follow up. Since then patient reports worsening swelling, redness and pain. He denies associated HA, fever, chills, chest pain, shortness of breath, abdominal pain, n/v/d, urinary symptoms, numbness or tingling. He takes eliquis. Patient's pacemaker is not MRI compatible. Patient with significant hear loss bilaterally.     In the ED: Hypertensive, RR 22, otherwise VSSAF. CBC without leukocytosis. CMP without emergent abnormalities, Cr 1.8 (baseline). ESR 55 and CRP 82.8 (elevated from 53.6 on 4/26). Received morphine and vanc. Admitted to  for further management.    Overview/Hospital Course:  Presented with Left hand swelling and pain x 2-3 weeks. Seen in ED 4/26, ring removed, discharged with oral keflex. Returns with worsening pain, swelling, and erythema that has now progressed to left wrist. Labs with leukocytosis. ESR and CRP elevated. Initial XR images concerning for osteo. Ortho consulted. Recommend broad spectrum iv abx. Wrap with ace bandage, elevate, will continue to trend crp and exam. CT L hand w/o contrast shows new erosive changes at the distal aspect of the middle phalanx of the 2nd finger concerning for osteomyelitis or other inflammatory arthritides.ID  following. On CTX and dapto. Uric acid elevated 11.6. Given hx of gout, consulting rheumatology for evaluation of gout flare. Hx of Afib, holding eliquis in setting of possible OR, heparin infusion for now. Started on Prednisone with improvement in hand pain/swelling. CTX and Daptomycin discontinued by ID on 5/03, as felt his symptoms are from gout. CRP trending down. Kidney function is improving daily. Will complete oral steroids on 5/7. Ambulatory referral to rheumatology clinic for follow up and maintenance therapy discussions. US LUE negative for DVT. Swelling improved with elevation. Renal function improving daily. Now near baseline.     PT/OT recommending SNF.     Interval History:     NAEON. He has continued pain in the left arm. Discussed plan of care with niece today. Pending SNF placement. Renal function improving. Patient repositioned in bed and eating breakfast.     Review of Systems   Constitutional:  Negative for chills, diaphoresis, fatigue and fever.   HENT:  Positive for hearing loss.    Respiratory:  Negative for cough and shortness of breath.    Cardiovascular:  Negative for chest pain and leg swelling.   Gastrointestinal:  Negative for abdominal pain, diarrhea, nausea and vomiting.   Genitourinary:  Negative for dysuria and frequency.   Musculoskeletal:  Positive for joint swelling. Negative for arthralgias and back pain.   Skin:  Negative for color change, rash and wound.   Neurological:  Negative for dizziness and headaches.   Psychiatric/Behavioral:  Positive for confusion. Negative for agitation. The patient is not nervous/anxious.    All other systems reviewed and are negative.    Objective:     Vital Signs (Most Recent):  Temp: 97.3 °F (36.3 °C) (05/07/24 1122)  Pulse: 69 (05/07/24 1126)  Resp: 18 (05/07/24 1122)  BP: (!) 128/59 (05/07/24 1122)  SpO2: 98 % (05/07/24 1122) Vital Signs (24h Range):  Temp:  [97.3 °F (36.3 °C)-97.9 °F (36.6 °C)] 97.3 °F (36.3 °C)  Pulse:  [69-85] 69  Resp:   [16-18] 18  SpO2:  [97 %-98 %] 98 %  BP: (128-164)/(59-69) 128/59     Weight: 62.7 kg (138 lb 3.7 oz)  Body mass index is 20.41 kg/m².    Intake/Output Summary (Last 24 hours) at 5/7/2024 1145  Last data filed at 5/7/2024 0637  Gross per 24 hour   Intake 375 ml   Output 1150 ml   Net -775 ml         Physical Exam  Vitals reviewed.   Constitutional:       General: He is not in acute distress.     Appearance: Normal appearance. He is not ill-appearing.   HENT:      Head: Normocephalic.      Nose: Nose normal.      Mouth/Throat:      Mouth: Mucous membranes are moist.   Eyes:      General: No scleral icterus.  Cardiovascular:      Rate and Rhythm: Normal rate.      Pulses: Normal pulses.   Pulmonary:      Effort: Pulmonary effort is normal. No respiratory distress.   Abdominal:      General: Abdomen is flat. There is no distension.      Palpations: Abdomen is soft.   Musculoskeletal:         General: Swelling (of the LUE) present.      Comments: No obvious joint swelling or tenderness.   Skin:     General: Skin is warm and dry.      Coloration: Skin is not jaundiced.   Neurological:      Mental Status: He is alert. Mental status is at baseline.           Significant Labs: All pertinent labs within the past 24 hours have been reviewed.      Assessment/Plan:      * Swelling of digit of left hand  Presented with Left hand swelling and pain x 2-3 weeks. Seen in ED 4/26, ring removed, discharged with oral keflex. Returns with worsening pain, swelling, and erythema that has now progressed to left wrist. See media. On admission, he was afebrile, leukocytosis. ESR and CRP elevated. Initial XR images concerning for osteo.     - Ortho consulted. Recommend broad spectrum iv abx. Wrap with ace bandage, elevate, will continue to trend crp and exam. On heparin gtt in case of procedure.   - CT L hand w/o contrast shows new erosive changes at the distal aspect of the middle phalanx of the 2nd finger concerning for osteomyelitis or  other inflammatory arthritides.  - ID following. On CTX and dapto, but discontinued on 5/03 as suspect his symptoms were related to gout flare.  - Rheum consulted, started on steroids.  PO prednisone for 5 days. Will complete on 5/7  - Outpatient rheumatology follow up for maintenance therapy       Elevated uric acid in blood  Acute polyarticular gout   Uric acid elevated 11.6.  Given hx of gout, consulting rheumatology for evaluation of gout flare.   Started on IV solumedrol 80mg x 2 days, now on PO prednisone for 5 days; will complete on 5/7  Outpatient rheumatology follow up - will arrange with SW assistance       Permanent atrial fibrillation  Long term (current) use of anticoagulants  S/P placement of cardiac pacemaker    - pacemaker not MRI compatible   - eliquis continued   - cont tele    Chronic diastolic heart failure  - no signs of acute overload  - Holding torsemide in setting of SARAH  - monitor UOP, signs of volume overload.    CAD (coronary artery disease)  History of heart bypass surgery  History of MI (myocardial infarction)    - Resume ASA   - previously intolerant to statins 2/2 myalgias    Complete AV block  - s/p pacemaker, not MRI compatible.     History of MI (myocardial infarction)  Noted history of       History of heart bypass surgery  Noted history of     Essential hypertension  - chronic, controlled  - Holding coreg and torsemide  - monitor     Dyslipidemia  - statin intolerance 2/2 myalgias     Acute kidney injury superimposed on chronic kidney disease  Cr 1.8 on admit, near baseline. Cr rising throughout admission, now improving. 1.9 on AM labs     - Urine studies, RP US.  - Good UOP as of now. Strict I&O.   - avoid nephrotoxins, renally dose meds      VTE Risk Mitigation (From admission, onward)           Ordered     apixaban tablet 2.5 mg  2 times daily         05/03/24 1650     Reason for No Pharmacological VTE Prophylaxis  Once        Question:  Reasons:  Answer:  Already adequately  anticoagulated on oral Anticoagulants    04/28/24 1431     IP VTE HIGH RISK PATIENT  Once         04/28/24 1431     Place sequential compression device  Until discontinued         04/28/24 1431                    Discharge Planning   TEOFILO: 5/8/2024     Code Status: Full Code   Is the patient medically ready for discharge?:     Reason for patient still in hospital (select all that apply): Pending disposition  Discharge Plan A: Skilled Nursing Facility   Discharge Delays: None known at this time    Mike Watt DO  Department of Hospital Medicine   [facility] Surg

## 2024-05-07 NOTE — ASSESSMENT & PLAN NOTE
Acute polyarticular gout   Uric acid elevated 11.6.  Given hx of gout, consulting rheumatology for evaluation of gout flare.   Started on IV solumedrol 80mg x 2 days, now on PO prednisone for 5 days; will complete on 5/7  Outpatient rheumatology follow up - will arrange with SW assistance

## 2024-05-07 NOTE — SUBJECTIVE & OBJECTIVE
Interval History:     NAEON. He has continued pain in the left arm. Discussed plan of care with niece today. Pending SNF placement. Renal function improving. Patient repositioned in bed and eating breakfast.     Review of Systems   Constitutional:  Negative for chills, diaphoresis, fatigue and fever.   HENT:  Positive for hearing loss.    Respiratory:  Negative for cough and shortness of breath.    Cardiovascular:  Negative for chest pain and leg swelling.   Gastrointestinal:  Negative for abdominal pain, diarrhea, nausea and vomiting.   Genitourinary:  Negative for dysuria and frequency.   Musculoskeletal:  Positive for joint swelling. Negative for arthralgias and back pain.   Skin:  Negative for color change, rash and wound.   Neurological:  Negative for dizziness and headaches.   Psychiatric/Behavioral:  Positive for confusion. Negative for agitation. The patient is not nervous/anxious.    All other systems reviewed and are negative.    Objective:     Vital Signs (Most Recent):  Temp: 97.3 °F (36.3 °C) (05/07/24 1122)  Pulse: 69 (05/07/24 1126)  Resp: 18 (05/07/24 1122)  BP: (!) 128/59 (05/07/24 1122)  SpO2: 98 % (05/07/24 1122) Vital Signs (24h Range):  Temp:  [97.3 °F (36.3 °C)-97.9 °F (36.6 °C)] 97.3 °F (36.3 °C)  Pulse:  [69-85] 69  Resp:  [16-18] 18  SpO2:  [97 %-98 %] 98 %  BP: (128-164)/(59-69) 128/59     Weight: 62.7 kg (138 lb 3.7 oz)  Body mass index is 20.41 kg/m².    Intake/Output Summary (Last 24 hours) at 5/7/2024 1145  Last data filed at 5/7/2024 0637  Gross per 24 hour   Intake 375 ml   Output 1150 ml   Net -775 ml         Physical Exam  Vitals reviewed.   Constitutional:       General: He is not in acute distress.     Appearance: Normal appearance. He is not ill-appearing.   HENT:      Head: Normocephalic.      Nose: Nose normal.      Mouth/Throat:      Mouth: Mucous membranes are moist.   Eyes:      General: No scleral icterus.  Cardiovascular:      Rate and Rhythm: Normal rate.      Pulses:  Normal pulses.   Pulmonary:      Effort: Pulmonary effort is normal. No respiratory distress.   Abdominal:      General: Abdomen is flat. There is no distension.      Palpations: Abdomen is soft.   Musculoskeletal:         General: Swelling (of the LUE) present.      Comments: No obvious joint swelling or tenderness.   Skin:     General: Skin is warm and dry.      Coloration: Skin is not jaundiced.   Neurological:      Mental Status: He is alert. Mental status is at baseline.           Significant Labs: All pertinent labs within the past 24 hours have been reviewed.

## 2024-05-07 NOTE — PLAN OF CARE
APPOINTMENT:    Necessary Specialty Appointments: Rheumatology     Provider requires schedule review by Office Nurse - Office to call patient with date and time.

## 2024-05-07 NOTE — PLAN OF CARE
ANN spoke to  Mame peterson/ [facility] [phone] regarding d/c plan, insurance auth, and paperwork.  Mame notified SW she will be reaching out to family to sign paperwork and submit for insurance auth.      ANN in communication with pt's daughter, Amelie [phone], regarding d/c plan.  Pt's daughter has not heard from [facility] resident yet regarding paperwork.      Discharge Plan A and Plan B have been determined by review of patient's clinical status, future medical and therapeutic needs, and coverage/benefits for post-acute care in coordination with multidisciplinary team members.     05/07/24 1502   Post-Acute Status   Post-Acute Authorization Placement   Post-Acute Placement Status Pending post-acute provider review/more information requested   Coverage [employer] Managed Medicare   Discharge Delays None known at this time   Discharge Plan   Discharge Plan A Skilled Nursing Facility   Discharge Plan B Home;Home with family;Home Health     Aziza Brock LMSW  Part-Time-  [facility]  Ext. [phone]

## 2024-05-07 NOTE — SUBJECTIVE & OBJECTIVE
Scheduled Meds:   apixaban  2.5 mg Oral BID    aspirin  81 mg Oral Daily    balsam peru-castor oiL   Topical (Top) BID    ferrous sulfate  1 tablet Oral Daily     Continuous Infusions:  PRN Meds:  Current Facility-Administered Medications:     acetaminophen, 1,000 mg, Oral, Q8H PRN    albuterol-ipratropium, 3 mL, Nebulization, Q4H PRN    bisacodyL, 10 mg, Rectal, Daily PRN    dextrose 10%, 12.5 g, Intravenous, PRN    dextrose 10%, 25 g, Intravenous, PRN    glucagon (human recombinant), 1 mg, Intramuscular, PRN    glucose, 16 g, Oral, PRN    glucose, 24 g, Oral, PRN    hydrALAZINE, 10 mg, Intravenous, Q8H PRN    HYDROmorphone, 1 mg, Intravenous, Q6H PRN    melatonin, 6 mg, Oral, Nightly PRN    naloxone, 0.02 mg, Intravenous, PRN    oxyCODONE, 5 mg, Oral, Q6H PRN    oxyCODONE, 10 mg, Oral, Q6H PRN    polyethylene glycol, 17 g, Oral, Daily PRN    prochlorperazine, 10 mg, Oral, TID PRN    promethazine, 25 mg, Oral, Q6H PRN    sodium chloride 0.9%, 10 mL, Intravenous, Q12H PRN    Review of patient's allergies indicates:   Allergen Reactions    Iodine and iodide containing products Other (See Comments)     Caused changes in skin color        Past Medical History:   Diagnosis Date    Acute on chronic diastolic heart failure 9/1/2016    Coronary artery disease     Hyperlipidemia     Hypertension     Macular degeneration (senile) of retina, unspecified 12/12/2014    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    Persistent atrial fibrillation 11/10/2016    S/P placement of cardiac pacemaker 9/14/2016     Past Surgical History:   Procedure Laterality Date    AORTIC VALVE REPLACEMENT N/A     CARDIAC PACEMAKER PLACEMENT      CATARACT EXTRACTION W/  INTRAOCULAR LENS IMPLANT Right 2/16/2016    Dr. Whitley    CATARACT EXTRACTION W/  INTRAOCULAR LENS IMPLANT Left 3/1/2016    Dr. Whitley    CORONARY ARTERY BYPASS GRAFT      EAR EXAMINATION UNDER ANESTHESIA      EYE SURGERY         Family History       Problem Relation (Age of Onset)     Hypertension Brother    No Known Problems Mother, Father, Sister, Maternal Aunt, Maternal Uncle, Paternal Aunt, Paternal Uncle, Maternal Grandmother, Maternal Grandfather, Paternal Grandmother, Paternal Grandfather, Daughter, Son    Stroke Brother          Tobacco Use    Smoking status: Never     Passive exposure: Never    Smokeless tobacco: Never   Substance and Sexual Activity    Alcohol use: No    Drug use: No    Sexual activity: Yes     Partners: Female     Review of Systems   Skin:  Positive for wound.       Objective:     Vital Signs (Most Recent):  Temp: 97.3 °F (36.3 °C) (05/07/24 1122)  Pulse: 69 (05/07/24 1126)  Resp: 18 (05/07/24 1122)  BP: (!) 128/59 (05/07/24 1122)  SpO2: 98 % (05/07/24 1122) Vital Signs (24h Range):  Temp:  [97.3 °F (36.3 °C)-97.9 °F (36.6 °C)] 97.3 °F (36.3 °C)  Pulse:  [69-85] 69  Resp:  [16-18] 18  SpO2:  [97 %-98 %] 98 %  BP: (128-164)/(59-69) 128/59     Weight: 62.7 kg (138 lb 3.7 oz)  Body mass index is 20.41 kg/m².     Physical Exam  Constitutional:       Appearance: Normal appearance.   Skin:     General: Skin is warm and dry.      Findings: Lesion present.   Neurological:      Mental Status: He is alert.          Laboratory:  All pertinent labs reviewed within the last 24 hours.    Diagnostic Results:  None

## 2024-05-07 NOTE — ASSESSMENT & PLAN NOTE
Cr 1.8 on admit, near baseline. Cr rising throughout admission, now improving. 1.9 on AM labs     - Urine studies, RP US.  - Good UOP as of now. Strict I&O.   - avoid nephrotoxins, renally dose meds

## 2024-05-07 NOTE — NURSING
Nurses Note -- 4 Eyes      5/7/2024   5:59 PM      Skin assessed during: Q Shift Change      [] No Altered Skin Integrity Present    []Prevention Measures Documented      [x] Yes- Altered Skin Integrity Present or Discovered   [x] LDA Added if Not in Epic (Describe Wound)   [] New Altered Skin Integrity was Present on Admit and Documented in LDA   [x] Wound Image Taken    Wound Care Consulted? Yes    Attending Nurse:  An Rg RN/Staff Member:   Jackie

## 2024-05-07 NOTE — HPI
Deena Moody is a 89 year old male with PMHx HTN, HLD, CAD, diastolic HF, A-fib with pacemaker, CABG, hx of MI, AV block, and CKD 3 who [place] [facility] [place] chief complaint of left hand swelling and pain. He reports this issues has been present x 2-3 weeks. He was in ED two days prior wherein his ring had to be cut from finger due to swelling. He was then sent home with keflex and PCP follow up. Since then patient reports worsening swelling, redness and pain. He denies associated HA, fever, chills, chest pain, shortness of breath, abdominal pain, n/v/d, urinary symptoms, numbness or tingling. He takes eliquis. Patient's pacemaker is not MRI compatible. Patient with significant hear loss bilaterally.      In the ED: Hypertensive, RR 22, otherwise VSSAF. CBC without leukocytosis. CMP without emergent abnormalities, Cr 1.8 (baseline). ESR 55 and CRP 82.8 (elevated from 53.6 on 4/26). Received morphine and vanc. Admitted to  for further management. Skin integrity EMILIO consulted for evaluation of skin injury.

## 2024-05-07 NOTE — PT/OT/SLP PROGRESS
Physical Therapy      Patient Name:  Deena Moody   MRN:  [id number]    Patient not seen today secondary to patient sleeping and family in agreement with not waking him up because he did not sleep last night. Will follow-up again tomorrow.

## 2024-05-08 ENCOUNTER — OUTPATIENT CASE MANAGEMENT (OUTPATIENT)
Dept: ADMINISTRATIVE | Facility: OTHER | Age: 89
End: 2024-05-08
Payer: MEDICARE

## 2024-05-08 VITALS
HEART RATE: 70 BPM | TEMPERATURE: 98 F | OXYGEN SATURATION: 96 % | WEIGHT: 138.25 LBS | SYSTOLIC BLOOD PRESSURE: 142 MMHG | HEIGHT: 69 IN | DIASTOLIC BLOOD PRESSURE: 62 MMHG | RESPIRATION RATE: 18 BRPM | BODY MASS INDEX: 20.48 KG/M2

## 2024-05-08 LAB
ALBUMIN SERPL BCP-MCNC: 2.3 G/DL (ref 3.5–5.2)
ANION GAP SERPL CALC-SCNC: 10 MMOL/L (ref 8–16)
BACTERIA BLD CULT: NORMAL
BACTERIA BLD CULT: NORMAL
BUN SERPL-MCNC: 100 MG/DL (ref 10–30)
CALCIUM SERPL-MCNC: 8.3 MG/DL (ref 8.7–10.5)
CHLORIDE SERPL-SCNC: 110 MMOL/L (ref 95–110)
CO2 SERPL-SCNC: 23 MMOL/L (ref 23–29)
CREAT SERPL-MCNC: 1.9 MG/DL (ref 0.5–1.4)
EST. GFR  (NO RACE VARIABLE): 32.5 ML/MIN/1.73 M^2
GLUCOSE SERPL-MCNC: 148 MG/DL (ref 70–110)
PHOSPHATE SERPL-MCNC: 2.2 MG/DL (ref 2.7–4.5)
POTASSIUM SERPL-SCNC: 4 MMOL/L (ref 3.5–5.1)
SODIUM SERPL-SCNC: 143 MMOL/L (ref 136–145)

## 2024-05-08 PROCEDURE — 63600175 PHARM REV CODE 636 W HCPCS: Mod: HCNC | Performed by: STUDENT IN AN ORGANIZED HEALTH CARE EDUCATION/TRAINING PROGRAM

## 2024-05-08 PROCEDURE — 97110 THERAPEUTIC EXERCISES: CPT | Mod: HCNC

## 2024-05-08 PROCEDURE — 25000003 PHARM REV CODE 250: Mod: HCNC | Performed by: STUDENT IN AN ORGANIZED HEALTH CARE EDUCATION/TRAINING PROGRAM

## 2024-05-08 PROCEDURE — 36415 COLL VENOUS BLD VENIPUNCTURE: CPT | Mod: HCNC | Performed by: STUDENT IN AN ORGANIZED HEALTH CARE EDUCATION/TRAINING PROGRAM

## 2024-05-08 PROCEDURE — 97530 THERAPEUTIC ACTIVITIES: CPT | Mod: HCNC

## 2024-05-08 PROCEDURE — 80069 RENAL FUNCTION PANEL: CPT | Mod: HCNC | Performed by: STUDENT IN AN ORGANIZED HEALTH CARE EDUCATION/TRAINING PROGRAM

## 2024-05-08 PROCEDURE — 25000003 PHARM REV CODE 250: Mod: HCNC

## 2024-05-08 PROCEDURE — 92526 ORAL FUNCTION THERAPY: CPT | Mod: HCNC

## 2024-05-08 RX ORDER — CARVEDILOL 6.25 MG/1
6.25 TABLET ORAL 2 TIMES DAILY
Status: DISCONTINUED | OUTPATIENT
Start: 2024-05-08 | End: 2024-05-08

## 2024-05-08 RX ORDER — SODIUM CHLORIDE, SODIUM LACTATE, POTASSIUM CHLORIDE, CALCIUM CHLORIDE 600; 310; 30; 20 MG/100ML; MG/100ML; MG/100ML; MG/100ML
INJECTION, SOLUTION INTRAVENOUS CONTINUOUS
Status: ACTIVE | OUTPATIENT
Start: 2024-05-08 | End: 2024-05-08

## 2024-05-08 RX ORDER — CARVEDILOL 3.12 MG/1
3.12 TABLET ORAL ONCE
Status: COMPLETED | OUTPATIENT
Start: 2024-05-08 | End: 2024-05-08

## 2024-05-08 RX ORDER — CARVEDILOL 3.12 MG/1
3.12 TABLET ORAL 2 TIMES DAILY WITH MEALS
Status: DISCONTINUED | OUTPATIENT
Start: 2024-05-08 | End: 2024-05-08 | Stop reason: HOSPADM

## 2024-05-08 RX ORDER — CARVEDILOL 3.12 MG/1
3.12 TABLET ORAL 2 TIMES DAILY
Status: DISCONTINUED | OUTPATIENT
Start: 2024-05-08 | End: 2024-05-08

## 2024-05-08 RX ORDER — CARVEDILOL 3.12 MG/1
3.12 TABLET ORAL 2 TIMES DAILY
Qty: 180 TABLET | Refills: 3 | Status: SHIPPED | OUTPATIENT
Start: 2024-05-08 | End: 2025-05-08

## 2024-05-08 RX ORDER — TORSEMIDE 20 MG/1
20 TABLET ORAL 2 TIMES DAILY
Qty: 180 TABLET | Refills: 3 | Status: ON HOLD | OUTPATIENT
Start: 2024-05-08 | End: 2024-05-14

## 2024-05-08 RX ADMIN — OXYCODONE HYDROCHLORIDE 10 MG: 10 TABLET ORAL at 08:05

## 2024-05-08 RX ADMIN — ASPIRIN 81 MG: 81 TABLET, COATED ORAL at 08:05

## 2024-05-08 RX ADMIN — CARVEDILOL 3.12 MG: 3.12 TABLET, FILM COATED ORAL at 12:05

## 2024-05-08 RX ADMIN — OXYCODONE HYDROCHLORIDE 10 MG: 10 TABLET ORAL at 04:05

## 2024-05-08 RX ADMIN — FERROUS SULFATE TAB EC 325 MG (65 MG FE EQUIVALENT) 1 EACH: 325 (65 FE) TABLET DELAYED RESPONSE at 08:05

## 2024-05-08 RX ADMIN — Medication: at 08:05

## 2024-05-08 RX ADMIN — SODIUM CHLORIDE, POTASSIUM CHLORIDE, SODIUM LACTATE AND CALCIUM CHLORIDE: 600; 310; 30; 20 INJECTION, SOLUTION INTRAVENOUS at 12:05

## 2024-05-08 RX ADMIN — SODIUM PHOSPHATE, MONOBASIC, MONOHYDRATE AND SODIUM PHOSPHATE, DIBASIC, ANHYDROUS 20.01 MMOL: 142; 276 INJECTION, SOLUTION INTRAVENOUS at 12:05

## 2024-05-08 RX ADMIN — CARVEDILOL 3.12 MG: 3.12 TABLET, FILM COATED ORAL at 04:05

## 2024-05-08 RX ADMIN — ACETAMINOPHEN 1000 MG: 500 TABLET ORAL at 08:05

## 2024-05-08 RX ADMIN — ACETAMINOPHEN 1000 MG: 500 TABLET ORAL at 04:05

## 2024-05-08 RX ADMIN — APIXABAN 2.5 MG: 2.5 TABLET, FILM COATED ORAL at 08:05

## 2024-05-08 NOTE — PROGRESS NOTES
[facility]  Progress Note    Patient Name: Deena Moody  MRN: [id number]  Patient Class: IP- Inpatient   Admission Date: 4/28/2024  Length of Stay: 9 days  Attending Physician: Mike Watt DO  Primary Care Provider: Jam Rowell MD        Subjective:     Principal Problem:Swelling of digit of left hand        HPI:  Deena Moody is a 89 y.o. male with PMHx HTN, HLD, CAD, diastolic HF, A-fib with pacemaker, CABG, hx of MI, AV block, and CKD 3 who presents to [facility] [place] with chief complaint of left hand swelling and pain. He reports this issues has been present x 2-3 weeks. He was in ED two days prior wherein his ring had to be cut from finger due to swelling. He was then sent home with keflex and PCP follow up. Since then patient reports worsening swelling, redness and pain. He denies associated HA, fever, chills, chest pain, shortness of breath, abdominal pain, n/v/d, urinary symptoms, numbness or tingling. He takes eliquis. Patient's pacemaker is not MRI compatible. Patient with significant hear loss bilaterally.     In the ED: Hypertensive, RR 22, otherwise VSSAF. CBC without leukocytosis. CMP without emergent abnormalities, Cr 1.8 (baseline). ESR 55 and CRP 82.8 (elevated from 53.6 on 4/26). Received morphine and vanc. Admitted to  for further management.    Overview/Hospital Course:  Presented with Left hand swelling and pain x 2-3 weeks. Seen in ED 4/26, ring removed, discharged with oral keflex. Returns with worsening pain, swelling, and erythema that has now progressed to left wrist. Labs with leukocytosis. ESR and CRP elevated. Initial XR images concerning for osteo. Ortho consulted. Recommend broad spectrum iv abx. Wrap with ace bandage, elevate, will continue to trend crp and exam. CT L hand w/o contrast shows new erosive changes at the distal aspect of the middle phalanx of the 2nd finger concerning for osteomyelitis or other inflammatory arthritides.ID  following. On CTX and dapto. Uric acid elevated 11.6. Given hx of gout, consulting rheumatology for evaluation of gout flare. Hx of Afib, holding eliquis in setting of possible OR, heparin infusion for now. Started on Prednisone with improvement in hand pain/swelling. CTX and Daptomycin discontinued by ID on 5/03, as felt his symptoms are from gout. CRP trending down. Kidney function is improving daily. Will complete oral steroids on 5/7. Ambulatory referral to rheumatology clinic for follow up and maintenance therapy discussions. US LUE negative for DVT. Swelling improved with elevation. Renal function improving daily. Now near baseline.     PT/OT recommending SNF. Pending placement.     Interval History:     NAEON. Left arm pain is improved. Pending SNF placement. Renal function stable.     Review of Systems   Constitutional:  Negative for chills, diaphoresis, fatigue and fever.   HENT:  Positive for hearing loss.    Respiratory:  Negative for cough and shortness of breath.    Cardiovascular:  Negative for chest pain and leg swelling.   Gastrointestinal:  Negative for abdominal pain, diarrhea, nausea and vomiting.   Genitourinary:  Negative for dysuria and frequency.   Musculoskeletal:  Positive for joint swelling. Negative for arthralgias and back pain.   Skin:  Negative for color change, rash and wound.   Neurological:  Negative for dizziness and headaches.   Psychiatric/Behavioral:  Negative for agitation and confusion. The patient is not nervous/anxious.    All other systems reviewed and are negative.    Objective:     Vital Signs (Most Recent):  Temp: 97.9 °F (36.6 °C) (05/08/24 0807)  Pulse: 69 (05/08/24 1112)  Resp: 18 (05/08/24 0821)  BP: (!) 160/74 (05/08/24 0807)  SpO2: 96 % (05/08/24 0807) Vital Signs (24h Range):  Temp:  [97.7 °F (36.5 °C)-98.5 °F (36.9 °C)] 97.9 °F (36.6 °C)  Pulse:  [69-70] 69  Resp:  [18] 18  SpO2:  [96 %-98 %] 96 %  BP: (134-168)/(60-75) 160/74     Weight: 62.7 kg (138 lb 3.7  oz)  Body mass index is 20.41 kg/m².    Intake/Output Summary (Last 24 hours) at 5/8/2024 1124  Last data filed at 5/8/2024 0639  Gross per 24 hour   Intake 980 ml   Output 1000 ml   Net -20 ml         Physical Exam  Vitals reviewed.   Constitutional:       General: He is not in acute distress.     Appearance: Normal appearance. He is not ill-appearing.   HENT:      Head: Normocephalic.      Nose: Nose normal.      Mouth/Throat:      Mouth: Mucous membranes are moist.   Eyes:      General: No scleral icterus.  Cardiovascular:      Rate and Rhythm: Normal rate.      Pulses: Normal pulses.   Pulmonary:      Effort: Pulmonary effort is normal. No respiratory distress.   Abdominal:      General: Abdomen is flat. There is no distension.      Palpations: Abdomen is soft.   Musculoskeletal:         General: Swelling (of the LUE) present.      Comments: No obvious joint swelling or tenderness.   Skin:     General: Skin is warm and dry.      Coloration: Skin is not jaundiced.   Neurological:      Mental Status: He is alert. Mental status is at baseline.           Significant Labs: All pertinent labs within the past 24 hours have been reviewed.      Assessment/Plan:      * Swelling of digit of left hand  Presented with Left hand swelling and pain x 2-3 weeks. Seen in ED 4/26, ring removed, discharged with oral keflex. Returns with worsening pain, swelling, and erythema that has now progressed to left wrist. See media. On admission, he was afebrile, leukocytosis. ESR and CRP elevated. Initial XR images concerning for osteo.     - Ortho consulted. Recommend broad spectrum iv abx. Wrap with ace bandage, elevate, will continue to trend crp and exam. On heparin gtt in case of procedure.   - CT L hand w/o contrast shows new erosive changes at the distal aspect of the middle phalanx of the 2nd finger concerning for osteomyelitis or other inflammatory arthritides.  - ID following. On CTX and dapto, but discontinued on 5/03 as suspect  his symptoms were related to gout flare.  - Rheum consulted, started on steroids.  PO prednisone for 5 days. Will complete on 5/7  - Outpatient rheumatology follow up for maintenance therapy       Elevated uric acid in blood  Acute polyarticular gout   Uric acid elevated 11.6.  Given hx of gout, consulting rheumatology for evaluation of gout flare.   Started on IV solumedrol 80mg x 2 days, now on PO prednisone for 5 days; will complete on 5/7  Outpatient rheumatology follow up - will arrange with SW assistance       Permanent atrial fibrillation  Long term (current) use of anticoagulants  S/P placement of cardiac pacemaker    - pacemaker not MRI compatible   - eliquis continued   - cont tele    Chronic diastolic heart failure  - no signs of acute overload  - Holding torsemide in setting of SARAH  - monitor UOP, signs of volume overload.    CAD (coronary artery disease)  History of heart bypass surgery  History of MI (myocardial infarction)    - Resume ASA   - previously intolerant to statins 2/2 myalgias    Complete AV block  - s/p pacemaker, not MRI compatible.     History of MI (myocardial infarction)  Noted history of       History of heart bypass surgery  Noted history of     Essential hypertension  - chronic, controlled  - Resume Coreg at 3.125 BID.  - Holding torsemide  - monitor     Dyslipidemia  - statin intolerance 2/2 myalgias     Acute kidney injury superimposed on chronic kidney disease  Cr 1.8 on admit, near baseline. Cr rising throughout admission, now improving. 1.9 on AM labs     - Urine studies, RP US.  - Good UOP as of now. Strict I&O.   - avoid nephrotoxins, renally dose meds      VTE Risk Mitigation (From admission, onward)           Ordered     apixaban tablet 2.5 mg  2 times daily         05/03/24 1650     Reason for No Pharmacological VTE Prophylaxis  Once        Question:  Reasons:  Answer:  Already adequately anticoagulated on oral Anticoagulants    04/28/24 1431     IP VTE HIGH RISK PATIENT   Once         04/28/24 1431     Place sequential compression device  Until discontinued         04/28/24 1431                    Discharge Planning   TEOFILO: 5/8/2024     Code Status: Full Code   Is the patient medically ready for discharge?:     Reason for patient still in hospital (select all that apply): Pending disposition  Discharge Plan A: Skilled Nursing Facility   Discharge Delays: None known at this time    Mike Watt DO  Department of Hospital Medicine   [facility]

## 2024-05-08 NOTE — PLAN OF CARE
ANN notified pt and family of discharge to [facility] today.      ANN scheduled d/c transportation to [facility] through [facility]. Patient is scheduled to be picked up at 5:30p.m.. ANN provided patient's nurse with report number [phone]; ask for the nurse for room 2B.  ANN is in communication with patient's CM and patient's Care Team.  Pt will discharge via stretcher/room air.      Discharge Plan A and Plan B have been determined by review of patient's clinical status, future medical and therapeutic needs, and coverage/benefits for post-acute care in coordination with multidisciplinary team members.     05/08/24 1636   Post-Acute Status   Post-Acute Authorization Placement   Post-Acute Placement Status Set-up Complete/Auth obtained  ([facility] SNF)   Coverage [employer] Managed Medicare   Discharge Delays None known at this time   Discharge Plan   Discharge Plan A Skilled Nursing Facility   Discharge Plan B Home;Home with family;Home Health     Aziza Brock LMSW  Part-Time-  [facility]  Ext. [phone]

## 2024-05-08 NOTE — TREATMENT PLAN
NURSING HOME ORDERS    05/08/2024  [facility] - MED SURG  [address] [address]  Dept: [phone]  Loc: [phone]     Admit to Nursing Home:      Diagnoses:  Active Hospital Problems    Diagnosis  POA    *Swelling of digit of left hand [M79.89]  Yes    Elevated uric acid in blood [E79.0]  Yes    Permanent atrial fibrillation [I48.21]  Yes     Chronic    Chronic diastolic heart failure [I50.32]  Yes     Patient being diuresed.      CAD (coronary artery disease) [I25.10]  Yes     Chronic    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.2]  Yes     Chronic    History of heart bypass surgery [Z95.1]  Not Applicable    History of MI (myocardial infarction) [I25.2]  Not Applicable    Acute kidney injury superimposed on chronic kidney disease [N17.9, N18.9]  Yes    Dyslipidemia [E78.5]  Yes     Chronic    Essential hypertension [I10]  Yes      Resolved Hospital Problems    Diagnosis Date Resolved POA    Gout [M10.9] 05/03/2024 Unknown    Hyperkalemia [E87.5] 05/03/2024 No    Swelling of left hand [M79.89] 05/03/2024 Yes    Macrocytosis [D75.89] 05/03/2024 Yes    Long term (current) use of anticoagulants [Z79.01] 05/03/2024 Not Applicable    S/P placement of cardiac pacemaker [Z95.0] 05/03/2024 Yes     Chronic       Patient is homebound due to:  Swelling of digit of left hand    Allergies:  Review of patient's allergies indicates:   Allergen Reactions    Iodine and iodide containing products Other (See Comments)     Caused changes in skin color       Vitals:  Routine    Diet: soft diet; minced and moist     Activities:   Activity as tolerated    Goals of Care Treatment Preferences:  Code Status: Full Code      Labs:  BMP in 1 week     Nursing Precautions:  Aspiration , Fall, and Pressure ulcer prevention    Consults:   PT to evaluate and treat- 3 times a week and OT to evaluate and treat- 3 times a week     Miscellaneous Care: Routine Skin for Bedridden Patients:  Apply moisture barrier  cream to all  CHF Care: Daily Weight with notification of MD/NP of 2lb or > increase in 24 hours    v/s and O2 sat every shift    Oxygen as needed for sats <90%    Report abnormal breath sounds to MD/NP    Edema checks q shift- notify MD/NP of increased edema    Task segmentation by nursing for daily care to decrease exertion    CHF education to include diet ,medication, and CHF flags for MD notification    Medications: Discontinue all previous medication orders, if any. See new list below.     Medication List        CHANGE how you take these medications      carvediloL 3.125 MG tablet  Commonly known as: COREG  Take 1 tablet (3.125 mg total) by mouth 2 (two) times daily.  What changed:   medication strength  how much to take  when to take this     torsemide 20 MG Tab  Commonly known as: DEMADEX  Take 1 tablet (20 mg total) by mouth 2 (two) times a day. HOLD UNTIL PCP FOLLOW UP  What changed: additional instructions            CONTINUE taking these medications      albuterol 90 mcg/actuation inhaler  Commonly known as: PROVENTIL HFA  Inhale 2 puffs into the lungs every 6 (six) hours as needed for Wheezing. Rescue     apixaban 2.5 mg Tab  Commonly known as: ELIQUIS  Take 1 tablet (2.5 mg total) by mouth 2 (two) times daily.     aspirin 81 MG EC tablet  Commonly known as: ECOTRIN  Take 1 tablet (81 mg total) by mouth once daily.     benazepriL 5 MG tablet  Commonly known as: LOTENSIN  Take 1 tablet (5 mg total) by mouth once daily.     diclofenac sodium 1 % Gel  Commonly known as: VOLTAREN  Apply 2 g topically daily as needed (pain).     ferrous sulfate 325 mg (65 mg iron) Tab tablet  Commonly known as: FEOSOL  Take 1 tablet (325 mg total) by mouth daily with breakfast.     fluticasone propionate 50 mcg/actuation nasal spray  Commonly known as: FLONASE  2 sprays (100 mcg total) by Each Nostril route once daily.     multivitamin per tablet  Commonly known as: ONE DAILY MULTIVITAMIN  Take 1 tablet by mouth once daily.      nitroGLYCERIN 0.4 MG SL tablet  Commonly known as: NITROSTAT  Take one tablet every 5 minutes for chest pain. After the third tablet go to the ED.     polyethylene glycol 17 gram/dose powder  Commonly known as: GLYCOLAX  Dissolve one capful (17 g) in liquid by mouth 3 (three) times daily as needed. Take 1 three times daily until well formed stool     potassium chloride 8 MEQ Tbsr  Commonly known as: KLOR-CON  Take 1 tablet (8 mEq total) by mouth once daily.            STOP taking these medications      cephALEXin 500 MG capsule  Commonly known as: KEFLEX                Immunizations Administered as of 5/8/2024       Name Date Dose VIS Date Route Exp Date    COVID-19, MRNA, LN-S, PF (Moderna) 4/1/2021 0.5 mL 12/1/2020 Intramuscular --    Site: Right deltoid     : [employer]     Lot: [id number]     Comment: Adminis     COVID-19, MRNA, LN-S, PF (Moderna) 3/4/2021 0.5 mL 12/1/2020 Intramuscular --    Site: Right deltoid     : Modern[employer]     Lot: [id number]     Comment: Adminis             This patient has had both covid vaccinations    Some patients may experience side effects after vaccination.  These may include fever, headache, muscle or joint aches.  Most symptoms resolve with 24-48 hours and do not require urgent medical evaluation unless they persist for more than 72 hours or symptoms are concerning for an unrelated medical condition.          _________________________________  Mike Watt DO  05/08/2024

## 2024-05-08 NOTE — PLAN OF CARE
ANN in communication with pt's daughter, Amelie [phone], waiting for a call from [facility] to complete paperwork.  ANN spoke to Mame kristen/ [facility] [phone] and advised pt's daughter has not received call regarding signing paperwork. Also notified insurance auth pending.  Escalated request for insurance auth to leadership.       11:39 AM  Orders sent.  Insurance auth pending.      Discharge Plan A and Plan B have been determined by review of patient's clinical status, future medical and therapeutic needs, and coverage/benefits for post-acute care in coordination with multidisciplinary team members.     05/08/24 1110   Post-Acute Status   Post-Acute Authorization Placement   Post-Acute Placement Status Pending payor review/awaiting authorization (if required)   Coverage [employer] Managed Medicare   Discharge Delays None known at this time   Discharge Plan   Discharge Plan A Skilled Nursing Facility   Discharge Plan B Home;Home with family;Home Health     Aziza Brock LMSW  Part-Time-  [facility]  Ext. [phone]

## 2024-05-08 NOTE — DISCHARGE SUMMARY
[facility] Medicine  Discharge Summary      Patient Name: Deena Moody  MRN: [id number]  HOMER: [id number]  Patient Class: IP- Inpatient  Admission Date: 4/28/2024  Hospital Length of Stay: 9 days  Discharge Date and Time:  05/08/2024 2:33 PM  Attending Physician: Mike Watt DO   Discharging Provider: Mike Watt DO  Primary Care Provider: Jam Rowell MD  [facility] Medicine Team: [facility] Mike Watt DO  Primary Care Team: [facility]    HPI:   Deena Moody is a 89 y.o. male with PMHx HTN, HLD, CAD, diastolic HF, A-fib with pacemaker, CABG, hx of MI, AV block, and CKD 3 who presents to [facility] ED with chief complaint of left hand swelling and pain. He reports this issues has been present x 2-3 weeks. He was in ED two days prior wherein his ring had to be cut from finger due to swelling. He was then sent home with keflex and PCP follow up. Since then patient reports worsening swelling, redness and pain. He denies associated HA, fever, chills, chest pain, shortness of breath, abdominal pain, n/v/d, urinary symptoms, numbness or tingling. He takes eliquis. Patient's pacemaker is not MRI compatible. Patient with significant hear loss bilaterally.     In the ED: Hypertensive, RR 22, otherwise VSSAF. CBC without leukocytosis. CMP without emergent abnormalities, Cr 1.8 (baseline). ESR 55 and CRP 82.8 (elevated from 53.6 on 4/26). Received morphine and vanc. Admitted to  for further management.    * No surgery found *      Hospital Course:   Presented with Left hand swelling and pain x 2-3 weeks. Seen in ED 4/26, ring removed, discharged with oral keflex. Returns with worsening pain, swelling, and erythema that has now progressed to left wrist. Labs with leukocytosis. ESR and CRP elevated. Initial XR images concerning for osteo. Ortho consulted. Recommend broad spectrum iv abx. Wrap with ace bandage, elevate, will continue to trend crp and exam. CT L hand w/o contrast  shows new erosive changes at the distal aspect of the middle phalanx of the 2nd finger concerning for osteomyelitis or other inflammatory arthritides.ID following. On CTX and dapto. Uric acid elevated 11.6. Given hx of gout, consulting rheumatology for evaluation of gout flare. Hx of Afib, holding eliquis in setting of possible OR, heparin infusion for now. Started on Prednisone with improvement in hand pain/swelling. CTX and Daptomycin discontinued by ID on 5/03, as felt his symptoms are from gout. CRP trending down. Kidney function is improving daily. Will complete oral steroids on 5/7. Ambulatory referral to rheumatology clinic for follow up and maintenance therapy discussions. US LUE negative for DVT. Swelling improved with elevation. Renal function improving daily. Now near baseline. PT/OT recommending SNF. Patient stable for discharge to [facility] on 5/8.      Goals of Care Treatment Preferences:  Code Status: Full Code      Consults:   Consults (From admission, onward)          Status Ordering Provider     Inpatient consult to Skin Integrity  Practitioner  Once        Provider:  (Not yet assigned)    Acknowledged BONNY KIMBLE     Inpatient consult to Skin Integrity  Practitioner  Once        Provider:  Bonny Kimble, NP    Acknowledged ALEJA KLEIN     Inpatient consult to Rheumatology  Once        Provider:  (Not yet assigned)    Completed ALVARO, SEEMAL     Inpatient consult to Infectious Diseases  Once        Provider:  (Not yet assigned)    Completed ALVARO, SEEMAL     Inpatient consult to Orthopedics  Once        Provider:  (Not yet assigned)    Completed WILLIE CALABRESE            No new Assessment & Plan notes have been filed under this hospital service since the last note was generated.  Service: Hospital Medicine    Final Active Diagnoses:    Diagnosis Date Noted POA    PRINCIPAL PROBLEM:  Swelling of digit of left hand [M79.89] 04/28/2024 Yes    Elevated uric acid in blood  [E79.0] 05/01/2024 Yes    Permanent atrial fibrillation [I48.21] 11/10/2016 Yes     Chronic    Chronic diastolic heart failure [I50.32] 09/01/2016 Yes    CAD (coronary artery disease) [I25.10] 08/31/2016 Yes     Chronic    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.2] 08/31/2016 Yes     Chronic    History of heart bypass surgery [Z95.1] 10/26/2015 Not Applicable    History of MI (myocardial infarction) [I25.2] 10/26/2015 Not Applicable    Acute kidney injury superimposed on chronic kidney disease [N17.9, N18.9] 11/18/2013 Yes    Dyslipidemia [E78.5] 11/18/2013 Yes     Chronic    Essential hypertension [I10] 11/18/2013 Yes      Problems Resolved During this Admission:    Diagnosis Date Noted Date Resolved POA    Gout [M10.9] 05/03/2024 05/03/2024 Unknown    Hyperkalemia [E87.5] 04/30/2024 05/03/2024 No    Swelling of left hand [M79.89] 04/30/2024 05/03/2024 Yes    Macrocytosis [D75.89] 04/28/2024 05/03/2024 Yes    Long term (current) use of anticoagulants [Z79.01] 07/11/2017 05/03/2024 Not Applicable    S/P placement of cardiac pacemaker [Z95.0] 09/14/2016 05/03/2024 Yes     Chronic       Discharged Condition: good    Disposition: Skilled Nursing Facility    Follow Up:   Follow-up Information       Jam Rowell MD Follow up.    Specialty: Family Medicine  Contact information:  [address]  [place] [id number] [phone]                           Patient Instructions:      Ambulatory referral/consult to Outpatient Case Management   Referral Priority: Routine Referral Type: Consultation   Referral Reason: Specialty Services Required   Number of Visits Requested: 1     Ambulatory referral/consult to Rheumatology   Standing Status: Future   Referral Priority: Routine Referral Type: Consultation   Referral Reason: Specialty Services Required   Requested Specialty: Rheumatology   Number of Visits Requested: 1       Significant Diagnostic Studies: Labs: CMP   Recent Labs   Lab 05/07/24  0649 05/08/24  0638    143   K  3.8 4.0    110   CO2 21* 23    148*   * 100*   CREATININE 1.9* 1.9*   CALCIUM 8.4* 8.3*   ALBUMIN 2.6* 2.3*   ANIONGAP 13 10    and CBC   Recent Labs   Lab 05/07/24  0902   WBC 15.75*   HGB 10.4*   HCT 33.5*          Pending Diagnostic Studies:       Procedure Component Value Units Date/Time    APTT [[id number]] Collected: 04/29/24 0438    Order Status: Sent Lab Status: No result     Specimen: Blood            Medications:  Reconciled Home Medications:      Medication List        CHANGE how you take these medications      carvediloL 3.125 MG tablet  Commonly known as: COREG  Take 1 tablet (3.125 mg total) by mouth 2 (two) times daily.  What changed:   medication strength  how much to take  when to take this     torsemide 20 MG Tab  Commonly known as: DEMADEX  Take 1 tablet (20 mg total) by mouth 2 (two) times a day. HOLD UNTIL PCP FOLLOW UP  What changed: additional instructions            CONTINUE taking these medications      albuterol 90 mcg/actuation inhaler  Commonly known as: PROVENTIL HFA  Inhale 2 puffs into the lungs every 6 (six) hours as needed for Wheezing. Rescue     apixaban 2.5 mg Tab  Commonly known as: ELIQUIS  Take 1 tablet (2.5 mg total) by mouth 2 (two) times daily.     aspirin 81 MG EC tablet  Commonly known as: ECOTRIN  Take 1 tablet (81 mg total) by mouth once daily.     benazepriL 5 MG tablet  Commonly known as: LOTENSIN  Take 1 tablet (5 mg total) by mouth once daily.     diclofenac sodium 1 % Gel  Commonly known as: VOLTAREN  Apply 2 g topically daily as needed (pain).     ferrous sulfate 325 mg (65 mg iron) Tab tablet  Commonly known as: FEOSOL  Take 1 tablet (325 mg total) by mouth daily with breakfast.     fluticasone propionate 50 mcg/actuation nasal spray  Commonly known as: FLONASE  2 sprays (100 mcg total) by Each Nostril route once daily.     multivitamin per tablet  Commonly known as: ONE DAILY MULTIVITAMIN  Take 1 tablet by mouth once daily.      nitroGLYCERIN 0.4 MG SL tablet  Commonly known as: NITROSTAT  Take one tablet every 5 minutes for chest pain. After the third tablet go to the ED.     polyethylene glycol 17 gram/dose powder  Commonly known as: GLYCOLAX  Dissolve one capful (17 g) in liquid by mouth 3 (three) times daily as needed. Take 1 three times daily until well formed stool     potassium chloride 8 MEQ Tbsr  Commonly known as: KLOR-CON  Take 1 tablet (8 mEq total) by mouth once daily.            STOP taking these medications      cephALEXin 500 MG capsule  Commonly known as: KEFLEX              Indwelling Lines/Drains at time of discharge:   Lines/Drains/Airways       None                   Time spent on the discharge of patient: 45 minutes         Mike Watt DO  Department of Hospital Medicine  [facility]

## 2024-05-08 NOTE — PLAN OF CARE
On-Call SW asked to monitor time of transport to [facility] . The ETA 7:50 pm          Dawn Barrientos LMSW  PRN - [facility]  [phone]

## 2024-05-08 NOTE — PT/OT/SLP PROGRESS
Speech Language Pathology Treatment/  Discharge Summary     Patient Name:  Deena Moody   MRN:  [id number]  Admitting Diagnosis: Swelling of digit of left hand    Recommendations:                 General Recommendations:  No follow up indicated   Diet recommendations:  Minced & Moist Diet - IDDSI Level 5, Liquid Diet Level: Thin liquids - IDDSI Level 0   Aspiration Precautions: 1 bite/sip at a time, Alternating bites/sips, Assistance with meals, Feed only when awake/alert, HOB to 90 degrees, Meds whole 1 at a time, Small bites/sips, and Standard aspiration precautions   General Precautions: Standard, fall, hearing impaired  Communication strategies:   speak up    Assessment:     Deena Moody is a 89 y.o. male with an SLP diagnosis of  functional swallow of modified diet .      Subjective      Pt awake, reclined in bed attempting to feed himself breakfast. No family present.     Pain/Comfort:  Pain Rating 1: 0/10  Pain Rating Post-Intervention 1: 0/10    Respiratory Status: Room air    Objective:     Has the patient been evaluated by SLP for swallowing?   Yes  Keep patient NPO? No     Pt seen bedside for dysphagia therapy. HOB raised and assisted with setting up meal tray to be more easily accessible to patient. Noted patient having difficulty feeding himself, therefore provided feed assist with minced and moist solids, puree solids and thin liquids from breakfast tray. Pt tolerated small single bites sips without any concerns for airway compromise and reports preference for soft diet. He benefited from feed assist, small bites and cyclic ingestion. Recommend he continue level 5 diet at this time. Education provided re: role of SLP, diet recs, swallow precs, s/s aspiration and POC.  Pt left comfortable reclined in bed and white board updated upon SLP exit. No further acute ST needs.     Goals:   Multidisciplinary Problems       SLP Goals       Not on file              Multidisciplinary Problems (Resolved)           Problem: SLP    Goal Priority Disciplines Outcome   SLP Goal   (Resolved)     SLP Met   Description: Goals due 5/8  1.  Pt. Will participate in ongoing assessment of swallow at bedside                      Plan:     Plan of Care reviewed with:  patient   SLP Follow-Up:  No       Discharge recommendations:  No Therapy Indicated   Barriers to Discharge:  None    Time Tracking:     SLP Treatment Date:   05/08/24  Speech Start Time:  0948  Speech Stop Time:  0955     Speech Total Time (min):  7 min    Billable Minutes: Treatment Swallowing Dysfunction 7    05/08/2024

## 2024-05-08 NOTE — PT/OT/SLP PROGRESS
Occupational Therapy   Treatment    Name: Deena Moody  MRN: [id number]  Admitting Diagnosis:  Swelling of digit of left hand       Recommendations:     Discharge Recommendations: No Therapy Indicated  Discharge Equipment Recommendations:  none  Barriers to discharge:  None    Assessment:     Deena Moody is a 89 y.o. male with a medical diagnosis of Swelling of digit of left hand.  He presents with the following performance deficits affecting function are weakness, impaired self care skills, impaired functional mobility, decreased lower extremity function, decreased upper extremity function, decreased ROM, pain, orthopedic precautions.    Pt agreeable to therapy and tolerated fairly. Pt was limited due to groin pain during bed mobility which prevented patient from sitting upright at the EOB. Pt was repositioned in bed and groin pain subsided. Pt demonstrated good participation with UE exercises. However, pt remains limited in ADLs, functional mobility, and functional transfers and is currently not performing tasks at OF. Pt would continue to benefit from skilled OT services to maximize functional independence with ADLs and functional mobility, reduce caregiver burden, and facilitate safe discharge in the least restrictive environment.      Rehab Prognosis:  Good; patient would benefit from acute skilled OT services to address these deficits and reach maximum level of function.       Plan:     Patient to be seen 4 x/week to address the above listed problems via self-care/home management, therapeutic activities, therapeutic exercises  Plan of Care Expires: 06/06/24  Plan of Care Reviewed with: patient    Subjective     Chief Complaint: groin pain  Patient/Family Comments/goals: pt agreeable to OT  Pain/Comfort:  Pain Rating 1: other (see comments) (pt did not rate pain but expresses pain)  Location - Orientation 1: generalized  Location 1: groin  Pain Addressed 1: Reposition, Distraction, Cessation of  Activity    Objective:     Communicated with: RN prior to session.  Patient found in a slouched position with HOB elevated with peripheral IV, PureWick upon OT entry to room.    General Precautions: Standard, fall, hearing impaired    Orthopedic Precautions:N/A  Braces: N/A  Respiratory Status: Room air     Occupational Performance:     Bed Mobility:    Patient completed Scooting to HOB with moderate assistance & verbal cueing for hand placement  Patient completed Supine to Sit with maximal assistance  Patient completed Sit to Supine with maximal assistance     Functional Mobility/Transfers:  Pt was unable to tolerate sitting at EOB 2/2 groin pain    AMPAC 6 Click ADL: 15    Treatment & Education:    Therapeutic exercises: pt completed the following UE exercises to maintain/improve UE strength and activity tolerance required for participation/independence with ADLs, IADLs & functional mobility/transfers   - Left elbow flexion 2x10   - Left open/closed fist (full extension & flexion of all digits) x10   - Left wrist flexion/extension x10   - Right shoulder flexion 2x10    - Right shoulder abduction 2x10    -Education on energy conservation and task modification to maximize safety and (I) during ADLs and mobility  - Education on bed positioning to prevent/minimize stiffness in neck, BUE & LUE  -Education on importance of OOB activity to improve overall activity tolerance and promote recovery  -Pt educated to call for assistance and to transfer with hospital staff only  Pt had no further questions & when asked whether there were any concerns pt reported none.     Patient left HOB elevated with all lines intact, call button in reach, RN notified, and telesitter present    GOALS:   Multidisciplinary Problems       Occupational Therapy Goals          Problem: Occupational Therapy    Goal Priority Disciplines Outcome Interventions   Occupational Therapy Goal     OT, PT/OT Progressing    Description: Goals to be met by:  6/2/24     Patient will increase functional independence with ADLs by performing:    Grooming while standing at sink with Supervision.  Toileting from bedside commode with Supervision for hygiene and clothing management.   Rolling to Bilateral with [place].   Supine to sit with Modified [place].  Toilet transfer to bedside commode with Supervision.                         Time Tracking:     OT Date of Treatment: 05/08/24  OT Start Time: 1149  OT Stop Time: 1221  OT Total Time (min): 32 min    Billable Minutes:Therapeutic Activity 17  Therapeutic Exercise 15    OT/ÁLVARO: OT          5/8/2024

## 2024-05-08 NOTE — PLAN OF CARE
Insurance auth received.  Family signing paperwork with Mame at [facility].  Waiting for report information.      Discharge Plan A and Plan B have been determined by review of patient's clinical status, future medical and therapeutic needs, and coverage/benefits for post-acute care in coordination with multidisciplinary team members.     05/08/24 1426   Post-Acute Status   Post-Acute Authorization Placement   Post-Acute Placement Status Pending post-acute provider review/more information requested   Coverage [employer] Managed Medicare   Discharge Delays None known at this time   Discharge Plan   Discharge Plan A Skilled Nursing Facility   Discharge Plan B Home with family;Home;Home Health     Aziza Brock LMSW  Part-Time-  [facility]  Ext. [phone]

## 2024-05-08 NOTE — PLAN OF CARE
Problem: Adult Inpatient Plan of Care  Goal: Plan of Care Review  Outcome: Met  Goal: Patient-Specific Goal (Individualized)  Outcome: Met  Goal: Absence of Hospital-Acquired Illness or Injury  Outcome: Met  Goal: Optimal Comfort and Wellbeing  Outcome: Met  Goal: Readiness for Transition of Care  Outcome: Met     Problem: Infection  Goal: Absence of Infection Signs and Symptoms  Outcome: Met     Problem: Fall Injury Risk  Goal: Absence of Fall and Fall-Related Injury  Outcome: Met     Problem: Skin Injury Risk Increased  Goal: Skin Health and Integrity  Outcome: Met     Problem: Wound  Goal: Optimal Coping  Outcome: Met  Goal: Optimal Functional Ability  Outcome: Met  Goal: Absence of Infection Signs and Symptoms  Outcome: Met  Goal: Improved Oral Intake  Outcome: Met  Goal: Optimal Pain Control and Function  Outcome: Met  Goal: Skin Health and Integrity  Outcome: Met  Goal: Optimal Wound Healing  Outcome: Met     Problem: Acute Kidney Injury/Impairment  Goal: Fluid and Electrolyte Balance  Outcome: Met  Goal: Improved Oral Intake  Outcome: Met  Goal: Effective Renal Function  Outcome: Met     Problem: Occupational Therapy  Goal: Occupational Therapy Goal  Description: Goals to be met by: 6/2/24     Patient will increase functional independence with ADLs by performing:    Grooming while standing at sink with Supervision.  Toileting from bedside commode with Supervision for hygiene and clothing management.   Rolling to Bilateral with Moore.   Supine to sit with Modified Moore.  Toilet transfer to bedside commode with Supervision.    Outcome: Met     Problem: Physical Therapy  Goal: Physical Therapy Goal  Description: PT goals until 5/20/24    1. Pt supine to sit with CGA-not met  2. Pt sit to supine with CGA-not met  3. Pt sit to stand with RW with CGA-not met  4. Pt to perform gait 120ft with RW with CGA.-not met  5. Pt to perform B LE exs in sitting or supine x 10 reps to strengthen B LE to improve  functional mobility.-not met   Outcome: Met

## 2024-05-08 NOTE — PHYSICIAN QUERY
Please clarify  the stage of chronic kidney disease (CKD):    Chronic Kidney Disease (CKD) stage 3 unspecified

## 2024-05-08 NOTE — PLAN OF CARE
Escalated SNF authorization request to patient's payor for expedited review.     Dayami Mack MSW, LCSW   - Case

## 2024-05-08 NOTE — SUBJECTIVE & OBJECTIVE
Interval History:     NAEON. Left arm pain is improved. Pending SNF placement. Renal function stable.     Review of Systems   Constitutional:  Negative for chills, diaphoresis, fatigue and fever.   HENT:  Positive for hearing loss.    Respiratory:  Negative for cough and shortness of breath.    Cardiovascular:  Negative for chest pain and leg swelling.   Gastrointestinal:  Negative for abdominal pain, diarrhea, nausea and vomiting.   Genitourinary:  Negative for dysuria and frequency.   Musculoskeletal:  Positive for joint swelling. Negative for arthralgias and back pain.   Skin:  Negative for color change, rash and wound.   Neurological:  Negative for dizziness and headaches.   Psychiatric/Behavioral:  Negative for agitation and confusion. The patient is not nervous/anxious.    All other systems reviewed and are negative.    Objective:     Vital Signs (Most Recent):  Temp: 97.9 °F (36.6 °C) (05/08/24 0807)  Pulse: 69 (05/08/24 1112)  Resp: 18 (05/08/24 0821)  BP: (!) 160/74 (05/08/24 0807)  SpO2: 96 % (05/08/24 0807) Vital Signs (24h Range):  Temp:  [97.7 °F (36.5 °C)-98.5 °F (36.9 °C)] 97.9 °F (36.6 °C)  Pulse:  [69-70] 69  Resp:  [18] 18  SpO2:  [96 %-98 %] 96 %  BP: (134-168)/(60-75) 160/74     Weight: 62.7 kg (138 lb 3.7 oz)  Body mass index is 20.41 kg/m².    Intake/Output Summary (Last 24 hours) at 5/8/2024 1124  Last data filed at 5/8/2024 0639  Gross per 24 hour   Intake 980 ml   Output 1000 ml   Net -20 ml         Physical Exam  Vitals reviewed.   Constitutional:       General: He is not in acute distress.     Appearance: Normal appearance. He is not ill-appearing.   HENT:      Head: Normocephalic.      Nose: Nose normal.      Mouth/Throat:      Mouth: Mucous membranes are moist.   Eyes:      General: No scleral icterus.  Cardiovascular:      Rate and Rhythm: Normal rate.      Pulses: Normal pulses.   Pulmonary:      Effort: Pulmonary effort is normal. No respiratory distress.   Abdominal:      General:  Abdomen is flat. There is no distension.      Palpations: Abdomen is soft.   Musculoskeletal:         General: Swelling (of the LUE) present.      Comments: No obvious joint swelling or tenderness.   Skin:     General: Skin is warm and dry.      Coloration: Skin is not jaundiced.   Neurological:      Mental Status: He is alert. Mental status is at baseline.           Significant Labs: All pertinent labs within the past 24 hours have been reviewed.

## 2024-05-08 NOTE — PROGRESS NOTES
05/08/24- Per [facility] notes patient is pending dc to [facility]. Closing episode.

## 2024-05-09 PROBLEM — L89.92: Status: ACTIVE | Noted: 2024-05-09

## 2024-05-09 PROBLEM — T14.8XXA BLOOD BLISTER: Status: ACTIVE | Noted: 2024-05-09

## 2024-05-09 NOTE — ASSESSMENT & PLAN NOTE
- consult received for evaluation of skin injury.  - pt with non blanchable redness to left sacrum with partial thickness tissue loss to the center wound with pink, moist wound base.  - suspected deep tissue injury with stage 2 pressure injury.  - continue BPCO bid/prn.  - Immerse surface.  - wedge for offloading.  - male external urinary catheter in place.  - turn q2h.  - nursing to maintain pressure injury prevention measures and continue wound care per orders.

## 2024-05-09 NOTE — CONSULTS
[facility] - Med Surg  Skin Integrity EMILIO  Consult Note    Patient Name: Deena Moody  MRN: [id number]  Admission Date: 4/28/2024  Hospital Length of Stay: 9 days  Attending Physician: No att. providers found  Primary Care Provider: Jam Rowell MD     Consults  Subjective:     History of Present Illness:  Deena Moody is a 89 year old male with PMHx HTN, HLD, CAD, diastolic HF, A-fib with pacemaker, CABG, hx of MI, AV block, and CKD 3 who [place] [facility] [place] with chief complaint of left hand swelling and pain. He reports this issues has been present x 2-3 weeks. He was in ED two days prior wherein his ring had to be cut from finger due to swelling. He was then sent home with keflex and PCP follow up. Since then patient reports worsening swelling, redness and pain. He denies associated HA, fever, chills, chest pain, shortness of breath, abdominal pain, n/v/d, urinary symptoms, numbness or tingling. He takes eliquis. Patient's pacemaker is not MRI compatible. Patient with significant hear loss bilaterally.      In the ED: Hypertensive, RR 22, otherwise VSSAF. CBC without leukocytosis. CMP without emergent abnormalities, Cr 1.8 (baseline). ESR 55 and CRP 82.8 (elevated from 53.6 on 4/26). Received morphine and vanc. Admitted to  for further management. Skin integrity EMILIO consulted for evaluation of skin injury.    Scheduled Meds:   apixaban  2.5 mg Oral BID    aspirin  81 mg Oral Daily    balsam peru-castor oiL   Topical (Top) BID    ferrous sulfate  1 tablet Oral Daily     Continuous Infusions:  PRN Meds:  Current Facility-Administered Medications:     acetaminophen, 1,000 mg, Oral, Q8H PRN    albuterol-ipratropium, 3 mL, Nebulization, Q4H PRN    bisacodyL, 10 mg, Rectal, Daily PRN    dextrose 10%, 12.5 g, Intravenous, PRN    dextrose 10%, 25 g, Intravenous, PRN    glucagon (human recombinant), 1 mg, Intramuscular, PRN    glucose, 16 g, Oral, PRN    glucose, 24 g, Oral, PRN    hydrALAZINE, 10 mg,  Intravenous, Q8H PRN    HYDROmorphone, 1 mg, Intravenous, Q6H PRN    melatonin, 6 mg, Oral, Nightly PRN    naloxone, 0.02 mg, Intravenous, PRN    oxyCODONE, 5 mg, Oral, Q6H PRN    oxyCODONE, 10 mg, Oral, Q6H PRN    polyethylene glycol, 17 g, Oral, Daily PRN    prochlorperazine, 10 mg, Oral, TID PRN    promethazine, 25 mg, Oral, Q6H PRN    sodium chloride 0.9%, 10 mL, Intravenous, Q12H PRN    Review of patient's allergies indicates:   Allergen Reactions    Iodine and iodide containing products Other (See Comments)     Caused changes in skin color        Past Medical History:   Diagnosis Date    Acute on chronic diastolic heart failure 9/1/2016    Coronary artery disease     Hyperlipidemia     Hypertension     Macular degeneration (senile) of retina, unspecified 12/12/2014    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    Persistent atrial fibrillation 11/10/2016    S/P placement of cardiac pacemaker 9/14/2016     Past Surgical History:   Procedure Laterality Date    AORTIC VALVE REPLACEMENT N/A     CARDIAC PACEMAKER PLACEMENT      CATARACT EXTRACTION W/  INTRAOCULAR LENS IMPLANT Right 2/16/2016    Dr. Whitley    CATARACT EXTRACTION W/  INTRAOCULAR LENS IMPLANT Left 3/1/2016    Dr. Whitley    CORONARY ARTERY BYPASS GRAFT      EAR EXAMINATION UNDER ANESTHESIA      EYE SURGERY         Family History       Problem Relation (Age of Onset)    Hypertension Brother    No Known Problems Mother, Father, Sister, Maternal Aunt, Maternal Uncle, Paternal Aunt, Paternal Uncle, Maternal Grandmother, Maternal Grandfather, Paternal Grandmother, Paternal Grandfather, Daughter, Son    Stroke Brother          Tobacco Use    Smoking status: Never     Passive exposure: Never    Smokeless tobacco: Never   Substance and Sexual Activity    Alcohol use: No    Drug use: No    Sexual activity: Yes     Partners: Female     Review of Systems   Skin:  Positive for wound.       Objective:     Vital Signs (Most Recent):  Temp: 97.3 °F (36.3 °C) (05/07/24  1122)  Pulse: 69 (05/07/24 1126)  Resp: 18 (05/07/24 1122)  BP: (!) 128/59 (05/07/24 1122)  SpO2: 98 % (05/07/24 1122) Vital Signs (24h Range):  Temp:  [97.3 °F (36.3 °C)-97.9 °F (36.6 °C)] 97.3 °F (36.3 °C)  Pulse:  [69-85] 69  Resp:  [16-18] 18  SpO2:  [97 %-98 %] 98 %  BP: (128-164)/(59-69) 128/59     Weight: 62.7 kg (138 lb 3.7 oz)  Body mass index is 20.41 kg/m².     Physical Exam  Constitutional:       Appearance: Normal appearance.   Skin:     General: Skin is warm and dry.      Findings: Lesion present.   Neurological:      Mental Status: He is alert.          Laboratory:  All pertinent labs reviewed within the last 24 hours.    Diagnostic Results:  None      Assessment/Plan:         EMILIO Skin Integrity Evaluation      Skin Integrity EMILIO evaluation of patient as part of the comprehensive skin care team.     He has been admitted for 9 days. Skin injury was noted on 5/3/24.     L heel - POA yes      R heel - POA no      L sacrum - Poa no              Orthopedic  Blood blister  - left heel blood blister with intact purple/maroon discoloration.  - continue hydrofera blue dressing q3d.  - right heel with intact skin and slowly blanchable redness. Appears to be improving.  - continue bpco bid/prn.  - heel foams intact.  - heel boots for offloading.      Pressure ulcer with suspected deep tissue injury, stage II  - consult received for evaluation of skin injury.  - pt with non blanchable redness to left sacrum with partial thickness tissue loss to the center wound with pink, moist wound base.  - suspected deep tissue injury with stage 2 pressure injury.  - continue BPCO bid/prn.  - Immerse surface.  - wedge for offloading.  - male external urinary catheter in place.  - turn q2h.  - nursing to maintain pressure injury prevention measures and continue wound care per orders.        Thank you for your consult. I will follow-up with patient. Please contact us if you have any additional questions.      Bonny Calvillo,  NP  Skin Integrity EMILIO  [facility] - Med Surg

## 2024-05-09 NOTE — NURSING
Report called in to receiving nurse. Pt d/c'd with [facility] via stretcher with son at bedside.  No s/s of distress.

## 2024-05-09 NOTE — PLAN OF CARE
[facility] - Med S[facility]  Discharge Final Note    Primary Care Provider: Jam Rowell MD    Expected Discharge Date: 5/8/2024    Pt discharged to [facility].  Pt's family notified of discharge.  Pt discharged via stretcher/room air.      Future Appointments   Date Time Provider Department Center   6/18/2024  2:20 PM Donn Hastings MD [facility]         Discharge Plan A and Plan B have been determined by review of patient's clinical status, future medical and therapeutic needs, and coverage/benefits for post-acute care in coordination with multidisciplinary team members.    Final Discharge Note (most recent)       Final Note - 05/09/24 0756          Final Note    Assessment Type Final Discharge Note     Anticipated Discharge Disposition Skilled Nursing Facility        Post-Acute Status    Post-Acute Authorization Placement     Post-Acute Placement Status Set-up Complete/Auth obtained   [facility] (SNF)    Coverage [employer] Managed Medicare     Discharge Delays None known at this time                     Important Message from Medicare  Important Message from Medicare regarding Discharge Appeal Rights: Given to patient/caregiver, Explained to patient/caregiver, Signed/date by patient/caregiver     Date IMM was signed: 05/06/24  Time IMM was signed: 1439    Contact Info       Jam Rowell MD   Specialty: Family Medicine   Relationship: PCP - General    [address] [place] [id number]   Phone: [phone]       Next Steps: Follow up          Aziza Brock LMSW  Part-Time-  [facility]  Ext. [phone]

## 2024-05-09 NOTE — ASSESSMENT & PLAN NOTE
- left heel blood blister with intact purple/maroon discoloration.  - continue hydrofera blue dressing q3d.  - right heel with intact skin and slowly blanchable redness. Appears to be improving.  - continue bpco bid/prn.  - heel foams intact.  - heel boots for offloading.

## 2024-05-10 LAB — BACTERIA SPEC ANAEROBE CULT: NORMAL

## 2024-05-11 ENCOUNTER — HOSPITAL ENCOUNTER (INPATIENT)
Facility: HOSPITAL | Age: 89
LOS: 10 days | Discharge: NURSING, REHAB OR LONG-TERM CARE | DRG: 553 | End: 2024-05-21
Attending: EMERGENCY MEDICINE | Admitting: STUDENT IN AN ORGANIZED HEALTH CARE EDUCATION/TRAINING PROGRAM
Payer: MEDICARE

## 2024-05-11 DIAGNOSIS — T14.8XXA BLOOD BLISTER: ICD-10-CM

## 2024-05-11 DIAGNOSIS — M79.89 ARM SWELLING: Primary | ICD-10-CM

## 2024-05-11 DIAGNOSIS — K59.00 CONSTIPATION, UNSPECIFIED CONSTIPATION TYPE: ICD-10-CM

## 2024-05-11 DIAGNOSIS — D72.829 LEUKOCYTOSIS: ICD-10-CM

## 2024-05-11 DIAGNOSIS — M86.9 OSTEOMYELITIS OF LEFT ARM: ICD-10-CM

## 2024-05-11 DIAGNOSIS — I48.19 PERSISTENT ATRIAL FIBRILLATION: ICD-10-CM

## 2024-05-11 DIAGNOSIS — D63.8 CHRONIC DISEASE ANEMIA: ICD-10-CM

## 2024-05-11 DIAGNOSIS — T14.8XXA MUSCLE STRAIN: ICD-10-CM

## 2024-05-11 DIAGNOSIS — R07.9 CHEST PAIN: ICD-10-CM

## 2024-05-11 DIAGNOSIS — L89.152 PRESSURE INJURY OF SACRAL REGION, STAGE 2: ICD-10-CM

## 2024-05-11 DIAGNOSIS — M79.602 LEFT ARM PAIN: ICD-10-CM

## 2024-05-11 PROBLEM — Z87.39 HISTORY OF GOUT: Status: ACTIVE | Noted: 2024-05-03

## 2024-05-11 PROBLEM — N18.4 CKD (CHRONIC KIDNEY DISEASE), STAGE IV: Status: ACTIVE | Noted: 2024-05-11

## 2024-05-11 LAB
ALBUMIN SERPL BCP-MCNC: 2.5 G/DL (ref 3.5–5.2)
ALP SERPL-CCNC: 123 U/L (ref 55–135)
ALT SERPL W/O P-5'-P-CCNC: 24 U/L (ref 10–44)
ANION GAP SERPL CALC-SCNC: 13 MMOL/L (ref 8–16)
AST SERPL-CCNC: 33 U/L (ref 10–40)
BASOPHILS # BLD AUTO: 0.03 K/UL (ref 0–0.2)
BASOPHILS # BLD AUTO: 0.05 K/UL (ref 0–0.2)
BASOPHILS NFR BLD: 0.2 % (ref 0–1.9)
BASOPHILS NFR BLD: 0.2 % (ref 0–1.9)
BILIRUB SERPL-MCNC: 1 MG/DL (ref 0.1–1)
BILIRUB UR QL STRIP: NEGATIVE
BUN SERPL-MCNC: 70 MG/DL (ref 10–30)
CALCIUM SERPL-MCNC: 8.1 MG/DL (ref 8.7–10.5)
CHLORIDE SERPL-SCNC: 108 MMOL/L (ref 95–110)
CLARITY UR REFRACT.AUTO: CLEAR
CO2 SERPL-SCNC: 24 MMOL/L (ref 23–29)
COLOR UR AUTO: YELLOW
CREAT SERPL-MCNC: 1.5 MG/DL (ref 0.5–1.4)
CRP SERPL-MCNC: 99.7 MG/L (ref 0–8.2)
DIFFERENTIAL METHOD BLD: ABNORMAL
DIFFERENTIAL METHOD BLD: ABNORMAL
EOSINOPHIL # BLD AUTO: 0.3 K/UL (ref 0–0.5)
EOSINOPHIL # BLD AUTO: 0.3 K/UL (ref 0–0.5)
EOSINOPHIL NFR BLD: 1.2 % (ref 0–8)
EOSINOPHIL NFR BLD: 1.8 % (ref 0–8)
ERYTHROCYTE [DISTWIDTH] IN BLOOD BY AUTOMATED COUNT: 14.6 % (ref 11.5–14.5)
ERYTHROCYTE [DISTWIDTH] IN BLOOD BY AUTOMATED COUNT: 14.7 % (ref 11.5–14.5)
ERYTHROCYTE [SEDIMENTATION RATE] IN BLOOD BY PHOTOMETRIC METHOD: 78 MM/HR (ref 0–23)
EST. GFR  (NO RACE VARIABLE): 43.1 ML/MIN/1.73 M^2
GLUCOSE SERPL-MCNC: 110 MG/DL (ref 70–110)
GLUCOSE UR QL STRIP: NEGATIVE
HCT VFR BLD AUTO: 34.4 % (ref 40–54)
HCT VFR BLD AUTO: 34.7 % (ref 40–54)
HGB BLD-MCNC: 10.5 G/DL (ref 14–18)
HGB BLD-MCNC: 10.7 G/DL (ref 14–18)
HGB UR QL STRIP: ABNORMAL
IMM GRANULOCYTES # BLD AUTO: 0.24 K/UL (ref 0–0.04)
IMM GRANULOCYTES # BLD AUTO: 0.41 K/UL (ref 0–0.04)
IMM GRANULOCYTES NFR BLD AUTO: 1.3 % (ref 0–0.5)
IMM GRANULOCYTES NFR BLD AUTO: 1.9 % (ref 0–0.5)
KETONES UR QL STRIP: NEGATIVE
LEUKOCYTE ESTERASE UR QL STRIP: NEGATIVE
LYMPHOCYTES # BLD AUTO: 1.6 K/UL (ref 1–4.8)
LYMPHOCYTES # BLD AUTO: 2.3 K/UL (ref 1–4.8)
LYMPHOCYTES NFR BLD: 10.5 % (ref 18–48)
LYMPHOCYTES NFR BLD: 9.1 % (ref 18–48)
MCH RBC QN AUTO: 30.1 PG (ref 27–31)
MCH RBC QN AUTO: 30.8 PG (ref 27–31)
MCHC RBC AUTO-ENTMCNC: 30.5 G/DL (ref 32–36)
MCHC RBC AUTO-ENTMCNC: 30.8 G/DL (ref 32–36)
MCV RBC AUTO: 101 FL (ref 82–98)
MCV RBC AUTO: 98 FL (ref 82–98)
MONOCYTES # BLD AUTO: 0.9 K/UL (ref 0.3–1)
MONOCYTES # BLD AUTO: 1.3 K/UL (ref 0.3–1)
MONOCYTES NFR BLD: 5 % (ref 4–15)
MONOCYTES NFR BLD: 5.8 % (ref 4–15)
NEUTROPHILS # BLD AUTO: 14.9 K/UL (ref 1.8–7.7)
NEUTROPHILS # BLD AUTO: 17.5 K/UL (ref 1.8–7.7)
NEUTROPHILS NFR BLD: 80.4 % (ref 38–73)
NEUTROPHILS NFR BLD: 82.6 % (ref 38–73)
NITRITE UR QL STRIP: NEGATIVE
NRBC BLD-RTO: 0 /100 WBC
NRBC BLD-RTO: 0 /100 WBC
PH UR STRIP: 5 [PH] (ref 5–8)
PLATELET # BLD AUTO: 207 K/UL (ref 150–450)
PLATELET # BLD AUTO: 269 K/UL (ref 150–450)
PMV BLD AUTO: 9.3 FL (ref 9.2–12.9)
PMV BLD AUTO: 9.6 FL (ref 9.2–12.9)
POCT GLUCOSE: 92 MG/DL (ref 70–110)
POTASSIUM SERPL-SCNC: 4.4 MMOL/L (ref 3.5–5.1)
PROT SERPL-MCNC: 6.5 G/DL (ref 6–8.4)
PROT UR QL STRIP: NEGATIVE
RBC # BLD AUTO: 3.41 M/UL (ref 4.6–6.2)
RBC # BLD AUTO: 3.55 M/UL (ref 4.6–6.2)
SODIUM SERPL-SCNC: 145 MMOL/L (ref 136–145)
SP GR UR STRIP: 1.01 (ref 1–1.03)
URATE SERPL-MCNC: 10 MG/DL (ref 3.4–7)
URN SPEC COLLECT METH UR: ABNORMAL
WBC # BLD AUTO: 17.99 K/UL (ref 3.9–12.7)
WBC # BLD AUTO: 21.72 K/UL (ref 3.9–12.7)

## 2024-05-11 PROCEDURE — 25000003 PHARM REV CODE 250: Mod: HCNC

## 2024-05-11 PROCEDURE — 85025 COMPLETE CBC W/AUTO DIFF WBC: CPT | Mod: 91,HCNC | Performed by: STUDENT IN AN ORGANIZED HEALTH CARE EDUCATION/TRAINING PROGRAM

## 2024-05-11 PROCEDURE — 21400001 HC TELEMETRY ROOM: Mod: HCNC

## 2024-05-11 PROCEDURE — 84550 ASSAY OF BLOOD/URIC ACID: CPT | Mod: HCNC

## 2024-05-11 PROCEDURE — 85025 COMPLETE CBC W/AUTO DIFF WBC: CPT | Mod: HCNC | Performed by: EMERGENCY MEDICINE

## 2024-05-11 PROCEDURE — 85652 RBC SED RATE AUTOMATED: CPT | Mod: HCNC | Performed by: EMERGENCY MEDICINE

## 2024-05-11 PROCEDURE — 11000001 HC ACUTE MED/SURG PRIVATE ROOM: Mod: HCNC

## 2024-05-11 PROCEDURE — 80053 COMPREHEN METABOLIC PANEL: CPT | Mod: HCNC | Performed by: EMERGENCY MEDICINE

## 2024-05-11 PROCEDURE — 25000003 PHARM REV CODE 250: Mod: HCNC | Performed by: EMERGENCY MEDICINE

## 2024-05-11 PROCEDURE — 96365 THER/PROPH/DIAG IV INF INIT: CPT | Mod: HCNC

## 2024-05-11 PROCEDURE — 63600175 PHARM REV CODE 636 W HCPCS: Mod: HCNC | Performed by: EMERGENCY MEDICINE

## 2024-05-11 PROCEDURE — 81003 URINALYSIS AUTO W/O SCOPE: CPT | Mod: HCNC | Performed by: EMERGENCY MEDICINE

## 2024-05-11 PROCEDURE — 86140 C-REACTIVE PROTEIN: CPT | Mod: HCNC | Performed by: EMERGENCY MEDICINE

## 2024-05-11 PROCEDURE — 96367 TX/PROPH/DG ADDL SEQ IV INF: CPT | Mod: HCNC

## 2024-05-11 PROCEDURE — 99285 EMERGENCY DEPT VISIT HI MDM: CPT | Mod: 25,HCNC

## 2024-05-11 RX ORDER — PROCHLORPERAZINE EDISYLATE 5 MG/ML
5 INJECTION INTRAMUSCULAR; INTRAVENOUS EVERY 6 HOURS PRN
Status: DISCONTINUED | OUTPATIENT
Start: 2024-05-11 | End: 2024-05-21 | Stop reason: HOSPADM

## 2024-05-11 RX ORDER — HYDROCODONE BITARTRATE AND ACETAMINOPHEN 5; 325 MG/1; MG/1
1 TABLET ORAL EVERY 6 HOURS PRN
Status: DISCONTINUED | OUTPATIENT
Start: 2024-05-11 | End: 2024-05-19

## 2024-05-11 RX ORDER — DICLOFENAC SODIUM 10 MG/G
2 GEL TOPICAL DAILY PRN
Status: DISCONTINUED | OUTPATIENT
Start: 2024-05-11 | End: 2024-05-21 | Stop reason: HOSPADM

## 2024-05-11 RX ORDER — IBUPROFEN 200 MG
16 TABLET ORAL
Status: DISCONTINUED | OUTPATIENT
Start: 2024-05-11 | End: 2024-05-21 | Stop reason: HOSPADM

## 2024-05-11 RX ORDER — ASPIRIN 81 MG/1
81 TABLET ORAL DAILY
Status: DISCONTINUED | OUTPATIENT
Start: 2024-05-11 | End: 2024-05-21 | Stop reason: HOSPADM

## 2024-05-11 RX ORDER — GLUCAGON 1 MG
1 KIT INJECTION
Status: DISCONTINUED | OUTPATIENT
Start: 2024-05-11 | End: 2024-05-21 | Stop reason: HOSPADM

## 2024-05-11 RX ORDER — SODIUM CHLORIDE 0.9 % (FLUSH) 0.9 %
10 SYRINGE (ML) INJECTION EVERY 8 HOURS PRN
Status: DISCONTINUED | OUTPATIENT
Start: 2024-05-11 | End: 2024-05-21 | Stop reason: HOSPADM

## 2024-05-11 RX ORDER — ONDANSETRON 8 MG/1
8 TABLET, ORALLY DISINTEGRATING ORAL EVERY 8 HOURS PRN
Status: DISCONTINUED | OUTPATIENT
Start: 2024-05-11 | End: 2024-05-21 | Stop reason: HOSPADM

## 2024-05-11 RX ORDER — IBUPROFEN 200 MG
24 TABLET ORAL
Status: DISCONTINUED | OUTPATIENT
Start: 2024-05-11 | End: 2024-05-21 | Stop reason: HOSPADM

## 2024-05-11 RX ORDER — LANOLIN ALCOHOL/MO/W.PET/CERES
1 CREAM (GRAM) TOPICAL DAILY
Status: DISCONTINUED | OUTPATIENT
Start: 2024-05-11 | End: 2024-05-21 | Stop reason: HOSPADM

## 2024-05-11 RX ORDER — POLYETHYLENE GLYCOL 3350 17 G/17G
17 POWDER, FOR SOLUTION ORAL 2 TIMES DAILY
Status: DISCONTINUED | OUTPATIENT
Start: 2024-05-11 | End: 2024-05-21 | Stop reason: HOSPADM

## 2024-05-11 RX ORDER — TALC
6 POWDER (GRAM) TOPICAL NIGHTLY PRN
Status: DISCONTINUED | OUTPATIENT
Start: 2024-05-11 | End: 2024-05-21 | Stop reason: HOSPADM

## 2024-05-11 RX ORDER — LISINOPRIL 5 MG/1
5 TABLET ORAL DAILY
Status: DISCONTINUED | OUTPATIENT
Start: 2024-05-11 | End: 2024-05-21 | Stop reason: HOSPADM

## 2024-05-11 RX ORDER — NALOXONE HCL 0.4 MG/ML
0.02 VIAL (ML) INJECTION
Status: DISCONTINUED | OUTPATIENT
Start: 2024-05-11 | End: 2024-05-21 | Stop reason: HOSPADM

## 2024-05-11 RX ORDER — IPRATROPIUM BROMIDE AND ALBUTEROL SULFATE 2.5; .5 MG/3ML; MG/3ML
3 SOLUTION RESPIRATORY (INHALATION) EVERY 4 HOURS PRN
Status: DISCONTINUED | OUTPATIENT
Start: 2024-05-11 | End: 2024-05-21 | Stop reason: HOSPADM

## 2024-05-11 RX ORDER — POLYETHYLENE GLYCOL 3350 17 G/17G
17 POWDER, FOR SOLUTION ORAL 2 TIMES DAILY PRN
Status: DISCONTINUED | OUTPATIENT
Start: 2024-05-11 | End: 2024-05-11

## 2024-05-11 RX ORDER — CARVEDILOL 3.12 MG/1
3.12 TABLET ORAL 2 TIMES DAILY
Status: DISCONTINUED | OUTPATIENT
Start: 2024-05-11 | End: 2024-05-21 | Stop reason: HOSPADM

## 2024-05-11 RX ORDER — ALUMINUM HYDROXIDE, MAGNESIUM HYDROXIDE, AND SIMETHICONE 1200; 120; 1200 MG/30ML; MG/30ML; MG/30ML
30 SUSPENSION ORAL 4 TIMES DAILY PRN
Status: DISCONTINUED | OUTPATIENT
Start: 2024-05-11 | End: 2024-05-21 | Stop reason: HOSPADM

## 2024-05-11 RX ORDER — ACETAMINOPHEN 325 MG/1
650 TABLET ORAL EVERY 6 HOURS PRN
Status: DISCONTINUED | OUTPATIENT
Start: 2024-05-11 | End: 2024-05-19

## 2024-05-11 RX ORDER — ACETAMINOPHEN 500 MG
1000 TABLET ORAL
Status: COMPLETED | OUTPATIENT
Start: 2024-05-11 | End: 2024-05-11

## 2024-05-11 RX ORDER — SODIUM CHLORIDE 0.9 % (FLUSH) 0.9 %
10 SYRINGE (ML) INJECTION
Status: DISCONTINUED | OUTPATIENT
Start: 2024-05-11 | End: 2024-05-21 | Stop reason: HOSPADM

## 2024-05-11 RX ADMIN — CARVEDILOL 3.12 MG: 3.12 TABLET, FILM COATED ORAL at 10:05

## 2024-05-11 RX ADMIN — LISINOPRIL 5 MG: 5 TABLET ORAL at 10:05

## 2024-05-11 RX ADMIN — APIXABAN 2.5 MG: 2.5 TABLET, FILM COATED ORAL at 10:05

## 2024-05-11 RX ADMIN — ACETAMINOPHEN 1000 MG: 500 TABLET ORAL at 05:05

## 2024-05-11 RX ADMIN — POLYETHYLENE GLYCOL 3350 17 G: 17 POWDER, FOR SOLUTION ORAL at 10:05

## 2024-05-11 RX ADMIN — ASPIRIN 81 MG: 81 TABLET, COATED ORAL at 10:05

## 2024-05-11 RX ADMIN — VANCOMYCIN HYDROCHLORIDE 1500 MG: 1.5 INJECTION, POWDER, LYOPHILIZED, FOR SOLUTION INTRAVENOUS at 06:05

## 2024-05-11 RX ADMIN — FERROUS SULFATE TAB EC 325 MG (65 MG FE EQUIVALENT) 1 EACH: 325 (65 FE) TABLET DELAYED RESPONSE at 10:05

## 2024-05-11 RX ADMIN — THERA TABS 1 TABLET: TAB at 10:05

## 2024-05-11 RX ADMIN — PIPERACILLIN SODIUM AND TAZOBACTAM SODIUM 4.5 G: 4; .5 INJECTION, POWDER, FOR SOLUTION INTRAVENOUS at 05:05

## 2024-05-11 NOTE — ASSESSMENT & PLAN NOTE
Chronic, controlled. Latest blood pressure and vitals reviewed-     Temp:  [96.7 °F (35.9 °C)-99 °F (37.2 °C)]   Pulse:  [69-70]   Resp:  [17-20]   BP: (118-168)/(55-75)   SpO2:  [96 %-100 %] .   Home meds for hypertension were reviewed and noted below.   Hypertension Medications               benazepriL (LOTENSIN) 5 MG tablet Take 1 tablet (5 mg total) by mouth once daily.    carvediloL (COREG) 3.125 MG tablet Take 1 tablet (3.125 mg total) by mouth 2 (two) times daily.    nitroGLYCERIN (NITROSTAT) 0.4 MG SL tablet Take one tablet every 5 minutes for chest pain. After the third tablet go to the ED.    torsemide (DEMADEX) 20 MG Tab Take 1 tablet (20 mg total) by mouth 2 (two) times a day. HOLD UNTIL PCP FOLLOW UP            While in the hospital, will manage blood pressure as follows; Continue home antihypertensive regimen    Will utilize p.r.n. blood pressure medication only if patient's blood pressure greater than 180/110 and he develops symptoms such as worsening chest pain or shortness of breath.    - at time of recent d/c, lasix held. Could consider resuming given improvement of Cr.

## 2024-05-11 NOTE — NURSING
Nurses Note -- 4 Eyes      5/11/2024   6:49 PM      Skin assessed during: Admit      [] No Altered Skin Integrity Present    []Prevention Measures Documented      [x] Yes- Altered Skin Integrity Present or Discovered   [x] LDA Added if Not in Epic (Describe Wound)   [x] New Altered Skin Integrity was Present on Admit and Documented in LDA   [] Wound Image Taken    Wound Care Consulted? Yes    Attending Nurse:  Courtney Rg RN/Staff Member:  Hyacinth

## 2024-05-11 NOTE — H&P
"  [facility] - Emergency Dept  [facility]  History & Physical    Patient Name: Deena Moody  MRN: [id number]  Patient Class: IP- Inpatient  Admission Date: 5/11/2024  Attending Physician: Mike Watt DO   Primary Care Provider: Jam Rowell MD         Patient information was obtained from patient, past medical records, and ER records.     Subjective:     Principal Problem:Arm swelling    Chief Complaint:   Chief Complaint   Patient presents with    Arm Swelling     L arm swelling, recent dx of osteomyelitis in L arm, from [facility]        HPI: Deena Moody is a 89 y.o. male with PMHx HTN, HLD, CAD, diastolic HF, A-fib with pacemaker, CABG, hx of MI, AV block, and CKD 3, with recent admission to  for treatment of L hand gout flare (d/c 5/8/24), who presents back to [facility] ED from his nursing home with L upper arm swelling. During prior admission, pt initially thought to have osteomyelitis and started on BS abx. Later determined to be gout flare, managed with steroids by rheumatology. Patient has dementia at baseline and is unable to explain his situation. He complains that he feels bad and endorses muscle pain "all over," but otherwise cannot describe his symptoms. No family at bedside. He denies confusion, fevers or chills, headaches, chest pain, SOB, belly pain.     In ED: T 99F, WBC 21K (15K prior admission). VSS. Labs otherwise c/w stable chronic anemia, CKD. CRP 99.7 (prior ~60). ESR 78 (prior ~100). UA unremarkable. CXR without change from prior. XR LUE remarkable for "redemonstration of erosive change involving the head of the 2nd middle phalanx, previously demonstrated on hand CT and radiograph referenced above.  Findings could reflect underlying osteomyelitis or other inflammatory arthritidis in appears similar to prior exams." Otherwise XR of L hand, L humerus, L forearm without acute findings. Given tylenol, vanc/ zosyn.     Past Medical History:   Diagnosis Date    Acute on " chronic diastolic heart failure 9/1/2016    Coronary artery disease     Hyperlipidemia     Hypertension     Macular degeneration (senile) of retina, unspecified 12/12/2014    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    Persistent atrial fibrillation 11/10/2016    S/P placement of cardiac pacemaker 9/14/2016       Past Surgical History:   Procedure Laterality Date    AORTIC VALVE REPLACEMENT N/A     CARDIAC PACEMAKER PLACEMENT      CATARACT EXTRACTION W/  INTRAOCULAR LENS IMPLANT Right 2/16/2016    Dr. Whitley    CATARACT EXTRACTION W/  INTRAOCULAR LENS IMPLANT Left 3/1/2016    Dr. Whitley    CORONARY ARTERY BYPASS GRAFT      EAR EXAMINATION UNDER ANESTHESIA      EYE SURGERY         Review of patient's allergies indicates:   Allergen Reactions    Iodine and iodide containing products Other (See Comments)     Caused changes in skin color       No current facility-administered medications on file prior to encounter.     Current Outpatient Medications on File Prior to Encounter   Medication Sig    albuterol (PROVENTIL HFA) 90 mcg/actuation inhaler Inhale 2 puffs into the lungs every 6 (six) hours as needed for Wheezing. Rescue    apixaban (ELIQUIS) 2.5 mg Tab Take 1 tablet (2.5 mg total) by mouth 2 (two) times daily.    aspirin (ECOTRIN) 81 MG EC tablet Take 1 tablet (81 mg total) by mouth once daily.    benazepriL (LOTENSIN) 5 MG tablet Take 1 tablet (5 mg total) by mouth once daily.    carvediloL (COREG) 3.125 MG tablet Take 1 tablet (3.125 mg total) by mouth 2 (two) times daily.    diclofenac sodium (VOLTAREN) 1 % Gel Apply 2 g topically daily as needed (pain).    ferrous sulfate (FEOSOL) 325 mg (65 mg iron) Tab tablet Take 1 tablet (325 mg total) by mouth daily with breakfast.    fluticasone propionate (FLONASE) 50 mcg/actuation nasal spray 2 sprays (100 mcg total) by Each Nostril route once daily.    multivitamin (ONE DAILY MULTIVITAMIN) per tablet Take 1 tablet by mouth once daily.    nitroGLYCERIN (NITROSTAT) 0.4 MG SL  "tablet Take one tablet every 5 minutes for chest pain. After the third tablet go to the ED.    polyethylene glycol (GLYCOLAX) 17 gram/dose powder Dissolve one capful (17 g) in liquid by mouth 3 (three) times daily as needed. Take 1 three times daily until well formed stool    potassium chloride (KLOR-CON) 8 MEQ TbSR Take 1 tablet (8 mEq total) by mouth once daily.    torsemide (DEMADEX) 20 MG Tab Take 1 tablet (20 mg total) by mouth 2 (two) times a day. HOLD UNTIL PCP FOLLOW UP     Family History       Problem Relation (Age of Onset)    Hypertension Brother    No Known Problems Mother, Father, Sister, Maternal Aunt, Maternal Uncle, Paternal Aunt, Paternal Uncle, Maternal Grandmother, Maternal Grandfather, Paternal Grandmother, Paternal Grandfather, Daughter, Son    Stroke Brother          Tobacco Use    Smoking status: Never     Passive exposure: Never    Smokeless tobacco: Never   Substance and Sexual Activity    Alcohol use: No    Drug use: No    Sexual activity: Yes     Partners: Female     Review of Systems   Unable to perform ROS: Dementia ("feels bad")   Constitutional:  Negative for chills and fever.   Respiratory:  Negative for shortness of breath.    Cardiovascular:  Negative for chest pain.   Gastrointestinal:  Negative for abdominal pain.   Musculoskeletal:  Positive for arthralgias and myalgias.        Muscles hurt "all over"   Neurological:  Negative for headaches.   Psychiatric/Behavioral:  Negative for confusion.      Objective:     Vital Signs (Most Recent):  Temp: 96.7 °F (35.9 °C) (05/11/24 0915)  Pulse: 70 (05/11/24 0915)  Resp: 17 (05/11/24 0915)  BP: (!) 157/70 (05/11/24 0915)  SpO2: 99 % (05/11/24 0915) Vital Signs (24h Range):  Temp:  [96.7 °F (35.9 °C)-99 °F (37.2 °C)] 96.7 °F (35.9 °C)  Pulse:  [69-70] 70  Resp:  [17-20] 17  SpO2:  [96 %-100 %] 99 %  BP: (118-168)/(55-75) 157/70     Weight: 63.5 kg (140 lb)  Body mass index is 20.67 kg/m².     Physical Exam  Vitals and nursing note reviewed. "   Constitutional:       Appearance: He is well-developed. He is ill-appearing.      Comments: Thin  Uncomfortable, moaning during exam   HENT:      Head: Normocephalic and atraumatic.      Mouth/Throat:      Pharynx: No oropharyngeal exudate.   Eyes:      Conjunctiva/sclera: Conjunctivae normal.      Pupils: Pupils are equal, round, and reactive to light.   Cardiovascular:      Rate and Rhythm: Normal rate and regular rhythm.      Heart sounds: Normal heart sounds.   Pulmonary:      Effort: Pulmonary effort is normal. No respiratory distress.      Breath sounds: Normal breath sounds.   Abdominal:      General: Bowel sounds are normal. There is no distension.      Palpations: Abdomen is soft.      Tenderness: There is no abdominal tenderness.   Musculoskeletal:         General: Swelling and tenderness present.      Comments: Painful passive ROM of LUE at hand/ wrist/ elbow/ shoulder.   Mild edema of L hand, more significant edema noted to L prox forearm to elbow.   Poor participation in exam.    Lymphadenopathy:      Cervical: No cervical adenopathy.   Skin:     General: Skin is warm and dry.      Capillary Refill: Capillary refill takes less than 2 seconds.      Findings: No rash.   Neurological:      Mental Status: He is alert and oriented to person, place, and time. Mental status is at baseline.      Cranial Nerves: No cranial nerve deficit.      Sensory: No sensory deficit.      Coordination: Coordination normal.   Psychiatric:         Behavior: Behavior normal.      Comments: Agitated, wants to be left alone               CRANIAL NERVES     CN III, IV, VI   Pupils are equal, round, and reactive to light.       Significant Labs: All pertinent labs within the past 24 hours have been reviewed.  CBC:   Recent Labs   Lab 05/11/24 0244   WBC 21.72*   HGB 10.7*   HCT 34.7*        CMP:   Recent Labs   Lab 05/11/24 0244      K 4.4      CO2 24      BUN 70*   CREATININE 1.5*   CALCIUM 8.1*  "  PROT 6.5   ALBUMIN 2.5*   BILITOT 1.0   ALKPHOS 123   AST 33   ALT 24   ANIONGAP 13       Significant Imaging: I have reviewed all pertinent imaging results/findings within the past 24 hours.    Imaging Results              X-Ray Chest 1 View (Final result)  Result time 05/11/24 06:43:40      Final result by Torito Galloway MD (05/11/24 06:43:40)                   Impression:      No detrimental change when compared with 05/03/2024.      Electronically signed by: Torito Galloway MD  Date:    05/11/2024  Time:    06:43               Narrative:    EXAMINATION:  XR CHEST 1 VIEW    CLINICAL HISTORY:  Provided history is "  Elevated white blood cell count, unspecified".    TECHNIQUE:  One view of the chest.    COMPARISON:  05/03/2024.    FINDINGS:  Cardiomediastinal silhouette is enlarged and similar to the prior study.  Similar postoperative changes and cardiac pacing device.  Subsegmental opacities in the right lung base, with associated right-sided pleural effusion.  Possible trace left pleural fluid.  Aeration is similar when compared with recent prior study.                                       X-Ray Hand 3 View Left (Final result)  Result time 05/11/24 03:48:21      Final result by Chel Xie MD (05/11/24 03:48:21)                   Impression:      Please see above.      Electronically signed by: Chel Xie MD  Date:    05/11/2024  Time:    03:48               Narrative:    EXAMINATION:  XR HAND COMPLETE 3 VIEW LEFT    CLINICAL HISTORY:  left arm pain;.    TECHNIQUE:  PA, lateral, and oblique views of the left hand were performed.    COMPARISON:  Radiograph CT 04/28/2024; CT 04/30/2020 for    FINDINGS:  There is diffuse osteopenia.  There is redemonstration of erosive change involving the head of the 2nd middle phalanx, previously demonstrated on hand CT and radiograph referenced above.  Findings could reflect underlying osteomyelitis or other inflammatory arthritidis in appears similar to prior " exams.  Remaining visualized osseous structures appear intact and unchanged from most recent comparison exam noting chronic changes of the distal left radius and ulna and advanced degenerative change of the left thumb.  No definite radiographic evidence of new acute osseous abnormality.  Vascular calcifications are present.  No large retained radiopaque foreign body.  There is soft tissue swelling about the hand, similar to prior exam.                                       X-Ray Forearm Left (Final result)  Result time 05/11/24 03:42:01      Final result by Chel Xie MD (05/11/24 03:42:01)                   Impression:      No radiographic evidence of acute osseous injury.      Electronically signed by: Chel Xie MD  Date:    05/11/2024  Time:    03:42               Narrative:    EXAMINATION:  XR FOREARM LEFT    CLINICAL HISTORY:  Pain in left arm    TECHNIQUE:  AP and lateral views of the left forearm were performed.    COMPARISON:  None    FINDINGS:  There diffuse osteopenia.  Visualized osseous and articular structures appear grossly intact.  Alignment appears within normal limits.  Vascular calcifications are present.  No large retained radiopaque foreign body.                                       X-Ray Humerus 2 View Left (Final result)  Result time 05/11/24 03:43:24      Final result by Chel Xie MD (05/11/24 03:43:24)                   Impression:      Please see above.      Electronically signed by: Chel Xie MD  Date:    05/11/2024  Time:    03:43               Narrative:    EXAMINATION:  XR HUMERUS 2 VIEW LEFT    CLINICAL HISTORY:  Pain in left arm    TECHNIQUE:  AP and lateral views of the left humerus were performed.    COMPARISON:  None    FINDINGS:  Visualized osseous and articular structures appear intact without definite radiographic evidence of acute osseous injury.  The humeral head appears appropriately seated within the glenoid allowing for patient positioning.  The  "acromioclavicular joint appears maintained with degenerative arthrosis.  Partially visualized left-sided cardiac pacing device in place.  No large retained radiopaque foreign body in the visualized soft tissues.  Incidentally noted large volume of fecal material within the left colon.                                      Assessment/Plan:     CKD (chronic kidney disease), stage IV  Creatine stable for now. BMP reviewed- noted Estimated Creatinine Clearance: 27.6 mL/min (A) (based on SCr of 1.5 mg/dL (H)). according to latest data. 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  Monitor UOP and serial BMP and adjust therapy as needed. Renally dose meds. Avoid nephrotoxic medications and procedures.  - at time of admission, Cr 1.5 - improved from baseline (~1.6-2.3)    Arm swelling  Hx gout     Recent admission for the same, d/c 5/8. Treated initially for osteomyelitis with BS IV abx, then determined to be gout (joint asp wrist on 5/3 without evidence of infection) and managed with IV/ PO steroids. Evals by ID, ortho, rheum during last admission.     Today returns from NH with edema from hand to elbow, left. Painful ROM LUE including hand/ wrist/ elbow/ shoulder.   - WBC 21K (prior 15K), T 99 F   - HDS   - CRP 99.7 (elevated from prior), ESR 78 - will trend   - Imaging reviewed - XR L hand significant for "redemonstration of erosive change involving the head of the 2nd middle phalanx, previously demonstrated on hand CT and radiograph referenced above.  Findings could reflect underlying osteomyelitis or other inflammatory arthritidis in appears similar to prior exams."  - MRI L hand and elbow ordered   - uric acid level ordered   - consider consult services rheum v ortho pending MRI results   - prn pain control     Permanent atrial fibrillation  Patient with Permanent atrial fibrillation which is controlled currently with Calcium Channel Blocker. Patient is currently in atrial fibrillation.[id number] Score: 3. " Anticoagulation indicated. Anticoagulation done with eliquis 2.5 BID .    Chronic diastolic heart failure  - controlled   - currently holding lasix - consider resuming   - Results for orders placed during the hospital encounter of 06/14/23    Echo    Interpretation Summary  · The left ventricle is normal in size with concentric remodeling and normal systolic function.  · The estimated ejection fraction is 65%.  · Grade III left ventricular diastolic dysfunction.  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  · The aortic valve mean gradient is 16 mmHg with a dimensionless index of 0.39.  · Mild-to-moderate mitral regurgitation.  · Mild right ventricular enlargement with mildly to moderately reduced right ventricular systolic function.  · Mild tricuspid regurgitation.  · Estimated PASP is at least 65mmHg.  · Moderate left atrial enlargement.        CAD (coronary artery disease)  Patient with known CAD s/p stent placement and CABG, which is controlled Will continue ASA and monitor for S/Sx of angina/ACS.     Essential hypertension  Chronic, controlled. Latest blood pressure and vitals reviewed-     Temp:  [96.7 °F (35.9 °C)-99 °F (37.2 °C)]   Pulse:  [69-70]   Resp:  [17-20]   BP: (118-168)/(55-75)   SpO2:  [96 %-100 %] .   Home meds for hypertension were reviewed and noted below.   Hypertension Medications               benazepriL (LOTENSIN) 5 MG tablet Take 1 tablet (5 mg total) by mouth once daily.    carvediloL (COREG) 3.125 MG tablet Take 1 tablet (3.125 mg total) by mouth 2 (two) times daily.    nitroGLYCERIN (NITROSTAT) 0.4 MG SL tablet Take one tablet every 5 minutes for chest pain. After the third tablet go to the ED.    torsemide (DEMADEX) 20 MG Tab Take 1 tablet (20 mg total) by mouth 2 (two) times a day. HOLD UNTIL PCP FOLLOW UP            While in the hospital, will manage blood pressure as follows; Continue home antihypertensive regimen    Will utilize p.r.n. blood pressure medication only if  patient's blood pressure greater than 180/110 and he develops symptoms such as worsening chest pain or shortness of breath.    - at time of recent d/c, lasix held. Could consider resuming given improvement of Cr.       VTE Risk Mitigation (From admission, onward)           Ordered     apixaban tablet 2.5 mg  2 times daily         05/11/24 0945     IP VTE HIGH RISK PATIENT  Once         05/11/24 0835     Place sequential compression device  Until discontinued         05/11/24 0835     Place sequential compression device  Until discontinued         05/11/24 0835     Reason for No Pharmacological VTE Prophylaxis  Once        Question:  Reasons:  Answer:  Already adequately anticoagulated on oral Anticoagulants    05/11/24 0835                                    Xena Al PA-C  Department of Hospital Medicine  [facility] - Emergency Dept

## 2024-05-11 NOTE — ASSESSMENT & PLAN NOTE
"Hx gout     Recent admission for the same, d/c 5/8. Treated initially for osteomyelitis with BS IV abx, then determined to be gout (joint asp wrist on 5/3 without evidence of infection) and managed with IV/ PO steroids. Evals by ID, ortho, rheum during last admission.     Today returns from NH with edema from hand to elbow, left. Painful ROM LUE including hand/ wrist/ elbow/ shoulder.   - WBC 21K (prior 15K), T 99 F   - HDS   - CRP 99.7 (elevated from prior), ESR 78 - will trend   - Imaging reviewed - XR L hand significant for "redemonstration of erosive change involving the head of the 2nd middle phalanx, previously demonstrated on hand CT and radiograph referenced above.  Findings could reflect underlying osteomyelitis or other inflammatory arthritidis in appears similar to prior exams."  - MRI L hand and elbow ordered   - uric acid level ordered   - consider consult services rheum v ortho pending MRI results   - prn pain control   "

## 2024-05-11 NOTE — ASSESSMENT & PLAN NOTE
Patient with Permanent atrial fibrillation which is controlled currently with Calcium Channel Blocker. Patient is currently in atrial fibrillation.[id number] Score: 3. Anticoagulation indicated. Anticoagulation done with eliquis 2.5 BID .

## 2024-05-11 NOTE — ASSESSMENT & PLAN NOTE
Patient with known CAD s/p stent placement and CABG, which is controlled Will continue ASA and monitor for S/Sx of angina/ACS.

## 2024-05-11 NOTE — SUBJECTIVE & OBJECTIVE
Past Medical History:   Diagnosis Date    Acute on chronic diastolic heart failure 9/1/2016    Coronary artery disease     Hyperlipidemia     Hypertension     Macular degeneration (senile) of retina, unspecified 12/12/2014    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    Persistent atrial fibrillation 11/10/2016    S/P placement of cardiac pacemaker 9/14/2016       Past Surgical History:   Procedure Laterality Date    AORTIC VALVE REPLACEMENT N/A     CARDIAC PACEMAKER PLACEMENT      CATARACT EXTRACTION W/  INTRAOCULAR LENS IMPLANT Right 2/16/2016    Dr. Whitley    CATARACT EXTRACTION W/  INTRAOCULAR LENS IMPLANT Left 3/1/2016    Dr. Whitley    CORONARY ARTERY BYPASS GRAFT      EAR EXAMINATION UNDER ANESTHESIA      EYE SURGERY         Review of patient's allergies indicates:   Allergen Reactions    Iodine and iodide containing products Other (See Comments)     Caused changes in skin color       No current facility-administered medications on file prior to encounter.     Current Outpatient Medications on File Prior to Encounter   Medication Sig    albuterol (PROVENTIL HFA) 90 mcg/actuation inhaler Inhale 2 puffs into the lungs every 6 (six) hours as needed for Wheezing. Rescue    apixaban (ELIQUIS) 2.5 mg Tab Take 1 tablet (2.5 mg total) by mouth 2 (two) times daily.    aspirin (ECOTRIN) 81 MG EC tablet Take 1 tablet (81 mg total) by mouth once daily.    benazepriL (LOTENSIN) 5 MG tablet Take 1 tablet (5 mg total) by mouth once daily.    carvediloL (COREG) 3.125 MG tablet Take 1 tablet (3.125 mg total) by mouth 2 (two) times daily.    diclofenac sodium (VOLTAREN) 1 % Gel Apply 2 g topically daily as needed (pain).    ferrous sulfate (FEOSOL) 325 mg (65 mg iron) Tab tablet Take 1 tablet (325 mg total) by mouth daily with breakfast.    fluticasone propionate (FLONASE) 50 mcg/actuation nasal spray 2 sprays (100 mcg total) by Each Nostril route once daily.    multivitamin (ONE DAILY MULTIVITAMIN) per tablet Take 1 tablet by mouth  "once daily.    nitroGLYCERIN (NITROSTAT) 0.4 MG SL tablet Take one tablet every 5 minutes for chest pain. After the third tablet go to the ED.    polyethylene glycol (GLYCOLAX) 17 gram/dose powder Dissolve one capful (17 g) in liquid by mouth 3 (three) times daily as needed. Take 1 three times daily until well formed stool    potassium chloride (KLOR-CON) 8 MEQ TbSR Take 1 tablet (8 mEq total) by mouth once daily.    torsemide (DEMADEX) 20 MG Tab Take 1 tablet (20 mg total) by mouth 2 (two) times a day. HOLD UNTIL PCP FOLLOW UP     Family History       Problem Relation (Age of Onset)    Hypertension Brother    No Known Problems Mother, Father, Sister, Maternal Aunt, Maternal Uncle, Paternal Aunt, Paternal Uncle, Maternal Grandmother, Maternal Grandfather, Paternal Grandmother, Paternal Grandfather, Daughter, Son    Stroke Brother          Tobacco Use    Smoking status: Never     Passive exposure: Never    Smokeless tobacco: Never   Substance and Sexual Activity    Alcohol use: No    Drug use: No    Sexual activity: Yes     Partners: Female     Review of Systems   Unable to perform ROS: Dementia ("feels bad")   Constitutional:  Negative for chills and fever.   Respiratory:  Negative for shortness of breath.    Cardiovascular:  Negative for chest pain.   Gastrointestinal:  Negative for abdominal pain.   Musculoskeletal:  Positive for arthralgias and myalgias.        Muscles hurt "all over"   Neurological:  Negative for headaches.   Psychiatric/Behavioral:  Negative for confusion.      Objective:     Vital Signs (Most Recent):  Temp: 96.7 °F (35.9 °C) (05/11/24 0915)  Pulse: 70 (05/11/24 0915)  Resp: 17 (05/11/24 0915)  BP: (!) 157/70 (05/11/24 0915)  SpO2: 99 % (05/11/24 0915) Vital Signs (24h Range):  Temp:  [96.7 °F (35.9 °C)-99 °F (37.2 °C)] 96.7 °F (35.9 °C)  Pulse:  [69-70] 70  Resp:  [17-20] 17  SpO2:  [96 %-100 %] 99 %  BP: (118-168)/(55-75) 157/70     Weight: 63.5 kg (140 lb)  Body mass index is 20.67 kg/m².   "   Physical Exam  Vitals and nursing note reviewed.   Constitutional:       Appearance: He is well-developed. He is ill-appearing.      Comments: Thin  Uncomfortable, moaning during exam   HENT:      Head: Normocephalic and atraumatic.      Mouth/Throat:      Pharynx: No oropharyngeal exudate.   Eyes:      Conjunctiva/sclera: Conjunctivae normal.      Pupils: Pupils are equal, round, and reactive to light.   Cardiovascular:      Rate and Rhythm: Normal rate and regular rhythm.      Heart sounds: Normal heart sounds.   Pulmonary:      Effort: Pulmonary effort is normal. No respiratory distress.      Breath sounds: Normal breath sounds.   Abdominal:      General: Bowel sounds are normal. There is no distension.      Palpations: Abdomen is soft.      Tenderness: There is no abdominal tenderness.   Musculoskeletal:         General: Swelling and tenderness present.      Comments: Painful passive ROM of LUE at hand/ wrist/ elbow/ shoulder.   Mild edema of L hand, more significant edema noted to L prox forearm to elbow.   Poor participation in exam.    Lymphadenopathy:      Cervical: No cervical adenopathy.   Skin:     General: Skin is warm and dry.      Capillary Refill: Capillary refill takes less than 2 seconds.      Findings: No rash.   Neurological:      Mental Status: He is alert and oriented to person, place, and time. Mental status is at baseline.      Cranial Nerves: No cranial nerve deficit.      Sensory: No sensory deficit.      Coordination: Coordination normal.   Psychiatric:         Behavior: Behavior normal.      Comments: Agitated, wants to be left alone               CRANIAL NERVES     CN III, IV, VI   Pupils are equal, round, and reactive to light.       Significant Labs: All pertinent labs within the past 24 hours have been reviewed.  CBC:   Recent Labs   Lab 05/11/24 0244   WBC 21.72*   HGB 10.7*   HCT 34.7*        CMP:   Recent Labs   Lab 05/11/24 0244      K 4.4      CO2 24   GLU  "110   BUN 70*   CREATININE 1.5*   CALCIUM 8.1*   PROT 6.5   ALBUMIN 2.5*   BILITOT 1.0   ALKPHOS 123   AST 33   ALT 24   ANIONGAP 13       Significant Imaging: I have reviewed all pertinent imaging results/findings within the past 24 hours.    Imaging Results              X-Ray Chest 1 View (Final result)  Result time 05/11/24 06:43:40      Final result by Torito Galloway MD (05/11/24 06:43:40)                   Impression:      No detrimental change when compared with 05/03/2024.      Electronically signed by: Torito Galloway MD  Date:    05/11/2024  Time:    06:43               Narrative:    EXAMINATION:  XR CHEST 1 VIEW    CLINICAL HISTORY:  Provided history is "  Elevated white blood cell count, unspecified".    TECHNIQUE:  One view of the chest.    COMPARISON:  05/03/2024.    FINDINGS:  Cardiomediastinal silhouette is enlarged and similar to the prior study.  Similar postoperative changes and cardiac pacing device.  Subsegmental opacities in the right lung base, with associated right-sided pleural effusion.  Possible trace left pleural fluid.  Aeration is similar when compared with recent prior study.                                       X-Ray Hand 3 View Left (Final result)  Result time 05/11/24 03:48:21      Final result by Chel Xie MD (05/11/24 03:48:21)                   Impression:      Please see above.      Electronically signed by: Chel Xie MD  Date:    05/11/2024  Time:    03:48               Narrative:    EXAMINATION:  XR HAND COMPLETE 3 VIEW LEFT    CLINICAL HISTORY:  left arm pain;.    TECHNIQUE:  PA, lateral, and oblique views of the left hand were performed.    COMPARISON:  Radiograph CT 04/28/2024; CT 04/30/2020 for    FINDINGS:  There is diffuse osteopenia.  There is redemonstration of erosive change involving the head of the 2nd middle phalanx, previously demonstrated on hand CT and radiograph referenced above.  Findings could reflect underlying osteomyelitis or other " inflammatory arthritidis in appears similar to prior exams.  Remaining visualized osseous structures appear intact and unchanged from most recent comparison exam noting chronic changes of the distal left radius and ulna and advanced degenerative change of the left thumb.  No definite radiographic evidence of new acute osseous abnormality.  Vascular calcifications are present.  No large retained radiopaque foreign body.  There is soft tissue swelling about the hand, similar to prior exam.                                       X-Ray Forearm Left (Final result)  Result time 05/11/24 03:42:01      Final result by Chel Xie MD (05/11/24 03:42:01)                   Impression:      No radiographic evidence of acute osseous injury.      Electronically signed by: Chel Xie MD  Date:    05/11/2024  Time:    03:42               Narrative:    EXAMINATION:  XR FOREARM LEFT    CLINICAL HISTORY:  Pain in left arm    TECHNIQUE:  AP and lateral views of the left forearm were performed.    COMPARISON:  None    FINDINGS:  There diffuse osteopenia.  Visualized osseous and articular structures appear grossly intact.  Alignment appears within normal limits.  Vascular calcifications are present.  No large retained radiopaque foreign body.                                       X-Ray Humerus 2 View Left (Final result)  Result time 05/11/24 03:43:24      Final result by Chel Xie MD (05/11/24 03:43:24)                   Impression:      Please see above.      Electronically signed by: Chel Xie MD  Date:    05/11/2024  Time:    03:43               Narrative:    EXAMINATION:  XR HUMERUS 2 VIEW LEFT    CLINICAL HISTORY:  Pain in left arm    TECHNIQUE:  AP and lateral views of the left humerus were performed.    COMPARISON:  None    FINDINGS:  Visualized osseous and articular structures appear intact without definite radiographic evidence of acute osseous injury.  The humeral head appears appropriately seated within the  glenoid allowing for patient positioning.  The acromioclavicular joint appears maintained with degenerative arthrosis.  Partially visualized left-sided cardiac pacing device in place.  No large retained radiopaque foreign body in the visualized soft tissues.  Incidentally noted large volume of fecal material within the left colon.

## 2024-05-11 NOTE — ED PROVIDER NOTES
Encounter Date: 5/11/2024       History     Chief Complaint   Patient presents with    Arm Swelling     L arm swelling, recent dx of osteomyelitis in L arm, from [facility]     89 y.o. male with PMHx HTN, HLD, CAD, diastolic HF, A-fib with pacemaker, CABG, hx of MI, AV block, and CKD 3 who presents to [facility] ED with chief complaint of upper arm swelling.  Patient reports to have dementia at baseline, alert and oriented x3, however, unable to explain his situation.  Per chart review, patient was discharged from the hospital 3 days ago for osteomyelitis to his left hand.  Today patient found to have increased swelling to his elbow, and pain to range of motion, which prompted him to be transferred to the ED for further evaluation.  No other reports nursing home or complaints from the patient.        Review of patient's allergies indicates:   Allergen Reactions    Iodine and iodide containing products Other (See Comments)     Caused changes in skin color     Past Medical History:   Diagnosis Date    Acute on chronic diastolic heart failure 9/1/2016    Coronary artery disease     Hyperlipidemia     Hypertension     Macular degeneration (senile) of retina, unspecified 12/12/2014    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    Persistent atrial fibrillation 11/10/2016    S/P placement of cardiac pacemaker 9/14/2016     Past Surgical History:   Procedure Laterality Date    AORTIC VALVE REPLACEMENT N/A     CARDIAC PACEMAKER PLACEMENT      CATARACT EXTRACTION W/  INTRAOCULAR LENS IMPLANT Right 2/16/2016    Dr. Whitley    CATARACT EXTRACTION W/  INTRAOCULAR LENS IMPLANT Left 3/1/2016    Dr. Whitley    CORONARY ARTERY BYPASS GRAFT      EAR EXAMINATION UNDER ANESTHESIA      EYE SURGERY       Family History   Problem Relation Name Age of Onset    No Known Problems Mother      No Known Problems Father      No Known Problems Sister x4     Stroke Brother x3     Hypertension Brother x3     No Known Problems Maternal Aunt      No Known  Problems Maternal Uncle      No Known Problems Paternal Aunt      No Known Problems Paternal Uncle      No Known Problems Maternal Grandmother      No Known Problems Maternal Grandfather      No Known Problems Paternal Grandmother      No Known Problems Paternal Grandfather      No Known Problems Daughter      No Known Problems Son      Amblyopia Neg Hx      Blindness Neg Hx      Cancer Neg Hx      Cataracts Neg Hx      Diabetes Neg Hx      Glaucoma Neg Hx      Macular degeneration Neg Hx      Retinal detachment Neg Hx      Strabismus Neg Hx      Thyroid disease Neg Hx       Social History     Tobacco Use    Smoking status: Never     Passive exposure: Never    Smokeless tobacco: Never   Substance Use Topics    Alcohol use: No    Drug use: No     Review of Systems    Physical Exam     Initial Vitals [05/11/24 0128]   BP Pulse Resp Temp SpO2   118/60 69 20 99 °F (37.2 °C) 96 %      MAP       --         Physical Exam    Nursing note and vitals reviewed.  Constitutional: He appears well-developed. No distress.   HENT:   Head: Normocephalic and atraumatic.   Nose: Nose normal.   Eyes: Conjunctivae and EOM are normal. Pupils are equal, round, and reactive to light.   Neck: No JVD present.   Normal range of motion.  Cardiovascular:  Normal rate, regular rhythm and normal heart sounds.           Pulmonary/Chest: Breath sounds normal. No respiratory distress.   Abdominal: Abdomen is soft. He exhibits no distension. There is no abdominal tenderness.   Musculoskeletal:         General: Tenderness and edema (Left elbow) present. Normal range of motion.      Cervical back: Normal range of motion.     Neurological: He is alert and oriented to person, place, and time. He has normal strength. No cranial nerve deficit.   Skin: Skin is warm and dry. Capillary refill takes less than 2 seconds.         ED Course   Procedures  Labs Reviewed   CBC W/ AUTO DIFFERENTIAL - Abnormal; Notable for the following components:       Result Value  "   WBC 21.72 (*)     RBC 3.55 (*)     Hemoglobin 10.7 (*)     Hematocrit 34.7 (*)     MCHC 30.8 (*)     RDW 14.6 (*)     Immature Granulocytes 1.9 (*)     Gran # (ANC) 17.5 (*)     Immature Grans (Abs) 0.41 (*)     Mono # 1.3 (*)     Gran % 80.4 (*)     Lymph % 10.5 (*)     All other components within normal limits   COMPREHENSIVE METABOLIC PANEL - Abnormal; Notable for the following components:    BUN 70 (*)     Creatinine 1.5 (*)     Calcium 8.1 (*)     Albumin 2.5 (*)     eGFR 43.1 (*)     All other components within normal limits   SEDIMENTATION RATE - Abnormal; Notable for the following components:    Sed Rate 78 (*)     All other components within normal limits   C-REACTIVE PROTEIN - Abnormal; Notable for the following components:    CRP 99.7 (*)     All other components within normal limits   URINALYSIS, REFLEX TO URINE CULTURE - Abnormal; Notable for the following components:    Occult Blood UA Trace (*)     All other components within normal limits    Narrative:     Specimen Source->Urine          Imaging Results              X-Ray Chest 1 View (Final result)  Result time 05/11/24 06:43:40      Final result by Torito Galloway MD (05/11/24 06:43:40)                   Impression:      No detrimental change when compared with 05/03/2024.      Electronically signed by: Torito Galloway MD  Date:    05/11/2024  Time:    06:43               Narrative:    EXAMINATION:  XR CHEST 1 VIEW    CLINICAL HISTORY:  Provided history is "  Elevated white blood cell count, unspecified".    TECHNIQUE:  One view of the chest.    COMPARISON:  05/03/2024.    FINDINGS:  Cardiomediastinal silhouette is enlarged and similar to the prior study.  Similar postoperative changes and cardiac pacing device.  Subsegmental opacities in the right lung base, with associated right-sided pleural effusion.  Possible trace left pleural fluid.  Aeration is similar when compared with recent prior study.                                       X-Ray " Hand 3 View Left (Final result)  Result time 05/11/24 03:48:21      Final result by Chel Xie MD (05/11/24 03:48:21)                   Impression:      Please see above.      Electronically signed by: Chel Xie MD  Date:    05/11/2024  Time:    03:48               Narrative:    EXAMINATION:  XR HAND COMPLETE 3 VIEW LEFT    CLINICAL HISTORY:  left arm pain;.    TECHNIQUE:  PA, lateral, and oblique views of the left hand were performed.    COMPARISON:  Radiograph CT 04/28/2024; CT 04/30/2020 for    FINDINGS:  There is diffuse osteopenia.  There is redemonstration of erosive change involving the head of the 2nd middle phalanx, previously demonstrated on hand CT and radiograph referenced above.  Findings could reflect underlying osteomyelitis or other inflammatory arthritidis in appears similar to prior exams.  Remaining visualized osseous structures appear intact and unchanged from most recent comparison exam noting chronic changes of the distal left radius and ulna and advanced degenerative change of the left thumb.  No definite radiographic evidence of new acute osseous abnormality.  Vascular calcifications are present.  No large retained radiopaque foreign body.  There is soft tissue swelling about the hand, similar to prior exam.                                       X-Ray Forearm Left (Final result)  Result time 05/11/24 03:42:01      Final result by Chel Xie MD (05/11/24 03:42:01)                   Impression:      No radiographic evidence of acute osseous injury.      Electronically signed by: Chel Xie MD  Date:    05/11/2024  Time:    03:42               Narrative:    EXAMINATION:  XR FOREARM LEFT    CLINICAL HISTORY:  Pain in left arm    TECHNIQUE:  AP and lateral views of the left forearm were performed.    COMPARISON:  None    FINDINGS:  There diffuse osteopenia.  Visualized osseous and articular structures appear grossly intact.  Alignment appears within normal limits.  Vascular  calcifications are present.  No large retained radiopaque foreign body.                                       X-Ray Humerus 2 View Left (Final result)  Result time 05/11/24 03:43:24      Final result by Chel Xie MD (05/11/24 03:43:24)                   Impression:      Please see above.      Electronically signed by: Chel Xie MD  Date:    05/11/2024  Time:    03:43               Narrative:    EXAMINATION:  XR HUMERUS 2 VIEW LEFT    CLINICAL HISTORY:  Pain in left arm    TECHNIQUE:  AP and lateral views of the left humerus were performed.    COMPARISON:  None    FINDINGS:  Visualized osseous and articular structures appear intact without definite radiographic evidence of acute osseous injury.  The humeral head appears appropriately seated within the glenoid allowing for patient positioning.  The acromioclavicular joint appears maintained with degenerative arthrosis.  Partially visualized left-sided cardiac pacing device in place.  No large retained radiopaque foreign body in the visualized soft tissues.  Incidentally noted large volume of fecal material within the left colon.                                       Medications   vancomycin 1,500 mg in dextrose 5 % (D5W) 250 mL IVPB (Vial-Mate) (1,500 mg Intravenous New Bag 5/11/24 0613)   acetaminophen tablet 1,000 mg (1,000 mg Oral Given 5/11/24 0534)   piperacillin-tazobactam (ZOSYN) 4.5 g in dextrose 5 % in water (D5W) 100 mL IVPB (MB+) (0 g Intravenous Stopped 5/11/24 0606)     Medical Decision Making  89 y.o. male with PMHx HTN, HLD, CAD, diastolic HF, A-fib with pacemaker, CABG, hx of MI, AV block, and CKD 3 who presents to [facility] ED with chief complaint of upper arm swelling.  Physical exam significant for left upper arm swelling, boggy to the touch, pain with active and passive range of motion, no erythema or abnormal discharge, no obvious deformity.  Differential including but not limited to dependent edema, cellulitis, septic joint, osteomyelitis,  acute fracture, doubt dislocation    Amount and/or Complexity of Data Reviewed  External Data Reviewed: notes.  Labs: ordered. Decision-making details documented in ED Course.  Radiology: ordered and independent interpretation performed. Decision-making details documented in ED Course.    Risk  OTC drugs.  Prescription drug management.  Decision regarding hospitalization.              Attending Attestation:   Physician Attestation Statement for Resident:  As the supervising MD   Physician Attestation Statement: I have personally seen and examined this patient.   I agree with the above history.  -:   As the supervising MD I agree with the above PE.     As the supervising MD I agree with the above treatment, course, plan, and disposition.                    ED Course as of 05/11/24 0725   Sat May 11, 2024   0724 CBC auto differential(!)  Worsening leukocytosis [NC]   0724 Hemoglobin(!): 10.7 [NC]   0724 Comprehensive metabolic panel(!)  No electrolyte derangement, renal function appear to be improved [NC]   0724 CRP(!): 99.7  Worsening inflammatory marker [NC]   0724 Discussed with Hospital Medicine, will admit patient for IV antibiotics, concerning for osteomyelitis, versus cellulitis, versus septic shock, will need orthopedic surgery evaluation. [NC]      ED Course User Index  [NC] Brandon Mares MD                           Clinical Impression:  Final diagnoses:  [M79.602] Left arm pain  [D72.829] Leukocytosis  [M79.89] Arm swelling (Primary)  [M86.9] Osteomyelitis of left arm          ED Disposition Condition    Admit                 Brandon Mares MD  Resident  05/11/24 0725       Karine Gifford MD  05/11/24 0038

## 2024-05-11 NOTE — ED TRIAGE NOTES
Deena Moody, a 89 y.o. male presents to the ED w/ complaint of arm swellng. Pt complains of left arm swelling and shows DTPI in left foot. Hx of osteomyelitis in left arm.     Triage note:  Chief Complaint   Patient presents with    Arm Swelling     L arm swelling, recent dx of osteomyelitis in L arm, from [facility]     Review of patient's allergies indicates:   Allergen Reactions    Iodine and iodide containing products Other (See Comments)     Caused changes in skin color     Past Medical History:   Diagnosis Date    Acute on chronic diastolic heart failure 9/1/2016    Coronary artery disease     Hyperlipidemia     Hypertension     Macular degeneration (senile) of retina, unspecified 12/12/2014    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    Persistent atrial fibrillation 11/10/2016    S/P placement of cardiac pacemaker 9/14/2016

## 2024-05-11 NOTE — HPI
"Deena Moody is a 89 y.o. male with PMHx HTN, HLD, CAD, diastolic HF, A-fib with pacemaker, CABG, hx of MI, AV block, and CKD 3, with recent admission to  for treatment of L hand gout flare (d/c 5/8/24), who presents back to [facility] ED from his nursing home with L upper arm swelling. During prior admission, pt initially thought to have osteomyelitis and started on BS abx. Later determined to be gout flare, managed with steroids by rheumatology. Patient has dementia at baseline and is unable to explain his situation. He complains that he feels bad and endorses muscle pain "all over," but otherwise cannot describe his symptoms. No family at bedside. He denies confusion, fevers or chills, headaches, chest pain, SOB, belly pain.     In ED: T 99F, WBC 21K (15K prior admission). VSS. Labs otherwise c/w stable chronic anemia, CKD. CRP 99.7 (prior ~60). ESR 78 (prior ~100). UA unremarkable. CXR without change from prior. XR LUE remarkable for "redemonstration of erosive change involving the head of the 2nd middle phalanx, previously demonstrated on hand CT and radiograph referenced above.  Findings could reflect underlying osteomyelitis or other inflammatory arthritidis in appears similar to prior exams." Otherwise XR of L hand, L humerus, L forearm without acute findings. Given tylenol, vanc/ zosyn.   "

## 2024-05-11 NOTE — ASSESSMENT & PLAN NOTE
- controlled   - currently holding lasix - consider resuming   - Results for orders placed during the hospital encounter of 06/14/23    Echo    Interpretation Summary  · The left ventricle is normal in size with concentric remodeling and normal systolic function.  · The estimated ejection fraction is 65%.  · Grade III left ventricular diastolic dysfunction.  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  · The aortic valve mean gradient is 16 mmHg with a dimensionless index of 0.39.  · Mild-to-moderate mitral regurgitation.  · Mild right ventricular enlargement with mildly to moderately reduced right ventricular systolic function.  · Mild tricuspid regurgitation.  · Estimated PASP is at least 65mmHg.  · Moderate left atrial enlargement.

## 2024-05-11 NOTE — ASSESSMENT & PLAN NOTE
Creatine stable for now. BMP reviewed- noted Estimated Creatinine Clearance: 27.6 mL/min (A) (based on SCr of 1.5 mg/dL (H)). according to latest data. 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  Monitor UOP and serial BMP and adjust therapy as needed. Renally dose meds. Avoid nephrotoxic medications and procedures.  - at time of admission, Cr 1.5 - improved from baseline (~1.6-2.3)

## 2024-05-11 NOTE — HOSPITAL COURSE
Patient admitted to [facility] medicine of 5/11 with ongoing left upper extremity swelling with worsening leukocytosis and CRP. Afebrile and HDS on arrival. On prior admit, there was concern for OM for which ortho and ID were consulted. Ultimately, a joint aspiration was performed with findings c/w gout. Ortho and ID signed off and all antimicrobials were discontinued. Rheum was consulted and steroids were initiated- IV first, then oral. Recent LUE US completed without DVT. MRI was never able to be obtained given PPM incompatibility. Repeat CT of the left hand and elbow were obtained on this admission notable for juxta-articular erosive change at the elbow with marked soft tissue swelling about the elbow most pronounced at the dorsal aspect. Findings may reflect gout arthropathy with diffuse soft tissue edema/anasarca. Soft tissue infection and osteomyelitis could appear similarly. Probable joint effusion. Scattered erosive and arthritic changes of the hand and wrist grossly similar to prior allowing for limitation of exam.  Findings are suggestive of gout arthropathy in light of history. Osteomyelitis could appear similarly. Mild generalized subcutaneous edema. Pathologic fracture of the distal middle phalanx of the 2nd digit.     Ultimately, rheumatology re- consulted given recurrent admission for the same. Do not suspect cellulitis based on clinical exam. Will see if they would like another joint tap/fluid sample analysis.

## 2024-05-12 LAB
ALBUMIN SERPL BCP-MCNC: 2.1 G/DL (ref 3.5–5.2)
ALP SERPL-CCNC: 97 U/L (ref 55–135)
ALT SERPL W/O P-5'-P-CCNC: 17 U/L (ref 10–44)
ANION GAP SERPL CALC-SCNC: 11 MMOL/L (ref 8–16)
AST SERPL-CCNC: 28 U/L (ref 10–40)
BASOPHILS # BLD AUTO: 0.01 K/UL (ref 0–0.2)
BASOPHILS NFR BLD: 0.1 % (ref 0–1.9)
BILIRUB SERPL-MCNC: 1 MG/DL (ref 0.1–1)
BUN SERPL-MCNC: 72 MG/DL (ref 10–30)
CALCIUM SERPL-MCNC: 7.8 MG/DL (ref 8.7–10.5)
CHLORIDE SERPL-SCNC: 106 MMOL/L (ref 95–110)
CO2 SERPL-SCNC: 25 MMOL/L (ref 23–29)
CREAT SERPL-MCNC: 1.4 MG/DL (ref 0.5–1.4)
DIFFERENTIAL METHOD BLD: ABNORMAL
EOSINOPHIL # BLD AUTO: 0.2 K/UL (ref 0–0.5)
EOSINOPHIL NFR BLD: 1.2 % (ref 0–8)
ERYTHROCYTE [DISTWIDTH] IN BLOOD BY AUTOMATED COUNT: 14.6 % (ref 11.5–14.5)
EST. GFR  (NO RACE VARIABLE): 46.9 ML/MIN/1.73 M^2
GLUCOSE SERPL-MCNC: 73 MG/DL (ref 70–110)
HCT VFR BLD AUTO: 30.3 % (ref 40–54)
HGB BLD-MCNC: 9 G/DL (ref 14–18)
IMM GRANULOCYTES # BLD AUTO: 0.16 K/UL (ref 0–0.04)
IMM GRANULOCYTES NFR BLD AUTO: 0.9 % (ref 0–0.5)
LYMPHOCYTES # BLD AUTO: 1.4 K/UL (ref 1–4.8)
LYMPHOCYTES NFR BLD: 7.6 % (ref 18–48)
MAGNESIUM SERPL-MCNC: 2 MG/DL (ref 1.6–2.6)
MCH RBC QN AUTO: 29.9 PG (ref 27–31)
MCHC RBC AUTO-ENTMCNC: 29.7 G/DL (ref 32–36)
MCV RBC AUTO: 101 FL (ref 82–98)
MONOCYTES # BLD AUTO: 1 K/UL (ref 0.3–1)
MONOCYTES NFR BLD: 5.5 % (ref 4–15)
NEUTROPHILS # BLD AUTO: 15.4 K/UL (ref 1.8–7.7)
NEUTROPHILS NFR BLD: 84.7 % (ref 38–73)
NRBC BLD-RTO: 0 /100 WBC
PHOSPHATE SERPL-MCNC: 3.6 MG/DL (ref 2.7–4.5)
PLATELET # BLD AUTO: 238 K/UL (ref 150–450)
PMV BLD AUTO: 9.7 FL (ref 9.2–12.9)
POTASSIUM SERPL-SCNC: 3.7 MMOL/L (ref 3.5–5.1)
PROT SERPL-MCNC: 5.6 G/DL (ref 6–8.4)
RBC # BLD AUTO: 3.01 M/UL (ref 4.6–6.2)
SODIUM SERPL-SCNC: 142 MMOL/L (ref 136–145)
WBC # BLD AUTO: 18.12 K/UL (ref 3.9–12.7)

## 2024-05-12 PROCEDURE — 80053 COMPREHEN METABOLIC PANEL: CPT | Mod: HCNC

## 2024-05-12 PROCEDURE — 83735 ASSAY OF MAGNESIUM: CPT | Mod: HCNC

## 2024-05-12 PROCEDURE — 36415 COLL VENOUS BLD VENIPUNCTURE: CPT | Mod: HCNC

## 2024-05-12 PROCEDURE — 25000003 PHARM REV CODE 250: Mod: HCNC

## 2024-05-12 PROCEDURE — 25000003 PHARM REV CODE 250: Mod: HCNC | Performed by: HOSPITALIST

## 2024-05-12 PROCEDURE — 99223 1ST HOSP IP/OBS HIGH 75: CPT | Mod: HCNC,GC,, | Performed by: INTERNAL MEDICINE

## 2024-05-12 PROCEDURE — 63600175 PHARM REV CODE 636 W HCPCS: Mod: HCNC | Performed by: STUDENT IN AN ORGANIZED HEALTH CARE EDUCATION/TRAINING PROGRAM

## 2024-05-12 PROCEDURE — 11000001 HC ACUTE MED/SURG PRIVATE ROOM: Mod: HCNC

## 2024-05-12 PROCEDURE — 25000003 PHARM REV CODE 250: Mod: HCNC | Performed by: STUDENT IN AN ORGANIZED HEALTH CARE EDUCATION/TRAINING PROGRAM

## 2024-05-12 PROCEDURE — 25000003 PHARM REV CODE 250: Mod: HCNC | Performed by: EMERGENCY MEDICINE

## 2024-05-12 PROCEDURE — 85025 COMPLETE CBC W/AUTO DIFF WBC: CPT | Mod: HCNC

## 2024-05-12 PROCEDURE — 84100 ASSAY OF PHOSPHORUS: CPT | Mod: HCNC

## 2024-05-12 PROCEDURE — 21400001 HC TELEMETRY ROOM: Mod: HCNC

## 2024-05-12 RX ORDER — COLCHICINE 0.6 MG/1
0.6 TABLET, FILM COATED ORAL EVERY OTHER DAY
Status: DISCONTINUED | OUTPATIENT
Start: 2024-05-12 | End: 2024-05-21 | Stop reason: HOSPADM

## 2024-05-12 RX ORDER — MORPHINE SULFATE 2 MG/ML
1 INJECTION, SOLUTION INTRAMUSCULAR; INTRAVENOUS EVERY 4 HOURS PRN
Status: DISCONTINUED | OUTPATIENT
Start: 2024-05-12 | End: 2024-05-20

## 2024-05-12 RX ORDER — HYDROXYZINE HYDROCHLORIDE 25 MG/1
25 TABLET, FILM COATED ORAL 3 TIMES DAILY PRN
Status: DISCONTINUED | OUTPATIENT
Start: 2024-05-12 | End: 2024-05-21 | Stop reason: HOSPADM

## 2024-05-12 RX ORDER — PREDNISONE 20 MG/1
40 TABLET ORAL DAILY
Status: COMPLETED | OUTPATIENT
Start: 2024-05-12 | End: 2024-05-16

## 2024-05-12 RX ADMIN — APIXABAN 2.5 MG: 2.5 TABLET, FILM COATED ORAL at 10:05

## 2024-05-12 RX ADMIN — APIXABAN 2.5 MG: 2.5 TABLET, FILM COATED ORAL at 09:05

## 2024-05-12 RX ADMIN — ALLOPURINOL 50 MG: 300 TABLET ORAL at 03:05

## 2024-05-12 RX ADMIN — FERROUS SULFATE TAB EC 325 MG (65 MG FE EQUIVALENT) 1 EACH: 325 (65 FE) TABLET DELAYED RESPONSE at 09:05

## 2024-05-12 RX ADMIN — POLYETHYLENE GLYCOL 3350 17 G: 17 POWDER, FOR SOLUTION ORAL at 10:05

## 2024-05-12 RX ADMIN — COLCHICINE 0.6 MG: 0.6 TABLET, FILM COATED ORAL at 03:05

## 2024-05-12 RX ADMIN — PREDNISONE 40 MG: 20 TABLET ORAL at 03:05

## 2024-05-12 RX ADMIN — POLYETHYLENE GLYCOL 3350 17 G: 17 POWDER, FOR SOLUTION ORAL at 09:05

## 2024-05-12 RX ADMIN — Medication 6 MG: at 01:05

## 2024-05-12 RX ADMIN — HYDROCODONE BITARTRATE AND ACETAMINOPHEN 1 TABLET: 5; 325 TABLET ORAL at 03:05

## 2024-05-12 RX ADMIN — LISINOPRIL 5 MG: 5 TABLET ORAL at 09:05

## 2024-05-12 RX ADMIN — HYDROCODONE BITARTRATE AND ACETAMINOPHEN 1 TABLET: 5; 325 TABLET ORAL at 09:05

## 2024-05-12 RX ADMIN — HYDROCODONE BITARTRATE AND ACETAMINOPHEN 1 TABLET: 5; 325 TABLET ORAL at 01:05

## 2024-05-12 RX ADMIN — DICLOFENAC SODIUM 2 G: 10 GEL TOPICAL at 02:05

## 2024-05-12 RX ADMIN — ASPIRIN 81 MG: 81 TABLET, COATED ORAL at 09:05

## 2024-05-12 RX ADMIN — THERA TABS 1 TABLET: TAB at 09:05

## 2024-05-12 RX ADMIN — CARVEDILOL 3.12 MG: 3.12 TABLET, FILM COATED ORAL at 09:05

## 2024-05-12 RX ADMIN — HYDROXYZINE HYDROCHLORIDE 25 MG: 25 TABLET, FILM COATED ORAL at 02:05

## 2024-05-12 RX ADMIN — MORPHINE SULFATE 1 MG: 2 INJECTION, SOLUTION INTRAMUSCULAR; INTRAVENOUS at 11:05

## 2024-05-12 RX ADMIN — CARVEDILOL 3.12 MG: 3.12 TABLET, FILM COATED ORAL at 10:05

## 2024-05-12 NOTE — CONSULTS
"[facility] ([place] [id number])  Rheumatology  Consult Note    Patient Name: Deena Moody  MRN: [id number]  Admission Date: 5/11/2024  Hospital Length of Stay: 1 days  Code Status: Full Code   Attending Provider: Mike Watt DO  Primary Care Physician: Jam Rowell MD  Principal Problem:Arm swelling    Inpatient consult to Rheumatology  Consult performed by: Rachel Gutierrez MD  Consult ordered by: Mike Watt DO        Subjective:     HPI: Deena Moody is a 89 y.o. M with PMH of gout arthritis, dementia/cognitive decline, hearing loss, HTN, HLD, CAD, diastolic HF, A-fib with pacemaker, CABG, hx of MI, AV block, and CKD 3.    Recent Rheum History:  - Patient has a long standing history of gout, with UA not at goal.  - Flares typically involve his knees but have involved his feet in the past as well.   - Arthrocentesis of L knee on 7/12/23 was previously positive for crystals.  - Had recent hospitalization earlier this month with LUE/hand pain/swelling, which was managed as acute gout flare (initially though to be SSTI and was on abx without improvement) - symptoms resolved with initial IV steroids x 2, then 5 day PO steroid course (cannot use colchicine due to CKD).  - L wrist aspirate on 5/3/24 was positive for urate crystals as well.  - Imaging notable for erosive joint changes consistent with OM vs inflammatory changes.  - Plan was for outpatient f/u and discussion of long term/maintenance treatment.     Current Hospitalization:  Pt has now presented again from his nursing facility on 5/11 with LUE swelling and pain. Lab workup notable for up trending CRP, improving ESR, persistent leukocytosis, stable anemia, persistently elevated UA, and stable renal function. Imaging workup notable for erosive changes - concerning for gout vs infectious etiology.     Rheumatology was consulted for "recent admit with gout, treated with IV steroids and then oral, returns with worsening swelling of " "the left arm. has CKD and so plan was to establish outpatient, but he didnt make it that far."    History is obtained from pt and his daughter at the bedside. Pt is very hard of hearing. They state that prior to the last hospitalization, pt was doing pretty well at his baseline. However, after the recent hospitalization, he was sent to SNF and has not quite gotten back to his previous baseline. Pt was having a lot of LUE swelling, which has persisted and not improved significantly over the past couple weeks. However, his pain had been under better control until Friday night/Saturday morning. He suddenly had acute pain in the LUE again, similar to the prior hospitalization. Symptoms have not improved much over the past 1-2 days. Pt was also complaining of some back pain as well as pain in the bilateral feet/legs, but not like the LUE pain and swelling symptoms. Other ROS negative.     Past Medical History:   Diagnosis Date    Acute on chronic diastolic heart failure 9/1/2016    Coronary artery disease     Hyperlipidemia     Hypertension     Macular degeneration (senile) of retina, unspecified 12/12/2014    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    Persistent atrial fibrillation 11/10/2016    S/P placement of cardiac pacemaker 9/14/2016       Past Surgical History:   Procedure Laterality Date    AORTIC VALVE REPLACEMENT N/A     CARDIAC PACEMAKER PLACEMENT      CATARACT EXTRACTION W/  INTRAOCULAR LENS IMPLANT Right 2/16/2016    Dr. Whitley    CATARACT EXTRACTION W/  INTRAOCULAR LENS IMPLANT Left 3/1/2016    Dr. Whitley    CORONARY ARTERY BYPASS GRAFT      EAR EXAMINATION UNDER ANESTHESIA      EYE SURGERY         Immunization History   Administered Date(s) Administered    COVID-19, MRNA, LN-S, PF (MODERNA FULL 0.5 ML DOSE) 03/04/2021, 04/01/2021    Influenza 10/17/2007, 10/15/2008, 10/14/2009, 09/16/2010, 10/03/2011    Influenza (FLUAD) - Quadrivalent - Adjuvanted - PF *Preferred* (65+) 10/15/2020, 09/30/2023    Influenza - " High Dose - PF (65 years and older) 11/18/2013, 12/15/2014, 10/26/2015, 11/10/2016, 01/02/2018, 11/08/2018, 10/02/2019    Influenza Split 10/17/2007, 10/15/2008, 10/14/2009, 09/16/2010, 10/03/2011    Pneumococcal Conjugate - 13 Valent 02/01/2016    Pneumococcal Polysaccharide - 23 Valent 11/18/2013    Tdap 08/08/2016       Review of patient's allergies indicates:   Allergen Reactions    Iodine and iodide containing products Other (See Comments)     Caused changes in skin color     Current Facility-Administered Medications   Medication Frequency    acetaminophen tablet 650 mg Q6H PRN    albuterol-ipratropium 2.5 mg-0.5 mg/3 mL nebulizer solution 3 mL Q4H PRN    allopurinol split tablet 50 mg Daily    aluminum-magnesium hydroxide-simethicone 200-200-20 mg/5 mL suspension 30 mL QID PRN    apixaban tablet 2.5 mg BID    aspirin EC tablet 81 mg Daily    carvediloL tablet 3.125 mg BID    colchicine capsule/tablet 0.6 mg Every other day    dextrose 10% bolus 125 mL 125 mL PRN    dextrose 10% bolus 250 mL 250 mL PRN    diclofenac sodium 1 % gel 2 g Daily PRN    ferrous sulfate tablet 1 each Daily    glucagon (human recombinant) injection 1 mg PRN    glucose chewable tablet 16 g PRN    glucose chewable tablet 24 g PRN    HYDROcodone-acetaminophen 5-325 mg per tablet 1 tablet Q6H PRN    hydrOXYzine HCL tablet 25 mg TID PRN    lisinopriL tablet 5 mg Daily    melatonin tablet 6 mg Nightly PRN    morphine injection 1 mg Q4H PRN    multivitamin tablet Daily    naloxone 0.4 mg/mL injection 0.02 mg PRN    ondansetron disintegrating tablet 8 mg Q8H PRN    polyethylene glycol packet 17 g BID    predniSONE tablet 40 mg Daily    prochlorperazine injection Soln 5 mg Q6H PRN    sodium chloride 0.9% flush 10 mL PRN    sodium chloride 0.9% flush 10 mL Q8H PRN     Family History       Problem Relation (Age of Onset)    Hypertension Brother    No Known Problems Mother, Father, Sister, Maternal Aunt, Maternal Uncle, Paternal Aunt, Paternal  Uncle, Maternal Grandmother, Maternal Grandfather, Paternal Grandmother, Paternal Grandfather, Daughter, Son    Stroke Brother          Tobacco Use    Smoking status: Never     Passive exposure: Never    Smokeless tobacco: Never   Substance and Sexual Activity    Alcohol use: No    Drug use: No    Sexual activity: Yes     Partners: Female     Review of Systems   Unable to perform ROS: Dementia     Objective:     Vital Signs (Most Recent):  Temp: 98.7 °F (37.1 °C) (05/12/24 1146)  Pulse: 69 (05/12/24 1146)  Resp: 16 (05/12/24 1513)  BP: (!) 108/57 (05/12/24 1146)  SpO2: 96 % (05/12/24 1146) Vital Signs (24h Range):  Temp:  [97.2 °F (36.2 °C)-98.7 °F (37.1 °C)] 98.7 °F (37.1 °C)  Pulse:  [69-70] 69  Resp:  [14-18] 16  SpO2:  [94 %-100 %] 96 %  BP: (104-168)/(57-71) 108/57     Weight: 52.7 kg (116 lb 2.9 oz) (05/11/24 2100)  Body mass index is 17.16 kg/m².  Body surface area is 1.6 meters squared.      Intake/Output Summary (Last 24 hours) at 5/12/2024 1623  Last data filed at 5/12/2024 1328  Gross per 24 hour   Intake 820 ml   Output 1550 ml   Net -730 ml         Physical Exam   Constitutional: He appears well-developed and well-nourished. No distress.   HENT:   Head: Normocephalic and atraumatic.   Right Ear: External ear normal.   Left Ear: External ear normal.   Nose: Nose normal. No nasal congestion.   Mouth/Throat: Mucous membranes are moist. Oropharynx is clear.   Eyes: Conjunctivae are normal.   Cardiovascular: Normal rate, regular rhythm and normal heart sounds.   Pulmonary/Chest: Effort normal. No respiratory distress. He has no wheezes.   Abdominal: Soft. He exhibits no distension. There is no abdominal tenderness.   Musculoskeletal:      Cervical back: Normal range of motion and neck supple.      Comments: There is notable TTP and swelling of the LUE around the wrist/hand and elbow. There is definite synovitis around the wrist and likely the elbow. Pt with some TTP of the bilateral ankles, but no notable  swelling, erythema, or other acute changes.   Neurological: He is alert.   Skin: Skin is warm and dry. No lesion and no rash noted.   Psychiatric: His behavior is normal. Mood normal.   Vitals reviewed.       Significant Labs:  All pertinent lab results from the last 24 hours have been reviewed.    Significant Imaging:  Imaging results within the past 24 hours have been reviewed.  Assessment/Plan:     Orthopedic  * Arm swelling  - Pt with recent LUE pain/swelling  - Initial concern during last hospitalization had been SSTI, but no improvement with abx  - Therefore, joint aspirated and confirmed urate crystal presence on 5/2  - Managed with steroids (IV solu medrol 80mg x 2 followed by prednisone 40mg x 5) with improvement of symptoms prior to discharge  - Now re-admitted with similar complaints with severe pain and swelling of the LUE    Plan/Recommendations:  - Would rule out concurrent infection in setting of known erosive gout arthritis, elevated CRP, and leukocytosis  - Start prednisone 40mg daily x 5 day course  - Start allopurinol 50mg daily for ULT for now, will slowly up titrate as needed/tolerated with goal UA<6 and in setting of known CKD  - Start colchicine 0.6mg every other day - reduced dose in setting of renal disease    History of gout  - Has known long standing gout but has not been on any ULT  - Would proceed with above workup and management as detailed      Thank you for your consult. I will follow-up with patient. Please contact us if you have any additional questions.    Assessment and plan discussed with supervising attending, Dr. Neri. Please do not hesitate to reach out with any questions or concerns.      Rachel Gutierrez MD  PGY-4, Rheumatology

## 2024-05-12 NOTE — ASSESSMENT & PLAN NOTE
Chronic, controlled. Latest blood pressure and vitals reviewed-     Temp:  [97.2 °F (36.2 °C)-98.5 °F (36.9 °C)]   Pulse:  [68-70]   Resp:  [14-18]   BP: (104-168)/(57-71)   SpO2:  [94 %-100 %] .   Home meds for hypertension were reviewed and noted below.   Hypertension Medications               benazepriL (LOTENSIN) 5 MG tablet Take 1 tablet (5 mg total) by mouth once daily.    carvediloL (COREG) 3.125 MG tablet Take 1 tablet (3.125 mg total) by mouth 2 (two) times daily.    nitroGLYCERIN (NITROSTAT) 0.4 MG SL tablet Take one tablet every 5 minutes for chest pain. After the third tablet go to the ED.    torsemide (DEMADEX) 20 MG Tab Take 1 tablet (20 mg total) by mouth 2 (two) times a day. HOLD UNTIL PCP FOLLOW UP            While in the hospital, will manage blood pressure as follows; Continue home antihypertensive regimen    Will utilize p.r.n. blood pressure medication only if patient's blood pressure greater than 180/110 and he develops symptoms such as worsening chest pain or shortness of breath.    - at time of recent d/c, lasix held. Could consider resuming given improvement of Cr.

## 2024-05-12 NOTE — ASSESSMENT & PLAN NOTE
- Appreciate Rheumatology consult  - Recent admit for the same, treated with steroids with ongoing pain/discomfort. Ultimately could not follow up in outpatient setting. Will need to discuss realistic options for treatment and control prior to discharge as the patient will otherwise be very high likelihood to bounce back again

## 2024-05-12 NOTE — SUBJECTIVE & OBJECTIVE
"Interval History:    Requests to know the plan for today, so discussed at length findings obtained so far and plans moving forward. Will discuss case with rheumatology, appreciate consultation. Pain medications adjusted. Reports diffuse pain, but most notably in the left arm.     Review of Systems   Unable to perform ROS: Dementia ("feels bad")   Constitutional:  Negative for chills and fever.   Respiratory:  Negative for shortness of breath.    Cardiovascular:  Negative for chest pain.   Gastrointestinal:  Negative for abdominal pain.   Musculoskeletal:  Positive for arthralgias and myalgias.        Muscles hurt "all over"   Neurological:  Negative for headaches.   Psychiatric/Behavioral:  Negative for confusion.      Objective:     Vital Signs (Most Recent):  Temp: 98.5 °F (36.9 °C) (05/12/24 0754)  Pulse: 69 (05/12/24 0924)  Resp: 15 (05/12/24 0925)  BP: (!) 112/57 (05/12/24 0924)  SpO2: 95 % (05/12/24 0754) Vital Signs (24h Range):  Temp:  [97.2 °F (36.2 °C)-98.5 °F (36.9 °C)] 98.5 °F (36.9 °C)  Pulse:  [68-70] 69  Resp:  [14-18] 15  SpO2:  [94 %-100 %] 95 %  BP: (104-168)/(57-71) 112/57     Weight: 52.7 kg (116 lb 2.9 oz)  Body mass index is 17.16 kg/m².     Physical Exam  Vitals reviewed.   Constitutional:       Appearance: He is well-developed. He is ill-appearing. He is not toxic-appearing.      Comments: Thin   HENT:      Head: Normocephalic and atraumatic.      Mouth/Throat:      Mouth: Mucous membranes are moist.   Eyes:      General: No scleral icterus.     Extraocular Movements: Extraocular movements intact.   Cardiovascular:      Rate and Rhythm: Normal rate.      Pulses: Normal pulses.   Pulmonary:      Effort: Pulmonary effort is normal. No respiratory distress.   Abdominal:      General: Abdomen is flat. There is no distension.      Palpations: Abdomen is soft.   Musculoskeletal:         General: Swelling and tenderness present.      Comments: Painful passive ROM of LUE at hand/ wrist/ elbow/ " shoulder.   Mild edema of L hand, more significant edema noted to L prox forearm to elbow.   Poor participation in exam.    Skin:     General: Skin is warm and dry.      Capillary Refill: Capillary refill takes less than 2 seconds.      Coloration: Skin is not jaundiced.      Findings: No erythema.   Neurological:      Mental Status: He is alert. Mental status is at baseline.   Psychiatric:      Comments: Agitated, wants to be left alone                 Significant Labs: All pertinent labs within the past 24 hours have been reviewed.  CBC:   Recent Labs   Lab 05/11/24 0244 05/11/24  1459 05/12/24  0541   WBC 21.72* 17.99* 18.12*   HGB 10.7* 10.5* 9.0*   HCT 34.7* 34.4* 30.3*    207 238     CMP:   Recent Labs   Lab 05/11/24 0244 05/12/24  0541    142   K 4.4 3.7    106   CO2 24 25    73   BUN 70* 72*   CREATININE 1.5* 1.4   CALCIUM 8.1* 7.8*   PROT 6.5 5.6*   ALBUMIN 2.5* 2.1*   BILITOT 1.0 1.0   ALKPHOS 123 97   AST 33 28   ALT 24 17   ANIONGAP 13 11       Significant Imaging: I have reviewed all pertinent imaging results/findings within the past 24 hours.    Imaging Results              CT Arm Elbow Without Contrast Left (Final result)  Result time 05/11/24 15:23:18      Final result by Luis Alberto Valencia Jr., MD (05/11/24 15:23:18)                   Impression:      Juxta-articular erosive change at the elbow with marked soft tissue swelling about the elbow most pronounced at the dorsal aspect.  Findings may reflect gout arthropathy with diffuse soft tissue edema/anasarca.  Soft tissue infection and osteomyelitis could appear similarly.    Probable joint effusion.  Sterility of the joint cannot be confirmed radiologically.  Aspiration as clinically warranted for further evaluation.    Electronically signed by resident: Wander Narayanan  Date:    05/11/2024  Time:    14:54    Electronically signed by: Luis Alberto Yarbrough Jr  Date:    05/11/2024  Time:    15:23               Narrative:     EXAMINATION:  CT ARM ELBOW WITHOUT CONTRAST LEFT    CLINICAL HISTORY:  intevral imaging, left arm swelling, signs of infection/osteo. contrast allergy and PPM without MRI compatability;.    TECHNIQUE:  Axial 0.625 mm images obtained of the left elbow.  Sagittal and coronal reformats obtained.    COMPARISON:  None    FINDINGS:  Beam hardening artifact degrades images significantly limiting exam.    No acute fracture or dislocation.  No focal osseous lesion.    Subchondral cystic changes, small osteophytes, and joint space loss of the above most pronounced at the radiocapitellar joint.  Juxta-articular punched out appearing erosive change near the medial epicondyle (series 200 image 67) with ill-defined non sclerotic margins.    There is probable joint effusion (series 201 image 53).    There is marked diffuse soft tissue edema including the subcutaneous, superficial, and deep inter fascial tissues most pronounced dorsally.  No definite organized collection noting lack of intravenous contrast and beam hardening artifact limits evaluation.                                       CT Hand Without Contrast Left (Final result)  Result time 05/11/24 15:59:10      Final result by Luis Alberto Valencia Jr., MD (05/11/24 15:59:10)                   Impression:      Scattered erosive and arthritic changes of the hand and wrist grossly similar to prior allowing for limitation of exam.  Findings are suggestive of gout arthropathy in light of history.  Osteomyelitis could appear similarly.    Mild generalized subcutaneous edema.  Clinical correlation advised for cellulitis.    Pathologic fracture of the distal middle phalanx of the 2nd digit is better appreciated on same day radiograph.    Electronically signed by resident: Wander Narayanan  Date:    05/11/2024  Time:    15:08    Electronically signed by: Luis Alberto Yarbrough Jr  Date:    05/11/2024  Time:    15:59               Narrative:    EXAMINATION:  CT HAND WITHOUT CONTRAST  "LEFT    CLINICAL HISTORY:  intevral imaging, left arm swelling, signs of infection/osteo. contrast allergy and PPM without MRI compatability;.    TECHNIQUE:  Axial CT 0.625 mm images obtained of the left hand without contrast.  Sagittal coronal reformats obtained.    COMPARISON:  Hand radiograph 05/11/2024, CT and left 04/30/2024    FINDINGS:  Limited exam due to motion artifact, beam hardening artifact, and suboptimal positioning due to difficulty of patient tolerating exam.    Demineralized bones.    Scattered punched-out appearing juxta-articular erosive changes of the hand most notably at the head of the 5th metacarpal and ulnar aspect of the distal 2nd middle phalanx.  Suspected associated soft tissue high density (series 6, image 412) at the level of the 5th metacarpal head erosive change could reflect gouty tophus.  The erosive change at the 2nd middle phalanx distally is complicated by intra-articular fracture.    Partially imaged wrist demonstrates probable additional juxta articular erosive changes for example near the medial aspect of the 1st carpometacarpal joint (series 5: Image 31) though not well evaluated.  Probable superimposed osteoarthritic changes of the 1st CMC joint.    Scattered arthritic changes of the PIPs and the DIPs.    Erosive and arthritic changes of the hand and visualized wrist are similar to prior.    Generalized mild subcutaneous edema.  No large focal as fluid collection.  Scattered vascular calcification.                                       X-Ray Chest 1 View (Final result)  Result time 05/11/24 06:43:40      Final result by Torito Galloway MD (05/11/24 06:43:40)                   Impression:      No detrimental change when compared with 05/03/2024.      Electronically signed by: Torito Galloway MD  Date:    05/11/2024  Time:    06:43               Narrative:    EXAMINATION:  XR CHEST 1 VIEW    CLINICAL HISTORY:  Provided history is "  Elevated white blood cell count, " "unspecified".    TECHNIQUE:  One view of the chest.    COMPARISON:  05/03/2024.    FINDINGS:  Cardiomediastinal silhouette is enlarged and similar to the prior study.  Similar postoperative changes and cardiac pacing device.  Subsegmental opacities in the right lung base, with associated right-sided pleural effusion.  Possible trace left pleural fluid.  Aeration is similar when compared with recent prior study.                                       X-Ray Hand 3 View Left (Final result)  Result time 05/11/24 03:48:21      Final result by Chel Xie MD (05/11/24 03:48:21)                   Impression:      Please see above.      Electronically signed by: Chel Xie MD  Date:    05/11/2024  Time:    03:48               Narrative:    EXAMINATION:  XR HAND COMPLETE 3 VIEW LEFT    CLINICAL HISTORY:  left arm pain;.    TECHNIQUE:  PA, lateral, and oblique views of the left hand were performed.    COMPARISON:  Radiograph CT 04/28/2024; CT 04/30/2020 for    FINDINGS:  There is diffuse osteopenia.  There is redemonstration of erosive change involving the head of the 2nd middle phalanx, previously demonstrated on hand CT and radiograph referenced above.  Findings could reflect underlying osteomyelitis or other inflammatory arthritidis in appears similar to prior exams.  Remaining visualized osseous structures appear intact and unchanged from most recent comparison exam noting chronic changes of the distal left radius and ulna and advanced degenerative change of the left thumb.  No definite radiographic evidence of new acute osseous abnormality.  Vascular calcifications are present.  No large retained radiopaque foreign body.  There is soft tissue swelling about the hand, similar to prior exam.                                       X-Ray Forearm Left (Final result)  Result time 05/11/24 03:42:01      Final result by Chel Xie MD (05/11/24 03:42:01)                   Impression:      No radiographic evidence of " acute osseous injury.      Electronically signed by: Chel Xie MD  Date:    05/11/2024  Time:    03:42               Narrative:    EXAMINATION:  XR FOREARM LEFT    CLINICAL HISTORY:  Pain in left arm    TECHNIQUE:  AP and lateral views of the left forearm were performed.    COMPARISON:  None    FINDINGS:  There diffuse osteopenia.  Visualized osseous and articular structures appear grossly intact.  Alignment appears within normal limits.  Vascular calcifications are present.  No large retained radiopaque foreign body.                                       X-Ray Humerus 2 View Left (Final result)  Result time 05/11/24 03:43:24      Final result by Chel Xie MD (05/11/24 03:43:24)                   Impression:      Please see above.      Electronically signed by: Chel Xie MD  Date:    05/11/2024  Time:    03:43               Narrative:    EXAMINATION:  XR HUMERUS 2 VIEW LEFT    CLINICAL HISTORY:  Pain in left arm    TECHNIQUE:  AP and lateral views of the left humerus were performed.    COMPARISON:  None    FINDINGS:  Visualized osseous and articular structures appear intact without definite radiographic evidence of acute osseous injury.  The humeral head appears appropriately seated within the glenoid allowing for patient positioning.  The acromioclavicular joint appears maintained with degenerative arthrosis.  Partially visualized left-sided cardiac pacing device in place.  No large retained radiopaque foreign body in the visualized soft tissues.  Incidentally noted large volume of fecal material within the left colon.

## 2024-05-12 NOTE — SUBJECTIVE & OBJECTIVE
Past Medical History:   Diagnosis Date    Acute on chronic diastolic heart failure 9/1/2016    Coronary artery disease     Hyperlipidemia     Hypertension     Macular degeneration (senile) of retina, unspecified 12/12/2014    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    Persistent atrial fibrillation 11/10/2016    S/P placement of cardiac pacemaker 9/14/2016       Past Surgical History:   Procedure Laterality Date    AORTIC VALVE REPLACEMENT N/A     CARDIAC PACEMAKER PLACEMENT      CATARACT EXTRACTION W/  INTRAOCULAR LENS IMPLANT Right 2/16/2016    Dr. Whitley    CATARACT EXTRACTION W/  INTRAOCULAR LENS IMPLANT Left 3/1/2016    Dr. Whitley    CORONARY ARTERY BYPASS GRAFT      EAR EXAMINATION UNDER ANESTHESIA      EYE SURGERY         Immunization History   Administered Date(s) Administered    COVID-19, MRNA, LN-S, PF (MODERNA FULL 0.5 ML DOSE) 03/04/2021, 04/01/2021    Influenza 10/17/2007, 10/15/2008, 10/14/2009, 09/16/2010, 10/03/2011    Influenza (FLUAD) - Quadrivalent - Adjuvanted - PF *Preferred* (65+) 10/15/2020, 09/30/2023    Influenza - High Dose - PF (65 years and older) 11/18/2013, 12/15/2014, 10/26/2015, 11/10/2016, 01/02/2018, 11/08/2018, 10/02/2019    Influenza Split 10/17/2007, 10/15/2008, 10/14/2009, 09/16/2010, 10/03/2011    Pneumococcal Conjugate - 13 Valent 02/01/2016    Pneumococcal Polysaccharide - 23 Valent 11/18/2013    Tdap 08/08/2016       Review of patient's allergies indicates:   Allergen Reactions    Iodine and iodide containing products Other (See Comments)     Caused changes in skin color     Current Facility-Administered Medications   Medication Frequency    acetaminophen tablet 650 mg Q6H PRN    albuterol-ipratropium 2.5 mg-0.5 mg/3 mL nebulizer solution 3 mL Q4H PRN    allopurinol split tablet 50 mg Daily    aluminum-magnesium hydroxide-simethicone 200-200-20 mg/5 mL suspension 30 mL QID PRN    apixaban tablet 2.5 mg BID    aspirin EC tablet 81 mg Daily    carvediloL tablet 3.125 mg BID     colchicine capsule/tablet 0.6 mg Every other day    dextrose 10% bolus 125 mL 125 mL PRN    dextrose 10% bolus 250 mL 250 mL PRN    diclofenac sodium 1 % gel 2 g Daily PRN    ferrous sulfate tablet 1 each Daily    glucagon (human recombinant) injection 1 mg PRN    glucose chewable tablet 16 g PRN    glucose chewable tablet 24 g PRN    HYDROcodone-acetaminophen 5-325 mg per tablet 1 tablet Q6H PRN    hydrOXYzine HCL tablet 25 mg TID PRN    lisinopriL tablet 5 mg Daily    melatonin tablet 6 mg Nightly PRN    morphine injection 1 mg Q4H PRN    multivitamin tablet Daily    naloxone 0.4 mg/mL injection 0.02 mg PRN    ondansetron disintegrating tablet 8 mg Q8H PRN    polyethylene glycol packet 17 g BID    predniSONE tablet 40 mg Daily    prochlorperazine injection Soln 5 mg Q6H PRN    sodium chloride 0.9% flush 10 mL PRN    sodium chloride 0.9% flush 10 mL Q8H PRN     Family History       Problem Relation (Age of Onset)    Hypertension Brother    No Known Problems Mother, Father, Sister, Maternal Aunt, Maternal Uncle, Paternal Aunt, Paternal Uncle, Maternal Grandmother, Maternal Grandfather, Paternal Grandmother, Paternal Grandfather, Daughter, Son    Stroke Brother          Tobacco Use    Smoking status: Never     Passive exposure: Never    Smokeless tobacco: Never   Substance and Sexual Activity    Alcohol use: No    Drug use: No    Sexual activity: Yes     Partners: Female     Review of Systems   Unable to perform ROS: Dementia     Objective:     Vital Signs (Most Recent):  Temp: 98.7 °F (37.1 °C) (05/12/24 1146)  Pulse: 69 (05/12/24 1146)  Resp: 16 (05/12/24 1513)  BP: (!) 108/57 (05/12/24 1146)  SpO2: 96 % (05/12/24 1146) Vital Signs (24h Range):  Temp:  [97.2 °F (36.2 °C)-98.7 °F (37.1 °C)] 98.7 °F (37.1 °C)  Pulse:  [69-70] 69  Resp:  [14-18] 16  SpO2:  [94 %-100 %] 96 %  BP: (104-168)/(57-71) 108/57     Weight: 52.7 kg (116 lb 2.9 oz) (05/11/24 2100)  Body mass index is 17.16 kg/m².  Body surface area is 1.6  meters squared.      Intake/Output Summary (Last 24 hours) at 5/12/2024 1623  Last data filed at 5/12/2024 1328  Gross per 24 hour   Intake 820 ml   Output 1550 ml   Net -730 ml         Physical Exam   Constitutional: He appears well-developed and well-nourished. No distress.   HENT:   Head: Normocephalic and atraumatic.   Right Ear: External ear normal.   Left Ear: External ear normal.   Nose: Nose normal. No nasal congestion.   Mouth/Throat: Mucous membranes are moist. Oropharynx is clear.   Eyes: Conjunctivae are normal.   Cardiovascular: Normal rate, regular rhythm and normal heart sounds.   Pulmonary/Chest: Effort normal. No respiratory distress. He has no wheezes.   Abdominal: Soft. He exhibits no distension. There is no abdominal tenderness.   Musculoskeletal:      Cervical back: Normal range of motion and neck supple.      Comments: There is notable TTP and swelling of the LUE around the wrist/hand and elbow. There is definite synovitis around the wrist and likely the elbow. Pt with some TTP of the bilateral ankles, but no notable swelling, erythema, or other acute changes.   Neurological: He is alert.   Skin: Skin is warm and dry. No lesion and no rash noted.   Psychiatric: His behavior is normal. Mood normal.   Vitals reviewed.       Significant Labs:  All pertinent lab results from the last 24 hours have been reviewed.    Significant Imaging:  Imaging results within the past 24 hours have been reviewed.

## 2024-05-12 NOTE — ASSESSMENT & PLAN NOTE
Patient with Permanent atrial fibrillation which is controlled currently with Calcium Channel Blocker. Patient is currently in atrial fibrillation.[id number] Score: 3. Anticoagulation indicated. Anticoagulation done with eliquis 2.5 BID .

## 2024-05-12 NOTE — ASSESSMENT & PLAN NOTE
"Hx gout     Recent admission for the same, d/c 5/8. Treated initially for osteomyelitis with BS IV abx, then determined to be gout (joint asp wrist on 5/3 without evidence of infection) and managed with IV/ PO steroids. Evals by ID, ortho, rheum during last admission.     Returns from [facility]  on 5/11 with edema from hand to elbow, left. Painful ROM LUE including hand/ wrist/ elbow/ shoulder.   - WBC 21K (prior 15K), T 99 F   - HDS   - CRP 99.7 (elevated from prior), ESR 78 - will trend   - Imaging reviewed - XR L hand significant for "redemonstration of erosive change involving the head of the 2nd middle phalanx, previously demonstrated on hand CT and radiograph referenced above.  Findings could reflect underlying osteomyelitis or other inflammatory arthritidis in appears similar to prior exams."  - MRI not able to be obtained d/t incompatibility with pacer   - Interval CT obtained of the left arm and hand with similar findings to prior   - LUE US completed on last admit; negative for DVT   - Arm does not appear erythematous and so clinically this is not cellulitis   - Uric acid level elevated on admit   - Rheumatology consulted   - prn pain control   "

## 2024-05-12 NOTE — ASSESSMENT & PLAN NOTE
Creatine stable for now. BMP reviewed- noted Estimated Creatinine Clearance: 24.6 mL/min (based on SCr of 1.4 mg/dL). according to latest data. 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  Monitor UOP and serial BMP and adjust therapy as needed. Renally dose meds. Avoid nephrotoxic medications and procedures.  - at time of admission, Cr 1.5 - improved from baseline (~1.6-2.3)

## 2024-05-12 NOTE — PLAN OF CARE
Problem: Adult Inpatient Plan of Care  Goal: Plan of Care Review  Outcome: Progressing     Problem: Adult Inpatient Plan of Care  Goal: Patient-Specific Goal (Individualized)  Outcome: Progressing     Problem: Acute Kidney Injury/Impairment  Goal: Fluid and Electrolyte Balance  Outcome: Progressing     Problem: Wound  Goal: Optimal Coping  Outcome: Progressing     Problem: Pain Acute  Goal: Optimal Pain Control and Function  Outcome: Progressing       Pt alert oriented to person, place and situation. Pt urine noted to be reddish pink, VS MD YADY notified. Pt c/o pain all over body. Pain med administered.

## 2024-05-12 NOTE — ASSESSMENT & PLAN NOTE
- Has known long standing gout but has not been on any ULT  - Would proceed with above workup and management as detailed

## 2024-05-12 NOTE — PROGRESS NOTES
"[facility] ([place] [id number])  [facility] Medicine  Progress Note    Patient Name: Deena Moody  MRN: [id number]  Patient Class: IP- Inpatient   Admission Date: 5/11/2024  Length of Stay: 1 days  Attending Physician: Mike Watt DO  Primary Care Provider: Jam Rowell MD        Subjective:     Principal Problem:Arm swelling        HPI:  Deena Moody is a 89 y.o. male with PMHx HTN, HLD, CAD, diastolic HF, A-fib with pacemaker, CABG, hx of MI, AV block, and CKD 3, with recent admission to  for treatment of L hand gout flare (d/c 5/8/24), who presents back to [facility] ED from his nursing home with L upper arm swelling. During prior admission, pt initially thought to have osteomyelitis and started on BS abx. Later determined to be gout flare, managed with steroids by rheumatology. Patient has dementia at baseline and is unable to explain his situation. He complains that he feels bad and endorses muscle pain "all over," but otherwise cannot describe his symptoms. No family at bedside. He denies confusion, fevers or chills, headaches, chest pain, SOB, belly pain.     In ED: T 99F, WBC 21K (15K prior admission). VSS. Labs otherwise c/w stable chronic anemia, CKD. CRP 99.7 (prior ~60). ESR 78 (prior ~100). UA unremarkable. CXR without change from prior. XR LUE remarkable for "redemonstration of erosive change involving the head of the 2nd middle phalanx, previously demonstrated on hand CT and radiograph referenced above.  Findings could reflect underlying osteomyelitis or other inflammatory arthritidis in appears similar to prior exams." Otherwise XR of L hand, L humerus, L forearm without acute findings. Given tylenol, vanc/ zosyn.     Overview/Hospital Course:  Patient admitted to hospital medicine of 5/11 with ongoing left upper extremity swelling with worsening leukocytosis and CRP. Afebrile and HDS on arrival. On prior admit, there was concern for OM for which ortho and ID were consulted. " "Ultimately, a joint aspiration was performed with findings c/w gout. Ortho and ID signed off and all antimicrobials were discontinued. Rheum was consulted and steroids were initiated- IV first, then oral. Recent LUE US completed without DVT. MRI was never able to be obtained given PPM incompatibility. Repeat CT of the left hand and elbow were obtained on this admission notable for juxta-articular erosive change at the elbow with marked soft tissue swelling about the elbow most pronounced at the dorsal aspect. Findings may reflect gout arthropathy with diffuse soft tissue edema/anasarca. Soft tissue infection and osteomyelitis could appear similarly. Probable joint effusion. Scattered erosive and arthritic changes of the hand and wrist grossly similar to prior allowing for limitation of exam.  Findings are suggestive of gout arthropathy in light of history. Osteomyelitis could appear similarly. Mild generalized subcutaneous edema. Pathologic fracture of the distal middle phalanx of the 2nd digit.     Ultimately, rheumatology re- consulted given recurrent admission for the same. Do not suspect cellulitis based on clinical exam. Will see if they would like another joint tap/fluid sample analysis.     Interval History:    Requests to know the plan for today, so discussed at length findings obtained so far and plans moving forward. Will discuss case with rheumatology, appreciate consultation. Pain medications adjusted. Reports diffuse pain, but most notably in the left arm.     Review of Systems   Unable to perform ROS: Dementia ("feels bad")   Constitutional:  Negative for chills and fever.   Respiratory:  Negative for shortness of breath.    Cardiovascular:  Negative for chest pain.   Gastrointestinal:  Negative for abdominal pain.   Musculoskeletal:  Positive for arthralgias and myalgias.        Muscles hurt "all over"   Neurological:  Negative for headaches.   Psychiatric/Behavioral:  Negative for confusion.  "     Objective:     Vital Signs (Most Recent):  Temp: 98.5 °F (36.9 °C) (05/12/24 0754)  Pulse: 69 (05/12/24 0924)  Resp: 15 (05/12/24 0925)  BP: (!) 112/57 (05/12/24 0924)  SpO2: 95 % (05/12/24 0754) Vital Signs (24h Range):  Temp:  [97.2 °F (36.2 °C)-98.5 °F (36.9 °C)] 98.5 °F (36.9 °C)  Pulse:  [68-70] 69  Resp:  [14-18] 15  SpO2:  [94 %-100 %] 95 %  BP: (104-168)/(57-71) 112/57     Weight: 52.7 kg (116 lb 2.9 oz)  Body mass index is 17.16 kg/m².     Physical Exam  Vitals reviewed.   Constitutional:       Appearance: He is well-developed. He is ill-appearing. He is not toxic-appearing.      Comments: Thin   HENT:      Head: Normocephalic and atraumatic.      Mouth/Throat:      Mouth: Mucous membranes are moist.   Eyes:      General: No scleral icterus.     Extraocular Movements: Extraocular movements intact.   Cardiovascular:      Rate and Rhythm: Normal rate.      Pulses: Normal pulses.   Pulmonary:      Effort: Pulmonary effort is normal. No respiratory distress.   Abdominal:      General: Abdomen is flat. There is no distension.      Palpations: Abdomen is soft.   Musculoskeletal:         General: Swelling and tenderness present.      Comments: Painful passive ROM of LUE at hand/ wrist/ elbow/ shoulder.   Mild edema of L hand, more significant edema noted to L prox forearm to elbow.   Poor participation in exam.    Skin:     General: Skin is warm and dry.      Capillary Refill: Capillary refill takes less than 2 seconds.      Coloration: Skin is not jaundiced.      Findings: No erythema.   Neurological:      Mental Status: He is alert. Mental status is at baseline.   Psychiatric:      Comments: Agitated, wants to be left alone                 Significant Labs: All pertinent labs within the past 24 hours have been reviewed.  CBC:   Recent Labs   Lab 05/11/24  0244 05/11/24  1459 05/12/24  0541   WBC 21.72* 17.99* 18.12*   HGB 10.7* 10.5* 9.0*   HCT 34.7* 34.4* 30.3*    207 238     CMP:   Recent Labs   Lab  05/11/24  0244 05/12/24  0541    142   K 4.4 3.7    106   CO2 24 25    73   BUN 70* 72*   CREATININE 1.5* 1.4   CALCIUM 8.1* 7.8*   PROT 6.5 5.6*   ALBUMIN 2.5* 2.1*   BILITOT 1.0 1.0   ALKPHOS 123 97   AST 33 28   ALT 24 17   ANIONGAP 13 11       Significant Imaging: I have reviewed all pertinent imaging results/findings within the past 24 hours.    Imaging Results              CT Arm Elbow Without Contrast Left (Final result)  Result time 05/11/24 15:23:18      Final result by Luis Alberto Valencia Jr., MD (05/11/24 15:23:18)                   Impression:      Juxta-articular erosive change at the elbow with marked soft tissue swelling about the elbow most pronounced at the dorsal aspect.  Findings may reflect gout arthropathy with diffuse soft tissue edema/anasarca.  Soft tissue infection and osteomyelitis could appear similarly.    Probable joint effusion.  Sterility of the joint cannot be confirmed radiologically.  Aspiration as clinically warranted for further evaluation.    Electronically signed by resident: Wander Narayanan  Date:    05/11/2024  Time:    14:54    Electronically signed by: Luis Alberto Yarbrough Jr  Date:    05/11/2024  Time:    15:23               Narrative:    EXAMINATION:  CT ARM ELBOW WITHOUT CONTRAST LEFT    CLINICAL HISTORY:  intevral imaging, left arm swelling, signs of infection/osteo. contrast allergy and PPM without MRI compatability;.    TECHNIQUE:  Axial 0.625 mm images obtained of the left elbow.  Sagittal and coronal reformats obtained.    COMPARISON:  None    FINDINGS:  Beam hardening artifact degrades images significantly limiting exam.    No acute fracture or dislocation.  No focal osseous lesion.    Subchondral cystic changes, small osteophytes, and joint space loss of the above most pronounced at the radiocapitellar joint.  Juxta-articular punched out appearing erosive change near the medial epicondyle (series 200 image 67) with ill-defined non sclerotic  margins.    There is probable joint effusion (series 201 image 53).    There is marked diffuse soft tissue edema including the subcutaneous, superficial, and deep inter fascial tissues most pronounced dorsally.  No definite organized collection noting lack of intravenous contrast and beam hardening artifact limits evaluation.                                       CT Hand Without Contrast Left (Final result)  Result time 05/11/24 15:59:10      Final result by Luis Alberto Valencia Jr., MD (05/11/24 15:59:10)                   Impression:      Scattered erosive and arthritic changes of the hand and wrist grossly similar to prior allowing for limitation of exam.  Findings are suggestive of gout arthropathy in light of history.  Osteomyelitis could appear similarly.    Mild generalized subcutaneous edema.  Clinical correlation advised for cellulitis.    Pathologic fracture of the distal middle phalanx of the 2nd digit is better appreciated on same day radiograph.    Electronically signed by resident: Wander Narayanan  Date:    05/11/2024  Time:    15:08    Electronically signed by: Luis Alberto Yarbrough Jr  Date:    05/11/2024  Time:    15:59               Narrative:    EXAMINATION:  CT HAND WITHOUT CONTRAST LEFT    CLINICAL HISTORY:  intevral imaging, left arm swelling, signs of infection/osteo. contrast allergy and PPM without MRI compatability;.    TECHNIQUE:  Axial CT 0.625 mm images obtained of the left hand without contrast.  Sagittal coronal reformats obtained.    COMPARISON:  Hand radiograph 05/11/2024, CT and left 04/30/2024    FINDINGS:  Limited exam due to motion artifact, beam hardening artifact, and suboptimal positioning due to difficulty of patient tolerating exam.    Demineralized bones.    Scattered punched-out appearing juxta-articular erosive changes of the hand most notably at the head of the 5th metacarpal and ulnar aspect of the distal 2nd middle phalanx.  Suspected associated soft tissue high density  "(series 6, image 412) at the level of the 5th metacarpal head erosive change could reflect gouty tophus.  The erosive change at the 2nd middle phalanx distally is complicated by intra-articular fracture.    Partially imaged wrist demonstrates probable additional juxta articular erosive changes for example near the medial aspect of the 1st carpometacarpal joint (series 5: Image 31) though not well evaluated.  Probable superimposed osteoarthritic changes of the 1st CMC joint.    Scattered arthritic changes of the PIPs and the DIPs.    Erosive and arthritic changes of the hand and visualized wrist are similar to prior.    Generalized mild subcutaneous edema.  No large focal as fluid collection.  Scattered vascular calcification.                                       X-Ray Chest 1 View (Final result)  Result time 05/11/24 06:43:40      Final result by Torito Galloway MD (05/11/24 06:43:40)                   Impression:      No detrimental change when compared with 05/03/2024.      Electronically signed by: Torito Galloway MD  Date:    05/11/2024  Time:    06:43               Narrative:    EXAMINATION:  XR CHEST 1 VIEW    CLINICAL HISTORY:  Provided history is "  Elevated white blood cell count, unspecified".    TECHNIQUE:  One view of the chest.    COMPARISON:  05/03/2024.    FINDINGS:  Cardiomediastinal silhouette is enlarged and similar to the prior study.  Similar postoperative changes and cardiac pacing device.  Subsegmental opacities in the right lung base, with associated right-sided pleural effusion.  Possible trace left pleural fluid.  Aeration is similar when compared with recent prior study.                                       X-Ray Hand 3 View Left (Final result)  Result time 05/11/24 03:48:21      Final result by Chel Xie MD (05/11/24 03:48:21)                   Impression:      Please see above.      Electronically signed by: Chel Xie MD  Date:    05/11/2024  Time:    03:48               " Narrative:    EXAMINATION:  XR HAND COMPLETE 3 VIEW LEFT    CLINICAL HISTORY:  left arm pain;.    TECHNIQUE:  PA, lateral, and oblique views of the left hand were performed.    COMPARISON:  Radiograph CT 04/28/2024; CT 04/30/2020 for    FINDINGS:  There is diffuse osteopenia.  There is redemonstration of erosive change involving the head of the 2nd middle phalanx, previously demonstrated on hand CT and radiograph referenced above.  Findings could reflect underlying osteomyelitis or other inflammatory arthritidis in appears similar to prior exams.  Remaining visualized osseous structures appear intact and unchanged from most recent comparison exam noting chronic changes of the distal left radius and ulna and advanced degenerative change of the left thumb.  No definite radiographic evidence of new acute osseous abnormality.  Vascular calcifications are present.  No large retained radiopaque foreign body.  There is soft tissue swelling about the hand, similar to prior exam.                                       X-Ray Forearm Left (Final result)  Result time 05/11/24 03:42:01      Final result by Chel Xie MD (05/11/24 03:42:01)                   Impression:      No radiographic evidence of acute osseous injury.      Electronically signed by: Chel Xie MD  Date:    05/11/2024  Time:    03:42               Narrative:    EXAMINATION:  XR FOREARM LEFT    CLINICAL HISTORY:  Pain in left arm    TECHNIQUE:  AP and lateral views of the left forearm were performed.    COMPARISON:  None    FINDINGS:  There diffuse osteopenia.  Visualized osseous and articular structures appear grossly intact.  Alignment appears within normal limits.  Vascular calcifications are present.  No large retained radiopaque foreign body.                                       X-Ray Humerus 2 View Left (Final result)  Result time 05/11/24 03:43:24      Final result by Chel Xie MD (05/11/24 03:43:24)                   Impression:     "  Please see above.      Electronically signed by: Chel Xie MD  Date:    05/11/2024  Time:    03:43               Narrative:    EXAMINATION:  XR HUMERUS 2 VIEW LEFT    CLINICAL HISTORY:  Pain in left arm    TECHNIQUE:  AP and lateral views of the left humerus were performed.    COMPARISON:  None    FINDINGS:  Visualized osseous and articular structures appear intact without definite radiographic evidence of acute osseous injury.  The humeral head appears appropriately seated within the glenoid allowing for patient positioning.  The acromioclavicular joint appears maintained with degenerative arthrosis.  Partially visualized left-sided cardiac pacing device in place.  No large retained radiopaque foreign body in the visualized soft tissues.  Incidentally noted large volume of fecal material within the left colon.                                        Assessment/Plan:      * Arm swelling  Hx gout     Recent admission for the same, d/c 5/8. Treated initially for osteomyelitis with BS IV abx, then determined to be gout (joint asp wrist on 5/3 without evidence of infection) and managed with IV/ PO steroids. Evals by ID, ortho, rheum during last admission.     Returns from NH  on 5/11 with edema from hand to elbow, left. Painful ROM LUE including hand/ wrist/ elbow/ shoulder.   - WBC 21K (prior 15K), T 99 F   - HDS   - CRP 99.7 (elevated from prior), ESR 78 - will trend   - Imaging reviewed - XR L hand significant for "redemonstration of erosive change involving the head of the 2nd middle phalanx, previously demonstrated on hand CT and radiograph referenced above.  Findings could reflect underlying osteomyelitis or other inflammatory arthritidis in appears similar to prior exams."  - MRI not able to be obtained d/t incompatibility with pacer   - Interval CT obtained of the left arm and hand with similar findings to prior   - LUE US completed on last admit; negative for DVT   - Arm does not appear erythematous and so " clinically this is not cellulitis   - Uric acid level elevated on admit   - Rheumatology consulted   - prn pain control     CKD (chronic kidney disease), stage IV  Creatine stable for now. BMP reviewed- noted Estimated Creatinine Clearance: 24.6 mL/min (based on SCr of 1.4 mg/dL). according to latest data. 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  Monitor UOP and serial BMP and adjust therapy as needed. Renally dose meds. Avoid nephrotoxic medications and procedures.  - at time of admission, Cr 1.5 - improved from baseline (~1.6-2.3)    History of gout  - Appreciate Rheumatology consult  - Recent admit for the same, treated with steroids with ongoing pain/discomfort. Ultimately could not follow up in outpatient setting. Will need to discuss realistic options for treatment and control prior to discharge as the patient will otherwise be very high likelihood to bounce back again     Permanent atrial fibrillation  Patient with Permanent atrial fibrillation which is controlled currently with Calcium Channel Blocker. Patient is currently in atrial fibrillation.[id number] Score: 3. Anticoagulation indicated. Anticoagulation done with eliquis 2.5 BID .    Chronic diastolic heart failure  - controlled   - currently holding lasix - consider resuming   - Results for orders placed during the hospital encounter of 06/14/23    Echo    Interpretation Summary  · The left ventricle is normal in size with concentric remodeling and normal systolic function.  · The estimated ejection fraction is 65%.  · Grade III left ventricular diastolic dysfunction.  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  · The aortic valve mean gradient is 16 mmHg with a dimensionless index of 0.39.  · Mild-to-moderate mitral regurgitation.  · Mild right ventricular enlargement with mildly to moderately reduced right ventricular systolic function.  · Mild tricuspid regurgitation.  · Estimated PASP is at least 65mmHg.  · Moderate left  atrial enlargement.        CAD (coronary artery disease)  Patient with known CAD s/p stent placement and CABG, which is controlled Will continue ASA and monitor for S/Sx of angina/ACS.     Essential hypertension  Chronic, controlled. Latest blood pressure and vitals reviewed-     Temp:  [97.2 °F (36.2 °C)-98.5 °F (36.9 °C)]   Pulse:  [68-70]   Resp:  [14-18]   BP: (104-168)/(57-71)   SpO2:  [94 %-100 %] .   Home meds for hypertension were reviewed and noted below.   Hypertension Medications               benazepriL (LOTENSIN) 5 MG tablet Take 1 tablet (5 mg total) by mouth once daily.    carvediloL (COREG) 3.125 MG tablet Take 1 tablet (3.125 mg total) by mouth 2 (two) times daily.    nitroGLYCERIN (NITROSTAT) 0.4 MG SL tablet Take one tablet every 5 minutes for chest pain. After the third tablet go to the ED.    torsemide (DEMADEX) 20 MG Tab Take 1 tablet (20 mg total) by mouth 2 (two) times a day. HOLD UNTIL PCP FOLLOW UP            While in the hospital, will manage blood pressure as follows; Continue home antihypertensive regimen    Will utilize p.r.n. blood pressure medication only if patient's blood pressure greater than 180/110 and he develops symptoms such as worsening chest pain or shortness of breath.    - at time of recent d/c, lasix held. Could consider resuming given improvement of Cr.       VTE Risk Mitigation (From admission, onward)           Ordered     apixaban tablet 2.5 mg  2 times daily         05/11/24 0945     IP VTE HIGH RISK PATIENT  Once         05/11/24 0835     Place sequential compression device  Until discontinued         05/11/24 0835     Place sequential compression device  Until discontinued         05/11/24 0835     Reason for No Pharmacological VTE Prophylaxis  Once        Question:  Reasons:  Answer:  Already adequately anticoagulated on oral Anticoagulants    05/11/24 0835                    Discharge Planning   TEOFILO: 5/14/2024     Code Status: Full Code   Is the patient medically  ready for discharge?:     Reason for patient still in hospital (select all that apply): Patient trending condition, Treatment, and Consult recommendations      Mike Watt DO  Department of Hospital Medicine   [facility] ([place])

## 2024-05-12 NOTE — ASSESSMENT & PLAN NOTE
- Pt with recent LUE pain/swelling  - Initial concern during last hospitalization had been SSTI, but no improvement with abx  - Therefore, joint aspirated and confirmed urate crystal presence on 5/2  - Managed with steroids (IV solu medrol 80mg x 2 followed by prednisone 40mg x 5) with improvement of symptoms prior to discharge  - Now re-admitted with similar complaints with severe pain and swelling of the LUE    Plan/Recommendations:  - Would rule out concurrent infection in setting of known erosive gout arthritis, elevated CRP, and leukocytosis  - Start prednisone 40mg daily x 5 day course  - Start allopurinol 50mg daily for ULT for now, will slowly up titrate as needed/tolerated with goal UA<6 and in setting of known CKD  - Start colchicine 0.6mg every other day - reduced dose in setting of renal disease

## 2024-05-12 NOTE — HPI
"Deena Moody is a 89 y.o. M with PMH of gout arthritis, dementia/cognitive decline, hearing loss, HTN, HLD, CAD, diastolic HF, A-fib with pacemaker, CABG, hx of MI, AV block, and CKD 3.    Recent Rheum History:  - Patient has a long standing history of gout, with UA not at goal.  - Flares typically involve his knees but have involved his feet in the past as well.   - Arthrocentesis of L knee on 7/12/23 was previously positive for crystals.  - Had recent hospitalization earlier this month with LUE/hand pain/swelling, which was managed as acute gout flare (initially though to be SSTI and was on abx without improvement) - symptoms resolved with initial IV steroids x 2, then 5 day PO steroid course (cannot use colchicine due to CKD).  - L wrist aspirate on 5/3/24 was positive for urate crystals as well.  - Imaging notable for erosive joint changes consistent with OM vs inflammatory changes.  - Plan was for outpatient f/u and discussion of long term/maintenance treatment.     Current Hospitalization:  Pt has now presented again from his nursing facility on 5/11 with LUE swelling and pain. Lab workup notable for up trending CRP, improving ESR, persistent leukocytosis, stable anemia, persistently elevated UA, and stable renal function. Imaging workup notable for erosive changes - concerning for gout vs infectious etiology.     Rheumatology was consulted for "recent admit with gout, treated with IV steroids and then oral, returns with worsening swelling of the left arm. has CKD and so plan was to establish outpatient, but he didnt make it that far."    History is obtained from pt and his daughter at the bedside. Pt is very hard of hearing. They state that prior to the last hospitalization, pt was doing pretty well at his baseline. However, after the recent hospitalization, he was sent to SNF and has not quite gotten back to his previous baseline. Pt was having a lot of LUE swelling, which has persisted and not improved " significantly over the past couple weeks. However, his pain had been under better control until Friday night/Saturday morning. He suddenly had acute pain in the LUE again, similar to the prior hospitalization. Symptoms have not improved much over the past 1-2 days. Pt was also complaining of some back pain as well as pain in the bilateral feet/legs, but not like the LUE pain and swelling symptoms. Other ROS negative.

## 2024-05-12 NOTE — PLAN OF CARE
Problem: Infection  Goal: Absence of Infection Signs and Symptoms  Outcome: Progressing     Problem: Adult Inpatient Plan of Care  Goal: Plan of Care Review  Outcome: Not Progressing  Goal: Absence of Hospital-Acquired Illness or Injury  Outcome: Progressing  Intervention: Identify and Manage Fall Risk  Flowsheets (Taken 5/12/2024 1346)  Safety Promotion/Fall Prevention:   assistive device/personal item within reach   side rails raised x 3   high risk medications identified   bed alarm set  Goal: Optimal Comfort and Wellbeing  Outcome: Progressing  Intervention: Monitor Pain and Promote Comfort  Flowsheets (Taken 5/12/2024 1346)  Pain Management Interventions:   around-the-clock dosing utilized   pillow support provided   position adjusted   quiet environment facilitated   medication offered

## 2024-05-13 LAB
ANION GAP SERPL CALC-SCNC: 15 MMOL/L (ref 8–16)
BASOPHILS # BLD AUTO: 0.01 K/UL (ref 0–0.2)
BASOPHILS NFR BLD: 0.1 % (ref 0–1.9)
BUN SERPL-MCNC: 82 MG/DL (ref 10–30)
CALCIUM SERPL-MCNC: 8.5 MG/DL (ref 8.7–10.5)
CHLORIDE SERPL-SCNC: 104 MMOL/L (ref 95–110)
CO2 SERPL-SCNC: 22 MMOL/L (ref 23–29)
CREAT SERPL-MCNC: 1.6 MG/DL (ref 0.5–1.4)
CRP SERPL-MCNC: 133.4 MG/L (ref 0–8.2)
DIFFERENTIAL METHOD BLD: ABNORMAL
EOSINOPHIL # BLD AUTO: 0 K/UL (ref 0–0.5)
EOSINOPHIL NFR BLD: 0 % (ref 0–8)
ERYTHROCYTE [DISTWIDTH] IN BLOOD BY AUTOMATED COUNT: 14.9 % (ref 11.5–14.5)
EST. GFR  (NO RACE VARIABLE): 39.9 ML/MIN/1.73 M^2
GLUCOSE SERPL-MCNC: 105 MG/DL (ref 70–110)
HCT VFR BLD AUTO: 33 % (ref 40–54)
HGB BLD-MCNC: 9.9 G/DL (ref 14–18)
IMM GRANULOCYTES # BLD AUTO: 0.15 K/UL (ref 0–0.04)
IMM GRANULOCYTES NFR BLD AUTO: 0.9 % (ref 0–0.5)
LYMPHOCYTES # BLD AUTO: 1.1 K/UL (ref 1–4.8)
LYMPHOCYTES NFR BLD: 6.5 % (ref 18–48)
MAGNESIUM SERPL-MCNC: 2.1 MG/DL (ref 1.6–2.6)
MCH RBC QN AUTO: 29.7 PG (ref 27–31)
MCHC RBC AUTO-ENTMCNC: 30 G/DL (ref 32–36)
MCV RBC AUTO: 99 FL (ref 82–98)
MONOCYTES # BLD AUTO: 0.2 K/UL (ref 0.3–1)
MONOCYTES NFR BLD: 1.2 % (ref 4–15)
NEUTROPHILS # BLD AUTO: 15.8 K/UL (ref 1.8–7.7)
NEUTROPHILS NFR BLD: 91.3 % (ref 38–73)
NRBC BLD-RTO: 0 /100 WBC
PLATELET # BLD AUTO: 265 K/UL (ref 150–450)
PMV BLD AUTO: 9.6 FL (ref 9.2–12.9)
POCT GLUCOSE: 97 MG/DL (ref 70–110)
POTASSIUM SERPL-SCNC: 4.6 MMOL/L (ref 3.5–5.1)
RBC # BLD AUTO: 3.33 M/UL (ref 4.6–6.2)
SODIUM SERPL-SCNC: 141 MMOL/L (ref 136–145)
WBC # BLD AUTO: 17.32 K/UL (ref 3.9–12.7)

## 2024-05-13 PROCEDURE — 80048 BASIC METABOLIC PNL TOTAL CA: CPT | Mod: HCNC | Performed by: STUDENT IN AN ORGANIZED HEALTH CARE EDUCATION/TRAINING PROGRAM

## 2024-05-13 PROCEDURE — 85025 COMPLETE CBC W/AUTO DIFF WBC: CPT | Mod: HCNC | Performed by: STUDENT IN AN ORGANIZED HEALTH CARE EDUCATION/TRAINING PROGRAM

## 2024-05-13 PROCEDURE — 83735 ASSAY OF MAGNESIUM: CPT | Mod: HCNC

## 2024-05-13 PROCEDURE — 25000003 PHARM REV CODE 250: Mod: HCNC

## 2024-05-13 PROCEDURE — 99499 UNLISTED E&M SERVICE: CPT | Mod: HCNC,,, | Performed by: STUDENT IN AN ORGANIZED HEALTH CARE EDUCATION/TRAINING PROGRAM

## 2024-05-13 PROCEDURE — 25000003 PHARM REV CODE 250: Mod: HCNC | Performed by: INTERNAL MEDICINE

## 2024-05-13 PROCEDURE — 86140 C-REACTIVE PROTEIN: CPT | Mod: HCNC

## 2024-05-13 PROCEDURE — 25000003 PHARM REV CODE 250: Mod: HCNC | Performed by: STUDENT IN AN ORGANIZED HEALTH CARE EDUCATION/TRAINING PROGRAM

## 2024-05-13 PROCEDURE — 11000001 HC ACUTE MED/SURG PRIVATE ROOM: Mod: HCNC

## 2024-05-13 PROCEDURE — 21400001 HC TELEMETRY ROOM: Mod: HCNC

## 2024-05-13 PROCEDURE — 99233 SBSQ HOSP IP/OBS HIGH 50: CPT | Mod: HCNC,,, | Performed by: INTERNAL MEDICINE

## 2024-05-13 PROCEDURE — 94761 N-INVAS EAR/PLS OXIMETRY MLT: CPT | Mod: HCNC

## 2024-05-13 PROCEDURE — 63600175 PHARM REV CODE 636 W HCPCS: Mod: HCNC | Performed by: STUDENT IN AN ORGANIZED HEALTH CARE EDUCATION/TRAINING PROGRAM

## 2024-05-13 PROCEDURE — 36415 COLL VENOUS BLD VENIPUNCTURE: CPT | Mod: HCNC | Performed by: STUDENT IN AN ORGANIZED HEALTH CARE EDUCATION/TRAINING PROGRAM

## 2024-05-13 RX ORDER — BALSAM PERU/CASTOR OIL
OINTMENT (GRAM) TOPICAL 2 TIMES DAILY
Status: DISCONTINUED | OUTPATIENT
Start: 2024-05-13 | End: 2024-05-21 | Stop reason: HOSPADM

## 2024-05-13 RX ADMIN — FERROUS SULFATE TAB EC 325 MG (65 MG FE EQUIVALENT) 1 EACH: 325 (65 FE) TABLET DELAYED RESPONSE at 08:05

## 2024-05-13 RX ADMIN — PREDNISONE 40 MG: 20 TABLET ORAL at 08:05

## 2024-05-13 RX ADMIN — THERA TABS 1 TABLET: TAB at 08:05

## 2024-05-13 RX ADMIN — APIXABAN 2.5 MG: 2.5 TABLET, FILM COATED ORAL at 11:05

## 2024-05-13 RX ADMIN — Medication: at 02:05

## 2024-05-13 RX ADMIN — APIXABAN 2.5 MG: 2.5 TABLET, FILM COATED ORAL at 08:05

## 2024-05-13 RX ADMIN — ALLOPURINOL 50 MG: 300 TABLET ORAL at 08:05

## 2024-05-13 RX ADMIN — ASPIRIN 81 MG: 81 TABLET, COATED ORAL at 08:05

## 2024-05-13 RX ADMIN — CARVEDILOL 3.12 MG: 3.12 TABLET, FILM COATED ORAL at 08:05

## 2024-05-13 RX ADMIN — POLYETHYLENE GLYCOL 3350 17 G: 17 POWDER, FOR SOLUTION ORAL at 11:05

## 2024-05-13 RX ADMIN — LISINOPRIL 5 MG: 5 TABLET ORAL at 08:05

## 2024-05-13 RX ADMIN — POLYETHYLENE GLYCOL 3350 17 G: 17 POWDER, FOR SOLUTION ORAL at 08:05

## 2024-05-13 RX ADMIN — CARVEDILOL 3.12 MG: 3.12 TABLET, FILM COATED ORAL at 11:05

## 2024-05-13 RX ADMIN — Medication: at 11:05

## 2024-05-13 NOTE — ASSESSMENT & PLAN NOTE
I have reviewed hospital notes from  HM service and other specialty providers. I have also reviewed CBC, CMP/BMP,  cultures and imaging with my interpretation as documented.      89M with pacemaker, dementia/cognitive decline, CKD3, and known h/o gout arthritis, who was previously seen 04/28 for atraumatic left hand/wrist pain/swelling. Imaging notable for erosive joint changes consistent with OM vs inflammatory changes. ID consulted to r/o cellulitis or other infectious etiology. Left wrist aspiration 05/03 +urate crystals; cxs negative. Sxs did not respond to Iv-abx; but near resolved with oral steroids x 7d. (Unable to use colchicine d/t CKD).  Plan then was for outpatient f/u and discussion of long term/maintenance treatment.   Pt now re-admitted for return of similar sxs. Arrives 05/11 from SNF with worsening LUE swelling / pain. Labs notable for uptrending CRP, persistent elevated uric acid, and leukocytosis 18. Pt was also complaining of some back pain as well as pain in the bilateral feet/legs, but not like the LUE pain and swelling symptoms. Pt received empiric Iv-vanc / zosyn 05/11, but stopped 05/12 and started on oral prednisone 40mg. Rheum following, suspect gout, but advised ID eval to r/o cellulitis or other ID etiology; considering leukocytosis 18 and uptrending  (up from 77).    Recommendations / Plan:  Gouty arthritis likely dx, and do not suspect concurrent infectious etiology.   Continue to monitor off abx.     -- ID will schedule pt for ID clinic f/u appt   -- Discussed with ID staff and primary team.  -- ID will sign off at this time.

## 2024-05-13 NOTE — PLAN OF CARE
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.9]  Chest pain [R07.9]    Admission Date: 5/11/2024  Expected Discharge Date: 5/14/2024    Transition of Care Barriers: (P) None    Payor: [employer] MEDICARE / Plan: [employer] MEDICARE HMO / Product Type: Capitation /     Extended Emergency Contact Information  Primary Emergency Contact: Amelie Fernandes   [place]  Mobile Phone: [phone]  Relation: Daughter  Secondary Emergency Contact: Elsa Moody  Mobile Phone: [phone]  Relation: Grandchild    Discharge Plan A: (P) Skilled Nursing Facility  Discharge Plan B: (P) Skilled Nursing Facility      [facility] - [facility] [address]  [address] [facility] [address] [address]  Phone: [phone] Fax: [phone]    [id number] [address] [facility] [address] [facility]  [address] [facility] [address]  Phone: [phone] Fax: [phone]    [facility]  [address]  [place] [address]  Phone: [phone] Fax: [phone]      Initial Assessment (most recent)       Adult Discharge Assessment - 05/13/24 1058          Discharge Assessment    Assessment Type Discharge Planning Assessment (P)      Confirmed/corrected address, phone number and insurance -- (P)    CM updated home phone in demographics    Source of Information patient;family (P)      Communicated TEOFILO with patient/caregiver Date not available/Unable to determine (P)      Reason For Admission Arm swelling (P)      People in Home grandchild(kelly) (P)      Facility Arrived From: [facility] (P)      Do you expect to return to your current living situation? Yes (P)      Do you have help at home or someone to help you  manage your care at home? Yes (P)      Who are your caregiver(s) and their phone number(s)? Jose L Moody, granddaughter, [phone] (P)      Prior to hospitilization cognitive status: Unable to Assess (P)      Current cognitive status: Alert/Oriented (P)      Walking or Climbing Stairs Difficulty yes (P)      Walking or Climbing Stairs ambulation difficulty, requires equipment;stair climbing difficulty, requires equipment (P)      Mobility Management rolling walker, w/c (P)      Dressing/Bathing Difficulty no (P)      Home Layout Able to live on 1st floor (P)      Equipment Currently Used at Home walker, rolling;wheelchair (P)      Readmission within 30 days? Yes (P)      Do you currently have service(s) that help you manage your care at home? No (P)      Do you take prescription medications? Yes (P)      Do you have prescription coverage? Yes (P)      Coverage [employer] (P)      Do you have any problems affording any of your prescribed medications? No (P)      Who is going to help you get home at discharge? Will need transport to [place] (P)      How do you get to doctors appointments? family or friend will provide (P)      Are you on dialysis? No (P)      Do you take coumadin? No (P)      Discharge Plan A Skilled Nursing Facility (P)      Discharge Plan B Skilled Nursing Facility (P)      DME Needed Upon Discharge  other (see comments) (P)    TBD    Discharge Plan discussed with: Patient;Adult children (P)      Transition of Care Barriers None (P)         Physical Activity    On average, how many days per week do you engage in moderate to strenuous exercise (like a brisk walk)? Patient declined (P)      On average, how many minutes do you engage in exercise at this level? Patient declined (P)         Financial Resource Strain    How hard is it for you to pay for the very basics like food, housing, medical care, and heating? Patient declined (P)         Housing Stability    In the last 12 months, was there a  time when you were not able to pay the mortgage or rent on time? Patient declined (P)      At any time in the past 12 months, were you homeless or living in a shelter (including now)? Patient declined (P)         Transportation Needs    Has the lack of transportation kept you from medical appointments, meetings, work or from getting things needed for daily living? Patient declined (P)         Food Insecurity    Within the past 12 months, you worried that your food would run out before you got the money to buy more. Patient declined (P)      Within the past 12 months, the food you bought just didn't last and you didn't have money to get more. Patient declined (P)         Stress    Do you feel stress - tense, restless, nervous, or anxious, or unable to sleep at night because your mind is troubled all the time - these days? Patient declined (P)         Social Isolation    How often do you feel lonely or isolated from those around you?  Patient declined (P)         Alcohol Use    Q1: How often do you have a drink containing alcohol? Patient declined (P)      Q2: How many drinks containing alcohol do you have on a typical day when you are drinking? Patient declined (P)      Q3: How often do you have six or more drinks on one occasion? Patient declined (P)         Notegraphy    In the past 12 months has the electric, gas, oil, or water company threatened to shut off services in your home? Patient declined (P)         Health Literacy    How often do you need to have someone help you when you read instructions, pamphlets, or other written material from your doctor or pharmacy? Patient declines to respond (P)         OTHER    Name(s) of People in Home Jose L Moody, granddaughter, [phone] (P)                  CM spoke with pt and pt's son Deena Moody  in room.  Pt lives with granddaughter Elsa in 1 story home, came from [facility], wants to return.  Pt will need ambulance transport back to [place], his  daughter takes him to MD appts.  No Hh, coumadin, or HD.  Pt uses walker, w/c for ambulation, is independent with bathing, dressing.  Pharmacy [facility].     sent referral to [facility] via CP.    ED CrespoN, BS, RN, CCM

## 2024-05-13 NOTE — CONSULTS
[facility] - Med Surg ([place] [address])  Infectious Disease  Consult Note    Patient Name: Deena Moody  MRN: [id number]  Admission Date: 5/11/2024  Hospital Length of Stay: 2 days  Attending Physician: Toma Porter MD  Primary Care Provider: Jam Rowell MD     Isolation Status: No active isolations    Patient information was obtained from patient and ER records.      Inpatient consult to Infectious Diseases  Consult performed by: Darron Coleman PA-C  Consult ordered by: Mike Watt DO        Assessment/Plan:     Orthopedic  * Arm swelling  I have reviewed hospital notes from  HM service and other specialty providers. I have also reviewed CBC, CMP/BMP,  cultures and imaging with my interpretation as documented.      89M with pacemaker, dementia/cognitive decline, CKD3, and known h/o gout arthritis, who was previously seen 04/28 for atraumatic left hand/wrist pain/swelling. Imaging notable for erosive joint changes consistent with OM vs inflammatory changes. ID consulted to r/o cellulitis or other infectious etiology. Left wrist aspiration 05/03 +urate crystals; cxs negative. Sxs did not respond to Iv-abx; but near resolved with oral steroids x 7d. (Unable to use colchicine d/t CKD).  Plan then was for outpatient f/u and discussion of long term/maintenance treatment.   Pt now re-admitted for return of similar sxs. Arrives 05/11 from SNF with worsening LUE swelling / pain. Labs notable for uptrending CRP, persistent elevated uric acid, and leukocytosis 18. Pt was also complaining of some back pain as well as pain in the bilateral feet/legs, but not like the LUE pain and swelling symptoms. Pt received empiric Iv-vanc / zosyn 05/11, but stopped 05/12 and started on oral prednisone 40mg. Rheum following, suspect gout, but advised ID eval to r/o cellulitis or other ID etiology; considering leukocytosis 18 and uptrending  (up from 77).    Recommendations / Plan:  Gouty arthritis likely dx, and do  not suspect concurrent infectious etiology.   Continue to monitor off abx.   Gout management per Rheum.     -- Pt does not need schedule ID appt at this time; but can f/u with ID as needed.  -- Discussed with ID staff and primary team.  -- ID will sign off at this time.       Thank you for your consult. I will sign off. Please contact us if you have any additional questions.    Darron Coleman PA-C  Infectious Disease  [facility] - Med Surg ([facility])    Subjective:     Principal Problem: Arm swelling    HPI: 89M with pacemaker, dementia/cognitive decline, CKD3, and known h/o gout arthritis, who was previously seen 04/28 for atraumatic left hand/wrist pain/swelling. Imaging notable for erosive joint changes consistent with OM vs inflammatory changes. ID consulted to r/o cellulitis or other infectious etiology. Left wrist aspiration 05/03 +urate crystals; cxs negative. Sxs did not respond to Iv-abx; but near resolved with oral steroids x 7d. (Unable to use colchicine d/t CKD).  Plan then was for outpatient f/u and discussion of long term/maintenance treatment.   Pt now re-admitted for return of similar sxs. Arrives 05/11 from SNF with worsening LUE swelling / pain. Labs notable for uptrending CRP, persistent elevated uric acid, and leukocytosis 18. Pt was also complaining of some back pain as well as pain in the bilateral feet/legs, but not like the LUE pain and swelling symptoms. Pt received empiric Iv-vanc / zosyn 05/11, but stopped 05/12 and started on oral prednisone 40mg. Rheum following, suspect gout, but advised ID eval to r/o cellulitis or other ID etiology.     Past Medical History:   Diagnosis Date    Acute on chronic diastolic heart failure 9/1/2016    Coronary artery disease     Hyperlipidemia     Hypertension     Macular degeneration (senile) of retina, unspecified 12/12/2014    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    Persistent atrial fibrillation 11/10/2016    S/P placement of cardiac pacemaker 9/14/2016        Past Surgical History:   Procedure Laterality Date    AORTIC VALVE REPLACEMENT N/A     CARDIAC PACEMAKER PLACEMENT      CATARACT EXTRACTION W/  INTRAOCULAR LENS IMPLANT Right 2/16/2016    Dr. Whitley    CATARACT EXTRACTION W/  INTRAOCULAR LENS IMPLANT Left 3/1/2016    Dr. Whitley    CORONARY ARTERY BYPASS GRAFT      EAR EXAMINATION UNDER ANESTHESIA      EYE SURGERY         Review of patient's allergies indicates:   Allergen Reactions    Iodine and iodide containing products Other (See Comments)     Caused changes in skin color       Medications:  Medications Prior to Admission   Medication Sig    albuterol (PROVENTIL HFA) 90 mcg/actuation inhaler Inhale 2 puffs into the lungs every 6 (six) hours as needed for Wheezing. Rescue    apixaban (ELIQUIS) 2.5 mg Tab Take 1 tablet (2.5 mg total) by mouth 2 (two) times daily.    aspirin (ECOTRIN) 81 MG EC tablet Take 1 tablet (81 mg total) by mouth once daily.    benazepriL (LOTENSIN) 5 MG tablet Take 1 tablet (5 mg total) by mouth once daily.    carvediloL (COREG) 3.125 MG tablet Take 1 tablet (3.125 mg total) by mouth 2 (two) times daily.    diclofenac sodium (VOLTAREN) 1 % Gel Apply 2 g topically daily as needed (pain).    ferrous sulfate (FEOSOL) 325 mg (65 mg iron) Tab tablet Take 1 tablet (325 mg total) by mouth daily with breakfast.    fluticasone propionate (FLONASE) 50 mcg/actuation nasal spray 2 sprays (100 mcg total) by Each Nostril route once daily.    multivitamin (ONE DAILY MULTIVITAMIN) per tablet Take 1 tablet by mouth once daily.    nitroGLYCERIN (NITROSTAT) 0.4 MG SL tablet Take one tablet every 5 minutes for chest pain. After the third tablet go to the ED.    polyethylene glycol (GLYCOLAX) 17 gram/dose powder Dissolve one capful (17 g) in liquid by mouth 3 (three) times daily as needed. Take 1 three times daily until well formed stool    potassium chloride (KLOR-CON) 8 MEQ TbSR Take 1 tablet (8 mEq total) by mouth once daily.    torsemide  (DEMADEX) 20 MG Tab Take 1 tablet (20 mg total) by mouth 2 (two) times a day. HOLD UNTIL PCP FOLLOW UP     Antibiotics (From admission, onward)      None          Antifungals (From admission, onward)      None          Antivirals (From admission, onward)      None             Immunization History   Administered Date(s) Administered    COVID-19, MRNA, LN-S, PF (MODERNA FULL 0.5 ML DOSE) 03/04/2021, 04/01/2021    Influenza 10/17/2007, 10/15/2008, 10/14/2009, 09/16/2010, 10/03/2011    Influenza (FLUAD) - Quadrivalent - Adjuvanted - PF *Preferred* (65+) 10/15/2020, 09/30/2023    Influenza - High Dose - PF (65 years and older) 11/18/2013, 12/15/2014, 10/26/2015, 11/10/2016, 01/02/2018, 11/08/2018, 10/02/2019    Influenza Split 10/17/2007, 10/15/2008, 10/14/2009, 09/16/2010, 10/03/2011    Pneumococcal Conjugate - 13 Valent 02/01/2016    Pneumococcal Polysaccharide - 23 Valent 11/18/2013    Tdap 08/08/2016       Family History       Problem Relation (Age of Onset)    Hypertension Brother    No Known Problems Mother, Father, Sister, Maternal Aunt, Maternal Uncle, Paternal Aunt, Paternal Uncle, Maternal Grandmother, Maternal Grandfather, Paternal Grandmother, Paternal Grandfather, Daughter, Son    Stroke Brother          Social History     Socioeconomic History    Marital status:    Tobacco Use    Smoking status: Never     Passive exposure: Never    Smokeless tobacco: Never   Substance and Sexual Activity    Alcohol use: No    Drug use: No    Sexual activity: Yes     Partners: Female     Social Determinants of Health     Financial Resource Strain: Patient Declined (5/13/2024)    Overall Financial Resource Strain (CARDIA)     Difficulty of Paying Living Expenses: Patient declined   Food Insecurity: Patient Declined (5/13/2024)    Hunger Vital Sign     Worried About Running Out of Food in the Last Year: Patient declined     Ran Out of Food in the Last Year: Patient declined   Transportation Needs: Patient Declined  (5/13/2024)    TRANSPORTATION NEEDS     Transportation : Patient declined   Physical Activity: Patient Declined (5/13/2024)    Exercise Vital Sign     Days of Exercise per Week: Patient declined     Minutes of Exercise per Session: Patient declined   Recent Concern: Physical Activity - Inactive (4/29/2024)    Exercise Vital Sign     Days of Exercise per Week: 0 days     Minutes of Exercise per Session: 0 min   Stress: Patient Declined (5/13/2024)    [employer] - Occupational Stress Questionnaire     Feeling of Stress : Patient declined   Recent Concern: Stress - Stress Concern Present (4/29/2024)    [employer] - Occupational Stress Questionnaire     Feeling of Stress : Rather much   Housing Stability: Patient Declined (5/13/2024)    Housing Stability Vital Sign     Unable to Pay for Housing in the Last Year: Patient declined     Homeless in the Last Year: Patient declined     Review of Systems   Unable to perform ROS: Dementia   Musculoskeletal:  Positive for arthralgias, joint swelling and myalgias.     Objective:     Vital Signs (Most Recent):  Temp: 97.8 °F (36.6 °C) (05/13/24 0755)  Pulse: 68 (05/13/24 1202)  Resp: 17 (05/13/24 1202)  BP: 116/63 (05/13/24 1202)  SpO2: 96 % (05/13/24 1202) Vital Signs (24h Range):  Temp:  [97.8 °F (36.6 °C)-98.4 °F (36.9 °C)] 97.8 °F (36.6 °C)  Pulse:  [68-70] 68  Resp:  [16-18] 17  SpO2:  [96 %-100 %] 96 %  BP: (110-148)/(55-71) 116/63     Weight: 52.7 kg (116 lb 2.9 oz)  Body mass index is 17.16 kg/m².    Estimated Creatinine Clearance: 21.5 mL/min (A) (based on SCr of 1.6 mg/dL (H)).     Physical Exam  Vitals and nursing note reviewed.   Constitutional:       Appearance: He is ill-appearing.   HENT:      Head: Normocephalic and atraumatic.      Mouth/Throat:      Pharynx: Oropharynx is clear.   Eyes:      General: No scleral icterus.     Conjunctiva/sclera: Conjunctivae normal.   Cardiovascular:      Heart sounds:  Normal heart sounds. No murmur heard.  Pulmonary:      Effort: No respiratory distress.      Breath sounds: Normal breath sounds.   Abdominal:      General: Bowel sounds are normal.      Palpations: Abdomen is soft.   Musculoskeletal:         General: Swelling and tenderness present.      Cervical back: No rigidity or tenderness.   Skin:     Findings: No erythema or rash.          Significant Labs: Blood Culture:   Recent Labs   Lab 05/03/24  0832   LABBLOO No growth after 5 days.  No growth after 5 days.     CBC:   Recent Labs   Lab 05/11/24  1459 05/12/24  0541 05/13/24  0439   WBC 17.99* 18.12* 17.32*   HGB 10.5* 9.0* 9.9*   HCT 34.4* 30.3* 33.0*    238 265     CMP:   Recent Labs   Lab 05/12/24  0541 05/13/24  0439    141   K 3.7 4.6    104   CO2 25 22*   GLU 73 105   BUN 72* 82*   CREATININE 1.4 1.6*   CALCIUM 7.8* 8.5*   PROT 5.6*  --    ALBUMIN 2.1*  --    BILITOT 1.0  --    ALKPHOS 97  --    AST 28  --    ALT 17  --    ANIONGAP 11 15     Microbiology Results (last 7 days)       ** No results found for the last 168 hours. **          All pertinent labs within the past 24 hours have been reviewed.    Significant Imaging: I have reviewed all pertinent imaging results/findings within the past 24 hours.    I have personally reviewed records / hospital notes from   service and other specialty providers. I have also reviewed CBC, CMP/BMP,  cultures and imaging with my interpretation as documented in my assessment / plan.    Patient is high risk for infectious complications given pt's age, multiple co-morbidities, and case complexity.      Time: 75 minutes   50% of time spent on face-to-face counseling and coordination of care. Counseling included review of test results, diagnosis, and treatment plan with patient and/or family.

## 2024-05-13 NOTE — SUBJECTIVE & OBJECTIVE
Past Medical History:   Diagnosis Date    Acute on chronic diastolic heart failure 9/1/2016    Coronary artery disease     Hyperlipidemia     Hypertension     Macular degeneration (senile) of retina, unspecified 12/12/2014    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    Persistent atrial fibrillation 11/10/2016    S/P placement of cardiac pacemaker 9/14/2016       Past Surgical History:   Procedure Laterality Date    AORTIC VALVE REPLACEMENT N/A     CARDIAC PACEMAKER PLACEMENT      CATARACT EXTRACTION W/  INTRAOCULAR LENS IMPLANT Right 2/16/2016    Dr. Whitley    CATARACT EXTRACTION W/  INTRAOCULAR LENS IMPLANT Left 3/1/2016    Dr. Whitley    CORONARY ARTERY BYPASS GRAFT      EAR EXAMINATION UNDER ANESTHESIA      EYE SURGERY         Review of patient's allergies indicates:   Allergen Reactions    Iodine and iodide containing products Other (See Comments)     Caused changes in skin color       Medications:  Medications Prior to Admission   Medication Sig    albuterol (PROVENTIL HFA) 90 mcg/actuation inhaler Inhale 2 puffs into the lungs every 6 (six) hours as needed for Wheezing. Rescue    apixaban (ELIQUIS) 2.5 mg Tab Take 1 tablet (2.5 mg total) by mouth 2 (two) times daily.    aspirin (ECOTRIN) 81 MG EC tablet Take 1 tablet (81 mg total) by mouth once daily.    benazepriL (LOTENSIN) 5 MG tablet Take 1 tablet (5 mg total) by mouth once daily.    carvediloL (COREG) 3.125 MG tablet Take 1 tablet (3.125 mg total) by mouth 2 (two) times daily.    diclofenac sodium (VOLTAREN) 1 % Gel Apply 2 g topically daily as needed (pain).    ferrous sulfate (FEOSOL) 325 mg (65 mg iron) Tab tablet Take 1 tablet (325 mg total) by mouth daily with breakfast.    fluticasone propionate (FLONASE) 50 mcg/actuation nasal spray 2 sprays (100 mcg total) by Each Nostril route once daily.    multivitamin (ONE DAILY MULTIVITAMIN) per tablet Take 1 tablet by mouth once daily.    nitroGLYCERIN (NITROSTAT) 0.4 MG SL tablet Take one tablet every 5 minutes  for chest pain. After the third tablet go to the ED.    polyethylene glycol (GLYCOLAX) 17 gram/dose powder Dissolve one capful (17 g) in liquid by mouth 3 (three) times daily as needed. Take 1 three times daily until well formed stool    potassium chloride (KLOR-CON) 8 MEQ TbSR Take 1 tablet (8 mEq total) by mouth once daily.    torsemide (DEMADEX) 20 MG Tab Take 1 tablet (20 mg total) by mouth 2 (two) times a day. HOLD UNTIL PCP FOLLOW UP     Antibiotics (From admission, onward)      None          Antifungals (From admission, onward)      None          Antivirals (From admission, onward)      None             Immunization History   Administered Date(s) Administered    COVID-19, MRNA, LN-S, PF (MODERNA FULL 0.5 ML DOSE) 03/04/2021, 04/01/2021    Influenza 10/17/2007, 10/15/2008, 10/14/2009, 09/16/2010, 10/03/2011    Influenza (FLUAD) - Quadrivalent - Adjuvanted - PF *Preferred* (65+) 10/15/2020, 09/30/2023    Influenza - High Dose - PF (65 years and older) 11/18/2013, 12/15/2014, 10/26/2015, 11/10/2016, 01/02/2018, 11/08/2018, 10/02/2019    Influenza Split 10/17/2007, 10/15/2008, 10/14/2009, 09/16/2010, 10/03/2011    Pneumococcal Conjugate - 13 Valent 02/01/2016    Pneumococcal Polysaccharide - 23 Valent 11/18/2013    Tdap 08/08/2016       Family History       Problem Relation (Age of Onset)    Hypertension Brother    No Known Problems Mother, Father, Sister, Maternal Aunt, Maternal Uncle, Paternal Aunt, Paternal Uncle, Maternal Grandmother, Maternal Grandfather, Paternal Grandmother, Paternal Grandfather, Daughter, Son    Stroke Brother          Social History     Socioeconomic History    Marital status:    Tobacco Use    Smoking status: Never     Passive exposure: Never    Smokeless tobacco: Never   Substance and Sexual Activity    Alcohol use: No    Drug use: No    Sexual activity: Yes     Partners: Female     Social Determinants of Health     Financial Resource Strain: Patient Declined (5/13/2024)     Overall Financial Resource Strain (CARDIA)     Difficulty of Paying Living Expenses: Patient declined   Food Insecurity: Patient Declined (5/13/2024)    Hunger Vital Sign     Worried About Running Out of Food in the Last Year: Patient declined     Ran Out of Food in the Last Year: Patient declined   Transportation Needs: Patient Declined (5/13/2024)    TRANSPORTATION NEEDS     Transportation : Patient declined   Physical Activity: Patient Declined (5/13/2024)    Exercise Vital Sign     Days of Exercise per Week: Patient declined     Minutes of Exercise per Session: Patient declined   Recent Concern: Physical Activity - Inactive (4/29/2024)    Exercise Vital Sign     Days of Exercise per Week: 0 days     Minutes of Exercise per Session: 0 min   Stress: Patient Declined (5/13/2024)    [employer] - Occupational Stress Questionnaire     Feeling of Stress : Patient declined   Recent Concern: Stress - Stress Concern Present (4/29/2024)    [employer] - Occupational Stress Questionnaire     Feeling of Stress : Rather much   Housing Stability: Patient Declined (5/13/2024)    Housing Stability Vital Sign     Unable to Pay for Housing in the Last Year: Patient declined     Homeless in the Last Year: Patient declined     Review of Systems   Unable to perform ROS: Dementia   Musculoskeletal:  Positive for arthralgias, joint swelling and myalgias.     Objective:     Vital Signs (Most Recent):  Temp: 97.8 °F (36.6 °C) (05/13/24 0755)  Pulse: 68 (05/13/24 1202)  Resp: 17 (05/13/24 1202)  BP: 116/63 (05/13/24 1202)  SpO2: 96 % (05/13/24 1202) Vital Signs (24h Range):  Temp:  [97.8 °F (36.6 °C)-98.4 °F (36.9 °C)] 97.8 °F (36.6 °C)  Pulse:  [68-70] 68  Resp:  [16-18] 17  SpO2:  [96 %-100 %] 96 %  BP: (110-148)/(55-71) 116/63     Weight: 52.7 kg (116 lb 2.9 oz)  Body mass index is 17.16 kg/m².    Estimated Creatinine Clearance: 21.5 mL/min (A) (based on SCr of 1.6 mg/dL  (H)).     Physical Exam  Vitals and nursing note reviewed.   Constitutional:       Appearance: He is ill-appearing.   HENT:      Head: Normocephalic and atraumatic.      Mouth/Throat:      Pharynx: Oropharynx is clear.   Eyes:      General: No scleral icterus.     Conjunctiva/sclera: Conjunctivae normal.   Cardiovascular:      Heart sounds: Normal heart sounds. No murmur heard.  Pulmonary:      Effort: No respiratory distress.      Breath sounds: Normal breath sounds.   Abdominal:      General: Bowel sounds are normal.      Palpations: Abdomen is soft.   Musculoskeletal:         General: Swelling and tenderness present.      Cervical back: No rigidity or tenderness.   Skin:     Findings: No erythema or rash.          Significant Labs: Blood Culture:   Recent Labs   Lab 05/03/24  0832   LABBLOO No growth after 5 days.  No growth after 5 days.     CBC:   Recent Labs   Lab 05/11/24  1459 05/12/24  0541 05/13/24  0439   WBC 17.99* 18.12* 17.32*   HGB 10.5* 9.0* 9.9*   HCT 34.4* 30.3* 33.0*    238 265     CMP:   Recent Labs   Lab 05/12/24  0541 05/13/24  0439    141   K 3.7 4.6    104   CO2 25 22*   GLU 73 105   BUN 72* 82*   CREATININE 1.4 1.6*   CALCIUM 7.8* 8.5*   PROT 5.6*  --    ALBUMIN 2.1*  --    BILITOT 1.0  --    ALKPHOS 97  --    AST 28  --    ALT 17  --    ANIONGAP 11 15     Microbiology Results (last 7 days)       ** No results found for the last 168 hours. **          All pertinent labs within the past 24 hours have been reviewed.    Significant Imaging: I have reviewed all pertinent imaging results/findings within the past 24 hours.    I have personally reviewed records / hospital notes from   service and other specialty providers. I have also reviewed CBC, CMP/BMP,  cultures and imaging with my interpretation as documented in my assessment / plan.    Patient is high risk for infectious complications given pt's age, multiple co-morbidities, and case complexity.      Time: 75 minutes    50% of time spent on face-to-face counseling and coordination of care. Counseling included review of test results, diagnosis, and treatment plan with patient and/or family.

## 2024-05-13 NOTE — HPI
89M with pacemaker, dementia/cognitive decline, CKD3, and known h/o gout arthritis, who was previously seen 04/28 for atraumatic left hand/wrist pain/swelling. Imaging notable for erosive joint changes consistent with OM vs inflammatory changes. ID consulted to r/o cellulitis or other infectious etiology. Left wrist aspiration 05/03 +urate crystals; cxs negative. Sxs did not respond to Iv-abx; but near resolved with oral steroids x 7d. (Unable to use colchicine d/t CKD).  Plan then was for outpatient f/u and discussion of long term/maintenance treatment.   Pt now re-admitted for return of similar sxs. Arrives 05/11 from SNF with worsening LUE swelling / pain. Labs notable for uptrending CRP, persistent elevated uric acid, and leukocytosis 18. Pt was also complaining of some back pain as well as pain in the bilateral feet/legs, but not like the LUE pain and swelling symptoms. Pt received empiric Iv-vanc / zosyn 05/11, but stopped 05/12 and started on oral prednisone 40mg. Rheum following, suspect gout, but advised ID eval to r/o cellulitis or other ID etiology.

## 2024-05-13 NOTE — PLAN OF CARE
Problem: Infection  Goal: Absence of Infection Signs and Symptoms  Outcome: Progressing     Problem: Adult Inpatient Plan of Care  Goal: Plan of Care Review  Outcome: Progressing  Goal: Patient-Specific Goal (Individualized)  Outcome: Progressing  Goal: Absence of Hospital-Acquired Illness or Injury  Outcome: Progressing  Goal: Optimal Comfort and Wellbeing  Outcome: Progressing  Goal: Readiness for Transition of Care  Outcome: Progressing     Problem: Acute Kidney Injury/Impairment  Goal: Fluid and Electrolyte Balance  Outcome: Progressing  Goal: Improved Oral Intake  Outcome: Progressing  Goal: Effective Renal Function  Outcome: Progressing     Problem: Wound  Goal: Optimal Coping  Outcome: Progressing  Goal: Optimal Functional Ability  Outcome: Progressing  Goal: Absence of Infection Signs and Symptoms  Outcome: Progressing  Goal: Improved Oral Intake  Outcome: Progressing  Goal: Optimal Pain Control and Function  Outcome: Progressing  Goal: Skin Health and Integrity  Outcome: Progressing  Goal: Optimal Wound Healing  Outcome: Progressing     Problem: Skin Injury Risk Increased  Goal: Skin Health and Integrity  Outcome: Progressing     Problem: Fall Injury Risk  Goal: Absence of Fall and Fall-Related Injury  Outcome: Progressing     Problem: Pain Acute  Goal: Optimal Pain Control and Function  Outcome: Progressing

## 2024-05-13 NOTE — CONSULTS
[facility] ([place] [address])  Wound Care    Patient Name:  Deena Moody   MRN:  [id number]  Date: 5/13/2024  Diagnosis: Arm swelling    History:     Past Medical History:   Diagnosis Date    Acute on chronic diastolic heart failure 9/1/2016    Coronary artery disease     Hyperlipidemia     Hypertension     Macular degeneration (senile) of retina, unspecified 12/12/2014    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    Persistent atrial fibrillation 11/10/2016    S/P placement of cardiac pacemaker 9/14/2016       Social History     Socioeconomic History    Marital status:    Tobacco Use    Smoking status: Never     Passive exposure: Never    Smokeless tobacco: Never   Substance and Sexual Activity    Alcohol use: No    Drug use: No    Sexual activity: Yes     Partners: Female     Social Determinants of Health     Financial Resource Strain: Patient Declined (5/13/2024)    Overall Financial Resource Strain (CARDIA)     Difficulty of Paying Living Expenses: Patient declined   Food Insecurity: Patient Declined (5/13/2024)    Hunger Vital Sign     Worried About Running Out of Food in the Last Year: Patient declined     Ran Out of Food in the Last Year: Patient declined   Transportation Needs: Patient Declined (5/13/2024)    TRANSPORTATION NEEDS     Transportation : Patient declined   Physical Activity: Patient Declined (5/13/2024)    Exercise Vital Sign     Days of Exercise per Week: Patient declined     Minutes of Exercise per Session: Patient declined   Recent Concern: Physical Activity - Inactive (4/29/2024)    Exercise Vital Sign     Days of Exercise per Week: 0 days     Minutes of Exercise per Session: 0 min   Stress: Patient Declined (5/13/2024)    [employer] - Occupational Stress Questionnaire     Feeling of Stress : Patient declined   Recent Concern: Stress - Stress Concern Present (4/29/2024)    [employer] - Occupational Stress Questionnaire     Feeling of  Stress : Rather much   Housing Stability: Patient Declined (5/13/2024)    Housing Stability Vital Sign     Unable to Pay for Housing in the Last Year: Patient declined     Homeless in the Last Year: Patient declined       Precautions:     Allergies as of 05/11/2024 - Reviewed 05/11/2024   Allergen Reaction Noted    Iodine and iodide containing products Other (See Comments) 07/20/2012       [facility] Assessment Details/Treatment     Patient seen for wound care consultation.   Reviewed chart for this encounter.   See Flow Sheet for findings.      RECOMMENDATIONS:Patient is agitated but agreeable to inpatient wound care. RN consult for right arm, bilateral heels, and buttocks. Patient is currently in pain and refuses to let me assess right arm. Per chart review, right arm is a skin tear. Cleanse right arm skin tear with vashe for antimicrobial properties and pat dry. Apply mepilex foam border dressing. Change every three days or PRN if soiled.    Left heel noted to be unstageable, black, and purple wound. Apply betadine daily. Right heel noted to have blanchable area of erythema. Not boggy. Apply BPCO daily to stimulate capillaries. Buttocks noted to have blanchable area of erythema. Right buttocks noted to have wound with evidence of shearing. Cleanse right buttocks with vashe for antimicrobial properties and pat dry. Apply mepilex foam border dressing to avoid shear. Apply BPCO to sacrum and surrounding buttocks.    Skin integrity EMILIO consulted. Immerse bed ordered for microclimate and pressure redistribution. No other issues or concerns at this time. Will follow up 5/20/2024 or sooner if needed. Please ensure heel foams, EHOB boots, and wedge are in use.        Discussed POC with patient and primary nurse.   See EMR for orders & patient education.    Discussed nutrition and the role of protein in wound healing with the patient. Instructed patient to optimize protein for wound healing.    Bedside nursing to continue care &  monitoring.  Bedside nursing to maintain pressure injury prevention interventions.            05/13/24 1130   WOCN Assessment   WOCN Total Time (mins) 45   Visit Date 05/13/24   Visit Time 1130   Consult Type New   WOCN Speciality Wound   Intervention assessed;changed;applied;chart review;coordination of care;orders   Teaching on-going        Wound 05/03/24 1700 Other (comment) Buttocks   Date First Assessed/Time First Assessed: 05/03/24 1700   Present on Original Admission: No  Primary Wound Type: Other (comment)  Location: Buttocks   Wound Image    Dressing Appearance Open to air   Drainage Amount None   Drainage Characteristics/Odor No odor   Appearance Pink;Red   Care Cleansed with:;Sterile normal saline        Wound 05/03/24 0500 Pressure Injury Left Heel   Date First Assessed/Time First Assessed: 05/03/24 0500   Primary Wound Type: Pressure Injury  Side: Left  Location: Heel   Wound Image    Dressing Appearance Dry;Intact   Drainage Amount None   Drainage Characteristics/Odor No odor   Appearance Black;Purple     Orders placed.   Ganga ZEPEDA, RN  05/13/2024

## 2024-05-14 PROBLEM — L89.152 PRESSURE INJURY OF SACRAL REGION, STAGE 2: Status: ACTIVE | Noted: 2024-05-14

## 2024-05-14 LAB
ANION GAP SERPL CALC-SCNC: 15 MMOL/L (ref 8–16)
BASOPHILS # BLD AUTO: 0.02 K/UL (ref 0–0.2)
BASOPHILS NFR BLD: 0.1 % (ref 0–1.9)
BUN SERPL-MCNC: 96 MG/DL (ref 10–30)
CALCIUM SERPL-MCNC: 8.3 MG/DL (ref 8.7–10.5)
CHLORIDE SERPL-SCNC: 105 MMOL/L (ref 95–110)
CO2 SERPL-SCNC: 22 MMOL/L (ref 23–29)
CREAT SERPL-MCNC: 1.6 MG/DL (ref 0.5–1.4)
DIFFERENTIAL METHOD BLD: ABNORMAL
EOSINOPHIL # BLD AUTO: 0 K/UL (ref 0–0.5)
EOSINOPHIL NFR BLD: 0 % (ref 0–8)
ERYTHROCYTE [DISTWIDTH] IN BLOOD BY AUTOMATED COUNT: 14.8 % (ref 11.5–14.5)
EST. GFR  (NO RACE VARIABLE): 39.9 ML/MIN/1.73 M^2
GLUCOSE SERPL-MCNC: 136 MG/DL (ref 70–110)
HCT VFR BLD AUTO: 31.3 % (ref 40–54)
HGB BLD-MCNC: 9.8 G/DL (ref 14–18)
IMM GRANULOCYTES # BLD AUTO: 0.19 K/UL (ref 0–0.04)
IMM GRANULOCYTES NFR BLD AUTO: 0.9 % (ref 0–0.5)
LYMPHOCYTES # BLD AUTO: 1.1 K/UL (ref 1–4.8)
LYMPHOCYTES NFR BLD: 4.9 % (ref 18–48)
MAGNESIUM SERPL-MCNC: 2.4 MG/DL (ref 1.6–2.6)
MCH RBC QN AUTO: 30.7 PG (ref 27–31)
MCHC RBC AUTO-ENTMCNC: 31.3 G/DL (ref 32–36)
MCV RBC AUTO: 98 FL (ref 82–98)
MONOCYTES # BLD AUTO: 1.2 K/UL (ref 0.3–1)
MONOCYTES NFR BLD: 5.3 % (ref 4–15)
NEUTROPHILS # BLD AUTO: 19.3 K/UL (ref 1.8–7.7)
NEUTROPHILS NFR BLD: 88.8 % (ref 38–73)
NRBC BLD-RTO: 0 /100 WBC
PLATELET # BLD AUTO: 288 K/UL (ref 150–450)
PMV BLD AUTO: 9.5 FL (ref 9.2–12.9)
POTASSIUM SERPL-SCNC: 4.8 MMOL/L (ref 3.5–5.1)
RBC # BLD AUTO: 3.19 M/UL (ref 4.6–6.2)
SARS-COV-2 RNA RESP QL NAA+PROBE: NOT DETECTED
SODIUM SERPL-SCNC: 142 MMOL/L (ref 136–145)
WBC # BLD AUTO: 21.74 K/UL (ref 3.9–12.7)

## 2024-05-14 PROCEDURE — 99222 1ST HOSP IP/OBS MODERATE 55: CPT | Mod: HCNC,,, | Performed by: NURSE PRACTITIONER

## 2024-05-14 PROCEDURE — 85025 COMPLETE CBC W/AUTO DIFF WBC: CPT | Mod: HCNC | Performed by: STUDENT IN AN ORGANIZED HEALTH CARE EDUCATION/TRAINING PROGRAM

## 2024-05-14 PROCEDURE — 21400001 HC TELEMETRY ROOM: Mod: HCNC

## 2024-05-14 PROCEDURE — 30200315 PPD INTRADERMAL TEST REV CODE 302: Mod: HCNC | Performed by: INTERNAL MEDICINE

## 2024-05-14 PROCEDURE — 25000003 PHARM REV CODE 250: Mod: HCNC

## 2024-05-14 PROCEDURE — 25000003 PHARM REV CODE 250: Mod: HCNC | Performed by: STUDENT IN AN ORGANIZED HEALTH CARE EDUCATION/TRAINING PROGRAM

## 2024-05-14 PROCEDURE — 63600175 PHARM REV CODE 636 W HCPCS: Mod: HCNC | Performed by: STUDENT IN AN ORGANIZED HEALTH CARE EDUCATION/TRAINING PROGRAM

## 2024-05-14 PROCEDURE — 25000003 PHARM REV CODE 250: Mod: HCNC | Performed by: INTERNAL MEDICINE

## 2024-05-14 PROCEDURE — 83735 ASSAY OF MAGNESIUM: CPT | Mod: HCNC

## 2024-05-14 PROCEDURE — 11000001 HC ACUTE MED/SURG PRIVATE ROOM: Mod: HCNC

## 2024-05-14 PROCEDURE — 87635 SARS-COV-2 COVID-19 AMP PRB: CPT | Mod: HCNC | Performed by: INTERNAL MEDICINE

## 2024-05-14 PROCEDURE — 80048 BASIC METABOLIC PNL TOTAL CA: CPT | Mod: HCNC | Performed by: STUDENT IN AN ORGANIZED HEALTH CARE EDUCATION/TRAINING PROGRAM

## 2024-05-14 PROCEDURE — 86580 TB INTRADERMAL TEST: CPT | Mod: HCNC | Performed by: INTERNAL MEDICINE

## 2024-05-14 PROCEDURE — 36415 COLL VENOUS BLD VENIPUNCTURE: CPT | Mod: HCNC

## 2024-05-14 RX ORDER — TORSEMIDE 20 MG/1
20 TABLET ORAL DAILY
Qty: 90 TABLET | Refills: 3 | Status: SHIPPED | OUTPATIENT
Start: 2024-05-14 | End: 2024-05-20 | Stop reason: HOSPADM

## 2024-05-14 RX ORDER — FERROUS SULFATE 325(65) MG
325 TABLET ORAL
Start: 2024-05-15

## 2024-05-14 RX ORDER — ACETAMINOPHEN 325 MG/1
650 TABLET ORAL 3 TIMES DAILY
Status: DISCONTINUED | OUTPATIENT
Start: 2024-05-14 | End: 2024-05-19

## 2024-05-14 RX ORDER — ALLOPURINOL 100 MG/1
50 TABLET ORAL DAILY
Qty: 30 TABLET | Refills: 0 | Status: SHIPPED | OUTPATIENT
Start: 2024-05-15 | End: 2024-05-20

## 2024-05-14 RX ORDER — TORSEMIDE 5 MG/1
20 TABLET ORAL DAILY
Status: DISCONTINUED | OUTPATIENT
Start: 2024-05-14 | End: 2024-05-15

## 2024-05-14 RX ORDER — ACETAMINOPHEN 325 MG/1
650 TABLET ORAL 3 TIMES DAILY
Start: 2024-05-14 | End: 2024-05-20 | Stop reason: HOSPADM

## 2024-05-14 RX ORDER — COLCHICINE 0.6 MG/1
0.6 TABLET ORAL EVERY OTHER DAY
Qty: 15 TABLET | Refills: 11 | Status: SHIPPED | OUTPATIENT
Start: 2024-05-16 | End: 2025-05-16

## 2024-05-14 RX ORDER — BALSAM PERU/CASTOR OIL
OINTMENT (GRAM) TOPICAL 2 TIMES DAILY
Start: 2024-05-14

## 2024-05-14 RX ORDER — PREDNISONE 20 MG/1
40 TABLET ORAL DAILY
Start: 2024-05-15 | End: 2024-05-20 | Stop reason: HOSPADM

## 2024-05-14 RX ORDER — DICLOFENAC SODIUM 10 MG/G
2 GEL TOPICAL 3 TIMES DAILY
Qty: 200 G | Refills: 0 | Status: ON HOLD | OUTPATIENT
Start: 2024-05-14 | End: 2024-06-05 | Stop reason: HOSPADM

## 2024-05-14 RX ADMIN — APIXABAN 2.5 MG: 2.5 TABLET, FILM COATED ORAL at 09:05

## 2024-05-14 RX ADMIN — Medication: at 09:05

## 2024-05-14 RX ADMIN — TUBERCULIN PURIFIED PROTEIN DERIVATIVE 5 UNITS: 5 INJECTION, SOLUTION INTRADERMAL at 01:05

## 2024-05-14 RX ADMIN — FERROUS SULFATE TAB EC 325 MG (65 MG FE EQUIVALENT) 1 EACH: 325 (65 FE) TABLET DELAYED RESPONSE at 09:05

## 2024-05-14 RX ADMIN — ALLOPURINOL 50 MG: 300 TABLET ORAL at 09:05

## 2024-05-14 RX ADMIN — ASPIRIN 81 MG: 81 TABLET, COATED ORAL at 09:05

## 2024-05-14 RX ADMIN — POLYETHYLENE GLYCOL 3350 17 G: 17 POWDER, FOR SOLUTION ORAL at 09:05

## 2024-05-14 RX ADMIN — CARVEDILOL 3.12 MG: 3.12 TABLET, FILM COATED ORAL at 09:05

## 2024-05-14 RX ADMIN — THERA TABS 1 TABLET: TAB at 09:05

## 2024-05-14 RX ADMIN — COLCHICINE 0.6 MG: 0.6 TABLET, FILM COATED ORAL at 09:05

## 2024-05-14 RX ADMIN — TORSEMIDE 20 MG: 5 TABLET ORAL at 01:05

## 2024-05-14 RX ADMIN — LISINOPRIL 5 MG: 5 TABLET ORAL at 09:05

## 2024-05-14 RX ADMIN — ACETAMINOPHEN 650 MG: 325 TABLET ORAL at 09:05

## 2024-05-14 RX ADMIN — PREDNISONE 40 MG: 20 TABLET ORAL at 09:05

## 2024-05-14 NOTE — CONSULTS
"[facility] - Med Surg ([place] [address])  Skin Integrity EMILIO  Consult Note    Patient Name: Deena Moody  MRN: [id number]  Admission Date: 5/11/2024  Hospital Length of Stay: 3 days  Attending Physician: Toma Porter MD  Primary Care Provider: Jam Rowell MD     Inpatient consult to Skin Integrity  Practitioner  Consult performed by: Bonny Calvillo NP  Consult ordered by: Toma Porter MD        Subjective:     History of Present Illness:  Deena Moody is a 89 year old male with PMHx HTN, HLD, CAD, diastolic HF, A-fib with pacemaker, CABG, hx of MI, AV block, and CKD 3, with recent admission to  for treatment of L hand gout flare (d/c 5/8/24), who presents back to [facility] ED from his nursing home with L upper arm swelling. During prior admission, pt initially thought to have osteomyelitis and started on BS abx. Later determined to be gout flare, managed with steroids by rheumatology. Patient has dementia at baseline and is unable to explain his situation. He complains that he feels bad and endorses muscle pain "all over," but otherwise cannot describe his symptoms. No family at bedside. He denies confusion, fevers or chills, headaches, chest pain, SOB, belly pain.      In ED: T 99F, WBC 21K (15K prior admission). VSS. Labs otherwise c/w stable chronic anemia, CKD. CRP 99.7 (prior ~60). ESR 78 (prior ~100). UA unremarkable. CXR without change from prior. XR LUE remarkable for "redemonstration of erosive change involving the head of the 2nd middle phalanx, previously demonstrated on hand CT and radiograph referenced above.  Findings could reflect underlying osteomyelitis or other inflammatory arthritidis in appears similar to prior exams." Otherwise XR of L hand, L humerus, L forearm without acute findings. Given tylenol, vanc/ zosyn. Patient admitted to hospital medicine service for further management. Skin integrity EMILIO consulted for evaluation of skin injury.       Scheduled Meds:   allopurinoL  50 " Pt taken to CT with trauma and RN      [place] Median  09/12/22 0983 mg Oral Daily    apixaban  2.5 mg Oral BID    aspirin  81 mg Oral Daily    balsam peru-castor oiL   Topical (Top) BID    carvediloL  3.125 mg Oral BID    colchicine  0.6 mg Oral Every other day    ferrous sulfate  1 tablet Oral Daily    lisinopriL  5 mg Oral Daily    multivitamin  1 tablet Oral Daily    polyethylene glycol  17 g Oral BID    predniSONE  40 mg Oral Daily    torsemide  20 mg Oral Daily     Continuous Infusions:  PRN Meds:  Current Facility-Administered Medications:     acetaminophen, 650 mg, Oral, Q6H PRN    albuterol-ipratropium, 3 mL, Nebulization, Q4H PRN    aluminum-magnesium hydroxide-simethicone, 30 mL, Oral, QID PRN    dextrose 10%, 12.5 g, Intravenous, PRN    dextrose 10%, 25 g, Intravenous, PRN    diclofenac sodium, 2 g, Topical (Top), Daily PRN    glucagon (human recombinant), 1 mg, Intramuscular, PRN    glucose, 16 g, Oral, PRN    glucose, 24 g, Oral, PRN    HYDROcodone-acetaminophen, 1 tablet, Oral, Q6H PRN    hydrOXYzine HCL, 25 mg, Oral, TID PRN    melatonin, 6 mg, Oral, Nightly PRN    morphine, 1 mg, Intravenous, Q4H PRN    naloxone, 0.02 mg, Intravenous, PRN    ondansetron, 8 mg, Oral, Q8H PRN    prochlorperazine, 5 mg, Intravenous, Q6H PRN    sodium chloride 0.9%, 10 mL, Intravenous, PRN    sodium chloride 0.9%, 10 mL, Intravenous, Q8H PRN    Review of patient's allergies indicates:   Allergen Reactions    Iodine and iodide containing products Other (See Comments)     Caused changes in skin color        Past Medical History:   Diagnosis Date    Acute on chronic diastolic heart failure 9/1/2016    Coronary artery disease     Hyperlipidemia     Hypertension     Macular degeneration (senile) of retina, unspecified 12/12/2014    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    Persistent atrial fibrillation 11/10/2016    S/P placement of cardiac pacemaker 9/14/2016     Past Surgical History:   Procedure Laterality Date    AORTIC VALVE REPLACEMENT N/A     CARDIAC PACEMAKER PLACEMENT      CATARACT EXTRACTION  W/  INTRAOCULAR LENS IMPLANT Right 2/16/2016    Dr. Whitley    CATARACT EXTRACTION W/  INTRAOCULAR LENS IMPLANT Left 3/1/2016    Dr. Whitley    CORONARY ARTERY BYPASS GRAFT      EAR EXAMINATION UNDER ANESTHESIA      EYE SURGERY         Family History       Problem Relation (Age of Onset)    Hypertension Brother    No Known Problems Mother, Father, Sister, Maternal Aunt, Maternal Uncle, Paternal Aunt, Paternal Uncle, Maternal Grandmother, Maternal Grandfather, Paternal Grandmother, Paternal Grandfather, Daughter, Son    Stroke Brother          Tobacco Use    Smoking status: Never     Passive exposure: Never    Smokeless tobacco: Never   Substance and Sexual Activity    Alcohol use: No    Drug use: No    Sexual activity: Yes     Partners: Female     Review of Systems   Skin:  Positive for wound.       Objective:     Vital Signs (Most Recent):  Temp: 97.8 °F (36.6 °C) (05/14/24 1116)  Pulse: 70 (05/14/24 1116)  Resp: 18 (05/14/24 1116)  BP: 109/66 (05/14/24 1116)  SpO2: 97 % (05/14/24 1116) Vital Signs (24h Range):  Temp:  [97.5 °F (36.4 °C)-98.3 °F (36.8 °C)] 97.8 °F (36.6 °C)  Pulse:  [69-70] 70  Resp:  [16-18] 18  SpO2:  [96 %-100 %] 97 %  BP: (109-187)/(65-75) 109/66     Weight: 52.7 kg (116 lb 2.9 oz)  Body mass index is 17.16 kg/m².     Physical Exam  Constitutional:       Appearance: Normal appearance.   Skin:     General: Skin is warm and dry.      Findings: Lesion present.   Neurological:      Mental Status: He is alert.          Laboratory:  All pertinent labs reviewed within the last 24 hours.    Diagnostic Results:  None      Assessment/Plan:         EMILIO Skin Integrity Evaluation      Skin Integrity EMILIO evaluation of patient as part of the comprehensive skin care team.     He has been admitted for 3 days. Skin injury was noted on 5/3/24. POA yes.    L sacrum/buttock      L heel       R heel          Orthopedic  Pressure injury of sacral region, stage 2  - consult received for evaluation of skin  injury.  - pt [facility] back [facility] [place] [facility] home with L upper arm swelling.  - left sacral area with partial thickness tissue loss and pink, moist wound base; stage 2 pressure injury of sacrum.  - continue with mepilex dressing to area.  - Immerse surface.  - pillows and heel boots for offloading.  - sacral foam intact.  - condom cath found next to pt in bed during assessment with pad saturated of urine. Replaced by tech.  - urinary incontinence episode cleaned.  - turn q2h.  - domingo score documented at 13 with a nutrition sub scale score of 2.  - recommend dietary consult to optimize nutrition for wound healing.  - nursing to maintain pressure injury prevention measures and continue wound care per orders.    Blood blister  - left heel purple/maroon discoloration noted of ruptured blood blister.  - continue heel boots for offloading.  - continue betadine daily to left heel.  - right heel with blanchable redness and intact skin. Continue BPCO bid/prn.        Thank you for your consult. I will follow-up with patient. Please contact us if you have any additional questions.      Bonny Calvillo NP  Skin Integrity EMILIO  [facility] ([facility])

## 2024-05-14 NOTE — PLAN OF CARE
RONALDO spoke with Cory at [facility] [phone], informed that pt to be d/c'd today.  He will discuss with Mame and call CM back.    10:51 AM  CM sent SNF orders to [facility] via CP.  CM called Nonya, left message with  requesting call back.    12:54 PM  Covid and PPD ordered.  CM called [facility], left message with  requesting call back.    1:52 PM  CM went to pt room to discuss d/c - pt not in room.  CM sent Covid, PPD, CXR to [facility] via CP.    Hiral Espinoza, EDN, BS, RN, CCM

## 2024-05-14 NOTE — SUBJECTIVE & OBJECTIVE
November 13, 2020      Armando Duron MD  [address]  [address]  [place] [address]           [facility]  [address]  [place] [address]  Phone: [phone]          Patient: Janki Casillas   MR Number: [id number]   YOB: 1929   Date of Visit: 11/13/2020       Dear Dr. Armando Duron:    Thank you for referring Janki Casillas to me for evaluation. Attached you will find relevant portions of my assessment and plan of care.    If you have questions, please do not hesitate to call me. I look forward to following Janki Casillas along with you.    Sincerely,    Aurelia Altman MD    Enclosure  CC:  No Recipients    If you would like to receive this communication electronically, please contact [email or web address] or [phone] to request more information on [employer] Link access.    For providers and/or their staff who would like to refer a patient to [facility], please contact us through our one-stop-shop provider referral line, [facility], at [phone].    If you feel you have received this communication in error or would no longer like to receive these types of communications, please e-mail [email or web address]          Scheduled Meds:   allopurinoL  50 mg Oral Daily    apixaban  2.5 mg Oral BID    aspirin  81 mg Oral Daily    balsam peru-castor oiL   Topical (Top) BID    carvediloL  3.125 mg Oral BID    colchicine  0.6 mg Oral Every other day    ferrous sulfate  1 tablet Oral Daily    lisinopriL  5 mg Oral Daily    multivitamin  1 tablet Oral Daily    polyethylene glycol  17 g Oral BID    predniSONE  40 mg Oral Daily    torsemide  20 mg Oral Daily     Continuous Infusions:  PRN Meds:  Current Facility-Administered Medications:     acetaminophen, 650 mg, Oral, Q6H PRN    albuterol-ipratropium, 3 mL, Nebulization, Q4H PRN    aluminum-magnesium hydroxide-simethicone, 30 mL, Oral, QID PRN    dextrose 10%, 12.5 g, Intravenous, PRN    dextrose 10%, 25 g, Intravenous, PRN    diclofenac sodium, 2 g, Topical (Top), Daily PRN    glucagon (human recombinant), 1 mg, Intramuscular, PRN    glucose, 16 g, Oral, PRN    glucose, 24 g, Oral, PRN    HYDROcodone-acetaminophen, 1 tablet, Oral, Q6H PRN    hydrOXYzine HCL, 25 mg, Oral, TID PRN    melatonin, 6 mg, Oral, Nightly PRN    morphine, 1 mg, Intravenous, Q4H PRN    naloxone, 0.02 mg, Intravenous, PRN    ondansetron, 8 mg, Oral, Q8H PRN    prochlorperazine, 5 mg, Intravenous, Q6H PRN    sodium chloride 0.9%, 10 mL, Intravenous, PRN    sodium chloride 0.9%, 10 mL, Intravenous, Q8H PRN    Review of patient's allergies indicates:   Allergen Reactions    Iodine and iodide containing products Other (See Comments)     Caused changes in skin color        Past Medical History:   Diagnosis Date    Acute on chronic diastolic heart failure 9/1/2016    Coronary artery disease     Hyperlipidemia     Hypertension     Macular degeneration (senile) of retina, unspecified 12/12/2014    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    Persistent atrial fibrillation 11/10/2016    S/P placement of cardiac pacemaker 9/14/2016     Past Surgical History:   Procedure Laterality Date    AORTIC VALVE REPLACEMENT N/A     CARDIAC PACEMAKER  PLACEMENT      CATARACT EXTRACTION W/  INTRAOCULAR LENS IMPLANT Right 2/16/2016    Dr. Whitley    CATARACT EXTRACTION W/  INTRAOCULAR LENS IMPLANT Left 3/1/2016    Dr. Whitley    CORONARY ARTERY BYPASS GRAFT      EAR EXAMINATION UNDER ANESTHESIA      EYE SURGERY         Family History       Problem Relation (Age of Onset)    Hypertension Brother    No Known Problems Mother, Father, Sister, Maternal Aunt, Maternal Uncle, Paternal Aunt, Paternal Uncle, Maternal Grandmother, Maternal Grandfather, Paternal Grandmother, Paternal Grandfather, Daughter, Son    Stroke Brother          Tobacco Use    Smoking status: Never     Passive exposure: Never    Smokeless tobacco: Never   Substance and Sexual Activity    Alcohol use: No    Drug use: No    Sexual activity: Yes     Partners: Female     Review of Systems   Skin:  Positive for wound.       Objective:     Vital Signs (Most Recent):  Temp: 97.8 °F (36.6 °C) (05/14/24 1116)  Pulse: 70 (05/14/24 1116)  Resp: 18 (05/14/24 1116)  BP: 109/66 (05/14/24 1116)  SpO2: 97 % (05/14/24 1116) Vital Signs (24h Range):  Temp:  [97.5 °F (36.4 °C)-98.3 °F (36.8 °C)] 97.8 °F (36.6 °C)  Pulse:  [69-70] 70  Resp:  [16-18] 18  SpO2:  [96 %-100 %] 97 %  BP: (109-187)/(65-75) 109/66     Weight: 52.7 kg (116 lb 2.9 oz)  Body mass index is 17.16 kg/m².     Physical Exam  Constitutional:       Appearance: Normal appearance.   Skin:     General: Skin is warm and dry.      Findings: Lesion present.   Neurological:      Mental Status: He is alert.          Laboratory:  All pertinent labs reviewed within the last 24 hours.    Diagnostic Results:  None

## 2024-05-14 NOTE — ASSESSMENT & PLAN NOTE
- left heel purple/maroon discoloration noted of ruptured blood blister.  - continue heel boots for offloading.  - continue betadine daily to left heel.  - right heel with blanchable redness and intact skin. Continue BPCO bid/prn.

## 2024-05-14 NOTE — PLAN OF CARE
Problem: Infection  Goal: Absence of Infection Signs and Symptoms  Outcome: Progressing     Problem: Adult Inpatient Plan of Care  Goal: Plan of Care Review  Outcome: Progressing  Goal: Absence of Hospital-Acquired Illness or Injury  Outcome: Progressing  Goal: Readiness for Transition of Care  Outcome: Progressing     Problem: Acute Kidney Injury/Impairment  Goal: Fluid and Electrolyte Balance  Outcome: Progressing  Goal: Effective Renal Function  Outcome: Progressing     Problem: Wound  Goal: Absence of Infection Signs and Symptoms  Outcome: Progressing  Goal: Optimal Pain Control and Function  Outcome: Progressing     Problem: Skin Injury Risk Increased  Goal: Skin Health and Integrity  Outcome: Progressing     Problem: Fall Injury Risk  Goal: Absence of Fall and Fall-Related Injury  Outcome: Progressing

## 2024-05-14 NOTE — ASSESSMENT & PLAN NOTE
- consult received for evaluation of skin injury.  - pt presents back to [facility] ED from his nursing home with L upper arm swelling.  - left sacral area with partial thickness tissue loss and pink, moist wound base; stage 2 pressure injury of sacrum.  - continue with mepilex dressing to area.  - Immerse surface.  - pillows and heel boots for offloading.  - sacral foam intact.  - condom cath found next to pt in bed during assessment with pad saturated of urine. Replaced by tech.  - urinary incontinence episode cleaned.  - turn q2h.  - domingo score documented at 13 with a nutrition sub scale score of 2.  - recommend dietary consult to optimize nutrition for wound healing.  - nursing to maintain pressure injury prevention measures and continue wound care per orders.

## 2024-05-14 NOTE — PLAN OF CARE
[facility]     Department of Hospital Medicine     [address] [place] [address]     [phone] after hours  [phone] fax       NURSING HOME ORDERS                                     05/14/2024    Admit to Nursing Home:    Skilled Bed      Diagnoses:  Active Hospital Problems    Diagnosis  POA    *Arm swelling [M79.89]  Yes    CKD (chronic kidney disease), stage IV [N18.4]  Yes    History of gout [Z87.39]  Yes    Permanent atrial fibrillation [I48.21]  Yes     Chronic    Chronic diastolic heart failure [I50.32]  Yes     Patient being diuresed.      CAD (coronary artery disease) [I25.10]  Yes     Chronic    Essential hypertension [I10]  Yes      Resolved Hospital Problems   No resolved problems to display.       Allergies:  Review of patient's allergies indicates:   Allergen Reactions    Iodine and iodide containing products Other (See Comments)     Caused changes in skin color       Vitals:   Every shift (Skilled Nursing patients)    Diet: low sodium diet  Fluid restriction 1500 mL    Acitivities:     - Up in a chair each morning as tolerated   - May use walker, cane, or self-propelled wheelchair    Nursing Precautions:     - Aspiration precautions:             -  Upright 90 degrees before during and after meals    - Decubitus precautions:        -  for positioning   - Pressure reducing foam mattress   - Turn patient every two hours. Use wedge pillows to anchor patient   - Fall precautions    CONSULTS:     PT to evaluate and treat     OT to evaluate and treat     ST to evaluate and treat     Nutrition to evaluate and recommend diet    LABS:  per unit routine    WOUND CARE:  Wound spray or saline for wound cleaning with all dressing changes.    All wounds to be measured with first dressing changes and every week.    Left heel daily: Paint left heel with betadine and leave open to air.    Right heel BID: Apply Balsam Peru Castor Oil  to right heel BID  and PRN if soiled.    Right buttock twice weekly: Cleanse right buttocks wound with vashe and pat dry. Apply mepilex foam border dressing. Change every three days or PRN if soiled.    Right arm tear twice weekly: Cleanse right arm skin tear with vashe and pat dry. Apply mepilex foam border dressing. Change every three days or PRN if soiled.    Medications: Discontinue all previous medication orders, if any. See new list below.    Current Discharge Medication List        START taking these medications    Details   acetaminophen (TYLENOL) 325 MG tablet Take 2 tablets (650 mg total) by mouth 3 (three) times daily. Stop at discharge from [facility] for 14 days      allopurinoL (ZYLOPRIM) 100 MG tablet Take 0.5 tablets (50 mg total) by mouth once daily.  Qty: 30 tablet, Refills: 0      balsam peru-castor oiL Oint Apply topically 2 (two) times a day. Apply to buttocks and sacrum BID and PRN if soiled.      colchicine (COLCRYS) 0.6 mg tablet Take 1 tablet (0.6 mg total) by mouth every other day.  Qty: 15 tablet, Refills: 11      predniSONE (DELTASONE) 20 MG tablet Take 2 tablets (40 mg total) by mouth once daily. for 4 days           CONTINUE these medications which have CHANGED    Details   diclofenac sodium (VOLTAREN) 1 % Gel Apply 2 g topically 3 (three) times daily. To left elbow  Qty: 200 g, Refills: 0    Associated Diagnoses: Muscle strain      ferrous sulfate (FEOSOL) 325 mg (65 mg iron) Tab tablet Take 1 tablet (325 mg total) by mouth every Mon, Wed, Fri. Before breakfast    Associated Diagnoses: Chronic disease anemia      torsemide (DEMADEX) 20 MG Tab Take 1 tablet (20 mg total) by mouth once daily.  Qty: 90 tablet, Refills: 3    Comments: .  Associated Diagnoses: Persistent atrial fibrillation           CONTINUE these medications which have NOT CHANGED    Details   albuterol (PROVENTIL HFA) 90 mcg/actuation inhaler Inhale 2 puffs into the lungs every 6 (six) hours as needed for Wheezing. Rescue  Qty: 18 g, Refills: 1       apixaban (ELIQUIS) 2.5 mg Tab Take 1 tablet (2.5 mg total) by mouth 2 (two) times daily.  Qty: 180 tablet, Refills: 3    Associated Diagnoses: Persistent atrial fibrillation      aspirin (ECOTRIN) 81 MG EC tablet Take 1 tablet (81 mg total) by mouth once daily.  Qty: 90 tablet, Refills: 3    Associated Diagnoses: Dyslipidemia      benazepriL (LOTENSIN) 5 MG tablet Take 1 tablet (5 mg total) by mouth once daily.  Qty: 90 tablet, Refills: 3    Associated Diagnoses: Stage 3b chronic kidney disease; Essential hypertension      carvediloL (COREG) 3.125 MG tablet Take 1 tablet (3.125 mg total) by mouth 2 (two) times daily.  Qty: 180 tablet, Refills: 3    Comments: .      fluticasone propionate (FLONASE) 50 mcg/actuation nasal spray 2 sprays (100 mcg total) by Each Nostril route once daily.  Qty: 30 mL, Refills: 1    Associated Diagnoses: COVID-19 virus infection; Acute cough      multivitamin (ONE DAILY MULTIVITAMIN) per tablet Take 1 tablet by mouth once daily.    Associated Diagnoses: Chronic disease anemia      nitroGLYCERIN (NITROSTAT) 0.4 MG SL tablet Take one tablet every 5 minutes for chest pain. After the third tablet go to the ED.  Qty: 25 tablet, Refills: 4    Associated Diagnoses: Persistent atrial fibrillation      polyethylene glycol (GLYCOLAX) 17 gram/dose powder Dissolve one capful (17 g) in liquid by mouth 3 (three) times daily as needed. Take 1 three times daily until well formed stool  Qty: 510 g, Refills: 0    Associated Diagnoses: Constipation, unspecified constipation type           STOP taking these medications       potassium chloride (KLOR-CON) 8 MEQ TbSR Comments:   Reason for Stopping:                 _________________________________  Toma Porter MD  05/14/2024

## 2024-05-14 NOTE — NURSING
During nursing rounds patient resting in bed requesting a sip of water, patient offered repositioning, patient agreed. Upon repositioning patient was found to have a moderate foul smelling bowel movement. Patient received a full linen change and anjelica care. Patient requiring total care of 2 people assist for hygiene care. Patient tolerated well. Patient repositioned for comfort, hydration offered. Patient vitals stable on room air, denies pain. Patient sitting upright in bed with left side pillow support to sacrum. Call light in reach bed in lowest position.

## 2024-05-14 NOTE — HPI
"Deena Moody is a 89 year old male with PMHx HTN, HLD, CAD, diastolic HF, A-fib with pacemaker, CABG, hx of MI, AV block, and CKD 3, with recent admission to  for treatment of L hand gout flare (d/c 5/8/24), who presents back to [facility] ED from his nursing home with L upper arm swelling. During prior admission, pt initially thought to have osteomyelitis and started on BS abx. Later determined to be gout flare, managed with steroids by rheumatology. Patient has dementia at baseline and is unable to explain his situation. He complains that he feels bad and endorses muscle pain "all over," but otherwise cannot describe his symptoms. No family at bedside. He denies confusion, fevers or chills, headaches, chest pain, SOB, belly pain.      In ED: T 99F, WBC 21K (15K prior admission). VSS. Labs otherwise c/w stable chronic anemia, CKD. CRP 99.7 (prior ~60). ESR 78 (prior ~100). UA unremarkable. CXR without change from prior. XR LUE remarkable for "redemonstration of erosive change involving the head of the 2nd middle phalanx, previously demonstrated on hand CT and radiograph referenced above.  Findings could reflect underlying osteomyelitis or other inflammatory arthritidis in appears similar to prior exams." Otherwise XR of L hand, L humerus, L forearm without acute findings. Given tylenol, vanc/ zosyn. Patient admitted to hospital medicine service for further management. Skin integrity EMILIO consulted for evaluation of skin injury.     "

## 2024-05-14 NOTE — PROGRESS NOTES
[facility]  Progress note    Team: [facility] W Toma Porter MD  Admit Date: 5/11/2024  Code status: Full Code    Principal Problem:  Arm swelling    Interval hx:      PEx  Temp:  [97.5 °F (36.4 °C)-97.8 °F (36.6 °C)]   Pulse:  [68-70]   Resp:  [16-18]   BP: (115-187)/(63-75)   SpO2:  [96 %-100 %]     Intake/Output Summary (Last 24 hours) at 5/13/2024 2337  Last data filed at 5/13/2024 1730  Gross per 24 hour   Intake 720 ml   Output 1000 ml   Net -280 ml       General Appearance: no acute distress, WD, elderly, ill appearing  Heart: regular rate and rhythm, no heave  Respiratory: Normal respiratory effort, symmetric excursion, bilateral vesicular breath sounds   Abdomen: Soft, non-tender; bowel sounds active  Skin: intact, no rash, no ulcers  Neurologic:  No focal numbness or weakness  Mental status: Alert, oriented x 4, affect appropriate    Recent Labs   Lab 05/11/24  1459 05/12/24  0541 05/13/24  0439   WBC 17.99* 18.12* 17.32*   HGB 10.5* 9.0* 9.9*   HCT 34.4* 30.3* 33.0*    238 265     Recent Labs   Lab 05/07/24  0649 05/08/24  0638 05/11/24  0244 05/12/24  0541 05/13/24  0439    143 145 142 141   K 3.8 4.0 4.4 3.7 4.6    110 108 106 104   CO2 21* 23 24 25 22*   * 100* 70* 72* 82*   CREATININE 1.9* 1.9* 1.5* 1.4 1.6*    148* 110 73 105   CALCIUM 8.4* 8.3* 8.1* 7.8* 8.5*   MG  --   --   --  2.0 2.1   PHOS 3.3 2.2*  --  3.6  --      Recent Labs   Lab 05/08/24  0638 05/11/24  0244 05/12/24  0541   ALKPHOS  --  123 97   ALT  --  24 17   AST  --  33 28   ALBUMIN 2.3* 2.5* 2.1*   PROT  --  6.5 5.6*   BILITOT  --  1.0 1.0        Recent Labs   Lab 05/11/24  0935 05/11/24  1635   POCTGLUCOSE 97 92       Scheduled Meds:   allopurinoL  50 mg Oral Daily    apixaban  2.5 mg Oral BID    aspirin  81 mg Oral Daily    balsam peru-castor oiL   Topical (Top) BID    carvediloL  3.125 mg Oral BID    colchicine  0.6 mg Oral Every other day    ferrous sulfate  1 tablet Oral Daily     "lisinopriL  5 mg Oral Daily    multivitamin  1 tablet Oral Daily    polyethylene glycol  17 g Oral BID    predniSONE  40 mg Oral Daily     Continuous Infusions:  As Needed:    Current Facility-Administered Medications:     acetaminophen, 650 mg, Oral, Q6H PRN    albuterol-ipratropium, 3 mL, Nebulization, Q4H PRN    aluminum-magnesium hydroxide-simethicone, 30 mL, Oral, QID PRN    dextrose 10%, 12.5 g, Intravenous, PRN    dextrose 10%, 25 g, Intravenous, PRN    diclofenac sodium, 2 g, Topical (Top), Daily PRN    glucagon (human recombinant), 1 mg, Intramuscular, PRN    glucose, 16 g, Oral, PRN    glucose, 24 g, Oral, PRN    HYDROcodone-acetaminophen, 1 tablet, Oral, Q6H PRN    hydrOXYzine HCL, 25 mg, Oral, TID PRN    melatonin, 6 mg, Oral, Nightly PRN    morphine, 1 mg, Intravenous, Q4H PRN    naloxone, 0.02 mg, Intravenous, PRN    ondansetron, 8 mg, Oral, Q8H PRN    prochlorperazine, 5 mg, Intravenous, Q6H PRN    sodium chloride 0.9%, 10 mL, Intravenous, PRN    sodium chloride 0.9%, 10 mL, Intravenous, Q8H PRN    Assessment and Plan  / Problems managed today    * Arm swelling  Hx gout     Recent admission for the same, d/c 5/8. Treated initially for osteomyelitis with BS IV abx, then determined to be gout (joint asp wrist on 5/3 without evidence of infection) and managed with IV/ PO steroids. Evals by ID, ortho, rheum during last admission.     Returns from NH  on 5/11 with edema from hand to elbow, left. Painful ROM LUE including hand/ wrist/ elbow/ shoulder.   - WBC 21K (prior 15K), T 99 F   - HDS   - CRP 99.7 (elevated from prior), ESR 78 - will trend   - Imaging reviewed - XR L hand significant for "redemonstration of erosive change involving the head of the 2nd middle phalanx, previously demonstrated on hand CT and radiograph referenced above.  Findings could reflect underlying osteomyelitis or other inflammatory arthritidis in appears similar to prior exams."  - MRI not able to be obtained d/t incompatibility " with pacer   - Interval CT obtained of the left arm and hand with similar findings to prior   - LUE US completed on last admit; negative for DVT   - Arm does not appear erythematous and so clinically this is not cellulitis   - Uric acid level elevated on admit   - Rheumatology consulted    ID ruled out infection  - prn pain control   - prednisone as per rheumatology    CKD (chronic kidney disease), stage IV  Creatine stable for now. BMP reviewed- noted Estimated Creatinine Clearance: 24.6 mL/min (based on SCr of 1.4 mg/dL). according to latest data. 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  Monitor UOP and serial BMP and adjust therapy as needed. Renally dose meds. Avoid nephrotoxic medications and procedures.  - at time of admission, Cr 1.5 - improved from baseline (~1.6-2.3)    History of gout  - Appreciate Rheumatology consult  - Recent admit for the same, treated with steroids with ongoing pain/discomfort. Ultimately could not follow up in outpatient setting. Will need to discuss realistic options for treatment and control prior to discharge as the patient will otherwise be very high likelihood to bounce back again     Permanent atrial fibrillation  Patient with Permanent atrial fibrillation which is controlled currently with Calcium Channel Blocker. Patient is currently in atrial fibrillation.[id number] Score: 3. Anticoagulation indicated. Anticoagulation done with eliquis 2.5 BID .    Chronic diastolic heart failure  - controlled   - currently holding lasix - consider resuming   - Results for orders placed during the hospital encounter of 06/14/23    Echo    Interpretation Summary  · The left ventricle is normal in size with concentric remodeling and normal systolic function.  · The estimated ejection fraction is 65%.  · Grade III left ventricular diastolic dysfunction.  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  · The aortic valve mean gradient is 16 mmHg with a dimensionless index of  0.39.  · Mild-to-moderate mitral regurgitation.  · Mild right ventricular enlargement with mildly to moderately reduced right ventricular systolic function.  · Mild tricuspid regurgitation.  · Estimated PASP is at least 65mmHg.  · Moderate left atrial enlargement.        CAD (coronary artery disease)  Patient with known CAD s/p stent placement and CABG, which is controlled Will continue ASA and monitor for S/Sx of angina/ACS.     Essential hypertension  Chronic, controlled. Latest blood pressure and vitals reviewed-     Temp:  [97.2 °F (36.2 °C)-98.5 °F (36.9 °C)]   Pulse:  [68-70]   Resp:  [14-18]   BP: (104-168)/(57-71)   SpO2:  [94 %-100 %] .   Home meds for hypertension were reviewed and noted below.   Hypertension Medications               benazepriL (LOTENSIN) 5 MG tablet Take 1 tablet (5 mg total) by mouth once daily.    carvediloL (COREG) 3.125 MG tablet Take 1 tablet (3.125 mg total) by mouth 2 (two) times daily.    nitroGLYCERIN (NITROSTAT) 0.4 MG SL tablet Take one tablet every 5 minutes for chest pain. After the third tablet go to the ED.    torsemide (DEMADEX) 20 MG Tab Take 1 tablet (20 mg total) by mouth 2 (two) times a day. HOLD UNTIL PCP FOLLOW UP            While in the hospital, will manage blood pressure as follows; Continue home antihypertensive regimen    Will utilize p.r.n. blood pressure medication only if patient's blood pressure greater than 180/110 and he develops symptoms such as worsening chest pain or shortness of breath.    - at time of recent d/c, lasix held. Could consider resuming given improvement of Cr.     Diet:  regular diet  GI PPx: not needed  DVT PPx:  apixaban  Airways: room air  Wounds: none    Goals of Care:  Return to prior functional status     Discharge Planning   TEOFILO: 5/14/2024   Is the patient medically ready for discharge?:     Reason for patient still in hospital (select all that apply): Patient trending condition and Treatment  Discharge Plan A: Skilled Nursing  Facility        Toma Porter MD

## 2024-05-14 NOTE — ASSESSMENT & PLAN NOTE
"Hx gout   Recent admission for the same, d/c 5/8. Treated initially for osteomyelitis with BS IV abx, then determined to be gout (joint asp wrist on 5/3 without evidence of infection) and managed with IV/ PO steroids. Evals by ID, ortho, rheum during last admission. Returns from NH  on 5/11 with edema from hand to elbow, left. Painful ROM LUE including hand/ wrist/ elbow/ shoulder. Repeat Left UE US negative for DVT. Imaging reviewed - XR L hand significant for "redemonstration of erosive change involving the head of the 2nd middle phalanx, previously demonstrated on hand CT and radiograph referenced above.  Findings could reflect underlying osteomyelitis or other inflammatory arthritidis in appears similar to prior exams." MRI not able to be obtained d/t incompatibility with pacer. Arm does not appear erythematous and so clinically this is not cellulitis. Uric acid level elevated on admit. ID ruled infection. Rheumatology consulted. Patient started on prednisone, colchicine 0.6 mg every other day, and allopurinol 50 mg daily. Started on acetaminophen 650 mg TID. Improved overall. Swelling improving but noticeably improved when briefly restarted on torsemide  "

## 2024-05-15 LAB
ANION GAP SERPL CALC-SCNC: 11 MMOL/L (ref 8–16)
BASOPHILS # BLD AUTO: 0.03 K/UL (ref 0–0.2)
BASOPHILS NFR BLD: 0.1 % (ref 0–1.9)
BNP SERPL-MCNC: 573 PG/ML (ref 0–99)
BUN SERPL-MCNC: 90 MG/DL (ref 10–30)
CALCIUM SERPL-MCNC: 8.4 MG/DL (ref 8.7–10.5)
CHLORIDE SERPL-SCNC: 106 MMOL/L (ref 95–110)
CO2 SERPL-SCNC: 25 MMOL/L (ref 23–29)
CREAT SERPL-MCNC: 1.5 MG/DL (ref 0.5–1.4)
CRP SERPL-MCNC: 52 MG/L (ref 0–8.2)
DIFFERENTIAL METHOD BLD: ABNORMAL
EOSINOPHIL # BLD AUTO: 0 K/UL (ref 0–0.5)
EOSINOPHIL NFR BLD: 0.1 % (ref 0–8)
ERYTHROCYTE [DISTWIDTH] IN BLOOD BY AUTOMATED COUNT: 15.1 % (ref 11.5–14.5)
EST. GFR  (NO RACE VARIABLE): 43.1 ML/MIN/1.73 M^2
GLUCOSE SERPL-MCNC: 89 MG/DL (ref 70–110)
HCT VFR BLD AUTO: 30.7 % (ref 40–54)
HGB BLD-MCNC: 9.4 G/DL (ref 14–18)
IMM GRANULOCYTES # BLD AUTO: 0.17 K/UL (ref 0–0.04)
IMM GRANULOCYTES NFR BLD AUTO: 0.7 % (ref 0–0.5)
LYMPHOCYTES # BLD AUTO: 1.6 K/UL (ref 1–4.8)
LYMPHOCYTES NFR BLD: 6.8 % (ref 18–48)
MCH RBC QN AUTO: 30.3 PG (ref 27–31)
MCHC RBC AUTO-ENTMCNC: 30.6 G/DL (ref 32–36)
MCV RBC AUTO: 99 FL (ref 82–98)
MONOCYTES # BLD AUTO: 1.2 K/UL (ref 0.3–1)
MONOCYTES NFR BLD: 5.2 % (ref 4–15)
NEUTROPHILS # BLD AUTO: 20.4 K/UL (ref 1.8–7.7)
NEUTROPHILS NFR BLD: 87.1 % (ref 38–73)
NRBC BLD-RTO: 0 /100 WBC
PLATELET # BLD AUTO: 231 K/UL (ref 150–450)
PMV BLD AUTO: 9.7 FL (ref 9.2–12.9)
POTASSIUM SERPL-SCNC: 4.7 MMOL/L (ref 3.5–5.1)
RBC # BLD AUTO: 3.1 M/UL (ref 4.6–6.2)
SODIUM SERPL-SCNC: 142 MMOL/L (ref 136–145)
TOXIC GRANULES BLD QL SMEAR: PRESENT
WBC # BLD AUTO: 23.37 K/UL (ref 3.9–12.7)

## 2024-05-15 PROCEDURE — 86140 C-REACTIVE PROTEIN: CPT | Mod: HCNC

## 2024-05-15 PROCEDURE — 85025 COMPLETE CBC W/AUTO DIFF WBC: CPT | Mod: HCNC | Performed by: STUDENT IN AN ORGANIZED HEALTH CARE EDUCATION/TRAINING PROGRAM

## 2024-05-15 PROCEDURE — 25000003 PHARM REV CODE 250: Mod: HCNC | Performed by: EMERGENCY MEDICINE

## 2024-05-15 PROCEDURE — 25000003 PHARM REV CODE 250: Mod: HCNC | Performed by: STUDENT IN AN ORGANIZED HEALTH CARE EDUCATION/TRAINING PROGRAM

## 2024-05-15 PROCEDURE — 83880 ASSAY OF NATRIURETIC PEPTIDE: CPT | Mod: HCNC | Performed by: INTERNAL MEDICINE

## 2024-05-15 PROCEDURE — 25000003 PHARM REV CODE 250: Mod: HCNC

## 2024-05-15 PROCEDURE — 25000003 PHARM REV CODE 250: Mod: HCNC | Performed by: HOSPITALIST

## 2024-05-15 PROCEDURE — 80048 BASIC METABOLIC PNL TOTAL CA: CPT | Mod: HCNC | Performed by: STUDENT IN AN ORGANIZED HEALTH CARE EDUCATION/TRAINING PROGRAM

## 2024-05-15 PROCEDURE — 21400001 HC TELEMETRY ROOM: Mod: HCNC

## 2024-05-15 PROCEDURE — 63600175 PHARM REV CODE 636 W HCPCS: Mod: HCNC | Performed by: STUDENT IN AN ORGANIZED HEALTH CARE EDUCATION/TRAINING PROGRAM

## 2024-05-15 PROCEDURE — 36415 COLL VENOUS BLD VENIPUNCTURE: CPT | Mod: HCNC

## 2024-05-15 PROCEDURE — 11000001 HC ACUTE MED/SURG PRIVATE ROOM: Mod: HCNC

## 2024-05-15 PROCEDURE — 25000003 PHARM REV CODE 250: Mod: HCNC | Performed by: INTERNAL MEDICINE

## 2024-05-15 RX ADMIN — PREDNISONE 40 MG: 20 TABLET ORAL at 09:05

## 2024-05-15 RX ADMIN — Medication 6 MG: at 10:05

## 2024-05-15 RX ADMIN — CARVEDILOL 3.12 MG: 3.12 TABLET, FILM COATED ORAL at 09:05

## 2024-05-15 RX ADMIN — APIXABAN 2.5 MG: 2.5 TABLET, FILM COATED ORAL at 09:05

## 2024-05-15 RX ADMIN — HYDROXYZINE HYDROCHLORIDE 25 MG: 25 TABLET, FILM COATED ORAL at 10:05

## 2024-05-15 RX ADMIN — FERROUS SULFATE TAB EC 325 MG (65 MG FE EQUIVALENT) 1 EACH: 325 (65 FE) TABLET DELAYED RESPONSE at 09:05

## 2024-05-15 RX ADMIN — ACETAMINOPHEN 650 MG: 325 TABLET ORAL at 02:05

## 2024-05-15 RX ADMIN — DICLOFENAC SODIUM 2 G: 10 GEL TOPICAL at 09:05

## 2024-05-15 RX ADMIN — HYDROCODONE BITARTRATE AND ACETAMINOPHEN 1 TABLET: 5; 325 TABLET ORAL at 02:05

## 2024-05-15 RX ADMIN — TORSEMIDE 20 MG: 5 TABLET ORAL at 09:05

## 2024-05-15 RX ADMIN — POLYETHYLENE GLYCOL 3350 17 G: 17 POWDER, FOR SOLUTION ORAL at 09:05

## 2024-05-15 RX ADMIN — Medication: at 09:05

## 2024-05-15 RX ADMIN — ACETAMINOPHEN 650 MG: 325 TABLET ORAL at 10:05

## 2024-05-15 RX ADMIN — THERA TABS 1 TABLET: TAB at 09:05

## 2024-05-15 RX ADMIN — ALLOPURINOL 50 MG: 300 TABLET ORAL at 09:05

## 2024-05-15 RX ADMIN — Medication: at 10:05

## 2024-05-15 RX ADMIN — ACETAMINOPHEN 650 MG: 325 TABLET ORAL at 09:05

## 2024-05-15 RX ADMIN — CARVEDILOL 3.12 MG: 3.12 TABLET, FILM COATED ORAL at 10:05

## 2024-05-15 RX ADMIN — APIXABAN 2.5 MG: 2.5 TABLET, FILM COATED ORAL at 10:05

## 2024-05-15 RX ADMIN — ASPIRIN 81 MG: 81 TABLET, COATED ORAL at 09:05

## 2024-05-15 RX ADMIN — LISINOPRIL 5 MG: 5 TABLET ORAL at 09:05

## 2024-05-15 NOTE — PROGRESS NOTES
[facility] Medicine  Progress note    Team: [facility] MED W Toma Porter MD  Admit Date: 5/11/2024  Code status: Full Code    Principal Problem:  Arm swelling    Interval hx:  No new complaints    PEx  Temp:  [97.3 °F (36.3 °C)-98.5 °F (36.9 °C)]   Pulse:  [67-70]   Resp:  [16-18]   BP: (117-159)/(55-81)   SpO2:  [95 %-100 %]     Intake/Output Summary (Last 24 hours) at 5/15/2024 1622  Last data filed at 5/14/2024 2331  Gross per 24 hour   Intake --   Output 0 ml   Net 0 ml         General Appearance: no acute distress, WD, elderly, ill appearing  Heart: regular rate and rhythm, no heave, no JVD, no BLE edema  Ext: LUE swelling at elbow area; painful passive range of motion  Respiratory: Normal respiratory effort, symmetric excursion, bilateral vesicular breath sounds   Abdomen: Soft, non-tender; bowel sounds active  Skin: intact, no rash, no ulcers  Neurologic:  No focal numbness or weakness  Mental status: Alert, oriented x 4, affect appropriate    Recent Labs   Lab 05/13/24  0439 05/14/24  0349 05/15/24  0606   WBC 17.32* 21.74* 23.37*   HGB 9.9* 9.8* 9.4*   HCT 33.0* 31.3* 30.7*    288 231     Recent Labs   Lab 05/12/24  0541 05/13/24  0439 05/14/24  0349 05/15/24  0606    141 142 142   K 3.7 4.6 4.8 4.7    104 105 106   CO2 25 22* 22* 25   BUN 72* 82* 96* 90*   CREATININE 1.4 1.6* 1.6* 1.5*   GLU 73 105 136* 89   CALCIUM 7.8* 8.5* 8.3* 8.4*   MG 2.0 2.1 2.4  --    PHOS 3.6  --   --   --      Recent Labs   Lab 05/11/24  0244 05/12/24  0541   ALKPHOS 123 97   ALT 24 17   AST 33 28   ALBUMIN 2.5* 2.1*   PROT 6.5 5.6*   BILITOT 1.0 1.0        Recent Labs   Lab 05/11/24  0935 05/11/24  1635   POCTGLUCOSE 97 92       Scheduled Meds:   acetaminophen  650 mg Oral TID    allopurinoL  50 mg Oral Daily    apixaban  2.5 mg Oral BID    aspirin  81 mg Oral Daily    balsam peru-castor oiL   Topical (Top) BID    carvediloL  3.125 mg Oral BID    colchicine  0.6 mg Oral Every other day    ferrous sulfate  1  "tablet Oral Daily    lisinopriL  5 mg Oral Daily    multivitamin  1 tablet Oral Daily    polyethylene glycol  17 g Oral BID    predniSONE  40 mg Oral Daily    torsemide  20 mg Oral Daily     Continuous Infusions:  As Needed:    Current Facility-Administered Medications:     acetaminophen, 650 mg, Oral, Q6H PRN    albuterol-ipratropium, 3 mL, Nebulization, Q4H PRN    aluminum-magnesium hydroxide-simethicone, 30 mL, Oral, QID PRN    dextrose 10%, 12.5 g, Intravenous, PRN    dextrose 10%, 25 g, Intravenous, PRN    diclofenac sodium, 2 g, Topical (Top), Daily PRN    glucagon (human recombinant), 1 mg, Intramuscular, PRN    glucose, 16 g, Oral, PRN    glucose, 24 g, Oral, PRN    HYDROcodone-acetaminophen, 1 tablet, Oral, Q6H PRN    hydrOXYzine HCL, 25 mg, Oral, TID PRN    melatonin, 6 mg, Oral, Nightly PRN    morphine, 1 mg, Intravenous, Q4H PRN    naloxone, 0.02 mg, Intravenous, PRN    ondansetron, 8 mg, Oral, Q8H PRN    prochlorperazine, 5 mg, Intravenous, Q6H PRN    sodium chloride 0.9%, 10 mL, Intravenous, PRN    sodium chloride 0.9%, 10 mL, Intravenous, Q8H PRN    Assessment and Plan  / Problems managed today    * Arm swelling  Hx gout   Recent admission for the same, d/c 5/8. Treated initially for osteomyelitis with BS IV abx, then determined to be gout (joint asp wrist on 5/3 without evidence of infection) and managed with IV/ PO steroids. Evals by ID, ortho, rheum during last admission. Returns from NH  on 5/11 with edema from hand to elbow, left. Painful ROM LUE including hand/ wrist/ elbow/ shoulder. Repeat Left UE US negative for DVT. Imaging reviewed - XR L hand significant for "redemonstration of erosive change involving the head of the 2nd middle phalanx, previously demonstrated on hand CT and radiograph referenced above.  Findings could reflect underlying osteomyelitis or other inflammatory arthritidis in appears similar to prior exams." MRI not able to be obtained d/t incompatibility with pacer. Arm does " not appear erythematous and so clinically this is not cellulitis. Uric acid level elevated on admit. ID ruled infection. Rheumatology consulted. Patient started on prednisone, colchicine 0.6 mg every other day, and allopurinol 50 mg daily. Started on acetaminophen 650 mg TID. Improved overall. Swelling improving but noticeably improved when briefly restarted on torsemide    Acute gout  - Appreciate Rheumatology consult  - Recent admit for the same, treated with steroids with ongoing pain/discomfort.  - detailed to family that gout will be further managed as outpatient  - prednisone burst and cholchicine 0.6 mg every other day  - allopurinol 50 mg daily. Increase slowly.   - pain much resolved at this time  - scheduled acetaminophen 650 mg TID    CKD (chronic kidney disease), stage IV  Creatine stable for now. BMP reviewed- noted Estimated Creatinine Clearance: 24.6 mL/min (based on SCr of 1.4 mg/dL). according to latest data. 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  Monitor UOP and serial BMP and adjust therapy as needed. Renally dose meds. Avoid nephrotoxic medications and procedures.  - at time of admission, Cr 1.5 - improved from baseline (~1.6-2.3)    Permanent atrial fibrillation  Patient with Permanent atrial fibrillation which is controlled currently with Calcium Channel Blocker. Patient is currently in atrial fibrillation.[id number] Score: 3. Anticoagulation indicated. Anticoagulation done with eliquis 2.5 BID .    Chronic diastolic heart failure  - controlled  - BNP in 500s, lowest it has been in record  - torsemide held since 5/1/2024  - consider holding as patient had 13 kg weight loss since admission for heart failure 6/2023    CAD (coronary artery disease)  Patient with known CAD s/p stent placement and CABG, which is controlled Will continue ASA and monitor for S/Sx of angina/ACS.     Essential hypertension  Chronic, controlled. Latest blood pressure and vitals reviewed-     Temp:   [97.3 °F (36.3 °C)-98.5 °F (36.9 °C)]   Pulse:  [67-70]   Resp:  [16-18]   BP: ()/(55-81)   SpO2:  [95 %-100 %] .     Labile. Has higher BP goal due to age and co-morbidities    Stop amlodpine  Continue carvedilol 2.125 mg BID and lisinopril 5 mg daily    Diet:  regular diet  GI PPx: not needed  DVT PPx:  apixaban  Airways: room air  Wounds: none    Goals of Care:  Return to prior functional status     Discharge Planning   TEOFILO: 5/16/2024   Is the patient medically ready for discharge?:     Reason for patient still in hospital (select all that apply): Patient trending condition and Treatment  Discharge Plan A: Skilled Nursing Facility        Toma Porter MD

## 2024-05-15 NOTE — ASSESSMENT & PLAN NOTE
- controlled  - BNP in 500s, lowest it has been in record  - torsemide held since 5/1/2024  - consider holding as patient had 13 kg weight loss since admission for heart failure 6/2023

## 2024-05-15 NOTE — PLAN OF CARE
CHW met with patient/family at bedside. Patient experience rounding completed and reviewed the following.     Do you know your discharge plan? Yes   If yes, what is the plan? ([place])     Have you discussed your needs and preferences with your SW/CM? Yes    If you are discharging home, do you have help at home? Yes   Do you think you will need help additional at home at discharge?  No     Do you currently have difficulty keeping up with bills, affording medicine or buying food? No    Assigned SW/CM notified of any patient/family needs or concerns. Appropriate resources provided to address patient's needs.  Granddaughter stated no needs/concerns

## 2024-05-15 NOTE — ASSESSMENT & PLAN NOTE
- Appreciate Rheumatology consult  - Recent admit for the same, treated with steroids with ongoing pain/discomfort.  - detailed to family that gout will be further managed as outpatient  - prednisone burst and cholchicine 0.6 mg every other day  - allopurinol 50 mg daily. Increase slowly.   - pain much resolved at this time  - prn acetaminophen  - opioid stopped due to constipation

## 2024-05-15 NOTE — PLAN OF CARE
Problem: Infection  Goal: Absence of Infection Signs and Symptoms  Outcome: Progressing     Problem: Adult Inpatient Plan of Care  Goal: Plan of Care Review  Outcome: Progressing  Goal: Patient-Specific Goal (Individualized)  Outcome: Progressing  Goal: Absence of Hospital-Acquired Illness or Injury  Outcome: Progressing  Goal: Optimal Comfort and Wellbeing  Outcome: Progressing  Goal: Readiness for Transition of Care  Outcome: Progressing   Pt in room moaning and groaning overnight. Turned and repositioned for comfort. Pt given pain medication as ordered. BM overnight.

## 2024-05-15 NOTE — PROGRESS NOTES
[facility] Medicine  Progress note    Team: [facility] W Toma Porter MD  Admit Date: 5/11/2024  Code status: Full Code    Principal Problem:  Arm swelling    Interval hx:      PEx  Temp:  [97.5 °F (36.4 °C)-98.3 °F (36.8 °C)]   Pulse:  [65-70]   Resp:  [16-18]   BP: (109-187)/(65-76)   SpO2:  [96 %-100 %]   No intake or output data in the 24 hours ending 05/14/24 1928      General Appearance: no acute distress, WD, elderly, ill appearing  Heart: regular rate and rhythm, no heave, no JVD, no BLE edema  Ext: LUE swelling at elbow area  Respiratory: Normal respiratory effort, symmetric excursion, bilateral vesicular breath sounds   Abdomen: Soft, non-tender; bowel sounds active  Skin: intact, no rash, no ulcers  Neurologic:  No focal numbness or weakness  Mental status: Alert, oriented x 4, affect appropriate    Recent Labs   Lab 05/12/24 0541 05/13/24 0439 05/14/24 0349   WBC 18.12* 17.32* 21.74*   HGB 9.0* 9.9* 9.8*   HCT 30.3* 33.0* 31.3*    265 288     Recent Labs   Lab 05/08/24 0638 05/11/24 0244 05/12/24 0541 05/13/24 0439 05/14/24 0349      < > 142 141 142   K 4.0   < > 3.7 4.6 4.8      < > 106 104 105   CO2 23   < > 25 22* 22*   *   < > 72* 82* 96*   CREATININE 1.9*   < > 1.4 1.6* 1.6*   *   < > 73 105 136*   CALCIUM 8.3*   < > 7.8* 8.5* 8.3*   MG  --   --  2.0 2.1 2.4   PHOS 2.2*  --  3.6  --   --     < > = values in this interval not displayed.     Recent Labs   Lab 05/08/24 0638 05/11/24 0244 05/12/24 0541   ALKPHOS  --  123 97   ALT  --  24 17   AST  --  33 28   ALBUMIN 2.3* 2.5* 2.1*   PROT  --  6.5 5.6*   BILITOT  --  1.0 1.0        Recent Labs   Lab 05/11/24  0935 05/11/24  1635   POCTGLUCOSE 97 92       Scheduled Meds:   acetaminophen  650 mg Oral TID    allopurinoL  50 mg Oral Daily    apixaban  2.5 mg Oral BID    aspirin  81 mg Oral Daily    balsam peru-castor oiL   Topical (Top) BID    carvediloL  3.125 mg Oral BID    colchicine  0.6 mg Oral Every other  "day    ferrous sulfate  1 tablet Oral Daily    lisinopriL  5 mg Oral Daily    multivitamin  1 tablet Oral Daily    polyethylene glycol  17 g Oral BID    predniSONE  40 mg Oral Daily    torsemide  20 mg Oral Daily     Continuous Infusions:  As Needed:    Current Facility-Administered Medications:     acetaminophen, 650 mg, Oral, Q6H PRN    albuterol-ipratropium, 3 mL, Nebulization, Q4H PRN    aluminum-magnesium hydroxide-simethicone, 30 mL, Oral, QID PRN    dextrose 10%, 12.5 g, Intravenous, PRN    dextrose 10%, 25 g, Intravenous, PRN    diclofenac sodium, 2 g, Topical (Top), Daily PRN    glucagon (human recombinant), 1 mg, Intramuscular, PRN    glucose, 16 g, Oral, PRN    glucose, 24 g, Oral, PRN    HYDROcodone-acetaminophen, 1 tablet, Oral, Q6H PRN    hydrOXYzine HCL, 25 mg, Oral, TID PRN    melatonin, 6 mg, Oral, Nightly PRN    morphine, 1 mg, Intravenous, Q4H PRN    naloxone, 0.02 mg, Intravenous, PRN    ondansetron, 8 mg, Oral, Q8H PRN    prochlorperazine, 5 mg, Intravenous, Q6H PRN    sodium chloride 0.9%, 10 mL, Intravenous, PRN    sodium chloride 0.9%, 10 mL, Intravenous, Q8H PRN    Assessment and Plan  / Problems managed today    * Arm swelling  Hx gout     Recent admission for the same, d/c 5/8. Treated initially for osteomyelitis with BS IV abx, then determined to be gout (joint asp wrist on 5/3 without evidence of infection) and managed with IV/ PO steroids. Evals by ID, ortho, rheum during last admission.     Returns from NH  on 5/11 with edema from hand to elbow, left. Painful ROM LUE including hand/ wrist/ elbow/ shoulder. Repeat Left UE US negative for DVT.   - WBC 21K (prior 15K), T 99 F   - HDS   - CRP 99.7 (elevated from prior), ESR 78 - will trend   - Imaging reviewed - XR L hand significant for "redemonstration of erosive change involving the head of the 2nd middle phalanx, previously demonstrated on hand CT and radiograph referenced above.  Findings could reflect underlying osteomyelitis or " "other inflammatory arthritidis in appears similar to prior exams."  - MRI not able to be obtained d/t incompatibility with pacer   - Interval CT obtained of the left arm and hand with similar findings to prior   - LUE US completed on last admit; negative for DVT   - Arm does not appear erythematous and so clinically this is not cellulitis   - Uric acid level elevated on admit   - Rheumatology consulted    ID ruled out infection  - acetaminophen 650 mg TID  - prednisone burst  as per rheumatology  - improved overall  - resume torsemide at 20 mg daily    Acute gout  - Appreciate Rheumatology consult  - Recent admit for the same, treated with steroids with ongoing pain/discomfort.  - detailed to family that gout will be further managed as outpatient  - prednisone burst and cholchicine 0.6 mg every other day  - allopurinol 50 mg daily. Increase slowly.   - pain much resolved at this time.     CKD (chronic kidney disease), stage IV  Creatine stable for now. BMP reviewed- noted Estimated Creatinine Clearance: 24.6 mL/min (based on SCr of 1.4 mg/dL). according to latest data. 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  Monitor UOP and serial BMP and adjust therapy as needed. Renally dose meds. Avoid nephrotoxic medications and procedures.  - at time of admission, Cr 1.5 - improved from baseline (~1.6-2.3)    Permanent atrial fibrillation  Patient with Permanent atrial fibrillation which is controlled currently with Calcium Channel Blocker. Patient is currently in atrial fibrillation.[id number] Score: 3. Anticoagulation indicated. Anticoagulation done with eliquis 2.5 BID .    Chronic diastolic heart failure  - controlled   - currently holding lasix - consider resuming   - Results for orders placed during the hospital encounter of 06/14/23    Echo    Interpretation Summary  · The left ventricle is normal in size with concentric remodeling and normal systolic function.  · The estimated ejection fraction is " 65%.  · Grade III left ventricular diastolic dysfunction.  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  · The aortic valve mean gradient is 16 mmHg with a dimensionless index of 0.39.  · Mild-to-moderate mitral regurgitation.  · Mild right ventricular enlargement with mildly to moderately reduced right ventricular systolic function.  · Mild tricuspid regurgitation.  · Estimated PASP is at least 65mmHg.  · Moderate left atrial enlargement.        CAD (coronary artery disease)  Patient with known CAD s/p stent placement and CABG, which is controlled Will continue ASA and monitor for S/Sx of angina/ACS.     Essential hypertension  Chronic, controlled. Latest blood pressure and vitals reviewed-     Temp:  [97.2 °F (36.2 °C)-98.5 °F (36.9 °C)]   Pulse:  [68-70]   Resp:  [14-18]   BP: (104-168)/(57-71)   SpO2:  [94 %-100 %] .   Home meds for hypertension were reviewed and noted below.   Hypertension Medications               benazepriL (LOTENSIN) 5 MG tablet Take 1 tablet (5 mg total) by mouth once daily.    carvediloL (COREG) 3.125 MG tablet Take 1 tablet (3.125 mg total) by mouth 2 (two) times daily.    nitroGLYCERIN (NITROSTAT) 0.4 MG SL tablet Take one tablet every 5 minutes for chest pain. After the third tablet go to the ED.    torsemide (DEMADEX) 20 MG Tab Take 1 tablet (20 mg total) by mouth 2 (two) times a day. HOLD UNTIL PCP FOLLOW UP            While in the hospital, will manage blood pressure as follows; Continue home antihypertensive regimen    Will utilize p.r.n. blood pressure medication only if patient's blood pressure greater than 180/110 and he develops symptoms such as worsening chest pain or shortness of breath.    - at time of recent d/c, lasix held. Could consider resuming given improvement of Cr.     Diet:  regular diet  GI PPx: not needed  DVT PPx:  apixaban  Airways: room air  Wounds: none    Goals of Care:  Return to prior functional status     Discharge Planning   TEOFILO: 5/14/2024   Is the  patient medically ready for discharge?:     Reason for patient still in hospital (select all that apply): Patient trending condition and Treatment  Discharge Plan A: Skilled Nursing Facility        Toma Porter MD

## 2024-05-15 NOTE — PLAN OF CARE
Per CP note from 5/14, auth from [facility] still pending.  CM called Mame at [facility] [phone] to ask if auth received.  Mame states that the system is down, she is unable to check auth; she will check again in a little while, check status of auth, call CM back.    2:59 PM  CM called Mame, per  she is not at her desk.  CM left message requesting call back.  Per CP, auth is still pending.    ED CrespoN, BS, RN, CCM

## 2024-05-16 LAB
ANION GAP SERPL CALC-SCNC: 12 MMOL/L (ref 8–16)
BASOPHILS # BLD AUTO: 0.03 K/UL (ref 0–0.2)
BASOPHILS NFR BLD: 0.1 % (ref 0–1.9)
BUN SERPL-MCNC: 87 MG/DL (ref 10–30)
CALCIUM SERPL-MCNC: 8.4 MG/DL (ref 8.7–10.5)
CHLORIDE SERPL-SCNC: 105 MMOL/L (ref 95–110)
CO2 SERPL-SCNC: 26 MMOL/L (ref 23–29)
CREAT SERPL-MCNC: 1.6 MG/DL (ref 0.5–1.4)
DIFFERENTIAL METHOD BLD: ABNORMAL
EOSINOPHIL # BLD AUTO: 0 K/UL (ref 0–0.5)
EOSINOPHIL NFR BLD: 0 % (ref 0–8)
ERYTHROCYTE [DISTWIDTH] IN BLOOD BY AUTOMATED COUNT: 14.7 % (ref 11.5–14.5)
EST. GFR  (NO RACE VARIABLE): 39.9 ML/MIN/1.73 M^2
GLUCOSE SERPL-MCNC: 115 MG/DL (ref 70–110)
HCT VFR BLD AUTO: 35 % (ref 40–54)
HGB BLD-MCNC: 10.9 G/DL (ref 14–18)
IMM GRANULOCYTES # BLD AUTO: 0.13 K/UL (ref 0–0.04)
IMM GRANULOCYTES NFR BLD AUTO: 0.6 % (ref 0–0.5)
LYMPHOCYTES # BLD AUTO: 1.1 K/UL (ref 1–4.8)
LYMPHOCYTES NFR BLD: 5.1 % (ref 18–48)
MCH RBC QN AUTO: 30.7 PG (ref 27–31)
MCHC RBC AUTO-ENTMCNC: 31.1 G/DL (ref 32–36)
MCV RBC AUTO: 99 FL (ref 82–98)
MONOCYTES # BLD AUTO: 0.7 K/UL (ref 0.3–1)
MONOCYTES NFR BLD: 3.1 % (ref 4–15)
NEUTROPHILS # BLD AUTO: 19.7 K/UL (ref 1.8–7.7)
NEUTROPHILS NFR BLD: 91.1 % (ref 38–73)
NRBC BLD-RTO: 0 /100 WBC
PLATELET # BLD AUTO: 257 K/UL (ref 150–450)
PMV BLD AUTO: 9.4 FL (ref 9.2–12.9)
POTASSIUM SERPL-SCNC: 5 MMOL/L (ref 3.5–5.1)
RBC # BLD AUTO: 3.55 M/UL (ref 4.6–6.2)
SODIUM SERPL-SCNC: 143 MMOL/L (ref 136–145)
WBC # BLD AUTO: 21.64 K/UL (ref 3.9–12.7)

## 2024-05-16 PROCEDURE — 63600175 PHARM REV CODE 636 W HCPCS: Mod: HCNC | Performed by: STUDENT IN AN ORGANIZED HEALTH CARE EDUCATION/TRAINING PROGRAM

## 2024-05-16 PROCEDURE — 80048 BASIC METABOLIC PNL TOTAL CA: CPT | Mod: HCNC | Performed by: STUDENT IN AN ORGANIZED HEALTH CARE EDUCATION/TRAINING PROGRAM

## 2024-05-16 PROCEDURE — 36415 COLL VENOUS BLD VENIPUNCTURE: CPT | Mod: HCNC | Performed by: STUDENT IN AN ORGANIZED HEALTH CARE EDUCATION/TRAINING PROGRAM

## 2024-05-16 PROCEDURE — 85025 COMPLETE CBC W/AUTO DIFF WBC: CPT | Mod: HCNC | Performed by: STUDENT IN AN ORGANIZED HEALTH CARE EDUCATION/TRAINING PROGRAM

## 2024-05-16 PROCEDURE — 25000003 PHARM REV CODE 250: Mod: HCNC | Performed by: STUDENT IN AN ORGANIZED HEALTH CARE EDUCATION/TRAINING PROGRAM

## 2024-05-16 PROCEDURE — 11000001 HC ACUTE MED/SURG PRIVATE ROOM: Mod: HCNC

## 2024-05-16 PROCEDURE — 25000003 PHARM REV CODE 250: Mod: HCNC | Performed by: INTERNAL MEDICINE

## 2024-05-16 PROCEDURE — 21400001 HC TELEMETRY ROOM: Mod: HCNC

## 2024-05-16 PROCEDURE — 25000003 PHARM REV CODE 250: Mod: HCNC

## 2024-05-16 RX ADMIN — CARVEDILOL 3.12 MG: 3.12 TABLET, FILM COATED ORAL at 09:05

## 2024-05-16 RX ADMIN — DICLOFENAC SODIUM 2 G: 10 GEL TOPICAL at 09:05

## 2024-05-16 RX ADMIN — ACETAMINOPHEN 650 MG: 325 TABLET ORAL at 09:05

## 2024-05-16 RX ADMIN — ACETAMINOPHEN 650 MG: 325 TABLET ORAL at 11:05

## 2024-05-16 RX ADMIN — LISINOPRIL 5 MG: 5 TABLET ORAL at 09:05

## 2024-05-16 RX ADMIN — PREDNISONE 40 MG: 20 TABLET ORAL at 09:05

## 2024-05-16 RX ADMIN — Medication: at 11:05

## 2024-05-16 RX ADMIN — THERA TABS 1 TABLET: TAB at 09:05

## 2024-05-16 RX ADMIN — FERROUS SULFATE TAB EC 325 MG (65 MG FE EQUIVALENT) 1 EACH: 325 (65 FE) TABLET DELAYED RESPONSE at 09:05

## 2024-05-16 RX ADMIN — ASPIRIN 81 MG: 81 TABLET, COATED ORAL at 09:05

## 2024-05-16 RX ADMIN — POLYETHYLENE GLYCOL 3350 17 G: 17 POWDER, FOR SOLUTION ORAL at 11:05

## 2024-05-16 RX ADMIN — Medication: at 09:05

## 2024-05-16 RX ADMIN — HYDROCODONE BITARTRATE AND ACETAMINOPHEN 1 TABLET: 5; 325 TABLET ORAL at 06:05

## 2024-05-16 RX ADMIN — HYDROCODONE BITARTRATE AND ACETAMINOPHEN 1 TABLET: 5; 325 TABLET ORAL at 04:05

## 2024-05-16 RX ADMIN — APIXABAN 2.5 MG: 2.5 TABLET, FILM COATED ORAL at 11:05

## 2024-05-16 RX ADMIN — APIXABAN 2.5 MG: 2.5 TABLET, FILM COATED ORAL at 09:05

## 2024-05-16 RX ADMIN — POLYETHYLENE GLYCOL 3350 17 G: 17 POWDER, FOR SOLUTION ORAL at 09:05

## 2024-05-16 RX ADMIN — COLCHICINE 0.6 MG: 0.6 TABLET, FILM COATED ORAL at 09:05

## 2024-05-16 RX ADMIN — ALLOPURINOL 50 MG: 300 TABLET ORAL at 09:05

## 2024-05-16 RX ADMIN — ACETAMINOPHEN 650 MG: 325 TABLET ORAL at 04:05

## 2024-05-16 RX ADMIN — CARVEDILOL 3.12 MG: 3.12 TABLET, FILM COATED ORAL at 11:05

## 2024-05-16 NOTE — PLAN OF CARE
Problem: Infection  Goal: Absence of Infection Signs and Symptoms  Outcome: Progressing     Problem: Adult Inpatient Plan of Care  Goal: Plan of Care Review  Outcome: Progressing  Goal: Absence of Hospital-Acquired Illness or Injury  Outcome: Progressing  Goal: Optimal Comfort and Wellbeing  Outcome: Progressing  Goal: Readiness for Transition of Care  Outcome: Progressing     Problem: Acute Kidney Injury/Impairment  Goal: Improved Oral Intake  Outcome: Progressing  Goal: Effective Renal Function  Outcome: Progressing     Problem: Wound  Goal: Optimal Functional Ability  Outcome: Progressing  Goal: Absence of Infection Signs and Symptoms  Outcome: Progressing  Goal: Improved Oral Intake  Outcome: Progressing     Problem: Fall Injury Risk  Goal: Absence of Fall and Fall-Related Injury  Outcome: Progressing

## 2024-05-16 NOTE — ASSESSMENT & PLAN NOTE
Chronic, controlled. Latest blood pressure and vitals reviewed-     Temp:  [97.9 °F (36.6 °C)-98.7 °F (37.1 °C)]   Pulse:  [67-70]   Resp:  [17-18]   BP: ()/(44-73)   SpO2:  [97 %-99 %] .     Labile. Has higher BP goal due to age and co-morbidities    Stop amlodpine  Continue carvedilol 3.125 mg BID and lisinopril 5 mg daily

## 2024-05-16 NOTE — PLAN OF CARE
CM went to pt room to reassess - pt sleeping.    RONALDO sent message to [place] in CP requesting update on auth status.    3:30 PM  Earlier today, RONALDO requested Dr. Porter to remove d/c orders d/t pt would not be able to be d/c'd today d/t no auth from [facility].  Dr. oPrter states no, she prefers to leave d/c orders in d/t she has to meet her metrics.    No response regarding auth status in .  RONALDO called Mame at [facility] [phone], left message with  requesting call back.    ED CrespoN, BS, RN, CCM

## 2024-05-16 NOTE — PLAN OF CARE
Problem: Infection  Goal: Absence of Infection Signs and Symptoms  Outcome: Progressing     Problem: Adult Inpatient Plan of Care  Goal: Plan of Care Review  Outcome: Progressing  Goal: Patient-Specific Goal (Individualized)  Outcome: Progressing  Goal: Absence of Hospital-Acquired Illness or Injury  Outcome: Progressing

## 2024-05-17 PROBLEM — K92.2 GI BLEED: Status: ACTIVE | Noted: 2024-05-17

## 2024-05-17 LAB
ABO + RH BLD: NORMAL
ANION GAP SERPL CALC-SCNC: 12 MMOL/L (ref 8–16)
BASOPHILS # BLD AUTO: 0.01 K/UL (ref 0–0.2)
BASOPHILS # BLD AUTO: 0.01 K/UL (ref 0–0.2)
BASOPHILS # BLD AUTO: 0.02 K/UL (ref 0–0.2)
BASOPHILS # BLD AUTO: 0.02 K/UL (ref 0–0.2)
BASOPHILS NFR BLD: 0.1 % (ref 0–1.9)
BLD GP AB SCN CELLS X3 SERPL QL: NORMAL
BUN SERPL-MCNC: 87 MG/DL (ref 10–30)
CALCIUM SERPL-MCNC: 8.1 MG/DL (ref 8.7–10.5)
CHLORIDE SERPL-SCNC: 106 MMOL/L (ref 95–110)
CO2 SERPL-SCNC: 24 MMOL/L (ref 23–29)
CREAT SERPL-MCNC: 1.5 MG/DL (ref 0.5–1.4)
DIFFERENTIAL METHOD BLD: ABNORMAL
EOSINOPHIL # BLD AUTO: 0 K/UL (ref 0–0.5)
EOSINOPHIL # BLD AUTO: 0 K/UL (ref 0–0.5)
EOSINOPHIL # BLD AUTO: 0.1 K/UL (ref 0–0.5)
EOSINOPHIL # BLD AUTO: 0.3 K/UL (ref 0–0.5)
EOSINOPHIL NFR BLD: 0.1 % (ref 0–8)
EOSINOPHIL NFR BLD: 0.1 % (ref 0–8)
EOSINOPHIL NFR BLD: 0.4 % (ref 0–8)
EOSINOPHIL NFR BLD: 1.5 % (ref 0–8)
ERYTHROCYTE [DISTWIDTH] IN BLOOD BY AUTOMATED COUNT: 14.7 % (ref 11.5–14.5)
ERYTHROCYTE [DISTWIDTH] IN BLOOD BY AUTOMATED COUNT: 14.7 % (ref 11.5–14.5)
ERYTHROCYTE [DISTWIDTH] IN BLOOD BY AUTOMATED COUNT: 14.8 % (ref 11.5–14.5)
ERYTHROCYTE [DISTWIDTH] IN BLOOD BY AUTOMATED COUNT: 14.8 % (ref 11.5–14.5)
EST. GFR  (NO RACE VARIABLE): 43.1 ML/MIN/1.73 M^2
GLUCOSE SERPL-MCNC: 104 MG/DL (ref 70–110)
HCT VFR BLD AUTO: 28.8 % (ref 40–54)
HCT VFR BLD AUTO: 30.3 % (ref 40–54)
HCT VFR BLD AUTO: 30.5 % (ref 40–54)
HCT VFR BLD AUTO: 30.6 % (ref 40–54)
HGB BLD-MCNC: 8.8 G/DL (ref 14–18)
HGB BLD-MCNC: 9.4 G/DL (ref 14–18)
HGB BLD-MCNC: 9.4 G/DL (ref 14–18)
HGB BLD-MCNC: 9.6 G/DL (ref 14–18)
IMM GRANULOCYTES # BLD AUTO: 0.1 K/UL (ref 0–0.04)
IMM GRANULOCYTES # BLD AUTO: 0.11 K/UL (ref 0–0.04)
IMM GRANULOCYTES # BLD AUTO: 0.12 K/UL (ref 0–0.04)
IMM GRANULOCYTES # BLD AUTO: 0.13 K/UL (ref 0–0.04)
IMM GRANULOCYTES NFR BLD AUTO: 0.5 % (ref 0–0.5)
IMM GRANULOCYTES NFR BLD AUTO: 0.6 % (ref 0–0.5)
IMM GRANULOCYTES NFR BLD AUTO: 0.7 % (ref 0–0.5)
IMM GRANULOCYTES NFR BLD AUTO: 0.7 % (ref 0–0.5)
LYMPHOCYTES # BLD AUTO: 1.2 K/UL (ref 1–4.8)
LYMPHOCYTES # BLD AUTO: 1.2 K/UL (ref 1–4.8)
LYMPHOCYTES # BLD AUTO: 1.6 K/UL (ref 1–4.8)
LYMPHOCYTES # BLD AUTO: 1.7 K/UL (ref 1–4.8)
LYMPHOCYTES NFR BLD: 10 % (ref 18–48)
LYMPHOCYTES NFR BLD: 6.4 % (ref 18–48)
LYMPHOCYTES NFR BLD: 6.5 % (ref 18–48)
LYMPHOCYTES NFR BLD: 8.4 % (ref 18–48)
MAGNESIUM SERPL-MCNC: 2.5 MG/DL (ref 1.6–2.6)
MCH RBC QN AUTO: 30.6 PG (ref 27–31)
MCH RBC QN AUTO: 30.8 PG (ref 27–31)
MCH RBC QN AUTO: 30.9 PG (ref 27–31)
MCH RBC QN AUTO: 31.1 PG (ref 27–31)
MCHC RBC AUTO-ENTMCNC: 30.6 G/DL (ref 32–36)
MCHC RBC AUTO-ENTMCNC: 30.7 G/DL (ref 32–36)
MCHC RBC AUTO-ENTMCNC: 30.8 G/DL (ref 32–36)
MCHC RBC AUTO-ENTMCNC: 31.7 G/DL (ref 32–36)
MCV RBC AUTO: 101 FL (ref 82–98)
MCV RBC AUTO: 101 FL (ref 82–98)
MCV RBC AUTO: 98 FL (ref 82–98)
MCV RBC AUTO: 99 FL (ref 82–98)
MONOCYTES # BLD AUTO: 0.7 K/UL (ref 0.3–1)
MONOCYTES # BLD AUTO: 0.7 K/UL (ref 0.3–1)
MONOCYTES # BLD AUTO: 0.8 K/UL (ref 0.3–1)
MONOCYTES # BLD AUTO: 0.8 K/UL (ref 0.3–1)
MONOCYTES NFR BLD: 3.9 % (ref 4–15)
MONOCYTES NFR BLD: 4 % (ref 4–15)
MONOCYTES NFR BLD: 4.3 % (ref 4–15)
MONOCYTES NFR BLD: 4.4 % (ref 4–15)
NEUTROPHILS # BLD AUTO: 14.5 K/UL (ref 1.8–7.7)
NEUTROPHILS # BLD AUTO: 16 K/UL (ref 1.8–7.7)
NEUTROPHILS # BLD AUTO: 16.3 K/UL (ref 1.8–7.7)
NEUTROPHILS # BLD AUTO: 16.8 K/UL (ref 1.8–7.7)
NEUTROPHILS NFR BLD: 83.5 % (ref 38–73)
NEUTROPHILS NFR BLD: 86.2 % (ref 38–73)
NEUTROPHILS NFR BLD: 88.7 % (ref 38–73)
NEUTROPHILS NFR BLD: 88.7 % (ref 38–73)
NRBC BLD-RTO: 0 /100 WBC
PLATELET # BLD AUTO: 199 K/UL (ref 150–450)
PLATELET # BLD AUTO: 199 K/UL (ref 150–450)
PLATELET # BLD AUTO: 205 K/UL (ref 150–450)
PLATELET # BLD AUTO: 209 K/UL (ref 150–450)
PMV BLD AUTO: 9.4 FL (ref 9.2–12.9)
PMV BLD AUTO: 9.5 FL (ref 9.2–12.9)
PMV BLD AUTO: 9.6 FL (ref 9.2–12.9)
PMV BLD AUTO: 9.8 FL (ref 9.2–12.9)
POTASSIUM SERPL-SCNC: 4.8 MMOL/L (ref 3.5–5.1)
RBC # BLD AUTO: 2.86 M/UL (ref 4.6–6.2)
RBC # BLD AUTO: 3.04 M/UL (ref 4.6–6.2)
RBC # BLD AUTO: 3.07 M/UL (ref 4.6–6.2)
RBC # BLD AUTO: 3.09 M/UL (ref 4.6–6.2)
SODIUM SERPL-SCNC: 142 MMOL/L (ref 136–145)
SPECIMEN OUTDATE: NORMAL
WBC # BLD AUTO: 17.36 K/UL (ref 3.9–12.7)
WBC # BLD AUTO: 18.35 K/UL (ref 3.9–12.7)
WBC # BLD AUTO: 18.57 K/UL (ref 3.9–12.7)
WBC # BLD AUTO: 18.88 K/UL (ref 3.9–12.7)

## 2024-05-17 PROCEDURE — 25000003 PHARM REV CODE 250: Mod: HCNC | Performed by: EMERGENCY MEDICINE

## 2024-05-17 PROCEDURE — 80048 BASIC METABOLIC PNL TOTAL CA: CPT | Mod: HCNC | Performed by: STUDENT IN AN ORGANIZED HEALTH CARE EDUCATION/TRAINING PROGRAM

## 2024-05-17 PROCEDURE — 21400001 HC TELEMETRY ROOM: Mod: HCNC

## 2024-05-17 PROCEDURE — 25000003 PHARM REV CODE 250: Mod: HCNC | Performed by: STUDENT IN AN ORGANIZED HEALTH CARE EDUCATION/TRAINING PROGRAM

## 2024-05-17 PROCEDURE — 85025 COMPLETE CBC W/AUTO DIFF WBC: CPT | Mod: 91,HCNC | Performed by: STUDENT IN AN ORGANIZED HEALTH CARE EDUCATION/TRAINING PROGRAM

## 2024-05-17 PROCEDURE — 25000003 PHARM REV CODE 250: Mod: HCNC

## 2024-05-17 PROCEDURE — 85025 COMPLETE CBC W/AUTO DIFF WBC: CPT | Mod: 91,HCNC | Performed by: INTERNAL MEDICINE

## 2024-05-17 PROCEDURE — 83735 ASSAY OF MAGNESIUM: CPT | Mod: HCNC | Performed by: INTERNAL MEDICINE

## 2024-05-17 PROCEDURE — 25000003 PHARM REV CODE 250: Mod: HCNC | Performed by: INTERNAL MEDICINE

## 2024-05-17 PROCEDURE — 63600175 PHARM REV CODE 636 W HCPCS: Mod: HCNC | Performed by: INTERNAL MEDICINE

## 2024-05-17 PROCEDURE — 36415 COLL VENOUS BLD VENIPUNCTURE: CPT | Mod: HCNC | Performed by: INTERNAL MEDICINE

## 2024-05-17 PROCEDURE — C9113 INJ PANTOPRAZOLE SODIUM, VIA: HCPCS | Mod: HCNC | Performed by: INTERNAL MEDICINE

## 2024-05-17 PROCEDURE — 86850 RBC ANTIBODY SCREEN: CPT | Mod: HCNC | Performed by: INTERNAL MEDICINE

## 2024-05-17 PROCEDURE — 99222 1ST HOSP IP/OBS MODERATE 55: CPT | Mod: HCNC,GC,, | Performed by: INTERNAL MEDICINE

## 2024-05-17 RX ORDER — MIRTAZAPINE 15 MG/1
15 TABLET, ORALLY DISINTEGRATING ORAL NIGHTLY
Status: DISCONTINUED | OUTPATIENT
Start: 2024-05-17 | End: 2024-05-17

## 2024-05-17 RX ORDER — PANTOPRAZOLE SODIUM 40 MG/1
40 TABLET, DELAYED RELEASE ORAL DAILY
Status: DISCONTINUED | OUTPATIENT
Start: 2024-05-18 | End: 2024-05-17

## 2024-05-17 RX ORDER — AMOXICILLIN 250 MG
2 CAPSULE ORAL 2 TIMES DAILY
Status: DISCONTINUED | OUTPATIENT
Start: 2024-05-17 | End: 2024-05-21 | Stop reason: HOSPADM

## 2024-05-17 RX ORDER — PANTOPRAZOLE SODIUM 40 MG/10ML
40 INJECTION, POWDER, LYOPHILIZED, FOR SOLUTION INTRAVENOUS 2 TIMES DAILY
Status: DISCONTINUED | OUTPATIENT
Start: 2024-05-18 | End: 2024-05-19

## 2024-05-17 RX ORDER — PANTOPRAZOLE SODIUM 40 MG/10ML
80 INJECTION, POWDER, LYOPHILIZED, FOR SOLUTION INTRAVENOUS ONCE
Status: COMPLETED | OUTPATIENT
Start: 2024-05-17 | End: 2024-05-17

## 2024-05-17 RX ADMIN — PANTOPRAZOLE SODIUM 80 MG: 40 INJECTION, POWDER, FOR SOLUTION INTRAVENOUS at 04:05

## 2024-05-17 RX ADMIN — ASPIRIN 81 MG: 81 TABLET, COATED ORAL at 09:05

## 2024-05-17 RX ADMIN — LISINOPRIL 5 MG: 5 TABLET ORAL at 09:05

## 2024-05-17 RX ADMIN — FERROUS SULFATE TAB EC 325 MG (65 MG FE EQUIVALENT) 1 EACH: 325 (65 FE) TABLET DELAYED RESPONSE at 09:05

## 2024-05-17 RX ADMIN — SENNOSIDES AND DOCUSATE SODIUM 2 TABLET: 50; 8.6 TABLET ORAL at 09:05

## 2024-05-17 RX ADMIN — ACETAMINOPHEN 650 MG: 325 TABLET ORAL at 09:05

## 2024-05-17 RX ADMIN — Medication: at 09:05

## 2024-05-17 RX ADMIN — THERA TABS 1 TABLET: TAB at 09:05

## 2024-05-17 RX ADMIN — POLYETHYLENE GLYCOL 3350 17 G: 17 POWDER, FOR SOLUTION ORAL at 09:05

## 2024-05-17 RX ADMIN — CARVEDILOL 3.12 MG: 3.12 TABLET, FILM COATED ORAL at 09:05

## 2024-05-17 RX ADMIN — Medication 6 MG: at 09:05

## 2024-05-17 RX ADMIN — APIXABAN 2.5 MG: 2.5 TABLET, FILM COATED ORAL at 09:05

## 2024-05-17 RX ADMIN — ALLOPURINOL 50 MG: 300 TABLET ORAL at 10:05

## 2024-05-17 RX ADMIN — ACETAMINOPHEN 650 MG: 325 TABLET ORAL at 03:05

## 2024-05-17 NOTE — PLAN OF CARE
[facility] Med [facility] ([facility])  Discharge Reassessment    Primary Care Provider: Jam Rowell MD    Expected Discharge Date: 5/17/2024    Reassessment (most recent)       Discharge Reassessment - 05/17/24 0948          Discharge Reassessment    Assessment Type Discharge Planning Reassessment (P)      Did the patient's condition or plan change since previous assessment? Yes (P)      Discharge Plan discussed with: Adult children (P)      Communicated TEOFILO with patient/caregiver Date not available/Unable to determine (P)      Discharge Plan A Skilled Nursing Facility (P)      Discharge Plan B Skilled Nursing Facility (P)      DME Needed Upon Discharge  other (see comments) (P)    TBD    Transition of Care Barriers Mobility (P)         Post-Acute Status    Coverage [employer] (P)      Discharge Delays Post-Acute Set-up (P)                  RONALDO spoke with Mame at [facility] [phone]; she requests PT/OT notes to submit to [employer].  No PT/OT notes on this pt.  RONALDO asked nurse Pauline if PT/OT appropriate for this pt.      CM called pt's dtr Amelie Vincent [phone].  She states yes, they still want SNF for PT/OT for this pt; she doesn't know why pt not getting this at hospital.  Pt needs therapy to get stronger. CM to f/u on this.    10:30 AM  Per Dr. Porter, pt needs PT/OT - this has been ordered.    ED CrespoN, BS, RN, [facility]

## 2024-05-17 NOTE — PLAN OF CARE
CM called Mame at [place] [phone] to check status of auth.  Left message with  requesting call back.  No update received in CP.    ED CrespoN, BS, RN, CCM

## 2024-05-17 NOTE — SUBJECTIVE & OBJECTIVE
Past Medical History:   Diagnosis Date    Acute on chronic diastolic heart failure 9/1/2016    Coronary artery disease     Hyperlipidemia     Hypertension     Macular degeneration (senile) of retina, unspecified 12/12/2014    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    Persistent atrial fibrillation 11/10/2016    S/P placement of cardiac pacemaker 9/14/2016       Past Surgical History:   Procedure Laterality Date    AORTIC VALVE REPLACEMENT N/A     CARDIAC PACEMAKER PLACEMENT      CATARACT EXTRACTION W/  INTRAOCULAR LENS IMPLANT Right 2/16/2016    Dr. Whitley    CATARACT EXTRACTION W/  INTRAOCULAR LENS IMPLANT Left 3/1/2016    Dr. Whitley    CORONARY ARTERY BYPASS GRAFT      EAR EXAMINATION UNDER ANESTHESIA      EYE SURGERY         Review of patient's allergies indicates:   Allergen Reactions    Iodine and iodide containing products Other (See Comments)     Caused changes in skin color     Family History       Problem Relation (Age of Onset)    Hypertension Brother    No Known Problems Mother, Father, Sister, Maternal Aunt, Maternal Uncle, Paternal Aunt, Paternal Uncle, Maternal Grandmother, Maternal Grandfather, Paternal Grandmother, Paternal Grandfather, Daughter, Son    Stroke Brother          Tobacco Use    Smoking status: Never     Passive exposure: Never    Smokeless tobacco: Never   Substance and Sexual Activity    Alcohol use: No    Drug use: No    Sexual activity: Yes     Partners: Female     Review of Systems   Reason unable to perform ROS: limited by dementia.   Respiratory:  Negative for shortness of breath.    Gastrointestinal:  Positive for abdominal pain.   Neurological:  Positive for dizziness.     Objective:     Vital Signs (Most Recent):  Temp: 98.7 °F (37.1 °C) (05/17/24 1140)  Pulse: 70 (05/17/24 1140)  Resp: 17 (05/17/24 1140)  BP: (!) 100/50 (05/17/24 1140)  SpO2: 97 % (05/17/24 1140) Vital Signs (24h Range):  Temp:  [97.8 °F (36.6 °C)-98.7 °F (37.1 °C)] 98.7 °F (37.1 °C)  Pulse:  [67-70] 70  Resp:   [16-18] 17  SpO2:  [97 %-100 %] 97 %  BP: ()/(50-70) 100/50     Weight: 52.7 kg (116 lb 2.9 oz) (05/11/24 2100)  Body mass index is 17.16 kg/m².      Intake/Output Summary (Last 24 hours) at 5/17/2024 1353  Last data filed at 5/17/2024 0458  Gross per 24 hour   Intake --   Output 450 ml   Net -450 ml       Lines/Drains/Airways       Drain  Duration             Male External Urinary Catheter 05/11/24 0834 Medium 6 days    Male External Urinary Catheter 05/11/24 0834 Medium 6 days              Peripheral Intravenous Line  Duration                  Peripheral IV - Single Lumen 05/11/24 0100 22 G Anterior;Right Forearm 6 days                     Physical Exam  Exam conducted with a chaperone present.   Constitutional:       General: He is not in acute distress.  HENT:      Head: Normocephalic and atraumatic.   Eyes:      General: No scleral icterus.  Cardiovascular:      Rate and Rhythm: Normal rate.   Pulmonary:      Effort: Pulmonary effort is normal. No respiratory distress.   Abdominal:      General: Abdomen is flat. There is no distension.      Palpations: Abdomen is soft.      Tenderness: There is abdominal tenderness (lower quadrants). There is no guarding or rebound.   Genitourinary:     Comments: Red, bloody stool present  Musculoskeletal:      Right lower leg: No edema.      Left lower leg: No edema.   Skin:     General: Skin is warm and dry.   Neurological:      Mental Status: He is alert. Mental status is at baseline. He is disoriented.   Psychiatric:         Mood and Affect: Mood normal.         Behavior: Behavior normal.          Significant Labs:  CBC:   Recent Labs   Lab 05/16/24  0255 05/17/24  0523 05/17/24  1059   WBC 21.64* 18.35*  18.88* 18.57*   HGB 10.9* 9.4*  9.4* 8.8*   HCT 35.0* 30.5*  30.6* 28.8*    199  199 209     CMP:   Recent Labs   Lab 05/17/24  0524      CALCIUM 8.1*      K 4.8   CO2 24      BUN 87*   CREATININE 1.5*     All pertinent lab results from  the last 24 hours have been reviewed.    Significant Imaging:  Imaging results within the past 24 hours have been reviewed.

## 2024-05-17 NOTE — CONSULTS
[facility] ([place] [address])  Gastroenterology  Consult Note    Patient Name: Deena Moody  MRN: [id number]  Admission Date: 5/11/2024  Hospital Length of Stay: 6 days  Code Status: Full Code   Attending Provider: Toma Porter MD   Consulting Provider: Juan Manuel Marcelino DO  Primary Care Physician: Jam Rowell MD  Principal Problem:Arm swelling    Inpatient consult to Gastroenterology  Consult performed by: Juan Manuel Marcelino DO  Consult ordered by: Toma Porter MD        Subjective:     HPI:  89 yoM with pmh of HTN, HLD, CAD, afib on eliquis 2.5, AV block with pacemaker, CKD, and dementia for whom GI was consulted for hematochezia. He has been getting treated for an acute gout flare and was ready for discharge until last night when he had 1-2 bloody bowel movements. His nurse states he also had another one today. Due to dementia, patient can offer little history but he denies having this problem before. He does report some mild abdominal pain across the middle of his stomach and some dizziness the last day. He denies any N/V. His sonis usually present at bedside but was gone when patient was seen.    There are no records for any EGD/colonoscopy in our records; patient also denies every having any scopes performed (with dementia, unsure if he would be able to recollect.     He is currently HDS and AF. Hgb the last 24 hours: 10.9->9.4->8.4. He is currently getting ASA, colchicine, iron, and his last dose of eliquis was this morning (5/17) at 9 AM. He received a 5 day course of prednisone from 5/12-5/16.    Past Medical History:   Diagnosis Date    Acute on chronic diastolic heart failure 9/1/2016    Coronary artery disease     Hyperlipidemia     Hypertension     Macular degeneration (senile) of retina, unspecified 12/12/2014    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    Persistent atrial fibrillation 11/10/2016    S/P placement of cardiac pacemaker 9/14/2016       Past Surgical History:   Procedure Laterality Date     AORTIC VALVE REPLACEMENT N/A     CARDIAC PACEMAKER PLACEMENT      CATARACT EXTRACTION W/  INTRAOCULAR LENS IMPLANT Right 2/16/2016    Dr. Whitley    CATARACT EXTRACTION W/  INTRAOCULAR LENS IMPLANT Left 3/1/2016    Dr. Whitley    CORONARY ARTERY BYPASS GRAFT      EAR EXAMINATION UNDER ANESTHESIA      EYE SURGERY         Review of patient's allergies indicates:   Allergen Reactions    Iodine and iodide containing products Other (See Comments)     Caused changes in skin color     Family History       Problem Relation (Age of Onset)    Hypertension Brother    No Known Problems Mother, Father, Sister, Maternal Aunt, Maternal Uncle, Paternal Aunt, Paternal Uncle, Maternal Grandmother, Maternal Grandfather, Paternal Grandmother, Paternal Grandfather, Daughter, Son    Stroke Brother          Tobacco Use    Smoking status: Never     Passive exposure: Never    Smokeless tobacco: Never   Substance and Sexual Activity    Alcohol use: No    Drug use: No    Sexual activity: Yes     Partners: Female     Review of Systems   Reason unable to perform ROS: limited by dementia.   Respiratory:  Negative for shortness of breath.    Gastrointestinal:  Positive for abdominal pain.   Neurological:  Positive for dizziness.     Objective:     Vital Signs (Most Recent):  Temp: 98.7 °F (37.1 °C) (05/17/24 1140)  Pulse: 70 (05/17/24 1140)  Resp: 17 (05/17/24 1140)  BP: (!) 100/50 (05/17/24 1140)  SpO2: 97 % (05/17/24 1140) Vital Signs (24h Range):  Temp:  [97.8 °F (36.6 °C)-98.7 °F (37.1 °C)] 98.7 °F (37.1 °C)  Pulse:  [67-70] 70  Resp:  [16-18] 17  SpO2:  [97 %-100 %] 97 %  BP: ()/(50-70) 100/50     Weight: 52.7 kg (116 lb 2.9 oz) (05/11/24 2100)  Body mass index is 17.16 kg/m².      Intake/Output Summary (Last 24 hours) at 5/17/2024 6086  Last data filed at 5/17/2024 0458  Gross per 24 hour   Intake --   Output 450 ml   Net -450 ml       Lines/Drains/Airways       Drain  Duration             Male External Urinary Catheter 05/11/24  0834 Medium 6 days    Male External Urinary Catheter 05/11/24 0834 Medium 6 days              Peripheral Intravenous Line  Duration                  Peripheral IV - Single Lumen 05/11/24 0100 22 G Anterior;Right Forearm 6 days                     Physical Exam  Exam conducted with a chaperone present.   Constitutional:       General: He is not in acute distress.  HENT:      Head: Normocephalic and atraumatic.   Eyes:      General: No scleral icterus.  Cardiovascular:      Rate and Rhythm: Normal rate.   Pulmonary:      Effort: Pulmonary effort is normal. No respiratory distress.   Abdominal:      General: Abdomen is flat. There is no distension.      Palpations: Abdomen is soft.      Tenderness: There is abdominal tenderness (lower quadrants). There is no guarding or rebound.   Genitourinary:     Comments: Red, bloody stool present  Musculoskeletal:      Right lower leg: No edema.      Left lower leg: No edema.   Skin:     General: Skin is warm and dry.   Neurological:      Mental Status: He is alert. Mental status is at baseline. He is disoriented.   Psychiatric:         Mood and Affect: Mood normal.         Behavior: Behavior normal.          Significant Labs:  CBC:   Recent Labs   Lab 05/16/24  0255 05/17/24  0523 05/17/24  1059   WBC 21.64* 18.35*  18.88* 18.57*   HGB 10.9* 9.4*  9.4* 8.8*   HCT 35.0* 30.5*  30.6* 28.8*    199  199 209     CMP:   Recent Labs   Lab 05/17/24  0524      CALCIUM 8.1*      K 4.8   CO2 24      BUN 87*   CREATININE 1.5*     All pertinent lab results from the last 24 hours have been reviewed.    Significant Imaging:  Imaging results within the past 24 hours have been reviewed.  Assessment/Plan:     GI  GI bleed  89 year old male with pmh of HTN, HLD, CAD, Afib on eliquis 2.5mg (last dose this am, 5/17, at 9 am), and dementia who last night began having bloody BMs. He has had 2-3 episodes. Hgb last 24 hours: 10.9->9.4->8.8. He reports some mild abdominal  pain and dizziness; overall, history limited by dementia. He has been getting treated for an acute gout flare during this admission and completed a 5 day course of prednisone (5/12-5/16). He is currently taking ASA, colchicine, and iron.    - Given patient's age, co morbidities, and conversation with him and his son, no inpatient colonoscopy at this time.  -If he has any further episodes of large volume hematochezia consider CTA GIB protocol. Given hx of CKD III, would recommend joint decision making with patient/son.  - Continue to trend Hgb. Transfuse for Hgb > 7, unless otherwise indicated  - Bolus IV pantoprazole 80mg followed by 40mg BID  - Maintain IV access with 2 large bore Ivs  - Intravascular resuscitation/support with IVFs   - Keep NPO  - Hold all NSAIDs and anticoagulants, unless contraindicated  - Please correct any coagulopathy with platelets and FFP for goal of platelets >50K and INR <2.0  - Please notify GI team if there is significant change in patient's clinical status          Thank you for your consult.       Juan Manuel Marcelino DO  Gastroenterology  [facility] - Med Surg ([place])

## 2024-05-17 NOTE — NURSING
Nurse was changing pt when they noticed bright red blood and clots in their stool. There was no active blood flowing out of the anus, specifically just blood and clots in their stool that they passed.  MD Buddy notified.

## 2024-05-17 NOTE — HPI
89 yoM with pmh of HTN, HLD, CAD, afib on eliquis 2.5, AV block with pacemaker, CKD, and dementia for whom GI was consulted for hematochezia. He has been getting treated for an acute gout flare and was ready for discharge until last night when he had 1-2 bloody bowel movements. His nurse states he also had another one today. Due to dementia, patient can offer little history but he denies having this problem before. He does report some mild abdominal pain across the middle of his stomach and some dizziness the last day. He denies any N/V. His sonis usually present at bedside but was gone when patient was seen.    There are no records for any EGD/colonoscopy in our records; patient also denies every having any scopes performed (with dementia, unsure if he would be able to recollect.     He is currently HDS and AF. Hgb the last 24 hours: 10.9->9.4->8.4. He is currently getting ASA, colchicine, iron, and his last dose of eliquis was this morning (5/17) at 9 AM. He received a 5 day course of prednisone from 5/12-5/16.

## 2024-05-17 NOTE — PLAN OF CARE
Significant Event  Hospital Medicine    CC:  Arm swelling  Date:  05/17/2024  Admit Date:  5/11/2024  Hospital Length of Stay:  6    Code Status:  Full Code     SUBJECTIVE:     Significant Events:  Called urgently to the bedside by nurse to evaluate patient for bloody bowel movement and lower BP. Patient awake and talking about dance class. Nurse had noted that he had a very large amount of blood in his bed that was not clots, just blood. After cleaning, he still has some blood, maroon colored around anal area, but not flowing.     OBJECTIVE:     Physical Exam:  Last Vitals:   Vitals:    05/17/24 0411   BP: (!) 112/51   Pulse: 70   Resp: 17   Temp: 98.2 °F (36.8 °C)     GA:  awake and alert  HEENT:  PERRL.  No scleral icterus or JVD.   Pulmonary:  Clear to auscultation A/P/L.  No wheezing, crackles, or rhonchi.  Cardiac:  RRR, S1 & S2 w/o rubs/murmurs/gallops.   Abdominal:  Bowel sounds present x 4. No appreciable hepatosplenomegaly.  Neuro:  --GCS: 15  --Mental Status:  oriented to self only (baseline), asking about missing his dance class  --CN II-XII grossly intact.  Corneal reflex, gag, cough intact.  --Extremity strength and sensation intact x 4.     ASSESSMENT AND PLAN/INTERVENTIONS:     1) GI bleeding  Plan/Interventions:  - with recent use of colchicine and steroids, on apixaban and aspirin concern for GI bleeding  - will get stat CBC, type and screen  - start on protonix  - consider GI consult in the AM, sooner if BP drops  - telemetry  - CrCl is at 21, so would not pursue CTA at this time, as pressure stable at this time  - low threshold for ICU consult    35 mins of Uninterrupted Critical Care/Counseling time (not including procedures) was provided in order to assess and manage the high probability of imminent or life-threatening deterioration of cardio-respiratory status requiring vasodilator support and pending intubation

## 2024-05-17 NOTE — PROGRESS NOTES
[facility] Medicine  Progress note    Team: [facility] W Toma Porter MD  Admit Date: 5/11/2024  Code status: Full Code    Principal Problem:  Arm swelling    Interval hx:  No new complaints    PEx  Temp:  [97.6 °F (36.4 °C)-98.5 °F (36.9 °C)]   Pulse:  [67-70]   Resp:  [16-18]   BP: ()/(51-70)   SpO2:  [97 %-100 %]     Intake/Output Summary (Last 24 hours) at 5/17/2024 1002  Last data filed at 5/17/2024 0458  Gross per 24 hour   Intake --   Output 450 ml   Net -450 ml         General Appearance: no acute distress, WD, elderly, ill appearing  Heart: regular rate and rhythm, no heave, no JVD, no BLE edema  Ext: LUE swelling at elbow area; less painful passive range of motion  Respiratory: Normal respiratory effort, symmetric excursion, bilateral vesicular breath sounds   Abdomen: Soft, non-tender; bowel sounds active  Skin: intact, no rash, no ulcers  Neurologic:  No focal numbness or weakness  Mental status: Alert, oriented x 4, affect appropriate    Recent Labs   Lab 05/15/24  0606 05/16/24  0255 05/17/24  0523   WBC 23.37* 21.64* 18.35*  18.88*   HGB 9.4* 10.9* 9.4*  9.4*   HCT 30.7* 35.0* 30.5*  30.6*    257 199  199     Recent Labs   Lab 05/12/24  0541 05/13/24  0439 05/14/24  0349 05/15/24  0606 05/16/24  0255 05/17/24  0523 05/17/24  0524    141 142 142 143  --  142   K 3.7 4.6 4.8 4.7 5.0  --  4.8    104 105 106 105  --  106   CO2 25 22* 22* 25 26  --  24   BUN 72* 82* 96* 90* 87*  --  87*   CREATININE 1.4 1.6* 1.6* 1.5* 1.6*  --  1.5*   GLU 73 105 136* 89 115*  --  104   CALCIUM 7.8* 8.5* 8.3* 8.4* 8.4*  --  8.1*   MG 2.0 2.1 2.4  --   --  2.5  --    PHOS 3.6  --   --   --   --   --   --      Recent Labs   Lab 05/11/24  0244 05/12/24  0541   ALKPHOS 123 97   ALT 24 17   AST 33 28   ALBUMIN 2.5* 2.1*   PROT 6.5 5.6*   BILITOT 1.0 1.0        Recent Labs   Lab 05/11/24  0935 05/11/24  1635   POCTGLUCOSE 97 92       Scheduled Meds:   acetaminophen  650 mg Oral TID    allopurinoL  50  "mg Oral Daily    apixaban  2.5 mg Oral BID    aspirin  81 mg Oral Daily    balsam peru-castor oiL   Topical (Top) BID    carvediloL  3.125 mg Oral BID    colchicine  0.6 mg Oral Every other day    ferrous sulfate  1 tablet Oral Daily    lisinopriL  5 mg Oral Daily    multivitamin  1 tablet Oral Daily    [START ON 5/18/2024] pantoprazole  40 mg Oral Daily    polyethylene glycol  17 g Oral BID     Continuous Infusions:  As Needed:    Current Facility-Administered Medications:     acetaminophen, 650 mg, Oral, Q6H PRN    albuterol-ipratropium, 3 mL, Nebulization, Q4H PRN    aluminum-magnesium hydroxide-simethicone, 30 mL, Oral, QID PRN    dextrose 10%, 12.5 g, Intravenous, PRN    dextrose 10%, 25 g, Intravenous, PRN    diclofenac sodium, 2 g, Topical (Top), Daily PRN    glucagon (human recombinant), 1 mg, Intramuscular, PRN    glucose, 16 g, Oral, PRN    glucose, 24 g, Oral, PRN    HYDROcodone-acetaminophen, 1 tablet, Oral, Q6H PRN    hydrOXYzine HCL, 25 mg, Oral, TID PRN    melatonin, 6 mg, Oral, Nightly PRN    morphine, 1 mg, Intravenous, Q4H PRN    naloxone, 0.02 mg, Intravenous, PRN    ondansetron, 8 mg, Oral, Q8H PRN    prochlorperazine, 5 mg, Intravenous, Q6H PRN    sodium chloride 0.9%, 10 mL, Intravenous, PRN    sodium chloride 0.9%, 10 mL, Intravenous, Q8H PRN    Assessment and Plan  / Problems managed today    * Arm swelling  Hx gout   Recent admission for the same, d/c 5/8. Treated initially for osteomyelitis with BS IV abx, then determined to be gout (joint asp wrist on 5/3 without evidence of infection) and managed with IV/ PO steroids. Evals by ID, ortho, rheum during last admission. Returns from NH  on 5/11 with edema from hand to elbow, left. Painful ROM LUE including hand/ wrist/ elbow/ shoulder. Repeat Left UE US negative for DVT. Imaging reviewed - XR L hand significant for "redemonstration of erosive change involving the head of the 2nd middle phalanx, previously demonstrated on hand CT and " "radiograph referenced above.  Findings could reflect underlying osteomyelitis or other inflammatory arthritidis in appears similar to prior exams." MRI not able to be obtained d/t incompatibility with pacer. Arm does not appear erythematous and so clinically this is not cellulitis. Uric acid level elevated on admit. ID ruled infection. Rheumatology consulted. Patient started on prednisone, colchicine 0.6 mg every other day, and allopurinol 50 mg daily. Started on acetaminophen 650 mg TID. Improved overall. Swelling improving but noticeably improved when briefly restarted on torsemide    Acute gout  - Appreciate Rheumatology consult  - Recent admit for the same, treated with steroids with ongoing pain/discomfort.  - detailed to family that gout will be further managed as outpatient  - prednisone burst and cholchicine 0.6 mg every other day  - allopurinol 50 mg daily. Increase slowly.   - pain much resolved at this time  - scheduled acetaminophen 650 mg TID    CKD (chronic kidney disease), stage IV  Creatine stable for now. BMP reviewed- noted Estimated Creatinine Clearance: 24.6 mL/min (based on SCr of 1.4 mg/dL). according to latest data. 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  Monitor UOP and serial BMP and adjust therapy as needed. Renally dose meds. Avoid nephrotoxic medications and procedures.  - at time of admission, Cr 1.5 - improved from baseline (~1.6-2.3)    Permanent atrial fibrillation  Patient with Permanent atrial fibrillation which is controlled currently with Calcium Channel Blocker. Patient is currently in atrial fibrillation.[id number] Score: 3. Anticoagulation indicated. Anticoagulation done with eliquis 2.5 BID .    Chronic diastolic heart failure  - controlled  - BNP in 500s, lowest it has been in record  - torsemide held since 5/1/2024  - consider holding as patient had 13 kg weight loss since admission for heart failure 6/2023    CAD (coronary artery disease)  Patient with " known CAD s/p stent placement and CABG, which is controlled Will continue ASA and monitor for S/Sx of angina/ACS.     Essential hypertension  Chronic, controlled. Latest blood pressure and vitals reviewed-     Temp:  [97.3 °F (36.3 °C)-98.5 °F (36.9 °C)]   Pulse:  [67-70]   Resp:  [16-18]   BP: ()/(55-81)   SpO2:  [95 %-100 %] .     Labile. Has higher BP goal due to age and co-morbidities    Stop amlodpine  Continue carvedilol 2.125 mg BID and lisinopril 5 mg daily    Diet:  regular diet  GI PPx: not needed  DVT PPx:  apixaban  Airways: room air  Wounds: none    Goals of Care:  Return to prior functional status     Discharge Planning   TEOFILO: 5/20/2024   Is the patient medically ready for discharge?:     Reason for patient still in hospital (select all that apply): Patient trending condition and Treatment  Discharge Plan A: Skilled Nursing Facility   Discharge Delays: (!) Post-Acute Set-up    Toma Porter MD

## 2024-05-17 NOTE — PLAN OF CARE
Problem: Adult Inpatient Plan of Care  Goal: Plan of Care Review  Outcome: Progressing  Flowsheets (Taken 5/17/2024 1826)  Plan of Care Reviewed With:   patient   child  Goal: Patient-Specific Goal (Individualized)  Outcome: Progressing  Goal: Absence of Hospital-Acquired Illness or Injury  Outcome: Progressing  Goal: Optimal Comfort and Wellbeing  Outcome: Progressing  Goal: Readiness for Transition of Care  Outcome: Progressing     Problem: Infection  Goal: Absence of Infection Signs and Symptoms  Outcome: Progressing     Problem: Acute Kidney Injury/Impairment  Goal: Fluid and Electrolyte Balance  Outcome: Progressing  Intervention: Monitor and Manage Fluid and Electrolyte Balance  Flowsheets (Taken 5/17/2024 0922)  Fluid/Electrolyte Management: (labs monitored) other (see comments)  Goal: Improved Oral Intake  Outcome: Progressing  Intervention: Promote and Optimize Oral Intake  Flowsheets (Taken 5/17/2024 0922)  Oral Nutrition Promotion: adaptive equipment use encouraged  Nutrition Interventions: food preferences provided  Goal: Effective Renal Function  Outcome: Progressing     Problem: Wound  Goal: Optimal Coping  Outcome: Progressing  Intervention: Support Patient and Family Response  Flowsheets (Taken 5/17/2024 0922)  Supportive Measures: active listening utilized  Goal: Optimal Functional Ability  Outcome: Progressing  Goal: Absence of Infection Signs and Symptoms  Outcome: Progressing  Goal: Improved Oral Intake  Outcome: Progressing  Intervention: Promote and Optimize Oral Intake  Flowsheets (Taken 5/17/2024 0922)  Oral Nutrition Promotion: adaptive equipment use encouraged  Nutrition Interventions: food preferences provided  Goal: Optimal Pain Control and Function  Outcome: Progressing  Intervention: Prevent or Manage Pain  Flowsheets (Taken 5/17/2024 0922)  Sleep/Rest Enhancement: consistent schedule promoted  Pain Management Interventions: pain management plan reviewed with patient/caregiver  Goal:  Skin Health and Integrity  Outcome: Progressing  Intervention: Optimize Skin Protection  Flowsheets (Taken 5/17/2024 0922)  Pressure Reduction Techniques:   frequent weight shift encouraged   weight shift assistance provided   positioned off wounds   pressure points protected  Pressure Reduction Devices:   pressure-redistributing mattress utilized   positioning supports utilized  Skin Protection: incontinence pads utilized  Head of Bed (HOB) Positioning: HOB at 30 degrees  Goal: Optimal Wound Healing  Outcome: Progressing  Intervention: Promote Wound Healing  Flowsheets (Taken 5/17/2024 0922)  Sleep/Rest Enhancement: consistent schedule promoted     Problem: Skin Injury Risk Increased  Goal: Skin Health and Integrity  Outcome: Progressing  Intervention: Optimize Skin Protection  Flowsheets (Taken 5/17/2024 0922)  Pressure Reduction Techniques:   frequent weight shift encouraged   weight shift assistance provided   positioned off wounds   pressure points protected  Pressure Reduction Devices:   pressure-redistributing mattress utilized   positioning supports utilized  Skin Protection: incontinence pads utilized  Head of Bed (HOB) Positioning: HOB at 30 degrees  Intervention: Promote and Optimize Oral Intake  Flowsheets (Taken 5/17/2024 0922)  Oral Nutrition Promotion: adaptive equipment use encouraged  Nutrition Interventions: food preferences provided     Problem: Fall Injury Risk  Goal: Absence of Fall and Fall-Related Injury  Outcome: Progressing  Intervention: Identify and Manage Contributors  Flowsheets (Taken 5/17/2024 0922)  Self-Care Promotion:   independence encouraged   BADL personal objects within reach   BADL personal routines maintained   meal set-up provided   adaptive equipment use encouraged     Problem: Pain Acute  Goal: Optimal Pain Control and Function  Outcome: Progressing  Intervention: Develop Pain Management Plan  Flowsheets (Taken 5/17/2024 0922)  Pain Management Interventions: pain management  plan reviewed with patient/caregiver  Intervention: Prevent or Manage Pain  Flowsheets (Taken 5/17/2024 0922)  Sleep/Rest Enhancement: consistent schedule promoted  Intervention: Optimize Psychosocial Wellbeing  Flowsheets (Taken 5/17/2024 0922)  Supportive Measures: active listening utilized

## 2024-05-17 NOTE — ASSESSMENT & PLAN NOTE
89 year old male with pmh of HTN, HLD, CAD, Afib on eliquis 2.5mg (last dose this am, 5/17, at 9 am), and dementia who last night began having bloody BMs. He has had 2-3 episodes. Hgb last 24 hours: 10.9->9.4->8.8. He reports some mild abdominal pain and dizziness; overall, history limited by dementia. He has been getting treated for an acute gout flare during this admission and completed a 5 day course of prednisone (5/12-5/16). He is currently taking ASA, colchicine, and iron.    - Given patient's age, co morbidities, and conversation with him and his son, no inpatient colonoscopy at this time.  -If he has any further episodes of large volume hematochezia consider CTA GIB protocol. Given hx of CKD III, would recommend joint decision making with patient/son.  - Continue to trend Hgb. Transfuse for Hgb > 7, unless otherwise indicated  - Bolus IV pantoprazole 80mg followed by 40mg BID  - Maintain IV access with 2 large bore Ivs  - Intravascular resuscitation/support with IVFs   - Keep NPO  - Hold all NSAIDs and anticoagulants, unless contraindicated  - Please correct any coagulopathy with platelets and FFP for goal of platelets >50K and INR <2.0  - Please notify GI team if there is significant change in patient's clinical status

## 2024-05-18 PROBLEM — E43 SEVERE MALNUTRITION: Status: ACTIVE | Noted: 2024-05-18

## 2024-05-18 PROBLEM — K92.1 HEMATOCHEZIA: Status: ACTIVE | Noted: 2024-05-17

## 2024-05-18 LAB
ANION GAP SERPL CALC-SCNC: 8 MMOL/L (ref 8–16)
BASOPHILS # BLD AUTO: 0.01 K/UL (ref 0–0.2)
BASOPHILS NFR BLD: 0.1 % (ref 0–1.9)
BUN SERPL-MCNC: 80 MG/DL (ref 10–30)
CALCIUM SERPL-MCNC: 7.8 MG/DL (ref 8.7–10.5)
CHLORIDE SERPL-SCNC: 110 MMOL/L (ref 95–110)
CO2 SERPL-SCNC: 25 MMOL/L (ref 23–29)
CREAT SERPL-MCNC: 1.5 MG/DL (ref 0.5–1.4)
DIFFERENTIAL METHOD BLD: ABNORMAL
EOSINOPHIL # BLD AUTO: 0.3 K/UL (ref 0–0.5)
EOSINOPHIL NFR BLD: 1.7 % (ref 0–8)
ERYTHROCYTE [DISTWIDTH] IN BLOOD BY AUTOMATED COUNT: 14.9 % (ref 11.5–14.5)
EST. GFR  (NO RACE VARIABLE): 43.1 ML/MIN/1.73 M^2
GLUCOSE SERPL-MCNC: 91 MG/DL (ref 70–110)
HCT VFR BLD AUTO: 30.9 % (ref 40–54)
HGB BLD-MCNC: 9.3 G/DL (ref 14–18)
IMM GRANULOCYTES # BLD AUTO: 0.09 K/UL (ref 0–0.04)
IMM GRANULOCYTES NFR BLD AUTO: 0.6 % (ref 0–0.5)
LYMPHOCYTES # BLD AUTO: 1.6 K/UL (ref 1–4.8)
LYMPHOCYTES NFR BLD: 11 % (ref 18–48)
MCH RBC QN AUTO: 30.2 PG (ref 27–31)
MCHC RBC AUTO-ENTMCNC: 30.1 G/DL (ref 32–36)
MCV RBC AUTO: 100 FL (ref 82–98)
MONOCYTES # BLD AUTO: 0.6 K/UL (ref 0.3–1)
MONOCYTES NFR BLD: 3.8 % (ref 4–15)
NEUTROPHILS # BLD AUTO: 12.4 K/UL (ref 1.8–7.7)
NEUTROPHILS NFR BLD: 82.8 % (ref 38–73)
NRBC BLD-RTO: 0 /100 WBC
PLATELET # BLD AUTO: 182 K/UL (ref 150–450)
PMV BLD AUTO: 9.6 FL (ref 9.2–12.9)
POTASSIUM SERPL-SCNC: 5 MMOL/L (ref 3.5–5.1)
RBC # BLD AUTO: 3.08 M/UL (ref 4.6–6.2)
SODIUM SERPL-SCNC: 143 MMOL/L (ref 136–145)
WBC # BLD AUTO: 14.94 K/UL (ref 3.9–12.7)

## 2024-05-18 PROCEDURE — 21400001 HC TELEMETRY ROOM: Mod: HCNC

## 2024-05-18 PROCEDURE — 97166 OT EVAL MOD COMPLEX 45 MIN: CPT | Mod: HCNC

## 2024-05-18 PROCEDURE — 97530 THERAPEUTIC ACTIVITIES: CPT | Mod: HCNC

## 2024-05-18 PROCEDURE — 63600175 PHARM REV CODE 636 W HCPCS: Mod: HCNC | Performed by: INTERNAL MEDICINE

## 2024-05-18 PROCEDURE — 25000003 PHARM REV CODE 250: Mod: HCNC | Performed by: INTERNAL MEDICINE

## 2024-05-18 PROCEDURE — 25000003 PHARM REV CODE 250: Mod: HCNC | Performed by: EMERGENCY MEDICINE

## 2024-05-18 PROCEDURE — 25000003 PHARM REV CODE 250: Mod: HCNC | Performed by: STUDENT IN AN ORGANIZED HEALTH CARE EDUCATION/TRAINING PROGRAM

## 2024-05-18 PROCEDURE — 36415 COLL VENOUS BLD VENIPUNCTURE: CPT | Mod: HCNC | Performed by: STUDENT IN AN ORGANIZED HEALTH CARE EDUCATION/TRAINING PROGRAM

## 2024-05-18 PROCEDURE — 25000003 PHARM REV CODE 250: Mod: HCNC

## 2024-05-18 PROCEDURE — C9113 INJ PANTOPRAZOLE SODIUM, VIA: HCPCS | Mod: HCNC | Performed by: INTERNAL MEDICINE

## 2024-05-18 PROCEDURE — 97162 PT EVAL MOD COMPLEX 30 MIN: CPT | Mod: HCNC

## 2024-05-18 PROCEDURE — 85025 COMPLETE CBC W/AUTO DIFF WBC: CPT | Mod: HCNC | Performed by: STUDENT IN AN ORGANIZED HEALTH CARE EDUCATION/TRAINING PROGRAM

## 2024-05-18 PROCEDURE — 80048 BASIC METABOLIC PNL TOTAL CA: CPT | Mod: HCNC | Performed by: STUDENT IN AN ORGANIZED HEALTH CARE EDUCATION/TRAINING PROGRAM

## 2024-05-18 PROCEDURE — 97535 SELF CARE MNGMENT TRAINING: CPT | Mod: HCNC

## 2024-05-18 RX ADMIN — COLCHICINE 0.6 MG: 0.6 TABLET, FILM COATED ORAL at 09:05

## 2024-05-18 RX ADMIN — PANTOPRAZOLE SODIUM 40 MG: 40 INJECTION, POWDER, FOR SOLUTION INTRAVENOUS at 09:05

## 2024-05-18 RX ADMIN — FERROUS SULFATE TAB EC 325 MG (65 MG FE EQUIVALENT) 1 EACH: 325 (65 FE) TABLET DELAYED RESPONSE at 09:05

## 2024-05-18 RX ADMIN — CARVEDILOL 3.12 MG: 3.12 TABLET, FILM COATED ORAL at 09:05

## 2024-05-18 RX ADMIN — SENNOSIDES AND DOCUSATE SODIUM 2 TABLET: 50; 8.6 TABLET ORAL at 09:05

## 2024-05-18 RX ADMIN — DICLOFENAC SODIUM 2 G: 10 GEL TOPICAL at 09:05

## 2024-05-18 RX ADMIN — POLYETHYLENE GLYCOL 3350 17 G: 17 POWDER, FOR SOLUTION ORAL at 09:05

## 2024-05-18 RX ADMIN — PANTOPRAZOLE SODIUM 40 MG: 40 INJECTION, POWDER, FOR SOLUTION INTRAVENOUS at 12:05

## 2024-05-18 RX ADMIN — APIXABAN 2.5 MG: 2.5 TABLET, FILM COATED ORAL at 09:05

## 2024-05-18 RX ADMIN — ALLOPURINOL 50 MG: 300 TABLET ORAL at 09:05

## 2024-05-18 RX ADMIN — ACETAMINOPHEN 650 MG: 325 TABLET ORAL at 02:05

## 2024-05-18 RX ADMIN — Medication: at 09:05

## 2024-05-18 RX ADMIN — LISINOPRIL 5 MG: 5 TABLET ORAL at 09:05

## 2024-05-18 RX ADMIN — ACETAMINOPHEN 650 MG: 325 TABLET ORAL at 09:05

## 2024-05-18 RX ADMIN — THERA TABS 1 TABLET: TAB at 09:05

## 2024-05-18 RX ADMIN — Medication 6 MG: at 09:05

## 2024-05-18 RX ADMIN — ASPIRIN 81 MG: 81 TABLET, COATED ORAL at 09:05

## 2024-05-18 NOTE — PT/OT/SLP EVAL
Physical Therapy Evaluation    Patient Name:  Deena Moody   MRN:  [id number]    Recommendations:     Discharge Recommendations: Moderate Intensity Therapy   Discharge Equipment Recommendations: hospital bed, lift device   Barriers to discharge: Inaccessible home, Decreased caregiver support, and patient below functional baseline    Patient requires a hospital bed for positioning of the body in ways that are not feasible with an ordinary bed. The patient requires special positioning for pain relief, limited mobility, and/or being unable to independently make changes in body position without the use of a hospital bed. Pillows and wedges will not be adequate for resolving these positional issues.     Assessment:     Deena Moody is a 89 y.o. male admitted with a medical diagnosis of Arm swelling.  He presents with the following impairments/functional limitations: weakness, impaired balance, impaired endurance, impaired self care skills, impaired functional mobility, gait instability, decreased lower extremity function, decreased upper extremity function, impaired cognition, pain, decreased safety awareness, decreased ROM, impaired joint extensibility. The patient's mobility is limited due to L shoulder pain, confusion and disorientation, [place] limiting his ability to actively participate in therapy session. Per family, he presents from [facility], prior to last hospital admission on 5/3/24, patient was independent with gait. The patient required maximum assistance for supine to sit, required moderate assistance for static sitting balance due to posterior lean. Patient resistant to attempt stand due to pain and confusion. Patient currently demonstrates a need for moderate intensity therapy on a daily basis post acute secondary to a decline in functional status due to illness     Rehab Prognosis: Fair; patient would benefit from acute skilled PT services to address these deficits and reach maximum level of  "function.    Recent Surgery: * No surgery found *      Plan:     During this hospitalization, patient to be seen 3 x/week to address the identified rehab impairments via gait training, therapeutic activities, therapeutic exercises, neuromuscular re-education and progress toward the following goals:    Plan of Care Expires:  06/17/24    Subjective     Chief Complaint: "I hurt all over"  Patient/Family Comments/goals: unable to state, per daughter in law "He needs to go to a SNF to get stronger"  Pain/Comfort:  Pain Rating 1: 10/10   Location - Side 1: Left  Location - Orientation 1: generalized  Pain Addressed 1: Distraction, Reposition, Cessation of Activity, Nurse notified  Pain Rating Post-Intervention 1: 10/10    Patients cultural, spiritual, Tenriism conflicts given the current situation: no     Living Environment:  Per family, patient lives with his granddaughter (who works 12 hr shifts outside of the house) in a [facility].   Prior to admission, patients level of function was independent to modified independent with use of RW prior to last admission 5/3/24.  Equipment used at home: walker, rolling, wheelchair .  DME owned (not currently used): none.  Upon discharge, patient will have assistance from family.    Objective:     Communicated with RN prior to session.  Patient found HOB elevated with Condom Catheter, telemetry, peripheral IV upon PT entry to room. Patient's son and daughter-in-law at bedside.     General Precautions: Standard, fall   Orthopedic Precautions:N/A   Braces: N/ARespiratory Status: Room air    Exams:    Cognitive Exam  Patient is A&O xself and follows occasional  one -step commands    Fine Motor Coordination   -       Impaired     Postural Exam Patient presented with the following abnormalities:    -       Rounded shoulders  -       Forward head  -       Kyphosis  -       Posterior pelvic tilt  -       Weight shift posterior   Sensation    -       Light touch intact OCTAVIO " LE   Skin Integrity/Edema     -       Skin integrity: scabs to OCTAVIO LE  -       Edema: L UE edema   R LE ROM WNL PROM    R LE Strength 3/5 hip flexion, knee ext/flex, and ankle DF/PF; limited by cognition   L LE ROM WNL PROM    L LE Strength  3/5 hip flexion, knee ext/flex, and ankle DF/PF, limited by cognition       Balance   Static Sitting moderate assistance posterior lean    Dynamic Sitting moderate assistance to maximum assistance    Static Standing NA   Dynamic Standing       NA        Functional Mobility:    Bed Mobility  Rolling to R: maximum assistance   Supine to Sit on the R  side:  total assistance   Sit to supine: total assistance   Scoot to HOB in supine: total assistance x2 drawsheet  Scoot to EOB in sitting: maximum assistance   Lateral scoot to R in sitting EOB: total assistance    Transfers Sit to Stand:  NA, patient resistant to attempt due to pain and anxiety          AM-PAC 6 CLICK MOBILITY  Total Score:8       Treatment & Education:  Patient and family educated on:  -role of therapy  -goals of session  -PT POC  -benefits of out of bed mobility and consequences of immobility  -calling for staff assist to mobilize safely  -Educated on therapy recommendation process for DC planning, PT does not have control over which facility is covered by insurance and has bed availability; they verbalize understanding but continue to repeat questions regarding DC process and PT role  Patient agreeable to mobilize with therapy.      Patient encouraged to ambulate, sit up in chair 3x/day to prevent deconditioning during hospitalization. Patient verbalized understanding and agreement to mobilize only with RN assist for safety.     Patient would benefit from Q2H turns.     Pt soiled with BM at end of session and condom catheter had fallen off, attempted to call PCT but no answer. RN notified.     Patient left HOB elevated with all lines intact, call button in reach, bed alarm on, RN notified, and family  present.    GOALS:   Multidisciplinary Problems       Physical Therapy Goals          Problem: Physical Therapy    Goal Priority Disciplines Outcome Goal Variances Interventions   Physical Therapy Goal     PT, PT/OT Progressing     Description: Goals to be met by:      Patient will increase functional independence with mobility by performin. Supine to sit with Moderate Assistance  2. Sit to supine with Moderate Assistance  3 Sit to stand transfer with Maximum Assistance  4. Bed to chair transfer with Maximum Assistance using LRAD  5. Gait  x 10 feet with Moderate Assistance using LRAD.   6. Sitting at edge of bed x5 minutes with contact guard assist                          History:     Past Medical History:   Diagnosis Date    Acute on chronic diastolic heart failure 2016    Coronary artery disease     Hyperlipidemia     Hypertension     Macular degeneration (senile) of retina, unspecified 2014    Nuclear sclerosis 2014    Persistent atrial fibrillation 11/10/2016    S/P placement of cardiac pacemaker 2016       Past Surgical History:   Procedure Laterality Date    AORTIC VALVE REPLACEMENT N/A     CARDIAC PACEMAKER PLACEMENT      CATARACT EXTRACTION W/  INTRAOCULAR LENS IMPLANT Right 2016    Dr. Whitley    CATARACT EXTRACTION W/  INTRAOCULAR LENS IMPLANT Left 3/1/2016    Dr. Whitley    CORONARY ARTERY BYPASS GRAFT      EAR EXAMINATION UNDER ANESTHESIA      EYE SURGERY         Time Tracking:     PT Received On: 24  PT Start Time: 845     PT Stop Time: 0907  PT Total Time (min): 22 min     Billable Minutes: Evaluation 11 and Therapeutic Activity 11      2024

## 2024-05-18 NOTE — ASSESSMENT & PLAN NOTE
GI consulted - observational management  Follow H/H - now stable  Constipation - aggressive bowel regimen  Bleeding felt to be from sterocoral ulcer - stools no longer bloody  Maalox for the abdominal pain  Stop opioid

## 2024-05-18 NOTE — PROGRESS NOTES
[facility] Medicine  Progress note    Team: [facility] W Toma Porter MD  Admit Date: 5/11/2024  Code status: Full Code    Principal Problem:  Arm swelling    Interval hx:  No new complaints    PEx  Temp:  [97.9 °F (36.6 °C)-98.7 °F (37.1 °C)]   Pulse:  [67-70]   Resp:  [17-18]   BP: ()/(44-73)   SpO2:  [97 %-99 %]     Intake/Output Summary (Last 24 hours) at 5/18/2024 0521  Last data filed at 5/17/2024 2333  Gross per 24 hour   Intake 120 ml   Output 600 ml   Net -480 ml         General Appearance: no acute distress, WD, elderly, ill appearing  Heart: regular rate and rhythm, no heave, no JVD, no BLE edema  Ext: LUE swelling at elbow area; less painful passive range of motion  Respiratory: Normal respiratory effort, symmetric excursion, bilateral vesicular breath sounds   Abdomen: Soft, non-tender; bowel sounds active  Skin: intact, no rash, no ulcers  Neurologic:  No focal numbness or weakness  Mental status: Alert, oriented x 4, affect appropriate    Recent Labs   Lab 05/17/24  0523 05/17/24  1059 05/17/24 2025   WBC 18.35*  18.88* 18.57* 17.36*   HGB 9.4*  9.4* 8.8* 9.6*   HCT 30.5*  30.6* 28.8* 30.3*     199 209 205     Recent Labs   Lab 05/12/24  0541 05/13/24  0439 05/14/24  0349 05/15/24  0606 05/16/24  0255 05/17/24  0523 05/17/24  0524    141 142 142 143  --  142   K 3.7 4.6 4.8 4.7 5.0  --  4.8    104 105 106 105  --  106   CO2 25 22* 22* 25 26  --  24   BUN 72* 82* 96* 90* 87*  --  87*   CREATININE 1.4 1.6* 1.6* 1.5* 1.6*  --  1.5*   GLU 73 105 136* 89 115*  --  104   CALCIUM 7.8* 8.5* 8.3* 8.4* 8.4*  --  8.1*   MG 2.0 2.1 2.4  --   --  2.5  --    PHOS 3.6  --   --   --   --   --   --      Recent Labs   Lab 05/12/24  0541   ALKPHOS 97   ALT 17   AST 28   ALBUMIN 2.1*   PROT 5.6*   BILITOT 1.0        Recent Labs   Lab 05/11/24  0935 05/11/24  1635   POCTGLUCOSE 97 92       Scheduled Meds:   acetaminophen  650 mg Oral TID    allopurinoL  50 mg Oral Daily    aspirin  81 mg  "Oral Daily    balsam peru-castor oiL   Topical (Top) BID    carvediloL  3.125 mg Oral BID    colchicine  0.6 mg Oral Every other day    ferrous sulfate  1 tablet Oral Daily    lisinopriL  5 mg Oral Daily    multivitamin  1 tablet Oral Daily    pantoprazole  40 mg Intravenous BID    polyethylene glycol  17 g Oral BID    senna-docusate 8.6-50 mg  2 tablet Oral BID     Continuous Infusions:  As Needed:    Current Facility-Administered Medications:     acetaminophen, 650 mg, Oral, Q6H PRN    albuterol-ipratropium, 3 mL, Nebulization, Q4H PRN    aluminum-magnesium hydroxide-simethicone, 30 mL, Oral, QID PRN    dextrose 10%, 12.5 g, Intravenous, PRN    dextrose 10%, 25 g, Intravenous, PRN    diclofenac sodium, 2 g, Topical (Top), Daily PRN    glucagon (human recombinant), 1 mg, Intramuscular, PRN    glucose, 16 g, Oral, PRN    glucose, 24 g, Oral, PRN    HYDROcodone-acetaminophen, 1 tablet, Oral, Q6H PRN    hydrOXYzine HCL, 25 mg, Oral, TID PRN    melatonin, 6 mg, Oral, Nightly PRN    morphine, 1 mg, Intravenous, Q4H PRN    naloxone, 0.02 mg, Intravenous, PRN    ondansetron, 8 mg, Oral, Q8H PRN    prochlorperazine, 5 mg, Intravenous, Q6H PRN    sodium chloride 0.9%, 10 mL, Intravenous, PRN    sodium chloride 0.9%, 10 mL, Intravenous, Q8H PRN    Assessment and Plan  / Problems managed today    * Arm swelling  Hx gout   Recent admission for the same, d/c 5/8. Treated initially for osteomyelitis with BS IV abx, then determined to be gout (joint asp wrist on 5/3 without evidence of infection) and managed with IV/ PO steroids. Evals by ID, ortho, rheum during last admission. Returns from NH  on 5/11 with edema from hand to elbow, left. Painful ROM LUE including hand/ wrist/ elbow/ shoulder. Repeat Left UE US negative for DVT. Imaging reviewed - XR L hand significant for "redemonstration of erosive change involving the head of the 2nd middle phalanx, previously demonstrated on hand CT and radiograph referenced above.  Findings " "could reflect underlying osteomyelitis or other inflammatory arthritidis in appears similar to prior exams." MRI not able to be obtained d/t incompatibility with pacer. Arm does not appear erythematous and so clinically this is not cellulitis. Uric acid level elevated on admit. ID ruled infection. Rheumatology consulted. Patient started on prednisone, colchicine 0.6 mg every other day, and allopurinol 50 mg daily. Started on acetaminophen 650 mg TID. Improved overall. Swelling improving but noticeably improved when briefly restarted on torsemide    Acute gout  - Appreciate Rheumatology consult  - Recent admit for the same, treated with steroids with ongoing pain/discomfort.  - detailed to family that gout will be further managed as outpatient  - prednisone burst and cholchicine 0.6 mg every other day  - allopurinol 50 mg daily. Increase slowly.   - pain much resolved at this time  - scheduled acetaminophen 650 mg TID    Hematochezia  GI consulted - observational management  Follow H/H - now stable    CKD (chronic kidney disease), stage IV  Creatine stable for now. BMP reviewed- noted Estimated Creatinine Clearance: 24.6 mL/min (based on SCr of 1.4 mg/dL). according to latest data. 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  Monitor UOP and serial BMP and adjust therapy as needed. Renally dose meds. Avoid nephrotoxic medications and procedures.  - at time of admission, Cr 1.5 - improved from baseline (~1.6-2.3)    Permanent atrial fibrillation  Patient with Permanent atrial fibrillation which is controlled currently with Calcium Channel Blocker. Patient is currently in atrial fibrillation.[id number] Score: 3. Anticoagulation indicated. Anticoagulation done with eliquis 2.5 BID .    Chronic diastolic heart failure  - controlled  - BNP in 500s, lowest it has been in record  - torsemide held since 5/1/2024  - consider holding as patient had 13 kg weight loss since admission for heart failure 6/2023    CAD " (coronary artery disease)  Patient with known CAD s/p stent placement and CABG, which is controlled Will continue ASA and monitor for S/Sx of angina/ACS.     Essential hypertension  Chronic, controlled. Latest blood pressure and vitals reviewed-     Temp:  [97.9 °F (36.6 °C)-98.7 °F (37.1 °C)]   Pulse:  [67-70]   Resp:  [17-18]   BP: ()/(44-73)   SpO2:  [97 %-99 %] .     Labile. Has higher BP goal due to age and co-morbidities    Stop amlodpine  Continue carvedilol 2.125 mg BID and lisinopril 5 mg daily    Diet:  regular diet  GI PPx: not needed  DVT PPx:  apixaban  Airways: room air  Wounds: none    Goals of Care:  Return to prior functional status     Discharge Planning   TEOFILO: 5/20/2024   Is the patient medically ready for discharge?:     Reason for patient still in hospital (select all that apply): Patient trending condition and Treatment  Discharge Plan A: Skilled Nursing Facility   Discharge Delays: (!) Post-Acute Set-up    Toma Porter MD

## 2024-05-18 NOTE — PROGRESS NOTES
[facility] Medicine  Progress note    Team: [facility] W Toma Porter MD  Admit Date: 5/11/2024  Code status: Full Code    Principal Problem:  Arm swelling    Interval hx:  No new complaints. Had multiple BM last night    PEx  Temp:  [97.4 °F (36.3 °C)-98.6 °F (37 °C)]   Pulse:  [67-70]   Resp:  [17-18]   BP: ()/(44-78)   SpO2:  [97 %-100 %]     Intake/Output Summary (Last 24 hours) at 5/18/2024 1423  Last data filed at 5/18/2024 0800  Gross per 24 hour   Intake 240 ml   Output 600 ml   Net -360 ml         General Appearance: no acute distress, WD, elderly, ill appearing  Heart: regular rate and rhythm, no heave, no JVD, no BLE edema  Ext: LUE swelling at elbow area; continued but mildly painful passive range of motion  Respiratory: Normal respiratory effort, symmetric excursion, bilateral vesicular breath sounds   Abdomen: Soft, non-tender; bowel sounds active  Skin: intact, no rash, no ulcers  Neurologic:  No focal numbness or weakness  Mental status: Alert, oriented x 4, affect appropriate    Recent Labs   Lab 05/17/24  1059 05/17/24 2025 05/18/24  0536   WBC 18.57* 17.36* 14.94*   HGB 8.8* 9.6* 9.3*   HCT 28.8* 30.3* 30.9*    205 182     Recent Labs   Lab 05/12/24  0541 05/13/24  0439 05/14/24  0349 05/15/24  0606 05/16/24  0255 05/17/24  0523 05/17/24  0524 05/18/24  0536    141 142   < > 143  --  142 143   K 3.7 4.6 4.8   < > 5.0  --  4.8 5.0    104 105   < > 105  --  106 110   CO2 25 22* 22*   < > 26  --  24 25   BUN 72* 82* 96*   < > 87*  --  87* 80*   CREATININE 1.4 1.6* 1.6*   < > 1.6*  --  1.5* 1.5*   GLU 73 105 136*   < > 115*  --  104 91   CALCIUM 7.8* 8.5* 8.3*   < > 8.4*  --  8.1* 7.8*   MG 2.0 2.1 2.4  --   --  2.5  --   --    PHOS 3.6  --   --   --   --   --   --   --     < > = values in this interval not displayed.     Recent Labs   Lab 05/12/24  0541   ALKPHOS 97   ALT 17   AST 28   ALBUMIN 2.1*   PROT 5.6*   BILITOT 1.0        Recent Labs   Lab 05/11/24  1635  "  POCTGLUCOSE 92       Scheduled Meds:   acetaminophen  650 mg Oral TID    allopurinoL  50 mg Oral Daily    aspirin  81 mg Oral Daily    balsam peru-castor oiL   Topical (Top) BID    carvediloL  3.125 mg Oral BID    colchicine  0.6 mg Oral Every other day    ferrous sulfate  1 tablet Oral Daily    lisinopriL  5 mg Oral Daily    multivitamin  1 tablet Oral Daily    pantoprazole  40 mg Intravenous BID    polyethylene glycol  17 g Oral BID    senna-docusate 8.6-50 mg  2 tablet Oral BID     Continuous Infusions:  As Needed:    Current Facility-Administered Medications:     acetaminophen, 650 mg, Oral, Q6H PRN    albuterol-ipratropium, 3 mL, Nebulization, Q4H PRN    aluminum-magnesium hydroxide-simethicone, 30 mL, Oral, QID PRN    dextrose 10%, 12.5 g, Intravenous, PRN    dextrose 10%, 25 g, Intravenous, PRN    diclofenac sodium, 2 g, Topical (Top), Daily PRN    glucagon (human recombinant), 1 mg, Intramuscular, PRN    glucose, 16 g, Oral, PRN    glucose, 24 g, Oral, PRN    HYDROcodone-acetaminophen, 1 tablet, Oral, Q6H PRN    hydrOXYzine HCL, 25 mg, Oral, TID PRN    melatonin, 6 mg, Oral, Nightly PRN    morphine, 1 mg, Intravenous, Q4H PRN    naloxone, 0.02 mg, Intravenous, PRN    ondansetron, 8 mg, Oral, Q8H PRN    prochlorperazine, 5 mg, Intravenous, Q6H PRN    sodium chloride 0.9%, 10 mL, Intravenous, PRN    sodium chloride 0.9%, 10 mL, Intravenous, Q8H PRN    Assessment and Plan  / Problems managed today    * Arm swelling  Hx gout   Recent admission for the same, d/c 5/8. Treated initially for osteomyelitis with BS IV abx, then determined to be gout (joint asp wrist on 5/3 without evidence of infection) and managed with IV/ PO steroids. Evals by ID, ortho, rheum during last admission. Returns from NH  on 5/11 with edema from hand to elbow, left. Painful ROM LUE including hand/ wrist/ elbow/ shoulder. Repeat Left UE US negative for DVT. Imaging reviewed - XR L hand significant for "redemonstration of erosive change " "involving the head of the 2nd middle phalanx, previously demonstrated on hand CT and radiograph referenced above.  Findings could reflect underlying osteomyelitis or other inflammatory arthritidis in appears similar to prior exams." MRI not able to be obtained d/t incompatibility with pacer. Arm does not appear erythematous and so clinically this is not cellulitis. Uric acid level elevated on admit. ID ruled infection. Rheumatology consulted. Patient started on prednisone, colchicine 0.6 mg every other day, and allopurinol 50 mg daily. Started on acetaminophen 650 mg TID. Improved overall. Swelling improving but noticeably improved when briefly restarted on torsemide    Acute gout  - Appreciate Rheumatology consult  - Recent admit for the same, treated with steroids with ongoing pain/discomfort.  - detailed to family that gout will be further managed as outpatient  - prednisone burst and cholchicine 0.6 mg every other day  - allopurinol 50 mg daily. Increase slowly.   - pain much resolved at this time  - scheduled acetaminophen 650 mg TID    Severe malnutrition  Severe fat and muscle wasting in setting of pressure wounds  Regular diet with boost plus     Hematochezia  GI consulted - observational management  Follow H/H - now stable  Constipation - aggressive bowel regimen  No longer bloody    CKD (chronic kidney disease), stage IV  Creatine stable for now. BMP reviewed- noted Estimated Creatinine Clearance: 24.6 mL/min (based on SCr of 1.4 mg/dL). according to latest data. 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  Monitor UOP and serial BMP and adjust therapy as needed. Renally dose meds. Avoid nephrotoxic medications and procedures.  - at time of admission, Cr 1.5 - improved from baseline (~1.6-2.3)    Permanent atrial fibrillation  Patient with Permanent atrial fibrillation which is controlled currently with Calcium Channel Blocker. Patient is currently in atrial fibrillation.[id number] Score: 3. " Anticoagulation indicated. Anticoagulation done with eliquis 2.5 BID .    Chronic diastolic heart failure  - controlled  - BNP in 500s, lowest it has been in record  - torsemide held since 5/1/2024  - consider holding as patient had 13 kg weight loss since admission for heart failure 6/2023    CAD (coronary artery disease)  Patient with known CAD s/p stent placement and CABG, which is controlled Will continue ASA and monitor for S/Sx of angina/ACS.     Essential hypertension  Chronic, controlled. Latest blood pressure and vitals reviewed-     Temp:  [97.9 °F (36.6 °C)-98.7 °F (37.1 °C)]   Pulse:  [67-70]   Resp:  [17-18]   BP: ()/(44-73)   SpO2:  [97 %-99 %] .     Labile. Has higher BP goal due to age and co-morbidities    Stop amlodpine  Continue carvedilol 2.125 mg BID and lisinopril 5 mg daily    Diet:  regular diet  GI PPx: not needed  DVT PPx:  apixaban  Airways: room air  Wounds: none    Goals of Care:  Return to prior functional status     Discharge Planning   TEOFILO: 5/20/2024   Is the patient medically ready for discharge?:     Reason for patient still in hospital (select all that apply): Patient trending condition and Treatment  Discharge Plan A: Skilled Nursing Facility   Discharge Delays: (!) Post-Acute Set-up    Toma Porter MD

## 2024-05-18 NOTE — PLAN OF CARE
Problem: Infection  Goal: Absence of Infection Signs and Symptoms  Outcome: Progressing     Problem: Adult Inpatient Plan of Care  Goal: Plan of Care Review  Outcome: Progressing  Goal: Patient-Specific Goal (Individualized)  Outcome: Progressing  Goal: Absence of Hospital-Acquired Illness or Injury  Outcome: Progressing  Goal: Optimal Comfort and Wellbeing  Outcome: Progressing  Goal: Readiness for Transition of Care  Outcome: Progressing     Problem: Acute Kidney Injury/Impairment  Goal: Fluid and Electrolyte Balance  Outcome: Progressing  Goal: Improved Oral Intake  Outcome: Progressing  Goal: Effective Renal Function  Outcome: Progressing     Problem: Wound  Goal: Optimal Coping  Outcome: Progressing  Goal: Optimal Functional Ability  Outcome: Progressing  Goal: Absence of Infection Signs and Symptoms  Outcome: Progressing  Goal: Improved Oral Intake  Outcome: Progressing  Goal: Optimal Pain Control and Function  Outcome: Progressing  Goal: Skin Health and Integrity  Outcome: Progressing  Goal: Optimal Wound Healing  Outcome: Progressing     Problem: Skin Injury Risk Increased  Goal: Skin Health and Integrity  Outcome: Progressing     Problem: Fall Injury Risk  Goal: Absence of Fall and Fall-Related Injury  Outcome: Progressing     Problem: Pain Acute  Goal: Optimal Pain Control and Function  Outcome: Progressing   Pt had an uneventful night. VSS. Pt is Aaox1 to self only and confused. Pt up most of shift. Pt had one episode of dark black stools. No red blood noted.  Pt is free of falls and injuries Bed in lowest position and call light within reach Safety maintained

## 2024-05-18 NOTE — PLAN OF CARE
Problem: Physical Therapy  Goal: Physical Therapy Goal  Description: Goals to be met by:      Patient will increase functional independence with mobility by performin. Supine to sit with Moderate Assistance  2. Sit to supine with Moderate Assistance  3 Sit to stand transfer with Maximum Assistance  4. Bed to chair transfer with Maximum Assistance using LRAD  5. Gait  x 10 feet with Moderate Assistance using LRAD.   6. Sitting at edge of bed x5 minutes with contact guard assist     Outcome: Progressing   Evaluation completed, initiated plan of care.   Danae Araon, PT  2024

## 2024-05-18 NOTE — PLAN OF CARE
Problem: Occupational Therapy  Goal: Occupational Therapy Goal  Description: Goals to be met by: 6/18/24     Patient will increase functional independence with ADLs by performing:    LE Dressing with Minimal Assistance.  Grooming while seated with Contact Guard Assistance.  Toileting from bedside commode with Minimal Assistance for hygiene and clothing management.   Sitting at edge of bed x5 minutes with Contact Guard Assistance.  Supine to sit with Moderate Assistance.  Sit to stnad transfers with Moderate Assistance.    Outcome: Progressing     Patient tolerated OT eval. Goals and POC established. See note for further details.

## 2024-05-18 NOTE — PT/OT/SLP EVAL
Occupational Therapy   Evaluation & Treatment    Name: Deena Moody  MRN: [id number]  Admitting Diagnosis: Arm swelling  Recent Surgery: * No surgery found *      Recommendations:     Discharge Recommendations: Moderate Intensity Therapy  Discharge Equipment Recommendations:  hospital bed, lift device  Patient requires a hospital bed for positioning of the body in ways that are not feasible with an ordinary bed. The patient requires special positioning for pain relief, limited mobility, and/or being unable to independently make changes in body position without the use of a hospital bed. Pillows and wedges will not be adequate for resolving these positional issues.    Barriers to discharge:  None    Assessment:     Deena Moody is a 89 y.o. male with a medical diagnosis of Arm swelling.  He presents with impaired command following during session with eyes remaining closed for ~99% of session. Patient and family agreeable to sit EOB but then once sitting, patient with posterior lean and reporting pain, requesting to return to supine. Performance deficits affecting function: weakness, impaired endurance, impaired self care skills, impaired functional mobility, gait instability, decreased lower extremity function, decreased upper extremity function, impaired cognition, decreased safety awareness, impaired balance, pain, impaired skin. Patient significantly below PLOF. Patient currently demonstrates a need for moderate intensity therapy on a daily basis post acute secondary to a decline in functional status due to illness      Rehab Prognosis: Good; patient would benefit from acute skilled OT services to address these deficits and reach maximum level of function.       Plan:     Patient to be seen 3 x/week to address the above listed problems via self-care/home management, therapeutic activities, therapeutic exercises, neuromuscular re-education  Plan of Care Expires: 06/01/24  Plan of Care Reviewed with: patient,  family    Subjective     Chief Complaint: pain, unable to locate or quantify   Patient/Family Comments/goals: family wants patient to return to PLOF    Occupational Profile:  Living Environment: lives with their daughter in a single story home with no steps to enter. Bathroom set up: walk in shower with  no AD  Previous level of function: patient previously at SNF but family reports unknown if received therapy evaluation. Prior to hospitalization earlier this month, patient independent with ADL and functional mobility with RW   Roles and Routines: unknown  Equipment Used at Home: walker, rolling, wheelchair  Assistance upon Discharge: family, unable to provide 24/7    Pain/Comfort:  Pain Rating 1: 0/10  Pain Rating Post-Intervention 1: 0/10    Patients cultural, spiritual, Zoroastrian conflicts given the current situation: no    Objective:     Communicated with: nursing prior to session.  Patient found HOB elevated with telemetry, Condom Catheter upon OT entry to room. Patient's son and DIL present in room during session.       General Precautions: Standard, fall  Orthopedic Precautions: N/A  Braces: N/A  Respiratory Status: Room air    Occupational Performance:    Bed Mobility:    Patient completed Rolling/Turning to Left with  total assistance  Patient completed Rolling/Turning to Right with total assistance  Patient completed Supine to Sit with maximal assistance  Patient completed Sit to Supine with total assistance  Patient sat EOB for ~ 3 minutes with Maximum Assistance  Patient with posterior lean    Functional Mobility/Transfers: unable to attempt    Activities of Daily Living:  Grooming: minimum assistance to wash face at bed level; increased time and cueing required  Toileting: total assistance to clean; found soiled with bowel movement    Cognitive/Visual Perceptual:  Cognitive/Psychosocial Skills:     -       Oriented to: Person   -       Follows Commands/attention:Inattentive  -       Communication:  clear/fluent  -       Memory: Impaired STM  -       Safety awareness/insight to disability: impaired   -       Mood/Affect/Coping skills/emotional control: Agitated    Physical Exam: unable to perform formal ROM/MMT testing secondary to poor command following and then patient falling asleep at end of session    AMPA 6 Click ADL:  AMPAC Total Score: 8    Treatment & Education:  Family with lengthy discussion on current level of care required. Several questions within OT scope answered or directed to other healthcare members appropriately     Patient educated on:   -purpose of OT and OT POC  -facilitation and education on proper body mechanics, energy conservation, and safety  -importance of early mobility and out of bed activities with staff assist  -overall benefits of therapy     All questions answered within OT scope and to patient's satisfaction      Patient left HOB elevated with all lines intact, call button in reach, and nursing notified    GOALS:   Multidisciplinary Problems       Occupational Therapy Goals          Problem: Occupational Therapy    Goal Priority Disciplines Outcome Interventions   Occupational Therapy Goal     OT, PT/OT Progressing    Description: Goals to be met by: 6/18/24     Patient will increase functional independence with ADLs by performing:    LE Dressing with Minimal Assistance.  Grooming while seated with Contact Guard Assistance.  Toileting from bedside commode with Minimal Assistance for hygiene and clothing management.   Sitting at edge of bed x5 minutes with Contact Guard Assistance.  Supine to sit with Moderate Assistance.  Sit to stnad transfers with Moderate Assistance.                         History:     Past Medical History:   Diagnosis Date    Acute on chronic diastolic heart failure 9/1/2016    Coronary artery disease     Hyperlipidemia     Hypertension     Macular degeneration (senile) of retina, unspecified 12/12/2014    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    Persistent  atrial fibrillation 11/10/2016    S/P placement of cardiac pacemaker 9/14/2016         Past Surgical History:   Procedure Laterality Date    AORTIC VALVE REPLACEMENT N/A     CARDIAC PACEMAKER PLACEMENT      CATARACT EXTRACTION W/  INTRAOCULAR LENS IMPLANT Right 2/16/2016    Dr. Whitley    CATARACT EXTRACTION W/  INTRAOCULAR LENS IMPLANT Left 3/1/2016    Dr. Whitley    CORONARY ARTERY BYPASS GRAFT      EAR EXAMINATION UNDER ANESTHESIA      EYE SURGERY         Time Tracking:     OT Date of Treatment: 05/18/24  OT Start Time: 0946  OT Stop Time: 1035  OT Total Time (min): 49 min    Billable Minutes:Evaluation 10  Self Care/Home Management 25  Therapeutic Activity 14    5/18/2024

## 2024-05-19 LAB
ANION GAP SERPL CALC-SCNC: 8 MMOL/L (ref 8–16)
BASOPHILS # BLD AUTO: 0.03 K/UL (ref 0–0.2)
BASOPHILS NFR BLD: 0.3 % (ref 0–1.9)
BUN SERPL-MCNC: 73 MG/DL (ref 10–30)
CALCIUM SERPL-MCNC: 8.1 MG/DL (ref 8.7–10.5)
CHLORIDE SERPL-SCNC: 110 MMOL/L (ref 95–110)
CO2 SERPL-SCNC: 22 MMOL/L (ref 23–29)
CREAT SERPL-MCNC: 1.3 MG/DL (ref 0.5–1.4)
DIFFERENTIAL METHOD BLD: ABNORMAL
EOSINOPHIL # BLD AUTO: 0.3 K/UL (ref 0–0.5)
EOSINOPHIL NFR BLD: 2.5 % (ref 0–8)
ERYTHROCYTE [DISTWIDTH] IN BLOOD BY AUTOMATED COUNT: 15.1 % (ref 11.5–14.5)
EST. GFR  (NO RACE VARIABLE): 51.2 ML/MIN/1.73 M^2
GLUCOSE SERPL-MCNC: 84 MG/DL (ref 70–110)
HCT VFR BLD AUTO: 32.8 % (ref 40–54)
HGB BLD-MCNC: 9.6 G/DL (ref 14–18)
IMM GRANULOCYTES # BLD AUTO: 0.09 K/UL (ref 0–0.04)
IMM GRANULOCYTES NFR BLD AUTO: 0.8 % (ref 0–0.5)
LYMPHOCYTES # BLD AUTO: 1.3 K/UL (ref 1–4.8)
LYMPHOCYTES NFR BLD: 11.1 % (ref 18–48)
MCH RBC QN AUTO: 30.7 PG (ref 27–31)
MCHC RBC AUTO-ENTMCNC: 29.3 G/DL (ref 32–36)
MCV RBC AUTO: 105 FL (ref 82–98)
MONOCYTES # BLD AUTO: 0.6 K/UL (ref 0.3–1)
MONOCYTES NFR BLD: 4.8 % (ref 4–15)
NEUTROPHILS # BLD AUTO: 9.5 K/UL (ref 1.8–7.7)
NEUTROPHILS NFR BLD: 80.5 % (ref 38–73)
NRBC BLD-RTO: 0 /100 WBC
PLATELET # BLD AUTO: 162 K/UL (ref 150–450)
PMV BLD AUTO: 9.5 FL (ref 9.2–12.9)
POTASSIUM SERPL-SCNC: 5 MMOL/L (ref 3.5–5.1)
RBC # BLD AUTO: 3.13 M/UL (ref 4.6–6.2)
SODIUM SERPL-SCNC: 140 MMOL/L (ref 136–145)
WBC # BLD AUTO: 11.81 K/UL (ref 3.9–12.7)

## 2024-05-19 PROCEDURE — 63600175 PHARM REV CODE 636 W HCPCS: Mod: HCNC | Performed by: STUDENT IN AN ORGANIZED HEALTH CARE EDUCATION/TRAINING PROGRAM

## 2024-05-19 PROCEDURE — 25000003 PHARM REV CODE 250: Mod: HCNC | Performed by: EMERGENCY MEDICINE

## 2024-05-19 PROCEDURE — 25000003 PHARM REV CODE 250: Mod: HCNC | Performed by: INTERNAL MEDICINE

## 2024-05-19 PROCEDURE — 25000003 PHARM REV CODE 250: Mod: HCNC | Performed by: STUDENT IN AN ORGANIZED HEALTH CARE EDUCATION/TRAINING PROGRAM

## 2024-05-19 PROCEDURE — 63600175 PHARM REV CODE 636 W HCPCS: Mod: HCNC | Performed by: INTERNAL MEDICINE

## 2024-05-19 PROCEDURE — 85025 COMPLETE CBC W/AUTO DIFF WBC: CPT | Mod: HCNC | Performed by: STUDENT IN AN ORGANIZED HEALTH CARE EDUCATION/TRAINING PROGRAM

## 2024-05-19 PROCEDURE — 21400001 HC TELEMETRY ROOM: Mod: HCNC

## 2024-05-19 PROCEDURE — 25000003 PHARM REV CODE 250: Mod: HCNC

## 2024-05-19 PROCEDURE — 36415 COLL VENOUS BLD VENIPUNCTURE: CPT | Mod: HCNC | Performed by: STUDENT IN AN ORGANIZED HEALTH CARE EDUCATION/TRAINING PROGRAM

## 2024-05-19 PROCEDURE — C9113 INJ PANTOPRAZOLE SODIUM, VIA: HCPCS | Mod: HCNC | Performed by: INTERNAL MEDICINE

## 2024-05-19 PROCEDURE — 80048 BASIC METABOLIC PNL TOTAL CA: CPT | Mod: HCNC | Performed by: STUDENT IN AN ORGANIZED HEALTH CARE EDUCATION/TRAINING PROGRAM

## 2024-05-19 RX ORDER — ALUMINUM HYDROXIDE, MAGNESIUM HYDROXIDE, AND SIMETHICONE 2400; 240; 2400 MG/30ML; MG/30ML; MG/30ML
30 SUSPENSION ORAL 3 TIMES DAILY
Status: DISPENSED | OUTPATIENT
Start: 2024-05-19 | End: 2024-05-20

## 2024-05-19 RX ORDER — ACETAMINOPHEN 325 MG/1
650 TABLET ORAL EVERY 6 HOURS PRN
Status: DISCONTINUED | OUTPATIENT
Start: 2024-05-19 | End: 2024-05-21 | Stop reason: HOSPADM

## 2024-05-19 RX ORDER — PANTOPRAZOLE SODIUM 40 MG/1
40 TABLET, DELAYED RELEASE ORAL DAILY
Status: DISCONTINUED | OUTPATIENT
Start: 2024-05-20 | End: 2024-05-21 | Stop reason: HOSPADM

## 2024-05-19 RX ADMIN — MORPHINE SULFATE 1 MG: 2 INJECTION, SOLUTION INTRAMUSCULAR; INTRAVENOUS at 06:05

## 2024-05-19 RX ADMIN — THERA TABS 1 TABLET: TAB at 09:05

## 2024-05-19 RX ADMIN — CARVEDILOL 3.12 MG: 3.12 TABLET, FILM COATED ORAL at 09:05

## 2024-05-19 RX ADMIN — FERROUS SULFATE TAB EC 325 MG (65 MG FE EQUIVALENT) 1 EACH: 325 (65 FE) TABLET DELAYED RESPONSE at 09:05

## 2024-05-19 RX ADMIN — APIXABAN 2.5 MG: 2.5 TABLET, FILM COATED ORAL at 09:05

## 2024-05-19 RX ADMIN — MORPHINE SULFATE 1 MG: 2 INJECTION, SOLUTION INTRAMUSCULAR; INTRAVENOUS at 11:05

## 2024-05-19 RX ADMIN — POLYETHYLENE GLYCOL 3350 17 G: 17 POWDER, FOR SOLUTION ORAL at 09:05

## 2024-05-19 RX ADMIN — Medication 6 MG: at 09:05

## 2024-05-19 RX ADMIN — LISINOPRIL 5 MG: 5 TABLET ORAL at 09:05

## 2024-05-19 RX ADMIN — Medication: at 09:05

## 2024-05-19 RX ADMIN — ALLOPURINOL 50 MG: 300 TABLET ORAL at 09:05

## 2024-05-19 RX ADMIN — ASPIRIN 81 MG: 81 TABLET, COATED ORAL at 09:05

## 2024-05-19 RX ADMIN — SENNOSIDES AND DOCUSATE SODIUM 2 TABLET: 50; 8.6 TABLET ORAL at 09:05

## 2024-05-19 RX ADMIN — ACETAMINOPHEN 650 MG: 325 TABLET ORAL at 09:05

## 2024-05-19 RX ADMIN — PANTOPRAZOLE SODIUM 40 MG: 40 INJECTION, POWDER, FOR SOLUTION INTRAVENOUS at 09:05

## 2024-05-19 RX ADMIN — ALUMINUM HYDROXIDE, MAGNESIUM HYDROXIDE, AND DIMETHICONE 30 ML: 400; 400; 40 SUSPENSION ORAL at 03:05

## 2024-05-19 RX ADMIN — HYDROCODONE BITARTRATE AND ACETAMINOPHEN 1 TABLET: 5; 325 TABLET ORAL at 12:05

## 2024-05-19 NOTE — TREATMENT PLAN
GI Treatment Plan    Deena Moody is a 89 y.o. male admitted to hospital 5/11/2024 (Hospital Day: 9) due to Arm swelling.     Interval History  Soft brown bowel movement overnight. No reported black or bloody stools. H&H stable.     Objective  Temp:  [97.4 °F (36.3 °C)-98.7 °F (37.1 °C)] 97.5 °F (36.4 °C) (05/19 0800)  Pulse:  [67-71] 70 (05/19 0800)  BP: (104-156)/(57-79) 121/58 (05/19 0800)  Resp:  [17-18] 18 (05/19 0800)  SpO2:  [95 %-100 %] 96 % (05/19 0800)    Laboratory    Recent Labs   Lab 05/17/24 2025 05/18/24  0536 05/19/24  0431   HGB 9.6* 9.3* 9.6*       Lab Results   Component Value Date    WBC 11.81 05/19/2024    HGB 9.6 (L) 05/19/2024    HCT 32.8 (L) 05/19/2024     (H) 05/19/2024     05/19/2024       Lab Results   Component Value Date     05/19/2024    K 5.0 05/19/2024     05/19/2024    CO2 22 (L) 05/19/2024    BUN 73 (H) 05/19/2024    CREATININE 1.3 05/19/2024    CALCIUM 8.1 (L) 05/19/2024    ANIONGAP 8 05/19/2024    ESTGFRAFRICA 55.5 (A) 05/28/2021    EGFRNONAA 48.0 (A) 05/28/2021       Lab Results   Component Value Date    ALT 17 05/12/2024    AST 28 05/12/2024    ALKPHOS 97 05/12/2024    BILITOT 1.0 05/12/2024       Lab Results   Component Value Date    INR 1.3 (H) 04/28/2024    INR 1.4 (H) 12/06/2022    INR 1.2 03/31/2018     Assessment/Plan:   89 year old male with pmh of HTN, HLD, CAD, Afib on eliquis 2.5mg (last dose this am, 5/17, at 9 am), and dementia who last night began having bloody BMs. He has had 2-3 episodes. Hgb last 24 hours: 10.9->9.4->8.8. He reports some mild abdominal pain and dizziness; overall, history limited by dementia. He has been getting treated for an acute gout flare during this admission and completed a 5 day course of prednisone (5/12-5/16). He is currently taking ASA, colchicine, and iron.    Recommendations:   - Given patient's age, co morbidities, and conversation with him and his son, colonoscopy is not recommended. No further  episodes of bleeding after bowel regimen started and given no significant decline in his H&H, likely stercoral colitis.   - Continue Miralax BID. Can likely discontinue senna. High fiber diet. Titrate bowel regimen soft stools at least once daily.   - Switch IV PPI from BID to PO Protonix 40 mg once daily.   - If he develops overt bleeding, please reach out to the GI team.  - We are signing-off. Please call with any questions.    Thank you for involving us in the care of Deena LARA Mattarnoldo. Please call with any additional questions, concerns or changes in the patient's clinical status.    Masha Bailey MD  Gastroenterology

## 2024-05-19 NOTE — PROGRESS NOTES
"Hospital Medicine  Progress note    Team: [facility] HOSP MED W Toma Porter MD  Admit Date: 5/11/2024  Code status: Full Code    Principal Problem:  Arm swelling    Interval hx:  No new complaints. Complaining of abdominal pain    PEx  Temp:  [97.5 °F (36.4 °C)-98.7 °F (37.1 °C)]   Pulse:  [67-71]   Resp:  [17-18]   BP: (104-156)/(55-79)   SpO2:  [94 %-100 %]     Intake/Output Summary (Last 24 hours) at 5/19/2024 1241  Last data filed at 5/19/2024 1236  Gross per 24 hour   Intake 480 ml   Output 800 ml   Net -320 ml         General Appearance: no acute distress, WD, elderly, ill appearing  Heart: regular rate and rhythm, no heave, no JVD, no BLE edema  Ext: LUE swelling at elbow area; continued but mildly painful passive range of motion  Respiratory: Normal respiratory effort, symmetric excursion, bilateral vesicular breath sounds   Abdomen: bowel sounds active, minimal distension, not ridgid, tender to palpation in all four quadrants  Skin: intact, no rash, no ulcers  Neurologic:  No focal numbness or weakness  Mental status: Alert, oriented x 4, affect appropriate    Recent Labs   Lab 05/17/24 2025 05/18/24  0536 05/19/24  0431   WBC 17.36* 14.94* 11.81   HGB 9.6* 9.3* 9.6*   HCT 30.3* 30.9* 32.8*    182 162     Recent Labs   Lab 05/13/24  0439 05/14/24  0349 05/15/24  0606 05/17/24  0523 05/17/24  0524 05/18/24  0536 05/19/24  0431    142   < >  --  142 143 140   K 4.6 4.8   < >  --  4.8 5.0 5.0    105   < >  --  106 110 110   CO2 22* 22*   < >  --  24 25 22*   BUN 82* 96*   < >  --  87* 80* 73*   CREATININE 1.6* 1.6*   < >  --  1.5* 1.5* 1.3    136*   < >  --  104 91 84   CALCIUM 8.5* 8.3*   < >  --  8.1* 7.8* 8.1*   MG 2.1 2.4  --  2.5  --   --   --     < > = values in this interval not displayed.     No results for input(s): "ALKPHOS", "ALT", "AST", "ALBUMIN", "PROT", "BILITOT", "INR" in the last 168 hours.       No results for input(s): "POCTGLUCOSE" in the last 168 hours.      Scheduled " "Meds:   allopurinoL  50 mg Oral Daily    aluminum & magnesium hydroxide-simethicone  30 mL Oral TID    apixaban  2.5 mg Oral BID    aspirin  81 mg Oral Daily    balsam peru-castor oiL   Topical (Top) BID    carvediloL  3.125 mg Oral BID    colchicine  0.6 mg Oral Every other day    ferrous sulfate  1 tablet Oral Daily    lisinopriL  5 mg Oral Daily    multivitamin  1 tablet Oral Daily    [START ON 5/20/2024] pantoprazole  40 mg Oral Daily    polyethylene glycol  17 g Oral BID    senna-docusate 8.6-50 mg  2 tablet Oral BID     Continuous Infusions:  As Needed:    Current Facility-Administered Medications:     acetaminophen, 650 mg, Oral, Q6H PRN    albuterol-ipratropium, 3 mL, Nebulization, Q4H PRN    aluminum-magnesium hydroxide-simethicone, 30 mL, Oral, QID PRN    dextrose 10%, 12.5 g, Intravenous, PRN    dextrose 10%, 25 g, Intravenous, PRN    diclofenac sodium, 2 g, Topical (Top), Daily PRN    glucagon (human recombinant), 1 mg, Intramuscular, PRN    glucose, 16 g, Oral, PRN    glucose, 24 g, Oral, PRN    hydrOXYzine HCL, 25 mg, Oral, TID PRN    melatonin, 6 mg, Oral, Nightly PRN    morphine, 1 mg, Intravenous, Q4H PRN    naloxone, 0.02 mg, Intravenous, PRN    ondansetron, 8 mg, Oral, Q8H PRN    prochlorperazine, 5 mg, Intravenous, Q6H PRN    sodium chloride 0.9%, 10 mL, Intravenous, PRN    sodium chloride 0.9%, 10 mL, Intravenous, Q8H PRN    Assessment and Plan  / Problems managed today    * Arm swelling  Hx gout   Recent admission for the same, d/c 5/8. Treated initially for osteomyelitis with BS IV abx, then determined to be gout (joint asp wrist on 5/3 without evidence of infection) and managed with IV/ PO steroids. Evals by ID, ortho, rheum during last admission. Returns from NH  on 5/11 with edema from hand to elbow, left. Painful ROM LUE including hand/ wrist/ elbow/ shoulder. Repeat Left UE US negative for DVT. Imaging reviewed - XR L hand significant for "redemonstration of erosive change involving the " "head of the 2nd middle phalanx, previously demonstrated on hand CT and radiograph referenced above.  Findings could reflect underlying osteomyelitis or other inflammatory arthritidis in appears similar to prior exams." MRI not able to be obtained d/t incompatibility with pacer. Arm does not appear erythematous and so clinically this is not cellulitis. Uric acid level elevated on admit. ID ruled infection. Rheumatology consulted. Patient started on prednisone, colchicine 0.6 mg every other day, and allopurinol 50 mg daily. Started on acetaminophen 650 mg TID. Improved overall. Swelling improving but noticeably improved when briefly restarted on torsemide    Acute gout  - Appreciate Rheumatology consult  - Recent admit for the same, treated with steroids with ongoing pain/discomfort.  - detailed to family that gout will be further managed as outpatient  - prednisone burst and cholchicine 0.6 mg every other day  - allopurinol 50 mg daily. Increase slowly.   - pain much resolved at this time  - scheduled acetaminophen 650 mg TID    Severe malnutrition  Severe fat and muscle wasting in setting of pressure wounds  Regular diet with boost plus     Hematochezia  GI consulted - observational management  Follow H/H - now stable  Constipation - aggressive bowel regimen  Bleeding felt to be from sterocoral ulcer - stools no longer bloody  Maalox for the abdominal pain  Stop opioid    CKD (chronic kidney disease), stage IV  Creatine stable for now. BMP reviewed- noted Estimated Creatinine Clearance: 24.6 mL/min (based on SCr of 1.4 mg/dL). according to latest data. 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  Monitor UOP and serial BMP and adjust therapy as needed. Renally dose meds. Avoid nephrotoxic medications and procedures.  - at time of admission, Cr 1.5 - improved from baseline (~1.6-2.3)    Permanent atrial fibrillation  Patient with Permanent atrial fibrillation which is controlled currently with Calcium " Channel Blocker. Patient is currently in atrial fibrillation.[id number] Score: 3. Anticoagulation indicated. Anticoagulation done with eliquis 2.5 BID .    Chronic diastolic heart failure  - controlled  - BNP in 500s, lowest it has been in record  - torsemide held since 5/1/2024  - consider holding as patient had 13 kg weight loss since admission for heart failure 6/2023    CAD (coronary artery disease)  Patient with known CAD s/p stent placement and CABG, which is controlled Will continue ASA and monitor for S/Sx of angina/ACS.     Essential hypertension  Chronic, controlled. Latest blood pressure and vitals reviewed-     Temp:  [97.9 °F (36.6 °C)-98.7 °F (37.1 °C)]   Pulse:  [67-70]   Resp:  [17-18]   BP: ()/(44-73)   SpO2:  [97 %-99 %] .     Labile. Has higher BP goal due to age and co-morbidities    Stop amlodpine  Continue carvedilol 2.125 mg BID and lisinopril 5 mg daily    Diet:  regular diet  GI PPx: not needed  DVT PPx:  apixaban  Airways: room air  Wounds: none    Goals of Care:  Return to prior functional status     Discharge Planning   TEOFILO: 5/20/2024   Is the patient medically ready for discharge?:     Reason for patient still in hospital (select all that apply): Patient trending condition and Treatment  Discharge Plan A: Skilled Nursing Facility   Discharge Delays: (!) Post-Acute Set-up    Toma Porter MD

## 2024-05-19 NOTE — PLAN OF CARE
Problem: Infection  Goal: Absence of Infection Signs and Symptoms  Outcome: Progressing     Problem: Adult Inpatient Plan of Care  Goal: Plan of Care Review  Outcome: Progressing  Goal: Patient-Specific Goal (Individualized)  Outcome: Progressing  Goal: Absence of Hospital-Acquired Illness or Injury  Outcome: Progressing  Goal: Optimal Comfort and Wellbeing  Outcome: Progressing  Goal: Readiness for Transition of Care  Outcome: Progressing     Problem: Acute Kidney Injury/Impairment  Goal: Fluid and Electrolyte Balance  Outcome: Progressing  Goal: Improved Oral Intake  Outcome: Progressing  Goal: Effective Renal Function  Outcome: Progressing     Problem: Wound  Goal: Optimal Coping  Outcome: Progressing  Goal: Optimal Functional Ability  Outcome: Progressing  Goal: Absence of Infection Signs and Symptoms  Outcome: Progressing  Goal: Improved Oral Intake  Outcome: Progressing  Goal: Optimal Pain Control and Function  Outcome: Progressing  Goal: Skin Health and Integrity  Outcome: Progressing  Goal: Optimal Wound Healing  Outcome: Progressing     Problem: Skin Injury Risk Increased  Goal: Skin Health and Integrity  Outcome: Progressing     Problem: Fall Injury Risk  Goal: Absence of Fall and Fall-Related Injury  Outcome: Progressing     Problem: Pain Acute  Goal: Optimal Pain Control and Function  Outcome: Progressing    Pt had an uneventful night. VSS. Pt c/o of leg pain and generalized pain intermittently. Pt given juan tyneol and prn pain medication  Pt is free of falls and injuries. Bed in lowest position  and call light within reach. Safety maintained

## 2024-05-20 PROBLEM — G93.40 ACUTE ENCEPHALOPATHY: Status: ACTIVE | Noted: 2024-05-20

## 2024-05-20 LAB
ANION GAP SERPL CALC-SCNC: 6 MMOL/L (ref 8–16)
BASOPHILS # BLD AUTO: 0.01 K/UL (ref 0–0.2)
BASOPHILS NFR BLD: 0.1 % (ref 0–1.9)
BUN SERPL-MCNC: 62 MG/DL (ref 10–30)
CALCIUM SERPL-MCNC: 7.9 MG/DL (ref 8.7–10.5)
CHLORIDE SERPL-SCNC: 106 MMOL/L (ref 95–110)
CO2 SERPL-SCNC: 26 MMOL/L (ref 23–29)
CREAT SERPL-MCNC: 1.5 MG/DL (ref 0.5–1.4)
DIFFERENTIAL METHOD BLD: ABNORMAL
EOSINOPHIL # BLD AUTO: 0.4 K/UL (ref 0–0.5)
EOSINOPHIL NFR BLD: 3.2 % (ref 0–8)
ERYTHROCYTE [DISTWIDTH] IN BLOOD BY AUTOMATED COUNT: 15.2 % (ref 11.5–14.5)
EST. GFR  (NO RACE VARIABLE): 43.1 ML/MIN/1.73 M^2
GLUCOSE SERPL-MCNC: 91 MG/DL (ref 70–110)
HCT VFR BLD AUTO: 30.2 % (ref 40–54)
HGB BLD-MCNC: 9.2 G/DL (ref 14–18)
IMM GRANULOCYTES # BLD AUTO: 0.05 K/UL (ref 0–0.04)
IMM GRANULOCYTES NFR BLD AUTO: 0.5 % (ref 0–0.5)
LYMPHOCYTES # BLD AUTO: 1.3 K/UL (ref 1–4.8)
LYMPHOCYTES NFR BLD: 11.9 % (ref 18–48)
MCH RBC QN AUTO: 30.7 PG (ref 27–31)
MCHC RBC AUTO-ENTMCNC: 30.5 G/DL (ref 32–36)
MCV RBC AUTO: 101 FL (ref 82–98)
MONOCYTES # BLD AUTO: 0.6 K/UL (ref 0.3–1)
MONOCYTES NFR BLD: 5.2 % (ref 4–15)
NEUTROPHILS # BLD AUTO: 8.6 K/UL (ref 1.8–7.7)
NEUTROPHILS NFR BLD: 79.1 % (ref 38–73)
NRBC BLD-RTO: 0 /100 WBC
PLATELET # BLD AUTO: 152 K/UL (ref 150–450)
PMV BLD AUTO: 9.8 FL (ref 9.2–12.9)
POTASSIUM SERPL-SCNC: 4.9 MMOL/L (ref 3.5–5.1)
RBC # BLD AUTO: 3 M/UL (ref 4.6–6.2)
SODIUM SERPL-SCNC: 138 MMOL/L (ref 136–145)
WBC # BLD AUTO: 10.81 K/UL (ref 3.9–12.7)

## 2024-05-20 PROCEDURE — 36415 COLL VENOUS BLD VENIPUNCTURE: CPT | Mod: HCNC | Performed by: STUDENT IN AN ORGANIZED HEALTH CARE EDUCATION/TRAINING PROGRAM

## 2024-05-20 PROCEDURE — 25000003 PHARM REV CODE 250: Mod: HCNC | Performed by: HOSPITALIST

## 2024-05-20 PROCEDURE — 21400001 HC TELEMETRY ROOM: Mod: HCNC

## 2024-05-20 PROCEDURE — 94761 N-INVAS EAR/PLS OXIMETRY MLT: CPT | Mod: HCNC

## 2024-05-20 PROCEDURE — 97110 THERAPEUTIC EXERCISES: CPT | Mod: HCNC,CQ

## 2024-05-20 PROCEDURE — 97530 THERAPEUTIC ACTIVITIES: CPT | Mod: HCNC,CQ

## 2024-05-20 PROCEDURE — 25000003 PHARM REV CODE 250: Mod: HCNC | Performed by: STUDENT IN AN ORGANIZED HEALTH CARE EDUCATION/TRAINING PROGRAM

## 2024-05-20 PROCEDURE — 25000003 PHARM REV CODE 250: Mod: HCNC | Performed by: INTERNAL MEDICINE

## 2024-05-20 PROCEDURE — 63600175 PHARM REV CODE 636 W HCPCS: Mod: JZ,JG,HCNC | Performed by: INTERNAL MEDICINE

## 2024-05-20 PROCEDURE — 80048 BASIC METABOLIC PNL TOTAL CA: CPT | Mod: HCNC | Performed by: STUDENT IN AN ORGANIZED HEALTH CARE EDUCATION/TRAINING PROGRAM

## 2024-05-20 PROCEDURE — 85025 COMPLETE CBC W/AUTO DIFF WBC: CPT | Mod: HCNC | Performed by: STUDENT IN AN ORGANIZED HEALTH CARE EDUCATION/TRAINING PROGRAM

## 2024-05-20 PROCEDURE — 25000003 PHARM REV CODE 250: Mod: HCNC | Performed by: EMERGENCY MEDICINE

## 2024-05-20 PROCEDURE — 25000003 PHARM REV CODE 250: Mod: HCNC

## 2024-05-20 PROCEDURE — 99900035 HC TECH TIME PER 15 MIN (STAT): Mod: HCNC

## 2024-05-20 PROCEDURE — 97530 THERAPEUTIC ACTIVITIES: CPT | Mod: HCNC,CO

## 2024-05-20 PROCEDURE — 97535 SELF CARE MNGMENT TRAINING: CPT | Mod: HCNC,CO

## 2024-05-20 RX ORDER — ALLOPURINOL 100 MG/1
TABLET ORAL
Start: 2024-05-20 | End: 2025-06-10

## 2024-05-20 RX ORDER — AMOXICILLIN 250 MG
2 CAPSULE ORAL 2 TIMES DAILY
Start: 2024-05-20

## 2024-05-20 RX ORDER — BISACODYL 10 MG/1
10 SUPPOSITORY RECTAL DAILY PRN
Start: 2024-05-20

## 2024-05-20 RX ORDER — ACETAMINOPHEN 325 MG/1
650 TABLET ORAL EVERY 4 HOURS PRN
Start: 2024-05-20

## 2024-05-20 RX ORDER — POLYETHYLENE GLYCOL 3350 17 G/17G
17 POWDER, FOR SOLUTION ORAL 2 TIMES DAILY
Start: 2024-05-20 | End: 2024-05-21

## 2024-05-20 RX ORDER — ALLOPURINOL 100 MG/1
100 TABLET ORAL DAILY
Start: 2024-05-20 | End: 2024-05-20

## 2024-05-20 RX ORDER — OLANZAPINE 10 MG/2ML
2.5 INJECTION, POWDER, FOR SOLUTION INTRAMUSCULAR ONCE
Status: COMPLETED | OUTPATIENT
Start: 2024-05-20 | End: 2024-05-20

## 2024-05-20 RX ADMIN — Medication: at 08:05

## 2024-05-20 RX ADMIN — HYDROXYZINE HYDROCHLORIDE 25 MG: 25 TABLET, FILM COATED ORAL at 02:05

## 2024-05-20 RX ADMIN — CARVEDILOL 3.12 MG: 3.12 TABLET, FILM COATED ORAL at 08:05

## 2024-05-20 RX ADMIN — POLYETHYLENE GLYCOL 3350 17 G: 17 POWDER, FOR SOLUTION ORAL at 08:05

## 2024-05-20 RX ADMIN — ACETAMINOPHEN 650 MG: 325 TABLET ORAL at 02:05

## 2024-05-20 RX ADMIN — DICLOFENAC SODIUM 2 G: 10 GEL TOPICAL at 02:05

## 2024-05-20 RX ADMIN — APIXABAN 2.5 MG: 2.5 TABLET, FILM COATED ORAL at 08:05

## 2024-05-20 RX ADMIN — Medication 6 MG: at 08:05

## 2024-05-20 RX ADMIN — SENNOSIDES AND DOCUSATE SODIUM 2 TABLET: 50; 8.6 TABLET ORAL at 08:05

## 2024-05-20 RX ADMIN — OLANZAPINE 2.5 MG: 10 INJECTION, POWDER, FOR SOLUTION INTRAMUSCULAR at 09:05

## 2024-05-20 RX ADMIN — OLANZAPINE 2.5 MG: 10 INJECTION, POWDER, FOR SOLUTION INTRAMUSCULAR at 11:05

## 2024-05-20 NOTE — PLAN OF CARE
Patient is awake, alert and oriented x 1. Given IM Olanzapin for non- redirectable agitation. Refused medications. Sat up to chair without issues. Bowel movement x 1 on a bedside commode, formed soft stools dark color. Placed on condom catheter.  Bed in locked and in low position. Side rails x 2. Fall camera still at bedside. Call light placed within patient reach.    Problem: Infection  Goal: Absence of Infection Signs and Symptoms  Outcome: Progressing     Problem: Adult Inpatient Plan of Care  Goal: Plan of Care Review  Outcome: Progressing  Goal: Patient-Specific Goal (Individualized)  Outcome: Progressing  Goal: Absence of Hospital-Acquired Illness or Injury  Outcome: Progressing  Goal: Optimal Comfort and Wellbeing  Outcome: Progressing  Goal: Readiness for Transition of Care  Outcome: Progressing

## 2024-05-20 NOTE — PLAN OF CARE
CM sent PT/OT evals to [facility] via CP.  Pt will be SNF to long term.    ED CrespoN, BS, RN, CCM

## 2024-05-20 NOTE — PLAN OF CARE
Problem: Infection  Goal: Absence of Infection Signs and Symptoms  Outcome: Progressing     Problem: Adult Inpatient Plan of Care  Goal: Plan of Care Review  Outcome: Progressing  Goal: Patient-Specific Goal (Individualized)  Outcome: Progressing  Goal: Absence of Hospital-Acquired Illness or Injury  Outcome: Progressing  Goal: Optimal Comfort and Wellbeing  Outcome: Progressing  Goal: Readiness for Transition of Care  Outcome: Progressing     Problem: Acute Kidney Injury/Impairment  Goal: Fluid and Electrolyte Balance  Outcome: Progressing  Goal: Improved Oral Intake  Outcome: Progressing  Goal: Effective Renal Function  Outcome: Progressing     Problem: Wound  Goal: Optimal Coping  Outcome: Progressing  Goal: Optimal Functional Ability  Outcome: Progressing  Goal: Absence of Infection Signs and Symptoms  Outcome: Progressing  Goal: Improved Oral Intake  Outcome: Progressing  Goal: Optimal Pain Control and Function  Outcome: Progressing  Goal: Skin Health and Integrity  Outcome: Progressing  Goal: Optimal Wound Healing  Outcome: Progressing     Problem: Skin Injury Risk Increased  Goal: Skin Health and Integrity  Outcome: Progressing     Problem: Fall Injury Risk  Goal: Absence of Fall and Fall-Related Injury  Outcome: Progressing     Problem: Pain Acute  Goal: Optimal Pain Control and Function  Outcome: Progressing   Pt had an uneventful night. VSS. Pt c/o frequent pain. Pt give prn pain meds and sleep meds. Pt had one bm with no blood in it.  Pt is free of falls and injuries.  Paulette monitor remains in place for safety. Bed in lowest position and call light within reach. Safety maintained

## 2024-05-20 NOTE — PT/OT/SLP PROGRESS
Physical Therapy Treatment  Co-treatment with OT due to acuity of condition, level of skilled assist needed for assessment of safety with mobility and potential of not tolerating a second treatment session.     Patient Name:  Deena Moody   MRN:  [id number]    Recommendations:     Discharge Recommendations: Moderate Intensity Therapy (Simultaneous filing. User may not have seen previous data.)  Discharge Equipment Recommendations: hospital bed, lift device (Simultaneous filing. User may not have seen previous data.)  Barriers to discharge:  current level of assistance     Assessment:     Deena Moody is a 89 y.o. male admitted with a medical diagnosis of Arm swelling.  He presents with the following impairments/functional limitations: weakness, impaired endurance, impaired self care skills, impaired functional mobility, gait instability, decreased upper extremity function, decreased lower extremity function, decreased safety awareness, impaired cardiopulmonary response to activity Pt tolerated treatment session well today. Patient remains appropriate for continued skilled services within the acute environment and goals remain appropriate.   .    Rehab Prognosis: Good; patient would benefit from acute skilled PT services to address these deficits and reach maximum level of function.    Recent Surgery: * No surgery found *      Plan:     During this hospitalization, patient to be seen 3 x/week to address the identified rehab impairments via gait training, therapeutic activities, therapeutic exercises, neuromuscular re-education and progress toward the following goals:    Plan of Care Expires:  06/17/24    Subjective     Chief Complaint: None stated   Patient/Family Comments/goals: Pt agreeable to PT   Pain/Comfort:  Pain Rating 1: 0/10      Objective:     Communicated with Rn prior to session.  Patient found supine with telemetry, Condom Catheter upon PT entry to room.     General Precautions: Standard, fall  (Simultaneous filing. User may not have seen previous data.)  Orthopedic Precautions: N/A (Simultaneous filing. User may not have seen previous data.)  Braces: N/A (Simultaneous filing. User may not have seen previous data.)  Respiratory Status: Room air     Functional Mobility:  Bed Mobility:     Supine to Sit: moderate assistance and of 2 persons  Pt sat EOB initially with Aleksandra yet progressing to CGA     Transfers:     Sit to Stand x 3:  minimum assistance and of 2 persons with hand-held assist  Bed to commode: moderate assistance and of 2 persons with  hand-held assist  using  Stand Pivot  Commode to chair: moderate assistance and of 2 persons with  hand-held assist  using  Stand Pivot    Pt performed 10 repetitions of seated B LE exercises consisting of: Marching, LAQ, ABD/ADD, heel raises, and toe raises.         AM-PAC 6 CLICK MOBILITY  Turning over in bed (including adjusting bedclothes, sheets and blankets)?: 2  Sitting down on and standing up from a chair with arms (e.g., wheelchair, bedside commode, etc.): 3  Moving from lying on back to sitting on the side of the bed?: 2  Moving to and from a bed to a chair (including a wheelchair)?: 2  Need to walk in hospital room?: 2  Climbing 3-5 steps with a railing?: 1  Basic Mobility Total Score: 12       Treatment & Education:  Therapist provided instruction and educated for safety during bed mobility, and t/f's. As well as proper body mechanics, energy conservation, and fall prevention strategies during tasks listed above, and the effects of prolonged immobility and the importance of performing EOB/OOB activity and exercises to promote healing and reduce recovery time.       Patient left up in chair with all lines intact, call button in reach, Rn notified, and camera system present..    GOALS:   Multidisciplinary Problems       Physical Therapy Goals          Problem: Physical Therapy    Goal Priority Disciplines Outcome Goal Variances Interventions   Physical  Therapy Goal     PT, PT/OT Progressing     Description: Goals to be met by:      Patient will increase functional independence with mobility by performin. Supine to sit with Moderate Assistance  2. Sit to supine with Moderate Assistance  3 Sit to stand transfer with Maximum Assistance  4. Bed to chair transfer with Maximum Assistance using LRAD  5. Gait  x 10 feet with Moderate Assistance using LRAD.   6. Sitting at edge of bed x5 minutes with contact guard assist                          Time Tracking:     PT Received On: 24  PT Start Time: 1332     PT Stop Time: 1402  PT Total Time (min): 30 min     Billable Minutes: Therapeutic Activity 15 and Therapeutic Exercise 15    Treatment Type: Treatment  PT/PTA: PTA     Number of PTA visits since last PT visit: 2024

## 2024-05-20 NOTE — PT/OT/SLP PROGRESS
Occupational Therapy   Treatment    Name: Deena Moody  MRN: [id number]  Admitting Diagnosis:  Arm swelling       Recommendations:     Discharge Recommendations:    Discharge Equipment Recommendations:     Barriers to discharge:       Assessment:     Deena Moody is a 89 y.o. male with a medical diagnosis of Arm swelling.  He presents with the following performance deficits affecting function: weakness, impaired endurance, impaired self care skills, impaired functional mobility, decreased coordination, impaired balance, gait instability, decreased upper extremity function, decreased lower extremity function, decreased safety awareness, impaired cardiopulmonary response to activity, decreased ROM.     Rehab Prognosis:  Good; patient would benefit from acute skilled OT services to address these deficits and reach maximum level of function.       Plan:     Patient to be seen 3 x/week to address the above listed problems via self-care/home management, therapeutic activities, therapeutic exercises, neuromuscular re-education  Plan of Care Expires: 06/01/24  Plan of Care Reviewed with: patient    Subjective     Patient/Family Comments/goals: to improve function and get out of bed  Pain/Comfort:  Pain Rating 1: 0/10  Pain Rating Post-Intervention 1: 0/10    Objective:     Communicated with: RN prior to session.  Patient found HOB elevated with telemetry, Condom Catheter upon OT entry to room.  A client care conference was completed by the OTR and the TAY prior to treatment by the TAY to discuss the patient's POC and current status.    General Precautions: Standard, fall    Orthopedic Precautions:N/A  Braces: N/A  Respiratory Status: Room air     Occupational Performance:     Bed Mobility:    Patient completed Scooting/Bridging with maximal assistance  Patient completed Supine to Sit with maximal assistance and 2 persons     Functional Mobility/Transfers:  Patient completed Sit <> Stand Transfer with minimum assistance  and of 2 persons  with  hand-held assist   Patient completed Bed <> Chair Transfer using Step Transfer technique with moderate assistance and of 2 persons with hand-held assist  Patient completed Toilet Transfer Step Transfer technique with moderate assistance and of 2 persons with  hand-held assist and bedside commode  Functional Mobility: pt taking 3-4 pivoting steps x 2 trials Bed>BSC, BSC>chair with Mod A x 2 persons using HHA.     Activities of Daily Living:  Grooming: stand by assistance for facial care seated EOB  Toileting: total assistance for pericare and clothing mgmt on BSC      AMPAC 6 Click ADL: 14    Treatment & Education:  Pt educated on OT POC and frequency during hospital stay.   Pt educated on importance of OOB activity to improve function and activity tolerance.  Pt educated on proper hand placement and techniques for transfers to improve safety awareness.   Pt sat EOB with min-CGA for sitting balance.   Addressed all patient questions/concerns within TAY scope of practice.     Patient left up in chair with all lines intact, call button in reach, and RN notified    GOALS:   Multidisciplinary Problems       Occupational Therapy Goals          Problem: Occupational Therapy    Goal Priority Disciplines Outcome Interventions   Occupational Therapy Goal     OT, PT/OT Progressing    Description: Goals to be met by: 6/18/24     Patient will increase functional independence with ADLs by performing:    LE Dressing with Minimal Assistance.  Grooming while seated with Contact Guard Assistance.  Toileting from bedside commode with Minimal Assistance for hygiene and clothing management.   Sitting at edge of bed x5 minutes with Contact Guard Assistance.  Supine to sit with Moderate Assistance.  Sit to stnad transfers with Moderate Assistance.                         Time Tracking:     OT Date of Treatment: 05/20/24  OT Start Time: 1332  OT Stop Time: 1401  OT Total Time (min): 29 min    Billable Minutes:Self  Care/Home Management 15  Therapeutic Activity 14    OT/ÁLVARO: ÁLVARO     Number of ÁLVARO visits since last OT visit: 1    5/20/2024

## 2024-05-21 VITALS
SYSTOLIC BLOOD PRESSURE: 105 MMHG | DIASTOLIC BLOOD PRESSURE: 50 MMHG | TEMPERATURE: 98 F | RESPIRATION RATE: 18 BRPM | HEART RATE: 69 BPM | OXYGEN SATURATION: 97 % | WEIGHT: 116.19 LBS | BODY MASS INDEX: 17.16 KG/M2

## 2024-05-21 LAB
ANION GAP SERPL CALC-SCNC: 9 MMOL/L (ref 8–16)
BASOPHILS # BLD AUTO: 0.02 K/UL (ref 0–0.2)
BASOPHILS NFR BLD: 0.2 % (ref 0–1.9)
BUN SERPL-MCNC: 57 MG/DL (ref 10–30)
CALCIUM SERPL-MCNC: 8.8 MG/DL (ref 8.7–10.5)
CHLORIDE SERPL-SCNC: 109 MMOL/L (ref 95–110)
CO2 SERPL-SCNC: 24 MMOL/L (ref 23–29)
CREAT SERPL-MCNC: 1.3 MG/DL (ref 0.5–1.4)
DIFFERENTIAL METHOD BLD: ABNORMAL
EOSINOPHIL # BLD AUTO: 0.3 K/UL (ref 0–0.5)
EOSINOPHIL NFR BLD: 2.6 % (ref 0–8)
ERYTHROCYTE [DISTWIDTH] IN BLOOD BY AUTOMATED COUNT: 15.2 % (ref 11.5–14.5)
EST. GFR  (NO RACE VARIABLE): 51.2 ML/MIN/1.73 M^2
GLUCOSE SERPL-MCNC: 90 MG/DL (ref 70–110)
HCT VFR BLD AUTO: 35.4 % (ref 40–54)
HGB BLD-MCNC: 11.2 G/DL (ref 14–18)
IMM GRANULOCYTES # BLD AUTO: 0.05 K/UL (ref 0–0.04)
IMM GRANULOCYTES NFR BLD AUTO: 0.5 % (ref 0–0.5)
LYMPHOCYTES # BLD AUTO: 0.9 K/UL (ref 1–4.8)
LYMPHOCYTES NFR BLD: 9.9 % (ref 18–48)
MCH RBC QN AUTO: 31.2 PG (ref 27–31)
MCHC RBC AUTO-ENTMCNC: 31.6 G/DL (ref 32–36)
MCV RBC AUTO: 99 FL (ref 82–98)
MONOCYTES # BLD AUTO: 0.6 K/UL (ref 0.3–1)
MONOCYTES NFR BLD: 6.1 % (ref 4–15)
NEUTROPHILS # BLD AUTO: 7.6 K/UL (ref 1.8–7.7)
NEUTROPHILS NFR BLD: 80.7 % (ref 38–73)
NRBC BLD-RTO: 0 /100 WBC
PLATELET # BLD AUTO: 193 K/UL (ref 150–450)
PMV BLD AUTO: 9.6 FL (ref 9.2–12.9)
POTASSIUM SERPL-SCNC: 5.8 MMOL/L (ref 3.5–5.1)
RBC # BLD AUTO: 3.59 M/UL (ref 4.6–6.2)
SODIUM SERPL-SCNC: 142 MMOL/L (ref 136–145)
URATE SERPL-MCNC: 7.4 MG/DL (ref 3.4–7)
WBC # BLD AUTO: 9.46 K/UL (ref 3.9–12.7)

## 2024-05-21 PROCEDURE — 36415 COLL VENOUS BLD VENIPUNCTURE: CPT | Mod: HCNC | Performed by: STUDENT IN AN ORGANIZED HEALTH CARE EDUCATION/TRAINING PROGRAM

## 2024-05-21 PROCEDURE — 85025 COMPLETE CBC W/AUTO DIFF WBC: CPT | Mod: HCNC | Performed by: STUDENT IN AN ORGANIZED HEALTH CARE EDUCATION/TRAINING PROGRAM

## 2024-05-21 PROCEDURE — 25000003 PHARM REV CODE 250: Mod: HCNC

## 2024-05-21 PROCEDURE — 84550 ASSAY OF BLOOD/URIC ACID: CPT | Mod: HCNC | Performed by: INTERNAL MEDICINE

## 2024-05-21 PROCEDURE — 25000003 PHARM REV CODE 250: Mod: HCNC | Performed by: INTERNAL MEDICINE

## 2024-05-21 PROCEDURE — 25000003 PHARM REV CODE 250: Mod: HCNC | Performed by: HOSPITALIST

## 2024-05-21 PROCEDURE — 63600175 PHARM REV CODE 636 W HCPCS: Mod: JG,HCNC | Performed by: INTERNAL MEDICINE

## 2024-05-21 PROCEDURE — 99232 SBSQ HOSP IP/OBS MODERATE 35: CPT | Mod: HCNC,,, | Performed by: NURSE PRACTITIONER

## 2024-05-21 PROCEDURE — 80048 BASIC METABOLIC PNL TOTAL CA: CPT | Mod: HCNC | Performed by: STUDENT IN AN ORGANIZED HEALTH CARE EDUCATION/TRAINING PROGRAM

## 2024-05-21 RX ORDER — OLANZAPINE 10 MG/2ML
5 INJECTION, POWDER, FOR SOLUTION INTRAMUSCULAR ONCE AS NEEDED
Status: COMPLETED | OUTPATIENT
Start: 2024-05-21 | End: 2024-05-21

## 2024-05-21 RX ORDER — POLYETHYLENE GLYCOL 3350 17 G/17G
17 POWDER, FOR SOLUTION ORAL 3 TIMES DAILY PRN
Start: 2024-05-21

## 2024-05-21 RX ADMIN — APIXABAN 2.5 MG: 2.5 TABLET, FILM COATED ORAL at 02:05

## 2024-05-21 RX ADMIN — FERROUS SULFATE TAB EC 325 MG (65 MG FE EQUIVALENT) 1 EACH: 325 (65 FE) TABLET DELAYED RESPONSE at 03:05

## 2024-05-21 RX ADMIN — HYDROXYZINE HYDROCHLORIDE 25 MG: 25 TABLET, FILM COATED ORAL at 04:05

## 2024-05-21 RX ADMIN — OLANZAPINE 5 MG: 10 INJECTION, POWDER, FOR SOLUTION INTRAMUSCULAR at 05:05

## 2024-05-21 RX ADMIN — LISINOPRIL 5 MG: 5 TABLET ORAL at 03:05

## 2024-05-21 RX ADMIN — CARVEDILOL 3.12 MG: 3.12 TABLET, FILM COATED ORAL at 02:05

## 2024-05-21 NOTE — ASSESSMENT & PLAN NOTE
- left heel with stable black eschar and surrounding red tissue with peeling skin from ruptured blood blister.  - continue heel boots for offloading.  - continue betadine daily to left heel.  - right heel with blanchable redness and intact skin. Continue BPCO bid/prn.

## 2024-05-21 NOTE — PROGRESS NOTES
"[facility] - [facility] ([place] [address])  Skin Integrity EMILIO  Progress Note    Patient Name: Deena Moody  MRN: [id number]  Admission Date: 5/11/2024  Hospital Length of Stay: 10 days  Attending Physician: Toma Porter MD  Primary Care Provider: Jam Rowell MD         Subjective:     HPI:  Deena Moody is a 89 year old male with PMHx HTN, HLD, CAD, diastolic HF, A-fib with pacemaker, CABG, hx of MI, AV block, and CKD 3, with recent admission to  for treatment of L hand gout flare (d/c 5/8/24), who presents back to [facility] ED from his nursing home with L upper arm swelling. During prior admission, pt initially thought to have osteomyelitis and started on BS abx. Later determined to be gout flare, managed with steroids by rheumatology. Patient has dementia at baseline and is unable to explain his situation. He complains that he feels bad and endorses muscle pain "all over," but otherwise cannot describe his symptoms. No family at bedside. He denies confusion, fevers or chills, headaches, chest pain, SOB, belly pain.      In ED: T 99F, WBC 21K (15K prior admission). VSS. Labs otherwise c/w stable chronic anemia, CKD. CRP 99.7 (prior ~60). ESR 78 (prior ~100). UA unremarkable. CXR without change from prior. XR LUE remarkable for "redemonstration of erosive change involving the head of the 2nd middle phalanx, previously demonstrated on hand CT and radiograph referenced above.  Findings could reflect underlying osteomyelitis or other inflammatory arthritidis in appears similar to prior exams." Otherwise XR of L hand, L humerus, L forearm without acute findings. Given tylenol, vanc/ zosyn. Patient admitted to hospital medicine service for further management. Skin integrity EMILIO consulted for evaluation of skin injury.       Hospital Course:   No notes on file          Scheduled Meds:   allopurinoL  50 mg Oral Daily    apixaban  2.5 mg Oral BID    aspirin  81 mg Oral Daily    balsam peru-castor oiL   Topical (Top) " BID    carvediloL  3.125 mg Oral BID    colchicine  0.6 mg Oral Every other day    ferrous sulfate  1 tablet Oral Daily    lisinopriL  5 mg Oral Daily    multivitamin  1 tablet Oral Daily    pantoprazole  40 mg Oral Daily    polyethylene glycol  17 g Oral BID    senna-docusate 8.6-50 mg  2 tablet Oral BID     Continuous Infusions:  PRN Meds:  Current Facility-Administered Medications:     acetaminophen, 650 mg, Oral, Q6H PRN    albuterol-ipratropium, 3 mL, Nebulization, Q4H PRN    aluminum-magnesium hydroxide-simethicone, 30 mL, Oral, QID PRN    dextrose 10%, 12.5 g, Intravenous, PRN    dextrose 10%, 25 g, Intravenous, PRN    diclofenac sodium, 2 g, Topical (Top), Daily PRN    glucagon (human recombinant), 1 mg, Intramuscular, PRN    glucose, 16 g, Oral, PRN    glucose, 24 g, Oral, PRN    hydrOXYzine HCL, 25 mg, Oral, TID PRN    melatonin, 6 mg, Oral, Nightly PRN    naloxone, 0.02 mg, Intravenous, PRN    ondansetron, 8 mg, Oral, Q8H PRN    prochlorperazine, 5 mg, Intravenous, Q6H PRN    sodium chloride 0.9%, 10 mL, Intravenous, PRN    sodium chloride 0.9%, 10 mL, Intravenous, Q8H PRN    Review of Systems   Skin:  Positive for wound.     Objective:     Vital Signs (Most Recent):  Temp: 97.4 °F (36.3 °C) (05/21/24 1140)  Pulse: 69 (05/21/24 1140)  Resp: 18 (05/21/24 1140)  BP: (!) 150/54 (05/21/24 1140)  SpO2: 98 % (05/21/24 1140) Vital Signs (24h Range):  Temp:  [97.4 °F (36.3 °C)-98 °F (36.7 °C)] 97.4 °F (36.3 °C)  Pulse:  [69-89] 69  Resp:  [18] 18  SpO2:  [45 %-100 %] 98 %  BP: ()/(52-75) 150/54     Weight: 52.7 kg (116 lb 2.9 oz)  Body mass index is 17.16 kg/m².     Physical Exam  Constitutional:       Appearance: Normal appearance.   Skin:     General: Skin is warm and dry.      Findings: Lesion present.   Neurological:      Mental Status: He is alert.          Laboratory:  All pertinent labs reviewed within the last 24 hours.    Diagnostic Results:  None    Assessment/Plan:         EMILIO Skin Integrity  Evaluation      Skin Integrity EMILIO evaluation of patient as part of the comprehensive skin care team.     He has been admitted for 10 days. Skin injury was noted on 5/3/24. POA yes.    L sacrum      L heel      R heel          Orthopedic  Pressure injury of sacral region, stage 2  - follow up evaluation of skin injury.  - pt presents back [place] [facility] [place] his nursing home with L upper arm swelling.  - left sacral area initially with partial thickness tissue loss and pink, moist wound base; stage 2 pressure injury of sacrum.  - appears to be healing. Smaller partial thickness tissue loss with pink wound base with pink scar tissue to the periwound.  - continue with mepilex dressing to area.  - Immerse surface.  - pillows and heel boots for offloading.  - condom cath in place.  - turn q2h.  - domingo score documented at 12 with a nutrition sub scale score of 2.  - recommend dietary consult to optimize nutrition for wound healing.  - nursing to maintain pressure injury prevention measures and continue wound care per orders.    Blood blister  - left heel with stable black eschar and surrounding red tissue with peeling skin from ruptured blood blister.  - continue heel boots for offloading.  - continue betadine daily to left heel.  - right heel with blanchable redness and intact skin. Continue BPCO bid/prn.        Bonny Calvillo NP  Skin Integrity EMILIO  [facility] - [facility] ([place])

## 2024-05-21 NOTE — PLAN OF CARE
[facility]     Department of Hospital Medicine     [address] [place] [address]     [phone] after hours  [phone] fax       NURSING HOME ORDERS                                    5/21/2024    Admit to Nursing Home:    Skilled Bed      Diagnoses:  Active Hospital Problems    Diagnosis  POA    *Arm swelling [M79.89]  Yes     Priority: 1 - High    Acute gout [M10.9]  Yes     Priority: 2     Severe malnutrition [E43]  Yes    Hematochezia [K92.1]  No    Pressure injury of sacral region, stage 2 [L89.152]  Yes    CKD (chronic kidney disease), stage IV [N18.4]  Yes    Blood blister [T14.8XXA]  Yes    Permanent atrial fibrillation [I48.21]  Yes     Chronic    Chronic diastolic heart failure [I50.32]  Yes     Patient being diuresed.      CAD (coronary artery disease) [I25.10]  Yes     Chronic    Essential hypertension [I10]  Yes      Resolved Hospital Problems   No resolved problems to display.       Allergies:  Review of patient's allergies indicates:   Allergen Reactions    Iodine and iodide containing products Other (See Comments)     Caused changes in skin color       Vitals:   Every shift (Skilled Nursing patients)    Diet: low sodium diet  Fluid restriction 1500 mL    Acitivities:     - Up in a chair each morning as tolerated   - May use walker, cane, or self-propelled wheelchair    Nursing Precautions:     - Aspiration precautions:             -  Upright 90 degrees before during and after meals    - Decubitus precautions:        -  for positioning   - Pressure reducing foam mattress   - Turn patient every two hours. Use wedge pillows to anchor patient   - Fall precautions    CONSULTS:     PT to evaluate and treat     OT to evaluate and treat     ST to evaluate and treat     Nutrition to evaluate and recommend diet    LABS:  per unit routine    WOUND CARE:  Wound spray or saline for wound cleaning with all dressing changes.    All wounds to be  measured with first dressing changes and every week.    Left heel daily: Paint left heel with betadine and leave open to air.    Right heel BID: Apply Balsam [place] Castor Oil  to right heel BID and PRN if soiled.    Right buttock twice weekly: Cleanse right buttocks wound with vashe and pat dry. Apply mepilex foam border dressing. Change every three days or PRN if soiled.    Right arm tear twice weekly: Cleanse right arm skin tear with vashe and pat dry. Apply mepilex foam border dressing. Change every three days or PRN if soiled.    Medications: Discontinue all previous medication orders, if any. See new list below.    Current Discharge Medication List        START taking these medications    Details   acetaminophen (TYLENOL) 325 MG tablet Take 2 tablets (650 mg total) by mouth every 4 (four) hours as needed (any pain or temp >100).      allopurinoL (ZYLOPRIM) 100 MG tablet Take 0.5 tablets (50 mg total) by mouth once daily for 21 days, THEN 1 tablet (100 mg total) once daily.      balsam peru-castor oiL Oint Apply topically 2 (two) times a day. Apply to buttocks and sacrum BID and PRN if soiled.      bisacodyL (DULCOLAX) 10 mg Supp Place 1 suppository (10 mg total) rectally daily as needed (no BM in over one calendar day).      colchicine (COLCRYS) 0.6 mg tablet Take 1 tablet (0.6 mg total) by mouth every other day.  Qty: 15 tablet, Refills: 11      senna-docusate 8.6-50 mg (PERICOLACE) 8.6-50 mg per tablet Take 2 tablets by mouth 2 (two) times daily.           CONTINUE these medications which have CHANGED    Details   diclofenac sodium (VOLTAREN) 1 % Gel Apply 2 g topically 3 (three) times daily. To left elbow  Qty: 200 g, Refills: 0    Associated Diagnoses: Muscle strain      ferrous sulfate (FEOSOL) 325 mg (65 mg iron) Tab tablet Take 1 tablet (325 mg total) by mouth every Mon, Wed, Fri. Before breakfast    Associated Diagnoses: Chronic disease anemia      polyethylene glycol (GLYCOLAX) 17 gram/dose powder Take  17 g by mouth 3 (three) times daily as needed for Constipation.    Associated Diagnoses: Constipation, unspecified constipation type           CONTINUE these medications which have NOT CHANGED    Details   albuterol (PROVENTIL HFA) 90 mcg/actuation inhaler Inhale 2 puffs into the lungs every 6 (six) hours as needed for Wheezing. Rescue  Qty: 18 g, Refills: 1      apixaban (ELIQUIS) 2.5 mg Tab Take 1 tablet (2.5 mg total) by mouth 2 (two) times daily.  Qty: 180 tablet, Refills: 3    Associated Diagnoses: Persistent atrial fibrillation      aspirin (ECOTRIN) 81 MG EC tablet Take 1 tablet (81 mg total) by mouth once daily.  Qty: 90 tablet, Refills: 3    Associated Diagnoses: Dyslipidemia      benazepriL (LOTENSIN) 5 MG tablet Take 1 tablet (5 mg total) by mouth once daily.  Qty: 90 tablet, Refills: 3    Associated Diagnoses: Stage 3b chronic kidney disease; Essential hypertension      carvediloL (COREG) 3.125 MG tablet Take 1 tablet (3.125 mg total) by mouth 2 (two) times daily.  Qty: 180 tablet, Refills: 3    Comments: .      fluticasone propionate (FLONASE) 50 mcg/actuation nasal spray 2 sprays (100 mcg total) by Each Nostril route once daily.  Qty: 30 mL, Refills: 1    Associated Diagnoses: COVID-19 virus infection; Acute cough      multivitamin (ONE DAILY MULTIVITAMIN) per tablet Take 1 tablet by mouth once daily.    Associated Diagnoses: Chronic disease anemia      nitroGLYCERIN (NITROSTAT) 0.4 MG SL tablet Take one tablet every 5 minutes for chest pain. After the third tablet go to the ED.  Qty: 25 tablet, Refills: 4    Associated Diagnoses: Persistent atrial fibrillation           STOP taking these medications       potassium chloride (KLOR-CON) 8 MEQ TbSR Comments:   Reason for Stopping:         torsemide (DEMADEX) 20 MG Tab Comments:   Reason for Stopping:               _________________________________  Toma Porter MD  5/21/2024

## 2024-05-21 NOTE — PLAN OF CARE
Problem: Infection  Goal: Absence of Infection Signs and Symptoms  Outcome: Progressing     Problem: Adult Inpatient Plan of Care  Goal: Plan of Care Review  Outcome: Progressing  Goal: Patient-Specific Goal (Individualized)  Outcome: Progressing  Goal: Absence of Hospital-Acquired Illness or Injury  Outcome: Progressing  Goal: Optimal Comfort and Wellbeing  Outcome: Progressing

## 2024-05-21 NOTE — PROGRESS NOTES
Hospital Medicine  Progress note    Team: [facility] W Toma Porter MD  Admit Date: 5/11/2024  Code status: Full Code    Principal Problem:  Arm swelling    Interval hx:  No new complaints. Somewhat combative with nursing today.    PEx  Temp:  [97.1 °F (36.2 °C)-98.2 °F (36.8 °C)]   Pulse:  [70-86]   Resp:  [16-18]   BP: (105-144)/(52-65)   SpO2:  [89 %-100 %]   No intake or output data in the 24 hours ending 05/20/24 2230    General Appearance: no acute distress, WD, elderly, ill appearing  Heart: regular rate and rhythm, no heave, no JVD, no BLE edema  Ext: LUE swelling at elbow area; continued but mildly painful passive range of motion  Respiratory: Normal respiratory effort, symmetric excursion, bilateral vesicular breath sounds   Abdomen: bowel sounds active, no distension, no longer pain on palpation  Skin: intact, no rash, no ulcers  Neurologic:  No focal numbness or weakness  Mental status: Alert, oriented to name, affect appropriate    Recent Labs   Lab 05/18/24  0536 05/19/24  0431 05/20/24  0326   WBC 14.94* 11.81 10.81   HGB 9.3* 9.6* 9.2*   HCT 30.9* 32.8* 30.2*    162 152     Recent Labs   Lab 05/14/24  0349 05/15/24  0606 05/17/24  0523 05/17/24  0524 05/18/24  0536 05/19/24  0431 05/20/24  0326      < >  --    < > 143 140 138   K 4.8   < >  --    < > 5.0 5.0 4.9      < >  --    < > 110 110 106   CO2 22*   < >  --    < > 25 22* 26   BUN 96*   < >  --    < > 80* 73* 62*   CREATININE 1.6*   < >  --    < > 1.5* 1.3 1.5*   *   < >  --    < > 91 84 91   CALCIUM 8.3*   < >  --    < > 7.8* 8.1* 7.9*   MG 2.4  --  2.5  --   --   --   --     < > = values in this interval not displayed.     Scheduled Meds:   allopurinoL  50 mg Oral Daily    apixaban  2.5 mg Oral BID    aspirin  81 mg Oral Daily    balsam peru-castor oiL   Topical (Top) BID    carvediloL  3.125 mg Oral BID    colchicine  0.6 mg Oral Every other day    ferrous sulfate  1 tablet Oral Daily    lisinopriL  5 mg Oral  "Daily    multivitamin  1 tablet Oral Daily    pantoprazole  40 mg Oral Daily    polyethylene glycol  17 g Oral BID    senna-docusate 8.6-50 mg  2 tablet Oral BID     Continuous Infusions:  As Needed:    Current Facility-Administered Medications:     acetaminophen, 650 mg, Oral, Q6H PRN    albuterol-ipratropium, 3 mL, Nebulization, Q4H PRN    aluminum-magnesium hydroxide-simethicone, 30 mL, Oral, QID PRN    dextrose 10%, 12.5 g, Intravenous, PRN    dextrose 10%, 25 g, Intravenous, PRN    diclofenac sodium, 2 g, Topical (Top), Daily PRN    glucagon (human recombinant), 1 mg, Intramuscular, PRN    glucose, 16 g, Oral, PRN    glucose, 24 g, Oral, PRN    hydrOXYzine HCL, 25 mg, Oral, TID PRN    melatonin, 6 mg, Oral, Nightly PRN    naloxone, 0.02 mg, Intravenous, PRN    ondansetron, 8 mg, Oral, Q8H PRN    prochlorperazine, 5 mg, Intravenous, Q6H PRN    sodium chloride 0.9%, 10 mL, Intravenous, PRN    sodium chloride 0.9%, 10 mL, Intravenous, Q8H PRN    Assessment and Plan  / Problems managed today    * Arm swelling  Hx gout   Recent admission for the same, d/c 5/8. Treated initially for osteomyelitis with BS IV abx, then determined to be gout (joint asp wrist on 5/3 without evidence of infection) and managed with IV/ PO steroids. Evals by ID, ortho, rheum during last admission. Returns from NH  on 5/11 with edema from hand to elbow, left. Painful ROM LUE including hand/ wrist/ elbow/ shoulder. Repeat Left UE US negative for DVT. Imaging reviewed - XR L hand significant for "redemonstration of erosive change involving the head of the 2nd middle phalanx, previously demonstrated on hand CT and radiograph referenced above.  Findings could reflect underlying osteomyelitis or other inflammatory arthritidis in appears similar to prior exams." MRI not able to be obtained d/t incompatibility with pacer. Arm does not appear erythematous and so clinically this is not cellulitis. Uric acid level elevated on admit. ID ruled " infection. Rheumatology consulted. Patient started on prednisone, colchicine 0.6 mg every other day, and allopurinol 50 mg daily. Started on acetaminophen 650 mg TID. Improved overall. Swelling improving but noticeably improved when briefly restarted on torsemide    Acute gout  - Appreciate Rheumatology consult  - Recent admit for the same, treated with steroids with ongoing pain/discomfort.  - detailed to family that gout will be further managed as outpatient  - prednisone burst and cholchicine 0.6 mg every other day  - allopurinol 50 mg daily. Increase slowly.   - pain much resolved at this time  - prn acetaminophen  - opioid stopped due to constipation    Acute encephalopathy  Chronic dementia  Patient with waxing and waning mental status  Treating underlying causes  Gout  Constipation  Zyprexa prn agitation    Severe malnutrition  Severe fat and muscle wasting in setting of pressure wounds  Regular diet with boost plus     Hematochezia  GI consulted - observational management  Follow H/H - now stable  Constipation - aggressive bowel regimen  Bleeding felt to be from sterocoral ulcer - stools no longer bloody  Maalox for the abdominal pain  Stop opioid    CKD (chronic kidney disease), stage IV  Creatine stable for now. BMP reviewed- noted Estimated Creatinine Clearance: 24.6 mL/min (based on SCr of 1.4 mg/dL). according to latest data. 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  Monitor UOP and serial BMP and adjust therapy as needed. Renally dose meds. Avoid nephrotoxic medications and procedures.  - at time of admission, Cr 1.5 - improved from baseline (~1.6-2.3)    Permanent atrial fibrillation  Patient with Permanent atrial fibrillation which is controlled currently with Calcium Channel Blocker. Patient is currently in atrial fibrillation.[id number] Score: 3. Anticoagulation indicated. Anticoagulation done with eliquis 2.5 BID .    Chronic diastolic heart failure  - controlled  - BNP in 500s,  lowest it has been in record  - torsemide held since 5/1/2024  - consider holding as patient had 13 kg weight loss since admission for heart failure 6/2023    CAD (coronary artery disease)  Patient with known CAD s/p stent placement and CABG, which is controlled Will continue ASA and monitor for S/Sx of angina/ACS.     Essential hypertension  Chronic, controlled. Latest blood pressure and vitals reviewed-     Temp:  [97.9 °F (36.6 °C)-98.7 °F (37.1 °C)]   Pulse:  [67-70]   Resp:  [17-18]   BP: ()/(44-73)   SpO2:  [97 %-99 %] .     Labile. Has higher BP goal due to age and co-morbidities    Stop amlodpine  Continue carvedilol 3.125 mg BID and lisinopril 5 mg daily    Diet:  regular diet  GI PPx: not needed  DVT PPx:  apixaban  Airways: room air  Wounds: none    Goals of Care:  Return to prior functional status     Discharge Planning   TEOFILO: 5/22/2024   Is the patient medically ready for discharge?:     Reason for patient still in hospital (select all that apply): Patient trending condition and Treatment  Discharge Plan A: Skilled Nursing Facility   Discharge Delays: (!) Post-Acute Set-up    Toma Porter MD        no

## 2024-05-21 NOTE — PLAN OF CARE
Problem: Fall Injury Risk  Goal: Absence of Fall and Fall-Related Injury  Outcome: Progressing     Problem: Skin Injury Risk Increased  Goal: Skin Health and Integrity  Outcome: Not Progressing   Telecamera at the bedside. Report given to the nurse receiving him in [facility]. Nurse tried twice to give pt medication; however, pt was uncooperative. Pt's room in front of nursing station. Wound care done by wound care nurse.

## 2024-05-21 NOTE — PROGRESS NOTES
[facility] - [facility] ([place] [address])  Wound Care    Patient Name:  Deena Moody   MRN:  [id number]  Date: 5/21/2024  Diagnosis: Arm swelling    History:     Past Medical History:   Diagnosis Date    Acute encephalopathy 5/20/2024    Acute on chronic diastolic heart failure 9/1/2016    Coronary artery disease     Hyperlipidemia     Hypertension     Macular degeneration (senile) of retina, unspecified 12/12/2014    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    Persistent atrial fibrillation 11/10/2016    S/P placement of cardiac pacemaker 9/14/2016       Social History     Socioeconomic History    Marital status:    Tobacco Use    Smoking status: Never     Passive exposure: Never    Smokeless tobacco: Never   Substance and Sexual Activity    Alcohol use: No    Drug use: No    Sexual activity: Yes     Partners: Female     Social Determinants of Health     Financial Resource Strain: Patient Declined (5/13/2024)    Overall Financial Resource Strain (CARDIA)     Difficulty of Paying Living Expenses: Patient declined   Food Insecurity: Patient Declined (5/13/2024)    Hunger Vital Sign     Worried About Running Out of Food in the Last Year: Patient declined     Ran Out of Food in the Last Year: Patient declined   Transportation Needs: Patient Declined (5/13/2024)    TRANSPORTATION NEEDS     Transportation : Patient declined   Physical Activity: Patient Declined (5/13/2024)    Exercise Vital Sign     Days of Exercise per Week: Patient declined     Minutes of Exercise per Session: Patient declined   Recent Concern: Physical Activity - Inactive (4/29/2024)    Exercise Vital Sign     Days of Exercise per Week: 0 days     Minutes of Exercise per Session: 0 min   Stress: Patient Declined (5/13/2024)    [employer] - Occupational Stress Questionnaire     Feeling of Stress : Patient declined   Recent Concern: Stress - Stress Concern Present (4/29/2024)    [employer] - Occupational  Stress Questionnaire     Feeling of Stress : Rather much   Housing Stability: Patient Declined (5/13/2024)    Housing Stability Vital Sign     Unable to Pay for Housing in the Last Year: Patient declined     Homeless in the Last Year: Patient declined       Precautions:     Allergies as of 05/11/2024 - Reviewed 05/11/2024   Allergen Reaction Noted    Iodine and iodide containing products Other (See Comments) 07/20/2012       WO Assessment Details/Treatment     Patient seen for wound care consultation.   Reviewed chart for this encounter.   See Flow Sheet for findings.      RECOMMENDATIONS: Patient seen and assessed for wound care follow up with skin integrity nurse practitioner. Please follow her note for updates.    Discussed POC with patient and primary nurse.   See EMR for orders & patient education.    Discussed nutrition and the role of protein in wound healing with the patient. Instructed patient to optimize protein for wound healing.    Bedside nursing to continue care & monitoring.  Bedside nursing to maintain pressure injury prevention interventions.            05/21/24 1035   WOCN Assessment   WOCN Total Time (mins) 45   Visit Date 05/21/24   Visit Time 1035   Consult Type Follow Up   WOCN Speciality Wound   Intervention assessed;changed;applied;chart review;coordination of care;consult other service;orders   Teaching on-going       Orders placed.   Ganga ZEPEDA, RN  05/21/2024

## 2024-05-21 NOTE — SUBJECTIVE & OBJECTIVE
"  Subjective:     HPI:  Deena Moody is a 89 year old male with PMHx HTN, HLD, CAD, diastolic HF, A-fib with pacemaker, CABG, hx of MI, AV block, and CKD 3, with recent admission to  for treatment of L hand gout flare (d/c 5/8/24), who presents back to [facility] ED from his nursing home with L upper arm swelling. During prior admission, pt initially thought to have osteomyelitis and started on BS abx. Later determined to be gout flare, managed with steroids by rheumatology. Patient has dementia at baseline and is unable to explain his situation. He complains that he feels bad and endorses muscle pain "all over," but otherwise cannot describe his symptoms. No family at bedside. He denies confusion, fevers or chills, headaches, chest pain, SOB, belly pain.      In ED: T 99F, WBC 21K (15K prior admission). VSS. Labs otherwise c/w stable chronic anemia, CKD. CRP 99.7 (prior ~60). ESR 78 (prior ~100). UA unremarkable. CXR without change from prior. XR LUE remarkable for "redemonstration of erosive change involving the head of the 2nd middle phalanx, previously demonstrated on hand CT and radiograph referenced above.  Findings could reflect underlying osteomyelitis or other inflammatory arthritidis in appears similar to prior exams." Otherwise XR of L hand, L humerus, L forearm without acute findings. Given tylenol, vanc/ zosyn. Patient admitted to hospital medicine service for further management. Skin integrity EMILIO consulted for evaluation of skin injury.       Hospital Course:   No notes on file          Scheduled Meds:   allopurinoL  50 mg Oral Daily    apixaban  2.5 mg Oral BID    aspirin  81 mg Oral Daily    balsam peru-castor oiL   Topical (Top) BID    carvediloL  3.125 mg Oral BID    colchicine  0.6 mg Oral Every other day    ferrous sulfate  1 tablet Oral Daily    lisinopriL  5 mg Oral Daily    multivitamin  1 tablet Oral Daily    pantoprazole  40 mg Oral Daily    polyethylene glycol  17 g Oral BID    " senna-docusate 8.6-50 mg  2 tablet Oral BID     Continuous Infusions:  PRN Meds:  Current Facility-Administered Medications:     acetaminophen, 650 mg, Oral, Q6H PRN    albuterol-ipratropium, 3 mL, Nebulization, Q4H PRN    aluminum-magnesium hydroxide-simethicone, 30 mL, Oral, QID PRN    dextrose 10%, 12.5 g, Intravenous, PRN    dextrose 10%, 25 g, Intravenous, PRN    diclofenac sodium, 2 g, Topical (Top), Daily PRN    glucagon (human recombinant), 1 mg, Intramuscular, PRN    glucose, 16 g, Oral, PRN    glucose, 24 g, Oral, PRN    hydrOXYzine HCL, 25 mg, Oral, TID PRN    melatonin, 6 mg, Oral, Nightly PRN    naloxone, 0.02 mg, Intravenous, PRN    ondansetron, 8 mg, Oral, Q8H PRN    prochlorperazine, 5 mg, Intravenous, Q6H PRN    sodium chloride 0.9%, 10 mL, Intravenous, PRN    sodium chloride 0.9%, 10 mL, Intravenous, Q8H PRN    Review of Systems   Skin:  Positive for wound.     Objective:     Vital Signs (Most Recent):  Temp: 97.4 °F (36.3 °C) (05/21/24 1140)  Pulse: 69 (05/21/24 1140)  Resp: 18 (05/21/24 1140)  BP: (!) 150/54 (05/21/24 1140)  SpO2: 98 % (05/21/24 1140) Vital Signs (24h Range):  Temp:  [97.4 °F (36.3 °C)-98 °F (36.7 °C)] 97.4 °F (36.3 °C)  Pulse:  [69-89] 69  Resp:  [18] 18  SpO2:  [45 %-100 %] 98 %  BP: ()/(52-75) 150/54     Weight: 52.7 kg (116 lb 2.9 oz)  Body mass index is 17.16 kg/m².     Physical Exam  Constitutional:       Appearance: Normal appearance.   Skin:     General: Skin is warm and dry.      Findings: Lesion present.   Neurological:      Mental Status: He is alert.          Laboratory:  All pertinent labs reviewed within the last 24 hours.    Diagnostic Results:  None

## 2024-05-21 NOTE — ASSESSMENT & PLAN NOTE
- follow up evaluation of skin injury.  - pt presents back [place] [facility] [place] his nursing home with L upper arm swelling.  - left sacral area initially with partial thickness tissue loss and pink, moist wound base; stage 2 pressure injury of sacrum.  - appears to be healing. Smaller partial thickness tissue loss with pink wound base with pink scar tissue to the periwound.  - continue with mepilex dressing to area.  - Immerse surface.  - pillows and heel boots for offloading.  - condom cath in place.  - turn q2h.  - domingo score documented at 12 with a nutrition sub scale score of 2.  - recommend dietary consult to optimize nutrition for wound healing.  - nursing to maintain pressure injury prevention measures and continue wound care per orders.

## 2024-05-21 NOTE — PLAN OF CARE
CM sent SNF orders to [facility] via CP.    10:50 AM  Per Dr. Porter, she's updated SNF orders. CM sent orders to [facility] via CP.    12:32 PM  Dr. Porter updated SNF orders.  CM sent new updated orders to [facility] via CP.    CM called Nonya at [facility] [phone], left message with  requesting call back.    1:34 PM  Nonya called back, states she received SNF orders, will look over them, put pt's room number for nurse to call report in CP.    1:59 PM  CM called pt's daughter Willa Vincent [phone] to discuss d/c.  CM LVM requesting call back.    Willa called back, CM informed that pt to be d/c'd today, CM just waiting on facility to call in report number, then ambulance transport will be arranged.  CG v/u.    2:38 PM  Per Mame, please have nurse call report to [phone], room 2B. Nurse Simons notified.    RONALDO set up ambulance transport for 3 PM.    DUANE Crespo, BS, RN, CCM

## 2024-05-21 NOTE — ASSESSMENT & PLAN NOTE
Chronic dementia  Patient with waxing and waning mental status  Treating underlying causes  Gout  Constipation  Zyprexa prn agitation

## 2024-05-22 ENCOUNTER — TELEPHONE (OUTPATIENT)
Dept: ELECTROPHYSIOLOGY | Facility: CLINIC | Age: 89
End: 2024-05-22
Payer: MEDICARE

## 2024-05-22 NOTE — TELEPHONE ENCOUNTER
Spoke with patient's daughter re: disconnected Merlin JONATHAN- She states patient has been admitted for the past month and will be returning to rehab facility for an expected additional 2 weeks. She states she will bring the monitor to the rehab if he requires a longer stay.

## 2024-05-22 NOTE — PLAN OF CARE
[facility] Med [facility] ([facility])  Discharge Final Note    Primary Care Provider: Jam Rowell MD    Expected Discharge Date: 5/21/2024    Final Discharge Note (most recent)       Final Note - 05/22/24 0820          Final Note    Assessment Type Final Discharge Note (P)      Anticipated Discharge Disposition Skilled Nursing Facility (P)         Post-Acute Status    Post-Acute Authorization Placement (P)      Post-Acute Placement Status Set-up Complete/Auth obtained (P)      Coverage [employer] (P)      Discharge Delays None known at this time (P)                      Important Message from Medicare  Important Message from Medicare regarding Discharge Appeal Rights: Explained to patient/caregiver, Signed/date by patient/caregiver, Given to patient/caregiver, Other (comments) (neeru signed)     Date IMM was signed: 05/15/24  Time IMM was signed: 1438  Pt d/c'd to [facility].    ED CrespoN, BS, RN, CCM

## 2024-06-01 ENCOUNTER — HOSPITAL ENCOUNTER (INPATIENT)
Facility: HOSPITAL | Age: 89
LOS: 1 days | Discharge: NURSING, REHAB OR LONG-TERM CARE | DRG: 377 | End: 2024-06-05
Attending: STUDENT IN AN ORGANIZED HEALTH CARE EDUCATION/TRAINING PROGRAM | Admitting: STUDENT IN AN ORGANIZED HEALTH CARE EDUCATION/TRAINING PROGRAM
Payer: MEDICARE

## 2024-06-01 DIAGNOSIS — I10 ESSENTIAL HYPERTENSION: ICD-10-CM

## 2024-06-01 DIAGNOSIS — K92.2 GIB (GASTROINTESTINAL BLEEDING): ICD-10-CM

## 2024-06-01 DIAGNOSIS — K92.2 GI BLEED: Primary | ICD-10-CM

## 2024-06-01 DIAGNOSIS — N18.32 STAGE 3B CHRONIC KIDNEY DISEASE: ICD-10-CM

## 2024-06-01 DIAGNOSIS — E78.5 DYSLIPIDEMIA: ICD-10-CM

## 2024-06-01 DIAGNOSIS — R06.02 SHORTNESS OF BREATH: ICD-10-CM

## 2024-06-01 PROBLEM — K62.5 RECTAL BLEEDING: Status: ACTIVE | Noted: 2024-06-01

## 2024-06-01 PROBLEM — F03.90: Status: ACTIVE | Noted: 2024-06-01

## 2024-06-01 PROBLEM — L97.429 CHRONIC ULCER OF LEFT HEEL: Status: ACTIVE | Noted: 2024-06-01

## 2024-06-01 LAB
ABO + RH BLD: NORMAL
ALBUMIN SERPL BCP-MCNC: 2.6 G/DL (ref 3.5–5.2)
ALP SERPL-CCNC: 147 U/L (ref 55–135)
ALT SERPL W/O P-5'-P-CCNC: 17 U/L (ref 10–44)
ANION GAP SERPL CALC-SCNC: 9 MMOL/L (ref 8–16)
APTT PPP: 33 SEC (ref 21–32)
AST SERPL-CCNC: 31 U/L (ref 10–40)
BASOPHILS # BLD AUTO: 0.06 K/UL (ref 0–0.2)
BASOPHILS NFR BLD: 0.7 % (ref 0–1.9)
BILIRUB SERPL-MCNC: 1 MG/DL (ref 0.1–1)
BLD GP AB SCN CELLS X3 SERPL QL: NORMAL
BUN SERPL-MCNC: 39 MG/DL (ref 10–30)
CALCIUM SERPL-MCNC: 8.4 MG/DL (ref 8.7–10.5)
CHLORIDE SERPL-SCNC: 114 MMOL/L (ref 95–110)
CO2 SERPL-SCNC: 19 MMOL/L (ref 23–29)
CREAT SERPL-MCNC: 1.4 MG/DL (ref 0.5–1.4)
DIFFERENTIAL METHOD BLD: ABNORMAL
EOSINOPHIL # BLD AUTO: 0.6 K/UL (ref 0–0.5)
EOSINOPHIL NFR BLD: 6.9 % (ref 0–8)
ERYTHROCYTE [DISTWIDTH] IN BLOOD BY AUTOMATED COUNT: 16.9 % (ref 11.5–14.5)
EST. GFR  (NO RACE VARIABLE): 46.9 ML/MIN/1.73 M^2
GLUCOSE SERPL-MCNC: 87 MG/DL (ref 70–110)
HCT VFR BLD AUTO: 32 % (ref 40–54)
HGB BLD-MCNC: 9.8 G/DL (ref 14–18)
IMM GRANULOCYTES # BLD AUTO: 0.12 K/UL (ref 0–0.04)
IMM GRANULOCYTES NFR BLD AUTO: 1.4 % (ref 0–0.5)
INR PPP: 1.1 (ref 0.8–1.2)
LYMPHOCYTES # BLD AUTO: 1.7 K/UL (ref 1–4.8)
LYMPHOCYTES NFR BLD: 19.6 % (ref 18–48)
MCH RBC QN AUTO: 30.8 PG (ref 27–31)
MCHC RBC AUTO-ENTMCNC: 30.6 G/DL (ref 32–36)
MCV RBC AUTO: 101 FL (ref 82–98)
MONOCYTES # BLD AUTO: 0.9 K/UL (ref 0.3–1)
MONOCYTES NFR BLD: 10.4 % (ref 4–15)
NEUTROPHILS # BLD AUTO: 5.4 K/UL (ref 1.8–7.7)
NEUTROPHILS NFR BLD: 61 % (ref 38–73)
NRBC BLD-RTO: 0 /100 WBC
PLATELET # BLD AUTO: 278 K/UL (ref 150–450)
PMV BLD AUTO: 8.9 FL (ref 9.2–12.9)
POTASSIUM SERPL-SCNC: 4.8 MMOL/L (ref 3.5–5.1)
PROT SERPL-MCNC: 7.1 G/DL (ref 6–8.4)
PROTHROMBIN TIME: 12.4 SEC (ref 9–12.5)
RBC # BLD AUTO: 3.18 M/UL (ref 4.6–6.2)
SODIUM SERPL-SCNC: 142 MMOL/L (ref 136–145)
SPECIMEN OUTDATE: NORMAL
WBC # BLD AUTO: 8.88 K/UL (ref 3.9–12.7)

## 2024-06-01 PROCEDURE — 25000003 PHARM REV CODE 250: Mod: HCNC

## 2024-06-01 PROCEDURE — 25000242 PHARM REV CODE 250 ALT 637 W/ HCPCS: Mod: HCNC | Performed by: STUDENT IN AN ORGANIZED HEALTH CARE EDUCATION/TRAINING PROGRAM

## 2024-06-01 PROCEDURE — C9113 INJ PANTOPRAZOLE SODIUM, VIA: HCPCS | Mod: HCNC | Performed by: STUDENT IN AN ORGANIZED HEALTH CARE EDUCATION/TRAINING PROGRAM

## 2024-06-01 PROCEDURE — 99285 EMERGENCY DEPT VISIT HI MDM: CPT | Mod: 25

## 2024-06-01 PROCEDURE — 63600175 PHARM REV CODE 636 W HCPCS: Mod: HCNC | Performed by: STUDENT IN AN ORGANIZED HEALTH CARE EDUCATION/TRAINING PROGRAM

## 2024-06-01 PROCEDURE — 96374 THER/PROPH/DIAG INJ IV PUSH: CPT | Mod: 59

## 2024-06-01 PROCEDURE — 93005 ELECTROCARDIOGRAM TRACING: CPT | Mod: HCNC

## 2024-06-01 PROCEDURE — 96374 THER/PROPH/DIAG INJ IV PUSH: CPT

## 2024-06-01 PROCEDURE — 96376 TX/PRO/DX INJ SAME DRUG ADON: CPT

## 2024-06-01 PROCEDURE — 93010 ELECTROCARDIOGRAM REPORT: CPT | Mod: HCNC,,, | Performed by: INTERNAL MEDICINE

## 2024-06-01 PROCEDURE — 25000003 PHARM REV CODE 250: Mod: HCNC | Performed by: STUDENT IN AN ORGANIZED HEALTH CARE EDUCATION/TRAINING PROGRAM

## 2024-06-01 PROCEDURE — 86850 RBC ANTIBODY SCREEN: CPT | Mod: HCNC

## 2024-06-01 PROCEDURE — 85610 PROTHROMBIN TIME: CPT | Mod: HCNC | Performed by: STUDENT IN AN ORGANIZED HEALTH CARE EDUCATION/TRAINING PROGRAM

## 2024-06-01 PROCEDURE — G0378 HOSPITAL OBSERVATION PER HR: HCPCS | Mod: HCNC

## 2024-06-01 PROCEDURE — 80053 COMPREHEN METABOLIC PANEL: CPT | Mod: HCNC

## 2024-06-01 PROCEDURE — 85730 THROMBOPLASTIN TIME PARTIAL: CPT | Mod: HCNC | Performed by: STUDENT IN AN ORGANIZED HEALTH CARE EDUCATION/TRAINING PROGRAM

## 2024-06-01 PROCEDURE — 96361 HYDRATE IV INFUSION ADD-ON: CPT

## 2024-06-01 PROCEDURE — 85025 COMPLETE CBC W/AUTO DIFF WBC: CPT | Mod: HCNC

## 2024-06-01 RX ORDER — AMOXICILLIN 250 MG
2 CAPSULE ORAL 2 TIMES DAILY
Status: DISCONTINUED | OUTPATIENT
Start: 2024-06-01 | End: 2024-06-05 | Stop reason: HOSPADM

## 2024-06-01 RX ORDER — NITROGLYCERIN 0.4 MG/1
0.4 TABLET SUBLINGUAL EVERY 5 MIN PRN
Status: DISCONTINUED | OUTPATIENT
Start: 2024-06-01 | End: 2024-06-05 | Stop reason: HOSPADM

## 2024-06-01 RX ORDER — COLCHICINE 0.6 MG/1
0.6 TABLET, FILM COATED ORAL EVERY OTHER DAY
Status: DISCONTINUED | OUTPATIENT
Start: 2024-06-02 | End: 2024-06-05 | Stop reason: HOSPADM

## 2024-06-01 RX ORDER — CARVEDILOL 3.12 MG/1
3.12 TABLET ORAL 2 TIMES DAILY
Status: DISCONTINUED | OUTPATIENT
Start: 2024-06-01 | End: 2024-06-05 | Stop reason: HOSPADM

## 2024-06-01 RX ORDER — BISACODYL 10 MG/1
10 SUPPOSITORY RECTAL DAILY PRN
Status: DISCONTINUED | OUTPATIENT
Start: 2024-06-01 | End: 2024-06-05 | Stop reason: HOSPADM

## 2024-06-01 RX ORDER — FLUTICASONE PROPIONATE 50 MCG
2 SPRAY, SUSPENSION (ML) NASAL DAILY
Status: DISCONTINUED | OUTPATIENT
Start: 2024-06-01 | End: 2024-06-05 | Stop reason: HOSPADM

## 2024-06-01 RX ORDER — ALBUTEROL SULFATE 90 UG/1
2 AEROSOL, METERED RESPIRATORY (INHALATION) EVERY 6 HOURS PRN
Status: DISCONTINUED | OUTPATIENT
Start: 2024-06-01 | End: 2024-06-05 | Stop reason: HOSPADM

## 2024-06-01 RX ORDER — PANTOPRAZOLE SODIUM 40 MG/10ML
40 INJECTION, POWDER, LYOPHILIZED, FOR SOLUTION INTRAVENOUS 2 TIMES DAILY
Status: DISCONTINUED | OUTPATIENT
Start: 2024-06-01 | End: 2024-06-05 | Stop reason: HOSPADM

## 2024-06-01 RX ORDER — ALLOPURINOL 100 MG/1
100 TABLET ORAL DAILY
Status: DISCONTINUED | OUTPATIENT
Start: 2024-06-10 | End: 2024-06-05 | Stop reason: HOSPADM

## 2024-06-01 RX ORDER — BALSAM PERU/CASTOR OIL
OINTMENT (GRAM) TOPICAL 2 TIMES DAILY
Status: DISCONTINUED | OUTPATIENT
Start: 2024-06-01 | End: 2024-06-05 | Stop reason: HOSPADM

## 2024-06-01 RX ORDER — PANTOPRAZOLE SODIUM 40 MG/10ML
80 INJECTION, POWDER, LYOPHILIZED, FOR SOLUTION INTRAVENOUS
Status: COMPLETED | OUTPATIENT
Start: 2024-06-01 | End: 2024-06-01

## 2024-06-01 RX ORDER — POLYETHYLENE GLYCOL 3350 17 G/17G
17 POWDER, FOR SOLUTION ORAL 2 TIMES DAILY PRN
Status: DISCONTINUED | OUTPATIENT
Start: 2024-06-01 | End: 2024-06-05 | Stop reason: HOSPADM

## 2024-06-01 RX ADMIN — FLUTICASONE PROPIONATE 100 MCG: 50 SPRAY, METERED NASAL at 02:06

## 2024-06-01 RX ADMIN — SENNOSIDES AND DOCUSATE SODIUM 2 TABLET: 50; 8.6 TABLET ORAL at 12:06

## 2024-06-01 RX ADMIN — SODIUM CHLORIDE 500 ML: 9 INJECTION, SOLUTION INTRAVENOUS at 08:06

## 2024-06-01 RX ADMIN — PANTOPRAZOLE SODIUM 40 MG: 40 INJECTION, POWDER, FOR SOLUTION INTRAVENOUS at 09:06

## 2024-06-01 RX ADMIN — Medication: at 09:06

## 2024-06-01 RX ADMIN — Medication: at 02:06

## 2024-06-01 RX ADMIN — PANTOPRAZOLE SODIUM 80 MG: 40 INJECTION, POWDER, LYOPHILIZED, FOR SOLUTION INTRAVENOUS at 08:06

## 2024-06-01 RX ADMIN — CARVEDILOL 3.12 MG: 3.12 TABLET, FILM COATED ORAL at 09:06

## 2024-06-01 RX ADMIN — SENNOSIDES AND DOCUSATE SODIUM 2 TABLET: 50; 8.6 TABLET ORAL at 09:06

## 2024-06-01 RX ADMIN — THERA TABS 1 TABLET: TAB at 12:06

## 2024-06-01 RX ADMIN — PANTOPRAZOLE SODIUM 40 MG: 40 INJECTION, POWDER, FOR SOLUTION INTRAVENOUS at 12:06

## 2024-06-01 NOTE — CARE UPDATE
I spoke to patients daughter Amelie. She changed his code status to DNR DNI. After discussing risk vs benefit with her she agreed she would not want invasive measures in treating her father. She would not want her father to undergo a colonoscopy. Discussed possibly stopping Eliquis on discharge from hospital.

## 2024-06-01 NOTE — ED TRIAGE NOTES
Deena Moody, a 89 y.o. male presents to the ED w/ complaint of rectal bleeding. Denies any stomach pain/discomfort.     Triage note:  Chief Complaint   Patient presents with    Rectal Bleeding     EMS reports [facility] staff states patient had several episodes of bloody diarrhea this am     Review of patient's allergies indicates:   Allergen Reactions    Iodine and iodide containing products Other (See Comments)     Caused changes in skin color     Past Medical History:   Diagnosis Date    Acute encephalopathy 5/20/2024    Acute on chronic diastolic heart failure 9/1/2016    Coronary artery disease     Hyperlipidemia     Hypertension     Macular degeneration (senile) of retina, unspecified 12/12/2014    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    Persistent atrial fibrillation 11/10/2016    S/P placement of cardiac pacemaker 9/14/2016    LOC/ APPEARANCE: The patient is AAOx4. Pt is speaking appropriately, no slurred speech. Pt changed into hospital gown. Continuous cardiac monitor, cont pulse ox, and auto BP cuff applied to patient. Pt reports pain level of 0. Pt updated on POC. Bed low and locked with side rails up x2, call bell in pt reach.  SKIN: Skin is warm dry. Swelling noted to left arm and hand. color is consistent with ethnicity.  Mucus membranes moist, acyanotic.  RESPIRATORY: Airway is open and patent. Respirations-spontaneous, unlabored, regular rate, equal bilaterally on inspiration and expiration. No accessory muscle use noted.   CARDIAC: Patient has regular heart rate.  No peripheral edema noted, and patient has no c/o chest pain. Peripheral pulses present equal and strong throughout. Pt has pacemaker to right upper chest.  ABDOMEN: Soft and non-tender to palpation with no distention noted. Pt has no complaints of abnormal bowel movements, reports blood in stool. Pt reports normal appetite.   NEUROLOGIC: Eyes open spontaneously and facial expression symmetrical. Pt behavior appropriate to situation, and pt  follows commands. Pt reports sensation present in all extremities when touched with a finger, denies any numbness or tingling. PERRLA  MUSCULOSKELETAL: Spontaneous movement noted to all extremities. Hand  equal.   : No complaints of frequency, burning, urgency or blood in the urine. Pt utilizes adult briefs.

## 2024-06-01 NOTE — ED NOTES
Nurses Note -- 4 Eyes      6/1/2024   6:54 PM      Skin assessed during: Admit      [] No Altered Skin Integrity Present    []Prevention Measures Documented      [x] Yes- Altered Skin Integrity Present or Discovered   [] LDA Added if Not in Epic (Describe Wound)   [] New Altered Skin Integrity was Present on Admit and Documented in LDA   [x] Wound Image Taken    Wound Care Consulted? No    Attending Nurse:  Shayy Rg RN/Staff Member:  Evelyn

## 2024-06-01 NOTE — ED NOTES
Pt arrived to ED full of feces and and blood stained sheets. Pt cleaned and  clean linen/gown placed on pt.

## 2024-06-01 NOTE — HPI
89 M with PMH of HTN, HLD, CAD, Afib on Eliquis 2.5mg, h/o AV block s/p pacemaker, macular degeneration of retina, dementia/cognitive decline, CAD s/p CABG, CKD3, long standing history of gout, was brought to the ED from [facility] for complains of rectal bleeding. Patient denies any episodes of rectal bleeding. He denies abdominal pain, nausea/vomiting, constipation. Says he has been passing his bowel movements. Asking to go back home. According to daughter, patient has dementia. Waxing and waning consciousness. Desmond in hospital. She reports he was in skilled nursing facility and last saw him last night. He did not complain of constipation or any symptoms. She was called in the morning with report of rectal bleeding. In ED nurse showed blood clot in patients diaper. No active episodes of rectal bleeding. Last Eliquis dose was this morning. In ED patients vitals stayed hemodynamically stable. Hb 9.8.    Recent admission for for left arm swelling. During that admission he had two episodes of bloody BM. Found to have fecal impaction. Disimpacted and started on bowel regimen for stercoral colitis. Colonoscopy not done in last admission after weighing risk vs benefit given patients age, comorbidities. Hb stayed stable so was discharged.     Update daughter Amelie medical updates. [phone]    residential meds:  - allopurinol   - colchicine  - bisacodyl prn  - senna-docusate BID  - miralax prn  - iron supplement  - albuterol prn  - flonase   - eliquis 2.5  - aspirin  - benazepril  - coreg

## 2024-06-01 NOTE — NURSING
Received report from Shayy in the ED about pt that is coming into 602. Telemetry monitor was already sent by [facility].

## 2024-06-01 NOTE — ACP (ADVANCE CARE PLANNING)
Advance Care Planning     Date: 06/01/2024    Code Status  In light of the patients advanced and life limiting illness,I engaged the the family in a voluntary conversation about the patient's preferences for care  at the very end of life. The patient wishes to have a natural, peaceful death.  Along those lines, the patient does not wish to have CPR or other invasive treatments performed when his heart and/or breathing stops. I communicated to the family that a DNR order would be placed in his medical record to reflect this preference.  I spent a total of 16 minutes engaging the patient in this advance care planning discussion.

## 2024-06-01 NOTE — H&P
[facility] Emergency Dept  Hospital Medicine  History & Physical    Patient Name: Deena Moody  MRN: [id number]  Patient Class: OP- Observation  Admission Date: 6/1/2024  Attending Physician: Rachel Cuevas MD   Primary Care Provider: Jam Rowell MD         Patient information was obtained from patient and ER records and daughter.     Subjective:     Principal Problem:Rectal bleeding    Chief Complaint:   Chief Complaint   Patient presents with    Rectal Bleeding     EMS reports [facility] staff states patient had several episodes of bloody diarrhea this am        HPI: 89 M with PMH of HTN, HLD, CAD, Afib on Eliquis 2.5mg, h/o AV block s/p pacemaker, macular degeneration of retina, dementia/cognitive decline, CAD s/p CABG, CKD3, long standing history of gout, was brought to the ED from [facility] for complains of rectal bleeding. Patient denies any episodes of rectal bleeding. He denies abdominal pain, nausea/vomiting, constipation. Says he has been passing his bowel movements. Asking to go back home. According to daughter, patient has dementia. Waxing and waning consciousness. Desmond in hospital. She reports he was in skilled nursing facility and last saw him last night. He did not complain of constipation or any symptoms. She was called in the morning with report of rectal bleeding. In ED nurse showed blood clot in patients diaper. No active episodes of rectal bleeding. Last Eliquis dose was this morning. In ED patients vitals stayed hemodynamically stable. Hb 9.8.    Recent admission for for left arm swelling. During that admission he had two episodes of bloody BM. Found to have fecal impaction. Disimpacted and started on bowel regimen for stercoral colitis. Colonoscopy not done in last admission after weighing risk vs benefit given patients age, comorbidities. Hb stayed stable so was discharged.     Update daughter Amelie medical updates. [phone]    [facility] meds:  - allopurinol   -  colchicine  - bisacodyl prn  - senna-docusate BID  - miralax prn  - iron supplement  - albuterol prn  - flonase   - eliquis 2.5  - aspirin  - benazepril  - coreg    Past Medical History:   Diagnosis Date    Acute encephalopathy 5/20/2024    Acute on chronic diastolic heart failure 9/1/2016    Chronic dementia 6/1/2024    Coronary artery disease     Hyperlipidemia     Hypertension     Macular degeneration (senile) of retina, unspecified 12/12/2014    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    Persistent atrial fibrillation 11/10/2016    S/P placement of cardiac pacemaker 9/14/2016       Past Surgical History:   Procedure Laterality Date    AORTIC VALVE REPLACEMENT N/A     CARDIAC PACEMAKER PLACEMENT      CATARACT EXTRACTION W/  INTRAOCULAR LENS IMPLANT Right 2/16/2016    Dr. Whitley    CATARACT EXTRACTION W/  INTRAOCULAR LENS IMPLANT Left 3/1/2016    Dr. Whitley    CORONARY ARTERY BYPASS GRAFT      EAR EXAMINATION UNDER ANESTHESIA      EYE SURGERY         Review of patient's allergies indicates:   Allergen Reactions    Iodine and iodide containing products Other (See Comments)     Caused changes in skin color       No current facility-administered medications on file prior to encounter.     Current Outpatient Medications on File Prior to Encounter   Medication Sig    acetaminophen (TYLENOL) 325 MG tablet Take 2 tablets (650 mg total) by mouth every 4 (four) hours as needed (any pain or temp >100).    albuterol (PROVENTIL HFA) 90 mcg/actuation inhaler Inhale 2 puffs into the lungs every 6 (six) hours as needed for Wheezing. Rescue    allopurinoL (ZYLOPRIM) 100 MG tablet Take 0.5 tablets (50 mg total) by mouth once daily for 21 days, THEN 1 tablet (100 mg total) once daily.    apixaban (ELIQUIS) 2.5 mg Tab Take 1 tablet (2.5 mg total) by mouth 2 (two) times daily.    aspirin (ECOTRIN) 81 MG EC tablet Take 1 tablet (81 mg total) by mouth once daily.    balsam peru-castor oiL Oint Apply topically 2 (two) times a day. Apply to  buttocks and sacrum BID and PRN if soiled.    benazepriL (LOTENSIN) 5 MG tablet Take 1 tablet (5 mg total) by mouth once daily.    bisacodyL (DULCOLAX) 10 mg Supp Place 1 suppository (10 mg total) rectally daily as needed (no BM in over one calendar day).    carvediloL (COREG) 3.125 MG tablet Take 1 tablet (3.125 mg total) by mouth 2 (two) times daily.    colchicine (COLCRYS) 0.6 mg tablet Take 1 tablet (0.6 mg total) by mouth every other day.    diclofenac sodium (VOLTAREN) 1 % Gel Apply 2 g topically 3 (three) times daily. To left elbow    ferrous sulfate (FEOSOL) 325 mg (65 mg iron) Tab tablet Take 1 tablet (325 mg total) by mouth every Mon, Wed, Fri. Before breakfast    fluticasone propionate (FLONASE) 50 mcg/actuation nasal spray 2 sprays (100 mcg total) by Each Nostril route once daily.    multivitamin (ONE DAILY MULTIVITAMIN) per tablet Take 1 tablet by mouth once daily.    nitroGLYCERIN (NITROSTAT) 0.4 MG SL tablet Take one tablet every 5 minutes for chest pain. After the third tablet go to the ED.    polyethylene glycol (GLYCOLAX) 17 gram/dose powder Take 17 g by mouth 3 (three) times daily as needed for Constipation.    senna-docusate 8.6-50 mg (PERICOLACE) 8.6-50 mg per tablet Take 2 tablets by mouth 2 (two) times daily.     Family History       Problem Relation (Age of Onset)    Hypertension Brother    No Known Problems Mother, Father, Sister, Maternal Aunt, Maternal Uncle, Paternal Aunt, Paternal Uncle, Maternal Grandmother, Maternal Grandfather, Paternal Grandmother, Paternal Grandfather, Daughter, Son    Stroke Brother          Tobacco Use    Smoking status: Never     Passive exposure: Never    Smokeless tobacco: Never   Substance and Sexual Activity    Alcohol use: No    Drug use: No    Sexual activity: Yes     Partners: Female     Review of Systems   Constitutional:  Negative for appetite change, chills and fever.   Respiratory:  Negative for cough, chest tightness, shortness of breath and wheezing.     Cardiovascular:  Negative for chest pain and palpitations.   Gastrointestinal:  Positive for blood in stool. Negative for abdominal pain, anal bleeding, diarrhea, nausea and vomiting.   Genitourinary:  Negative for difficulty urinating.   Neurological:  Negative for dizziness and numbness.   Psychiatric/Behavioral:  Positive for agitation. Negative for behavioral problems.      Objective:     Vital Signs (Most Recent):  Temp: 98.2 °F (36.8 °C) (06/01/24 1104)  Pulse: 69 (06/01/24 1104)  Resp: 18 (06/01/24 1104)  BP: (!) 146/67 (06/01/24 1104)  SpO2: 100 % (06/01/24 1104) Vital Signs (24h Range):  Temp:  [98 °F (36.7 °C)-98.2 °F (36.8 °C)] 98.2 °F (36.8 °C)  Pulse:  [69-74] 69  Resp:  [16-18] 18  SpO2:  [98 %-100 %] 100 %  BP: (101-157)/(52-67) 146/67     Weight: 53.1 kg (117 lb)  Body mass index is 17.28 kg/m².     Physical Exam  Constitutional:       Appearance: He is not ill-appearing.   Cardiovascular:      Rate and Rhythm: Normal rate.      Heart sounds: Normal heart sounds.   Pulmonary:      Effort: Pulmonary effort is normal.      Breath sounds: Normal breath sounds. No wheezing.   Abdominal:      General: Abdomen is flat.      Palpations: Abdomen is soft.   Genitourinary:     Rectum: Guaiac result positive.   Musculoskeletal:      Right lower leg: No edema.      Left lower leg: No edema.      Comments: Dressing on chronic left heel ulcer   Neurological:      Mental Status: He is alert. Mental status is at baseline.   Psychiatric:         Mood and Affect: Mood normal.                Significant Labs: All pertinent labs within the past 24 hours have been reviewed.  BMP:   Recent Labs   Lab 06/01/24  0816   GLU 87      K 4.8   *   CO2 19*   BUN 39*   CREATININE 1.4   CALCIUM 8.4*     CBC:   Recent Labs   Lab 06/01/24  0816   WBC 8.88   HGB 9.8*   HCT 32.0*            Significant Imaging: I have reviewed all pertinent imaging results/findings within the past 24 hours.      Assessment/Plan:      * Rectal bleeding  Rectal bleeding/hematochezia   Differentials include constipation/stercoral colitis, diverticular bleed, vs intracolonic pathology   - monitor Hb  - active type and screen  - transfuse for <7 or hemodynamic instability  - IV PPI BID  - hold eliquis   - GI consult   - abdominal xray   - continue bowel regimen     Chronic ulcer of left heel  - wound care consult   - nutrition consult    Chronic dementia  Waxing and waning consciousness. Desmond in hospital.   - will monitor     Severe malnutrition  - consult nutrition  - monitor electrolytes      Left arm swelling  Improving from previous admission. R/o infectious cause in previous admission. Continue rheum recs from previous admission.  - continue allopurinol  - continue colchicine  - tylenol prn     Atrial fibrillation  [id number] Score: 3.   - hold eliquis 2.5mg in setting of GIB  - continue coreg    CAD (coronary artery disease)  H/o stent placement and CABG  - hold ASA in setting of GIB, can restart once stable     Essential hypertension  - monitor vitals  - continue coreg  - hold benazepril      VTE Risk Mitigation (From admission, onward)           Ordered     IP VTE HIGH RISK PATIENT  Once         06/01/24 1115     Place sequential compression device  Until discontinued         06/01/24 1115                       On 06/01/2024, patient should be placed in hospital observation services under my care.             Rachel Cuevas MD  Department of Hospital Medicine  [facility] Dept

## 2024-06-01 NOTE — SUBJECTIVE & OBJECTIVE
Past Medical History:   Diagnosis Date    Acute encephalopathy 5/20/2024    Acute on chronic diastolic heart failure 9/1/2016    Chronic dementia 6/1/2024    Coronary artery disease     Hyperlipidemia     Hypertension     Macular degeneration (senile) of retina, unspecified 12/12/2014    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    Persistent atrial fibrillation 11/10/2016    S/P placement of cardiac pacemaker 9/14/2016       Past Surgical History:   Procedure Laterality Date    AORTIC VALVE REPLACEMENT N/A     CARDIAC PACEMAKER PLACEMENT      CATARACT EXTRACTION W/  INTRAOCULAR LENS IMPLANT Right 2/16/2016    Dr. Whitley    CATARACT EXTRACTION W/  INTRAOCULAR LENS IMPLANT Left 3/1/2016    Dr. Whitley    CORONARY ARTERY BYPASS GRAFT      EAR EXAMINATION UNDER ANESTHESIA      EYE SURGERY         Review of patient's allergies indicates:   Allergen Reactions    Iodine and iodide containing products Other (See Comments)     Caused changes in skin color       No current facility-administered medications on file prior to encounter.     Current Outpatient Medications on File Prior to Encounter   Medication Sig    acetaminophen (TYLENOL) 325 MG tablet Take 2 tablets (650 mg total) by mouth every 4 (four) hours as needed (any pain or temp >100).    albuterol (PROVENTIL HFA) 90 mcg/actuation inhaler Inhale 2 puffs into the lungs every 6 (six) hours as needed for Wheezing. Rescue    allopurinoL (ZYLOPRIM) 100 MG tablet Take 0.5 tablets (50 mg total) by mouth once daily for 21 days, THEN 1 tablet (100 mg total) once daily.    apixaban (ELIQUIS) 2.5 mg Tab Take 1 tablet (2.5 mg total) by mouth 2 (two) times daily.    aspirin (ECOTRIN) 81 MG EC tablet Take 1 tablet (81 mg total) by mouth once daily.    balsam peru-castor oiL Oint Apply topically 2 (two) times a day. Apply to buttocks and sacrum BID and PRN if soiled.    benazepriL (LOTENSIN) 5 MG tablet Take 1 tablet (5 mg total) by mouth once daily.    bisacodyL (DULCOLAX) 10 mg Supp  Place 1 suppository (10 mg total) rectally daily as needed (no BM in over one calendar day).    carvediloL (COREG) 3.125 MG tablet Take 1 tablet (3.125 mg total) by mouth 2 (two) times daily.    colchicine (COLCRYS) 0.6 mg tablet Take 1 tablet (0.6 mg total) by mouth every other day.    diclofenac sodium (VOLTAREN) 1 % Gel Apply 2 g topically 3 (three) times daily. To left elbow    ferrous sulfate (FEOSOL) 325 mg (65 mg iron) Tab tablet Take 1 tablet (325 mg total) by mouth every Mon, Wed, Fri. Before breakfast    fluticasone propionate (FLONASE) 50 mcg/actuation nasal spray 2 sprays (100 mcg total) by Each Nostril route once daily.    multivitamin (ONE DAILY MULTIVITAMIN) per tablet Take 1 tablet by mouth once daily.    nitroGLYCERIN (NITROSTAT) 0.4 MG SL tablet Take one tablet every 5 minutes for chest pain. After the third tablet go to the ED.    polyethylene glycol (GLYCOLAX) 17 gram/dose powder Take 17 g by mouth 3 (three) times daily as needed for Constipation.    senna-docusate 8.6-50 mg (PERICOLACE) 8.6-50 mg per tablet Take 2 tablets by mouth 2 (two) times daily.     Family History       Problem Relation (Age of Onset)    Hypertension Brother    No Known Problems Mother, Father, Sister, Maternal Aunt, Maternal Uncle, Paternal Aunt, Paternal Uncle, Maternal Grandmother, Maternal Grandfather, Paternal Grandmother, Paternal Grandfather, Daughter, Son    Stroke Brother          Tobacco Use    Smoking status: Never     Passive exposure: Never    Smokeless tobacco: Never   Substance and Sexual Activity    Alcohol use: No    Drug use: No    Sexual activity: Yes     Partners: Female     Review of Systems   Constitutional:  Negative for appetite change, chills and fever.   Respiratory:  Negative for cough, chest tightness, shortness of breath and wheezing.    Cardiovascular:  Negative for chest pain and palpitations.   Gastrointestinal:  Positive for blood in stool. Negative for abdominal pain, anal bleeding,  diarrhea, nausea and vomiting.   Genitourinary:  Negative for difficulty urinating.   Neurological:  Negative for dizziness and numbness.   Psychiatric/Behavioral:  Positive for agitation. Negative for behavioral problems.      Objective:     Vital Signs (Most Recent):  Temp: 98.2 °F (36.8 °C) (06/01/24 1104)  Pulse: 69 (06/01/24 1104)  Resp: 18 (06/01/24 1104)  BP: (!) 146/67 (06/01/24 1104)  SpO2: 100 % (06/01/24 1104) Vital Signs (24h Range):  Temp:  [98 °F (36.7 °C)-98.2 °F (36.8 °C)] 98.2 °F (36.8 °C)  Pulse:  [69-74] 69  Resp:  [16-18] 18  SpO2:  [98 %-100 %] 100 %  BP: (101-157)/(52-67) 146/67     Weight: 53.1 kg (117 lb)  Body mass index is 17.28 kg/m².     Physical Exam  Constitutional:       Appearance: He is not ill-appearing.   Cardiovascular:      Rate and Rhythm: Normal rate.      Heart sounds: Normal heart sounds.   Pulmonary:      Effort: Pulmonary effort is normal.      Breath sounds: Normal breath sounds. No wheezing.   Abdominal:      General: Abdomen is flat.      Palpations: Abdomen is soft.   Genitourinary:     Rectum: Guaiac result positive.   Musculoskeletal:      Right lower leg: No edema.      Left lower leg: No edema.      Comments: Dressing on chronic left heel ulcer   Neurological:      Mental Status: He is alert. Mental status is at baseline.   Psychiatric:         Mood and Affect: Mood normal.                Significant Labs: All pertinent labs within the past 24 hours have been reviewed.  BMP:   Recent Labs   Lab 06/01/24  0816   GLU 87      K 4.8   *   CO2 19*   BUN 39*   CREATININE 1.4   CALCIUM 8.4*     CBC:   Recent Labs   Lab 06/01/24  0816   WBC 8.88   HGB 9.8*   HCT 32.0*            Significant Imaging: I have reviewed all pertinent imaging results/findings within the past 24 hours.

## 2024-06-01 NOTE — ASSESSMENT & PLAN NOTE
Improving from previous admission. R/o infectious cause in previous admission. Continue rheum recs from previous admission.  - continue allopurinol  - continue colchicine  - tylenol prn

## 2024-06-01 NOTE — ASSESSMENT & PLAN NOTE
Rectal bleeding/hematochezia   Differentials include constipation/stercoral colitis, diverticular bleed, vs intracolonic pathology   - monitor Hb  - active type and screen  - transfuse for <7 or hemodynamic instability  - IV PPI BID  - hold eliquis   - GI consult   - abdominal xray   - continue bowel regimen

## 2024-06-01 NOTE — ED PROVIDER NOTES
Encounter Date: 6/1/2024       History     Chief Complaint   Patient presents with    Rectal Bleeding     EMS reports [facility] staff states patient had several episodes of bloody diarrhea this am     89-year-old male with history of CAD, HLD, HTN, macular degeneration of retina, atrial fibrillation, and s/p cardiac pacemaker who presents to the ED via EMS from [facility] for chief complaint of rectal bleeding.  Patient has had multiple episodes of bright red blood from rectum at nursing home.  He denies any chest pain, SOB, abdominal pain, nausea, vomiting, or changes in urination.  Patient denies any rectal pain.  Patient does take Eliquis.  He has had no recent falls or injuries.  Patient has had not any recent colonoscopies or EGDs.    The history is provided by the patient, medical records and the nursing home. No  was used.     Review of patient's allergies indicates:   Allergen Reactions    Iodine and iodide containing products Other (See Comments)     Caused changes in skin color     Past Medical History:   Diagnosis Date    Acute encephalopathy 5/20/2024    Acute on chronic diastolic heart failure 9/1/2016    Chronic dementia 6/1/2024    Coronary artery disease     Hyperlipidemia     Hypertension     Macular degeneration (senile) of retina, unspecified 12/12/2014    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    Persistent atrial fibrillation 11/10/2016    S/P placement of cardiac pacemaker 9/14/2016     Past Surgical History:   Procedure Laterality Date    AORTIC VALVE REPLACEMENT N/A     CARDIAC PACEMAKER PLACEMENT      CATARACT EXTRACTION W/  INTRAOCULAR LENS IMPLANT Right 2/16/2016    Dr. Whitley    CATARACT EXTRACTION W/  INTRAOCULAR LENS IMPLANT Left 3/1/2016    Dr. Whitley    CORONARY ARTERY BYPASS GRAFT      EAR EXAMINATION UNDER ANESTHESIA      EYE SURGERY       Family History   Problem Relation Name Age of Onset    No Known Problems Mother      No Known Problems Father      No Known  Problems Sister x4     Stroke Brother x3     Hypertension Brother x3     No Known Problems Maternal Aunt      No Known Problems Maternal Uncle      No Known Problems Paternal Aunt      No Known Problems Paternal Uncle      No Known Problems Maternal Grandmother      No Known Problems Maternal Grandfather      No Known Problems Paternal Grandmother      No Known Problems Paternal Grandfather      No Known Problems Daughter      No Known Problems Son      Amblyopia Neg Hx      Blindness Neg Hx      Cancer Neg Hx      Cataracts Neg Hx      Diabetes Neg Hx      Glaucoma Neg Hx      Macular degeneration Neg Hx      Retinal detachment Neg Hx      Strabismus Neg Hx      Thyroid disease Neg Hx       Social History     Tobacco Use    Smoking status: Never     Passive exposure: Never    Smokeless tobacco: Never   Substance Use Topics    Alcohol use: No    Drug use: No     Review of Systems    Physical Exam     Initial Vitals [06/01/24 0733]   BP Pulse Resp Temp SpO2   (!) 101/52 74 16 98 °F (36.7 °C) 98 %      MAP       --         Physical Exam    Nursing note and vitals reviewed.  Constitutional: No distress.   Patient is laying in the bed in no apparent distress   HENT:   Head: Normocephalic.   Mouth/Throat: Oropharynx is clear and moist.   Eyes: Conjunctivae and EOM are normal. No scleral icterus.   Neck:   Normal range of motion.  Cardiovascular:  Normal rate, regular rhythm and normal heart sounds.           Pulmonary/Chest: Breath sounds normal. No respiratory distress. He has no wheezes. He has no rhonchi. He has no rales.   Abdominal: Abdomen is soft. He exhibits no distension. There is no abdominal tenderness. There is no rebound and no guarding.   Genitourinary: Rectum:      Guaiac result positive.   Guaiac positive stool. : Acceptable.   Genitourinary Comments: No gross blood in rectum.     Musculoskeletal:         General: Normal range of motion.      Cervical back: Normal range of motion.      Neurological: He is alert.   Alert and oriented x2 to person and place   Skin: Skin is warm. Capillary refill takes less than 2 seconds.   Psychiatric: He has a normal mood and affect.         ED Course   Procedures  Labs Reviewed   CBC W/ AUTO DIFFERENTIAL - Abnormal; Notable for the following components:       Result Value    RBC 3.18 (*)     Hemoglobin 9.8 (*)     Hematocrit 32.0 (*)      (*)     MCHC 30.6 (*)     RDW 16.9 (*)     MPV 8.9 (*)     Immature Granulocytes 1.4 (*)     Immature Grans (Abs) 0.12 (*)     Eos # 0.6 (*)     All other components within normal limits   COMPREHENSIVE METABOLIC PANEL - Abnormal; Notable for the following components:    Chloride 114 (*)     CO2 19 (*)     BUN 39 (*)     Calcium 8.4 (*)     Albumin 2.6 (*)     Alkaline Phosphatase 147 (*)     eGFR 46.9 (*)     All other components within normal limits   APTT - Abnormal; Notable for the following components:    aPTT 33.0 (*)     All other components within normal limits   PROTIME-INR   TYPE & SCREEN          Imaging Results              X-Ray Abdomen Portable (Final result)  Result time 06/01/24 13:14:53      Final result by Belem Freitas MD (06/01/24 13:14:53)                   Impression:      No definite acute intrathoracic process seen.      Electronically signed by: Belem Freitas MD  Date:    06/01/2024  Time:    13:14               Narrative:    EXAMINATION:  XR ABDOMEN PORTABLE    CLINICAL HISTORY:  check stool burdon. h/o of fecal impaction;    TECHNIQUE:  AP View(s) of the abdomen was performed.    COMPARISON:  05/07/2019    FINDINGS:  Moderate stool retention.  No bowel dilation.  No organomegaly.  Cholelithiasis seen.  Small right pleural effusion, bibasilar increased lung markings.  Significant atherosclerotic plaque of the abdominal aorta.  The osseous structures appear within normal limits for age.                                       Medications   pantoprazole injection 40 mg (40 mg  Intravenous Given 6/1/24 1245)   albuterol inhaler 2 puff (has no administration in time range)   allopurinol split tablet 50 mg (has no administration in time range)     Followed by   allopurinoL tablet 100 mg (has no administration in time range)   balsam peru-castor oiL Oint ( Topical (Top) Given 6/1/24 1431)   bisacodyL suppository 10 mg (has no administration in time range)   colchicine capsule/tablet 0.6 mg (has no administration in time range)   fluticasone propionate 50 mcg/actuation nasal spray 100 mcg (100 mcg Each Nostril Given 6/1/24 1431)   multivitamin tablet (1 tablet Oral Given 6/1/24 1245)   nitroGLYCERIN SL tablet 0.4 mg (has no administration in time range)   polyethylene glycol packet 17 g (has no administration in time range)   senna-docusate 8.6-50 mg per tablet 2 tablet (2 tablets Oral Given 6/1/24 1245)   carvediloL tablet 3.125 mg (has no administration in time range)   sodium chloride 0.9% bolus 500 mL 500 mL (0 mLs Intravenous Stopped 6/1/24 0918)   pantoprazole injection 80 mg (80 mg Intravenous Given 6/1/24 0834)     Medical Decision Making  89-year-old male who presents with rectal bleeding.  His BP is soft, other vitals WNL.  On exam, no gross blood in the rectum.  Bedside FOBT is positive.  Abdomen is soft nontender.  Please see physical exam findings above for additional details.  Differential diagnoses include but are not limited to lower GI bleed, upper GI bleed, anemia, angiodysplasia.  Higher suspicion of lower GI bleed since nursing home has been seeing hematochezia, no melena.  Will evaluate for anemia.  Ordered labs, 500 mL bolus of normal saline, and Protonix.  Please see ED course for additional details.    Patient will be admitted to the hospital for observation in the setting of his rectal bleeding and for H/H trending and GI evaluation.    Amount and/or Complexity of Data Reviewed  Labs: ordered. Decision-making details documented in ED Course.  ECG/medicine tests:  ordered and independent interpretation performed. Decision-making details documented in ED Course.    Risk  Prescription drug management.               ED Course as of 06/01/24 1601   Sat Jun 01, 2024   0807 Hx of CAD, HTN, HLD, A-fib on anticoagulation [NN]   0809 6/15/2023 Echo  · The left ventricle is normal in size with concentric remodeling and normal systolic function.  · The estimated ejection fraction is 65%.  · Grade III left ventricular diastolic dysfunction.  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  · The aortic valve mean gradient is 16 mmHg with a dimensionless index of 0.39.  · Mild-to-moderate mitral regurgitation.  · Mild right ventricular enlargement with mildly to moderately reduced right ventricular systolic function.  · Mild tricuspid regurgitation.  · Estimated PASP is at least 65mmHg.  · Moderate left atrial enlargement.   [NN]   0821 EKG with ventricularly paced rhythm, rate 70, no STEMI [NN]   0824 I evaluated this patient with the resident.  Briefly, patient is a 89-year-old male who presents from his nursing facility for rectal bleeding that started today. [NN]   0824 Per report from EMS, patient had multiple episodes of bloody diarrhea.  On exam, patient is not having any active hemorrhage.  Blood pressure is soft.  He has no abdominal tenderness.  His diaper had 1 large gelatinous blood clot measuring 3 cm.  Rectal exam performed by the resident with brown stool mixed with some blood. [NN]   0825 On my repeat exam, patient's blood pressure now 140s systolic without any intervention.  Will still give 500 cc IV fluid bolus.  Labs and type and screen ordered. [NN]   0832 Patient had large blood clot in diaper.  No gross hematuria on rectal exam.  FOBT is positive. [MD]   0919 Hemoglobin(!): 9.8 [NN]      ED Course User Index  [MD] Heraclio Brian MD  [NN] Jyoti Pastrana MD                           Clinical Impression:  Final diagnoses:  [K92.2] GI bleed (Primary)          ED  Disposition Condition    Observation Stable                Heraclio Brian MD  Resident  06/01/24 6390

## 2024-06-02 LAB
ANION GAP SERPL CALC-SCNC: 10 MMOL/L (ref 8–16)
BASOPHILS # BLD AUTO: 0.05 K/UL (ref 0–0.2)
BASOPHILS NFR BLD: 0.7 % (ref 0–1.9)
BUN SERPL-MCNC: 39 MG/DL (ref 10–30)
CALCIUM SERPL-MCNC: 8 MG/DL (ref 8.7–10.5)
CHLORIDE SERPL-SCNC: 115 MMOL/L (ref 95–110)
CO2 SERPL-SCNC: 14 MMOL/L (ref 23–29)
CREAT SERPL-MCNC: 1.2 MG/DL (ref 0.5–1.4)
DIFFERENTIAL METHOD BLD: ABNORMAL
EOSINOPHIL # BLD AUTO: 0.4 K/UL (ref 0–0.5)
EOSINOPHIL NFR BLD: 5.2 % (ref 0–8)
ERYTHROCYTE [DISTWIDTH] IN BLOOD BY AUTOMATED COUNT: 16.8 % (ref 11.5–14.5)
EST. GFR  (NO RACE VARIABLE): 56.4 ML/MIN/1.73 M^2
GLUCOSE SERPL-MCNC: 58 MG/DL (ref 70–110)
HCT VFR BLD AUTO: 28.8 % (ref 40–54)
HGB BLD-MCNC: 8.5 G/DL (ref 14–18)
IMM GRANULOCYTES # BLD AUTO: 0.1 K/UL (ref 0–0.04)
IMM GRANULOCYTES NFR BLD AUTO: 1.4 % (ref 0–0.5)
LYMPHOCYTES # BLD AUTO: 1.3 K/UL (ref 1–4.8)
LYMPHOCYTES NFR BLD: 17 % (ref 18–48)
MCH RBC QN AUTO: 30.7 PG (ref 27–31)
MCHC RBC AUTO-ENTMCNC: 29.5 G/DL (ref 32–36)
MCV RBC AUTO: 104 FL (ref 82–98)
MONOCYTES # BLD AUTO: 0.8 K/UL (ref 0.3–1)
MONOCYTES NFR BLD: 10.2 % (ref 4–15)
NEUTROPHILS # BLD AUTO: 4.8 K/UL (ref 1.8–7.7)
NEUTROPHILS NFR BLD: 65.5 % (ref 38–73)
NRBC BLD-RTO: 0 /100 WBC
OHS QRS DURATION: 156 MS
OHS QRS DURATION: 158 MS
OHS QTC CALCULATION: 503 MS
OHS QTC CALCULATION: 511 MS
PLATELET # BLD AUTO: 203 K/UL (ref 150–450)
PMV BLD AUTO: 8.9 FL (ref 9.2–12.9)
POTASSIUM SERPL-SCNC: 5.3 MMOL/L (ref 3.5–5.1)
RBC # BLD AUTO: 2.77 M/UL (ref 4.6–6.2)
SODIUM SERPL-SCNC: 139 MMOL/L (ref 136–145)
WBC # BLD AUTO: 7.34 K/UL (ref 3.9–12.7)

## 2024-06-02 PROCEDURE — 96361 HYDRATE IV INFUSION ADD-ON: CPT

## 2024-06-02 PROCEDURE — 25000242 PHARM REV CODE 250 ALT 637 W/ HCPCS: Mod: HCNC | Performed by: STUDENT IN AN ORGANIZED HEALTH CARE EDUCATION/TRAINING PROGRAM

## 2024-06-02 PROCEDURE — C9113 INJ PANTOPRAZOLE SODIUM, VIA: HCPCS | Mod: HCNC | Performed by: STUDENT IN AN ORGANIZED HEALTH CARE EDUCATION/TRAINING PROGRAM

## 2024-06-02 PROCEDURE — 63600175 PHARM REV CODE 636 W HCPCS: Mod: HCNC | Performed by: STUDENT IN AN ORGANIZED HEALTH CARE EDUCATION/TRAINING PROGRAM

## 2024-06-02 PROCEDURE — 96376 TX/PRO/DX INJ SAME DRUG ADON: CPT

## 2024-06-02 PROCEDURE — 25000003 PHARM REV CODE 250: Mod: HCNC | Performed by: STUDENT IN AN ORGANIZED HEALTH CARE EDUCATION/TRAINING PROGRAM

## 2024-06-02 PROCEDURE — 36415 COLL VENOUS BLD VENIPUNCTURE: CPT | Mod: HCNC | Performed by: STUDENT IN AN ORGANIZED HEALTH CARE EDUCATION/TRAINING PROGRAM

## 2024-06-02 PROCEDURE — 80048 BASIC METABOLIC PNL TOTAL CA: CPT | Mod: HCNC | Performed by: STUDENT IN AN ORGANIZED HEALTH CARE EDUCATION/TRAINING PROGRAM

## 2024-06-02 PROCEDURE — 85025 COMPLETE CBC W/AUTO DIFF WBC: CPT | Mod: HCNC | Performed by: STUDENT IN AN ORGANIZED HEALTH CARE EDUCATION/TRAINING PROGRAM

## 2024-06-02 PROCEDURE — G0378 HOSPITAL OBSERVATION PER HR: HCPCS | Mod: HCNC

## 2024-06-02 RX ORDER — DEXTROSE AND SODIUM CHLORIDE 10; .45 G/100ML; G/100ML
INJECTION, SOLUTION INTRAVENOUS CONTINUOUS
Status: DISCONTINUED | OUTPATIENT
Start: 2024-06-02 | End: 2024-06-02

## 2024-06-02 RX ORDER — ALUMINUM HYDROXIDE, MAGNESIUM HYDROXIDE, AND SIMETHICONE 2400; 240; 2400 MG/30ML; MG/30ML; MG/30ML
30 SUSPENSION ORAL EVERY 8 HOURS PRN
Status: DISCONTINUED | OUTPATIENT
Start: 2024-06-02 | End: 2024-06-05 | Stop reason: HOSPADM

## 2024-06-02 RX ADMIN — CARVEDILOL 3.12 MG: 3.12 TABLET, FILM COATED ORAL at 08:06

## 2024-06-02 RX ADMIN — SENNOSIDES AND DOCUSATE SODIUM 2 TABLET: 50; 8.6 TABLET ORAL at 09:06

## 2024-06-02 RX ADMIN — CARVEDILOL 3.12 MG: 3.12 TABLET, FILM COATED ORAL at 09:06

## 2024-06-02 RX ADMIN — THERA TABS 1 TABLET: TAB at 08:06

## 2024-06-02 RX ADMIN — SENNOSIDES AND DOCUSATE SODIUM 2 TABLET: 50; 8.6 TABLET ORAL at 08:06

## 2024-06-02 RX ADMIN — ALLOPURINOL 50 MG: 300 TABLET ORAL at 08:06

## 2024-06-02 RX ADMIN — Medication: at 08:06

## 2024-06-02 RX ADMIN — DEXTROSE AND SODIUM CHLORIDE: 10; .45 INJECTION, SOLUTION INTRAVENOUS at 07:06

## 2024-06-02 RX ADMIN — ALUMINUM HYDROXIDE, MAGNESIUM HYDROXIDE, AND DIMETHICONE 30 ML: 400; 400; 40 SUSPENSION ORAL at 06:06

## 2024-06-02 RX ADMIN — PANTOPRAZOLE SODIUM 40 MG: 40 INJECTION, POWDER, FOR SOLUTION INTRAVENOUS at 08:06

## 2024-06-02 RX ADMIN — Medication: at 11:06

## 2024-06-02 RX ADMIN — COLCHICINE 0.6 MG: 0.6 TABLET, FILM COATED ORAL at 08:06

## 2024-06-02 RX ADMIN — FLUTICASONE PROPIONATE 100 MCG: 50 SPRAY, METERED NASAL at 12:06

## 2024-06-02 RX ADMIN — SODIUM ZIRCONIUM CYCLOSILICATE 5 G: 5 POWDER, FOR SUSPENSION ORAL at 12:06

## 2024-06-02 RX ADMIN — PANTOPRAZOLE SODIUM 40 MG: 40 INJECTION, POWDER, FOR SOLUTION INTRAVENOUS at 09:06

## 2024-06-02 NOTE — ED NOTES
Telemetry Verification   Patient placed on Telemetry Box  Verified with War Room  Box # [id number]   Monitor Tech    Rate 69   Rhythm NS

## 2024-06-02 NOTE — PLAN OF CARE
[facility] - Med Surg  Initial Discharge Assessment       Primary Care Provider: Jam Rowell MD    Admission Diagnosis: GI bleed [K92.2]  GIB (gastrointestinal bleeding) [K92.2]    Admission Date: 6/1/2024  Expected Discharge Date: 6/3/2024    Transition of Care Barriers: Mental illness    Payor: HU[employer] MANAGED MEDICARE / Plan: HU[employer] MEDICARE HMO / Product Type: Capitation /     Extended Emergency Contact Information  Primary Emergency Contact: Amelie Fernandes   [place]  Mobile Phone: [phone]  Relation: Daughter  Secondary Emergency Contact: Elsa Moody  Mobile Phone: [phone]  Relation: Grandchild    Discharge Plan A: Skilled Nursing Facility  Discharge Plan B: Return to Nursing Home      [facility] - [facility] [address] [facility]  [address] [facility] [address]  Phone: [phone] Fax: [phone]    [id number] [place] [address] AT [facility] [address]  [address]  [facility] [address]  Phone: [phone] Fax: [phone]    [facility]  [address] [facility] [address]  [facility] [address]  Phone: [phone] Fax: [phone]      Initial Assessment (most recent)       Adult Discharge Assessment - 06/02/24 1219          Discharge Assessment    Assessment Type Discharge Planning Assessment     Confirmed/corrected address, phone number and insurance Yes     Confirmed Demographics Correct on Facesheet     Source of Information family     Does patient/caregiver understand observation status Yes     Communicated TEOFILO with patient/caregiver Yes     Reason For Admission GI bleed     People in Home facility resident     Facility Arrived From: [facility]     Do you expect to return to your current living situation? Yes     Do you have help at home or someone to help you manage your care at home? Yes     Who are your caregiver(s) and their phone number(s)? [facility]      Prior to hospitilization cognitive status: Not Oriented to Person;Not Oriented to Place     Current cognitive status: Not Oriented to Person;Not Oriented to Place     Walking or Climbing Stairs Difficulty yes     Walking or Climbing Stairs ambulation difficulty, requires equipment     Mobility Management W/C/RW     Dressing/Bathing Difficulty yes     Dressing/Bathing bathing difficulty, assistance 1 person     Home Accessibility wheelchair accessible     Home Layout Able to live on 1st floor     Equipment Currently Used at Home walker, rolling;wheelchair     Readmission within 30 days? No     Patient currently being followed by outpatient case management? No     Do you currently have service(s) that help you manage your care at home? No     Do you take prescription medications? Yes     Do you have prescription coverage? Yes     Do you have any problems affording any of your prescribed medications? No     Is the patient taking medications as prescribed? yes     Who is going to help you get home at discharge? hospital     How do you get to doctors appointments? agency     Are you on dialysis? No     Do you take coumadin? No     Discharge Plan A Skilled Nursing Facility     Discharge Plan B Return to Nursing Home     DME Needed Upon Discharge  none     Transition of Care Barriers Mental illness        Physical Activity    On average, how many days per week do you engage in moderate to strenuous exercise (like a brisk walk)? 7 days     On average, how many minutes do you engage in exercise at this level? 20 min        Financial Resource Strain    How hard is it for you to pay for the very basics like food, housing, medical care, and heating? Not hard at all        Housing Stability    In the last 12 months, was there a time when you were not able to pay the mortgage or rent on time? No     At any time in the past 12 months, were you homeless or living in a shelter (including now)? No        Transportation Needs     Has the lack of transportation kept you from medical appointments, meetings, work or from getting things needed for daily living? No        Food Insecurity    Within the past 12 months, you worried that your food would run out before you got the money to buy more. Never true     Within the past 12 months, the food you bought just didn't last and you didn't have money to get more. Never true        Stress    Do you feel stress - tense, restless, nervous, or anxious, or unable to sleep at night because your mind is troubled all the time - these days? To some extent        Social Isolation    How often do you feel lonely or isolated from those around you?  Sometimes        Alcohol Use    Q1: How often do you have a drink containing alcohol? Never     Q2: How many drinks containing alcohol do you have on a typical day when you are drinking? Patient does not drink     Q3: How often do you have six or more drinks on one occasion? Never        Utilities    In the past 12 months has the electric, gas, oil, or water company threatened to shut off services in your home? No        Health Literacy    How often do you need to have someone help you when you read instructions, pamphlets, or other written material from your doctor or pharmacy? Always                      Discharge Plan A and Plan B have been determined by review of patient's clinical status, future medical and therapeutic needs, and coverage/benefits for post-acute care in coordination with multidisciplinary team members.

## 2024-06-02 NOTE — NURSING
Nurses Note -- 4 Eyes      6/2/2024   1:57 AM      Skin assessed during: Admit      [] No Altered Skin Integrity Present    []Prevention Measures Documented      [x] Yes- Altered Skin Integrity Present or Discovered   [x] LDA Added if Not in Epic (Describe Wound)   [x] New Altered Skin Integrity was Present on Admit and Documented in LDA   [x] Wound Image Taken    Wound Care Consulted? Yes    Attending Nurse:  Palmira Rg RN/Staff Member:   elayne pathak

## 2024-06-02 NOTE — PROGRESS NOTES
[facility] - [facility] Medicine  Progress Note    Patient Name: Deena Moody  MRN: [id number]  Patient Class: OP- Observation   Admission Date: 6/1/2024  Length of Stay: 0 days  Attending Physician: Rachel Cuevas MD  Primary Care Provider: Jam Rowell MD        Subjective:     Principal Problem:Rectal bleeding        HPI:  89 M with PMH of HTN, HLD, CAD, Afib on Eliquis 2.5mg, h/o AV block s/p pacemaker, macular degeneration of retina, dementia/cognitive decline, CAD s/p CABG, CKD3, long standing history of gout, was brought to the ED from [facility] for complains of rectal bleeding. Patient denies any episodes of rectal bleeding. He denies abdominal pain, nausea/vomiting, constipation. Says he has been passing his bowel movements. Asking to go back home. According to daughter, patient has dementia. Waxing and waning consciousness. Desmond in hospital. She reports he was in skilled nursing facility and last saw him last night. He did not complain of constipation or any symptoms. She was called in the morning with report of rectal bleeding. In ED nurse showed blood clot in patients diaper. No active episodes of rectal bleeding. Last Eliquis dose was this morning. In ED patients vitals stayed hemodynamically stable. Hb 9.8.    Recent admission for for left arm swelling. During that admission he had two episodes of bloody BM. Found to have fecal impaction. Disimpacted and started on bowel regimen for stercoral colitis. Colonoscopy not done in last admission after weighing risk vs benefit given patients age, comorbidities. Hb stayed stable so was discharged.     Update daughter Amelie medical updates. [phone]    [facility] meds:  - allopurinol   - colchicine  - bisacodyl prn  - senna-docusate BID  - miralax prn  - iron supplement  - albuterol prn  - flonase   - eliquis 2.5  - aspirin  - benazepril  - coreg    Overview/Hospital Course:  89 M with PMH of HTN, HLD, CAD, Afib on Eliquis 2.5mg, h/o AV  block s/p pacemaker, macular degeneration of retina, dementia/cognitive decline, CAD s/p CABG, CKD3, long standing history of gout, was brought to the ED from [facility] for complains of rectal bleeding. Patient denies any episodes of rectal bleeding. He denies abdominal pain, nausea/vomiting, constipation. Says he has been passing his bowel movements. Asking to go back home. According to daughter, patient has dementia. Waxing and waning consciousness. Desmond in hospital. She reports he was in skilled nursing facility and last saw him last night. He did not complain of constipation or any symptoms. She was called in the morning with report of rectal bleeding. In ED nurse showed blood clot in patients diaper. No active episodes of rectal bleeding. Last Eliquis dose was this morning. In ED patients vitals stayed hemodynamically stable. Hb 9.8.     Patient did not have any more episodes of blood per rectum. Patients daughter spoke to GI and hospital medicine and options were discussed for colonoscopy. Given his age, she did not want him to undergo a colonoscopy. She wanted less invasive treatments. Patient received bowel regimen and had regular bowel movement without blood. Goals of care discussion was had with daughter and she changed his code status to DNR DNI. Xray abdomen shows moderate stool burden.       Interval History:     Patient had bowel movement overnight. No blood in stools. Patients reports feeling better this morning and eating his meals.   Hb had slight drop but no signs of active bleeding. Will trend Hb.    Review of Systems   Constitutional:  Negative for fever.   Respiratory:  Negative for cough, chest tightness, shortness of breath and wheezing.    Cardiovascular:  Negative for chest pain, palpitations and leg swelling.   Gastrointestinal:  Negative for abdominal pain, blood in stool, constipation, diarrhea and nausea.   Genitourinary: Negative.    Musculoskeletal: Negative.    Neurological:   Negative for dizziness, seizures, light-headedness, numbness and headaches.   Hematological: Negative.    Psychiatric/Behavioral:  Negative for dysphoric mood. The patient is not nervous/anxious.      Objective:     Vital Signs (Most Recent):  Temp: 97.4 °F (36.3 °C) (06/02/24 0728)  Pulse: 66 (06/02/24 1049)  Resp: 20 (06/02/24 0728)  BP: (!) 114/56 (06/02/24 0830)  SpO2: 97 % (06/02/24 0728) Vital Signs (24h Range):  Temp:  [97.4 °F (36.3 °C)-99.2 °F (37.3 °C)] 97.4 °F (36.3 °C)  Pulse:  [66-70] 66  Resp:  [17-20] 20  SpO2:  [97 %-99 %] 97 %  BP: (114-154)/(56-67) 114/56     Weight: 53.1 kg (117 lb 1 oz)  Body mass index is 17.29 kg/m².  No intake or output data in the 24 hours ending 06/02/24 1116      Physical Exam  Constitutional:       General: He is not in acute distress.     Appearance: He is not ill-appearing.   HENT:      Head: Normocephalic.   Cardiovascular:      Rate and Rhythm: Normal rate.      Pulses: Normal pulses.      Heart sounds: Normal heart sounds.   Pulmonary:      Effort: Pulmonary effort is normal.      Breath sounds: Normal breath sounds.   Abdominal:      General: Abdomen is flat.      Palpations: Abdomen is soft.   Musculoskeletal:      Right lower leg: No edema.      Left lower leg: No edema.      Comments: Left hand swelling+    Skin:     Comments: Dressing on left foot   Neurological:      Mental Status: Mental status is at baseline.             Significant Labs: All pertinent labs within the past 24 hours have been reviewed.  CBC:   Recent Labs   Lab 06/01/24  0816 06/02/24  0542   WBC 8.88 7.34   HGB 9.8* 8.5*   HCT 32.0* 28.8*    203       Significant Imaging: I have reviewed all pertinent imaging results/findings within the past 24 hours.    Assessment/Plan:      * Rectal bleeding  Rectal bleeding/hematochezia   Differentials include constipation/stercoral colitis, diverticular bleed.  GOC with family do not want invasive management with colonoscopy.   Hb drop today but no  more episodes of rectal bleeding   - trend Hb  - active type and screen  - transfuse for <7 or hemodynamic instability  - IV PPI BID  - hold eliquis    - continue bowel regimen     Chronic ulcer of left heel  - wound care consult   - nutrition consult    Chronic dementia  Waxing and waning consciousness. Desmond in hospital.   - will monitor     Severe malnutrition  seen by nutrition  - [facility] soft diet with assistance  - nutritional supplements   - monitor electrolytes      - PT/ OT    Left arm swelling  Improving from previous admission. R/o infectious cause in previous admission. Continue rheum recs from previous admission.  - continue allopurinol  - continue colchicine  - tylenol prn     Atrial fibrillation  [id number] Score: 3. Hasblad score 3. High risk of bleeding  - hold eliquis 2.5mg in setting of GIB  - continue coreg    CAD (coronary artery disease)  H/o stent placement and CABG  - hold ASA in setting of GIB, can restart once Hb is stable     Essential hypertension  - monitor vitals  - continue coreg  - hold benazepril      VTE Risk Mitigation (From admission, onward)           Ordered     IP VTE HIGH RISK PATIENT  Once         06/01/24 1115     Place sequential compression device  Until discontinued         06/01/24 1115                    Discharge Planning   TEOFILO:  6/3/24, pending stable Hb     Code Status: DNR   Is the patient medically ready for discharge?:     Reason for patient still in hospital (select all that apply): Patient trending condition                     Rachel Cuevas MD  Department of Hospital Medicine   [facility]

## 2024-06-02 NOTE — ASSESSMENT & PLAN NOTE
seen by nutrition  - [facility] soft diet with assistance  - nutritional supplements   - monitor electrolytes      - PT/ OT

## 2024-06-02 NOTE — ASSESSMENT & PLAN NOTE
[id number] Score: 3. Hasblad score 3. High risk of bleeding  - hold eliquis 2.5mg in setting of GIB  - continue coreg

## 2024-06-02 NOTE — SUBJECTIVE & OBJECTIVE
Interval History:     Patient had bowel movement overnight. No blood in stools. Patients reports feeling better this morning and eating his meals.   Hb had slight drop but no signs of active bleeding. Will trend Hb.    Review of Systems   Constitutional:  Negative for fever.   Respiratory:  Negative for cough, chest tightness, shortness of breath and wheezing.    Cardiovascular:  Negative for chest pain, palpitations and leg swelling.   Gastrointestinal:  Negative for abdominal pain, blood in stool, constipation, diarrhea and nausea.   Genitourinary: Negative.    Musculoskeletal: Negative.    Neurological:  Negative for dizziness, seizures, light-headedness, numbness and headaches.   Hematological: Negative.    Psychiatric/Behavioral:  Negative for dysphoric mood. The patient is not nervous/anxious.      Objective:     Vital Signs (Most Recent):  Temp: 97.4 °F (36.3 °C) (06/02/24 0728)  Pulse: 66 (06/02/24 1049)  Resp: 20 (06/02/24 0728)  BP: (!) 114/56 (06/02/24 0830)  SpO2: 97 % (06/02/24 0728) Vital Signs (24h Range):  Temp:  [97.4 °F (36.3 °C)-99.2 °F (37.3 °C)] 97.4 °F (36.3 °C)  Pulse:  [66-70] 66  Resp:  [17-20] 20  SpO2:  [97 %-99 %] 97 %  BP: (114-154)/(56-67) 114/56     Weight: 53.1 kg (117 lb 1 oz)  Body mass index is 17.29 kg/m².  No intake or output data in the 24 hours ending 06/02/24 1116      Physical Exam  Constitutional:       General: He is not in acute distress.     Appearance: He is not ill-appearing.   HENT:      Head: Normocephalic.   Cardiovascular:      Rate and Rhythm: Normal rate.      Pulses: Normal pulses.      Heart sounds: Normal heart sounds.   Pulmonary:      Effort: Pulmonary effort is normal.      Breath sounds: Normal breath sounds.   Abdominal:      General: Abdomen is flat.      Palpations: Abdomen is soft.   Musculoskeletal:      Right lower leg: No edema.      Left lower leg: No edema.      Comments: Left hand swelling+    Skin:     Comments: Dressing on left foot    Neurological:      Mental Status: Mental status is at baseline.             Significant Labs: All pertinent labs within the past 24 hours have been reviewed.  CBC:   Recent Labs   Lab 06/01/24  0816 06/02/24  0542   WBC 8.88 7.34   HGB 9.8* 8.5*   HCT 32.0* 28.8*    203       Significant Imaging: I have reviewed all pertinent imaging results/findings within the past 24 hours.

## 2024-06-02 NOTE — HOSPITAL COURSE
89 M with PMH of HTN, HLD, CAD, Afib on Eliquis 2.5mg, h/o AV block s/p pacemaker, macular degeneration of retina, dementia/cognitive decline, CAD s/p CABG, CKD3, long standing history of gout, was brought to the ED from [facility] for complains of rectal bleeding. Patient denies any episodes of rectal bleeding. He denies abdominal pain, nausea/vomiting, constipation. Says he has been passing his bowel movements. Asking to go back home. According to daughter, patient has dementia. Waxing and waning consciousness. Desmond in hospital. She reports he was in skilled nursing facility and last saw him last night. He did not complain of constipation or any symptoms. She was called in the morning with report of rectal bleeding. In ED nurse showed blood clot in patients diaper. No active episodes of rectal bleeding. Last Eliquis dose was this morning. In ED patients vitals stayed hemodynamically stable. Hb 9.8.     Patient did not have any more episodes of blood per rectum. Patients daughter spoke to GI and hospital medicine and options were discussed for colonoscopy. Given his age, she did not want him to undergo a colonoscopy. She wanted less invasive treatments. Patient received bowel regimen and had regular bowel movement without blood. Goals of care discussion was had with daughter and she changed his code status to DNR DNI. Xray abdomen shows moderate stool burden. CXR done shows persistent pleural effusion. No change from prior CXR. Patient sating well on RA and no reports of SOB. Patient did not have any more episodes of bleeding per rectum during hospital course. Hb downtrended to 7.5, he was transfused 1 PRBC. Hb responded adequately at 8.5. After discussion with daughter, plan for conservative treatment with CBC monitoring and transfusing as needed to maintain Hb 8<. Patient had episodes of urinary retention, straight cathx2. Muhammad placed and started on tamsulosin. Patients renal function remained at baseline. Cr  1.2-1.6.  Patient is stable and now ready for discharge to SNF.     To do  - monitor CBC, transfuse to maintain Hb 8+  - TOV 6/7/24  - restart aspirin 6/12/24

## 2024-06-02 NOTE — ASSESSMENT & PLAN NOTE
Rectal bleeding/hematochezia   Differentials include constipation/stercoral colitis, diverticular bleed.  GOC with family do not want invasive management with colonoscopy.   Hb drop today but no more episodes of rectal bleeding   - trend Hb  - active type and screen  - transfuse for <7 or hemodynamic instability  - IV PPI BID  - hold eliquis    - continue bowel regimen

## 2024-06-02 NOTE — PLAN OF CARE
Recommendations     Continue Soft & Bite Sized (level 6) diet.  Boost Plus ONS added TID.  RD to monitor & follow-up.     Goals: Meet % EEN, EPN by RD f/u date  Nutrition Goal Status: new  Communication of RD Recs: other (comment) (POC)

## 2024-06-02 NOTE — ASSESSMENT & PLAN NOTE
Malnutrition Type:  Context: chronic illness  Level: severe    Related to (etiology):   Inability to consume sufficient energy     Signs and Symptoms (as evidenced by):   Weight loss, NFPE, BMI 17.2    Malnutrition Characteristic Summary:  Weight Loss (Malnutrition): greater than 10% in 6 months  Subcutaneous Fat (Malnutrition): severe depletion  Muscle Mass (Malnutrition): severe depletion    Interventions/Recommendations (treatment strategy):  Collaboration of nutrition care w/ other providers  ONS    Nutrition Diagnosis Status:   New

## 2024-06-02 NOTE — CONSULTS
"  [facility]  Adult Nutrition  Consult Note    SUMMARY     Recommendations    Continue Soft & Bite Sized (level 6) diet.  Boost Plus ONS added TID.  RD to monitor & follow-up.    Goals: Meet % EEN, EPN by RD f/u date  Nutrition Goal Status: new  Communication of RD Recs: other (comment) (POC)    Assessment and Plan    Severe malnutrition    Malnutrition Type:  Context: chronic illness  Level: severe    Related to (etiology):   Inability to consume sufficient energy     Signs and Symptoms (as evidenced by):   Weight loss, NFPE, BMI 17.2    Malnutrition Characteristic Summary:  Weight Loss (Malnutrition): greater than 10% in 6 months  Subcutaneous Fat (Malnutrition): severe depletion  Muscle Mass (Malnutrition): severe depletion    Interventions/Recommendations (treatment strategy):  Collaboration of nutrition care w/ other providers  ONS    Nutrition Diagnosis Status:   New    Malnutrition Assessment    Malnutrition Context: chronic illness  Malnutrition Level: severe    Weight Loss (Malnutrition): greater than 10% in 6 months  Subcutaneous Fat (Malnutrition): severe depletion  Muscle Mass (Malnutrition): severe depletion     Reason for Assessment    Reason For Assessment: consult  Diagnosis: other (see comments) (Rectal bleeding)  Relevant Medical History: HTN, HLD, CKD, Dementia  Interdisciplinary Rounds: did not attend    General Information Comments: Diet advanced this AM. Pt presents from NH (disoriented) - unsure of energy intake PTA. Per chart review, pt w/ UBW of 135#. Visual NFPE reveals severe muscle/fat wasting. RD feels pt meets criteria for severe malnutrition; please see PES statement for details.  Nutrition Discharge Planning: Adequate PO intake    Nutrition/Diet History    Food Allergies: NKFA  Factors Affecting Nutritional Intake: decreased appetite    Anthropometrics    Temp: 97.4 °F (36.3 °C)  Height Method: Stated  Height: 5' 9" (175.3 cm)  Height (inches): 69 in  Weight Method: " Stated  Weight: 53.1 kg (117 lb 1 oz)  Weight (lb): 117.07 lb  Ideal Body Weight (IBW), Male: 160 lb  % Ideal Body Weight, Male (lb): 73.17 %  BMI (Calculated): 17.3  BMI Grade: 17 - 18.4 protein-energy malnutrition grade I  Usual Body Weight (UBW), k.4 kg  % Usual Body Weight: 86.66  % Weight Change From Usual Weight: -13.52 %    Lab/Procedures/Meds    Pertinent Labs Reviewed: reviewed  Pertinent Labs Comments: K 5.3, GFR 56.4  Pertinent Medications Reviewed: reviewed  Pertinent Medications Comments: D10, MVI    Estimated/Assessed Needs    Weight Used For Calorie Calculations: 53.1 kg (117 lb 1 oz)    Energy Calorie Requirements (kcal): 1593 kcal/d  Energy Need Method: Kcal/kg (30 kcal/kg)    Protein Requirements: 64 g/d (1.2 g/kg)  Weight Used For Protein Calculations: 53.1 kg (117 lb 1 oz)    Estimated Fluid Requirement Method: other (see comments) (Per MD)  RDA Method (mL): 1593    Nutrition Prescription Ordered    Current Diet Order: Soft & Bite Sized (level 6)    Evaluation of Received Nutrient/Fluid Intake    I/O: +5L since admit    Comments: LBM: 6/2    Tolerance: tolerating    Nutrition Risk    Level of Risk/Frequency of Follow-up:  (1x/week)     Monitor and Evaluation    Food and Nutrient Intake: energy intake, food and beverage intake  Food and Nutrient Adminstration: diet order  Physical Activity and Function: nutrition-related ADLs and IADLs  Anthropometric Measurements: weight, weight change  Biochemical Data, Medical Tests and Procedures: glucose/endocrine profile, lipid profile, inflammatory profile, gastrointestinal profile, electrolyte and renal panel  Nutrition-Focused Physical Findings: overall appearance     Nutrition Follow-Up    RD Follow-up?: Yes

## 2024-06-03 PROBLEM — D62 ACUTE BLOOD LOSS ANEMIA: Status: ACTIVE | Noted: 2024-06-03

## 2024-06-03 LAB
ANION GAP SERPL CALC-SCNC: 8 MMOL/L (ref 8–16)
BASOPHILS # BLD AUTO: 0.05 K/UL (ref 0–0.2)
BASOPHILS NFR BLD: 0.6 % (ref 0–1.9)
BUN SERPL-MCNC: 41 MG/DL (ref 10–30)
CALCIUM SERPL-MCNC: 7.9 MG/DL (ref 8.7–10.5)
CHLORIDE SERPL-SCNC: 113 MMOL/L (ref 95–110)
CO2 SERPL-SCNC: 16 MMOL/L (ref 23–29)
CREAT SERPL-MCNC: 1.6 MG/DL (ref 0.5–1.4)
DIFFERENTIAL METHOD BLD: ABNORMAL
EOSINOPHIL # BLD AUTO: 0.4 K/UL (ref 0–0.5)
EOSINOPHIL NFR BLD: 4.9 % (ref 0–8)
ERYTHROCYTE [DISTWIDTH] IN BLOOD BY AUTOMATED COUNT: 17.2 % (ref 11.5–14.5)
EST. GFR  (NO RACE VARIABLE): 39.9 ML/MIN/1.73 M^2
GLUCOSE SERPL-MCNC: 115 MG/DL (ref 70–110)
HCT VFR BLD AUTO: 28 % (ref 40–54)
HGB BLD-MCNC: 8.3 G/DL (ref 14–18)
IMM GRANULOCYTES # BLD AUTO: 0.09 K/UL (ref 0–0.04)
IMM GRANULOCYTES NFR BLD AUTO: 1.1 % (ref 0–0.5)
LYMPHOCYTES # BLD AUTO: 1.5 K/UL (ref 1–4.8)
LYMPHOCYTES NFR BLD: 18.2 % (ref 18–48)
MCH RBC QN AUTO: 30.9 PG (ref 27–31)
MCHC RBC AUTO-ENTMCNC: 29.6 G/DL (ref 32–36)
MCV RBC AUTO: 104 FL (ref 82–98)
MONOCYTES # BLD AUTO: 1 K/UL (ref 0.3–1)
MONOCYTES NFR BLD: 13.1 % (ref 4–15)
NEUTROPHILS # BLD AUTO: 4.9 K/UL (ref 1.8–7.7)
NEUTROPHILS NFR BLD: 62.1 % (ref 38–73)
NRBC BLD-RTO: 0 /100 WBC
PLATELET # BLD AUTO: 216 K/UL (ref 150–450)
PMV BLD AUTO: 8.8 FL (ref 9.2–12.9)
POTASSIUM SERPL-SCNC: 4.9 MMOL/L (ref 3.5–5.1)
RBC # BLD AUTO: 2.69 M/UL (ref 4.6–6.2)
SODIUM SERPL-SCNC: 137 MMOL/L (ref 136–145)
WBC # BLD AUTO: 7.96 K/UL (ref 3.9–12.7)

## 2024-06-03 PROCEDURE — 85025 COMPLETE CBC W/AUTO DIFF WBC: CPT | Mod: HCNC | Performed by: STUDENT IN AN ORGANIZED HEALTH CARE EDUCATION/TRAINING PROGRAM

## 2024-06-03 PROCEDURE — 25000003 PHARM REV CODE 250: Mod: HCNC | Performed by: STUDENT IN AN ORGANIZED HEALTH CARE EDUCATION/TRAINING PROGRAM

## 2024-06-03 PROCEDURE — 36415 COLL VENOUS BLD VENIPUNCTURE: CPT | Mod: HCNC | Performed by: STUDENT IN AN ORGANIZED HEALTH CARE EDUCATION/TRAINING PROGRAM

## 2024-06-03 PROCEDURE — C9113 INJ PANTOPRAZOLE SODIUM, VIA: HCPCS | Mod: HCNC | Performed by: STUDENT IN AN ORGANIZED HEALTH CARE EDUCATION/TRAINING PROGRAM

## 2024-06-03 PROCEDURE — 97530 THERAPEUTIC ACTIVITIES: CPT | Mod: HCNC

## 2024-06-03 PROCEDURE — G0378 HOSPITAL OBSERVATION PER HR: HCPCS | Mod: HCNC

## 2024-06-03 PROCEDURE — 96376 TX/PRO/DX INJ SAME DRUG ADON: CPT

## 2024-06-03 PROCEDURE — 97161 PT EVAL LOW COMPLEX 20 MIN: CPT | Mod: HCNC

## 2024-06-03 PROCEDURE — 80048 BASIC METABOLIC PNL TOTAL CA: CPT | Mod: HCNC | Performed by: STUDENT IN AN ORGANIZED HEALTH CARE EDUCATION/TRAINING PROGRAM

## 2024-06-03 PROCEDURE — 63600175 PHARM REV CODE 636 W HCPCS: Mod: HCNC | Performed by: STUDENT IN AN ORGANIZED HEALTH CARE EDUCATION/TRAINING PROGRAM

## 2024-06-03 RX ORDER — TAMSULOSIN HYDROCHLORIDE 0.4 MG/1
0.8 CAPSULE ORAL DAILY
Status: DISCONTINUED | OUTPATIENT
Start: 2024-06-03 | End: 2024-06-05 | Stop reason: HOSPADM

## 2024-06-03 RX ADMIN — Medication: at 08:06

## 2024-06-03 RX ADMIN — CARVEDILOL 3.12 MG: 3.12 TABLET, FILM COATED ORAL at 09:06

## 2024-06-03 RX ADMIN — FLUTICASONE PROPIONATE 100 MCG: 50 SPRAY, METERED NASAL at 08:06

## 2024-06-03 RX ADMIN — SENNOSIDES AND DOCUSATE SODIUM 2 TABLET: 50; 8.6 TABLET ORAL at 09:06

## 2024-06-03 RX ADMIN — Medication: at 09:06

## 2024-06-03 RX ADMIN — SENNOSIDES AND DOCUSATE SODIUM 2 TABLET: 50; 8.6 TABLET ORAL at 08:06

## 2024-06-03 RX ADMIN — ALLOPURINOL 50 MG: 300 TABLET ORAL at 08:06

## 2024-06-03 RX ADMIN — PANTOPRAZOLE SODIUM 40 MG: 40 INJECTION, POWDER, FOR SOLUTION INTRAVENOUS at 09:06

## 2024-06-03 RX ADMIN — CARVEDILOL 3.12 MG: 3.12 TABLET, FILM COATED ORAL at 08:06

## 2024-06-03 RX ADMIN — THERA TABS 1 TABLET: TAB at 08:06

## 2024-06-03 RX ADMIN — TAMSULOSIN HYDROCHLORIDE 0.8 MG: 0.4 CAPSULE ORAL at 09:06

## 2024-06-03 RX ADMIN — PANTOPRAZOLE SODIUM 40 MG: 40 INJECTION, POWDER, FOR SOLUTION INTRAVENOUS at 08:06

## 2024-06-03 NOTE — ASSESSMENT & PLAN NOTE
This patient has hyperkalemia which is controlled. We will monitor for arrhythmias with EKG or continuous telemetry. We will treat the hyperkalemia with Potassium Binders. The likely etiology of the hyperkalemia is  CKD IV .  The patients latest potassium has been reviewed and the results are listed below  Recent Labs   Lab 06/03/24  0510   K 4.9

## 2024-06-03 NOTE — ASSESSMENT & PLAN NOTE
[id number] Score: 3. Hasblad score 3. High risk of bleeding  - hold eliquis 2.5mg in setting of GIB  - continue coreg    After conversation with daughter, plan to hold eliquis on discharge given high risk of bleeding

## 2024-06-03 NOTE — PROGRESS NOTES
Sadiq Acosta - [facility] Medicine  Progress Note    Patient Name: Deena Moody  MRN: [id number]  Patient Class: OP- Observation   Admission Date: 6/1/2024  Length of Stay: 0 days  Attending Physician: Rachel Cuevas MD  Primary Care Provider: Jam Rowell MD        Subjective:     Principal Problem:Rectal bleeding        HPI:  89 M with PMH of HTN, HLD, CAD, Afib on Eliquis 2.5mg, h/o AV block s/p pacemaker, macular degeneration of retina, dementia/cognitive decline, CAD s/p CABG, CKD3, long standing history of gout, was brought to the ED from [facility] for complains of rectal bleeding. Patient denies any episodes of rectal bleeding. He denies abdominal pain, nausea/vomiting, constipation. Says he has been passing his bowel movements. Asking to go back home. According to daughter, patient has dementia. Waxing and waning consciousness. Desmond in hospital. She reports he was in skilled nursing facility and last saw him last night. He did not complain of constipation or any symptoms. She was called in the morning with report of rectal bleeding. In ED nurse showed blood clot in patients diaper. No active episodes of rectal bleeding. Last Eliquis dose was this morning. In ED patients vitals stayed hemodynamically stable. Hb 9.8.    Recent admission for for left arm swelling. During that admission he had two episodes of bloody BM. Found to have fecal impaction. Disimpacted and started on bowel regimen for stercoral colitis. Colonoscopy not done in last admission after weighing risk vs benefit given patients age, comorbidities. Hb stayed stable so was discharged.     Update daughter Amelie medical updates. [phone]    [facility] meds:  - allopurinol   - colchicine  - bisacodyl prn  - senna-docusate BID  - miralax prn  - iron supplement  - albuterol prn  - flonase   - eliquis 2.5  - aspirin  - benazepril  - coreg    Overview/Hospital Course:  89 M with PMH of HTN, HLD, CAD, Afib on Eliquis 2.5mg, h/o AV  block s/p pacemaker, macular degeneration of retina, dementia/cognitive decline, CAD s/p CABG, CKD3, long standing history of gout, was brought to the ED from [facility] for complains of rectal bleeding. Patient denies any episodes of rectal bleeding. He denies abdominal pain, nausea/vomiting, constipation. Says he has been passing his bowel movements. Asking to go back home. According to daughter, patient has dementia. Waxing and waning consciousness. Desmond in hospital. She reports he was in skilled nursing facility and last saw him last night. He did not complain of constipation or any symptoms. She was called in the morning with report of rectal bleeding. In ED nurse showed blood clot in patients diaper. No active episodes of rectal bleeding. Last Eliquis dose was this morning. In ED patients vitals stayed hemodynamically stable. Hb 9.8.     Patient did not have any more episodes of blood per rectum. Patients daughter spoke to GI and hospital medicine and options were discussed for colonoscopy. Given his age, she did not want him to undergo a colonoscopy. She wanted less invasive treatments. Patient received bowel regimen and had regular bowel movement without blood. Goals of care discussion was had with daughter and she changed his code status to DNR DNI. Xray abdomen shows moderate stool burden.       Interval History:     No acute overnight events.   Cr increased to 1.6  Hb stable at 8.3   No signs of bleeding in stools   Bladder scan with 400cc. Straight cath done. Started on tamsulosin    Seen by PT today and wound care    I had conversation with daughter. I discussed risks and benefits of being on Eliquis for Afib stroke prophylaxis. HASBLED score is high risk of bleeding and chadsvasc score is 3. After discussion, daughter agreed to stop eliquis on discharge since patient had 2 episodes of GI bleed in the past month.     Review of Systems   Constitutional:  Positive for fatigue. Negative for chills and  fever.   Respiratory:  Negative for chest tightness, shortness of breath and wheezing.    Cardiovascular:  Negative for chest pain and leg swelling.   Gastrointestinal:  Negative for abdominal pain, constipation, diarrhea, nausea and vomiting.   Genitourinary: Negative.    Musculoskeletal:  Negative for back pain and myalgias.   Neurological:  Negative for dizziness, weakness, light-headedness and numbness.   Psychiatric/Behavioral:  Negative for agitation and confusion. The patient is not nervous/anxious.      Objective:     Vital Signs (Most Recent):  Temp: 98.3 °F (36.8 °C) (06/03/24 1224)  Pulse: 71 (06/03/24 1224)  Resp: 18 (06/03/24 1224)  BP: 136/78 (06/03/24 1224)  SpO2: 96 % (06/03/24 1224) Vital Signs (24h Range):  Temp:  [97.2 °F (36.2 °C)-98.3 °F (36.8 °C)] 98.3 °F (36.8 °C)  Pulse:  [67-71] 71  Resp:  [16-20] 18  SpO2:  [91 %-100 %] 96 %  BP: (102-146)/(54-78) 136/78     Weight: 53.1 kg (117 lb 1 oz)  Body mass index is 17.29 kg/m².    Intake/Output Summary (Last 24 hours) at 6/3/2024 1333  Last data filed at 6/3/2024 1200  Gross per 24 hour   Intake 650 ml   Output 1455 ml   Net -805 ml         Physical Exam  Constitutional:       General: He is not in acute distress.     Appearance: He is not ill-appearing.   HENT:      Head: Normocephalic.   Cardiovascular:      Rate and Rhythm: Normal rate.      Heart sounds: Murmur heard.   Pulmonary:      Effort: Pulmonary effort is normal. No respiratory distress.      Breath sounds: Normal breath sounds.   Abdominal:      General: Abdomen is flat.      Palpations: Abdomen is soft.   Musculoskeletal:      Right lower leg: No edema.      Left lower leg: No edema.   Skin:     Comments: Dressing on left heel   Neurological:      Mental Status: He is alert. Mental status is at baseline.   Psychiatric:         Mood and Affect: Mood normal.             Significant Labs: All pertinent labs within the past 24 hours have been reviewed.  BMP:   Recent Labs   Lab  "06/03/24  0510   *      K 4.9   *   CO2 16*   BUN 41*   CREATININE 1.6*   CALCIUM 7.9*     CBC:   Recent Labs   Lab 06/02/24  0542 06/03/24  0510   WBC 7.34 7.96   HGB 8.5* 8.3*   HCT 28.8* 28.0*    216     Magnesium: No results for input(s): "MG" in the last 48 hours.    Significant Imaging: I have reviewed all pertinent imaging results/findings within the past 24 hours.    Assessment/Plan:      * Rectal bleeding  Rectal bleeding/hematochezia   Differentials include constipation/stercoral colitis, diverticular bleed.  GOC with family do not want invasive management with colonoscopy.   Hb drop today but no more episodes of rectal bleeding   - trend Hb  - active type and screen  - transfuse for <7 or hemodynamic instability  - IV PPI BID  - hold eliquis    - continue bowel regimen     Acute blood loss anemia  Hb in 8s. Stable so far.  Eliquis and aspirin still held    Chronic ulcer of left heel  - wound care consult recs followed  - nutrition consult    Chronic dementia  Waxing and waning consciousness. Desmond in hospital.   - will monitor     Severe malnutrition  seen by nutrition  - [facility] soft diet with assistance  - nutritional supplements   - monitor electrolytes      - PT/ OT    Left arm swelling  Improving from previous admission. R/o infectious cause in previous admission. Continue rheum recs from previous admission.  - continue allopurinol  - continue colchicine  - tylenol prn     Hyperkalemia  This patient has hyperkalemia which is controlled. We will monitor for arrhythmias with EKG or continuous telemetry. We will treat the hyperkalemia with Potassium Binders. The likely etiology of the hyperkalemia is  CKD IV .  The patients latest potassium has been reviewed and the results are listed below  Recent Labs   Lab 06/03/24  0510   K 4.9             SARAH (acute kidney injury)  SARAH due to post obstructive cause. Straight cath w/ 400cc. Likely BPH. Will start tamsulosin.   - bladder scan and " prn straight cath    Atrial fibrillation  [id number] Score: 3. Hasblad score 3. High risk of bleeding  - hold eliquis 2.5mg in setting of GIB  - continue coreg    After conversation with daughter, plan to hold eliquis on discharge given high risk of bleeding    CAD (coronary artery disease)  H/o stent placement and CABG  - hold ASA in setting of GIB, can restart  tomorrow if Hb remains stable     Essential hypertension  - monitor vitals  - continue coreg  - retsart benzapril tomorrow      VTE Risk Mitigation (From admission, onward)           Ordered     IP VTE HIGH RISK PATIENT  Once         06/01/24 1115     Place sequential compression device  Until discontinued         06/01/24 1115                    Discharge Planning   TEOFILO: 6/4/2024     Code Status: DNR   Is the patient medically ready for discharge?:     Reason for patient still in hospital (select all that apply): Patient trending condition  Discharge Plan A: Skilled Nursing Facility                  Rachel Cuevas MD  Department of Hospital Medicine   [facility]

## 2024-06-03 NOTE — ASSESSMENT & PLAN NOTE
H/o stent placement and CABG  - hold ASA in setting of GIB, can restart  tomorrow if Hb remains stable

## 2024-06-03 NOTE — ASSESSMENT & PLAN NOTE
SARAH due to post obstructive cause. Straight cath w/ 400cc. Likely BPH. Will start tamsulosin.   - bladder scan and prn straight cath

## 2024-06-03 NOTE — CONSULTS
Sadiq alonso - [facility] Surg  Wound Care    Patient Name:  Deena Moody   MRN:  [id number]  Date: 6/3/2024  Diagnosis: Rectal bleeding    History:     Past Medical History:   Diagnosis Date    Acute encephalopathy 5/20/2024    Acute on chronic diastolic heart failure 9/1/2016    Chronic dementia 6/1/2024    Coronary artery disease     Hyperlipidemia     Hypertension     Macular degeneration (senile) of retina, unspecified 12/12/2014    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    Persistent atrial fibrillation 11/10/2016    S/P placement of cardiac pacemaker 9/14/2016       Social History     Socioeconomic History    Marital status:    Tobacco Use    Smoking status: Never     Passive exposure: Never    Smokeless tobacco: Never   Substance and Sexual Activity    Alcohol use: No    Drug use: No    Sexual activity: Yes     Partners: Female     Social Determinants of Health     Financial Resource Strain: Low Risk  (6/2/2024)    Overall Financial Resource Strain (CARDIA)     Difficulty of Paying Living Expenses: Not hard at all   Food Insecurity: No Food Insecurity (6/2/2024)    Hunger Vital Sign     Worried About Running Out of Food in the Last Year: Never true     Ran Out of Food in the Last Year: Never true   Transportation Needs: No Transportation Needs (6/2/2024)    TRANSPORTATION NEEDS     Transportation : No   Physical Activity: Insufficiently Active (6/2/2024)    Exercise Vital Sign     Days of Exercise per Week: 7 days     Minutes of Exercise per Session: 20 min   Stress: Stress Concern Present (6/2/2024)    [employer] - Occupational Stress Questionnaire     Feeling of Stress : To some extent   Housing Stability: Low Risk  (6/2/2024)    Housing Stability Vital Sign     Unable to Pay for Housing in the Last Year: No     Homeless in the Last Year: No       Precautions:     Allergies as of 06/01/2024 - Reviewed 06/01/2024   Allergen Reaction Noted    Iodine and iodide containing products Other (See  Comments) 07/20/2012       [facility] Assessment Details/Treatment     Patient seen for wound care consultation.   Reviewed chart for this encounter.   See Flow Sheet for findings.      RECOMMENDATIONS: Patient's support person at bedside and agreeable to inpatient wound care. MD consult for bilateral heels and sacrum. Right heel noted to have blanchable area of erythema. No bogginess. Apply BPCO BID and PRN for capillary stimulation. Left heel noted to have unstageable wound present on admission. Wound is dry, black, and tan. Due to patient's allergy to iodine, apply chloraprep daily for drying affects and leave open to air in EHOB boot. Sacrum noted to have blanchable area of erythema with skin breakdown and maceration indicative of moisture. Upon chart review, patient is incontinent of stool. Sacrum noted to have purple discoloration with possible DTI. Skin integrity EMILIO consulted. Immerse bed ordered for pressure redistribution and microclimate. Cleanse sacrum with sterile normal saline and pat dry. Apply triad BID and PRN if soiled for moisture barrier. No other issues or concerns at this time. Will follow up 6/10/2024 or sooner if needed.    Discussed POC with patient and primary nurse.   See EMR for orders & patient education.    Discussed nutrition and the role of protein in wound healing with the patient. Instructed patient to optimize protein for wound healing.    Bedside nursing to continue care & monitoring.  Bedside nursing to maintain pressure injury prevention interventions.            06/03/24 0940   WOCN Assessment   WOCN Total Time (mins) 30   Visit Date 06/03/24   Visit Time 0940   Consult Type New   WOCN Speciality Wound   Intervention assessed;changed;applied;chart review;coordination of care;orders   Teaching on-going        Wound 05/03/24 0500 Pressure Injury Left Heel   Date First Assessed/Time First Assessed: 05/03/24 0500   Primary Wound Type: Pressure Injury  Side: Left  Location: Heel   Wound  Image    Pressure Injury Stage U   Dressing Appearance Open to air;Dry   Drainage Amount None   Drainage Characteristics/Odor No odor   Appearance Tan;Black   Care Other (see comments)  (Apply betadine daily and leave open dana air)        Wound 05/03/24 1700 Other (comment) Buttocks   Date First Assessed/Time First Assessed: 05/03/24 1700   Present on Original Admission: No  Primary Wound Type: Other (comment)  Location: Buttocks   Wound Image    Dressing Appearance Open to air;Dry   Drainage Amount None   Drainage Characteristics/Odor No odor   Appearance Intact;Pink;Red;Maroon   Care Cleansed with:;Sterile normal saline;Applied:;Skin Barrier       Ganga WARDN, RN  06/03/2024

## 2024-06-03 NOTE — PLAN OF CARE
Plan of care reviewed with the patient . Understanding verbalized.No acute distress observed.Safety Maintained.Bed at lowest position.Call bell at reach.Bed side table at reach.Instructed Patient to call for assistance whenever required.    Problem: Adult Inpatient Plan of Care  Goal: Plan of Care Review  Outcome: Progressing  Goal: Patient-Specific Goal (Individualized)  Outcome: Progressing  Goal: Absence of Hospital-Acquired Illness or Injury  Outcome: Progressing

## 2024-06-03 NOTE — PLAN OF CARE
Spoke with Admissions at [facility] and was informed that patient will require another auth submitted to insurance company in order to return to SNF. Orders placed for PT/OT.         PHILIP Fernandez  Case Management  [phone]

## 2024-06-03 NOTE — PLAN OF CARE
Sadiq alonso - Med Surg  Discharge Reassessment    Primary Care Provider: Jam Rowell MD    Expected Discharge Date: 6/4/2024      Patient remains inpatient due to continued need for medical management. Patient being monitored for possible urinary retention. Discharge plan is for patient to return to [facility]. Discharge Plan A and Plan B have been determined by review of patient's clinical status, future medical and therapeutic needs, and coverage/benefits for post-acute care in coordination with multidisciplinary team members.  Reassessment (most recent)       Discharge Reassessment - 06/03/24 1629          Discharge Reassessment    Assessment Type Discharge Planning Reassessment (P)      Did the patient's condition or plan change since previous assessment? No (P)      Discharge Plan discussed with: -- (P)    family    Communicated TEOFILO with patient/caregiver Yes (P)      Discharge Plan A Skilled Nursing Facility (P)      Discharge Plan B Skilled Nursing Facility (P)      DME Needed Upon Discharge  none (P)      Why the patient remains in the hospital Requires continued medical care (P)         Post-Acute Status    Post-Acute Authorization Placement (P)    SNF    Post-Acute Placement Status Referrals Sent (P)      Coverage [employer] (P)      Discharge Delays None known at this time (P)                      PHILIP Fernandez  Case Management  [phone]

## 2024-06-03 NOTE — PLAN OF CARE
Pt continue to be turned and repositioned every 2 hours. Waffle mattress added for additional skin protection as well as sacral foam pad added to sacral. Wound care consult is currently in to put in recommendations to treat left heel ulcer and sacral. Otherwise no other major concerns, pt has had adequate intake of nutrition and liquid intake. No major events during day shift.   Problem: Adult Inpatient Plan of Care  Goal: Plan of Care Review  Outcome: Progressing  Flowsheets (Taken 6/2/2024 1900)  Plan of Care Reviewed With:   patient   family   child  Goal: Patient-Specific Goal (Individualized)  Outcome: Progressing  Goal: Absence of Hospital-Acquired Illness or Injury  Outcome: Progressing  Intervention: Identify and Manage Fall Risk  Flowsheets (Taken 6/2/2024 1900)  Safety Promotion/Fall Prevention:   assistive device/personal item within reach   bed alarm set   Fall Risk reviewed with patient/family   side rails raised x 2   toileting scheduled   supervised activity   medications reviewed   Fall Risk signage in place   Supervised toileting - stay within arms reach  Intervention: Prevent Skin Injury  Flowsheets (Taken 6/2/2024 1900)  Body Position:   turned   weight shifting   log-rolled  Skin Protection:   incontinence pads utilized   skin sealant/moisture barrier applied  Device Skin Pressure Protection:   absorbent pad utilized/changed   adhesive use limited   positioning supports utilized   pressure points protected   skin-to-device areas padded   skin-to-skin areas padded   tubing/devices free from skin contact  Intervention: Prevent and Manage VTE (Venous Thromboembolism) Risk  Flowsheets (Taken 6/2/2024 1900)  VTE Prevention/Management:   bleeding precations maintained   bleeding risk assessed   bleeding risk factor(s) identified, provider notified   dorsiflexion/plantar flexion performed   intravenous hydration   fluids promoted   ROM (active) performed   ROM (passive) performed  Intervention: Prevent  Infection  Flowsheets (Taken 6/2/2024 1900)  Infection Prevention:   cohorting utilized   environmental surveillance performed   equipment surfaces disinfected   hand hygiene promoted   personal protective equipment utilized   rest/sleep promoted   single patient room provided   visitors restricted/screened  Goal: Optimal Comfort and Wellbeing  Outcome: Progressing  Intervention: Monitor Pain and Promote Comfort  Flowsheets (Taken 6/2/2024 1900)  Pain Management Interventions:   care clustered   pillow support provided   position adjusted   quiet environment facilitated   relaxation techniques promoted   warm blanket provided  Intervention: Provide Person-Centered Care  Flowsheets (Taken 6/2/2024 1900)  Trust Relationship/Rapport:   care explained   choices provided   questions answered   questions encouraged   reassurance provided   thoughts/feelings acknowledged  Goal: Readiness for Transition of Care  Outcome: Progressing  Intervention: Mutually Develop Transition Plan  Flowsheets (Taken 6/2/2024 1900)  Equipment Currently Used at Home:   walker, rolling   wheelchair  Transportation Anticipated: health plan transportation  Communicated TEOFILO with patient/caregiver: Yes  Do you expect to return to your current living situation?: Yes  Do you have help at home or someone to help you manage your care at home?: Yes  Readmission within 30 days?: No  Do you currently have service(s) that help you manage your care at home?: No  Is the pt/caregiver preference to resume services with current agency: No     Problem: Gastrointestinal Bleeding  Goal: Optimal Coping with Acute Illness  Outcome: Progressing  Intervention: Optimize Psychosocial Response  Flowsheets (Taken 6/2/2024 1900)  Supportive Measures:   active listening utilized   goal-setting facilitated   positive reinforcement provided   relaxation techniques promoted   self-care encouraged   self-reflection promoted   self-responsibility promoted   verbalization of  feelings encouraged  Goal: Hemostasis  Outcome: Progressing  Intervention: Manage Gastrointestinal Bleeding  Flowsheets (Taken 6/2/2024 1900)  Stabilization Measures: legs elevated  Environmental Support:   calm environment promoted   caregiver consistency promoted   environmental consistency promoted   personal routine supported   rest periods encouraged     Problem: Fall Injury Risk  Goal: Absence of Fall and Fall-Related Injury  Outcome: Progressing  Intervention: Identify and Manage Contributors  Flowsheets (Taken 6/2/2024 2124)  Self-Care Promotion:   independence encouraged   BADL personal objects within reach   BADL personal routines maintained   meal set-up provided   adaptive equipment use encouraged  Medication Review/Management: medications reviewed  Intervention: Promote Injury-Free Environment  Flowsheets (Taken 6/2/2024 1900)  Safety Promotion/Fall Prevention:   assistive device/personal item within reach   bed alarm set   Fall Risk reviewed with patient/family   side rails raised x 2   toileting scheduled   supervised activity   medications reviewed   Fall Risk signage in place   Supervised toileting - stay within arms reach     Problem: Skin Injury Risk Increased  Goal: Skin Health and Integrity  Outcome: Progressing  Intervention: Optimize Skin Protection  Flowsheets (Taken 6/2/2024 1900)  Pressure Reduction Techniques:   frequent weight shift encouraged   pressure points protected   sit time limited to 2 hours   weight shift assistance provided  Pressure Reduction Devices:   heel offloading device utilized   positioning supports utilized  Skin Protection:   incontinence pads utilized   skin sealant/moisture barrier applied  Activity Management:   Arm raise - L1   Rolling - L1   Patient unable to perform activities  Head of Bed (HOB) Positioning: HOB at 30-45 degrees  Intervention: Promote and Optimize Oral Intake  Flowsheets (Taken 6/2/2024 1900)  Oral Nutrition Promotion:   adaptive equipment use  encouraged   physical activity promoted   rest periods promoted  Nutrition Interventions:   food preferences provided   meal set-up provided   supplemental drinks provided     Problem: Wound  Goal: Optimal Coping  Outcome: Progressing  Intervention: Support Patient and Family Response  Flowsheets (Taken 6/2/2024 1900)  Supportive Measures:   active listening utilized   goal-setting facilitated   positive reinforcement provided   relaxation techniques promoted   self-care encouraged   self-reflection promoted   self-responsibility promoted   verbalization of feelings encouraged  Family/Support System Care:   involvement promoted   presence promoted   support provided   self-care encouraged  Goal: Optimal Functional Ability  Outcome: Progressing  Intervention: Optimize Functional Ability  Flowsheets (Taken 6/2/2024 1900)  Activity Management:   Arm raise - L1   Rolling - L1   Patient unable to perform activities  Activity Assistance Provided: assistance, 2 people  Goal: Absence of Infection Signs and Symptoms  Outcome: Progressing  Intervention: Prevent or Manage Infection  Flowsheets (Taken 6/2/2024 1900)  Infection Management: aseptic technique maintained  Isolation Precautions: protective  Goal: Improved Oral Intake  Outcome: Progressing  Intervention: Promote and Optimize Oral Intake  Flowsheets (Taken 6/2/2024 1900)  Oral Nutrition Promotion:   adaptive equipment use encouraged   physical activity promoted   rest periods promoted  Nutrition Interventions:   food preferences provided   meal set-up provided   supplemental drinks provided  Goal: Optimal Pain Control and Function  Outcome: Progressing  Intervention: Prevent or Manage Pain  Flowsheets (Taken 6/2/2024 1900)  Sleep/Rest Enhancement:   relaxation techniques promoted   noise level reduced   regular sleep/rest pattern promoted   consistent schedule promoted   family presence promoted  Pain Management Interventions:   care clustered   pillow support  provided   position adjusted   quiet environment facilitated   relaxation techniques promoted   warm blanket provided  Goal: Skin Health and Integrity  Outcome: Progressing  Intervention: Optimize Skin Protection  Flowsheets (Taken 6/2/2024 1900)  Pressure Reduction Techniques:   frequent weight shift encouraged   pressure points protected   sit time limited to 2 hours   weight shift assistance provided  Pressure Reduction Devices:   heel offloading device utilized   positioning supports utilized  Skin Protection:   incontinence pads utilized   skin sealant/moisture barrier applied  Activity Management:   Arm raise - L1   Rolling - L1   Patient unable to perform activities  Head of Bed (HOB) Positioning: HOB at 30-45 degrees  Goal: Optimal Wound Healing  Outcome: Progressing

## 2024-06-03 NOTE — PT/OT/SLP EVAL
Physical Therapy Evaluation and Treatment     Patient Name:  Deena Moody  MRN: [id number]    Admit Date: 6/1/2024  Admitting Diagnosis:  Rectal bleeding  Length of Stay: 0 days  Recent Surgery: * No surgery found *      Recommendations:     Discharge Recommendations: moderate intensity therapy  Equipment recommendations: bedside commode, shower chair, and wheelchair  Patient has a mobility limitation that significantly impairs their ability to participate in one or more mobility related activities of daily living, including toileting. This deficit can be resolved by using a bedside commode. Patient demonstrates mobility limitations that will cause them to be confined to one room at home without bathroom access for up to 30 days. Using a bedside commode will greatly improve the patient's ability to participate in MRADLs.     Patient has a mobility limitation that significantly impairs their ability to participate in one or more mobility related activities of daily living in customary locations in the home. The mobility limitation cannot be sufficiently resolved by the use of a cane or walker. The use of a manual wheelchair will greatly improve the patient's ability to participate in MRADLs. The patient will use the wheelchair on a regular basis at home. They have expressed their willingness to use a manual wheelchair in the home, and have a caregiver who is available and willing to assist with the wheelchair if needed.       Barriers to discharge: Increased level of skilled assistance required    Assessment:     Deena Moody is a 89 y.o. male admitted to [facility] on 6/1/2024 with medical diagnosis of Rectal bleeding. Pt presents with weakness, impaired functional mobility, impaired balance, decreased lower extremity function. These deficits effect their roles and responsibilities in which they were able to complete prior to admit.    Pt agreed to skilled physical therapy session. Pt denied any pain. Pt went to [facility] after admission to [facility] ~1 month ago. Prior to that admission, pt was (I) with mobility and ADLs. Pt received PT for gait training using a RW at SNF. Pt performed supine to sit with Real, attempted two STS from bed with a RW unable to clear hips. STS trial from bed without RW required maxA.Gait deferred d/t poor standing balance. Pt left in bed, daughter present, VSS, and call bell in reach.      Deena Moody would benefit from acute PT intervention to improve quality of life, focus on recovery of impairments, provide patient/caregiver education, reduce fall risk, and maximize (I) and safety with functional mobility. Once medically stable, recommending pt discharge to moderate intensity therapy. Patient currently demonstrates a need for moderate intensity therapy on a daily basis post acute secondary to a decline in functional status due to illness.    Rehab Prognosis: Good    Plan:     During this hospitalization, patient to be seen 4 x/week to address the identified rehab impairments via gait training, therapeutic activities, therapeutic exercises, neuromuscular re-education and progress towards stated goals.     Plan of Care Expires:  07/03/24  Plan of Care reviewed with: patient, daughter    This plan of care has been discussed with the patient/caregiver, who was included in its development and is in agreement with the identified goals and treatment plan.     Subjective     Communicated with RN prior to session.  Patient found HOB elevated upon PT entry to room, agreeable to evaluation. Pt's daughter present during session.    Chief Complaint: none stated    Patient/Family Comments/goals: none stated    Pain/Comfort:  Pain Rating 1: 0/10  Pain Rating Post-Intervention 1: 0/10    Patients cultural, spiritual, Amish conflicts given the current situation: None identified     Patient History: information obtained from pt daughter      Living Environment: Pt lives in Skilled nursing facility.    Prior Level of Function: modified (I) for mobility and ADLs using RW as AD   DME owned: rolling walker  Support Available/Caregiver Assistance: SNF staff    Objective:     Patient found with: Alexi    Recent Surgery: * No surgery found *    General Precautions: Standard, fall   Orthopedic Precautions:N/A   Braces: N/A   Oxygen Device: room air      Exams:    Cognition:  AO x 4  Command following: Follows multistep verbal commands  Communication: clear/fluent  [place]    Sensation:   Light touch sensation: Intact BLEs    Gross Motor Coordination: No deficits noted during functional mobility tasks     Edema/Skin Integrity: None noted; Visible skin intact    Postural examination/scapula alignment: Rounded shoulder, Head forward, and Abnormal trunk flexion    Lower Extremity Range of Motion:  Right Lower Extremity: WFL  Left Lower Extremity: WFL    Lower Extremity Strength:  Right Lower Extremity: WFL except hip flexion (3+/5)  Left Lower Extremity: WFL except hip flexion (3+/5)    Functional Mobility:    Bed Mobility:  Scooting with stand by assistance  Supine > Sit with minimum assistance  Sit > Supine with stand by assistance    Transfers:   Sit <> Stand Transfer:    x2 attempts with RW, pt unable to clear hips  Maximum Assistance x 2 trials from bed with no AD             Gait:  Deferred d/t poor standing balance    Balance:  Dynamic Sitting: FAIR+: Maintains balance through MINIMAL excursions of active trunk motion  Standing:  Static: 0: Needs MAXIMAL assist to maintain    Dynamic: 0: N/A    Outcome Measure: AM-PAC 6 CLICK MOBILITY  Total Score:11     Patient/Caregiver Education:     Therapist educated pt/caregiver regarding:   PT POC and goals for therapy   Safety with mobility and fall risk   Instruction on use of call button and importance of calling nursing staff for assistance with mobility   Time provided for therapeutic counseling and discussion of current health disposition. All questions answered to  satisfaction, within scope of PT practice     Patient/caregiver able to verbalize understanding and expressed no further questions this visit; will follow-up with pt/caregiver during current admit for additional questions/concerns within scope of practice.     White board updated.     Patient left HOB elevated with all lines intact, call button in reach, and daughter present.    GOALS:   Multidisciplinary Problems       Physical Therapy Goals          Problem: Physical Therapy    Goal Priority Disciplines Outcome Goal Variances Interventions   Physical Therapy Goal     PT, PT/OT Progressing     Description: Goals to be met by: 7/3/2024     Patient will increase functional independence with mobility by performin. Supine to sit with Stand-by Assistance  2. Sit to stand transfer with Contact Guard Assistance using Rolling Walker  3. Gait  x 50 feet with Contact Guard Assistance using Rolling Walker.                            History:     Past Medical History:   Diagnosis Date    Acute encephalopathy 2024    Acute on chronic diastolic heart failure 2016    Chronic dementia 2024    Coronary artery disease     Hyperlipidemia     Hypertension     Macular degeneration (senile) of retina, unspecified 2014    Nuclear sclerosis 2014    Persistent atrial fibrillation 11/10/2016    S/P placement of cardiac pacemaker 2016       Past Surgical History:   Procedure Laterality Date    AORTIC VALVE REPLACEMENT N/A     CARDIAC PACEMAKER PLACEMENT      CATARACT EXTRACTION W/  INTRAOCULAR LENS IMPLANT Right 2016    Dr. Whitley    CATARACT EXTRACTION W/  INTRAOCULAR LENS IMPLANT Left 3/1/2016    Dr. Whitley    CORONARY ARTERY BYPASS GRAFT      EAR EXAMINATION UNDER ANESTHESIA      EYE SURGERY         Time Tracking:     PT Received On: 24  PT Start Time: 08     PT Stop Time: 0930  PT Total Time (min): 36 min     Billable Minutes: Evaluation 10 and Therapeutic Activity  26    06/03/2024

## 2024-06-03 NOTE — PLAN OF CARE
Problem: Physical Therapy  Goal: Physical Therapy Goal  Description: Goals to be met by: 7/3/2024     Patient will increase functional independence with mobility by performin. Supine to sit with Stand-by Assistance  2. Sit to stand transfer with Contact Guard Assistance using Rolling Walker  3. Gait  x 50 feet with Contact Guard Assistance using Rolling Walker.     Outcome: Progressing    6/3/2024

## 2024-06-03 NOTE — CARE UPDATE
I have reviewed the chart of Deena LARA Mattarnoldo who is hospitalized for the following:    Active Hospital Problems    Diagnosis    *Rectal bleeding    Acute blood loss anemia    Chronic dementia    Chronic ulcer of left heel    Severe malnutrition    Left arm swelling    CKD (chronic kidney disease), stage IV    Hyperkalemia    Atrial fibrillation    CAD (coronary artery disease)    Essential hypertension        Nancy Culp PA-C  [facility]

## 2024-06-03 NOTE — SUBJECTIVE & OBJECTIVE
Interval History:     No acute overnight events.   Cr increased to 1.6  Hb stable at 8.3   No signs of bleeding in stools   Bladder scan with 400cc. Straight cath done. Started on tamsulosin    Seen by PT today and wound care    I had conversation with daughter. I discussed risks and benefits of being on Eliquis for Afib stroke prophylaxis. HASBLED score is high risk of bleeding and chadsvasc score is 3. After discussion, daughter agreed to stop eliquis on discharge since patient had 2 episodes of GI bleed in the past month.     Review of Systems   Constitutional:  Positive for fatigue. Negative for chills and fever.   Respiratory:  Negative for chest tightness, shortness of breath and wheezing.    Cardiovascular:  Negative for chest pain and leg swelling.   Gastrointestinal:  Negative for abdominal pain, constipation, diarrhea, nausea and vomiting.   Genitourinary: Negative.    Musculoskeletal:  Negative for back pain and myalgias.   Neurological:  Negative for dizziness, weakness, light-headedness and numbness.   Psychiatric/Behavioral:  Negative for agitation and confusion. The patient is not nervous/anxious.      Objective:     Vital Signs (Most Recent):  Temp: 98.3 °F (36.8 °C) (06/03/24 1224)  Pulse: 71 (06/03/24 1224)  Resp: 18 (06/03/24 1224)  BP: 136/78 (06/03/24 1224)  SpO2: 96 % (06/03/24 1224) Vital Signs (24h Range):  Temp:  [97.2 °F (36.2 °C)-98.3 °F (36.8 °C)] 98.3 °F (36.8 °C)  Pulse:  [67-71] 71  Resp:  [16-20] 18  SpO2:  [91 %-100 %] 96 %  BP: (102-146)/(54-78) 136/78     Weight: 53.1 kg (117 lb 1 oz)  Body mass index is 17.29 kg/m².    Intake/Output Summary (Last 24 hours) at 6/3/2024 1333  Last data filed at 6/3/2024 1200  Gross per 24 hour   Intake 650 ml   Output 1455 ml   Net -805 ml         Physical Exam  Constitutional:       General: He is not in acute distress.     Appearance: He is not ill-appearing.   HENT:      Head: Normocephalic.   Cardiovascular:      Rate and Rhythm: Normal rate.     "  Heart sounds: Murmur heard.   Pulmonary:      Effort: Pulmonary effort is normal. No respiratory distress.      Breath sounds: Normal breath sounds.   Abdominal:      General: Abdomen is flat.      Palpations: Abdomen is soft.   Musculoskeletal:      Right lower leg: No edema.      Left lower leg: No edema.   Skin:     Comments: Dressing on left heel   Neurological:      Mental Status: He is alert. Mental status is at baseline.   Psychiatric:         Mood and Affect: Mood normal.             Significant Labs: All pertinent labs within the past 24 hours have been reviewed.  BMP:   Recent Labs   Lab 06/03/24  0510   *      K 4.9   *   CO2 16*   BUN 41*   CREATININE 1.6*   CALCIUM 7.9*     CBC:   Recent Labs   Lab 06/02/24  0542 06/03/24  0510   WBC 7.34 7.96   HGB 8.5* 8.3*   HCT 28.8* 28.0*    216     Magnesium: No results for input(s): "MG" in the last 48 hours.    Significant Imaging: I have reviewed all pertinent imaging results/findings within the past 24 hours.  "

## 2024-06-04 LAB
ABO + RH BLD: NORMAL
ANION GAP SERPL CALC-SCNC: 7 MMOL/L (ref 8–16)
BASOPHILS # BLD AUTO: 0.04 K/UL (ref 0–0.2)
BASOPHILS NFR BLD: 0.6 % (ref 0–1.9)
BLD GP AB SCN CELLS X3 SERPL QL: NORMAL
BLD PROD TYP BPU: NORMAL
BLOOD UNIT EXPIRATION DATE: NORMAL
BLOOD UNIT TYPE CODE: 7300
BLOOD UNIT TYPE: NORMAL
BUN SERPL-MCNC: 44 MG/DL (ref 10–30)
CALCIUM SERPL-MCNC: 8 MG/DL (ref 8.7–10.5)
CHLORIDE SERPL-SCNC: 110 MMOL/L (ref 95–110)
CO2 SERPL-SCNC: 18 MMOL/L (ref 23–29)
CODING SYSTEM: NORMAL
CREAT SERPL-MCNC: 1.5 MG/DL (ref 0.5–1.4)
CROSSMATCH INTERPRETATION: NORMAL
DIFFERENTIAL METHOD BLD: ABNORMAL
DISPENSE STATUS: NORMAL
EOSINOPHIL # BLD AUTO: 0.3 K/UL (ref 0–0.5)
EOSINOPHIL NFR BLD: 4.7 % (ref 0–8)
ERYTHROCYTE [DISTWIDTH] IN BLOOD BY AUTOMATED COUNT: 17.2 % (ref 11.5–14.5)
EST. GFR  (NO RACE VARIABLE): 43.1 ML/MIN/1.73 M^2
FERRITIN SERPL-MCNC: 387 NG/ML (ref 20–300)
FUNGUS SPEC CULT: NORMAL
GLUCOSE SERPL-MCNC: 104 MG/DL (ref 70–110)
HCT VFR BLD AUTO: 24.7 % (ref 40–54)
HGB BLD-MCNC: 7.5 G/DL (ref 14–18)
IMM GRANULOCYTES # BLD AUTO: 0.08 K/UL (ref 0–0.04)
IMM GRANULOCYTES NFR BLD AUTO: 1.1 % (ref 0–0.5)
IRON SERPL-MCNC: 24 UG/DL (ref 45–160)
LYMPHOCYTES # BLD AUTO: 1.4 K/UL (ref 1–4.8)
LYMPHOCYTES NFR BLD: 19.4 % (ref 18–48)
MCH RBC QN AUTO: 30.4 PG (ref 27–31)
MCHC RBC AUTO-ENTMCNC: 30.4 G/DL (ref 32–36)
MCV RBC AUTO: 100 FL (ref 82–98)
MONOCYTES # BLD AUTO: 0.9 K/UL (ref 0.3–1)
MONOCYTES NFR BLD: 13.1 % (ref 4–15)
NEUTROPHILS # BLD AUTO: 4.4 K/UL (ref 1.8–7.7)
NEUTROPHILS NFR BLD: 61.1 % (ref 38–73)
NRBC BLD-RTO: 0 /100 WBC
PLATELET # BLD AUTO: 188 K/UL (ref 150–450)
PMV BLD AUTO: 8.8 FL (ref 9.2–12.9)
POTASSIUM SERPL-SCNC: 5.4 MMOL/L (ref 3.5–5.1)
RBC # BLD AUTO: 2.47 M/UL (ref 4.6–6.2)
SATURATED IRON: 14 % (ref 20–50)
SODIUM SERPL-SCNC: 135 MMOL/L (ref 136–145)
SPECIMEN OUTDATE: NORMAL
TOTAL IRON BINDING CAPACITY: 170 UG/DL (ref 250–450)
TRANS ERYTHROCYTES VOL PATIENT: NORMAL ML
TRANSFERRIN SERPL-MCNC: 115 MG/DL (ref 200–375)
WBC # BLD AUTO: 7.16 K/UL (ref 3.9–12.7)

## 2024-06-04 PROCEDURE — 27000221 HC OXYGEN, UP TO 24 HOURS: Mod: HCNC

## 2024-06-04 PROCEDURE — 83540 ASSAY OF IRON: CPT | Mod: HCNC | Performed by: STUDENT IN AN ORGANIZED HEALTH CARE EDUCATION/TRAINING PROGRAM

## 2024-06-04 PROCEDURE — 36415 COLL VENOUS BLD VENIPUNCTURE: CPT | Mod: HCNC | Performed by: STUDENT IN AN ORGANIZED HEALTH CARE EDUCATION/TRAINING PROGRAM

## 2024-06-04 PROCEDURE — 63600175 PHARM REV CODE 636 W HCPCS: Mod: HCNC | Performed by: STUDENT IN AN ORGANIZED HEALTH CARE EDUCATION/TRAINING PROGRAM

## 2024-06-04 PROCEDURE — 21400001 HC TELEMETRY ROOM: Mod: HCNC

## 2024-06-04 PROCEDURE — 85025 COMPLETE CBC W/AUTO DIFF WBC: CPT | Mod: HCNC | Performed by: STUDENT IN AN ORGANIZED HEALTH CARE EDUCATION/TRAINING PROGRAM

## 2024-06-04 PROCEDURE — 86922 COMPATIBILITY TEST ANTIGLOB: CPT | Mod: HCNC | Performed by: STUDENT IN AN ORGANIZED HEALTH CARE EDUCATION/TRAINING PROGRAM

## 2024-06-04 PROCEDURE — 80048 BASIC METABOLIC PNL TOTAL CA: CPT | Mod: HCNC | Performed by: STUDENT IN AN ORGANIZED HEALTH CARE EDUCATION/TRAINING PROGRAM

## 2024-06-04 PROCEDURE — 36430 TRANSFUSION BLD/BLD COMPNT: CPT | Mod: HCNC

## 2024-06-04 PROCEDURE — 86850 RBC ANTIBODY SCREEN: CPT | Mod: HCNC | Performed by: STUDENT IN AN ORGANIZED HEALTH CARE EDUCATION/TRAINING PROGRAM

## 2024-06-04 PROCEDURE — 99900035 HC TECH TIME PER 15 MIN (STAT): Mod: HCNC

## 2024-06-04 PROCEDURE — 94761 N-INVAS EAR/PLS OXIMETRY MLT: CPT | Mod: HCNC

## 2024-06-04 PROCEDURE — C9113 INJ PANTOPRAZOLE SODIUM, VIA: HCPCS | Mod: HCNC | Performed by: STUDENT IN AN ORGANIZED HEALTH CARE EDUCATION/TRAINING PROGRAM

## 2024-06-04 PROCEDURE — 99222 1ST HOSP IP/OBS MODERATE 55: CPT | Mod: HCNC,,, | Performed by: NURSE PRACTITIONER

## 2024-06-04 PROCEDURE — 82728 ASSAY OF FERRITIN: CPT | Mod: HCNC | Performed by: STUDENT IN AN ORGANIZED HEALTH CARE EDUCATION/TRAINING PROGRAM

## 2024-06-04 PROCEDURE — 25000003 PHARM REV CODE 250: Mod: HCNC | Performed by: STUDENT IN AN ORGANIZED HEALTH CARE EDUCATION/TRAINING PROGRAM

## 2024-06-04 PROCEDURE — 30233N1 TRANSFUSION OF NONAUTOLOGOUS RED BLOOD CELLS INTO PERIPHERAL VEIN, PERCUTANEOUS APPROACH: ICD-10-PCS | Performed by: STUDENT IN AN ORGANIZED HEALTH CARE EDUCATION/TRAINING PROGRAM

## 2024-06-04 PROCEDURE — P9021 RED BLOOD CELLS UNIT: HCPCS | Mod: HCNC | Performed by: STUDENT IN AN ORGANIZED HEALTH CARE EDUCATION/TRAINING PROGRAM

## 2024-06-04 RX ORDER — ACETAMINOPHEN 325 MG/1
650 TABLET ORAL EVERY 8 HOURS PRN
Status: DISCONTINUED | OUTPATIENT
Start: 2024-06-04 | End: 2024-06-04

## 2024-06-04 RX ORDER — FUROSEMIDE 10 MG/ML
20 INJECTION INTRAMUSCULAR; INTRAVENOUS ONCE
Status: COMPLETED | OUTPATIENT
Start: 2024-06-04 | End: 2024-06-04

## 2024-06-04 RX ORDER — ACETAMINOPHEN 325 MG/1
650 TABLET ORAL ONCE
Status: COMPLETED | OUTPATIENT
Start: 2024-06-04 | End: 2024-06-04

## 2024-06-04 RX ADMIN — SENNOSIDES AND DOCUSATE SODIUM 2 TABLET: 50; 8.6 TABLET ORAL at 09:06

## 2024-06-04 RX ADMIN — PANTOPRAZOLE SODIUM 40 MG: 40 INJECTION, POWDER, FOR SOLUTION INTRAVENOUS at 09:06

## 2024-06-04 RX ADMIN — Medication: at 10:06

## 2024-06-04 RX ADMIN — TAMSULOSIN HYDROCHLORIDE 0.8 MG: 0.4 CAPSULE ORAL at 09:06

## 2024-06-04 RX ADMIN — ALUMINUM HYDROXIDE, MAGNESIUM HYDROXIDE, AND DIMETHICONE 30 ML: 400; 400; 40 SUSPENSION ORAL at 01:06

## 2024-06-04 RX ADMIN — FUROSEMIDE 20 MG: 10 INJECTION, SOLUTION INTRAMUSCULAR; INTRAVENOUS at 12:06

## 2024-06-04 RX ADMIN — CARVEDILOL 3.12 MG: 3.12 TABLET, FILM COATED ORAL at 09:06

## 2024-06-04 RX ADMIN — ACETAMINOPHEN 650 MG: 325 TABLET ORAL at 06:06

## 2024-06-04 RX ADMIN — ALLOPURINOL 50 MG: 300 TABLET ORAL at 09:06

## 2024-06-04 RX ADMIN — Medication: at 09:06

## 2024-06-04 RX ADMIN — FLUTICASONE PROPIONATE 100 MCG: 50 SPRAY, METERED NASAL at 09:06

## 2024-06-04 RX ADMIN — COLCHICINE 0.6 MG: 0.6 TABLET, FILM COATED ORAL at 09:06

## 2024-06-04 RX ADMIN — THERA TABS 1 TABLET: TAB at 09:06

## 2024-06-04 RX ADMIN — SODIUM ZIRCONIUM CYCLOSILICATE 5 G: 5 POWDER, FOR SUSPENSION ORAL at 07:06

## 2024-06-04 NOTE — PLAN OF CARE
SW reached out to [facility] via [facility] this morning-auth still pending.     JEROME Stevens  [facility] [place] [facility]  [phone]

## 2024-06-04 NOTE — CARE UPDATE
Spoke to daughter Anh about need for blood transfusion given drop in Hb.     She was explained the risks and benefits of blood transfusion and consented to transfusion of 1 PRBC.

## 2024-06-04 NOTE — HPI
Deena Moody is a 89 year old male with PMH of HTN, HLD, CAD, Afib on Eliquis 2.5mg, h/o AV block s/p pacemaker, macular degeneration of retina, dementia/cognitive decline, CAD s/p CABG, CKD3, long standing history of gout, was brought to the ED from [facility] for complains of rectal bleeding. Patient denies any episodes of rectal bleeding. He denies abdominal pain, nausea/vomiting, constipation. Says he has been passing his bowel movements. Asking to go back home. According to daughter, patient has dementia. Waxing and waning consciousness. [place] in hospital. She reports he was in skilled nursing facility and last saw him last night. He did not complain of constipation or any symptoms. She was called in the morning with report of rectal bleeding. In ED nurse showed blood clot in patients diaper. No active episodes of rectal bleeding. Last Eliquis dose was this morning. In ED patients vitals stayed hemodynamically stable. Hb 9.8.     Recent admission for for left arm swelling. During that admission he had two episodes of bloody BM. Found to have fecal impaction. Disimpacted and started on bowel regimen for stercoral colitis. Colonoscopy not done in last admission after weighing risk vs benefit given patients age, comorbidities. Hb stayed stable so was discharged. Patient admitted to hospital medicine service for further management. Skin integrity [facility] consulted for evaluation of skin injury.

## 2024-06-04 NOTE — DISCHARGE SUMMARY
"Discharge Summary  Hospital Medicine    Attending Provider on Discharge: Toma Porter MD  [facility] Medicine Team: [facility]  Date of Admission:  5/11/2024     Date of Discharge:  5/21/2024  Code status: Full Code    Active Hospital Problems    Diagnosis  POA    *Left arm swelling [M79.89]  Yes     Priority: 1 - High    Acute gout [M10.9]  Yes     Priority: 2     Acute encephalopathy [G93.40]  Yes    Severe malnutrition [E43]  Yes    Hematochezia [K92.1]  No    Pressure injury of sacral region, stage 2 [L89.152]  Yes    CKD (chronic kidney disease), stage IV [N18.4]  Yes    Blood blister [T14.8XXA]  Yes    Atrial fibrillation [I48.91]  Yes    Chronic diastolic heart failure [I50.32]  Yes     Patient being diuresed.      CAD (coronary artery disease) [I25.10]  Yes     Chronic    Essential hypertension [I10]  Yes      Resolved Hospital Problems   No resolved problems to display.     HPI  Deena Moody is a 89 y.o. male with PMHx HTN, HLD, CAD, diastolic HF, A-fib with pacemaker, CABG, hx of MI, AV block, and CKD 3, with recent admission to  for treatment of L hand gout flare (d/c 5/8/24), who presents back to [facility] ED from his nursing home with L upper arm swelling. During prior admission, pt initially thought to have osteomyelitis and started on BS abx. Later determined to be gout flare, managed with steroids by rheumatology. Patient has dementia at baseline and is unable to explain his situation. He complains that he feels bad and endorses muscle pain "all over," but otherwise cannot describe his symptoms. No family at bedside. He denies confusion, fevers or chills, headaches, chest pain, SOB, belly pain.     In ED: T 99F, WBC 21K (15K prior admission). VSS. Labs otherwise c/w stable chronic anemia, CKD. CRP 99.7 (prior ~60). ESR 78 (prior ~100). UA unremarkable. CXR without change from prior. XR LUE remarkable for "redemonstration of erosive change involving the head of the 2nd middle phalanx, previously " "demonstrated on hand CT and radiograph referenced above.  Findings could reflect underlying osteomyelitis or other inflammatory arthritidis in appears similar to prior exams." Otherwise XR of L hand, L humerus, L forearm without acute findings. Given tylenol, vanc/ zosyn.     Hospital Course  * Left arm swelling  Hx gout   Recent admission for the same, d/c 5/8. Treated initially for osteomyelitis with BS IV abx, then determined to be gout (joint asp wrist on 5/3 without evidence of infection) and managed with IV/ PO steroids. Evals by ID, ortho, rheum during last admission. Returns from NH  on 5/11 with edema from hand to elbow, left. Painful ROM LUE including hand/ wrist/ elbow/ shoulder. Repeat Left UE US negative for DVT. Imaging reviewed - XR L hand significant for "redemonstration of erosive change involving the head of the 2nd middle phalanx, previously demonstrated on hand CT and radiograph referenced above.  Findings could reflect underlying osteomyelitis or other inflammatory arthritidis in appears similar to prior exams." MRI not able to be obtained d/t incompatibility with pacer. Arm does not appear erythematous and so clinically this is not cellulitis. Uric acid level elevated on admit. ID ruled out infection. Rheumatology consulted. Patient started on prednisone, colchicine 0.6 mg every other day, and allopurinol 50 mg daily. Started on acetaminophen 650 mg TID. Improved overall. Swelling improving but noticeably improved when briefly restarted on torsemide    Acute gout  - Appreciate Rheumatology consult  - Recent admit for the same, treated with steroids with ongoing pain/discomfort.  - detailed to family that gout will be further managed as outpatient  - prednisone burst completed and continued on cholchicine 0.6 mg every other day  - allopurinol 50 mg daily for four weeks, then increase to 100 mg daily - further titration per rheumatology  - pain much resolved at this time  - prn acetaminophen  - " opioid stopped due to constipation    Acute encephalopathy  Chronic dementia  Patient with waxing and waning mental status  Treating underlying causes  Gout  Constipation  Zyprexa prn agitation    Severe malnutrition  Severe fat and muscle wasting in setting of pressure wounds  Regular diet with boost plus     Hematochezia  GI consulted - observational management  Follow H/H - now stable  Constipation - aggressive bowel regimen  Bleeding felt to be from sterocoral ulcer - stools no longer bloody  Maalox for the abdominal pain  Stop opioid  Discharged on regular BM regimen    CKD (chronic kidney disease), stage IV  Creatine stable for now. BMP reviewed- noted Estimated Creatinine Clearance: 24.6 mL/min (based on SCr of 1.4 mg/dL). according to latest data. 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  Monitor UOP and serial BMP and adjust therapy as needed. Renally dose meds. Avoid nephrotoxic medications and procedures.  - at time of admission, Cr 1.5 - improved from baseline (~1.6-2.3)    Atrial fibrillation  Patient with Permanent atrial fibrillation which is controlled currently with Calcium Channel Blocker. Patient is currently in atrial fibrillation.[id number] Score: 3. Anticoagulation indicated. Anticoagulation done with eliquis 2.5 BID .    Chronic diastolic heart failure  - controlled  - BNP in 500s, lowest it has been in record  - torsemide held since 5/1/2024  - Discontinued torsemide as patient with significant weight loss and decreased access to fluid intake    CAD (coronary artery disease)  Patient with known CAD s/p stent placement and CABG, which is controlled Will continue ASA and monitor for S/Sx of angina/ACS.     Essential hypertension  Chronic, controlled. Latest blood pressure and vitals reviewed-     Temp:  [97.9 °F (36.6 °C)-98.7 °F (37.1 °C)]   Pulse:  [67-70]   Resp:  [17-18]   BP: ()/(44-73)   SpO2:  [97 %-99 %] .     Labile. Has higher BP goal due to age and  co-morbidities    Stop amlodpine  Continue carvedilol 3.125 mg BID and lisinopril 5 mg daily      Procedures: none    Consultants: gastroenterology, wound care, infectious idsease and rheumatology    Discharge Medication List as of 5/21/2024  4:59 PM        START taking these medications    Details   balsam peru-castor oiL Oint Apply topically 2 (two) times a day. Apply to buttocks and sacrum BID and PRN if soiled., Starting Tue 5/14/2024, No Print      bisacodyL (DULCOLAX) 10 mg Supp Place 1 suppository (10 mg total) rectally daily as needed (no BM in over one calendar day)., Starting Mon 5/20/2024, No Print      colchicine (COLCRYS) 0.6 mg tablet Take 1 tablet (0.6 mg total) by mouth every other day., Starting Thu 5/16/2024, Until Fri 5/16/2025, Normal      senna-docusate 8.6-50 mg (PERICOLACE) 8.6-50 mg per tablet Take 2 tablets by mouth 2 (two) times daily., Starting Mon 5/20/2024, No Print           CONTINUE these medications which have CHANGED    Details   acetaminophen (TYLENOL) 325 MG tablet Take 2 tablets (650 mg total) by mouth every 4 (four) hours as needed (any pain or temp >100)., Starting Mon 5/20/2024, No Print      allopurinoL (ZYLOPRIM) 100 MG tablet Multiple Dosages:Starting Mon 5/20/2024, Until Sun 6/9/2024 at 2359, THEN Starting Mon 6/10/2024, Until Mon 6/9/2025 at 2359Take 0.5 tablets (50 mg total) by mouth once daily for 21 days, THEN 1 tablet (100 mg total) once daily., No Print      diclofenac sodium (VOLTAREN) 1 % Gel Apply 2 g topically 3 (three) times daily. To left elbow, Starting Tue 5/14/2024, Normal      ferrous sulfate (FEOSOL) 325 mg (65 mg iron) Tab tablet Take 1 tablet (325 mg total) by mouth every Mon, Wed, Fri. Before breakfast, Starting Wed 5/15/2024, No Print      polyethylene glycol (GLYCOLAX) 17 gram/dose powder Take 17 g by mouth 3 (three) times daily as needed for Constipation., Starting Tue 5/21/2024, No Print           CONTINUE these medications which have NOT CHANGED     Details   albuterol (PROVENTIL HFA) 90 mcg/actuation inhaler Inhale 2 puffs into the lungs every 6 (six) hours as needed for Wheezing. Rescue, Starting Sun 12/11/2022, Normal      apixaban (ELIQUIS) 2.5 mg Tab Take 1 tablet (2.5 mg total) by mouth 2 (two) times daily., Starting Tue 7/25/2023, Normal      aspirin (ECOTRIN) 81 MG EC tablet Take 1 tablet (81 mg total) by mouth once daily., Starting Thu 3/18/2021, Normal      benazepriL (LOTENSIN) 5 MG tablet Take 1 tablet (5 mg total) by mouth once daily., Starting Tue 7/25/2023, Normal      carvediloL (COREG) 3.125 MG tablet Take 1 tablet (3.125 mg total) by mouth 2 (two) times daily., Starting Wed 5/8/2024, Until Thu 5/8/2025, Normal      fluticasone propionate (FLONASE) 50 mcg/actuation nasal spray 2 sprays (100 mcg total) by Each Nostril route once daily., Starting Sat 10/8/2022, Normal      multivitamin (ONE DAILY MULTIVITAMIN) per tablet Take 1 tablet by mouth once daily., Starting Thu 3/18/2021, OTC      nitroGLYCERIN (NITROSTAT) 0.4 MG SL tablet Take one tablet every 5 minutes for chest pain. After the third tablet go to the ED., Normal           STOP taking these medications       potassium chloride (KLOR-CON) 8 MEQ TbSR Comments:   Reason for Stopping:         predniSONE (DELTASONE) 20 MG tablet Comments:   Reason for Stopping:         torsemide (DEMADEX) 20 MG Tab Comments:   Reason for Stopping:               Discharge Diet:2 gram sodium diet     Activity: activity as tolerated    Discharge Condition: Good    Disposition: Home or Self Care    Tests pending at the time of discharge: none      Time spent  on the discharge of the patient including review of hospital course with the patient. reviewing discharge medications and arranging follow-up care 35 min    Discharge examination Patient was seen and examined on the date of discharge and determined to be suitable for discharge.    Discharge plan     Future Appointments   Date Time Provider Department Center    6/5/2024  6:30 AM ECHO, [facility] ECHOSTR [facility]   6/18/2024  2:20 PM Donn Hastings MD [facility] ARRHYTH [facility] MD

## 2024-06-04 NOTE — ASSESSMENT & PLAN NOTE
SARAH due to post obstructive cause. Straight cath x2.  Likely BPH. Will start tamsulosin.   - ramirez placed  - monitor BMP

## 2024-06-04 NOTE — ASSESSMENT & PLAN NOTE
- right heel with healing injury, tan dried scab noted.  - left heel with stable black eschar from a blood blister.  - continue BPCO bid/prn to right heel and cover with foam dressing.  - chloroprep daily to left heel and leave open to air.  - heel boots at bedside for offloading.   - pt prefers to have heels floated off bed with pillows.

## 2024-06-04 NOTE — CONSULTS
[facility] - Med Surg  Skin Integrity EMILIO  Consult Note    Patient Name: Deena Moody  MRN: [id number]  Admission Date: 6/1/2024  Hospital Length of Stay: 0 days  Attending Physician: Rachel Cuevas MD  Primary Care Provider: Jam Rowell MD     Inpatient consult to Skin Integrity  Practitioner  Consult performed by: Bonny Calvillo NP  Consult ordered by: Rachel Cuevas MD        Subjective:     History of Present Illness:  Deena Moody is a 89 year old male with PMH of HTN, HLD, CAD, Afib on Eliquis 2.5mg, h/o AV block s/p pacemaker, macular degeneration of retina, dementia/cognitive decline, CAD s/p CABG, CKD3, long standing history of gout, was brought to the ED from [facility] for complains of rectal bleeding. Patient denies any episodes of rectal bleeding. He denies abdominal pain, nausea/vomiting, constipation. Says he has been passing his bowel movements. Asking to go back home. According to daughter, patient has dementia. Waxing and waning consciousness. Desmond in hospital. She reports he was in skilled nursing facility and last saw him last night. He did not complain of constipation or any symptoms. She was called in the morning with report of rectal bleeding. In ED nurse showed blood clot in patients diaper. No active episodes of rectal bleeding. Last Eliquis dose was this morning. In ED patients vitals stayed hemodynamically stable. Hb 9.8.     Recent admission for for left arm swelling. During that admission he had two episodes of bloody BM. Found to have fecal impaction. Disimpacted and started on bowel regimen for stercoral colitis. Colonoscopy not done in last admission after weighing risk vs benefit given patients age, comorbidities. Hb stayed stable so was discharged. Patient admitted to hospital medicine service for further management. Skin integrity EMILIO consulted for evaluation of skin injury.    Scheduled Meds:   allopurinoL  50 mg Oral Daily    Followed by    [START ON 6/10/2024]  allopurinoL  100 mg Oral Daily    balsam peru-castor oiL   Topical (Top) BID    carvediloL  3.125 mg Oral BID    colchicine  0.6 mg Oral Every other day    fluticasone propionate  2 spray Each Nostril Daily    multivitamin  1 tablet Oral Daily    pantoprazole  40 mg Intravenous BID    senna-docusate 8.6-50 mg  2 tablet Oral BID    tamsulosin  0.8 mg Oral Daily     Continuous Infusions:  PRN Meds:  Current Facility-Administered Medications:     albuterol, 2 puff, Inhalation, Q6H PRN    aluminum & magnesium hydroxide-simethicone, 30 mL, Oral, Q8H PRN    bisacodyL, 10 mg, Rectal, Daily PRN    dextrose 10%, 25 g, Intravenous, PRN    nitroGLYCERIN, 0.4 mg, Sublingual, Q5 Min PRN    polyethylene glycol, 17 g, Oral, BID PRN    Review of patient's allergies indicates:   Allergen Reactions    Iodine and iodide containing products Other (See Comments)     Caused changes in skin color        Past Medical History:   Diagnosis Date    Acute encephalopathy 5/20/2024    Acute on chronic diastolic heart failure 9/1/2016    Chronic dementia 6/1/2024    Coronary artery disease     Hyperlipidemia     Hypertension     Macular degeneration (senile) of retina, unspecified 12/12/2014    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    Persistent atrial fibrillation 11/10/2016    S/P placement of cardiac pacemaker 9/14/2016     Past Surgical History:   Procedure Laterality Date    AORTIC VALVE REPLACEMENT N/A     CARDIAC PACEMAKER PLACEMENT      CATARACT EXTRACTION W/  INTRAOCULAR LENS IMPLANT Right 2/16/2016    Dr. Whitley    CATARACT EXTRACTION W/  INTRAOCULAR LENS IMPLANT Left 3/1/2016    Dr. Whitley    CORONARY ARTERY BYPASS GRAFT      EAR EXAMINATION UNDER ANESTHESIA      EYE SURGERY         Family History       Problem Relation (Age of Onset)    Hypertension Brother    No Known Problems Mother, Father, Sister, Maternal Aunt, Maternal Uncle, Paternal Aunt, Paternal Uncle, Maternal Grandmother, Maternal Grandfather, Paternal Grandmother, Paternal  Grandfather, Daughter, Son    Stroke Brother          Tobacco Use    Smoking status: Never     Passive exposure: Never    Smokeless tobacco: Never   Substance and Sexual Activity    Alcohol use: No    Drug use: No    Sexual activity: Yes     Partners: Female     Review of Systems   Skin:  Positive for wound.       Objective:     Vital Signs (Most Recent):  Temp: 98.3 °F (36.8 °C) (06/04/24 1330)  Pulse: 66 (06/04/24 1330)  Resp: 18 (06/04/24 1311)  BP: 115/68 (06/04/24 1330)  SpO2: 98 % (06/04/24 1330) Vital Signs (24h Range):  Temp:  [97.7 °F (36.5 °C)-98.8 °F (37.1 °C)] 98.3 °F (36.8 °C)  Pulse:  [66-70] 66  Resp:  [18-20] 18  SpO2:  [93 %-100 %] 98 %  BP: (105-125)/(44-68) 115/68     Weight: 53.1 kg (117 lb 1 oz)  Body mass index is 17.29 kg/m².     Physical Exam  Constitutional:       Appearance: Normal appearance.   Skin:     General: Skin is warm and dry.      Findings: Lesion present.   Neurological:      Mental Status: He is alert.          Laboratory:  All pertinent labs reviewed within the last 24 hours.    Diagnostic Results:  None      Assessment/Plan:         EMILIO Skin Integrity Evaluation      Skin Integrity EMILIO evaluation of patient as part of the comprehensive skin care team.     He has been admitted for 3 days. Skin injury was noted on 5/3/24. POA yes.    Sacrum/buttock      R heel      L heel          Orthopedic  Pressure injury of sacral region, stage 2  - consult received for evaluation of skin injury.  - pt well known to wound care dept and seen for injuries previously.  - left sacral healing stage 2 pressure injury with pink, moist wound base with surrounding scar tissue.  - darkened discoloration noted across sacrum and buttock.  - pt remains incontinent of stool. Small stool episode cleaned during assessment.  - ramirez catheter in place.  - foam dressing removed. No foam dressings over wound please.  - continue Triad only bid/prn.  - Immerse surface.  - wedge/boots for offloading.  - turn  q2h.  - domingo score documented at 13 with a nutrition sub scale score of 3.  - nursing to maintain pressure injury prevention measures and continue wound care per orders.    Blood blister  - right heel with healing injury, tan dried scab noted.  - left heel with stable black eschar from a blood blister.  - continue BPCO bid/prn to right heel and cover with foam dressing.  - chloroprep daily to left heel and leave open to air.  - heel boots at bedside for offloading.   - pt prefers to have heels floated off bed with pillows.        Thank you for your consult. I will follow-up with patient. Please contact us if you have any additional questions.      Bonny Calvillo NP  Skin Integrity EMILIO  [facility] - Med S[facility]

## 2024-06-04 NOTE — ASSESSMENT & PLAN NOTE
[id number] Score: 3. Hasblad score 3. High risk of bleeding  - hold eliquis 2.5mg in setting of GIB  - continue coreg    After conversation with daughter, plan to hold eliquis on discharge given high risk of bleeding

## 2024-06-04 NOTE — PT/OT/SLP PROGRESS
Physical Therapy      Patient Name:  Deena Moody   MRN:  [id number]    Patient not seen today secondary to Blood transfusion, Patient fatigue. Pt daughter reported, pt having tests done because he was having a worse day and needed blood. Will follow-up per POC.

## 2024-06-04 NOTE — NURSING
Bladder Scan Volume 330 ml . Order for straight cath received . Straight Cath 280 ml Urine drained .

## 2024-06-04 NOTE — SUBJECTIVE & OBJECTIVE
Interval History:     No blood in stools.  Straight cath done overnight. Muhammad placed in AM for retention. Cr improved to 1.4. Blood transfused for Hb 7.3.   Discussed with daughter again about colonoscopy. She does not want to pursue invasive treatments. Plan to monitor Hb and transfuse as needed hb 8< once discharged.  Patient complained of shortness of breath. IV lasix 20mg given. Echo ordered.     Review of Systems   Constitutional:  Negative for chills, fatigue and fever.   Respiratory:  Negative for apnea, cough, choking, chest tightness, shortness of breath and wheezing.    Cardiovascular:  Negative for chest pain, palpitations and leg swelling.   Gastrointestinal:  Positive for abdominal pain. Negative for diarrhea and nausea.   Musculoskeletal:  Negative for back pain.   Neurological:  Negative for dizziness, light-headedness, numbness and headaches.   Psychiatric/Behavioral:  Negative for behavioral problems and confusion. The patient is not nervous/anxious.      Objective:     Vital Signs (Most Recent):  Temp: 98.3 °F (36.8 °C) (06/04/24 1330)  Pulse: 66 (06/04/24 1330)  Resp: 18 (06/04/24 1311)  BP: 115/68 (06/04/24 1330)  SpO2: 98 % (06/04/24 1330) Vital Signs (24h Range):  Temp:  [97.7 °F (36.5 °C)-98.8 °F (37.1 °C)] 98.3 °F (36.8 °C)  Pulse:  [66-70] 66  Resp:  [18-20] 18  SpO2:  [93 %-100 %] 98 %  BP: (105-125)/(44-68) 115/68     Weight: 53.1 kg (117 lb 1 oz)  Body mass index is 17.29 kg/m².    Intake/Output Summary (Last 24 hours) at 6/4/2024 1623  Last data filed at 6/4/2024 1533  Gross per 24 hour   Intake 225 ml   Output 1100 ml   Net -875 ml         Physical Exam  Constitutional:       General: He is not in acute distress.     Appearance: He is not ill-appearing.   HENT:      Head: Normocephalic.   Cardiovascular:      Heart sounds: Normal heart sounds.   Pulmonary:      Effort: No respiratory distress.      Comments: Increased work of breathing  Abdominal:      General: Abdomen is flat. Bowel  sounds are normal.      Palpations: Abdomen is soft.   Musculoskeletal:      Right lower leg: No edema.      Left lower leg: No edema.   Neurological:      Mental Status: He is alert. Mental status is at baseline.             Significant Labs: All pertinent labs within the past 24 hours have been reviewed.  BMP:   Recent Labs   Lab 06/04/24 0229      *   K 5.4*      CO2 18*   BUN 44*   CREATININE 1.5*   CALCIUM 8.0*     CBC:   Recent Labs   Lab 06/03/24 0510 06/04/24 0229   WBC 7.96 7.16   HGB 8.3* 7.5*   HCT 28.0* 24.7*    188     CMP:   Recent Labs   Lab 06/03/24 0510 06/04/24 0229    135*   K 4.9 5.4*   * 110   CO2 16* 18*   * 104   BUN 41* 44*   CREATININE 1.6* 1.5*   CALCIUM 7.9* 8.0*   ANIONGAP 8 7*       Significant Imaging: I have reviewed all pertinent imaging results/findings within the past 24 hours.

## 2024-06-04 NOTE — ASSESSMENT & PLAN NOTE
- consult received for evaluation of skin injury.  - pt well known to wound care dept and seen for injuries previously.  - left sacral healing stage 2 pressure injury with pink, moist wound base with surrounding scar tissue.  - darkened discoloration noted across sacrum and buttock.  - pt remains incontinent of stool. Small stool episode cleaned during assessment.  - ramirez catheter in place.  - foam dressing removed. No foam dressings over wound please.  - continue Triad only bid/prn.  - Immerse surface.  - wedge/boots for offloading.  - turn q2h.  - domingo score documented at 13 with a nutrition sub scale score of 3.  - nursing to maintain pressure injury prevention measures and continue wound care per orders.

## 2024-06-04 NOTE — PLAN OF CARE
Problem: Adult Inpatient Plan of Care  Goal: Plan of Care Review  Outcome: Progressing  Goal: Patient-Specific Goal (Individualized)  Outcome: Progressing  Goal: Absence of Hospital-Acquired Illness or Injury  Outcome: Progressing  Goal: Optimal Comfort and Wellbeing  Outcome: Progressing  Goal: Readiness for Transition of Care  Outcome: Progressing     Problem: Gastrointestinal Bleeding  Goal: Optimal Coping with Acute Illness  Outcome: Progressing  Goal: Hemostasis  Outcome: Progressing     Problem: Acute Kidney Injury/Impairment  Goal: Fluid and Electrolyte Balance  Outcome: Progressing  Goal: Improved Oral Intake  Outcome: Progressing  Goal: Effective Renal Function  Outcome: Progressing     Problem: Fall Injury Risk  Goal: Absence of Fall and Fall-Related Injury  Outcome: Progressing     Problem: Skin Injury Risk Increased  Goal: Skin Health and Integrity  Outcome: Progressing     Problem: Wound  Goal: Optimal Coping  Outcome: Progressing  Goal: Optimal Functional Ability  Outcome: Progressing  Goal: Absence of Infection Signs and Symptoms  Outcome: Progressing  Goal: Improved Oral Intake  Outcome: Progressing  Goal: Optimal Pain Control and Function  Outcome: Progressing  Goal: Skin Health and Integrity  Outcome: Progressing  Goal: Optimal Wound Healing  Outcome: Progressing     Problem: Infection  Goal: Absence of Infection Signs and Symptoms  Outcome: Progressing     Problem: Urinary Retention  Goal: Effective Urinary Elimination  Outcome: Progressing     Problem: Pain Acute  Goal: Optimal Pain Control and Function  Outcome: Progressing       Patient rested comfortably with no significant changes during the shift, remains free from falls and injuries. No blood in stool this shift. Side rails up x2, bed low and locked, call light and personal belongings within reach. VS remain stable and respirations even and unlabored. No grimace, cries or other signs of distress. Questions and concerns addressed and  answered. Medication compliance. Muhammad draining to gravity in place. Pt received 1 unit of RBCs during shift. No adverse reactions occurred. Family remains at bedside. Hygiene care performed. HOB and pillows adjusted for comfort. Heels elevated off bed. Reviewed importance of safety barriers and calling for assistance prn. Verbalized understanding and states he will call prn for needs.

## 2024-06-04 NOTE — SUBJECTIVE & OBJECTIVE
Scheduled Meds:   allopurinoL  50 mg Oral Daily    Followed by    [START ON 6/10/2024] allopurinoL  100 mg Oral Daily    balsam peru-castor oiL   Topical (Top) BID    carvediloL  3.125 mg Oral BID    colchicine  0.6 mg Oral Every other day    fluticasone propionate  2 spray Each Nostril Daily    multivitamin  1 tablet Oral Daily    pantoprazole  40 mg Intravenous BID    senna-docusate 8.6-50 mg  2 tablet Oral BID    tamsulosin  0.8 mg Oral Daily     Continuous Infusions:  PRN Meds:  Current Facility-Administered Medications:     albuterol, 2 puff, Inhalation, Q6H PRN    aluminum & magnesium hydroxide-simethicone, 30 mL, Oral, Q8H PRN    bisacodyL, 10 mg, Rectal, Daily PRN    dextrose 10%, 25 g, Intravenous, PRN    nitroGLYCERIN, 0.4 mg, Sublingual, Q5 Min PRN    polyethylene glycol, 17 g, Oral, BID PRN    Review of patient's allergies indicates:   Allergen Reactions    Iodine and iodide containing products Other (See Comments)     Caused changes in skin color        Past Medical History:   Diagnosis Date    Acute encephalopathy 5/20/2024    Acute on chronic diastolic heart failure 9/1/2016    Chronic dementia 6/1/2024    Coronary artery disease     Hyperlipidemia     Hypertension     Macular degeneration (senile) of retina, unspecified 12/12/2014    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    Persistent atrial fibrillation 11/10/2016    S/P placement of cardiac pacemaker 9/14/2016     Past Surgical History:   Procedure Laterality Date    AORTIC VALVE REPLACEMENT N/A     CARDIAC PACEMAKER PLACEMENT      CATARACT EXTRACTION W/  INTRAOCULAR LENS IMPLANT Right 2/16/2016    Dr. Whitley    CATARACT EXTRACTION W/  INTRAOCULAR LENS IMPLANT Left 3/1/2016    Dr. Whitley    CORONARY ARTERY BYPASS GRAFT      EAR EXAMINATION UNDER ANESTHESIA      EYE SURGERY         Family History       Problem Relation (Age of Onset)    Hypertension Brother    No Known Problems Mother, Father, Sister, Maternal Aunt, Maternal Uncle, Paternal Aunt,  Paternal Uncle, Maternal Grandmother, Maternal Grandfather, Paternal Grandmother, Paternal Grandfather, Daughter, Son    Stroke Brother          Tobacco Use    Smoking status: Never     Passive exposure: Never    Smokeless tobacco: Never   Substance and Sexual Activity    Alcohol use: No    Drug use: No    Sexual activity: Yes     Partners: Female     Review of Systems   Skin:  Positive for wound.       Objective:     Vital Signs (Most Recent):  Temp: 98.3 °F (36.8 °C) (06/04/24 1330)  Pulse: 66 (06/04/24 1330)  Resp: 18 (06/04/24 1311)  BP: 115/68 (06/04/24 1330)  SpO2: 98 % (06/04/24 1330) Vital Signs (24h Range):  Temp:  [97.7 °F (36.5 °C)-98.8 °F (37.1 °C)] 98.3 °F (36.8 °C)  Pulse:  [66-70] 66  Resp:  [18-20] 18  SpO2:  [93 %-100 %] 98 %  BP: (105-125)/(44-68) 115/68     Weight: 53.1 kg (117 lb 1 oz)  Body mass index is 17.29 kg/m².     Physical Exam  Constitutional:       Appearance: Normal appearance.   Skin:     General: Skin is warm and dry.      Findings: Lesion present.   Neurological:      Mental Status: He is alert.          Laboratory:  All pertinent labs reviewed within the last 24 hours.    Diagnostic Results:  None

## 2024-06-04 NOTE — PLAN OF CARE
Problem: Wound  Goal: Optimal Coping  Outcome: Progressing  Intervention: Support Patient and Family Response  Flowsheets (Taken 6/3/2024 1900)  Supportive Measures:   relaxation techniques promoted   self-care encouraged   self-reflection promoted   verbalization of feelings encouraged  Family/Support System Care:   presence promoted   involvement promoted   self-care encouraged   support provided  Goal: Optimal Functional Ability  Outcome: Progressing  Intervention: Optimize Functional Ability  Flowsheets (Taken 6/3/2024 1900)  Activity Management:   Rolling - L1   Arm raise - L1  Activity Assistance Provided: assistance, 1 person  Goal: Absence of Infection Signs and Symptoms  Outcome: Progressing  Intervention: Prevent or Manage Infection  Flowsheets (Taken 6/3/2024 1900)  Infection Management: aseptic technique maintained  Isolation Precautions: protective  Goal: Improved Oral Intake  Outcome: Progressing  Intervention: Promote and Optimize Oral Intake  Flowsheets (Taken 6/3/2024 1900)  Oral Nutrition Promotion:   adaptive equipment use encouraged   physical activity promoted   calorie-dense liquids provided   rest periods promoted  Nutrition Interventions:   food preferences provided   meal set-up provided   supplemental drinks provided  Goal: Optimal Pain Control and Function  Outcome: Progressing  Goal: Skin Health and Integrity  Outcome: Progressing  Goal: Optimal Wound Healing  Outcome: Progressing     Problem: Infection  Goal: Absence of Infection Signs and Symptoms  Outcome: Progressing  Intervention: Prevent or Manage Infection  Flowsheets (Taken 6/3/2024 1900)  Infection Management: aseptic technique maintained  Isolation Precautions: protective     Problem: Urinary Retention  Goal: Effective Urinary Elimination  Outcome: Progressing  Intervention: Promote Effective Urine Elimination  Flowsheets (Taken 6/3/2024 1900)  Bowel Function Promotion:   toileting schedule established   prompt response to  urge promoted   straining discouraged  Urinary Elimination Promotion:   absorbent pad/diaper use encouraged   bladder volume assessed by ultrasound   frequent voiding encouraged   positioned for ease of voiding   toileting offered   toileting scheduled   voiding relaxation promoted     Problem: Pain Acute  Goal: Optimal Pain Control and Function  Outcome: Progressing  Intervention: Develop Pain Management Plan  Flowsheets (Taken 6/3/2024 1900)  Pain Management Interventions:   care clustered   medication offered   pillow support provided   position adjusted   quiet environment facilitated   relaxation techniques promoted  Intervention: Prevent or Manage Pain  Flowsheets (Taken 6/3/2024 1900)  Sleep/Rest Enhancement:   regular sleep/rest pattern promoted   relaxation techniques promoted  Sensory Stimulation Regulation:   quiet environment promoted   television on  Medication Review/Management: medications reviewed  Intervention: Optimize Psychosocial Wellbeing  Flowsheets (Taken 6/3/2024 2222)  Supportive Measures:   relaxation techniques promoted   self-care encouraged   self-reflection promoted   verbalization of feelings encouraged  Diversional Activities: television

## 2024-06-05 VITALS
BODY MASS INDEX: 17.34 KG/M2 | DIASTOLIC BLOOD PRESSURE: 68 MMHG | TEMPERATURE: 98 F | WEIGHT: 117.06 LBS | RESPIRATION RATE: 16 BRPM | SYSTOLIC BLOOD PRESSURE: 108 MMHG | OXYGEN SATURATION: 94 % | HEART RATE: 66 BPM | HEIGHT: 69 IN

## 2024-06-05 LAB
ANION GAP SERPL CALC-SCNC: 6 MMOL/L (ref 8–16)
BASOPHILS # BLD AUTO: 0.04 K/UL (ref 0–0.2)
BASOPHILS NFR BLD: 0.6 % (ref 0–1.9)
BUN SERPL-MCNC: 46 MG/DL (ref 10–30)
CALCIUM SERPL-MCNC: 7.9 MG/DL (ref 8.7–10.5)
CHLORIDE SERPL-SCNC: 108 MMOL/L (ref 95–110)
CO2 SERPL-SCNC: 21 MMOL/L (ref 23–29)
CREAT SERPL-MCNC: 1.6 MG/DL (ref 0.5–1.4)
DIFFERENTIAL METHOD BLD: ABNORMAL
EOSINOPHIL # BLD AUTO: 0.3 K/UL (ref 0–0.5)
EOSINOPHIL NFR BLD: 4.7 % (ref 0–8)
ERYTHROCYTE [DISTWIDTH] IN BLOOD BY AUTOMATED COUNT: 16.3 % (ref 11.5–14.5)
EST. GFR  (NO RACE VARIABLE): 39.9 ML/MIN/1.73 M^2
GLUCOSE SERPL-MCNC: 79 MG/DL (ref 70–110)
HCT VFR BLD AUTO: 27.4 % (ref 40–54)
HGB BLD-MCNC: 8.5 G/DL (ref 14–18)
IMM GRANULOCYTES # BLD AUTO: 0.07 K/UL (ref 0–0.04)
IMM GRANULOCYTES NFR BLD AUTO: 1.1 % (ref 0–0.5)
LYMPHOCYTES # BLD AUTO: 1.4 K/UL (ref 1–4.8)
LYMPHOCYTES NFR BLD: 20.8 % (ref 18–48)
MAGNESIUM SERPL-MCNC: 2.1 MG/DL (ref 1.6–2.6)
MCH RBC QN AUTO: 31 PG (ref 27–31)
MCHC RBC AUTO-ENTMCNC: 31 G/DL (ref 32–36)
MCV RBC AUTO: 100 FL (ref 82–98)
MONOCYTES # BLD AUTO: 0.8 K/UL (ref 0.3–1)
MONOCYTES NFR BLD: 12.6 % (ref 4–15)
NEUTROPHILS # BLD AUTO: 4 K/UL (ref 1.8–7.7)
NEUTROPHILS NFR BLD: 60.2 % (ref 38–73)
NRBC BLD-RTO: 0 /100 WBC
PHOSPHATE SERPL-MCNC: 2.4 MG/DL (ref 2.7–4.5)
PLATELET # BLD AUTO: 182 K/UL (ref 150–450)
PMV BLD AUTO: 9.1 FL (ref 9.2–12.9)
POTASSIUM SERPL-SCNC: 5 MMOL/L (ref 3.5–5.1)
RBC # BLD AUTO: 2.74 M/UL (ref 4.6–6.2)
SODIUM SERPL-SCNC: 135 MMOL/L (ref 136–145)
WBC # BLD AUTO: 6.65 K/UL (ref 3.9–12.7)

## 2024-06-05 PROCEDURE — 80048 BASIC METABOLIC PNL TOTAL CA: CPT | Mod: HCNC | Performed by: STUDENT IN AN ORGANIZED HEALTH CARE EDUCATION/TRAINING PROGRAM

## 2024-06-05 PROCEDURE — 85025 COMPLETE CBC W/AUTO DIFF WBC: CPT | Mod: HCNC | Performed by: STUDENT IN AN ORGANIZED HEALTH CARE EDUCATION/TRAINING PROGRAM

## 2024-06-05 PROCEDURE — 63600175 PHARM REV CODE 636 W HCPCS: Mod: HCNC | Performed by: STUDENT IN AN ORGANIZED HEALTH CARE EDUCATION/TRAINING PROGRAM

## 2024-06-05 PROCEDURE — 84100 ASSAY OF PHOSPHORUS: CPT | Mod: HCNC | Performed by: STUDENT IN AN ORGANIZED HEALTH CARE EDUCATION/TRAINING PROGRAM

## 2024-06-05 PROCEDURE — 99900035 HC TECH TIME PER 15 MIN (STAT): Mod: HCNC

## 2024-06-05 PROCEDURE — 36415 COLL VENOUS BLD VENIPUNCTURE: CPT | Mod: HCNC | Performed by: STUDENT IN AN ORGANIZED HEALTH CARE EDUCATION/TRAINING PROGRAM

## 2024-06-05 PROCEDURE — C9113 INJ PANTOPRAZOLE SODIUM, VIA: HCPCS | Mod: HCNC | Performed by: STUDENT IN AN ORGANIZED HEALTH CARE EDUCATION/TRAINING PROGRAM

## 2024-06-05 PROCEDURE — 83735 ASSAY OF MAGNESIUM: CPT | Mod: HCNC | Performed by: STUDENT IN AN ORGANIZED HEALTH CARE EDUCATION/TRAINING PROGRAM

## 2024-06-05 PROCEDURE — 25000003 PHARM REV CODE 250: Mod: HCNC | Performed by: STUDENT IN AN ORGANIZED HEALTH CARE EDUCATION/TRAINING PROGRAM

## 2024-06-05 PROCEDURE — 94761 N-INVAS EAR/PLS OXIMETRY MLT: CPT | Mod: HCNC

## 2024-06-05 PROCEDURE — 27000221 HC OXYGEN, UP TO 24 HOURS: Mod: HCNC

## 2024-06-05 RX ORDER — TAMSULOSIN HYDROCHLORIDE 0.4 MG/1
0.8 CAPSULE ORAL DAILY
Qty: 60 CAPSULE | Refills: 11 | Status: SHIPPED | OUTPATIENT
Start: 2024-06-06 | End: 2025-06-06

## 2024-06-05 RX ORDER — ACETAMINOPHEN 325 MG/1
650 TABLET ORAL EVERY 8 HOURS PRN
Status: DISCONTINUED | OUTPATIENT
Start: 2024-06-05 | End: 2024-06-05 | Stop reason: HOSPADM

## 2024-06-05 RX ORDER — PANTOPRAZOLE SODIUM 40 MG/1
40 TABLET, DELAYED RELEASE ORAL DAILY
Qty: 90 TABLET | Refills: 3 | Status: SHIPPED | OUTPATIENT
Start: 2024-06-05 | End: 2025-06-05

## 2024-06-05 RX ORDER — ASPIRIN 81 MG/1
81 TABLET ORAL DAILY
Qty: 90 TABLET | Refills: 3 | Status: SHIPPED | OUTPATIENT
Start: 2024-06-12

## 2024-06-05 RX ADMIN — SENNOSIDES AND DOCUSATE SODIUM 2 TABLET: 50; 8.6 TABLET ORAL at 08:06

## 2024-06-05 RX ADMIN — FLUTICASONE PROPIONATE 100 MCG: 50 SPRAY, METERED NASAL at 08:06

## 2024-06-05 RX ADMIN — ACETAMINOPHEN 650 MG: 325 TABLET ORAL at 01:06

## 2024-06-05 RX ADMIN — Medication: at 09:06

## 2024-06-05 RX ADMIN — PANTOPRAZOLE SODIUM 40 MG: 40 INJECTION, POWDER, FOR SOLUTION INTRAVENOUS at 08:06

## 2024-06-05 RX ADMIN — CARVEDILOL 3.12 MG: 3.12 TABLET, FILM COATED ORAL at 08:06

## 2024-06-05 RX ADMIN — THERA TABS 1 TABLET: TAB at 08:06

## 2024-06-05 RX ADMIN — TAMSULOSIN HYDROCHLORIDE 0.8 MG: 0.4 CAPSULE ORAL at 08:06

## 2024-06-05 RX ADMIN — ALLOPURINOL 50 MG: 300 TABLET ORAL at 08:06

## 2024-06-05 NOTE — ASSESSMENT & PLAN NOTE
seen by nutrition  - [facility] soft diet with assistance  - nutritional supplements   - monitor electrolytes      - PT/ OT

## 2024-06-05 NOTE — PLAN OF CARE
Sent nursing home orders via [employer].      PHILIP Fernandez  Case Management  [phone]

## 2024-06-05 NOTE — DISCHARGE SUMMARY
[facility] - [facility] Medicine  Discharge Summary      Patient Name: Deena Moody  MRN: [id number]  HOMER: [id number]  Patient Class: IP- Inpatient  Admission Date: 6/1/2024  Hospital Length of Stay: 1 days  Discharge Date and Time:  06/05/2024 10:58 AM  Attending Physician: Rachel Cuevas MD   Discharging Provider: Rachel Cuevas MD  Primary Care Provider: Jam Rowell MD  [facility] Medicine Team: [facility] Rachel Cuevas MD  Primary Care Team: [facility]    HPI:   89 M with PMH of HTN, HLD, CAD, Afib on Eliquis 2.5mg, h/o AV block s/p pacemaker, macular degeneration of retina, dementia/cognitive decline, CAD s/p CABG, CKD3, long standing history of gout, was brought to the ED from [facility] for complains of rectal bleeding. Patient denies any episodes of rectal bleeding. He denies abdominal pain, nausea/vomiting, constipation. Says he has been passing his bowel movements. Asking to go back home. According to daughter, patient has dementia. Waxing and waning consciousness. Desmond in hospital. She reports he was in skilled nursing facility and last saw him last night. He did not complain of constipation or any symptoms. She was called in the morning with report of rectal bleeding. In ED nurse showed blood clot in patients diaper. No active episodes of rectal bleeding. Last Eliquis dose was this morning. In ED patients vitals stayed hemodynamically stable. Hb 9.8.    Recent admission for for left arm swelling. During that admission he had two episodes of bloody BM. Found to have fecal impaction. Disimpacted and started on bowel regimen for stercoral colitis. Colonoscopy not done in last admission after weighing risk vs benefit given patients age, comorbidities. Hb stayed stable so was discharged.     Update daughter Amelie medical updates. [phone]    [facility]PC meds:  - allopurinol   - colchicine  - bisacodyl prn  - senna-docusate BID  - miralax prn  - iron supplement  - albuterol prn  -  flonase   - eliquis 2.5  - aspirin  - benazepril  - coreg    * No surgery found *      Hospital Course:   89 M with PMH of HTN, HLD, CAD, Afib on Eliquis 2.5mg, h/o AV block s/p pacemaker, macular degeneration of retina, dementia/cognitive decline, CAD s/p CABG, CKD3, long standing history of gout, was brought to the ED from [facility] for complains of rectal bleeding. Patient denies any episodes of rectal bleeding. He denies abdominal pain, nausea/vomiting, constipation. Says he has been passing his bowel movements. Asking to go back home. According to daughter, patient has dementia. Waxing and waning consciousness. Desmond in hospital. She reports he was in skilled nursing facility and last saw him last night. He did not complain of constipation or any symptoms. She was called in the morning with report of rectal bleeding. In ED nurse showed blood clot in patients diaper. No active episodes of rectal bleeding. Last Eliquis dose was this morning. In ED patients vitals stayed hemodynamically stable. Hb 9.8.     Patient did not have any more episodes of blood per rectum. Patients daughter spoke to GI and hospital medicine and options were discussed for colonoscopy. Given his age, she did not want him to undergo a colonoscopy. She wanted less invasive treatments. Patient received bowel regimen and had regular bowel movement without blood. Goals of care discussion was had with daughter and she changed his code status to DNR DNI. Xray abdomen shows moderate stool burden. CXR done shows persistent pleural effusion. No change from prior CXR. Patient sating well on RA and no reports of SOB. Patient did not have any more episodes of bleeding per rectum during hospital course. Hb downtrended to 7.5, he was transfused 1 PRBC. Hb responded adequately at 8.5. After discussion with daughter, plan for conservative treatment with CBC monitoring and transfusing as needed to maintain Hb 8<. Patient had episodes of urinary retention,  straight cathx2. Muhammad placed and started on tamsulosin. Patients renal function remained at baseline. Cr 1.2-1.6.  Patient is stable and now ready for discharge to SNF.     To do  - monitor CBC, transfuse to maintain Hb 8+  - TOV 6/7/24  - restart aspirin 6/12/24       Goals of Care Treatment Preferences:  Code Status: DNR      Consults:   Consults (From admission, onward)          Status Ordering Provider     Inpatient consult to Skin Integrity  Practitioner  Once        Provider:  Bonny Calvillo NP    Completed AVELINO BUCHANAN     Inpatient consult to Registered Dietitian/Nutritionist  Once        Provider:  (Not yet assigned)    Completed AVELINO BUCHANAN            No new Assessment & Plan notes have been filed under this hospital service since the last note was generated.  Service: Hospital Medicine    Final Active Diagnoses:    Diagnosis Date Noted POA    PRINCIPAL PROBLEM:  Rectal bleeding [K62.5] 06/01/2024 Yes    Acute blood loss anemia [D62] 06/03/2024 Yes    Chronic dementia [F03.90] 06/01/2024 Yes    Chronic ulcer of left heel [L97.429] 06/01/2024 Yes    Severe malnutrition [E43] 05/18/2024 Yes    Pressure injury of sacral region, stage 2 [L89.152] 05/14/2024 Yes    Left arm swelling [M79.89] 05/11/2024 Yes    CKD (chronic kidney disease), stage IV [N18.4] 05/11/2024 Yes    Blood blister [T14.8XXA] 05/09/2024 Yes    Hyperkalemia [E87.5] 04/30/2024 Yes    SARAH (acute kidney injury) [N17.9] 12/03/2022 Yes    Atrial fibrillation [I48.91] 11/10/2016 Yes    CAD (coronary artery disease) [I25.10] 08/31/2016 Yes     Chronic    Essential hypertension [I10] 11/18/2013 Yes      Problems Resolved During this Admission:       Discharged Condition: stable    Disposition:     Follow Up:   Follow-up Information       Jam Rowell MD Follow up.    Specialty: Family Medicine  Contact information:  [address]  [place] [address] [phone]                           Patient Instructions:   No discharge  procedures on file.    Significant Diagnostic Studies: Labs: BMP:   Recent Labs   Lab 06/04/24 0229 06/05/24  0405    79   * 135*   K 5.4* 5.0    108   CO2 18* 21*   BUN 44* 46*   CREATININE 1.5* 1.6*   CALCIUM 8.0* 7.9*   MG  --  2.1    and CBC   Recent Labs   Lab 06/04/24 0229 06/05/24  0405   WBC 7.16 6.65   HGB 7.5* 8.5*   HCT 24.7* 27.4*    182       Pending Diagnostic Studies:       None           Medications:  Reconciled Home Medications:      Medication List        START taking these medications      pantoprazole 40 MG tablet  Commonly known as: PROTONIX  Take 1 tablet (40 mg total) by mouth once daily.     tamsulosin 0.4 mg Cap  Commonly known as: FLOMAX  Take 2 capsules (0.8 mg total) by mouth once daily.  Start taking on: June 6, 2024            CHANGE how you take these medications      aspirin 81 MG EC tablet  Commonly known as: ECOTRIN  Take 1 tablet (81 mg total) by mouth once daily.  Start taking on: June 12, 2024  What changed: These instructions start on June 12, 2024. If you are unsure what to do until then, ask your doctor or other care provider.            CONTINUE taking these medications      acetaminophen 325 MG tablet  Commonly known as: TYLENOL  Take 2 tablets (650 mg total) by mouth every 4 (four) hours as needed (any pain or temp >100).     albuterol 90 mcg/actuation inhaler  Commonly known as: PROVENTIL HFA  Inhale 2 puffs into the lungs every 6 (six) hours as needed for Wheezing. Rescue     allopurinoL 100 MG tablet  Commonly known as: ZYLOPRIM  Take 0.5 tablets (50 mg total) by mouth once daily for 21 days, THEN 1 tablet (100 mg total) once daily.  Start taking on: May 20, 2024     balsam peru-castor oiL Oint  Apply topically 2 (two) times a day. Apply to buttocks and sacrum BID and PRN if soiled.     benazepriL 5 MG tablet  Commonly known as: LOTENSIN  Take 1 tablet (5 mg total) by mouth once daily.     bisacodyL 10 mg Supp  Commonly known as:  DULCOLAX  Place 1 suppository (10 mg total) rectally daily as needed (no BM in over one calendar day).     carvediloL 3.125 MG tablet  Commonly known as: COREG  Take 1 tablet (3.125 mg total) by mouth 2 (two) times daily.     colchicine 0.6 mg tablet  Commonly known as: COLCRYS  Take 1 tablet (0.6 mg total) by mouth every other day.     ferrous sulfate 325 mg (65 mg iron) Tab tablet  Commonly known as: FEOSOL  Take 1 tablet (325 mg total) by mouth every Mon, Wed, Fri. Before breakfast     fluticasone propionate 50 mcg/actuation nasal spray  Commonly known as: FLONASE  2 sprays (100 mcg total) by Each Nostril route once daily.     multivitamin per tablet  Commonly known as: ONE DAILY MULTIVITAMIN  Take 1 tablet by mouth once daily.     nitroGLYCERIN 0.4 MG SL tablet  Commonly known as: NITROSTAT  Take one tablet every 5 minutes for chest pain. After the third tablet go to the ED.     polyethylene glycol 17 gram/dose powder  Commonly known as: GLYCOLAX  Take 17 g by mouth 3 (three) times daily as needed for Constipation.     senna-docusate 8.6-50 mg 8.6-50 mg per tablet  Commonly known as: PERICOLACE  Take 2 tablets by mouth 2 (two) times daily.            STOP taking these medications      apixaban 2.5 mg Tab  Commonly known as: ELIQUIS     diclofenac sodium 1 % Gel  Commonly known as: VOLTAREN              Indwelling Lines/Drains at time of discharge:   Lines/Drains/Airways       Drain  Duration                  Urethral Catheter 06/04/24 1115 16 Fr. <1 day                    Time spent on the discharge of patient: 45 minutes         Rachel Cuevas MD  Department of Hospital Medicine  [facility]

## 2024-06-05 NOTE — PROGRESS NOTES
[facility] Medicine  Progress Note    Patient Name: Deena Modoy  MRN: [id number]  Patient Class: IP- Inpatient   Admission Date: 6/1/2024  Length of Stay: 0 days  Attending Physician: Rachel Cuevas MD  Primary Care Provider: Jam Rowell MD        Subjective:     Principal Problem:Rectal bleeding        HPI:  89 M with PMH of HTN, HLD, CAD, Afib on Eliquis 2.5mg, h/o AV block s/p pacemaker, macular degeneration of retina, dementia/cognitive decline, CAD s/p CABG, CKD3, long standing history of gout, was brought to the ED from [facility] for complains of rectal bleeding. Patient denies any episodes of rectal bleeding. He denies abdominal pain, nausea/vomiting, constipation. Says he has been passing his bowel movements. Asking to go back home. According to daughter, patient has dementia. Waxing and waning consciousness. Desmond in hospital. She reports he was in skilled nursing facility and last saw him last night. He did not complain of constipation or any symptoms. She was called in the morning with report of rectal bleeding. In ED nurse showed blood clot in patients diaper. No active episodes of rectal bleeding. Last Eliquis dose was this morning. In ED patients vitals stayed hemodynamically stable. Hb 9.8.    Recent admission for for left arm swelling. During that admission he had two episodes of bloody BM. Found to have fecal impaction. Disimpacted and started on bowel regimen for stercoral colitis. Colonoscopy not done in last admission after weighing risk vs benefit given patients age, comorbidities. Hb stayed stable so was discharged.     Update daughter Amelie medical updates. [phone]    [facility] meds:  - allopurinol   - colchicine  - bisacodyl prn  - senna-docusate BID  - miralax prn  - iron supplement  - albuterol prn  - flonase   - eliquis 2.5  - aspirin  - benazepril  - coreg    Overview/Hospital Course:  89 M with PMH of HTN, HLD, CAD, Afib on Eliquis 2.5mg, h/o AV  block s/p pacemaker, macular degeneration of retina, dementia/cognitive decline, CAD s/p CABG, CKD3, long standing history of gout, was brought to the ED from [facility] for complains of rectal bleeding. Patient denies any episodes of rectal bleeding. He denies abdominal pain, nausea/vomiting, constipation. Says he has been passing his bowel movements. Asking to go back home. According to daughter, patient has dementia. Waxing and waning consciousness. Desmond in hospital. She reports he was in skilled nursing facility and last saw him last night. He did not complain of constipation or any symptoms. She was called in the morning with report of rectal bleeding. In ED nurse showed blood clot in patients diaper. No active episodes of rectal bleeding. Last Eliquis dose was this morning. In ED patients vitals stayed hemodynamically stable. Hb 9.8.     Patient did not have any more episodes of blood per rectum. Patients daughter spoke to GI and hospital medicine and options were discussed for colonoscopy. Given his age, she did not want him to undergo a colonoscopy. She wanted less invasive treatments. Patient received bowel regimen and had regular bowel movement without blood. Goals of care discussion was had with daughter and she changed his code status to DNR DNI. Xray abdomen shows moderate stool burden.         Interval History:     No blood in stools.  Straight cath done overnight. Muhammad placed in AM for retention. Cr improved to 1.4. Blood transfused for Hb 7.3.   Discussed with daughter again about colonoscopy. She does not want to pursue invasive treatments. Plan to monitor Hb and transfuse as needed hb 8< once discharged.  Patient complained of shortness of breath. IV lasix 20mg given. Echo ordered.     Review of Systems   Constitutional:  Negative for chills, fatigue and fever.   Respiratory:  Negative for apnea, cough, choking, chest tightness, shortness of breath and wheezing.    Cardiovascular:  Negative for  chest pain, palpitations and leg swelling.   Gastrointestinal:  Positive for abdominal pain. Negative for diarrhea and nausea.   Musculoskeletal:  Negative for back pain.   Neurological:  Negative for dizziness, light-headedness, numbness and headaches.   Psychiatric/Behavioral:  Negative for behavioral problems and confusion. The patient is not nervous/anxious.      Objective:     Vital Signs (Most Recent):  Temp: 98.3 °F (36.8 °C) (06/04/24 1330)  Pulse: 66 (06/04/24 1330)  Resp: 18 (06/04/24 1311)  BP: 115/68 (06/04/24 1330)  SpO2: 98 % (06/04/24 1330) Vital Signs (24h Range):  Temp:  [97.7 °F (36.5 °C)-98.8 °F (37.1 °C)] 98.3 °F (36.8 °C)  Pulse:  [66-70] 66  Resp:  [18-20] 18  SpO2:  [93 %-100 %] 98 %  BP: (105-125)/(44-68) 115/68     Weight: 53.1 kg (117 lb 1 oz)  Body mass index is 17.29 kg/m².    Intake/Output Summary (Last 24 hours) at 6/4/2024 1623  Last data filed at 6/4/2024 1533  Gross per 24 hour   Intake 225 ml   Output 1100 ml   Net -875 ml         Physical Exam  Constitutional:       General: He is not in acute distress.     Appearance: He is not ill-appearing.   HENT:      Head: Normocephalic.   Cardiovascular:      Heart sounds: Normal heart sounds.   Pulmonary:      Effort: No respiratory distress.      Comments: Increased work of breathing  Abdominal:      General: Abdomen is flat. Bowel sounds are normal.      Palpations: Abdomen is soft.   Musculoskeletal:      Right lower leg: No edema.      Left lower leg: No edema.   Neurological:      Mental Status: He is alert. Mental status is at baseline.             Significant Labs: All pertinent labs within the past 24 hours have been reviewed.  BMP:   Recent Labs   Lab 06/04/24 0229      *   K 5.4*      CO2 18*   BUN 44*   CREATININE 1.5*   CALCIUM 8.0*     CBC:   Recent Labs   Lab 06/03/24  0510 06/04/24 0229   WBC 7.96 7.16   HGB 8.3* 7.5*   HCT 28.0* 24.7*    188     CMP:   Recent Labs   Lab 06/03/24  0510  06/04/24  0229    135*   K 4.9 5.4*   * 110   CO2 16* 18*   * 104   BUN 41* 44*   CREATININE 1.6* 1.5*   CALCIUM 7.9* 8.0*   ANIONGAP 8 7*       Significant Imaging: I have reviewed all pertinent imaging results/findings within the past 24 hours.    Assessment/Plan:      * Rectal bleeding  Rectal bleeding/hematochezia   Differentials include constipation/stercoral colitis, diverticular bleed.  GOC with family do not want invasive management with colonoscopy.   Hb drop today but no more episodes of rectal bleeding   - trend Hb  - active type and screen  - transfuse for <8 or hemodynamic instability  - IV PPI BID  - hold eliquis    - continue bowel regimen     Acute blood loss anemia  Eliquis and aspirin still held  - monitor cbc    Chronic ulcer of left heel  - wound care consult recs followed  - nutrition consult    Chronic dementia  Waxing and waning consciousness. Desmond in hospital.   - will monitor     Severe malnutrition  seen by nutrition  - [facility] soft diet with assistance  - nutritional supplements   - monitor electrolytes      - PT/ OT    Left arm swelling  Improving from previous admission. R/o infectious cause in previous admission. Continue rheum recs from previous admission.  - continue allopurinol  - continue colchicine  - tylenol prn     Hyperkalemia  Due to SARAH/urinary retention  - monitor K      SARAH (acute kidney injury)  SARAH due to post obstructive cause. Straight cath x2.  Likely BPH. Will start tamsulosin.   - ramirez placed  - monitor BMP      Atrial fibrillation  [id number] Score: 3. Hasblad score 3. High risk of bleeding  - hold eliquis 2.5mg in setting of GIB  - continue coreg    After conversation with daughter, plan to hold eliquis on discharge given high risk of bleeding    CAD (coronary artery disease)  H/o stent placement and CABG  - hold ASA in setting of GIB    Essential hypertension  - monitor vitals  - continue coreg        VTE Risk Mitigation (From admission, onward)            Ordered     IP VTE HIGH RISK PATIENT  Once         06/01/24 1115     Place sequential compression device  Until discontinued         06/01/24 1115                    Discharge Planning   TEOFILO: 6/5/2024     Code Status: DNR   Is the patient medically ready for discharge?:     Reason for patient still in hospital (select all that apply): Patient trending condition  Discharge Plan A: Skilled Nursing Facility   Discharge Delays: None known at this time              Rachel Cuevas MD  Department of Hospital Medicine   [facility] Surg

## 2024-06-05 NOTE — PLAN OF CARE
Transportation has been scheduled for 1:00pm and report can be called to [phone]. Patient is going to room 2B.  Patient nurse Lisset informed of the above.        PHILIP Fernandez  Case Management  [phone]

## 2024-06-05 NOTE — PLAN OF CARE
Problem: Adult Inpatient Plan of Care  Goal: Plan of Care Review  Outcome: Met  Goal: Patient-Specific Goal (Individualized)  Outcome: Met  Goal: Absence of Hospital-Acquired Illness or Injury  Outcome: Met  Goal: Optimal Comfort and Wellbeing  Outcome: Met  Goal: Readiness for Transition of Care  Outcome: Met     Problem: Gastrointestinal Bleeding  Goal: Optimal Coping with Acute Illness  Outcome: Met  Goal: Hemostasis  Outcome: Met     Problem: Acute Kidney Injury/Impairment  Goal: Fluid and Electrolyte Balance  Outcome: Met  Goal: Improved Oral Intake  Outcome: Met  Goal: Effective Renal Function  Outcome: Met     Problem: Fall Injury Risk  Goal: Absence of Fall and Fall-Related Injury  Outcome: Met     Problem: Skin Injury Risk Increased  Goal: Skin Health and Integrity  Outcome: Met     Problem: Wound  Goal: Optimal Coping  Outcome: Met  Goal: Optimal Functional Ability  Outcome: Met  Goal: Absence of Infection Signs and Symptoms  Outcome: Met  Goal: Improved Oral Intake  Outcome: Met  Goal: Optimal Pain Control and Function  Outcome: Met  Goal: Skin Health and Integrity  Outcome: Met  Goal: Optimal Wound Healing  Outcome: Met     Problem: Physical Therapy  Goal: Physical Therapy Goal  Description: Goals to be met by: 7/3/2024     Patient will increase functional independence with mobility by performin. Supine to sit with Stand-by Assistance  2. Sit to stand transfer with Contact Guard Assistance using Rolling Walker  3. Gait  x 50 feet with Contact Guard Assistance using Rolling Walker.     Outcome: Met     Problem: Infection  Goal: Absence of Infection Signs and Symptoms  Outcome: Met     Problem: Urinary Retention  Goal: Effective Urinary Elimination  Outcome: Met     Problem: Pain Acute  Goal: Optimal Pain Control and Function  Outcome: Met

## 2024-06-05 NOTE — PLAN OF CARE
NURSING HOME ORDERS    06/05/2024  [facility] - MED SURG  [address]  [place] [address]  Dept: [phone]  Loc: [phone]     Admit to Nursing Home: SNF      Diagnoses:  Active Hospital Problems    Diagnosis  POA    *Rectal bleeding [K62.5]  Yes    Acute blood loss anemia [D62]  Yes    Chronic dementia [F03.90]  Yes    Chronic ulcer of left heel [L97.429]  Yes    Severe malnutrition [E43]  Yes    Pressure injury of sacral region, stage 2 [L89.152]  Yes    Left arm swelling [M79.89]  Yes    CKD (chronic kidney disease), stage IV [N18.4]  Yes    Blood blister [T14.8XXA]  Yes    Hyperkalemia [E87.5]  Yes    SARHA (acute kidney injury) [N17.9]  Yes    Atrial fibrillation [I48.91]  Yes    CAD (coronary artery disease) [I25.10]  Yes     Chronic    Essential hypertension [I10]  Yes      Resolved Hospital Problems   No resolved problems to display.       Patient is homebound due to:  Rectal bleeding    Allergies:  Review of patient's allergies indicates:   Allergen Reactions    Iodine and iodide containing products Other (See Comments)     Caused changes in skin color       Vitals:  Routine    Diet: 2 gram sodium diet    Activities:   Activity as tolerated    Goals of Care Treatment Preferences:  Code Status: DNR      Labs:    CBC weekly x 3 weeks    Nursing Precautions:  Fall and Pressure ulcer prevention    Consults:   PT to evaluate and treat- 5 times a week, OT to evaluate and treat- 5 times a week, and Wound Care     Miscellaneous Care:     Muhammad Care: Empty Muhammad bag every shift.  Change Muhammad every month    TOV on 6/7/24    Routine Skin for Bedridden Patients:  Apply moisture barrier cream to all    Wound Care: yes:   Pressure injury left heel:   Apply chloraprep to left heel and leave open to air in EHOB boot.  Right heel:  Apply BPCO to right heel BID and PRN  Cleanse sacrum with sterile normal saline and pat dry. Apply triad BID and PRN if soiled              Please  restart Aspirin 6/12/24. Monitor CBC weekly.   Medications: Discontinue all previous medication orders, if any. See new list below.     Medication List        START taking these medications      pantoprazole 40 MG tablet  Commonly known as: PROTONIX  Take 1 tablet (40 mg total) by mouth once daily.     tamsulosin 0.4 mg Cap  Commonly known as: FLOMAX  Take 2 capsules (0.8 mg total) by mouth once daily.  Start taking on: June 6, 2024            CHANGE how you take these medications      aspirin 81 MG EC tablet  Commonly known as: ECOTRIN  Take 1 tablet (81 mg total) by mouth once daily.  Start taking on: June 12, 2024  What changed: These instructions start on June 12, 2024. If you are unsure what to do until then, ask your doctor or other care provider.            CONTINUE taking these medications      acetaminophen 325 MG tablet  Commonly known as: TYLENOL  Take 2 tablets (650 mg total) by mouth every 4 (four) hours as needed (any pain or temp >100).     albuterol 90 mcg/actuation inhaler  Commonly known as: PROVENTIL HFA  Inhale 2 puffs into the lungs every 6 (six) hours as needed for Wheezing. Rescue     allopurinoL 100 MG tablet  Commonly known as: ZYLOPRIM  Take 0.5 tablets (50 mg total) by mouth once daily for 21 days, THEN 1 tablet (100 mg total) once daily.  Start taking on: May 20, 2024     balsam peru-castor oiL Oint  Apply topically 2 (two) times a day. Apply to buttocks and sacrum BID and PRN if soiled.     benazepriL 5 MG tablet  Commonly known as: LOTENSIN  Take 1 tablet (5 mg total) by mouth once daily.     bisacodyL 10 mg Supp  Commonly known as: DULCOLAX  Place 1 suppository (10 mg total) rectally daily as needed (no BM in over one calendar day).     carvediloL 3.125 MG tablet  Commonly known as: COREG  Take 1 tablet (3.125 mg total) by mouth 2 (two) times daily.     colchicine 0.6 mg tablet  Commonly known as: COLCRYS  Take 1 tablet (0.6 mg total) by mouth every other day.     ferrous sulfate 325  mg (65 mg iron) Tab tablet  Commonly known as: FEOSOL  Take 1 tablet (325 mg total) by mouth every Mon, Wed, Fri. Before breakfast     fluticasone propionate 50 mcg/actuation nasal spray  Commonly known as: FLONASE  2 sprays (100 mcg total) by Each Nostril route once daily.     multivitamin per tablet  Commonly known as: ONE DAILY MULTIVITAMIN  Take 1 tablet by mouth once daily.     nitroGLYCERIN 0.4 MG SL tablet  Commonly known as: NITROSTAT  Take one tablet every 5 minutes for chest pain. After the third tablet go to the ED.     polyethylene glycol 17 gram/dose powder  Commonly known as: GLYCOLAX  Take 17 g by mouth 3 (three) times daily as needed for Constipation.     senna-docusate 8.6-50 mg 8.6-50 mg per tablet  Commonly known as: PERICOLACE  Take 2 tablets by mouth 2 (two) times daily.            STOP taking these medications      apixaban 2.5 mg Tab  Commonly known as: ELIQUIS     diclofenac sodium 1 % Gel  Commonly known as: VOLTAREN                Immunizations Administered as of 6/5/2024       Name Date Dose VIS Date Route Exp Date    COVID-19, MRNA, LN-S, PF (Moderna) 4/1/2021 0.5 mL 12/1/2020 Intramuscular --    Site: Right deltoid     : [employer]     Lot: [id number]     Comment: Adminis     COVID-19, MRNA, LN-S, PF (Moderna) 3/4/2021 0.5 mL 12/1/2020 Intramuscular --    Site: Right deltoid     : [employer]     Lot: [id number]     Comment: Adminis           _________________________________  Rachel Cuevas MD  06/05/2024

## 2024-06-05 NOTE — PLAN OF CARE
Problem: Adult Inpatient Plan of Care  Goal: Plan of Care Review  Outcome: Progressing  Goal: Patient-Specific Goal (Individualized)  Outcome: Progressing  Goal: Absence of Hospital-Acquired Illness or Injury  Outcome: Progressing  Goal: Optimal Comfort and Wellbeing  Outcome: Progressing  Goal: Readiness for Transition of Care  Outcome: Progressing     Problem: Gastrointestinal Bleeding  Goal: Optimal Coping with Acute Illness  Outcome: Progressing  Goal: Hemostasis  Outcome: Progressing     Problem: Acute Kidney Injury/Impairment  Goal: Fluid and Electrolyte Balance  Outcome: Progressing  Goal: Improved Oral Intake  Outcome: Progressing  Goal: Effective Renal Function  Outcome: Progressing     Problem: Fall Injury Risk  Goal: Absence of Fall and Fall-Related Injury  Outcome: Progressing

## 2024-06-05 NOTE — NURSING
"Pt discharged to [facility] room 2B. Gave report to "Abby" [phone].  IV removed, catheter tip intact. Pt waiting on transportation scheduled for 1pm.    1618 - got a call from "Martin" from [facility] for report.  Informed nurse report was given to "Abby".  Report was given to nurse again.   "

## 2024-06-05 NOTE — ASSESSMENT & PLAN NOTE
Rectal bleeding/hematochezia   Differentials include constipation/stercoral colitis, diverticular bleed.  GOC with family do not want invasive management with colonoscopy.   Hb drop today but no more episodes of rectal bleeding   - trend Hb  - active type and screen  - transfuse for <8 or hemodynamic instability  - IV PPI BID  - hold eliquis    - continue bowel regimen

## 2024-06-06 NOTE — PLAN OF CARE
[facility] - Med Surg  Discharge Final Note    Primary Care Provider: Jam Rowell MD    Expected Discharge Date: 6/5/2024      Patient discharged to [facility]. Discharge Plan A and Plan B have been determined by review of patient's clinical status, future medical and therapeutic needs, and coverage/benefits for post-acute care in coordination with multidisciplinary team members.  Final Discharge Note (most recent)       Final Note - 06/06/24 0859          Final Note    Assessment Type Final Discharge Note (P)      Anticipated Discharge Disposition Skilled Nursing Facility (P)         Post-Acute Status    Post-Acute Authorization Placement (P)      Post-Acute Placement Status Set-up Complete/Auth obtained (P)      Coverage [employer] (P)      Discharge Delays None known at this time (P)                      Important Message from Medicare  Important Message from Medicare regarding Discharge Appeal Rights: Given to patient/caregiver, Explained to patient/caregiver, Signed/date by patient/caregiver     Date IMM was signed: 06/04/24  Time IMM was signed: 1314    Contact Info       Jam Rowell MD   Specialty: Family Medicine   Relationship: PCP - General    [address] [place] [address]   Phone: [phone]       Next Steps: Follow up            PHILIP Fernandez  Case Management  [phone]

## 2024-06-10 DIAGNOSIS — Z95.0 CARDIAC PACEMAKER IN SITU: Primary | ICD-10-CM

## 2024-06-10 DIAGNOSIS — I44.2 COMPLETE AV BLOCK: ICD-10-CM

## 2024-06-11 ENCOUNTER — TELEPHONE (OUTPATIENT)
Dept: ELECTROPHYSIOLOGY | Facility: CLINIC | Age: 89
End: 2024-06-11
Payer: MEDICARE

## 2024-06-11 NOTE — TELEPHONE ENCOUNTER
Spoke with daughter, Jemma and advised of device check appt added to Dr. Hastings appt for next week.  States patient currently at [facility].  Advised to notify facility of appts and need for transportation to appts.  Jemma will notify facility of appts.

## 2024-06-14 DIAGNOSIS — I44.2 COMPLETE AV BLOCK: Primary | ICD-10-CM

## 2024-06-15 LAB
ACID FAST MOD KINY STN SPEC: NORMAL
MYCOBACTERIUM SPEC QL CULT: NORMAL
MYCOBACTERIUM SPEC QL CULT: NORMAL

## 2024-06-17 ENCOUNTER — TELEPHONE (OUTPATIENT)
Dept: CARDIOLOGY | Facility: HOSPITAL | Age: 89
End: 2024-06-17
Payer: MEDICARE

## 2024-06-17 NOTE — TELEPHONE ENCOUNTER
I spoke to the patient's daughter over the phone about Mr. Moody's scheduled appts.  She states he is still @ [facility] in Skilled unit.  She states he is very frail and weak and has been in and out of the hospital and concerned the appts tomorrow would be too much for him. She states he expressed the same concern to her yesterday when she visited with him.  He was living at home independent  with his granddaughter until about 2mths ago and does not anticipate him going back to his home. His home monitor has been disconnected since then. At that time his transmission showed he had approx. 11 mths to RRT.  I explained this to her and asked she could set up his home monitor at [facility] so the clinic could continue home monitoring until he can come to clinic.  She agreed to bring his home monitor to [facility] this week and will notify us.  Once he is re-connected he will placed on monthly remotes. She also agreed to call back in a couple of weeks to re-schedule his in-clinic visits. She appreciated my call.         ----- Message from Francesca Patricia MA sent at 6/17/2024 10:48 AM CDT -----  Regarding: RE: Appt    ----- Message -----  From: Gisela Bean  Sent: 6/17/2024  10:18 AM CDT  To: [facility] Staff  Subject: Appt                                             Patient will [facility] calling to see if his device be check from home because he's sick and difficult to transport. Please call back @ [phone]. Thank you Gisela

## 2024-07-01 ENCOUNTER — HOSPITAL ENCOUNTER (INPATIENT)
Facility: HOSPITAL | Age: 89
LOS: 3 days | Discharge: HOME | DRG: 291 | End: 2024-07-05
Attending: EMERGENCY MEDICINE | Admitting: EMERGENCY MEDICINE
Payer: MEDICARE

## 2024-07-01 DIAGNOSIS — I44.30 AV BLOCK: ICD-10-CM

## 2024-07-01 DIAGNOSIS — I50.9 CHF (CONGESTIVE HEART FAILURE): ICD-10-CM

## 2024-07-01 DIAGNOSIS — R06.02 SHORTNESS OF BREATH: ICD-10-CM

## 2024-07-01 DIAGNOSIS — Z51.5 PALLIATIVE CARE ENCOUNTER: Primary | ICD-10-CM

## 2024-07-01 DIAGNOSIS — Z71.89 ADVANCE CARE PLANNING: ICD-10-CM

## 2024-07-01 DIAGNOSIS — I10 ESSENTIAL HYPERTENSION: ICD-10-CM

## 2024-07-01 DIAGNOSIS — R60.9 SWELLING: ICD-10-CM

## 2024-07-01 DIAGNOSIS — R42 DIZZINESS: ICD-10-CM

## 2024-07-01 DIAGNOSIS — N18.32 STAGE 3B CHRONIC KIDNEY DISEASE: ICD-10-CM

## 2024-07-01 DIAGNOSIS — R53.81 DEBILITY: ICD-10-CM

## 2024-07-01 DIAGNOSIS — R52 PAIN: ICD-10-CM

## 2024-07-01 LAB
ALBUMIN SERPL BCP-MCNC: 2.1 G/DL (ref 3.5–5.2)
ALP SERPL-CCNC: 146 U/L (ref 55–135)
ALT SERPL W/O P-5'-P-CCNC: 11 U/L (ref 10–44)
ANION GAP SERPL CALC-SCNC: 7 MMOL/L (ref 8–16)
AST SERPL-CCNC: 27 U/L (ref 10–40)
BASOPHILS # BLD AUTO: 0.04 K/UL (ref 0–0.2)
BASOPHILS NFR BLD: 0.4 % (ref 0–1.9)
BILIRUB SERPL-MCNC: 0.5 MG/DL (ref 0.1–1)
BNP SERPL-MCNC: 1801 PG/ML (ref 0–99)
BUN SERPL-MCNC: 30 MG/DL (ref 10–30)
CALCIUM SERPL-MCNC: 7.6 MG/DL (ref 8.7–10.5)
CHLORIDE SERPL-SCNC: 115 MMOL/L (ref 95–110)
CO2 SERPL-SCNC: 19 MMOL/L (ref 23–29)
CREAT SERPL-MCNC: 1.3 MG/DL (ref 0.5–1.4)
DIFFERENTIAL METHOD BLD: ABNORMAL
EOSINOPHIL # BLD AUTO: 0.3 K/UL (ref 0–0.5)
EOSINOPHIL NFR BLD: 3.4 % (ref 0–8)
ERYTHROCYTE [DISTWIDTH] IN BLOOD BY AUTOMATED COUNT: 18.6 % (ref 11.5–14.5)
EST. GFR  (NO RACE VARIABLE): 51.2 ML/MIN/1.73 M^2
GLUCOSE SERPL-MCNC: 118 MG/DL (ref 70–110)
HCT VFR BLD AUTO: 29.7 % (ref 40–54)
HCV AB SERPL QL IA: NORMAL
HGB BLD-MCNC: 9 G/DL (ref 14–18)
HIV 1+2 AB+HIV1 P24 AG SERPL QL IA: NORMAL
IMM GRANULOCYTES # BLD AUTO: 0.07 K/UL (ref 0–0.04)
IMM GRANULOCYTES NFR BLD AUTO: 0.8 % (ref 0–0.5)
LYMPHOCYTES # BLD AUTO: 1.9 K/UL (ref 1–4.8)
LYMPHOCYTES NFR BLD: 20 % (ref 18–48)
MCH RBC QN AUTO: 31.4 PG (ref 27–31)
MCHC RBC AUTO-ENTMCNC: 30.3 G/DL (ref 32–36)
MCV RBC AUTO: 104 FL (ref 82–98)
MONOCYTES # BLD AUTO: 0.8 K/UL (ref 0.3–1)
MONOCYTES NFR BLD: 8.8 % (ref 4–15)
NEUTROPHILS # BLD AUTO: 6.2 K/UL (ref 1.8–7.7)
NEUTROPHILS NFR BLD: 66.6 % (ref 38–73)
NRBC BLD-RTO: 0 /100 WBC
PLATELET # BLD AUTO: 174 K/UL (ref 150–450)
PMV BLD AUTO: 8.9 FL (ref 9.2–12.9)
POTASSIUM SERPL-SCNC: 5.5 MMOL/L (ref 3.5–5.1)
PROT SERPL-MCNC: 6.3 G/DL (ref 6–8.4)
RBC # BLD AUTO: 2.87 M/UL (ref 4.6–6.2)
SODIUM SERPL-SCNC: 141 MMOL/L (ref 136–145)
TROPONIN I SERPL DL<=0.01 NG/ML-MCNC: 0.05 NG/ML (ref 0–0.03)
WBC # BLD AUTO: 9.25 K/UL (ref 3.9–12.7)

## 2024-07-01 PROCEDURE — 99285 EMERGENCY DEPT VISIT HI MDM: CPT | Mod: 25,HCNC

## 2024-07-01 PROCEDURE — 84484 ASSAY OF TROPONIN QUANT: CPT | Mod: HCNC | Performed by: EMERGENCY MEDICINE

## 2024-07-01 PROCEDURE — 86803 HEPATITIS C AB TEST: CPT | Mod: HCNC | Performed by: PHYSICIAN ASSISTANT

## 2024-07-01 PROCEDURE — 87389 HIV-1 AG W/HIV-1&-2 AB AG IA: CPT | Mod: HCNC | Performed by: PHYSICIAN ASSISTANT

## 2024-07-01 PROCEDURE — 83735 ASSAY OF MAGNESIUM: CPT | Mod: HCNC | Performed by: PHYSICIAN ASSISTANT

## 2024-07-01 PROCEDURE — 93010 ELECTROCARDIOGRAM REPORT: CPT | Mod: HCNC,,, | Performed by: INTERNAL MEDICINE

## 2024-07-01 PROCEDURE — 83880 ASSAY OF NATRIURETIC PEPTIDE: CPT | Mod: HCNC | Performed by: EMERGENCY MEDICINE

## 2024-07-01 PROCEDURE — 83036 HEMOGLOBIN GLYCOSYLATED A1C: CPT | Mod: HCNC | Performed by: PHYSICIAN ASSISTANT

## 2024-07-01 PROCEDURE — 80053 COMPREHEN METABOLIC PANEL: CPT | Mod: HCNC | Performed by: EMERGENCY MEDICINE

## 2024-07-01 PROCEDURE — 63600175 PHARM REV CODE 636 W HCPCS: Performed by: EMERGENCY MEDICINE

## 2024-07-01 PROCEDURE — 93005 ELECTROCARDIOGRAM TRACING: CPT

## 2024-07-01 PROCEDURE — 96374 THER/PROPH/DIAG INJ IV PUSH: CPT | Mod: HCNC

## 2024-07-01 PROCEDURE — 85025 COMPLETE CBC W/AUTO DIFF WBC: CPT | Mod: HCNC | Performed by: EMERGENCY MEDICINE

## 2024-07-01 RX ORDER — FUROSEMIDE 10 MG/ML
40 INJECTION INTRAMUSCULAR; INTRAVENOUS
Status: COMPLETED | OUTPATIENT
Start: 2024-07-01 | End: 2024-07-01

## 2024-07-01 RX ADMIN — FUROSEMIDE 40 MG: 10 INJECTION, SOLUTION INTRAVENOUS at 10:07

## 2024-07-02 PROBLEM — R06.02 SHORTNESS OF BREATH: Status: ACTIVE | Noted: 2024-07-02

## 2024-07-02 LAB
ANION GAP SERPL CALC-SCNC: 8 MMOL/L (ref 8–16)
ANION GAP SERPL CALC-SCNC: 9 MMOL/L (ref 8–16)
ASCENDING AORTA: 2.92 CM
AV INDEX (PROSTH): 0.31
AV MEAN GRADIENT: 15 MMHG
AV PEAK GRADIENT: 26 MMHG
AV VALVE AREA BY VELOCITY RATIO: 0.92 CM²
AV VALVE AREA: 0.93 CM²
AV VELOCITY RATIO: 0.31
BASOPHILS # BLD AUTO: 0.05 K/UL (ref 0–0.2)
BASOPHILS NFR BLD: 0.6 % (ref 0–1.9)
BSA FOR ECHO PROCEDURE: 1.79 M2
BUN SERPL-MCNC: 33 MG/DL (ref 10–30)
BUN SERPL-MCNC: 34 MG/DL (ref 10–30)
CALCIUM SERPL-MCNC: 8.1 MG/DL (ref 8.7–10.5)
CALCIUM SERPL-MCNC: 8.2 MG/DL (ref 8.7–10.5)
CHLORIDE SERPL-SCNC: 113 MMOL/L (ref 95–110)
CHLORIDE SERPL-SCNC: 114 MMOL/L (ref 95–110)
CO2 SERPL-SCNC: 19 MMOL/L (ref 23–29)
CO2 SERPL-SCNC: 19 MMOL/L (ref 23–29)
CREAT SERPL-MCNC: 1.3 MG/DL (ref 0.5–1.4)
CREAT SERPL-MCNC: 1.4 MG/DL (ref 0.5–1.4)
CV ECHO LV RWT: 0.52 CM
DIFFERENTIAL METHOD BLD: ABNORMAL
DOP CALC AO PEAK VEL: 2.55 M/S
DOP CALC AO VTI: 53.65 CM
DOP CALC LVOT AREA: 3 CM2
DOP CALC LVOT DIAMETER: 1.96 CM
DOP CALC LVOT PEAK VEL: 0.78 M/S
DOP CALC LVOT STROKE VOLUME: 50.09 CM3
DOP CALCLVOT PEAK VEL VTI: 16.61 CM
E/E' RATIO: 62.8 M/S
ECHO LV POSTERIOR WALL: 0.98 CM (ref 0.6–1.1)
EJECTION FRACTION: 63 %
EOSINOPHIL # BLD AUTO: 0.4 K/UL (ref 0–0.5)
EOSINOPHIL NFR BLD: 5.6 % (ref 0–8)
ERYTHROCYTE [DISTWIDTH] IN BLOOD BY AUTOMATED COUNT: 18.6 % (ref 11.5–14.5)
EST. GFR  (NO RACE VARIABLE): 46.9 ML/MIN/1.73 M^2
EST. GFR  (NO RACE VARIABLE): 51.2 ML/MIN/1.73 M^2
ESTIMATED AVG GLUCOSE: 103 MG/DL (ref 68–131)
FRACTIONAL SHORTENING: 31 % (ref 28–44)
GLUCOSE SERPL-MCNC: 75 MG/DL (ref 70–110)
GLUCOSE SERPL-MCNC: 91 MG/DL (ref 70–110)
HBA1C MFR BLD: 5.2 % (ref 4–5.6)
HCT VFR BLD AUTO: 30.2 % (ref 40–54)
HGB BLD-MCNC: 9 G/DL (ref 14–18)
IMM GRANULOCYTES # BLD AUTO: 0.06 K/UL (ref 0–0.04)
IMM GRANULOCYTES NFR BLD AUTO: 0.8 % (ref 0–0.5)
INTERVENTRICULAR SEPTUM: 0.99 CM (ref 0.6–1.1)
LA MAJOR: 5.63 CM
LA MINOR: 6.25 CM
LA WIDTH: 4.46 CM
LEFT ATRIUM SIZE: 2.44 CM
LEFT ATRIUM VOLUME INDEX MOD: 39.2 ML/M2
LEFT ATRIUM VOLUME INDEX: 30.4 ML/M2
LEFT ATRIUM VOLUME MOD: 70.47 CM3
LEFT ATRIUM VOLUME: 54.8 CM3
LEFT INTERNAL DIMENSION IN SYSTOLE: 2.61 CM (ref 2.1–4)
LEFT VENTRICLE DIASTOLIC VOLUME INDEX: 34.33 ML/M2
LEFT VENTRICLE DIASTOLIC VOLUME: 61.79 ML
LEFT VENTRICLE MASS INDEX: 64 G/M2
LEFT VENTRICLE SYSTOLIC VOLUME INDEX: 13.7 ML/M2
LEFT VENTRICLE SYSTOLIC VOLUME: 24.73 ML
LEFT VENTRICULAR INTERNAL DIMENSION IN DIASTOLE: 3.8 CM (ref 3.5–6)
LEFT VENTRICULAR MASS: 114.77 G
LV LATERAL E/E' RATIO: 52.33 M/S
LV SEPTAL E/E' RATIO: 78.5 M/S
LYMPHOCYTES # BLD AUTO: 1.8 K/UL (ref 1–4.8)
LYMPHOCYTES NFR BLD: 22.9 % (ref 18–48)
MAGNESIUM SERPL-MCNC: 1.9 MG/DL (ref 1.6–2.6)
MAGNESIUM SERPL-MCNC: 2 MG/DL (ref 1.6–2.6)
MCH RBC QN AUTO: 31 PG (ref 27–31)
MCHC RBC AUTO-ENTMCNC: 29.8 G/DL (ref 32–36)
MCV RBC AUTO: 104 FL (ref 82–98)
MONOCYTES # BLD AUTO: 0.7 K/UL (ref 0.3–1)
MONOCYTES NFR BLD: 9.6 % (ref 4–15)
MV PEAK E VEL: 1.57 M/S
NEUTROPHILS # BLD AUTO: 4.7 K/UL (ref 1.8–7.7)
NEUTROPHILS NFR BLD: 60.5 % (ref 38–73)
NRBC BLD-RTO: 0 /100 WBC
OHS QRS DURATION: 148 MS
OHS QTC CALCULATION: 488 MS
PHOSPHATE SERPL-MCNC: 3.1 MG/DL (ref 2.7–4.5)
PISA TR MAX VEL: 4.16 M/S
PLATELET # BLD AUTO: 192 K/UL (ref 150–450)
PMV BLD AUTO: 9.6 FL (ref 9.2–12.9)
POTASSIUM SERPL-SCNC: 4.7 MMOL/L (ref 3.5–5.1)
POTASSIUM SERPL-SCNC: 4.7 MMOL/L (ref 3.5–5.1)
RA MAJOR: 4.79 CM
RA PRESSURE ESTIMATED: 3 MMHG
RA WIDTH: 4.11 CM
RBC # BLD AUTO: 2.9 M/UL (ref 4.6–6.2)
RIGHT VENTRICLE DIASTOLIC BASEL DIMENSION: 4.4 CM
RV TB RVSP: 7 MMHG
RV TISSUE DOPPLER FREE WALL SYSTOLIC VELOCITY 1 (APICAL 4 CHAMBER VIEW): 7.28 CM/S
SINUS: 3.24 CM
SODIUM SERPL-SCNC: 141 MMOL/L (ref 136–145)
SODIUM SERPL-SCNC: 141 MMOL/L (ref 136–145)
STJ: 2.56 CM
TDI LATERAL: 0.03 M/S
TDI SEPTAL: 0.02 M/S
TDI: 0.03 M/S
TR MAX PG: 69 MMHG
TRICUSPID ANNULAR PLANE SYSTOLIC EXCURSION: 1.68 CM
TROPONIN I SERPL DL<=0.01 NG/ML-MCNC: 0.06 NG/ML (ref 0–0.03)
TV REST PULMONARY ARTERY PRESSURE: 72 MMHG
WBC # BLD AUTO: 7.7 K/UL (ref 3.9–12.7)
Z-SCORE OF LEFT VENTRICULAR DIMENSION IN END DIASTOLE: -2.7
Z-SCORE OF LEFT VENTRICULAR DIMENSION IN END SYSTOLE: -1.3

## 2024-07-02 PROCEDURE — 25000003 PHARM REV CODE 250: Mod: HCNC

## 2024-07-02 PROCEDURE — 83735 ASSAY OF MAGNESIUM: CPT | Mod: HCNC

## 2024-07-02 PROCEDURE — 85025 COMPLETE CBC W/AUTO DIFF WBC: CPT | Mod: HCNC

## 2024-07-02 PROCEDURE — 25000003 PHARM REV CODE 250

## 2024-07-02 PROCEDURE — 84100 ASSAY OF PHOSPHORUS: CPT | Mod: HCNC

## 2024-07-02 PROCEDURE — 63600175 PHARM REV CODE 636 W HCPCS: Mod: HCNC

## 2024-07-02 PROCEDURE — 36415 COLL VENOUS BLD VENIPUNCTURE: CPT | Mod: HCNC

## 2024-07-02 PROCEDURE — 84484 ASSAY OF TROPONIN QUANT: CPT | Mod: HCNC

## 2024-07-02 PROCEDURE — 97535 SELF CARE MNGMENT TRAINING: CPT | Mod: HCNC

## 2024-07-02 PROCEDURE — 21400001 HC TELEMETRY ROOM: Mod: HCNC

## 2024-07-02 PROCEDURE — 80048 BASIC METABOLIC PNL TOTAL CA: CPT | Mod: HCNC

## 2024-07-02 PROCEDURE — 92610 EVALUATE SWALLOWING FUNCTION: CPT | Mod: HCNC

## 2024-07-02 RX ORDER — SODIUM CHLORIDE 0.9 % (FLUSH) 0.9 %
10 SYRINGE (ML) INJECTION
Status: DISCONTINUED | OUTPATIENT
Start: 2024-07-02 | End: 2024-07-05 | Stop reason: HOSPADM

## 2024-07-02 RX ORDER — ASCORBIC ACID 500 MG
500 TABLET ORAL 2 TIMES DAILY
COMMUNITY

## 2024-07-02 RX ORDER — COLCHICINE 0.6 MG/1
0.6 TABLET, FILM COATED ORAL EVERY OTHER DAY
Status: DISCONTINUED | OUTPATIENT
Start: 2024-07-02 | End: 2024-07-05 | Stop reason: HOSPADM

## 2024-07-02 RX ORDER — AMOXICILLIN 250 MG
2 CAPSULE ORAL 2 TIMES DAILY
Status: DISCONTINUED | OUTPATIENT
Start: 2024-07-02 | End: 2024-07-05 | Stop reason: HOSPADM

## 2024-07-02 RX ORDER — OXYCODONE HYDROCHLORIDE 5 MG/1
5 TABLET ORAL 4 TIMES DAILY PRN
Status: ON HOLD | COMMUNITY
End: 2024-07-02

## 2024-07-02 RX ORDER — CARVEDILOL 3.12 MG/1
3.12 TABLET ORAL 2 TIMES DAILY
Status: DISCONTINUED | OUTPATIENT
Start: 2024-07-02 | End: 2024-07-02

## 2024-07-02 RX ORDER — ENOXAPARIN SODIUM 100 MG/ML
30 INJECTION SUBCUTANEOUS EVERY 24 HOURS
Status: DISCONTINUED | OUTPATIENT
Start: 2024-07-02 | End: 2024-07-05 | Stop reason: HOSPADM

## 2024-07-02 RX ORDER — LISINOPRIL 5 MG/1
5 TABLET ORAL DAILY
Status: DISCONTINUED | OUTPATIENT
Start: 2024-07-02 | End: 2024-07-05 | Stop reason: HOSPADM

## 2024-07-02 RX ORDER — PANTOPRAZOLE SODIUM 40 MG/1
40 TABLET, DELAYED RELEASE ORAL DAILY
Status: DISCONTINUED | OUTPATIENT
Start: 2024-07-02 | End: 2024-07-05 | Stop reason: HOSPADM

## 2024-07-02 RX ORDER — LEVOTHYROXINE SODIUM 25 UG/1
25 TABLET ORAL
COMMUNITY

## 2024-07-02 RX ORDER — BISACODYL 10 MG/1
10 SUPPOSITORY RECTAL DAILY PRN
Status: DISCONTINUED | OUTPATIENT
Start: 2024-07-02 | End: 2024-07-05 | Stop reason: HOSPADM

## 2024-07-02 RX ORDER — TAMSULOSIN HYDROCHLORIDE 0.4 MG/1
0.8 CAPSULE ORAL DAILY
Status: DISCONTINUED | OUTPATIENT
Start: 2024-07-02 | End: 2024-07-05 | Stop reason: HOSPADM

## 2024-07-02 RX ORDER — ASPIRIN 81 MG/1
81 TABLET ORAL DAILY
Status: DISCONTINUED | OUTPATIENT
Start: 2024-07-02 | End: 2024-07-05 | Stop reason: HOSPADM

## 2024-07-02 RX ORDER — ALLOPURINOL 100 MG/1
100 TABLET ORAL DAILY
Status: DISCONTINUED | OUTPATIENT
Start: 2024-07-02 | End: 2024-07-05 | Stop reason: HOSPADM

## 2024-07-02 RX ORDER — POLYETHYLENE GLYCOL 3350 17 G/17G
17 POWDER, FOR SOLUTION ORAL 2 TIMES DAILY PRN
Status: DISCONTINUED | OUTPATIENT
Start: 2024-07-02 | End: 2024-07-05 | Stop reason: HOSPADM

## 2024-07-02 RX ORDER — FUROSEMIDE 10 MG/ML
40 INJECTION INTRAMUSCULAR; INTRAVENOUS EVERY 12 HOURS
Status: DISCONTINUED | OUTPATIENT
Start: 2024-07-02 | End: 2024-07-03

## 2024-07-02 RX ORDER — TALC
6 POWDER (GRAM) TOPICAL NIGHTLY PRN
Status: DISCONTINUED | OUTPATIENT
Start: 2024-07-02 | End: 2024-07-05 | Stop reason: HOSPADM

## 2024-07-02 RX ORDER — LANOLIN ALCOHOL/MO/W.PET/CERES
1 CREAM (GRAM) TOPICAL
Status: DISCONTINUED | OUTPATIENT
Start: 2024-07-02 | End: 2024-07-05 | Stop reason: HOSPADM

## 2024-07-02 RX ADMIN — SENNOSIDES AND DOCUSATE SODIUM 2 TABLET: 50; 8.6 TABLET ORAL at 08:07

## 2024-07-02 RX ADMIN — COLCHICINE 0.6 MG: 0.6 TABLET, FILM COATED ORAL at 10:07

## 2024-07-02 RX ADMIN — FUROSEMIDE 40 MG: 10 INJECTION, SOLUTION INTRAVENOUS at 10:07

## 2024-07-02 RX ADMIN — PANTOPRAZOLE SODIUM 40 MG: 40 TABLET, DELAYED RELEASE ORAL at 10:07

## 2024-07-02 RX ADMIN — ALLOPURINOL 100 MG: 100 TABLET ORAL at 10:07

## 2024-07-02 RX ADMIN — TAMSULOSIN HYDROCHLORIDE 0.8 MG: 0.4 CAPSULE ORAL at 10:07

## 2024-07-02 RX ADMIN — SENNOSIDES AND DOCUSATE SODIUM 2 TABLET: 50; 8.6 TABLET ORAL at 10:07

## 2024-07-02 RX ADMIN — ENOXAPARIN SODIUM 30 MG: 30 INJECTION SUBCUTANEOUS at 05:07

## 2024-07-02 RX ADMIN — Medication 6 MG: at 09:07

## 2024-07-02 RX ADMIN — FUROSEMIDE 40 MG: 10 INJECTION, SOLUTION INTRAVENOUS at 08:07

## 2024-07-02 RX ADMIN — ASPIRIN 81 MG: 81 TABLET, COATED ORAL at 10:07

## 2024-07-02 RX ADMIN — FERROUS SULFATE TAB EC 325 MG (65 MG FE EQUIVALENT) 1 EACH: 325 (65 FE) TABLET DELAYED RESPONSE at 03:07

## 2024-07-02 RX ADMIN — LISINOPRIL 5 MG: 5 TABLET ORAL at 10:07

## 2024-07-02 NOTE — HPI
Mr. Moody is a 89 y.o. male with a PMH of HTN, HLD, Chronic diastolic HF, CAD s/p CABG, A-Fib with a hx of AV block s/p pacemaker placement, CKD3, gout, and macular degeneration BIB EMS from [facility] with complaints of left arm swelling for approximately the last month resulting in significant pain and swelling. He also notes intermittent swelling in his right hand and bilateral lower extremities. He has had shortness of breath when waking in the morning lasting about ten minutes and occasionally requiring oxygen of support. Other notable symptoms include periodic abdominal pain relieved by a medication given by nursing home staff, but he is unsure of the treatment. He also notes a significant chronic left heel wound with no acute changes. Lastly, he has occasional difficulties swallowing. Of note, he was admitted one month ago for rectal bleeding which he currently denies.    In the emergency department he was found to have a BNP of 1801 and was given one dose of lasix and supportive oxygen via nasal cannula. He was resting comfortably.

## 2024-07-02 NOTE — NURSING
Report was called by In[facility] in ED. Stated Await  for Tele box then will send patient to room 645

## 2024-07-02 NOTE — H&P
[facility]  History & Physical    Patient Name: Deena Moody  MRN: [id number]  Patient Class: IP- Inpatient  Admission Date: 7/1/2024  Attending Physician:  Jan Schaeffer MD  Primary Care Provider: Jam Rowell MD         Patient information was obtained from patient and ER records.     Subjective:     Principal Problem:Left arm swelling    Chief Complaint:   Chief Complaint   Patient presents with    Arm Swelling     Left arm swelling and pain. Hx gout. Coming from [facility].        HPI: Mr. Moody is a 89 y.o. male with a PMH of HTN, HLD, Chronic diastolic HF, CAD s/p CABG, A-Fib with a hx of AV block s/p pacemaker placement, CKD3, gout, and macular degeneration BIB EMS from [facility] with complaints of left arm swelling for approximately the last month resulting in significant pain and swelling. He also notes intermittent swelling in his right hand and bilateral lower extremities. He has had shortness of breath when waking in the morning lasting about ten minutes and occasionally requiring oxygen of support. Other notable symptoms include periodic abdominal pain relieved by a medication given by nursing home staff, but he is unsure of the treatment. He also notes a significant chronic left heel wound with no acute changes. Lastly, he has occasional difficulties swallowing. Of note, he was admitted one month ago for rectal bleeding which he currently denies.    In the emergency department he was found to have a BNP of 1801 and was given one dose of lasix and supportive oxygen via nasal cannula. He was resting comfortably.    Past Medical History:   Diagnosis Date    Acute encephalopathy 5/20/2024    Acute on chronic diastolic heart failure 9/1/2016    Chronic dementia 6/1/2024    Coronary artery disease     Hyperlipidemia     Hypertension     Macular degeneration (senile) of retina, unspecified 12/12/2014    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    Persistent  atrial fibrillation 11/10/2016    S/P placement of cardiac pacemaker 9/14/2016       Past Surgical History:   Procedure Laterality Date    AORTIC VALVE REPLACEMENT N/A     CARDIAC PACEMAKER PLACEMENT      CATARACT EXTRACTION W/  INTRAOCULAR LENS IMPLANT Right 2/16/2016    Dr. Whitley    CATARACT EXTRACTION W/  INTRAOCULAR LENS IMPLANT Left 3/1/2016    Dr. Whitley    CORONARY ARTERY BYPASS GRAFT      EAR EXAMINATION UNDER ANESTHESIA      EYE SURGERY         Review of patient's allergies indicates:   Allergen Reactions    Iodine and iodide containing products Other (See Comments)     Caused changes in skin color       No current facility-administered medications on file prior to encounter.     Current Outpatient Medications on File Prior to Encounter   Medication Sig    acetaminophen (TYLENOL) 325 MG tablet Take 2 tablets (650 mg total) by mouth every 4 (four) hours as needed (any pain or temp >100).    albuterol (PROVENTIL HFA) 90 mcg/actuation inhaler Inhale 2 puffs into the lungs every 6 (six) hours as needed for Wheezing. Rescue    allopurinoL (ZYLOPRIM) 100 MG tablet Take 0.5 tablets (50 mg total) by mouth once daily for 21 days, THEN 1 tablet (100 mg total) once daily.    aspirin (ECOTRIN) 81 MG EC tablet Take 1 tablet (81 mg total) by mouth once daily.    balsam peru-castor oiL Oint Apply topically 2 (two) times a day. Apply to buttocks and sacrum BID and PRN if soiled.    benazepriL (LOTENSIN) 5 MG tablet Take 1 tablet (5 mg total) by mouth once daily.    bisacodyL (DULCOLAX) 10 mg Supp Place 1 suppository (10 mg total) rectally daily as needed (no BM in over one calendar day).    carvediloL (COREG) 3.125 MG tablet Take 1 tablet (3.125 mg total) by mouth 2 (two) times daily.    colchicine (COLCRYS) 0.6 mg tablet Take 1 tablet (0.6 mg total) by mouth every other day.    ferrous sulfate (FEOSOL) 325 mg (65 mg iron) Tab tablet Take 1 tablet (325 mg total) by mouth every Mon, Wed, Fri. Before breakfast     fluticasone propionate (FLONASE) 50 mcg/actuation nasal spray 2 sprays (100 mcg total) by Each Nostril route once daily.    multivitamin (ONE DAILY MULTIVITAMIN) per tablet Take 1 tablet by mouth once daily.    nitroGLYCERIN (NITROSTAT) 0.4 MG SL tablet Take one tablet every 5 minutes for chest pain. After the third tablet go to the ED.    pantoprazole (PROTONIX) 40 MG tablet Take 1 tablet (40 mg total) by mouth once daily.    polyethylene glycol (GLYCOLAX) 17 gram/dose powder Take 17 g by mouth 3 (three) times daily as needed for Constipation.    senna-docusate 8.6-50 mg (PERICOLACE) 8.6-50 mg per tablet Take 2 tablets by mouth 2 (two) times daily.    tamsulosin (FLOMAX) 0.4 mg Cap Take 2 capsules (0.8 mg total) by mouth once daily.     Family History       Problem Relation (Age of Onset)    Hypertension Brother    No Known Problems Mother, Father, Sister, Maternal Aunt, Maternal Uncle, Paternal Aunt, Paternal Uncle, Maternal Grandmother, Maternal Grandfather, Paternal Grandmother, Paternal Grandfather, Daughter, Son    Stroke Brother          Tobacco Use    Smoking status: Never     Passive exposure: Never    Smokeless tobacco: Never   Substance and Sexual Activity    Alcohol use: No    Drug use: No    Sexual activity: Yes     Partners: Female     Review of Systems   Constitutional:  Negative for activity change, appetite change, fatigue, fever and unexpected weight change.   HENT:  Positive for hearing loss (chronic) and trouble swallowing (chronic). Negative for congestion and rhinorrhea.    Eyes:  Negative for visual disturbance.   Respiratory:  Positive for shortness of breath. Negative for cough, chest tightness and wheezing.    Cardiovascular:  Positive for leg swelling. Negative for chest pain and palpitations.   Gastrointestinal:  Positive for abdominal pain (fluctuating, not currently). Negative for abdominal distention, blood in stool, constipation, nausea and vomiting.   Genitourinary:  Negative for  dysuria, hematuria and urgency.   Musculoskeletal:  Positive for arthralgias.        Left wrist pain with swelling   Skin:         Bruising over wrist swelling   Neurological:  Negative for tremors, syncope, weakness, light-headedness, numbness and headaches.   Psychiatric/Behavioral:  Negative for confusion.      Objective:     Vital Signs (Most Recent):  Temp: 98.4 °F (36.9 °C) (24)  Pulse: 70 (24)  Resp: 18 (24)  BP: 132/63 (24)  SpO2: 98 % (2 lbnc decreased to 1  l BNC) (24) Vital Signs (24h Range):  Temp:  [97.7 °F (36.5 °C)-98.4 °F (36.9 °C)] 98.4 °F (36.9 °C)  Pulse:  [69-78] 70  Resp:  [16-24] 18  SpO2:  [96 %-100 %] 98 %  BP: (110-148)/(58-80) 132/63     Weight: 53.1 kg (117 lb 1 oz)  Body mass index is 17.29 kg/m².     Physical Exam  Constitutional:       General: He is not in acute distress.     Appearance: Normal appearance. He is normal weight. He is not diaphoretic.   HENT:      Head: Normocephalic and atraumatic.      Mouth/Throat:      Mouth: Mucous membranes are dry.      Pharynx: Oropharynx is clear.   Cardiovascular:      Rate and Rhythm: Normal rate and regular rhythm.      Pulses: Normal pulses.      Heart sounds: Normal heart sounds. No murmur heard.     No friction rub. No gallop.   Pulmonary:      Effort: Pulmonary effort is normal.      Breath sounds: Normal breath sounds. No stridor. No wheezing, rhonchi or rales.      Comments: Mild distress after attempting to sit forward  Chest:      Chest wall: No tenderness.   Abdominal:      General: Abdomen is flat.      Palpations: Abdomen is soft.   Musculoskeletal:         General: Swelling (left wrist) present. Normal range of motion.      Right lower le+ Pitting Edema present.      Left lower le+ Pitting Edema present.   Feet:      Comments: Large left heel wound, chronic  Skin:     General: Skin is warm and dry.   Neurological:      General: No focal deficit present.      Mental  Status: He is alert and oriented to person, place, and time.   Psychiatric:         Mood and Affect: Mood normal.         Behavior: Behavior normal.         Thought Content: Thought content normal.         Judgment: Judgment normal.                Significant Labs: All pertinent labs within the past 24 hours have been reviewed.    Significant Imaging: I have reviewed all pertinent imaging results/findings within the past 24 hours.  Assessment/Plan:     * Left arm swelling  Given one dose of lasix in ED with some improvement. Continue to monitor and diurese as needed.       Shortness of breath  SOB present when waking for last month. Supplemental O2 utilized appx. 1x week. Tx with O2 nasal cannula as needed      Chronic ulcer of left heel  3-4 inch circular black wound on left heel. He reports it is chronic. Consult placed to wound care      Acute gout  No current complaints. Continue allopurinol and colchicine    Nonspecific colitis  Recent rectal bleeding but no current complaints. Continue home regimen of pantoprazole, senna-docusate, and Iron supplementation. Hgb 9 on admission.     Constipation  Not currently contipated. Senna-docusate ordered. PRN biscadoyl suppository and polyethylene glycol.     Atrial fibrillation  Pacemaker in place with regular visits to cardiology. No current issues.     Chronic diastolic heart failure  BNP on admission 1801. Received one dose of furosemide in ED. Not currently on home regimen of furosemide. Will continue to monitor.      CAD (coronary artery disease)  Patient with known CAD s/p CABG. Continuing home aspirin 81mg. Previously on Eliquis now d/c fo r hx of GI bleed.    Complete AV block  Pacemaker in place with no issues at this time. Continue tele.       Essential hypertension  Temp:  [97.7 °F (36.5 °C)-98.4 °F (36.9 °C)]   Pulse:  [69-78]   Resp:  [16-24]   BP: (110-148)/(58-80)   SpO2:  [96 %-100 %] .   Home meds for hypertension were reviewed and noted below.    Hypertension Medications               benazepriL (LOTENSIN) 5 MG tablet Take 1 tablet (5 mg total) by mouth once daily.    carvediloL (COREG) 3.125 MG tablet Take 1 tablet (3.125 mg total) by mouth 2 (two) times daily.    nitroGLYCERIN (NITROSTAT) 0.4 MG SL tablet Take one tablet every 5 minutes for chest pain. After the third tablet go to the ED.            While in the hospital, will manage blood pressure as follows; Adjust home antihypertensive regimen as follows- hold nitroglycerin, continue carvedilol and lisinopril    Will utilize p.r.n. blood pressure medication only if patient's blood pressure greater than 180/110 and he develops symptoms such as worsening chest pain or shortness of breath.      VTE Risk Mitigation (From admission, onward)           Ordered     enoxaparin injection 30 mg  Daily         07/02/24 0123     IP VTE HIGH RISK PATIENT  Once         07/02/24 0123     Place sequential compression device  Until discontinued         07/02/24 0123                                    Dominga Schofield MD PGY-1  Department of Hospital Medicine  [facility]

## 2024-07-02 NOTE — PLAN OF CARE
Problem: Infection  Goal: Absence of Infection Signs and Symptoms  Outcome: Progressing     Problem: Adult Inpatient Plan of Care  Goal: Plan of Care Review  Outcome: Progressing  Goal: Patient-Specific Goal (Individualized)  Outcome: Progressing  Goal: Absence of Hospital-Acquired Illness or Injury  Outcome: Progressing  Goal: Optimal Comfort and Wellbeing  Outcome: Progressing  Goal: Readiness for Transition of Care  Outcome: Progressing     Problem: Acute Kidney Injury/Impairment  Goal: Fluid and Electrolyte Balance  Outcome: Progressing  Goal: Improved Oral Intake  Outcome: Progressing  Goal: Effective Renal Function  Outcome: Progressing     Problem: Wound  Goal: Optimal Coping  Outcome: Progressing  Goal: Optimal Functional Ability  Outcome: Progressing  Goal: Absence of Infection Signs and Symptoms  Outcome: Progressing  Goal: Improved Oral Intake  Outcome: Progressing  Goal: Optimal Pain Control and Function  Outcome: Progressing  Goal: Skin Health and Integrity  Outcome: Progressing  Goal: Optimal Wound Healing  Outcome: Progressing     Problem: Fall Injury Risk  Goal: Absence of Fall and Fall-Related Injury  Outcome: Progressing     Problem: Skin Injury Risk Increased  Goal: Skin Health and Integrity  Outcome: Progressing

## 2024-07-02 NOTE — PLAN OF CARE
Problem: Infection  Goal: Absence of Infection Signs and Symptoms  Outcome: Progressing     Problem: Adult Inpatient Plan of Care  Goal: Plan of Care Review  Outcome: Progressing  Goal: Patient-Specific Goal (Individualized)  Outcome: Progressing  Goal: Absence of Hospital-Acquired Illness or Injury  Outcome: Progressing  Goal: Optimal Comfort and Wellbeing  Outcome: Progressing  Goal: Readiness for Transition of Care  Outcome: Progressing     Problem: Acute Kidney Injury/Impairment  Goal: Fluid and Electrolyte Balance  Outcome: Progressing  Goal: Improved Oral Intake  Outcome: Progressing  Goal: Effective Renal Function  Outcome: Progressing     Problem: Wound  Goal: Optimal Coping  Outcome: Progressing  Goal: Optimal Functional Ability  Outcome: Progressing  Goal: Absence of Infection Signs and Symptoms  Outcome: Progressing     Problem: Fall Injury Risk  Goal: Absence of Fall and Fall-Related Injury  Outcome: Progressing     Problem: Skin Injury Risk Increased  Goal: Skin Health and Integrity  Outcome: Progressing   Alert orient to person place and situation. O2 was wean from 2l to 1 bnc this shift. Waffle mattress  was place on bed prior pt patient arriving to room. Sacral foam dressing was applied and faom dressing also placed on bilateral heel and rt outer ankle. Reposition every  2 hours.  Non skids socks  on bilateral.  Bed alarm on. Side rails  up times  2.  No  fall. Ramirez  in place from NH. [place] ED nurse who gave  report stated she  spoke to the  doctor  about replacing ramirez  and MD asked  her to leave the current on in place. I asked  her to document this but documentation was not found by  me.

## 2024-07-02 NOTE — ASSESSMENT & PLAN NOTE
Recent rectal bleeding but no current complaints. Continue home regimen of pantoprazole, senna-docusate, and Iron supplementation. Hgb 9 on admission.

## 2024-07-02 NOTE — ED NOTES
Telemetry Verification   Patient placed on Telemetry Box  Verified with War Room  Box # [id number]   Monitor Tech    Rate    Rhythm

## 2024-07-02 NOTE — PT/OT/SLP EVAL
"Speech Language Pathology Evaluation  Bedside Swallow    Patient Name:  Deena Moody   MRN:  [id number]  Admitting Diagnosis: Left arm swelling    Recommendations:                 General Recommendations:   Monitor diet tolerance  Diet recommendations:  Regular Diet - IDDSI Level 7, Thin liquids - IDDSI Level 0   Aspiration Precautions: Strict aspiration precautions   General Precautions: Standard, fall, aspiration  Communication strategies:  none    Assessment:     Deena Moody is a 89 y.o. male with an SLP diagnosis of  baseline oral dysphagia .  He presents with prolonged mastication.    History:     Past Medical History:   Diagnosis Date    Acute encephalopathy 5/20/2024    Acute on chronic diastolic heart failure 9/1/2016    Chronic dementia 6/1/2024    Coronary artery disease     Hyperlipidemia     Hypertension     Macular degeneration (senile) of retina, unspecified 12/12/2014    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    Persistent atrial fibrillation 11/10/2016    S/P placement of cardiac pacemaker 9/14/2016       Past Surgical History:   Procedure Laterality Date    AORTIC VALVE REPLACEMENT N/A     CARDIAC PACEMAKER PLACEMENT      CATARACT EXTRACTION W/  INTRAOCULAR LENS IMPLANT Right 2/16/2016    Dr. Whitley    CATARACT EXTRACTION W/  INTRAOCULAR LENS IMPLANT Left 3/1/2016    Dr. Whitley    CORONARY ARTERY BYPASS GRAFT      EAR EXAMINATION UNDER ANESTHESIA      EYE SURGERY         Social History: Patient lives at [facility].    Prior Intubation HX:  None this admit    Modified Barium Swallow: none found on file    Chest X-Rays: "Imaging findings are compatible with worsening CHF with interstitial and new alveolar pulmonary edema, and increased pleural fluid.  Pneumonia other underlying pathology not excluded.  Correlate clinically, and with follow-up films."    Prior diet: unclear between regular solids vs soft solids     Subjective     SLP communicated with RN prior to entry and received clearance for " therapy this date.   Pt awake and alert upon ST entry. Daughter present at the bedside. Pt agreeable to participate with ST.    Pain/Comfort:  Pain Rating 1: 0/10    Respiratory Status: Room air    Objective:     Oral Musculature Evaluation  Oral Musculature: unable to assess due to poor participation/comprehension    Bedside Swallow Eval:   Consistencies Assessed:  Thin liquids - several straw sips water  Solids - x1/2 sheet josh cracker      Oral Phase:   Prolonged mastication    Pharyngeal Phase:   no overt clinical signs/symptoms of aspiration  no overt clinical signs/symptoms of pharyngeal dysphagia    Compensatory Strategies  None    SLP provided education re: current impressions, recs, and SLP's intent to follow up to ensure diet tolerance. Pt and his daughter expressed agreement. Pt will require assist with po intake.     Goals:   Multidisciplinary Problems       SLP Goals          Problem: SLP    Goal Priority Disciplines Outcome   SLP Goal     SLP Progressing   Description: Speech Language Pathology Goals  Goals expected to be met by 7/9:  1. Pt will participate in ongoing assessment of swallow function to determine safest and least restrictive diet.                        Plan:     Patient to be seen:  3 x/week   Plan of Care expires:  07/31/24  Plan of Care reviewed with:  patient, daughter   SLP Follow-Up:  Yes       Discharge recommendations:  No Therapy Indicated     Time Tracking:     SLP Treatment Date:   07/02/24  Speech Start Time:  1310  Speech Stop Time:  1333     Speech Total Time (min):  23 min    Billable Minutes: Eval Swallow and Oral Function 13 and Self Care/Home Management Training 10    07/02/2024

## 2024-07-02 NOTE — ASSESSMENT & PLAN NOTE
Patient with known CAD s/p CABG. Continuing home aspirin 81mg. Previously on Eliquis now d/c fo r hx of GI bleed.

## 2024-07-02 NOTE — ASSESSMENT & PLAN NOTE
SOB present when waking for last month. Supplemental O2 utilized appx. 1x week. Tx with O2 nasal cannula as needed

## 2024-07-02 NOTE — ASSESSMENT & PLAN NOTE
Not currently contipated. Senna-docusate ordered. PRN biscadoyl suppository and polyethylene glycol.

## 2024-07-02 NOTE — ASSESSMENT & PLAN NOTE
Temp:  [97.7 °F (36.5 °C)-98.4 °F (36.9 °C)]   Pulse:  [69-78]   Resp:  [16-24]   BP: (110-148)/(58-80)   SpO2:  [96 %-100 %] .   Home meds for hypertension were reviewed and noted below.   Hypertension Medications               benazepriL (LOTENSIN) 5 MG tablet Take 1 tablet (5 mg total) by mouth once daily.    carvediloL (COREG) 3.125 MG tablet Take 1 tablet (3.125 mg total) by mouth 2 (two) times daily.    nitroGLYCERIN (NITROSTAT) 0.4 MG SL tablet Take one tablet every 5 minutes for chest pain. After the third tablet go to the ED.            While in the hospital, will manage blood pressure as follows; Adjust home antihypertensive regimen as follows- hold nitroglycerin, continue carvedilol and lisinopril    Will utilize p.r.n. blood pressure medication only if patient's blood pressure greater than 180/110 and he develops symptoms such as worsening chest pain or shortness of breath.

## 2024-07-02 NOTE — FIRST PROVIDER EVALUATION
"Dictation #1  MRN:[id number]  CSN:[id number] Medical screening examination initiated.  I have conducted a focused provider triage encounter, findings are as follows:    Brief history of present illness:  Patient with a reported history of upper extremity edema, gout, CHF presents with increased upper extremity left-greater-than-right edema    Vitals:    07/01/24 1941   BP: (!) 148/80   Pulse: 70   Resp: (!) 24   Temp: 97.7 °F (36.5 °C)   TempSrc: Oral   SpO2: 97%   Weight: 53.1 kg (117 lb 1 oz)   Height: 5' 9" (1.753 m)       Pertinent physical exam:  Bilateral upper extremity edema left-greater-than-right, no significant erythema to suggest cellulitis, patient denies shortness of breath    Brief workup plan:  Labs, XR, EKG    Preliminary workup initiated; this workup will be continued and followed by the physician or advanced practice provider that is assigned to the patient when roomed.  "

## 2024-07-02 NOTE — CONSULTS
Food & Nutrition  Education    Diet Education: Fluid and Salt Restriction  Time Spent: 10 minutes  Learners: Patient and Family member      Nutrition Education provided with handouts: N/A      Comments: Pt is a resident of [facility]. RD attempted to speak with patient regarding fluid and salt restriction. Patient is not appropriate for diet education currently. Ensure coupons provided. Continue low sodium diet as appropriate.      All questions and concerns answered. Dietitian's contact information provided.       Follow-Up: Yes (1x/ week)    Please Re-consult as needed        Thanks!     Rishabh Mari MS, RD, LDN

## 2024-07-02 NOTE — ASSESSMENT & PLAN NOTE
BNP on admission 1801. Received one dose of furosemide in ED. Not currently on home regimen of furosemide. Will continue to monitor.     Primary Defect Length (In Cm): 5.4

## 2024-07-02 NOTE — ED PROVIDER NOTES
Encounter Date: 7/1/2024       History     Chief Complaint   Patient presents with    Arm Swelling     Left arm swelling and pain. Hx gout. Coming from [facility].     Mr. Moody is a 89 y.o. male with hx of gout, CHF, CKD III, afib with a pacemaker presents to the ED via EMS from [facility] for L arm swelling and pain. Patient's NP at [facility] [place] reports that patient was expericing severe pain from his left arm which was not alleviated by tylenol. Patient is not complaining of pain at this time. Patient denies any fever, nausea, vomiting, abdominal discomfort.     Patient states that he experiences some SOB intermittently where he has required oxygen in the past.          The history is provided by the patient and the nursing home.     Review of patient's allergies indicates:   Allergen Reactions    Iodine and iodide containing products Other (See Comments)     Caused changes in skin color     Past Medical History:   Diagnosis Date    Acute encephalopathy 5/20/2024    Acute on chronic diastolic heart failure 9/1/2016    Chronic dementia 6/1/2024    Coronary artery disease     Hyperlipidemia     Hypertension     Macular degeneration (senile) of retina, unspecified 12/12/2014    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    Persistent atrial fibrillation 11/10/2016    S/P placement of cardiac pacemaker 9/14/2016     Past Surgical History:   Procedure Laterality Date    AORTIC VALVE REPLACEMENT N/A     CARDIAC PACEMAKER PLACEMENT      CATARACT EXTRACTION W/  INTRAOCULAR LENS IMPLANT Right 2/16/2016    Dr. Whitley    CATARACT EXTRACTION W/  INTRAOCULAR LENS IMPLANT Left 3/1/2016    Dr. Whitley    CORONARY ARTERY BYPASS GRAFT      EAR EXAMINATION UNDER ANESTHESIA      EYE SURGERY       Family History   Problem Relation Name Age of Onset    No Known Problems Mother      No Known Problems Father      No Known Problems Sister x4     Stroke Brother x3     Hypertension Brother x3     No  Known Problems Maternal Aunt      No Known Problems Maternal Uncle      No Known Problems Paternal Aunt      No Known Problems Paternal Uncle      No Known Problems Maternal Grandmother      No Known Problems Maternal Grandfather      No Known Problems Paternal Grandmother      No Known Problems Paternal Grandfather      No Known Problems Daughter      No Known Problems Son      Amblyopia Neg Hx      Blindness Neg Hx      Cancer Neg Hx      Cataracts Neg Hx      Diabetes Neg Hx      Glaucoma Neg Hx      Macular degeneration Neg Hx      Retinal detachment Neg Hx      Strabismus Neg Hx      Thyroid disease Neg Hx       Social History     Tobacco Use    Smoking status: Never     Passive exposure: Never    Smokeless tobacco: Never   Substance Use Topics    Alcohol use: No    Drug use: No     Review of Systems   Constitutional:  Negative for activity change and fever.   Gastrointestinal: Negative.  Negative for diarrhea, nausea and vomiting.   Genitourinary:  Negative for genital sores.   All other systems reviewed and are negative.      Physical Exam     Initial Vitals [07/01/24 1941]   BP Pulse Resp Temp SpO2   (!) 148/80 70 (!) 24 97.7 °F (36.5 °C) 97 %      MAP       --         Physical Exam    Vitals reviewed.  Constitutional: Vital signs are normal. He appears well-developed and well-nourished.   HENT:   Head: Normocephalic and atraumatic.   Cardiovascular:  Normal rate, regular rhythm, normal heart sounds and intact distal pulses.           Pulmonary/Chest: Effort normal. No accessory muscle usage. No apnea. No respiratory distress. He has decreased breath sounds.   Abdominal: He exhibits distension. There is no abdominal tenderness.   Musculoskeletal:         General: Edema present.      Comments: 3+ non pitting upper extremity edema, cap refill < 2 s distally     Neurological: He is alert.   Moving all extremities    Skin: Skin is warm and dry. Capillary refill takes less than 2 seconds.  There is erythema.   Mild erythema along L mid-forearm       ED Course   Procedures  Labs Reviewed   CBC W/ AUTO DIFFERENTIAL - Abnormal; Notable for the following components:       Result Value    RBC 2.87 (*)     Hemoglobin 9.0 (*)     Hematocrit 29.7 (*)      (*)     MCH 31.4 (*)     MCHC 30.3 (*)     RDW 18.6 (*)     MPV 8.9 (*)     Immature Granulocytes 0.8 (*)     Immature Grans (Abs) 0.07 (*)     All other components within normal limits    Narrative:     Release to patient->Immediate   COMPREHENSIVE METABOLIC PANEL   TROPONIN I   B-TYPE NATRIURETIC PEPTIDE   HIV 1 / 2 ANTIBODY   HEPATITIS C ANTIBODY          Imaging Results              X-Ray Chest AP Portable (In process)                      Medications - No data to display  Medical Decision Making  89 y.o. male that presents to the ED from nursing home with L arm pain and swelling that wasn't alleviated with tylenol. Patient does not have any bony tenderness on palpation but his extremities are warm to the touch with slight tenderness to the left arm with associated swelling.   Labs reviewed. Pro BMP 1800 which is doubled from baseline and troponin elevated.     X-rays obtained today which showed pulmonary vascular engorgement and interstitial pulmonary opacification likely represents interstitial pulmonary edema. Patient's symptoms, imaging, and labs are suggestive of edema iso heart failure exacerbation.     Diuresis started today with furosemide 40 mg.      Amount and/or Complexity of Data Reviewed  Independent Historian: guardian  External Data Reviewed: labs, radiology and notes.  Labs: ordered.  Radiology: ordered.  ECG/medicine tests: ordered.    Risk  Prescription drug management.  Decision regarding hospitalization.              Attending Attestation:   Physician Attestation Statement for Resident:  As the supervising MD   Physician Attestation Statement: I have personally seen and examined this patient.   I agree with the above history.   -:   As the supervising MD I agree with the above PE.     As the supervising MD I agree with the above treatment, course, plan, and disposition.   -: Patient with initial complaints of upper extremity swelling, however, on further discussion patient also notes some recent shortness of breath.   Physical exam is concerning for anasarca/volume overload  Labs are remarkable for a BNP > 1800, trop > 0.05, increased from previous  CXR remarkable for pulmonary edema   LUE US without evidence of DVT   Will plan for diuresis, admission    I have reviewed and agree with the residents interpretation of the following: lab data and x-rays.  I have reviewed the following: old EKGs and old records at this facility.                                     Clinical Impression:  Final diagnoses:  [R06.02] Shortness of breath  [R60.9] Swelling                 Tosin Philippe MD  07/03/24 5097

## 2024-07-02 NOTE — ED NOTES
Nurses Note -- 4 Eyes      7/1/2024   10:59 PM      Skin assessed during: Daily Assessment      [] No Altered Skin Integrity Present    []Prevention Measures Documented      [x] Yes- Altered Skin Integrity Present or Discovered   [x] LDA Added if Not in Epic (Describe Wound)   [x] New Altered Skin Integrity was Present on Admit and Documented in LDA   [x] Wound Image Taken    Wound Care Consulted? No    Attending Nurse:  Inocencia Gutierrez RN     Second RN/Staff Member:  Nichol Beaver RN

## 2024-07-02 NOTE — ASSESSMENT & PLAN NOTE
3-4 inch circular black wound on left heel. He reports it is chronic. Consult placed to wound care

## 2024-07-02 NOTE — PLAN OF CARE
[facility] - Med Surg  Initial Discharge Assessment       Primary Care Provider: Jam Rowell MD    Admission Diagnosis: Shortness of breath [R06.02]  Swelling [R60.9]  CHF (congestive heart failure) [I50.9]    Admission Date: 7/1/2024  Expected Discharge Date: 7/5/2024    Transition of Care Barriers: (P) None    Payor: HU[employer] MANAGED MEDICARE / Plan: HU[employer] MEDICARE HMO / Product Type: Capitation /     Extended Emergency Contact Information  Primary Emergency Contact: Amelie Vincent   [place]  Mobile Phone: [phone]  Relation: Daughter  Secondary Emergency Contact: Elas Moody  Mobile Phone: [phone]  Relation: Grandchild    Discharge Plan A: (P) New Nursing Home placement - shelter care facility  Discharge Plan B: (P) New Nursing Home placement - shelter care facility      [facility] - [facility] [address] [facility] [address]  [address] [facility] [place] [address]  Phone: [phone] Fax: [phone]    [id number] [address] [facility] [address]  [address] [facility] [address]  Phone: [phone] Fax: [phone]    [facility]  [address] [facility] [address]  [facility] [address]  Phone: [phone] Fax: [phone]        CM met with patient to discuss discharge planning and patient was asleep in bed. Patient grandchild (Elsa) was present in the room and daughter (Amelie) came in later. Family provided the answers for assessment. Patient is a resident at [facility]. Prior to hospital admission patient required total assistance with ADLs and was transferred to and pushed around in a wheelchair. Discharge plan is for patient to return to [facility]. Discharge Plan A and Plan B have been determined by review of patient's clinical status, future medical and therapeutic needs, and coverage/benefits for post-acute care in coordination with  multidisciplinary team members.  Initial Assessment (most recent)       Adult Discharge Assessment - 07/02/24 1521          Discharge Assessment    Assessment Type Discharge Planning Assessment (P)      Confirmed/corrected address, phone number and insurance Yes (P)      Confirmed Demographics Correct on Facesheet (P)      Source of Information family (P)      Communicated TEOFILO with patient/caregiver Date not available/Unable to determine (P)      Reason For Admission Left arm swelling and shortness of breath (P)      People in Home facility resident (P)      Facility Arrived From: [facility] (P)      Do you expect to return to your current living situation? Yes (P)      Do you have help at home or someone to help you manage your care at home? Yes (P)      Who are your caregiver(s) and their phone number(s)? Facility staff (P)      Prior to hospitilization cognitive status: Unable to Assess (P)      Current cognitive status: Unable to Assess (P)      Walking or Climbing Stairs Difficulty yes (P)      Walking or Climbing Stairs ambulation difficulty, requires equipment (P)      Mobility Management wheelchair (P)      Dressing/Bathing Difficulty yes (P)      Dressing/Bathing bathing difficulty, assistance 1 person;dressing difficulty, assistance 1 person (P)      Home Accessibility wheelchair accessible (P)      Home Layout Able to live on 1st floor (P)      Equipment Currently Used at Home wheelchair;hospital bed (P)      Readmission within 30 days? Yes (P)      Patient currently being followed by outpatient case management? No (P)      Do you currently have service(s) that help you manage your care at home? No (P)      Do you take prescription medications? Yes (P)      Do you have prescription coverage? Yes (P)      Coverage [employer] (P)      Do you have any problems affording any of your prescribed medications? No (P)      Is the patient taking medications as prescribed? yes (P)      Who is  going to help you get home at discharge?  (P)      How do you get to doctors appointments? health plan transportation (P)      Are you on dialysis? No (P)      Do you take coumadin? No (P)      Discharge Plan A New Nursing Home placement - nursing home care facility (P)      Discharge Plan B New Nursing Home placement - nursing home care facility (P)      DME Needed Upon Discharge  none (P)      Discharge Plan discussed with: Adult children (P)    Grandchild    Transition of Care Barriers None (P)         Physical Activity    On average, how many days per week do you engage in moderate to strenuous exercise (like a brisk walk)? 0 days (P)      On average, how many minutes do you engage in exercise at this level? 0 min (P)         Financial Resource Strain    How hard is it for you to pay for the very basics like food, housing, medical care, and heating? Not hard at all (P)         Housing Stability    In the last 12 months, was there a time when you were not able to pay the mortgage or rent on time? No (P)      At any time in the past 12 months, were you homeless or living in a shelter (including now)? No (P)         Transportation Needs    Has the lack of transportation kept you from medical appointments, meetings, work or from getting things needed for daily living? No (P)         Food Insecurity    Within the past 12 months, you worried that your food would run out before you got the money to buy more. Never true (P)      Within the past 12 months, the food you bought just didn't last and you didn't have money to get more. Never true (P)         Stress    Do you feel stress - tense, restless, nervous, or anxious, or unable to sleep at night because your mind is troubled all the time - these days? Patient unable to answer (P)         Social Isolation    How often do you feel lonely or isolated from those around you?  Sometimes (P)         Alcohol Use    Q1: How often do you have a drink containing alcohol?  Never (P)      Q2: How many drinks containing alcohol do you have on a typical day when you are drinking? Patient does not drink (P)      Q3: How often do you have six or more drinks on one occasion? Never (P)         Utilities    In the past 12 months has the electric, gas, oil, or water company threatened to shut off services in your home? No (P)         Health Literacy    How often do you need to have someone help you when you read instructions, pamphlets, or other written material from your doctor or pharmacy? Sometimes (P)         OTHER    Name(s) of People in Home facility residents (P)                      Readmission Assessment (most recent)       Readmission Assessment - 07/02/24 1519          Readmission    Was this a planned readmission? No     Why were you hospitalized in the last 30 days? Arm swelling     Why were you readmitted? Related to previous admission     When you left the hospital how did you feel? Per grandchild, swelling decreased     When you left the hospital where did you go? Nursing Home     Did patient/caregiver refused recommended DC plan? No     Tell me about what happened between when you left the hospital and the day you returned. Per grandchild, swelling was significantly decreased     Did you try to manage your symptoms your self? No     Did you call anyone? No     Why? nursing home called ambulance to transport patient to the hospital     Did you try to see or did see a doctor or nurse before you came? Yes     Were you seen? Yes     Did you have  a follow-up appointment on discharge? Yes     Did you go? Yes                        PHILIP Fernandez  Case Management  [phone]

## 2024-07-02 NOTE — PROGRESS NOTES
[facility] Failure [facility] (H[facility]) Team notified of pt referral via [facility] (automated inbasket notification) .    Patient screened today 7-2-2024 by provider and RN for enrollment to program.      Pt was deemed not a candidate for enrollment at this time related to patient has follow up barrier: pt is being discharged to non-[facility] skilled nursing/rehab facility, nursing home or other facility that will be unable to assist pt with participation.  [facility] a resident of  [facility].    Pt will require additional follow up planning per primary team.     If pt status, diagnosis, or treatment plan changes , please place AMB referral to [facility] ([id number]) for HFTCC enrollment re-evalution.

## 2024-07-02 NOTE — ASSESSMENT & PLAN NOTE
Given one dose of lasix in ED with some improvement. Continue to monitor and diurese as needed.

## 2024-07-02 NOTE — SUBJECTIVE & OBJECTIVE
Past Medical History:   Diagnosis Date    Acute encephalopathy 5/20/2024    Acute on chronic diastolic heart failure 9/1/2016    Chronic dementia 6/1/2024    Coronary artery disease     Hyperlipidemia     Hypertension     Macular degeneration (senile) of retina, unspecified 12/12/2014    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    Persistent atrial fibrillation 11/10/2016    S/P placement of cardiac pacemaker 9/14/2016       Past Surgical History:   Procedure Laterality Date    AORTIC VALVE REPLACEMENT N/A     CARDIAC PACEMAKER PLACEMENT      CATARACT EXTRACTION W/  INTRAOCULAR LENS IMPLANT Right 2/16/2016    Dr. Whitley    CATARACT EXTRACTION W/  INTRAOCULAR LENS IMPLANT Left 3/1/2016    Dr. Whitley    CORONARY ARTERY BYPASS GRAFT      EAR EXAMINATION UNDER ANESTHESIA      EYE SURGERY         Review of patient's allergies indicates:   Allergen Reactions    Iodine and iodide containing products Other (See Comments)     Caused changes in skin color       No current facility-administered medications on file prior to encounter.     Current Outpatient Medications on File Prior to Encounter   Medication Sig    acetaminophen (TYLENOL) 325 MG tablet Take 2 tablets (650 mg total) by mouth every 4 (four) hours as needed (any pain or temp >100).    albuterol (PROVENTIL HFA) 90 mcg/actuation inhaler Inhale 2 puffs into the lungs every 6 (six) hours as needed for Wheezing. Rescue    allopurinoL (ZYLOPRIM) 100 MG tablet Take 0.5 tablets (50 mg total) by mouth once daily for 21 days, THEN 1 tablet (100 mg total) once daily.    aspirin (ECOTRIN) 81 MG EC tablet Take 1 tablet (81 mg total) by mouth once daily.    balsam peru-castor oiL Oint Apply topically 2 (two) times a day. Apply to buttocks and sacrum BID and PRN if soiled.    benazepriL (LOTENSIN) 5 MG tablet Take 1 tablet (5 mg total) by mouth once daily.    bisacodyL (DULCOLAX) 10 mg Supp Place 1 suppository (10 mg total) rectally daily as needed (no BM in over one calendar day).     carvediloL (COREG) 3.125 MG tablet Take 1 tablet (3.125 mg total) by mouth 2 (two) times daily.    colchicine (COLCRYS) 0.6 mg tablet Take 1 tablet (0.6 mg total) by mouth every other day.    ferrous sulfate (FEOSOL) 325 mg (65 mg iron) Tab tablet Take 1 tablet (325 mg total) by mouth every Mon, Wed, Fri. Before breakfast    fluticasone propionate (FLONASE) 50 mcg/actuation nasal spray 2 sprays (100 mcg total) by Each Nostril route once daily.    multivitamin (ONE DAILY MULTIVITAMIN) per tablet Take 1 tablet by mouth once daily.    nitroGLYCERIN (NITROSTAT) 0.4 MG SL tablet Take one tablet every 5 minutes for chest pain. After the third tablet go to the ED.    pantoprazole (PROTONIX) 40 MG tablet Take 1 tablet (40 mg total) by mouth once daily.    polyethylene glycol (GLYCOLAX) 17 gram/dose powder Take 17 g by mouth 3 (three) times daily as needed for Constipation.    senna-docusate 8.6-50 mg (PERICOLACE) 8.6-50 mg per tablet Take 2 tablets by mouth 2 (two) times daily.    tamsulosin (FLOMAX) 0.4 mg Cap Take 2 capsules (0.8 mg total) by mouth once daily.     Family History       Problem Relation (Age of Onset)    Hypertension Brother    No Known Problems Mother, Father, Sister, Maternal Aunt, Maternal Uncle, Paternal Aunt, Paternal Uncle, Maternal Grandmother, Maternal Grandfather, Paternal Grandmother, Paternal Grandfather, Daughter, Son    Stroke Brother          Tobacco Use    Smoking status: Never     Passive exposure: Never    Smokeless tobacco: Never   Substance and Sexual Activity    Alcohol use: No    Drug use: No    Sexual activity: Yes     Partners: Female     Review of Systems   Constitutional:  Negative for activity change, appetite change, fatigue, fever and unexpected weight change.   HENT:  Positive for hearing loss (chronic) and trouble swallowing (chronic). Negative for congestion and rhinorrhea.    Eyes:  Negative for visual disturbance.   Respiratory:  Positive for shortness of breath. Negative  for cough, chest tightness and wheezing.    Cardiovascular:  Positive for leg swelling. Negative for chest pain and palpitations.   Gastrointestinal:  Positive for abdominal pain (fluctuating, not currently). Negative for abdominal distention, blood in stool, constipation, nausea and vomiting.   Genitourinary:  Negative for dysuria, hematuria and urgency.   Musculoskeletal:  Positive for arthralgias.        Left wrist pain with swelling   Skin:         Bruising over wrist swelling   Neurological:  Negative for tremors, syncope, weakness, light-headedness, numbness and headaches.   Psychiatric/Behavioral:  Negative for confusion.      Objective:     Vital Signs (Most Recent):  Temp: 98.4 °F (36.9 °C) (07/02/24 0215)  Pulse: 70 (07/02/24 0215)  Resp: 18 (07/02/24 0215)  BP: 132/63 (07/02/24 0215)  SpO2: 98 % (2 lbnc decreased to 1  l BNC) (07/02/24 0215) Vital Signs (24h Range):  Temp:  [97.7 °F (36.5 °C)-98.4 °F (36.9 °C)] 98.4 °F (36.9 °C)  Pulse:  [69-78] 70  Resp:  [16-24] 18  SpO2:  [96 %-100 %] 98 %  BP: (110-148)/(58-80) 132/63     Weight: 53.1 kg (117 lb 1 oz)  Body mass index is 17.29 kg/m².     Physical Exam  Constitutional:       General: He is not in acute distress.     Appearance: Normal appearance. He is normal weight. He is not diaphoretic.   HENT:      Head: Normocephalic and atraumatic.      Mouth/Throat:      Mouth: Mucous membranes are dry.      Pharynx: Oropharynx is clear.   Cardiovascular:      Rate and Rhythm: Normal rate and regular rhythm.      Pulses: Normal pulses.      Heart sounds: Normal heart sounds. No murmur heard.     No friction rub. No gallop.   Pulmonary:      Effort: Pulmonary effort is normal.      Breath sounds: Normal breath sounds. No stridor. No wheezing, rhonchi or rales.      Comments: Mild distress after attempting to sit forward  Chest:      Chest wall: No tenderness.   Abdominal:      General: Abdomen is flat.      Palpations: Abdomen is soft.   Musculoskeletal:          General: Swelling (left wrist) present. Normal range of motion.      Right lower le+ Pitting Edema present.      Left lower le+ Pitting Edema present.   Feet:      Comments: Large left heel wound, chronic  Skin:     General: Skin is warm and dry.   Neurological:      General: No focal deficit present.      Mental Status: He is alert and oriented to person, place, and time.   Psychiatric:         Mood and Affect: Mood normal.         Behavior: Behavior normal.         Thought Content: Thought content normal.         Judgment: Judgment normal.                Significant Labs: All pertinent labs within the past 24 hours have been reviewed.    Significant Imaging: I have reviewed all pertinent imaging results/findings within the past 24 hours.   25

## 2024-07-02 NOTE — ED TRIAGE NOTES
Deena Moody, a 89 y.o. male presents to the ED via EMS from [facility] for L arm swelling and pain. Hx of gout.     Triage note:  Chief Complaint   Patient presents with    Arm Swelling     Left arm swelling and pain. Hx gout. Coming from [facility].     Review of patient's allergies indicates:   Allergen Reactions    Iodine and iodide containing products Other (See Comments)     Caused changes in skin color     Past Medical History:   Diagnosis Date    Acute encephalopathy 5/20/2024    Acute on chronic diastolic heart failure 9/1/2016    Chronic dementia 6/1/2024    Coronary artery disease     Hyperlipidemia     Hypertension     Macular degeneration (senile) of retina, unspecified 12/12/2014    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    Persistent atrial fibrillation 11/10/2016    S/P placement of cardiac pacemaker 9/14/2016

## 2024-07-02 NOTE — NURSING
Nurses Note -- 4 Eyes      7/2/2024   7:16 AM      Skin assessed during: Admit      [] No Altered Skin Integrity Present    []Prevention Measures Documented      [x] Yes- Altered Skin Integrity Present or Discovered   [] LDA Added if Not in Epic (Describe Wound)   [] New Altered Skin Integrity was Present on Admit and Documented in LDA   [] Wound Image Taken    Wound Care Consulted? Yes    Attending Nurse Tamra MAR     Second RN/Staff Member:     Soni Mar

## 2024-07-02 NOTE — ED NOTES
Telemetry Verification   Patient placed on Telemetry Box  Verified with War Room  Box # [id number]   Monitor Tech TW   Rate 78   Rhythm V-Paced

## 2024-07-03 PROBLEM — R52 PAIN: Status: ACTIVE | Noted: 2024-07-03

## 2024-07-03 PROBLEM — Z51.5 PALLIATIVE CARE ENCOUNTER: Status: ACTIVE | Noted: 2024-07-03

## 2024-07-03 PROBLEM — R53.81 DEBILITY: Status: ACTIVE | Noted: 2024-07-03

## 2024-07-03 PROBLEM — Z71.89 ADVANCE CARE PLANNING: Status: ACTIVE | Noted: 2024-07-03

## 2024-07-03 LAB
ANION GAP SERPL CALC-SCNC: 10 MMOL/L (ref 8–16)
ANION GAP SERPL CALC-SCNC: 8 MMOL/L (ref 8–16)
BASOPHILS # BLD AUTO: 0.06 K/UL (ref 0–0.2)
BASOPHILS NFR BLD: 0.8 % (ref 0–1.9)
BUN SERPL-MCNC: 33 MG/DL (ref 10–30)
BUN SERPL-MCNC: 33 MG/DL (ref 10–30)
CALCIUM SERPL-MCNC: 8 MG/DL (ref 8.7–10.5)
CALCIUM SERPL-MCNC: 8.1 MG/DL (ref 8.7–10.5)
CHLORIDE SERPL-SCNC: 109 MMOL/L (ref 95–110)
CHLORIDE SERPL-SCNC: 111 MMOL/L (ref 95–110)
CO2 SERPL-SCNC: 16 MMOL/L (ref 23–29)
CO2 SERPL-SCNC: 21 MMOL/L (ref 23–29)
CREAT SERPL-MCNC: 1.4 MG/DL (ref 0.5–1.4)
CREAT SERPL-MCNC: 1.6 MG/DL (ref 0.5–1.4)
CRP SERPL-MCNC: 28.9 MG/L (ref 0–8.2)
DIFFERENTIAL METHOD BLD: ABNORMAL
EOSINOPHIL # BLD AUTO: 0.6 K/UL (ref 0–0.5)
EOSINOPHIL NFR BLD: 7.2 % (ref 0–8)
ERYTHROCYTE [DISTWIDTH] IN BLOOD BY AUTOMATED COUNT: 18.3 % (ref 11.5–14.5)
ERYTHROCYTE [SEDIMENTATION RATE] IN BLOOD BY PHOTOMETRIC METHOD: 88 MM/HR (ref 0–23)
EST. GFR  (NO RACE VARIABLE): 39.9 ML/MIN/1.73 M^2
EST. GFR  (NO RACE VARIABLE): 46.9 ML/MIN/1.73 M^2
GLUCOSE SERPL-MCNC: 106 MG/DL (ref 70–110)
GLUCOSE SERPL-MCNC: 72 MG/DL (ref 70–110)
HCT VFR BLD AUTO: 31.2 % (ref 40–54)
HGB BLD-MCNC: 9.7 G/DL (ref 14–18)
IMM GRANULOCYTES # BLD AUTO: 0.04 K/UL (ref 0–0.04)
IMM GRANULOCYTES NFR BLD AUTO: 0.5 % (ref 0–0.5)
LYMPHOCYTES # BLD AUTO: 1.7 K/UL (ref 1–4.8)
LYMPHOCYTES NFR BLD: 21.8 % (ref 18–48)
MAGNESIUM SERPL-MCNC: 1.8 MG/DL (ref 1.6–2.6)
MCH RBC QN AUTO: 31.6 PG (ref 27–31)
MCHC RBC AUTO-ENTMCNC: 31.1 G/DL (ref 32–36)
MCV RBC AUTO: 102 FL (ref 82–98)
MONOCYTES # BLD AUTO: 0.6 K/UL (ref 0.3–1)
MONOCYTES NFR BLD: 7.5 % (ref 4–15)
NEUTROPHILS # BLD AUTO: 5 K/UL (ref 1.8–7.7)
NEUTROPHILS NFR BLD: 62.2 % (ref 38–73)
NRBC BLD-RTO: 0 /100 WBC
PHOSPHATE SERPL-MCNC: 3.6 MG/DL (ref 2.7–4.5)
PLATELET # BLD AUTO: 184 K/UL (ref 150–450)
PMV BLD AUTO: 9.5 FL (ref 9.2–12.9)
POTASSIUM SERPL-SCNC: 4.1 MMOL/L (ref 3.5–5.1)
POTASSIUM SERPL-SCNC: 4.9 MMOL/L (ref 3.5–5.1)
RBC # BLD AUTO: 3.07 M/UL (ref 4.6–6.2)
SODIUM SERPL-SCNC: 137 MMOL/L (ref 136–145)
SODIUM SERPL-SCNC: 138 MMOL/L (ref 136–145)
URATE SERPL-MCNC: 6.5 MG/DL (ref 3.4–7)
WBC # BLD AUTO: 7.95 K/UL (ref 3.9–12.7)

## 2024-07-03 PROCEDURE — 86140 C-REACTIVE PROTEIN: CPT | Mod: HCNC | Performed by: FAMILY MEDICINE

## 2024-07-03 PROCEDURE — 92526 ORAL FUNCTION THERAPY: CPT | Mod: HCNC

## 2024-07-03 PROCEDURE — 85025 COMPLETE CBC W/AUTO DIFF WBC: CPT | Mod: HCNC | Performed by: FAMILY MEDICINE

## 2024-07-03 PROCEDURE — 97535 SELF CARE MNGMENT TRAINING: CPT | Mod: HCNC

## 2024-07-03 PROCEDURE — 84550 ASSAY OF BLOOD/URIC ACID: CPT | Mod: HCNC | Performed by: FAMILY MEDICINE

## 2024-07-03 PROCEDURE — 80048 BASIC METABOLIC PNL TOTAL CA: CPT | Mod: HCNC

## 2024-07-03 PROCEDURE — 36415 COLL VENOUS BLD VENIPUNCTURE: CPT | Mod: HCNC,XB | Performed by: FAMILY MEDICINE

## 2024-07-03 PROCEDURE — 25000003 PHARM REV CODE 250: Mod: HCNC

## 2024-07-03 PROCEDURE — 83735 ASSAY OF MAGNESIUM: CPT | Mod: HCNC

## 2024-07-03 PROCEDURE — 63600175 PHARM REV CODE 636 W HCPCS: Mod: HCNC

## 2024-07-03 PROCEDURE — 85652 RBC SED RATE AUTOMATED: CPT | Mod: HCNC | Performed by: FAMILY MEDICINE

## 2024-07-03 PROCEDURE — 36415 COLL VENOUS BLD VENIPUNCTURE: CPT | Mod: HCNC

## 2024-07-03 PROCEDURE — 21400001 HC TELEMETRY ROOM: Mod: HCNC

## 2024-07-03 PROCEDURE — 25000003 PHARM REV CODE 250: Mod: HCNC | Performed by: FAMILY MEDICINE

## 2024-07-03 PROCEDURE — 99223 1ST HOSP IP/OBS HIGH 75: CPT | Mod: HCNC,,, | Performed by: CLINICAL NURSE SPECIALIST

## 2024-07-03 PROCEDURE — 84100 ASSAY OF PHOSPHORUS: CPT | Mod: HCNC

## 2024-07-03 RX ORDER — BALSAM PERU/CASTOR OIL
OINTMENT (GRAM) TOPICAL 2 TIMES DAILY
Status: DISCONTINUED | OUTPATIENT
Start: 2024-07-03 | End: 2024-07-05 | Stop reason: HOSPADM

## 2024-07-03 RX ADMIN — ASPIRIN 81 MG: 81 TABLET, COATED ORAL at 08:07

## 2024-07-03 RX ADMIN — Medication 6 MG: at 08:07

## 2024-07-03 RX ADMIN — PANTOPRAZOLE SODIUM 40 MG: 40 TABLET, DELAYED RELEASE ORAL at 08:07

## 2024-07-03 RX ADMIN — Medication: at 03:07

## 2024-07-03 RX ADMIN — LISINOPRIL 5 MG: 5 TABLET ORAL at 08:07

## 2024-07-03 RX ADMIN — FERROUS SULFATE TAB EC 325 MG (65 MG FE EQUIVALENT) 1 EACH: 325 (65 FE) TABLET DELAYED RESPONSE at 08:07

## 2024-07-03 RX ADMIN — SENNOSIDES AND DOCUSATE SODIUM 2 TABLET: 50; 8.6 TABLET ORAL at 08:07

## 2024-07-03 RX ADMIN — TAMSULOSIN HYDROCHLORIDE 0.8 MG: 0.4 CAPSULE ORAL at 08:07

## 2024-07-03 RX ADMIN — FUROSEMIDE 40 MG: 10 INJECTION, SOLUTION INTRAVENOUS at 08:07

## 2024-07-03 RX ADMIN — ENOXAPARIN SODIUM 30 MG: 30 INJECTION SUBCUTANEOUS at 06:07

## 2024-07-03 RX ADMIN — Medication: at 08:07

## 2024-07-03 RX ADMIN — ALLOPURINOL 100 MG: 100 TABLET ORAL at 08:07

## 2024-07-03 NOTE — PROGRESS NOTES
[facility] Medicine  Progress Note    Patient Name: Deena Moody  MRN: [id number]  Patient Class: IP- Inpatient   Admission Date: 7/1/2024  Length of Stay: 1 days  Attending Physician: Vamshi Valera III*  Primary Care Provider: Jam Rowell MD        Subjective:     Principal Problem:Left arm swelling        HPI:  Mr. Moody is a 89 y.o. male with a PMH of HTN, HLD, Chronic diastolic HF, CAD s/p CABG, A-Fib with a hx of AV block s/p pacemaker placement, CKD3, gout, and macular degeneration BIB EMS from [facility] with complaints of left arm swelling for approximately the last month resulting in significant pain and swelling. He also notes intermittent swelling in his right hand and bilateral lower extremities. He has had shortness of breath when waking in the morning lasting about ten minutes and occasionally requiring oxygen of support. Other notable symptoms include periodic abdominal pain relieved by a medication given by nursing home staff, but he is unsure of the treatment. He also notes a significant chronic left heel wound with no acute changes. Lastly, he has occasional difficulties swallowing. Of note, he was admitted one month ago for rectal bleeding which he currently denies.    In the emergency department he was found to have a BNP of 1801 and was given one dose of lasix and supportive oxygen via nasal cannula. He was resting comfortably.    Overview/Hospital Course:  Admitted to  for L arm swelling on 7/2. Family and patient were interested in goals of care discussion with palliative, palliative consulted.     Interval History: Pt was seen and examined at bedside this AM. He reports resolution in R arm edema, and b/l LE edema however L arm edema is minimally resolved.     Review of Systems  Objective:     Vital Signs (Most Recent):  Temp: 97.8 °F (36.6 °C) (07/03/24 1616)  Pulse: 70 (07/03/24 1616)  Resp: 18 (07/03/24 1616)  BP: (!) 101/52 (07/03/24  1616)  SpO2: 99 % (07/03/24 1616) Vital Signs (24h Range):  Temp:  [97.2 °F (36.2 °C)-97.9 °F (36.6 °C)] 97.8 °F (36.6 °C)  Pulse:  [67-87] 70  Resp:  [16-18] 18  SpO2:  [97 %-99 %] 99 %  BP: ()/(52-83) 101/52     Weight: 64.7 kg (142 lb 10.2 oz)  Body mass index is 21.06 kg/m².    Intake/Output Summary (Last 24 hours) at 7/3/2024 1646  Last data filed at 7/3/2024 1300  Gross per 24 hour   Intake 180 ml   Output 3251 ml   Net -3071 ml         Physical Exam  Constitutional:       General: He is not in acute distress.     Appearance: Normal appearance. He is normal weight. He is not diaphoretic.   HENT:      Head: Normocephalic and atraumatic.      Mouth/Throat:      Mouth: Mucous membranes are dry.      Pharynx: Oropharynx is clear.   Cardiovascular:      Rate and Rhythm: Normal rate and regular rhythm.      Pulses: Normal pulses.      Heart sounds: Normal heart sounds. No murmur heard.     No friction rub. No gallop.   Pulmonary:      Effort: Pulmonary effort is normal.      Breath sounds: Normal breath sounds. No stridor. No wheezing, rhonchi or rales.      Comments: Mild distress after attempting to sit forward  Chest:      Chest wall: No tenderness.   Abdominal:      General: Abdomen is flat.      Palpations: Abdomen is soft.   Musculoskeletal:         General: Swelling (left wrist) present. Normal range of motion.      Right lower leg: No edema.      Left lower leg: No edema.   Feet:      Comments: Large left heel wound, chronic  Skin:     General: Skin is warm and dry.   Neurological:      General: No focal deficit present.      Mental Status: He is alert and oriented to person, place, and time.   Psychiatric:         Mood and Affect: Mood normal.         Behavior: Behavior normal.         Thought Content: Thought content normal.         Judgment: Judgment normal.             Significant Labs: All pertinent labs within the past 24 hours have been reviewed.    Significant Imaging: I have reviewed all  pertinent imaging results/findings within the past 24 hours.    Assessment/Plan:      * Left arm swelling  - D/c Lasix on 7/3  - Will continue to monitor L arm swelling, likely positional edema  - Pt and family ready for goals of care disucssion  - Palliative consulted        Shortness of breath  SOB present when waking for last month. Supplemental O2 utilized appx. 1x week. Tx with O2 nasal cannula as needed      Chronic ulcer of left heel  3-4 inch circular black wound on left heel. He reports it is chronic. Consult placed to wound care      Acute gout  No current complaints. Continue allopurinol and colchicine    Nonspecific colitis  Recent rectal bleeding but no current complaints. Continue home regimen of pantoprazole, senna-docusate, and Iron supplementation. Hgb 9 on admission.     Constipation  Not currently contipated. Senna-docusate ordered. PRN biscadoyl suppository and polyethylene glycol.     Atrial fibrillation  Pacemaker in place with regular visits to cardiology. No current issues.     Chronic diastolic heart failure  BNP on admission 1801. Received one dose of furosemide in ED. Not currently on home regimen of furosemide. Will continue to monitor.      CAD (coronary artery disease)  Patient with known CAD s/p CABG. Continuing home aspirin 81mg. Previously on Eliquis now d/c fo r hx of GI bleed.    Complete AV block  Pacemaker in place with no issues at this time. Continue tele.       Essential hypertension  Temp:  [97.7 °F (36.5 °C)-98.4 °F (36.9 °C)]   Pulse:  [69-78]   Resp:  [16-24]   BP: (110-148)/(58-80)   SpO2:  [96 %-100 %] .   Home meds for hypertension were reviewed and noted below.   Hypertension Medications               benazepriL (LOTENSIN) 5 MG tablet Take 1 tablet (5 mg total) by mouth once daily.    carvediloL (COREG) 3.125 MG tablet Take 1 tablet (3.125 mg total) by mouth 2 (two) times daily.    nitroGLYCERIN (NITROSTAT) 0.4 MG SL tablet Take one tablet every 5 minutes for chest  pain. After the third tablet go to the ED.            While in the hospital, will manage blood pressure as follows; Adjust home antihypertensive regimen as follows- hold nitroglycerin, continue carvedilol and lisinopril    Will utilize p.r.n. blood pressure medication only if patient's blood pressure greater than 180/110 and he develops symptoms such as worsening chest pain or shortness of breath.      VTE Risk Mitigation (From admission, onward)           Ordered     enoxaparin injection 30 mg  Daily         07/02/24 0123     IP VTE HIGH RISK PATIENT  Once         07/02/24 0123     Place sequential compression device  Until discontinued         07/02/24 0123                    Discharge Planning   TEOFILO: 7/5/2024     Code Status: Full Code   Is the patient medically ready for discharge?:     Reason for patient still in hospital (select all that apply): Patient trending condition and Pending disposition  Discharge Plan A: Return to nursing home   Discharge Delays: None known at this time              Mikhail Bender MD  Department of Hospital Medicine   [facility]

## 2024-07-03 NOTE — ASSESSMENT & PLAN NOTE
- D/c Lasix on 7/3  - Will continue to monitor L arm swelling, likely positional edema  - Pt and family ready for goals of care disucssion  - Palliative consulted

## 2024-07-03 NOTE — SUBJECTIVE & OBJECTIVE
Past Medical History:   Diagnosis Date    Acute encephalopathy 5/20/2024    Acute on chronic diastolic heart failure 9/1/2016    Chronic dementia 6/1/2024    Coronary artery disease     Hyperlipidemia     Hypertension     Macular degeneration (senile) of retina, unspecified 12/12/2014    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    Persistent atrial fibrillation 11/10/2016    S/P placement of cardiac pacemaker 9/14/2016       Past Surgical History:   Procedure Laterality Date    AORTIC VALVE REPLACEMENT N/A     CARDIAC PACEMAKER PLACEMENT      CATARACT EXTRACTION W/  INTRAOCULAR LENS IMPLANT Right 2/16/2016    Dr. Whitley    CATARACT EXTRACTION W/  INTRAOCULAR LENS IMPLANT Left 3/1/2016    Dr. Whitley    CORONARY ARTERY BYPASS GRAFT      EAR EXAMINATION UNDER ANESTHESIA      EYE SURGERY         Review of patient's allergies indicates:   Allergen Reactions    Iodine and iodide containing products Other (See Comments)     Caused changes in skin color       Medications:  Continuous Infusions:  Scheduled Meds:   allopurinoL  100 mg Oral Daily    aspirin  81 mg Oral Daily    balsam peru-castor oiL   Topical (Top) BID    colchicine  0.6 mg Oral Every other day    enoxparin  30 mg Subcutaneous Daily    ferrous sulfate  1 tablet Oral Once per day on Monday Wednesday Friday    lisinopriL  5 mg Oral Daily    pantoprazole  40 mg Oral Daily    senna-docusate 8.6-50 mg  2 tablet Oral BID    tamsulosin  0.8 mg Oral Daily     PRN Meds:  Current Facility-Administered Medications:     bisacodyL, 10 mg, Rectal, Daily PRN    melatonin, 6 mg, Oral, Nightly PRN    polyethylene glycol, 17 g, Oral, BID PRN    sodium chloride 0.9%, 10 mL, Intravenous, PRN    Family History       Problem Relation (Age of Onset)    Hypertension Brother    No Known Problems Mother, Father, Sister, Maternal Aunt, Maternal Uncle, Paternal Aunt, Paternal Uncle, Maternal Grandmother, Maternal Grandfather, Paternal Grandmother, Paternal Grandfather, Daughter, Son     Stroke Brother          Tobacco Use    Smoking status: Never     Passive exposure: Never    Smokeless tobacco: Never   Substance and Sexual Activity    Alcohol use: No    Drug use: No    Sexual activity: Yes     Partners: Female       Review of Systems   Constitutional:  Positive for activity change.   HENT:  Positive for hearing loss.    Respiratory:  Negative for shortness of breath.    Skin:  Positive for wound.   Neurological:  Positive for weakness.   Psychiatric/Behavioral:  Positive for decreased concentration.      Objective:     Vital Signs (Most Recent):  Temp: 97.9 °F (36.6 °C) (07/03/24 1114)  Pulse: 69 (07/03/24 1504)  Resp: 18 (07/03/24 1114)  BP: (!) 96/56 (07/03/24 1114)  SpO2: 97 % (07/03/24 1114) Vital Signs (24h Range):  Temp:  [97.2 °F (36.2 °C)-97.9 °F (36.6 °C)] 97.9 °F (36.6 °C)  Pulse:  [67-87] 69  Resp:  [16-18] 18  SpO2:  [97 %-99 %] 97 %  BP: ()/(56-83) 96/56     Weight: 64.7 kg (142 lb 10.2 oz)  Body mass index is 21.06 kg/m².       Physical Exam  Constitutional:       General: He is awake. He is not in acute distress.  HENT:      Head: Normocephalic and atraumatic.   Pulmonary:      Effort: Pulmonary effort is normal. No respiratory distress.   Musculoskeletal:      Comments: Weakness noted.    Skin:     Comments: Pressure sore to left heel.    Neurological:      Mental Status: He is alert.      Comments: Oriented to person, place            Review of Symptoms      Symptom Assessment (ESAS 0-10 Scale)  Pain:  0  Dyspnea:  0  Anxiety:  0  Nausea:  0  Depression:  0  Anorexia:  0  Fatigue:  0  Insomnia:  0  Restlessness:  0  Agitation:  0         Psychosocial/Cultural:   See Palliative Psychosocial Note: No  Pt lives at [facility]. Pt has daughter, Amelie and son, Deena Pierce who are NOK.   **Primary  to Follow**  Palliative Care  Consult: Yes        Advance Care Planning   Advance Directives:   Living Will: No    LaPOST: No    Do Not Resuscitate Status: No     Medical Power of : No    Agent's Name:  Son and dee are NOK.    Decision Making:  Patient answered questions and Family answered questions  Goals of Care: Spoke to son. Per son, family leaning toward hospice at NH with [facility].          Significant Labs: All pertinent labs within the past 24 hours have been reviewed.  CBC:   Recent Labs   Lab 07/03/24  0639   WBC 7.95   HGB 9.7*   HCT 31.2*   *        BMP:  Recent Labs   Lab 07/03/24  0342 07/03/24  1343   GLU 72 106    138   K 4.9 4.1   * 109   CO2 16* 21*   BUN 33* 33*   CREATININE 1.4 1.6*   CALCIUM 8.0* 8.1*   MG 1.8  --      LFT:  Lab Results   Component Value Date    AST 27 07/01/2024    ALKPHOS 146 (H) 07/01/2024    BILITOT 0.5 07/01/2024     Albumin:   Albumin   Date Value Ref Range Status   07/01/2024 2.1 (L) 3.5 - 5.2 g/dL Final     Protein:   Total Protein   Date Value Ref Range Status   07/01/2024 6.3 6.0 - 8.4 g/dL Final     Lactic acid:   Lab Results   Component Value Date    LACTATE 1.8 09/22/2023    LACTATE 1.6 05/12/2020       Significant Imaging: I have reviewed all pertinent imaging results/findings within the past 24 hours.

## 2024-07-03 NOTE — CONSULTS
[facility]  Wound Care    Patient Name:  Deena Moody   MRN:  [id number]  Date: 7/3/2024  Diagnosis: Left arm swelling    History:     Past Medical History:   Diagnosis Date    Acute encephalopathy 5/20/2024    Acute on chronic diastolic heart failure 9/1/2016    Chronic dementia 6/1/2024    Coronary artery disease     Hyperlipidemia     Hypertension     Macular degeneration (senile) of retina, unspecified 12/12/2014    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    Persistent atrial fibrillation 11/10/2016    S/P placement of cardiac pacemaker 9/14/2016       Social History     Socioeconomic History    Marital status:    Tobacco Use    Smoking status: Never     Passive exposure: Never    Smokeless tobacco: Never   Substance and Sexual Activity    Alcohol use: No    Drug use: No    Sexual activity: Yes     Partners: Female     Social Determinants of Health     Financial Resource Strain: Low Risk  (7/2/2024)    Overall Financial Resource Strain (CARDIA)     Difficulty of Paying Living Expenses: Not hard at all   Food Insecurity: No Food Insecurity (7/2/2024)    Hunger Vital Sign     Worried About Running Out of Food in the Last Year: Never true     Ran Out of Food in the Last Year: Never true   Transportation Needs: No Transportation Needs (7/2/2024)    TRANSPORTATION NEEDS     Transportation : No   Physical Activity: Inactive (7/2/2024)    Exercise Vital Sign     Days of Exercise per Week: 0 days     Minutes of Exercise per Session: 0 min   Stress: Patient Unable To Answer (7/2/2024)    [employer] - Occupational Stress Questionnaire     Feeling of Stress : Patient unable to answer   Recent Concern: Stress - Stress Concern Present (6/2/2024)    [employer] - Occupational Stress Questionnaire     Feeling of Stress : To some extent   Housing Stability: Low Risk  (7/2/2024)    Housing Stability Vital Sign     Unable to Pay for Housing in the Last Year: No      Homeless in the Last Year: No       Precautions:     Allergies as of 07/01/2024 - Reviewed 07/01/2024   Allergen Reaction Noted    Iodine and iodide containing products Other (See Comments) 07/20/2012       [facility] Assessment Details/Treatment     Patient seen for inpatient wound care consult for left heel and sacrum. Family at bedside for encounter. Left heel noted to have significant pain, with black and tan eschar covering the heel. No wound bed visible. EHOB boots ordered for pressure offloading. Right heel noted to be clean, dry, and intact with no wound or breakdown. Sacrum noted to have blanchable area of erythema with purple discoloration. Skin breakdown indicative of moisture with small bowel incontinence cleaned during assessment. Immerse bed ordered for pressure redistribution and microclimate to promote wound healing. Confirmation [id number]    Reviewed chart for this encounter.  See flowsheet for findings.      Recommendations: Cleanse sacrum with sterile normal saline and pat dry. Apply triad BID and PRN if soiled for moisture barrier.    Apply chloraprep to left heel for antimicrobial properties and leave open to air with EHOB boot in use.    Apply BPCO to right heel BID and PRN for capillary stimulation.    Skin integrity EMILIO consulted.    No other issues or concerns at this time. Will follow up 7/10/2024 or sooner if needed.      Discussed POC with patient and primary nurse.  See EMR for orders and patient education.    Bedside nursing to continue care and monitoring.  Bedside to maintain pressure injury prevention interventions.        07/03/24 1035   WOCN Assessment   WOCN Total Time (mins) 30   Visit Date 07/03/24   Visit Time 1035   Consult Type New   WOCN Speciality Wound   Intervention assessed;changed;applied;chart review;coordination of care;orders;consult other service   Teaching on-going        Wound 05/03/24 0500 Pressure Injury Left Heel   Date First Assessed/Time First Assessed: 05/03/24 0500    Primary Wound Type: Pressure Injury  Side: Left  Location: Heel   Wound Image    Pressure Injury Stage U   Dressing Appearance Open to air;Dry   Drainage Amount None   Drainage Characteristics/Odor No odor   Appearance Eschar   Care Cleansed with:;Sterile normal saline        Wound 05/03/24 1700 Other (comment) Buttocks   Date First Assessed/Time First Assessed: 05/03/24 1700   Present on Original Admission: No  Primary Wound Type: Other (comment)  Location: Buttocks   Wound Image    Dressing Appearance Open to air;Dry   Drainage Amount None   Drainage Characteristics/Odor No odor   Appearance Pink;Red;Purple   Care Cleansed with:;Sterile normal saline;Applied:;Skin Barrier       Orders placed.   Ganga Beth BSN, RN  07/03/2024

## 2024-07-03 NOTE — HPI
Pt is a 89 y.o. male with a PMH of HTN, HLD, Chronic diastolic HF, CAD s/p CABG, A-Fib with a hx of AV block s/p pacemaker placement, CKD3, gout, and macular degeneration BIB EMS from [facility] with complaints of left arm swelling for approximately the last month resulting in significant pain and swelling. Per chart review, he also notes intermittent swelling in his right hand and bilateral lower extremities. He has had shortness of breath when waking in the morning lasting about ten minutes and occasionally requiring oxygen of support. Other notable symptoms include periodic abdominal pain relieved by a medication given by nursing home staff, but he is unsure of the treatment. He also notes a significant chronic left heel wound with no acute changes. Lastly, he has occasional difficulties swallowing. Of note, he was admitted one month ago for rectal bleeding which he currently denies.     In the emergency department he was found to have a BNP of 1801 and was given one dose of lasix and supportive oxygen via nasal cannula. He was resting comfortably.

## 2024-07-03 NOTE — PLAN OF CARE
Problem: Infection  Goal: Absence of Infection Signs and Symptoms  Outcome: Progressing     Problem: Adult Inpatient Plan of Care  Goal: Plan of Care Review  Outcome: Progressing  Goal: Patient-Specific Goal (Individualized)  Outcome: Progressing  Goal: Absence of Hospital-Acquired Illness or Injury  Outcome: Progressing  Goal: Optimal Comfort and Wellbeing  Outcome: Progressing  Goal: Readiness for Transition of Care  Outcome: Progressing     Problem: Acute Kidney Injury/Impairment  Goal: Fluid and Electrolyte Balance  Outcome: Progressing  Goal: Improved Oral Intake  Outcome: Progressing  Goal: Effective Renal Function  Outcome: Progressing     Problem: Wound  Goal: Optimal Coping  Outcome: Progressing  Goal: Optimal Functional Ability  Outcome: Progressing  Goal: Absence of Infection Signs and Symptoms  Outcome: Progressing  Goal: Improved Oral Intake  Outcome: Progressing  Goal: Optimal Pain Control and Function  Outcome: Progressing  Goal: Skin Health and Integrity  Outcome: Progressing  Goal: Optimal Wound Healing  Outcome: Progressing     Problem: Fall Injury Risk  Goal: Absence of Fall and Fall-Related Injury  Outcome: Progressing     Problem: Skin Injury Risk Increased  Goal: Skin Health and Integrity  Outcome: Progressing     Problem: Coping Ineffective  Goal: Effective Coping  Outcome: Progressing

## 2024-07-03 NOTE — HOSPITAL COURSE
Patient admitted to  for management of left arm swelling, bilateral LE swelling, and shortness of breath. Treated as HF exacerbation with IV diuresis with improvement in SOB and LE edema. TTE noted to have CVP of 3 and pulm htn with PASP at 72 mmHg. Diuresis discontinued with improvement in symptoms. Left arm swelling persisted. Noted to have significantly decreased ROM of the left shoulder. CT ordered for evaluation, noted diffuse edema and degenerative changes of the shoulder and especially of the radiocapitellar joint. Etiology of edema likely dependent edema related to decreased usage of the arm and inability to flow against gravity. Discussed this with pt's son at bedside and daughter on the phone, recommended compression arm brace for further management. Palliative care consulted during admission for GOC conversations given that family is considering hospice.     Pt stable to discharge back to NH with palliative care referral for further GOC conversations. Will send with lasix for future management of HF. Continue home gout regimen.

## 2024-07-03 NOTE — SUBJECTIVE & OBJECTIVE
Interval History: Pt resting in bed, he says his right arm swelling and b/l LE edema has decreased however he sees minimal improvement in his left arm. He denies chest pain and SOB.     Review of Systems  Objective:     Vital Signs (Most Recent):  Temp: 97.9 °F (36.6 °C) (07/03/24 1114)  Pulse: 69 (07/03/24 1504)  Resp: 18 (07/03/24 1114)  BP: (!) 96/56 (07/03/24 1114)  SpO2: 97 % (07/03/24 1114) Vital Signs (24h Range):  Temp:  [97.2 °F (36.2 °C)-97.9 °F (36.6 °C)] 97.9 °F (36.6 °C)  Pulse:  [67-87] 69  Resp:  [16-18] 18  SpO2:  [97 %-99 %] 97 %  BP: ()/(56-83) 96/56     Weight: 64.7 kg (142 lb 10.2 oz)  Body mass index is 21.06 kg/m².    Intake/Output Summary (Last 24 hours) at 7/3/2024 1545  Last data filed at 7/3/2024 1300  Gross per 24 hour   Intake 180 ml   Output 3251 ml   Net -3071 ml         Physical Exam        Significant Labs: All pertinent labs within the past 24 hours have been reviewed.    Significant Imaging: I have reviewed all pertinent imaging results/findings within the past 24 hours.

## 2024-07-03 NOTE — NURSING
Upon arriving to patient room his 2 grand daughters was present and concern that patient keeps stating he feels like he is falling . After speaking to patient he appears slightly anxious . He continue stating he did not like the feeling like he was falling. I informed him that I was going to call his doctor and let him know and see if his doctor will get him something to assist with pt sleeping tonight. I called the MD on call for Med team 2. I requested that pt need med to aid him with sleeping. Md stated he was coming to see the patient. MD arrived to floor shortly afterward. After speaking with patient melatonin was ordered

## 2024-07-03 NOTE — PLAN OF CARE
Problem: Infection  Goal: Absence of Infection Signs and Symptoms  Outcome: Progressing     Problem: Adult Inpatient Plan of Care  Goal: Plan of Care Review  Outcome: Progressing  Goal: Patient-Specific Goal (Individualized)  Outcome: Progressing  Goal: Absence of Hospital-Acquired Illness or Injury  Outcome: Progressing  Goal: Optimal Comfort and Wellbeing  Outcome: Progressing  Goal: Readiness for Transition of Care  Outcome: Progressing     Problem: Acute Kidney Injury/Impairment  Goal: Fluid and Electrolyte Balance  Outcome: Progressing  Goal: Improved Oral Intake  Outcome: Progressing  Goal: Effective Renal Function  Outcome: Progressing     Problem: Wound  Goal: Optimal Coping  Outcome: Progressing  Goal: Optimal Functional Ability  Outcome: Progressing  Goal: Absence of Infection Signs and Symptoms  Outcome: Progressing  Goal: Improved Oral Intake  Outcome: Progressing  Goal: Optimal Pain Control and Function  Outcome: Progressing  Goal: Skin Health and Integrity  Outcome: Progressing  Goal: Optimal Wound Healing  Outcome: Progressing

## 2024-07-03 NOTE — SUBJECTIVE & OBJECTIVE
Interval History: Pt was seen and examined at bedside this AM. He reports resolution in R arm edema, and b/l LE edema however L arm edema is minimally resolved.     Review of Systems  Objective:     Vital Signs (Most Recent):  Temp: 97.8 °F (36.6 °C) (07/03/24 1616)  Pulse: 70 (07/03/24 1616)  Resp: 18 (07/03/24 1616)  BP: (!) 101/52 (07/03/24 1616)  SpO2: 99 % (07/03/24 1616) Vital Signs (24h Range):  Temp:  [97.2 °F (36.2 °C)-97.9 °F (36.6 °C)] 97.8 °F (36.6 °C)  Pulse:  [67-87] 70  Resp:  [16-18] 18  SpO2:  [97 %-99 %] 99 %  BP: ()/(52-83) 101/52     Weight: 64.7 kg (142 lb 10.2 oz)  Body mass index is 21.06 kg/m².    Intake/Output Summary (Last 24 hours) at 7/3/2024 1646  Last data filed at 7/3/2024 1300  Gross per 24 hour   Intake 180 ml   Output 3251 ml   Net -3071 ml         Physical Exam  Constitutional:       General: He is not in acute distress.     Appearance: Normal appearance. He is normal weight. He is not diaphoretic.   HENT:      Head: Normocephalic and atraumatic.      Mouth/Throat:      Mouth: Mucous membranes are dry.      Pharynx: Oropharynx is clear.   Cardiovascular:      Rate and Rhythm: Normal rate and regular rhythm.      Pulses: Normal pulses.      Heart sounds: Normal heart sounds. No murmur heard.     No friction rub. No gallop.   Pulmonary:      Effort: Pulmonary effort is normal.      Breath sounds: Normal breath sounds. No stridor. No wheezing, rhonchi or rales.      Comments: Mild distress after attempting to sit forward  Chest:      Chest wall: No tenderness.   Abdominal:      General: Abdomen is flat.      Palpations: Abdomen is soft.   Musculoskeletal:         General: Swelling (left wrist) present. Normal range of motion.      Right lower leg: No edema.      Left lower leg: No edema.   Feet:      Comments: Large left heel wound, chronic  Skin:     General: Skin is warm and dry.   Neurological:      General: No focal deficit present.      Mental Status: He is alert and  oriented to person, place, and time.   Psychiatric:         Mood and Affect: Mood normal.         Behavior: Behavior normal.         Thought Content: Thought content normal.         Judgment: Judgment normal.             Significant Labs: All pertinent labs within the past 24 hours have been reviewed.    Significant Imaging: I have reviewed all pertinent imaging results/findings within the past 24 hours.

## 2024-07-03 NOTE — PHARMACY MED REC
"Admission Medication History     The home medication history was taken by Kathie Vitale.    You may go to "Admission" then "Reconcile Home Medications" tabs to review and/or act upon these items.     The home medication list has been updated by the Pharmacy department.   Please read ALL comments highlighted in yellow.   Please address this information as you see fit.    Feel free to contact us if you have any questions or require assistance.      The medications listed below were removed from the home medication list. Please reorder if appropriate:  Patient reports no longer taking the following medication(s):  Oxycodone 5 mg ir tablet   Polyethylene Glycol 17 gm powder      Medications listed below were obtained from: [employer]- [facility]  PTA Medications   Medication Sig    acetaminophen (TYLENOL) 325 MG tablet Take 2 tablets (650 mg total) by mouth every 4 (four) hours as needed (any pain or temp >100).      albuterol (PROVENTIL HFA) 90 mcg/actuation inhaler Inhale 2 puffs into the lungs every 6 (six) hours as needed for Wheezing. Rescue      allopurinoL (ZYLOPRIM) 100 MG tablet   Take 100 mg by mouth once daily    ascorbic acid, vitamin C, (VITAMIN C) 500 MG tablet Take 500 mg by mouth 2 (two) times daily. Promote wound healing      aspirin (ECOTRIN) 81 MG EC tablet   Take 1 tablet (81 mg total) by mouth once daily.    balsam peru-castor oiL Oint Apply 2 oz topically 2 (two) times a day. Apply to buttocks and sacrum BID and PRN if soiled.)      benazepriL (LOTENSIN) 5 MG tablet Take 5 mg by mouth once daily. If systolic blood pressure is less than 90 or diastolic >110 notify MD)      bisacodyL (DULCOLAX) 10 mg Supp Place 1 suppository (10 mg total) rectally daily as needed       carvediloL (COREG) 3.125 MG tablet    Take 3.125 mg by mouth 2 (two) times daily.    colchicine (COLCRYS) 0.6 mg tablet   Take 1 tablet (0.6 mg total) by mouth every other day.    ferrous sulfate (FEOSOL) 325 mg (65 " mg iron) Tab tablet Take 1 tablet (325 mg total) by mouth every Mon, Wed, Fri. Before breakfast      fluticasone propionate (FLONASE) 50 mcg/actuation nasal spray   2 sprays (100 mcg total) by Each Nostril route once daily.    levothyroxine (SYNTHROID) 25 MCG tablet   Take 25 mcg by mouth before breakfast.    multivitamin (ONE DAILY MULTIVITAMIN) per tablet   Take 1 tablet by mouth once daily.    nitroGLYCERIN (NITROSTAT) 0.4 MG SL tablet Place 0.4 mg under the tongue every 5 (five) minutes as needed for Chest pain. Take one tablet every 5 minutes for chest pain. After the third tablet go to the ED.)      pantoprazole (PROTONIX) 40 MG tablet   Take 1 tablet (40 mg total) by mouth once daily.    senna-docusate 8.6-50 mg (PERICOLACE) 8.6-50 mg per tablet    Take 2 tablets by mouth once daily.    sodium phosphates (FLEET PEDIATRIC) 9.5-3.5 gram/59 mL Enem   Place 1 enema rectally daily as needed (constipation).    tamsulosin (FLOMAX) 0.4 mg Cap Take 2 capsules (0.8 mg total) by mouth once daily.     Kathie Burfect  EXT [id number]                 .

## 2024-07-03 NOTE — NURSING
Gina noted from Rt arm . Bed had to be changed. Pads place under arms ,Pt with adequate output. Remain on fluid restrictions. Has some generalize edema notedSlept after taking melatonin  at HS. No complaint voiced. No fall

## 2024-07-03 NOTE — PT/OT/SLP PROGRESS
"Speech Language Pathology Treatment  Discharge Note    Patient Name:  Deena Moody   MRN:  [id number]  Admitting Diagnosis: Left arm swelling    Recommendations:                 General Recommendations:   No ST Services Warranted   Diet recommendations:  Regular Diet - IDDSI Level 7, Thin liquids - IDDSI Level 0   Aspiration Precautions: Strict aspiration precautions   General Precautions: Standard, fall, aspiration  Communication strategies:  none    Assessment:     Deena Moody is a 89 y.o. male with an oropharyngeal swallow that appears functional for PO intake of regular solids with thin liquids. ST services are no longer warranted at this time.     Subjective     Pt awake/alert and agreeable to PO trials.     "I eat all food."     Pain/Comfort:  Pain Rating 1: 0/10  Pain Rating Post-Intervention 1: 0/10    Respiratory Status: Nasal cannula, flow 1 L/min    Objective:     Has the patient been evaluated by SLP for swallowing?   Yes  Keep patient NPO? No     Pt seen for ongoing dysphagia therapy. Pt laying in bed awake/alert and watching TV upon SLP entry. Pt seen with 2 whole josh crackers and small sips of water via straw. Pt with mildly prolonged mastication though mastication appears WNL for age. Mild oral residue observed though adequate clearance appreciated with liquid wash x1. No overt signs of aspiration noted with regular solids or thin liquids. SLP provided education re: recs for regular/thin diet, assist with meals, signs of aspiration, aspiration precautions, and recs for ST discharge. Pt in agreement. Upon exiting the room, pt with all needs met and no report of pain.     Goals:   Multidisciplinary Problems       SLP Goals          Problem: SLP    Goal Priority Disciplines Outcome   SLP Goal     SLP Progressing   Description: Speech Language Pathology Goals  Goals expected to be met by 7/9:  1. Pt will participate in ongoing assessment of swallow function to determine safest and least restrictive " diet.                        Plan:     Patient to be seen:  3 x/week   Plan of Care expires:  07/31/24  Plan of Care reviewed with:  patient   SLP Follow-Up:  No       Discharge recommendations:  No Therapy Indicated   Barriers to Discharge:  None    Time Tracking:     SLP Treatment Date:   07/03/24  Speech Start Time:  1113  Speech Stop Time:  1128     Speech Total Time (min):  15 min    Billable Minutes: Treatment Swallowing Dysfunction 7 and Self Care/Home Management Training 8    07/03/2024    Bao Mack CF-SLP

## 2024-07-03 NOTE — PLAN OF CARE
[facility] - Med Surg  Discharge Reassessment    Primary Care Provider: Jam Rowell MD    Expected Discharge Date: 7/5/2024      Patient remains inpatient due to continued need for medical management. Patient receiving IV lasix to help with swelling. Discharge plan is for patient to return to [facility]. Discharge Plan A and Plan B have been determined by review of patient's clinical status, future medical and therapeutic needs, and coverage/benefits for post-acute care in coordination with multidisciplinary team members.  Reassessment (most recent)       Discharge Reassessment - 07/03/24 1512          Discharge Reassessment    Assessment Type Discharge Planning Reassessment     Did the patient's condition or plan change since previous assessment? No     Discharge Plan discussed with: Adult children     Communicated TEOFILO with patient/caregiver Date not available/Unable to determine     Discharge Plan A Return to nursing home     Discharge Plan B Return to Nursing Home     DME Needed Upon Discharge  none     Transition of Care Barriers None     Why the patient remains in the hospital Requires continued medical care        Post-Acute Status    Coverage [employer]     Discharge Delays None known at this time                     PHILIP Fernandez  Case Management  [phone]

## 2024-07-04 PROBLEM — K52.9 NONSPECIFIC COLITIS: Status: RESOLVED | Noted: 2020-05-11 | Resolved: 2024-07-04

## 2024-07-04 LAB
ANION GAP SERPL CALC-SCNC: 10 MMOL/L (ref 8–16)
BASOPHILS # BLD AUTO: 0.04 K/UL (ref 0–0.2)
BASOPHILS NFR BLD: 0.5 % (ref 0–1.9)
BUN SERPL-MCNC: 33 MG/DL (ref 10–30)
CALCIUM SERPL-MCNC: 7.8 MG/DL (ref 8.7–10.5)
CHLORIDE SERPL-SCNC: 108 MMOL/L (ref 95–110)
CO2 SERPL-SCNC: 23 MMOL/L (ref 23–29)
CREAT SERPL-MCNC: 1.5 MG/DL (ref 0.5–1.4)
DIFFERENTIAL METHOD BLD: ABNORMAL
EOSINOPHIL # BLD AUTO: 0.6 K/UL (ref 0–0.5)
EOSINOPHIL NFR BLD: 6.9 % (ref 0–8)
ERYTHROCYTE [DISTWIDTH] IN BLOOD BY AUTOMATED COUNT: 18.3 % (ref 11.5–14.5)
EST. GFR  (NO RACE VARIABLE): 43.1 ML/MIN/1.73 M^2
GLUCOSE SERPL-MCNC: 86 MG/DL (ref 70–110)
HCT VFR BLD AUTO: 29.6 % (ref 40–54)
HGB BLD-MCNC: 9.1 G/DL (ref 14–18)
IMM GRANULOCYTES # BLD AUTO: 0.05 K/UL (ref 0–0.04)
IMM GRANULOCYTES NFR BLD AUTO: 0.6 % (ref 0–0.5)
LYMPHOCYTES # BLD AUTO: 1.8 K/UL (ref 1–4.8)
LYMPHOCYTES NFR BLD: 20.9 % (ref 18–48)
MAGNESIUM SERPL-MCNC: 1.7 MG/DL (ref 1.6–2.6)
MCH RBC QN AUTO: 31.1 PG (ref 27–31)
MCHC RBC AUTO-ENTMCNC: 30.7 G/DL (ref 32–36)
MCV RBC AUTO: 101 FL (ref 82–98)
MONOCYTES # BLD AUTO: 0.8 K/UL (ref 0.3–1)
MONOCYTES NFR BLD: 8.9 % (ref 4–15)
NEUTROPHILS # BLD AUTO: 5.4 K/UL (ref 1.8–7.7)
NEUTROPHILS NFR BLD: 62.2 % (ref 38–73)
NRBC BLD-RTO: 0 /100 WBC
PHOSPHATE SERPL-MCNC: 3.3 MG/DL (ref 2.7–4.5)
PLATELET # BLD AUTO: 180 K/UL (ref 150–450)
PMV BLD AUTO: 9.5 FL (ref 9.2–12.9)
POTASSIUM SERPL-SCNC: 4.1 MMOL/L (ref 3.5–5.1)
RBC # BLD AUTO: 2.93 M/UL (ref 4.6–6.2)
SODIUM SERPL-SCNC: 141 MMOL/L (ref 136–145)
WBC # BLD AUTO: 8.66 K/UL (ref 3.9–12.7)

## 2024-07-04 PROCEDURE — 21400001 HC TELEMETRY ROOM: Mod: HCNC

## 2024-07-04 PROCEDURE — 84100 ASSAY OF PHOSPHORUS: CPT | Mod: HCNC

## 2024-07-04 PROCEDURE — 94761 N-INVAS EAR/PLS OXIMETRY MLT: CPT | Mod: HCNC

## 2024-07-04 PROCEDURE — 36415 COLL VENOUS BLD VENIPUNCTURE: CPT | Mod: HCNC

## 2024-07-04 PROCEDURE — 25000003 PHARM REV CODE 250: Mod: HCNC

## 2024-07-04 PROCEDURE — 85025 COMPLETE CBC W/AUTO DIFF WBC: CPT | Mod: HCNC

## 2024-07-04 PROCEDURE — 27000221 HC OXYGEN, UP TO 24 HOURS: Mod: HCNC

## 2024-07-04 PROCEDURE — 63600175 PHARM REV CODE 636 W HCPCS: Mod: HCNC

## 2024-07-04 PROCEDURE — 83735 ASSAY OF MAGNESIUM: CPT | Mod: HCNC

## 2024-07-04 PROCEDURE — 80048 BASIC METABOLIC PNL TOTAL CA: CPT | Mod: HCNC

## 2024-07-04 RX ORDER — FUROSEMIDE 20 MG/1
20 TABLET ORAL DAILY PRN
Qty: 30 TABLET | Refills: 11 | Status: SHIPPED | OUTPATIENT
Start: 2024-07-04 | End: 2024-07-05

## 2024-07-04 RX ORDER — BENAZEPRIL HYDROCHLORIDE 5 MG/1
5 TABLET ORAL DAILY
Start: 2024-07-04 | End: 2024-07-05

## 2024-07-04 RX ORDER — AMOXICILLIN 250 MG
2 CAPSULE ORAL DAILY
Start: 2024-07-04

## 2024-07-04 RX ORDER — ALLOPURINOL 100 MG/1
100 TABLET ORAL DAILY
Start: 2024-07-05

## 2024-07-04 RX ORDER — FUROSEMIDE 20 MG/1
20 TABLET ORAL DAILY PRN
Qty: 30 TABLET | Refills: 11 | Status: SHIPPED | OUTPATIENT
Start: 2024-07-04 | End: 2024-07-04

## 2024-07-04 RX ADMIN — SENNOSIDES AND DOCUSATE SODIUM 2 TABLET: 50; 8.6 TABLET ORAL at 08:07

## 2024-07-04 RX ADMIN — Medication: at 08:07

## 2024-07-04 RX ADMIN — TAMSULOSIN HYDROCHLORIDE 0.8 MG: 0.4 CAPSULE ORAL at 09:07

## 2024-07-04 RX ADMIN — ALLOPURINOL 100 MG: 100 TABLET ORAL at 09:07

## 2024-07-04 RX ADMIN — SENNOSIDES AND DOCUSATE SODIUM 2 TABLET: 50; 8.6 TABLET ORAL at 09:07

## 2024-07-04 RX ADMIN — ASPIRIN 81 MG: 81 TABLET, COATED ORAL at 09:07

## 2024-07-04 RX ADMIN — COLCHICINE 0.6 MG: 0.6 TABLET, FILM COATED ORAL at 09:07

## 2024-07-04 RX ADMIN — LISINOPRIL 5 MG: 5 TABLET ORAL at 09:07

## 2024-07-04 RX ADMIN — Medication: at 09:07

## 2024-07-04 RX ADMIN — Medication 6 MG: at 08:07

## 2024-07-04 RX ADMIN — PANTOPRAZOLE SODIUM 40 MG: 40 TABLET, DELAYED RELEASE ORAL at 09:07

## 2024-07-04 RX ADMIN — ENOXAPARIN SODIUM 30 MG: 30 INJECTION SUBCUTANEOUS at 06:07

## 2024-07-04 NOTE — ASSESSMENT & PLAN NOTE
Temp:  [97.2 °F (36.2 °C)-98 °F (36.7 °C)]   Pulse:  [68-92]   Resp:  [16-18]   BP: (101-129)/(52-62)   SpO2:  [94 %-99 %] .   Home meds for hypertension were reviewed and noted below.   Hypertension Medications               benazepriL (LOTENSIN) 5 MG tablet Take 1 tablet (5 mg total) by mouth once daily.    carvediloL (COREG) 3.125 MG tablet Take 1 tablet (3.125 mg total) by mouth 2 (two) times daily.    nitroGLYCERIN (NITROSTAT) 0.4 MG SL tablet Take one tablet every 5 minutes for chest pain. After the third tablet go to the ED.            While in the hospital, will manage blood pressure as follows; Adjust home antihypertensive regimen as follows- hold nitroglycerin, continue carvedilol and lisinopril    Will utilize p.r.n. blood pressure medication only if patient's blood pressure greater than 180/110 and he develops symptoms such as worsening chest pain or shortness of breath.

## 2024-07-04 NOTE — PLAN OF CARE
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  Health care agent number: No value filed.                     Nursing Precautions:  Aspiration , Fall, and Pressure ulcer prevention      Miscellaneous Care: Muhammad Care: Empty Muhammad bag every shift.  Change Muhammad every month  Routine Skin for Bedridden Patients:  Apply moisture barrier cream to all  CHF Care: Daily Weight with notification of MD/NP of 2lb or > increase in 24 hours    v/s and O2 sat every shift    Oxygen as needed for sats <90%    Report abnormal breath sounds to MD/NP    Edema checks q shift- notify MD/NP of increased edema    Task  segmentation by nursing for daily care to decrease exertion    CHF education to include diet ,medication, and CHF flags for MD notification                Medications: Discontinue all previous medication orders, if any. See new list below.     Medication List        START taking these medications      furosemide 20 MG tablet  Commonly known as: LASIX  Take 1 tablet (20 mg total) by mouth daily as needed (Please measure daily weight - give a dose of medication for weight gain of > 3lbs in 1 day or > 5lbs in 3 days).     meclizine 25 mg tablet  Commonly known as: ANTIVERT  Take 1 tablet (25 mg total) by mouth 3 (three) times daily as needed for Dizziness.            CHANGE how you take these medications      allopurinoL 100 MG tablet  Commonly known as: ZYLOPRIM  Take 1 tablet (100 mg total) by mouth once daily.  What changed: See the new instructions.     balsam peru-castor oiL Oint  Apply topically 2 (two) times a day. Apply to buttocks and sacrum BID and PRN if soiled.  What changed: how much to take     carvediloL 3.125 MG tablet  Commonly known as: COREG  Take 1 tablet (3.125 mg total) by mouth 2 (two) times daily. HOLD UNTIL SEEN BY PCP  What changed: additional instructions     nitroGLYCERIN 0.4 MG SL tablet  Commonly known as: NITROSTAT  Take one tablet every 5 minutes for chest pain. After the third tablet go to the ED.  What changed:   how much to take  how to take this  when to take this  reasons to take this            CONTINUE taking these medications      acetaminophen 325 MG tablet  Commonly known as: TYLENOL  Take 2 tablets (650 mg total) by mouth every 4 (four) hours as needed (any pain or temp >100).     albuterol 90 mcg/actuation inhaler  Commonly known as: PROVENTIL HFA  Inhale 2 puffs into the lungs every 6 (six) hours as needed for Wheezing. Rescue     aspirin 81 MG EC tablet  Commonly known as: ECOTRIN  Take 1 tablet (81 mg total) by mouth once daily.     benazepriL 5 MG tablet  Commonly known  as: LOTENSIN  Take 1 tablet (5 mg total) by mouth once daily. If systolic blood pressure is less than 90 or diastolic >110 notify MD     bisacodyL 10 mg Supp  Commonly known as: DULCOLAX  Place 1 suppository (10 mg total) rectally daily as needed (no BM in over one calendar day).     colchicine 0.6 mg tablet  Commonly known as: COLCRYS  Take 1 tablet (0.6 mg total) by mouth every other day.     ferrous sulfate 325 mg (65 mg iron) Tab tablet  Commonly known as: FEOSOL  Take 1 tablet (325 mg total) by mouth every Mon, Wed, Fri. Before breakfast     fluticasone propionate 50 mcg/actuation nasal spray  Commonly known as: FLONASE  2 sprays (100 mcg total) by Each Nostril route once daily.     levothyroxine 25 MCG tablet  Commonly known as: SYNTHROID  Take 25 mcg by mouth before breakfast.     multivitamin per tablet  Commonly known as: ONE DAILY MULTIVITAMIN  Take 1 tablet by mouth once daily.     pantoprazole 40 MG tablet  Commonly known as: PROTONIX  Take 1 tablet (40 mg total) by mouth once daily.     senna-docusate 8.6-50 mg 8.6-50 mg per tablet  Commonly known as: PERICOLACE  Take 2 tablets by mouth once daily.     sodium phosphates 9.5-3.5 gram/59 mL Enem  Commonly known as: FLEET PEDIATRIC  Place 1 enema rectally daily as needed (constipation).     tamsulosin 0.4 mg Cap  Commonly known as: FLOMAX  Take 2 capsules (0.8 mg total) by mouth once daily.     VITAMIN C 500 MG tablet  Generic drug: ascorbic acid (vitamin C)  Take 500 mg by mouth 2 (two) times daily. Promote wound healing                Immunizations Administered as of 7/5/2024       Name Date Dose VIS Date Route Exp Date    COVID-19, MRNA, LN-S, PF (Moderna) 4/1/2021 0.5 mL 12/1/2020 Intramuscular --    Site: Right deltoid     : [employer]     Lot: [id number]     Comment: Adminis     COVID-19, MRNA, LN-S, PF (Moderna) 3/4/2021 0.5 mL 12/1/2020 Intramuscular --    Site: Right deltoid     : Modern[employer]     Lot: [id number]      Comment: Adminis                 Some patients may experience side effects after vaccination.  These may include fever, headache, muscle or joint aches.  Most symptoms resolve with 24-48 hours and do not require urgent medical evaluation unless they persist for more than 72 hours or symptoms are concerning for an unrelated medical condition.          _________________________________  Mikhail Bender MD  07/05/2024

## 2024-07-04 NOTE — ASSESSMENT & PLAN NOTE
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

## 2024-07-04 NOTE — PLAN OF CARE
Dyan informed Pt is medically stable to return to his senior living NH at [facility]. Dyan called , asked for [employer] to fax Pt' information, placed on hold, [facility] approved return of Pt. Dyan faxed all info to . Nurse to call report to [facility] at  and nurse is Martin. Transport setup for 230pm via stretcher and family/nursing updated.

## 2024-07-04 NOTE — PLAN OF CARE
Problem: Infection  Goal: Absence of Infection Signs and Symptoms  Outcome: Met     Problem: Adult Inpatient Plan of Care  Goal: Plan of Care Review  Outcome: Met  Goal: Patient-Specific Goal (Individualized)  Outcome: Met  Goal: Absence of Hospital-Acquired Illness or Injury  Outcome: Met  Goal: Optimal Comfort and Wellbeing  Outcome: Met  Goal: Readiness for Transition of Care  Outcome: Met     Problem: Acute Kidney Injury/Impairment  Goal: Fluid and Electrolyte Balance  Outcome: Met  Goal: Improved Oral Intake  Outcome: Met  Goal: Effective Renal Function  Outcome: Met     Problem: Wound  Goal: Optimal Coping  Outcome: Met  Goal: Optimal Functional Ability  Outcome: Met  Goal: Absence of Infection Signs and Symptoms  Outcome: Met  Goal: Improved Oral Intake  Outcome: Met  Goal: Optimal Pain Control and Function  Outcome: Met  Goal: Skin Health and Integrity  Outcome: Met  Goal: Optimal Wound Healing  Outcome: Met     Problem: Fall Injury Risk  Goal: Absence of Fall and Fall-Related Injury  Outcome: Met     Problem: Skin Injury Risk Increased  Goal: Skin Health and Integrity  Outcome: Met     Problem: Coping Ineffective  Goal: Effective Coping  Outcome: Met

## 2024-07-04 NOTE — ASSESSMENT & PLAN NOTE
BNP on admission 1801. Received one dose of furosemide in ED.   - Diuresed with lasix until LE edema improved and increase in Cr  - Plan to d/c with sliding scale lasix

## 2024-07-04 NOTE — PROGRESS NOTES
[facility] Medicine  Progress Note    Patient Name: Deena Moody  MRN: [id number]  Patient Class: IP- Inpatient   Admission Date: 7/1/2024  Length of Stay: 2 days  Attending Physician: Josue Araujo MD  Primary Care Provider: Jam Rowell MD        Subjective:     Principal Problem:Left arm swelling        HPI:  Mr. Moody is a 89 y.o. male with a PMH of HTN, HLD, Chronic diastolic HF, CAD s/p CABG, A-Fib with a hx of AV block s/p pacemaker placement, CKD3, gout, and macular degeneration BIB EMS from [facility] with complaints of left arm swelling for approximately the last month resulting in significant pain and swelling. He also notes intermittent swelling in his right hand and bilateral lower extremities. He has had shortness of breath when waking in the morning lasting about ten minutes and occasionally requiring oxygen of support. Other notable symptoms include periodic abdominal pain relieved by a medication given by nursing home staff, but he is unsure of the treatment. He also notes a significant chronic left heel wound with no acute changes. Lastly, he has occasional difficulties swallowing. Of note, he was admitted one month ago for rectal bleeding which he currently denies.    In the emergency department he was found to have a BNP of 1801 and was given one dose of lasix and supportive oxygen via nasal cannula. He was resting comfortably.    Overview/Hospital Course:  Patient admitted to  for management of left arm swelling, bilateral LE swelling, and shortness of breath. Treated as HF exacerbation with IV diuresis with improvement in SOB and LE edema. TTE noted to have CVP of 3 and pulm htn with PASP at 72 mmHg. Diuresis discontinued with improvement in symptoms. Left arm swelling persisted. Noted to have significantly decreased ROM of the left shoulder. CT ordered for evaluation, noted diffuse edema and degenerative changes of the shoulder and especially  of the radiocapitellar joint. Etiology of edema likely dependent edema related to decreased usage of the arm and inability to flow against gravity. Discussed this with pt's son at bedside and daughter on the phone, recommended compression arm brace for further management. Palliative care consulted during admission for GOC conversations given that family is considering hospice. Daughter states she is still considering and looking to learn all of her options.     Pt stable to discharge back to NH with palliative care referral for further GOC conversations. Will send with sliding scale lasix for future management of HF. Continue home gout regimen.    Interval History: Pt doing well today. Notes some dizziness this morning that improved with breakfast. Persistent left elbow swelling, appears dependent. Planning to discharge today, unable to do so as NH not accepting patient due to the holiday. Will plan to discharge tomorrow    Review of Systems  Objective:     Vital Signs (Most Recent):  Temp: 97.6 °F (36.4 °C) (07/04/24 1119)  Pulse: 68 (07/04/24 1119)  Resp: 18 (07/04/24 1119)  BP: 101/62 (07/04/24 1119)  SpO2: 98 % (07/04/24 1119) Vital Signs (24h Range):  Temp:  [97.2 °F (36.2 °C)-98 °F (36.7 °C)] 97.6 °F (36.4 °C)  Pulse:  [68-92] 68  Resp:  [16-18] 18  SpO2:  [94 %-99 %] 98 %  BP: (101-129)/(52-62) 101/62     Weight: 64.7 kg (142 lb 10.2 oz)  Body mass index is 21.06 kg/m².    Intake/Output Summary (Last 24 hours) at 7/4/2024 5188  Last data filed at 7/4/2024 1119  Gross per 24 hour   Intake 120 ml   Output 1000 ml   Net -880 ml         Physical Exam  Vitals and nursing note reviewed.   Constitutional:       General: He is not in acute distress.     Appearance: Normal appearance. He is not ill-appearing or diaphoretic.   HENT:      Head: Normocephalic and atraumatic.   Eyes:      General: No scleral icterus.  Cardiovascular:      Rate and Rhythm: Normal rate and regular rhythm.      Heart sounds: Normal heart  sounds. No murmur heard.  Pulmonary:      Effort: Pulmonary effort is normal. No respiratory distress.      Breath sounds: Normal breath sounds. No wheezing.   Abdominal:      General: Abdomen is flat. There is no distension.      Palpations: Abdomen is soft.      Tenderness: There is no abdominal tenderness.   Musculoskeletal:         General: Swelling (left elbow) present.      Right lower leg: No edema.      Left lower leg: No edema.      Comments: Decreased ROM of left shoulder   Skin:     General: Skin is warm and dry.   Neurological:      General: No focal deficit present.      Mental Status: He is alert. He is disoriented.      Motor: Weakness present.   Psychiatric:         Mood and Affect: Mood normal.         Behavior: Behavior normal.             Significant Labs: All pertinent labs within the past 24 hours have been reviewed.    Significant Imaging: I have reviewed all pertinent imaging results/findings within the past 24 hours.    Assessment/Plan:      * Left arm swelling  - D/c Lasix on 7/3  - Will continue to monitor L arm swelling, likely positional edema in setting of left shoulder arthritis  - Pt and family ready for goals of care disucssion  - Palliative consulted        Shortness of breath  SOB present when waking for last month. Supplemental O2 utilized appx. 1x week. Tx with O2 nasal cannula as needed      Chronic ulcer of left heel  3-4 inch circular black wound on left heel. He reports it is chronic. Consult placed to wound care      Acute gout  No current complaints. Continue allopurinol and colchicine    Constipation  Not currently contipated. Senna-docusate ordered. PRN biscadoyl suppository and polyethylene glycol.     Atrial fibrillation  Pacemaker in place with regular visits to cardiology. No current issues.     Chronic diastolic heart failure  BNP on admission 1801. Received one dose of furosemide in ED.   - Diuresed with lasix until LE edema improved and increase in Cr  - Plan to d/c  with sliding scale lasix      CAD (coronary artery disease)  Patient with known CAD s/p CABG. Continuing home aspirin 81mg. Previously on Eliquis now d/c fo r hx of GI bleed.    Complete AV block  Pacemaker in place with no issues at this time. Continue tele.       Essential hypertension  Temp:  [97.2 °F (36.2 °C)-98 °F (36.7 °C)]   Pulse:  [68-92]   Resp:  [16-18]   BP: (101-129)/(52-62)   SpO2:  [94 %-99 %] .   Home meds for hypertension were reviewed and noted below.   Hypertension Medications               benazepriL (LOTENSIN) 5 MG tablet Take 1 tablet (5 mg total) by mouth once daily.    carvediloL (COREG) 3.125 MG tablet Take 1 tablet (3.125 mg total) by mouth 2 (two) times daily.    nitroGLYCERIN (NITROSTAT) 0.4 MG SL tablet Take one tablet every 5 minutes for chest pain. After the third tablet go to the ED.            While in the hospital, will manage blood pressure as follows; Adjust home antihypertensive regimen as follows- hold nitroglycerin, continue carvedilol and lisinopril    Will utilize p.r.n. blood pressure medication only if patient's blood pressure greater than 180/110 and he develops symptoms such as worsening chest pain or shortness of breath.      VTE Risk Mitigation (From admission, onward)           Ordered     enoxaparin injection 30 mg  Daily         07/02/24 0123     IP VTE HIGH RISK PATIENT  Once         07/02/24 0123     Place sequential compression device  Until discontinued         07/02/24 0123                    Discharge Planning   TEOFILO: 7/4/2024     Code Status: Full Code   Is the patient medically ready for discharge?:     Reason for patient still in hospital (select all that apply): Pending disposition  Discharge Plan A: Return to nursing home   Discharge Delays: None known at this time              Eloina Laws DO  Department of Hospital Medicine   [facility] Surg

## 2024-07-04 NOTE — PLAN OF CARE
[facility] Med [facility]  Discharge Final Note    Primary Care Provider: Jam Rowell MD    Expected Discharge Date: 7/4/2024    Final Discharge Note (most recent)       Final Note - 07/04/24 1255          Final Note    Assessment Type Final Discharge Note     Anticipated Discharge Disposition [facility]     Hospital Resources/Appts/Education Provided Post-Acute resouces added to AVS        Post-Acute Status    Post-Acute Authorization Placement     Post-Acute Placement Status Set-up Complete/Auth obtained     Discharge Delays None known at this time                     Important Message from Medicare             Contact Info       [facility]   Specialty: Cardiology    [address]  [facility] [id number]   Phone: [phone]       Next Steps: Follow up

## 2024-07-04 NOTE — PLAN OF CARE
"Dyan did receive call from BRITTNY Menjivar with [facility] who now informs Sw "that admissions is not in today and Pt can not return today but can return tomorrow 7/5. Sw updated IM team and reached out to  leadership, will follow. Sw updated that Pt's dc should be canceled, Sw to email details to leadership and Sw will bill avoidable day to [facility].   "

## 2024-07-04 NOTE — ASSESSMENT & PLAN NOTE
Impression: Pt is a 89 y/o male withsevere PHTN, diastolic HF, CAD s/p CABG, Afib, AV block s/p pacemaker placement admitted to  for left arm swelling. Pt lives at [facility]. Pt is [place] . Pt is alert and oriented. Some confusion noted. Debility noted.     Reason for consult: GOC. Communicated with Dr. Valera.     GOC/ACP:     Met with pt's sonDeena. Per son he and his sister are NOK. They are aware of pt's medical issues. Per son, his sister has been speaking to NH about pt having hospice at NH. Per son, they are interested in hospice at NH. Hospice ed done with son.    Code status discussed. Pt's son to speak to his sister about.     Spoke to pt who is [place]. Pt pleasant and able to answer concrete questions. Some confusion noted. Pt's daughter is at work.     Symptom management:     Pain:   Pt reports pain at times to left shoulder  Pt on allopurinol for gout.   Pt takes Asprin daily.     Will monitor pain and reassess.     Debility:   Pt has pressure sores to heels.  Bedside RN providing wound care.     Pt denies nausea, vomiting, anxiety.     Plan:   Will f/u with pt's daughter and son.

## 2024-07-04 NOTE — SUBJECTIVE & OBJECTIVE
Interval History: Pt doing well today. Notes some dizziness this morning that improved with breakfast. Persistent left elbow swelling, appears dependent. Planning to discharge today, unable to do so as NH not accepting patient due to the holiday. Will plan to discharge tomorrow    Review of Systems  Objective:     Vital Signs (Most Recent):  Temp: 97.6 °F (36.4 °C) (07/04/24 1119)  Pulse: 68 (07/04/24 1119)  Resp: 18 (07/04/24 1119)  BP: 101/62 (07/04/24 1119)  SpO2: 98 % (07/04/24 1119) Vital Signs (24h Range):  Temp:  [97.2 °F (36.2 °C)-98 °F (36.7 °C)] 97.6 °F (36.4 °C)  Pulse:  [68-92] 68  Resp:  [16-18] 18  SpO2:  [94 %-99 %] 98 %  BP: (101-129)/(52-62) 101/62     Weight: 64.7 kg (142 lb 10.2 oz)  Body mass index is 21.06 kg/m².    Intake/Output Summary (Last 24 hours) at 7/4/2024 1458  Last data filed at 7/4/2024 1119  Gross per 24 hour   Intake 120 ml   Output 1000 ml   Net -880 ml         Physical Exam  Vitals and nursing note reviewed.   Constitutional:       General: He is not in acute distress.     Appearance: Normal appearance. He is not ill-appearing or diaphoretic.   HENT:      Head: Normocephalic and atraumatic.   Eyes:      General: No scleral icterus.  Cardiovascular:      Rate and Rhythm: Normal rate and regular rhythm.      Heart sounds: Normal heart sounds. No murmur heard.  Pulmonary:      Effort: Pulmonary effort is normal. No respiratory distress.      Breath sounds: Normal breath sounds. No wheezing.   Abdominal:      General: Abdomen is flat. There is no distension.      Palpations: Abdomen is soft.      Tenderness: There is no abdominal tenderness.   Musculoskeletal:         General: Swelling (left elbow) present.      Right lower leg: No edema.      Left lower leg: No edema.      Comments: Decreased ROM of left shoulder   Skin:     General: Skin is warm and dry.   Neurological:      General: No focal deficit present.      Mental Status: He is alert. He is disoriented.      Motor: Weakness  present.   Psychiatric:         Mood and Affect: Mood normal.         Behavior: Behavior normal.             Significant Labs: All pertinent labs within the past 24 hours have been reviewed.    Significant Imaging: I have reviewed all pertinent imaging results/findings within the past 24 hours.

## 2024-07-04 NOTE — DISCHARGE SUMMARY
[facility] Medicine  Discharge Summary      Patient Name: Deena Moody  MRN: [id number]  HOMER: [id number]  Patient Class: IP- Inpatient  Admission Date: 7/1/2024  Hospital Length of Stay: 2 days  Discharge Date and Time:  07/04/2024 1:42 PM  Attending Physician: Josue Araujo MD   Discharging Provider: Eloina Laws DO  Primary Care Provider: Jam Rowell MD  [facility] Medicine Team: [facility] 2 Eloina Laws DO  Primary Care Team: [facility]    HPI:   Mr. Moody is a 89 y.o. male with a PMH of HTN, HLD, Chronic diastolic HF, CAD s/p CABG, A-Fib with a hx of AV block s/p pacemaker placement, CKD3, gout, and macular degeneration BIB EMS from [facility] with complaints of left arm swelling for approximately the last month resulting in significant pain and swelling. He also notes intermittent swelling in his right hand and bilateral lower extremities. He has had shortness of breath when waking in the morning lasting about ten minutes and occasionally requiring oxygen of support. Other notable symptoms include periodic abdominal pain relieved by a medication given by nursing home staff, but he is unsure of the treatment. He also notes a significant chronic left heel wound with no acute changes. Lastly, he has occasional difficulties swallowing. Of note, he was admitted one month ago for rectal bleeding which he currently denies.    In the emergency department he was found to have a BNP of 1801 and was given one dose of lasix and supportive oxygen via nasal cannula. He was resting comfortably.    * No surgery found *      Hospital Course:   Patient admitted to  for management of left arm swelling, bilateral LE swelling, and shortness of breath. Treated as HF exacerbation with IV diuresis with improvement in SOB and LE edema. TTE noted to have CVP of 3 and pulm htn with PASP at 72 mmHg. Diuresis discontinued with improvement in symptoms. Left arm swelling  persisted. Noted to have significantly decreased ROM of the left shoulder. CT ordered for evaluation, noted diffuse edema and degenerative changes of the shoulder and especially of the radiocapitellar joint. Etiology of edema likely dependent edema related to decreased usage of the arm and inability to flow against gravity. Discussed this with pt's son at bedside and daughter on the phone, recommended compression arm brace for further management. Palliative care consulted during admission for GOC conversations given that family is considering hospice. Daughter states she is still considering and looking to learn all of her options.     Pt stable to discharge back to NH with palliative care referral for further GOC conversations. Will send with sliding scale lasix for future management of HF. Continue home gout regimen.     Goals of Care Treatment Preferences:  Code Status: Full Code    Health care agent: Son and dee are NOK.  Health care agent number: No value filed.                   Consults:   Consults (From admission, onward)          Status Ordering Provider     Inpatient consult to Palliative Care  Once        Provider:  (Not yet assigned)    Completed MALGORZATA LÓPEZ     Inpatient consult to Skin Integrity  Practitioner  Once        Provider:  Bonny Calvillo NP    Acknowledged REGINA MYERS III     Inpatient consult to Social Work/Case Management  Once        Provider:  (Not yet assigned)    Acknowledged ALEJANDRO FRANCO     Inpatient consult to Registered Dietitian/Nutritionist  Once        Provider:  (Not yet assigned)    Completed ALEJANDRO FRANCO            No new Assessment & Plan notes have been filed under this hospital service since the last note was generated.  Service: Hospital Medicine    Final Active Diagnoses:    Diagnosis Date Noted POA    PRINCIPAL PROBLEM:  Left arm swelling [M79.89] 05/11/2024 Yes    Palliative care encounter [Z51.5] 07/03/2024 Not Applicable    Pain [R52] 07/03/2024  Unknown    Advance care planning [Z71.89] 07/03/2024 Not Applicable    Debility [R53.81] 07/03/2024 Unknown    Shortness of breath [R06.02] 07/02/2024 Yes    Chronic ulcer of left heel [L97.429] 06/01/2024 Yes    Acute gout [M10.9] 05/03/2024 Yes    Constipation [K59.00] 05/11/2020 Yes    Nonspecific colitis [K52.9] 05/11/2020 Yes    Atrial fibrillation [I48.91] 11/10/2016 Yes    Chronic diastolic heart failure [I50.32] 09/01/2016 Yes    CAD (coronary artery disease) [I25.10] 08/31/2016 Yes     Chronic    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.2] 08/31/2016 Yes     Chronic    Essential hypertension [I10] 11/18/2013 Yes      Problems Resolved During this Admission:       Discharged Condition: stable    Disposition:     Follow Up:   Follow-up Information       [facility] Follow up.    Specialty: Cardiology  Contact information:  [facility] [address] [facility] [id number] [phone]                         Patient Instructions:      Ambulatory referral/consult to CLINIC Palliative Care   Standing Status: Future   Referral Priority: Routine Referral Type: Consultation   Requested Specialty: Palliative Medicine   Number of Visits Requested: 1       Significant Diagnostic Studies: N/A    Pending Diagnostic Studies:       None           Medications:  Reconciled Home Medications:      Medication List        START taking these medications      furosemide 20 MG tablet  Commonly known as: LASIX  Take 1 tablet (20 mg total) by mouth daily as needed (Please measure daily weight - give a dose of medication for weight gain of > 3lbs in 1 day or > 5lbs in 3 days).            CHANGE how you take these medications      allopurinoL 100 MG tablet  Commonly known as: ZYLOPRIM  Take 1 tablet (100 mg total) by mouth once daily.  Start taking on: July 5, 2024  What changed: See the new instructions.     balsam peru-castor oiL Oint  Apply topically 2 (two) times a day. Apply to buttocks and sacrum BID and PRN if soiled.  What changed: how  much to take     benazepriL 5 MG tablet  Commonly known as: LOTENSIN  Take 1 tablet (5 mg total) by mouth once daily. HOLD until follow up with PCP  If systolic blood pressure is less than 90 or diastolic >110 notify MD  What changed: additional instructions     carvediloL 3.125 MG tablet  Commonly known as: COREG  Take 1 tablet (3.125 mg total) by mouth 2 (two) times daily.  What changed: when to take this     nitroGLYCERIN 0.4 MG SL tablet  Commonly known as: NITROSTAT  Take one tablet every 5 minutes for chest pain. After the third tablet go to the ED.  What changed:   how much to take  how to take this  when to take this  reasons to take this            CONTINUE taking these medications      acetaminophen 325 MG tablet  Commonly known as: TYLENOL  Take 2 tablets (650 mg total) by mouth every 4 (four) hours as needed (any pain or temp >100).     albuterol 90 mcg/actuation inhaler  Commonly known as: PROVENTIL HFA  Inhale 2 puffs into the lungs every 6 (six) hours as needed for Wheezing. Rescue     aspirin 81 MG EC tablet  Commonly known as: ECOTRIN  Take 1 tablet (81 mg total) by mouth once daily.     bisacodyL 10 mg Supp  Commonly known as: DULCOLAX  Place 1 suppository (10 mg total) rectally daily as needed (no BM in over one calendar day).     colchicine 0.6 mg tablet  Commonly known as: COLCRYS  Take 1 tablet (0.6 mg total) by mouth every other day.     ferrous sulfate 325 mg (65 mg iron) Tab tablet  Commonly known as: FEOSOL  Take 1 tablet (325 mg total) by mouth every Mon, Wed, Fri. Before breakfast     fluticasone propionate 50 mcg/actuation nasal spray  Commonly known as: FLONASE  2 sprays (100 mcg total) by Each Nostril route once daily.     levothyroxine 25 MCG tablet  Commonly known as: SYNTHROID  Take 25 mcg by mouth before breakfast.     multivitamin per tablet  Commonly known as: ONE DAILY MULTIVITAMIN  Take 1 tablet by mouth once daily.     pantoprazole 40 MG tablet  Commonly known as:  PROTONIX  Take 1 tablet (40 mg total) by mouth once daily.     senna-docusate 8.6-50 mg 8.6-50 mg per tablet  Commonly known as: PERICOLACE  Take 2 tablets by mouth once daily.     sodium phosphates 9.5-3.5 gram/59 mL Enem  Commonly known as: FLEET PEDIATRIC  Place 1 enema rectally daily as needed (constipation).     tamsulosin 0.4 mg Cap  Commonly known as: FLOMAX  Take 2 capsules (0.8 mg total) by mouth once daily.     VITAMIN C 500 MG tablet  Generic drug: ascorbic acid (vitamin C)  Take 500 mg by mouth 2 (two) times daily. Promote wound healing              Indwelling Lines/Drains at time of discharge:   Lines/Drains/Airways       Drain  Duration                  Urethral Catheter 07/01/24 2030 2 days                    Time spent on the discharge of patient: 35 minutes         Eloina Laws DO  Department of Hospital Medicine  [facility] Surg

## 2024-07-04 NOTE — CONSULTS
[facility] - [facility]  Palliative Medicine  Consult Note    Patient Name: Deena Moody  MRN: [id number]  Admission Date: 7/1/2024  Hospital Length of Stay: 1 days  Code Status: Full Code   Attending Provider: Vamshi Valera III*  Consulting Provider: ISABEL Mantilla  Primary Care Physician: Jam Rowell MD  Principal Problem:Left arm swelling    Patient information was obtained from patient, relative(s), and primary team.      Inpatient consult to Palliative Care  Consult performed by: Esther Hernandez CNS  Consult ordered by: Mikhail Bender MD        Assessment/Plan:     Palliative Care  Palliative care encounter  Impression: Pt is a 89 y/o male withsevere PHTN, diastolic HF, CAD s/p CABG, Afib, AV block s/p pacemaker placement admitted to  for left arm swelling. Pt lives at [facility]. Pt is [place] . Pt is alert and oriented. Some confusion noted. Debility noted.     Reason for consult: GOC. Communicated with Dr. Valera.     GOC/ACP:     Met with pt's son, Deena. Per son he and his sister are NOK. They are aware of pt's medical issues. Per son, his sister has been speaking to [facility] about pt having hospice at [facility]. Per son, they are interested in hospice at NH. Hospice ed done with son.    Code status discussed. Pt's son to speak to his sister about.     Spoke to pt who is [place]. Pt pleasant and able to answer concrete questions. Some confusion noted. Pt's daughter is at work.     Symptom management:     Pain:   Pt reports pain at times to left shoulder  Pt on allopurinol for gout.   Pt takes Asprin daily.     Will monitor pain and reassess.     Debility:   Pt has pressure sores to heels.  Bedside RN providing wound care.     Pt denies nausea, vomiting, anxiety.     Plan:   Will f/u with pt's daughter and son.              Thank you for your consult. I will follow-up with patient. Please contact us if you have any additional questions.    Subjective:     HPI:   Pt is a 89 y.o. male with a PMH  of HTN, HLD, Chronic diastolic HF, CAD s/p CABG, A-Fib with a hx of AV block s/p pacemaker placement, CKD3, gout, and macular degeneration BIB EMS from [facility] with complaints of left arm swelling for approximately the last month resulting in significant pain and swelling. Per chart review, he also notes intermittent swelling in his right hand and bilateral lower extremities. He has had shortness of breath when waking in the morning lasting about ten minutes and occasionally requiring oxygen of support. Other notable symptoms include periodic abdominal pain relieved by a medication given by nursing home staff, but he is unsure of the treatment. He also notes a significant chronic left heel wound with no acute changes. Lastly, he has occasional difficulties swallowing. Of note, he was admitted one month ago for rectal bleeding which he currently denies.     In the emergency department he was found to have a BNP of 1801 and was given one dose of lasix and supportive oxygen via nasal cannula. He was resting comfortably.       Hospital Course:  No notes on file        Past Medical History:   Diagnosis Date    Acute encephalopathy 5/20/2024    Acute on chronic diastolic heart failure 9/1/2016    Chronic dementia 6/1/2024    Coronary artery disease     Hyperlipidemia     Hypertension     Macular degeneration (senile) of retina, unspecified 12/12/2014    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    Persistent atrial fibrillation 11/10/2016    S/P placement of cardiac pacemaker 9/14/2016       Past Surgical History:   Procedure Laterality Date    AORTIC VALVE REPLACEMENT N/A     CARDIAC PACEMAKER PLACEMENT      CATARACT EXTRACTION W/  INTRAOCULAR LENS IMPLANT Right 2/16/2016    Dr. Whitley    CATARACT EXTRACTION W/  INTRAOCULAR LENS IMPLANT Left 3/1/2016    Dr. Whitley    CORONARY ARTERY BYPASS GRAFT      EAR EXAMINATION UNDER ANESTHESIA      EYE SURGERY         Review of patient's allergies indicates:   Allergen  Reactions    Iodine and iodide containing products Other (See Comments)     Caused changes in skin color       Medications:  Continuous Infusions:  Scheduled Meds:   allopurinoL  100 mg Oral Daily    aspirin  81 mg Oral Daily    balsam peru-castor oiL   Topical (Top) BID    colchicine  0.6 mg Oral Every other day    enoxparin  30 mg Subcutaneous Daily    ferrous sulfate  1 tablet Oral Once per day on Monday Wednesday Friday    lisinopriL  5 mg Oral Daily    pantoprazole  40 mg Oral Daily    senna-docusate 8.6-50 mg  2 tablet Oral BID    tamsulosin  0.8 mg Oral Daily     PRN Meds:  Current Facility-Administered Medications:     bisacodyL, 10 mg, Rectal, Daily PRN    melatonin, 6 mg, Oral, Nightly PRN    polyethylene glycol, 17 g, Oral, BID PRN    sodium chloride 0.9%, 10 mL, Intravenous, PRN    Family History       Problem Relation (Age of Onset)    Hypertension Brother    No Known Problems Mother, Father, Sister, Maternal Aunt, Maternal Uncle, Paternal Aunt, Paternal Uncle, Maternal Grandmother, Maternal Grandfather, Paternal Grandmother, Paternal Grandfather, Daughter, Son    Stroke Brother          Tobacco Use    Smoking status: Never     Passive exposure: Never    Smokeless tobacco: Never   Substance and Sexual Activity    Alcohol use: No    Drug use: No    Sexual activity: Yes     Partners: Female       Review of Systems   Constitutional:  Positive for activity change.   HENT:  Positive for hearing loss.    Respiratory:  Negative for shortness of breath.    Skin:  Positive for wound.   Neurological:  Positive for weakness.   Psychiatric/Behavioral:  Positive for decreased concentration.      Objective:     Vital Signs (Most Recent):  Temp: 97.9 °F (36.6 °C) (07/03/24 1114)  Pulse: 69 (07/03/24 1504)  Resp: 18 (07/03/24 1114)  BP: (!) 96/56 (07/03/24 1114)  SpO2: 97 % (07/03/24 1114) Vital Signs (24h Range):  Temp:  [97.2 °F (36.2 °C)-97.9 °F (36.6 °C)] 97.9 °F (36.6 °C)  Pulse:  [67-87] 69  Resp:  [16-18]  18  SpO2:  [97 %-99 %] 97 %  BP: ()/(56-83) 96/56     Weight: 64.7 kg (142 lb 10.2 oz)  Body mass index is 21.06 kg/m².       Physical Exam  Constitutional:       General: He is awake. He is not in acute distress.  HENT:      Head: Normocephalic and atraumatic.   Pulmonary:      Effort: Pulmonary effort is normal. No respiratory distress.   Musculoskeletal:      Comments: Weakness noted.    Skin:     Comments: Pressure sore to left heel.    Neurological:      Mental Status: He is alert.      Comments: Oriented to person, place            Review of Symptoms      Symptom Assessment (ESAS 0-10 Scale)  Pain:  0  Dyspnea:  0  Anxiety:  0  Nausea:  0  Depression:  0  Anorexia:  0  Fatigue:  0  Insomnia:  0  Restlessness:  0  Agitation:  0         Psychosocial/Cultural:   See Palliative Psychosocial Note: No  Pt lives at [facility]. Pt has daughter, Amelie and son, Deena Pierce who are NOK.   **Primary  to Follow**  Palliative Care  Consult: Yes        Advance Care Planning  Advance Directives:   Living Will: No    LaPOST: No    Do Not Resuscitate Status: No    Medical Power of : No    Agent's Name:  Son and dee are NOK.    Decision Making:  Patient answered questions and Family answered questions  Goals of Care: Spoke to son. Per son, family leaning toward hospice at [facility] with [facility].          Significant Labs: All pertinent labs within the past 24 hours have been reviewed.  CBC:   Recent Labs   Lab 07/03/24  0639   WBC 7.95   HGB 9.7*   HCT 31.2*   *        BMP:  Recent Labs   Lab 07/03/24  0342 07/03/24  1343   GLU 72 106    138   K 4.9 4.1   * 109   CO2 16* 21*   BUN 33* 33*   CREATININE 1.4 1.6*   CALCIUM 8.0* 8.1*   MG 1.8  --      LFT:  Lab Results   Component Value Date    AST 27 07/01/2024    ALKPHOS 146 (H) 07/01/2024    BILITOT 0.5 07/01/2024     Albumin:   Albumin   Date Value Ref Range Status   07/01/2024 2.1 (L) 3.5 - 5.2 g/dL Final      Protein:   Total Protein   Date Value Ref Range Status   07/01/2024 6.3 6.0 - 8.4 g/dL Final     Lactic acid:   Lab Results   Component Value Date    LACTATE 1.8 09/22/2023    LACTATE 1.6 05/12/2020       Significant Imaging: I have reviewed all pertinent imaging results/findings within the past 24 hours.      I spent a total of 75  minutes on the day of the visit. This includes face to face time in discussion of goals of care, symptom assessment, coordination of care and emotional support.  This also includes non-face to face time preparing to see the patient (eg, review of tests/imaging), obtaining and/or reviewing separately obtained history, documenting clinical information in the electronic or other health record, independently interpreting results and communicating results to the patient/family/caregiver, or care coordinator.    Esther Hernandez, CNS  Palliative Medicine  [facility]

## 2024-07-05 ENCOUNTER — TELEPHONE (OUTPATIENT)
Dept: PALLIATIVE MEDICINE | Facility: CLINIC | Age: 89
End: 2024-07-05
Payer: MEDICARE

## 2024-07-05 VITALS
RESPIRATION RATE: 18 BRPM | OXYGEN SATURATION: 96 % | BODY MASS INDEX: 21.13 KG/M2 | SYSTOLIC BLOOD PRESSURE: 102 MMHG | TEMPERATURE: 97 F | HEART RATE: 67 BPM | HEIGHT: 69 IN | DIASTOLIC BLOOD PRESSURE: 56 MMHG | WEIGHT: 142.63 LBS

## 2024-07-05 PROBLEM — R42 DIZZINESS: Status: ACTIVE | Noted: 2024-07-05

## 2024-07-05 LAB
ANION GAP SERPL CALC-SCNC: 7 MMOL/L (ref 8–16)
BASOPHILS # BLD AUTO: 0.04 K/UL (ref 0–0.2)
BASOPHILS NFR BLD: 0.6 % (ref 0–1.9)
BUN SERPL-MCNC: 29 MG/DL (ref 10–30)
CALCIUM SERPL-MCNC: 7.9 MG/DL (ref 8.7–10.5)
CHLORIDE SERPL-SCNC: 108 MMOL/L (ref 95–110)
CO2 SERPL-SCNC: 22 MMOL/L (ref 23–29)
CREAT SERPL-MCNC: 1.3 MG/DL (ref 0.5–1.4)
DIFFERENTIAL METHOD BLD: ABNORMAL
EOSINOPHIL # BLD AUTO: 0.4 K/UL (ref 0–0.5)
EOSINOPHIL NFR BLD: 6.6 % (ref 0–8)
ERYTHROCYTE [DISTWIDTH] IN BLOOD BY AUTOMATED COUNT: 18.2 % (ref 11.5–14.5)
EST. GFR  (NO RACE VARIABLE): 51.2 ML/MIN/1.73 M^2
GLUCOSE SERPL-MCNC: 83 MG/DL (ref 70–110)
HCT VFR BLD AUTO: 28.3 % (ref 40–54)
HGB BLD-MCNC: 8.6 G/DL (ref 14–18)
IMM GRANULOCYTES # BLD AUTO: 0.02 K/UL (ref 0–0.04)
IMM GRANULOCYTES NFR BLD AUTO: 0.3 % (ref 0–0.5)
LYMPHOCYTES # BLD AUTO: 1.5 K/UL (ref 1–4.8)
LYMPHOCYTES NFR BLD: 23.5 % (ref 18–48)
MAGNESIUM SERPL-MCNC: 1.7 MG/DL (ref 1.6–2.6)
MCH RBC QN AUTO: 30.9 PG (ref 27–31)
MCHC RBC AUTO-ENTMCNC: 30.4 G/DL (ref 32–36)
MCV RBC AUTO: 102 FL (ref 82–98)
MONOCYTES # BLD AUTO: 0.6 K/UL (ref 0.3–1)
MONOCYTES NFR BLD: 9.4 % (ref 4–15)
NEUTROPHILS # BLD AUTO: 3.9 K/UL (ref 1.8–7.7)
NEUTROPHILS NFR BLD: 59.6 % (ref 38–73)
NRBC BLD-RTO: 0 /100 WBC
PHOSPHATE SERPL-MCNC: 3 MG/DL (ref 2.7–4.5)
PLATELET # BLD AUTO: 162 K/UL (ref 150–450)
PMV BLD AUTO: 9.6 FL (ref 9.2–12.9)
POTASSIUM SERPL-SCNC: 4.2 MMOL/L (ref 3.5–5.1)
RBC # BLD AUTO: 2.78 M/UL (ref 4.6–6.2)
SODIUM SERPL-SCNC: 137 MMOL/L (ref 136–145)
WBC # BLD AUTO: 6.52 K/UL (ref 3.9–12.7)

## 2024-07-05 PROCEDURE — 36415 COLL VENOUS BLD VENIPUNCTURE: CPT | Mod: HCNC

## 2024-07-05 PROCEDURE — 83735 ASSAY OF MAGNESIUM: CPT | Mod: HCNC

## 2024-07-05 PROCEDURE — 25000003 PHARM REV CODE 250: Mod: HCNC

## 2024-07-05 PROCEDURE — 80048 BASIC METABOLIC PNL TOTAL CA: CPT | Mod: HCNC

## 2024-07-05 PROCEDURE — 84100 ASSAY OF PHOSPHORUS: CPT | Mod: HCNC

## 2024-07-05 PROCEDURE — 99233 SBSQ HOSP IP/OBS HIGH 50: CPT | Mod: HCNC,95,, | Performed by: CLINICAL NURSE SPECIALIST

## 2024-07-05 PROCEDURE — 85025 COMPLETE CBC W/AUTO DIFF WBC: CPT | Mod: HCNC

## 2024-07-05 RX ORDER — MECLIZINE HCL 12.5 MG 12.5 MG/1
25 TABLET ORAL 3 TIMES DAILY PRN
Status: DISCONTINUED | OUTPATIENT
Start: 2024-07-05 | End: 2024-07-05 | Stop reason: HOSPADM

## 2024-07-05 RX ORDER — FUROSEMIDE 20 MG/1
20 TABLET ORAL DAILY PRN
Start: 2024-07-05 | End: 2025-07-05

## 2024-07-05 RX ORDER — BENAZEPRIL HYDROCHLORIDE 5 MG/1
5 TABLET ORAL DAILY
Start: 2024-07-05

## 2024-07-05 RX ORDER — MECLIZINE HYDROCHLORIDE 25 MG/1
25 TABLET ORAL 3 TIMES DAILY PRN
Start: 2024-07-05

## 2024-07-05 RX ORDER — CARVEDILOL 3.12 MG/1
3.12 TABLET ORAL 2 TIMES DAILY
Start: 2024-07-05 | End: 2025-07-05

## 2024-07-05 RX ADMIN — Medication: at 10:07

## 2024-07-05 RX ADMIN — TAMSULOSIN HYDROCHLORIDE 0.8 MG: 0.4 CAPSULE ORAL at 10:07

## 2024-07-05 RX ADMIN — ALLOPURINOL 100 MG: 100 TABLET ORAL at 10:07

## 2024-07-05 RX ADMIN — SENNOSIDES AND DOCUSATE SODIUM 2 TABLET: 50; 8.6 TABLET ORAL at 10:07

## 2024-07-05 RX ADMIN — FERROUS SULFATE TAB EC 325 MG (65 MG FE EQUIVALENT) 1 EACH: 325 (65 FE) TABLET DELAYED RESPONSE at 10:07

## 2024-07-05 RX ADMIN — LISINOPRIL 5 MG: 5 TABLET ORAL at 10:07

## 2024-07-05 RX ADMIN — PANTOPRAZOLE SODIUM 40 MG: 40 TABLET, DELAYED RELEASE ORAL at 10:07

## 2024-07-05 RX ADMIN — ASPIRIN 81 MG: 81 TABLET, COATED ORAL at 10:07

## 2024-07-05 NOTE — NURSING
Attempted to call report,  Mariluz placed nurse on hold, Michelle then came to the phone and placed nurse on hold at [facility]. No response from nurse who will be receiving pt. Will re-attempt to call report at a later time.

## 2024-07-05 NOTE — SUBJECTIVE & OBJECTIVE
Past Medical History:   Diagnosis Date    Acute encephalopathy 5/20/2024    Acute on chronic diastolic heart failure 9/1/2016    Chronic dementia 6/1/2024    Coronary artery disease     Hyperlipidemia     Hypertension     Macular degeneration (senile) of retina, unspecified 12/12/2014    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    Palliative care encounter 7/3/2024    Persistent atrial fibrillation 11/10/2016    S/P placement of cardiac pacemaker 9/14/2016       Past Surgical History:   Procedure Laterality Date    AORTIC VALVE REPLACEMENT N/A     CARDIAC PACEMAKER PLACEMENT      CATARACT EXTRACTION W/  INTRAOCULAR LENS IMPLANT Right 2/16/2016    Dr. Whitley    CATARACT EXTRACTION W/  INTRAOCULAR LENS IMPLANT Left 3/1/2016    Dr. Whitley    CORONARY ARTERY BYPASS GRAFT      EAR EXAMINATION UNDER ANESTHESIA      EYE SURGERY         Review of patient's allergies indicates:   Allergen Reactions    Iodine and iodide containing products Other (See Comments)     Caused changes in skin color       Medications:  Continuous Infusions:  Scheduled Meds:   allopurinoL  100 mg Oral Daily    aspirin  81 mg Oral Daily    balsam peru-castor oiL   Topical (Top) BID    colchicine  0.6 mg Oral Every other day    enoxparin  30 mg Subcutaneous Daily    ferrous sulfate  1 tablet Oral Once per day on Monday Wednesday Friday    lisinopriL  5 mg Oral Daily    pantoprazole  40 mg Oral Daily    senna-docusate 8.6-50 mg  2 tablet Oral BID    tamsulosin  0.8 mg Oral Daily     PRN Meds:  Current Facility-Administered Medications:     bisacodyL, 10 mg, Rectal, Daily PRN    meclizine, 25 mg, Oral, TID PRN    melatonin, 6 mg, Oral, Nightly PRN    polyethylene glycol, 17 g, Oral, BID PRN    sodium chloride 0.9%, 10 mL, Intravenous, PRN    Family History       Problem Relation (Age of Onset)    Hypertension Brother    No Known Problems Mother, Father, Sister, Maternal Aunt, Maternal Uncle, Paternal Aunt, Paternal Uncle, Maternal Grandmother, Maternal  Grandfather, Paternal Grandmother, Paternal Grandfather, Daughter, Son    Stroke Brother          Tobacco Use    Smoking status: Never     Passive exposure: Never    Smokeless tobacco: Never   Substance and Sexual Activity    Alcohol use: No    Drug use: No    Sexual activity: Yes     Partners: Female       Review of Systems   Constitutional:  Positive for activity change.   HENT:  Positive for hearing loss.    Respiratory:  Negative for shortness of breath.    Skin:  Positive for wound.   Neurological:  Positive for weakness.   Psychiatric/Behavioral:  Positive for decreased concentration.      Objective:     Vital Signs (Most Recent):  Temp: 97.4 °F (36.3 °C) (07/05/24 0836)  Pulse: 70 (07/05/24 0836)  Resp: 16 (07/05/24 0400)  BP: 109/67 (07/05/24 0836)  SpO2: 98 % (07/05/24 0836) Vital Signs (24h Range):  Temp:  [97.4 °F (36.3 °C)-98 °F (36.7 °C)] 97.4 °F (36.3 °C)  Pulse:  [68-72] 70  Resp:  [16-18] 16  SpO2:  [90 %-98 %] 98 %  BP: (101-137)/(57-67) 109/67     Weight: 64.7 kg (142 lb 10.2 oz)  Body mass index is 21.06 kg/m².       Physical Exam  Constitutional:       General: He is awake. He is not in acute distress.  HENT:      Head: Normocephalic and atraumatic.   Pulmonary:      Effort: Pulmonary effort is normal. No respiratory distress.   Musculoskeletal:      Comments: Weakness noted.    Skin:     Comments: Pressure sore to left heel.    Neurological:      Mental Status: He is alert.      Comments: Oriented to person, place            Review of Symptoms      Symptom Assessment (ESAS 0-10 Scale)  Pain:  0  Dyspnea:  0  Anxiety:  0  Nausea:  0  Depression:  0  Anorexia:  0  Fatigue:  0  Insomnia:  0  Restlessness:  0  Agitation:  0         Psychosocial/Cultural:   See Palliative Psychosocial Note: No  Pt lives at [facility]. Pt has daughterAmelie and sonDeena Jr who are NOK.   **Primary  to Follow**  Palliative Care  Consult: Yes        Advance Care Planning   Advance  Directives:   Living Will: No    LaPOST: No    Do Not Resuscitate Status: No    Medical Power of : No    Agent's Name:  Son and jean pierrehter are NOK.    Decision Making:  Patient answered questions and Family answered questions  Goals of Care: Spoke to son. Per son, family leaning toward hospice at [facility] with [facility].          Significant Labs: All pertinent labs within the past 24 hours have been reviewed.  CBC:   Recent Labs   Lab 07/05/24  0353   WBC 6.52   HGB 8.6*   HCT 28.3*   *        BMP:  Recent Labs   Lab 07/05/24  0353   GLU 83      K 4.2      CO2 22*   BUN 29   CREATININE 1.3   CALCIUM 7.9*   MG 1.7     LFT:  Lab Results   Component Value Date    AST 27 07/01/2024    ALKPHOS 146 (H) 07/01/2024    BILITOT 0.5 07/01/2024     Albumin:   Albumin   Date Value Ref Range Status   07/01/2024 2.1 (L) 3.5 - 5.2 g/dL Final     Protein:   Total Protein   Date Value Ref Range Status   07/01/2024 6.3 6.0 - 8.4 g/dL Final     Lactic acid:   Lab Results   Component Value Date    LACTATE 1.8 09/22/2023    LACTATE 1.6 05/12/2020       Significant Imaging: I have reviewed all pertinent imaging results/findings within the past 24 hours.

## 2024-07-05 NOTE — ASSESSMENT & PLAN NOTE
Impression: Pt is a 89 y/o male withsevere PHTN, diastolic HF, CAD s/p CABG, Afib, AV block s/p pacemaker placement admitted to  for left arm swelling. Pt lives at [facility]. Pt is [place] . Pt is alert and oriented. Some confusion noted. Debility noted.     Reason for consult: GOC. Communicated with Dr. Valera.     GOC/ACP:     Today: Plan is back to NH and family will transition to hospice when they are ready at NH.     7/3/24  Met with pt's son, Deena. Per son he and his sister are NOK. They are aware of pt's medical issues. Per son, his sister has been speaking to [facility] about pt having hospice at NH. Per son, they are interested in hospice at NH. Hospice ed done with son.    Code status discussed. Pt's son to speak to his sister about.     Spoke to pt who is [place]. Pt pleasant and able to answer concrete questions. Some confusion noted. Pt's daughter is at work.     Symptom management:     Pain:   Pt reports pain at times to left shoulder  Pt on allopurinol for gout.   Pt takes Asprin daily.     Will monitor pain and reassess.     Debility:   Pt has pressure sores to heels.  Bedside RN providing wound care.     Pt denies nausea, vomiting,.     Dizziness: pt c/o dizziness    Pt has meclizine prn tid    Plan:   Will f/u with pt's daughter and son.  Plan back to [facility] with hospice.

## 2024-07-05 NOTE — ASSESSMENT & PLAN NOTE
BNP on admission 1801. Received one dose of furosemide in ED.   - Diuresed with lasix until LE edema improved and increase in Cr  - Plan to d/c with Lasix 20mg PO daily PRN

## 2024-07-05 NOTE — PROGRESS NOTES
[facility] Surg  Palliative Medicine  Progress Note    Patient Name: Deena Moody  MRN: [id number]  Admission Date: 7/1/2024  Hospital Length of Stay: 3 days  Code Status: Full Code   Attending Provider: Josue Araujo MD  Consulting Provider: ISABEL Mantilla  Primary Care Physician: Jam Rowell MD  Principal Problem:Left arm swelling    Patient information was obtained from patient and primary team.      Assessment/Plan:     Palliative Care  Palliative care encounter  Impression: Pt is a 89 y/o male withsevere PHTN, diastolic HF, CAD s/p CABG, Afib, AV block s/p pacemaker placement admitted to  for left arm swelling. Pt lives at [facility]. Pt is [place] . Pt is alert and oriented. Some confusion noted. Debility noted.     Reason for consult: GOC. Communicated with Dr. Valera.     GOC/ACP:     Today: Plan is back to NH and family will transition to hospice when they are ready at NH.     7/3/24  Met with pt's son, Deena. Per son he and his sister are NOK. They are aware of pt's medical issues. Per son, his sister has been speaking to NH about pt having hospice at NH. Per son, they are interested in hospice at NH. Hospice ed done with son.    Code status discussed. Pt's son to speak to his sister about.     Spoke to pt who is [place]. Pt pleasant and able to answer concrete questions. Some confusion noted. Pt's daughter is at work.     Symptom management:     Pain:   Pt reports pain at times to left shoulder  Pt on allopurinol for gout.   Pt takes Asprin daily.     Will monitor pain and reassess.     Debility:   Pt has pressure sores to heels.  Bedside RN providing wound care.     Pt denies nausea, vomiting,.     Dizziness: pt c/o dizziness    Pt has meclizine prn tid    Plan:   Will f/u with pt's daughter and son.  Plan back to NH with hospice.               I will follow-up with patient. Please contact us if you have any additional questions.    Subjective:     Chief Complaint:   Chief  Complaint   Patient presents with    Arm Swelling     Left arm swelling and pain. Hx gout. Coming from [facility].       HPI:   Pt is a 89 y.o. male with a PMH of HTN, HLD, Chronic diastolic HF, CAD s/p CABG, A-Fib with a hx of AV block s/p pacemaker placement, CKD3, gout, and macular degeneration BIB EMS from [facility] with complaints of left arm swelling for approximately the last month resulting in significant pain and swelling. Per chart review, he also notes intermittent swelling in his right hand and bilateral lower extremities. He has had shortness of breath when waking in the morning lasting about ten minutes and occasionally requiring oxygen of support. Other notable symptoms include periodic abdominal pain relieved by a medication given by nursing home staff, but he is unsure of the treatment. He also notes a significant chronic left heel wound with no acute changes. Lastly, he has occasional difficulties swallowing. Of note, he was admitted one month ago for rectal bleeding which he currently denies.     In the emergency department he was found to have a BNP of 1801 and was given one dose of lasix and supportive oxygen via nasal cannula. He was resting comfortably.       Hospital Course:  No notes on file        Past Medical History:   Diagnosis Date    Acute encephalopathy 5/20/2024    Acute on chronic diastolic heart failure 9/1/2016    Chronic dementia 6/1/2024    Coronary artery disease     Hyperlipidemia     Hypertension     Macular degeneration (senile) of retina, unspecified 12/12/2014    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    Palliative care encounter 7/3/2024    Persistent atrial fibrillation 11/10/2016    S/P placement of cardiac pacemaker 9/14/2016       Past Surgical History:   Procedure Laterality Date    AORTIC VALVE REPLACEMENT N/A     CARDIAC PACEMAKER PLACEMENT      CATARACT EXTRACTION W/  INTRAOCULAR LENS IMPLANT Right 2/16/2016    Dr. Whitley    CATARACT EXTRACTION W/   INTRAOCULAR LENS IMPLANT Left 3/1/2016    Dr. Whitley    CORONARY ARTERY BYPASS GRAFT      EAR EXAMINATION UNDER ANESTHESIA      EYE SURGERY         Review of patient's allergies indicates:   Allergen Reactions    Iodine and iodide containing products Other (See Comments)     Caused changes in skin color       Medications:  Continuous Infusions:  Scheduled Meds:   allopurinoL  100 mg Oral Daily    aspirin  81 mg Oral Daily    balsam peru-castor oiL   Topical (Top) BID    colchicine  0.6 mg Oral Every other day    enoxparin  30 mg Subcutaneous Daily    ferrous sulfate  1 tablet Oral Once per day on Monday Wednesday Friday    lisinopriL  5 mg Oral Daily    pantoprazole  40 mg Oral Daily    senna-docusate 8.6-50 mg  2 tablet Oral BID    tamsulosin  0.8 mg Oral Daily     PRN Meds:  Current Facility-Administered Medications:     bisacodyL, 10 mg, Rectal, Daily PRN    meclizine, 25 mg, Oral, TID PRN    melatonin, 6 mg, Oral, Nightly PRN    polyethylene glycol, 17 g, Oral, BID PRN    sodium chloride 0.9%, 10 mL, Intravenous, PRN    Family History       Problem Relation (Age of Onset)    Hypertension Brother    No Known Problems Mother, Father, Sister, Maternal Aunt, Maternal Uncle, Paternal Aunt, Paternal Uncle, Maternal Grandmother, Maternal Grandfather, Paternal Grandmother, Paternal Grandfather, Daughter, Son    Stroke Brother          Tobacco Use    Smoking status: Never     Passive exposure: Never    Smokeless tobacco: Never   Substance and Sexual Activity    Alcohol use: No    Drug use: No    Sexual activity: Yes     Partners: Female       Review of Systems   Constitutional:  Positive for activity change.   HENT:  Positive for hearing loss.    Respiratory:  Negative for shortness of breath.    Skin:  Positive for wound.   Neurological:  Positive for weakness.   Psychiatric/Behavioral:  Positive for decreased concentration.      Objective:     Vital Signs (Most Recent):  Temp: 97.4 °F (36.3 °C) (07/05/24  0836)  Pulse: 70 (07/05/24 0836)  Resp: 16 (07/05/24 0400)  BP: 109/67 (07/05/24 0836)  SpO2: 98 % (07/05/24 0836) Vital Signs (24h Range):  Temp:  [97.4 °F (36.3 °C)-98 °F (36.7 °C)] 97.4 °F (36.3 °C)  Pulse:  [68-72] 70  Resp:  [16-18] 16  SpO2:  [90 %-98 %] 98 %  BP: (101-137)/(57-67) 109/67     Weight: 64.7 kg (142 lb 10.2 oz)  Body mass index is 21.06 kg/m².       Physical Exam  Constitutional:       General: He is awake. He is not in acute distress.  HENT:      Head: Normocephalic and atraumatic.   Pulmonary:      Effort: Pulmonary effort is normal. No respiratory distress.   Musculoskeletal:      Comments: Weakness noted.    Skin:     Comments: Pressure sore to left heel.    Neurological:      Mental Status: He is alert.      Comments: Oriented to person, place            Review of Symptoms      Symptom Assessment (ESAS 0-10 Scale)  Pain:  0  Dyspnea:  0  Anxiety:  0  Nausea:  0  Depression:  0  Anorexia:  0  Fatigue:  0  Insomnia:  0  Restlessness:  0  Agitation:  0         Psychosocial/Cultural:   See Palliative Psychosocial Note: No  Pt lives at [facility]. Pt has daughterAmelie and son, Deena Pierce who are NOK.   **Primary  to Follow**  Palliative Care  Consult: Yes        Advance Care Planning  Advance Directives:   Living Will: No    LaPOST: No    Do Not Resuscitate Status: No    Medical Power of : No    Agent's Name:  Son and jean pierrehter are NOK.    Decision Making:  Patient answered questions and Family answered questions  Goals of Care: Spoke to son. Per son, family leaning toward hospice at NH with [facility].          Significant Labs: All pertinent labs within the past 24 hours have been reviewed.  CBC:   Recent Labs   Lab 07/05/24  0353   WBC 6.52   HGB 8.6*   HCT 28.3*   *        BMP:  Recent Labs   Lab 07/05/24  0353   GLU 83      K 4.2      CO2 22*   BUN 29   CREATININE 1.3   CALCIUM 7.9*   MG 1.7     LFT:  Lab Results   Component  Value Date    AST 27 07/01/2024    ALKPHOS 146 (H) 07/01/2024    BILITOT 0.5 07/01/2024     Albumin:   Albumin   Date Value Ref Range Status   07/01/2024 2.1 (L) 3.5 - 5.2 g/dL Final     Protein:   Total Protein   Date Value Ref Range Status   07/01/2024 6.3 6.0 - 8.4 g/dL Final     Lactic acid:   Lab Results   Component Value Date    LACTATE 1.8 09/22/2023    LACTATE 1.6 05/12/2020       Significant Imaging: I have reviewed all pertinent imaging results/findings within the past 24 hours.    > 50% of  55 min visit spent in chart review, face to face discussion of goals of care,  symptom assessment, charting,  coordination of care and emotional support     Esther Hernandez, CNS  Palliative Medicine  [facility] Surg

## 2024-07-05 NOTE — PLAN OF CARE
Pt approved to return, room is 19a, nurse to call report to  transport setup via stretcher for 12pm, nursing updated.

## 2024-07-05 NOTE — DISCHARGE SUMMARY
[facility] Medicine  Discharge Summary      Patient Name: Deena Moody  MRN: [id number]  HOMER: [id number]  Patient Class: IP- Inpatient  Admission Date: 7/1/2024  Hospital Length of Stay: 3 days  Discharge Date and Time:  07/05/2024 1:05 PM  Attending Physician: Josue Araujo MD   Discharging Provider: Mikhail Bender MD  Primary Care Provider: Jam Rowell MD  [facility] Medicine Team: [facility] 2 Mikhail Bender MD  Primary Care Team: [facility] 2    HPI:   Mr. Moody is a 89 y.o. male with a PMH of HTN, HLD, Chronic diastolic HF, CAD s/p CABG, A-Fib with a hx of AV block s/p pacemaker placement, CKD3, gout, and macular degeneration BIB EMS from [facility] with complaints of left arm swelling for approximately the last month resulting in significant pain and swelling. He also notes intermittent swelling in his right hand and bilateral lower extremities. He has had shortness of breath when waking in the morning lasting about ten minutes and occasionally requiring oxygen of support. Other notable symptoms include periodic abdominal pain relieved by a medication given by nursing home staff, but he is unsure of the treatment. He also notes a significant chronic left heel wound with no acute changes. Lastly, he has occasional difficulties swallowing. Of note, he was admitted one month ago for rectal bleeding which he currently denies.    In the emergency department he was found to have a BNP of 1801 and was given one dose of lasix and supportive oxygen via nasal cannula. He was resting comfortably.    * No surgery found *      Hospital Course:   Patient admitted to  for management of left arm swelling, bilateral LE swelling, and shortness of breath. Treated as HF exacerbation with IV diuresis with improvement in SOB and LE edema. TTE noted to have CVP of 3 and pulm htn with PASP at 72 mmHg. Diuresis discontinued with improvement in symptoms. Left arm swelling persisted.  Noted to have significantly decreased ROM of the left shoulder. CT ordered for evaluation, noted diffuse edema and degenerative changes of the shoulder and especially of the radiocapitellar joint. Etiology of edema likely dependent edema related to decreased usage of the arm and inability to flow against gravity. Discussed this with pt's son at bedside and daughter on the phone, recommended compression arm brace for further management. Palliative care consulted during admission for GOC conversations given that family is considering hospice.     Pt stable to discharge back to NH with palliative care referral for further GOC conversations. Will send with lasix for future management of HF. Continue home gout regimen.     Goals of Care Treatment Preferences:  Code Status: Full Code    Health care agent: Son and dee are NOK.  Health care agent number: No value filed.                   Consults:   Consults (From admission, onward)          Status Ordering Provider     Inpatient consult to Palliative Care  Once        Provider:  (Not yet assigned)    Completed MALGORZATA LÓPEZ     Inpatient consult to Skin Integrity  Practitioner  Once        Provider:  Bonny Calvillo NP    Acknowledged REGINA MYERS III     Inpatient consult to Social Work/Case Management  Once        Provider:  (Not yet assigned)    Acknowledged ALEJANDRO FRANCO     Inpatient consult to Registered Dietitian/Nutritionist  Once        Provider:  (Not yet assigned)    Completed ALEJANDRO FRANCO            Cardiac/Vascular  Chronic diastolic heart failure  BNP on admission 1801. Received one dose of furosemide in ED.   - Diuresed with lasix until LE edema improved and increase in Cr  - Plan to d/c with Lasix 20mg PO daily PRN      Other  Dizziness  - Start 25mg Meclizine PO TID for dizziness        Final Active Diagnoses:    Diagnosis Date Noted POA    PRINCIPAL PROBLEM:  Left arm swelling [M79.89] 05/11/2024 Yes    Dizziness [R42] 07/05/2024  Clinically Undetermined    Palliative care encounter [Z51.5] 07/03/2024 Not Applicable    Pain [R52] 07/03/2024 Unknown    Advance care planning [Z71.89] 07/03/2024 Not Applicable    Debility [R53.81] 07/03/2024 Unknown    Shortness of breath [R06.02] 07/02/2024 Yes    Chronic ulcer of left heel [L97.429] 06/01/2024 Yes    Acute gout [M10.9] 05/03/2024 Yes    Constipation [K59.00] 05/11/2020 Yes    Atrial fibrillation [I48.91] 11/10/2016 Yes    Chronic diastolic heart failure [I50.32] 09/01/2016 Yes    CAD (coronary artery disease) [I25.10] 08/31/2016 Yes     Chronic    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.2] 08/31/2016 Yes     Chronic    Essential hypertension [I10] 11/18/2013 Yes      Problems Resolved During this Admission:    Diagnosis Date Noted Date Resolved POA    Nonspecific colitis [K52.9] 05/11/2020 07/04/2024 Yes       Discharged Condition: fair    Disposition: Skilled Nursing Facility    Follow Up:   Follow-up Information       [facility] CARDIOLOGY Follow up.    Specialty: Cardiology  Contact information:  [address] [place] [address] [id number] [phone]                         Patient Instructions:      Ambulatory referral/consult to CLINIC Palliative Care   Standing Status: Future   Referral Priority: Routine Referral Type: Consultation   Requested Specialty: Palliative Medicine   Number of Visits Requested: 1       Significant Diagnostic Studies: Labs: BMP:   Recent Labs   Lab 07/03/24  1343 07/04/24  0509 07/05/24  0353    86 83    141 137   K 4.1 4.1 4.2    108 108   CO2 21* 23 22*   BUN 33* 33* 29   CREATININE 1.6* 1.5* 1.3   CALCIUM 8.1* 7.8* 7.9*   MG  --  1.7 1.7   , CBC   Recent Labs   Lab 07/04/24  0509 07/05/24  0353   WBC 8.66 6.52   HGB 9.1* 8.6*   HCT 29.6* 28.3*    162   , and All labs within the past 24 hours have been reviewed      Pending Diagnostic Studies:       None           Medications:  Reconciled Home Medications:      Medication List        START taking  these medications      furosemide 20 MG tablet  Commonly known as: LASIX  Take 1 tablet (20 mg total) by mouth daily as needed (Please measure daily weight - give a dose of medication for weight gain of > 3lbs in 1 day or > 5lbs in 3 days).     meclizine 25 mg tablet  Commonly known as: ANTIVERT  Take 1 tablet (25 mg total) by mouth 3 (three) times daily as needed for Dizziness.            CHANGE how you take these medications      allopurinoL 100 MG tablet  Commonly known as: ZYLOPRIM  Take 1 tablet (100 mg total) by mouth once daily.  What changed: See the new instructions.     balsam peru-castor oiL Oint  Apply topically 2 (two) times a day. Apply to buttocks and sacrum BID and PRN if soiled.  What changed: how much to take     carvediloL 3.125 MG tablet  Commonly known as: COREG  Take 1 tablet (3.125 mg total) by mouth 2 (two) times daily. HOLD UNTIL SEEN BY PCP  What changed: additional instructions     nitroGLYCERIN 0.4 MG SL tablet  Commonly known as: NITROSTAT  Take one tablet every 5 minutes for chest pain. After the third tablet go to the ED.  What changed:   how much to take  how to take this  when to take this  reasons to take this            CONTINUE taking these medications      acetaminophen 325 MG tablet  Commonly known as: TYLENOL  Take 2 tablets (650 mg total) by mouth every 4 (four) hours as needed (any pain or temp >100).     albuterol 90 mcg/actuation inhaler  Commonly known as: PROVENTIL HFA  Inhale 2 puffs into the lungs every 6 (six) hours as needed for Wheezing. Rescue     aspirin 81 MG EC tablet  Commonly known as: ECOTRIN  Take 1 tablet (81 mg total) by mouth once daily.     benazepriL 5 MG tablet  Commonly known as: LOTENSIN  Take 1 tablet (5 mg total) by mouth once daily. If systolic blood pressure is less than 90 or diastolic >110 notify MD     bisacodyL 10 mg Supp  Commonly known as: DULCOLAX  Place 1 suppository (10 mg total) rectally daily as needed (no BM in over one calendar  day).     colchicine 0.6 mg tablet  Commonly known as: COLCRYS  Take 1 tablet (0.6 mg total) by mouth every other day.     ferrous sulfate 325 mg (65 mg iron) Tab tablet  Commonly known as: FEOSOL  Take 1 tablet (325 mg total) by mouth every Mon, Wed, Fri. Before breakfast     fluticasone propionate 50 mcg/actuation nasal spray  Commonly known as: FLONASE  2 sprays (100 mcg total) by Each Nostril route once daily.     levothyroxine 25 MCG tablet  Commonly known as: SYNTHROID  Take 25 mcg by mouth before breakfast.     multivitamin per tablet  Commonly known as: ONE DAILY MULTIVITAMIN  Take 1 tablet by mouth once daily.     pantoprazole 40 MG tablet  Commonly known as: PROTONIX  Take 1 tablet (40 mg total) by mouth once daily.     senna-docusate 8.6-50 mg 8.6-50 mg per tablet  Commonly known as: PERICOLACE  Take 2 tablets by mouth once daily.     sodium phosphates 9.5-3.5 gram/59 mL Enem  Commonly known as: FLEET PEDIATRIC  Place 1 enema rectally daily as needed (constipation).     tamsulosin 0.4 mg Cap  Commonly known as: FLOMAX  Take 2 capsules (0.8 mg total) by mouth once daily.     VITAMIN C 500 MG tablet  Generic drug: ascorbic acid (vitamin C)  Take 500 mg by mouth 2 (two) times daily. Promote wound healing              Indwelling Lines/Drains at time of discharge:   Lines/Drains/Airways       Drain  Duration                  Urethral Catheter 07/01/24 2030 3 days                    Time spent on the discharge of patient: 40 minutes         Mikhail Bender MD  Department of Hospital Medicine  [facility]

## 2024-07-05 NOTE — PLAN OF CARE
[facility] - Med Surg  Discharge Final Note    Primary Care Provider: Jam Rowell MD    Expected Discharge Date: 7/5/2024    Final Discharge Note (most recent)       Final Note - 07/05/24 0851          Final Note    Assessment Type Final Discharge Note     Anticipated Discharge Disposition care home Nursing Facility     Hospital Resources/Appts/Education Provided Appointments scheduled and added to AVS;Post-Acute resouces added to AVS        Post-Acute Status    Post-Acute Authorization Placement     Post-Acute Placement Status Set-up Complete/Auth obtained     Discharge Delays Post-Acute Set-up                     Important Message from Medicare             Contact Info       [facility] CARDIOLOGY   Specialty: Cardiology    [address]  [facility] [address]   Phone: [phone]       Next Steps: Follow up

## 2024-07-21 ENCOUNTER — CLINICAL SUPPORT (OUTPATIENT)
Dept: CARDIOLOGY | Facility: HOSPITAL | Age: 89
End: 2024-07-21
Payer: MEDICARE

## 2024-07-21 DIAGNOSIS — I48.91 UNSPECIFIED ATRIAL FIBRILLATION: ICD-10-CM

## 2025-07-07 NOTE — ED NOTES
It was sent in May with refills   Looks like it needs prior auth now, which does not make sense because he has been on this dose.    Pt identifiers Deena Moody were checked and correct  LOC: The patient is awake, alert, aware of environment with an appropriate affect. Oriented x3, speaking appropriately  APPEARANCE: Pt resting comfortably, in no acute distress, pt is clean and well groomed, clothing properly fastened  SKIN: Skin warm, dry and intact, normal skin turgor, moist mucus membranes, bruising to the face.  RESPIRATORY: Airway is open and patent, respirations are spontaneous, even and unlabored, normal effort and rate  CARDIAC: Normal rate and rhythm, no peripheral edema noted, capillary refill < 3 seconds, bilateral radial pulses 2+  ABDOMEN: Soft, nontender, nondistended. Bowel sounds present x 4 quadrants.   NEUROLOGIC: PERRL, facial expression is symmetrical, patient moving all extremities spontaneously, normal sensation in all extremities when touched with a finger.  Follows all commands appropriately  MUSCULOSKELETAL: No obvious deformities.